# Patient Record
Sex: FEMALE | Race: BLACK OR AFRICAN AMERICAN | NOT HISPANIC OR LATINO | Employment: OTHER | URBAN - METROPOLITAN AREA
[De-identification: names, ages, dates, MRNs, and addresses within clinical notes are randomized per-mention and may not be internally consistent; named-entity substitution may affect disease eponyms.]

---

## 2018-02-05 ENCOUNTER — HOSPITAL ENCOUNTER (OUTPATIENT)
Facility: HOSPITAL | Age: 48
Discharge: HOME | End: 2018-02-06
Attending: EMERGENCY MEDICINE | Admitting: INTERNAL MEDICINE
Payer: MEDICAID

## 2018-02-05 DIAGNOSIS — J98.4 RESTRICTIVE LUNG DISEASE: Chronic | ICD-10-CM

## 2018-02-05 DIAGNOSIS — Z79.4 TYPE 2 DIABETES MELLITUS WITHOUT COMPLICATION, WITH LONG-TERM CURRENT USE OF INSULIN: Chronic | ICD-10-CM

## 2018-02-05 DIAGNOSIS — Z72.0 TOBACCO ABUSE: Chronic | ICD-10-CM

## 2018-02-05 DIAGNOSIS — I10 ESSENTIAL HYPERTENSION: Chronic | ICD-10-CM

## 2018-02-05 DIAGNOSIS — E11.9 TYPE 2 DIABETES MELLITUS WITHOUT COMPLICATION, WITH LONG-TERM CURRENT USE OF INSULIN: Chronic | ICD-10-CM

## 2018-02-05 DIAGNOSIS — R07.9 CHEST PAIN: ICD-10-CM

## 2018-02-05 DIAGNOSIS — F41.9 ANXIETY: Chronic | ICD-10-CM

## 2018-02-05 DIAGNOSIS — I50.9 CONGESTIVE HEART FAILURE, UNSPECIFIED CONGESTIVE HEART FAILURE CHRONICITY, UNSPECIFIED CONGESTIVE HEART FAILURE TYPE: Primary | Chronic | ICD-10-CM

## 2018-02-05 PROBLEM — J43.9 EMPHYSEMA LUNG: Chronic | Status: ACTIVE | Noted: 2018-02-05

## 2018-02-05 LAB
ALBUMIN SERPL BCP-MCNC: 3.6 G/DL
ALP SERPL-CCNC: 86 U/L
ALT SERPL W/O P-5'-P-CCNC: 20 U/L
ANION GAP SERPL CALC-SCNC: 9 MMOL/L
AST SERPL-CCNC: 19 U/L
B-HCG UR QL: NEGATIVE
BACTERIA #/AREA URNS HPF: NORMAL /HPF
BASOPHILS # BLD AUTO: 0.04 K/UL
BASOPHILS NFR BLD: 0.7 %
BILIRUB SERPL-MCNC: 0.6 MG/DL
BILIRUB UR QL STRIP: NEGATIVE
BNP SERPL-MCNC: 16 PG/ML
BUN SERPL-MCNC: 7 MG/DL
CALCIUM SERPL-MCNC: 8.8 MG/DL
CHLORIDE SERPL-SCNC: 103 MMOL/L
CLARITY UR: CLEAR
CO2 SERPL-SCNC: 26 MMOL/L
COLOR UR: YELLOW
CREAT SERPL-MCNC: 0.9 MG/DL
DIFFERENTIAL METHOD: ABNORMAL
EOSINOPHIL # BLD AUTO: 0.1 K/UL
EOSINOPHIL NFR BLD: 1.1 %
ERYTHROCYTE [DISTWIDTH] IN BLOOD BY AUTOMATED COUNT: 15.3 %
EST. GFR  (AFRICAN AMERICAN): >60 ML/MIN/1.73 M^2
EST. GFR  (NON AFRICAN AMERICAN): >60 ML/MIN/1.73 M^2
FLUAV AG SPEC QL IA: NEGATIVE
FLUBV AG SPEC QL IA: NEGATIVE
GLUCOSE SERPL-MCNC: 329 MG/DL
GLUCOSE UR QL STRIP: ABNORMAL
HCT VFR BLD AUTO: 40 %
HGB BLD-MCNC: 13.2 G/DL
HGB UR QL STRIP: NEGATIVE
HYALINE CASTS #/AREA URNS LPF: 0 /LPF
KETONES UR QL STRIP: NEGATIVE
LEUKOCYTE ESTERASE UR QL STRIP: NEGATIVE
LYMPHOCYTES # BLD AUTO: 1.8 K/UL
LYMPHOCYTES NFR BLD: 32.4 %
MCH RBC QN AUTO: 22.7 PG
MCHC RBC AUTO-ENTMCNC: 33 G/DL
MCV RBC AUTO: 69 FL
MICROSCOPIC COMMENT: NORMAL
MONOCYTES # BLD AUTO: 0.6 K/UL
MONOCYTES NFR BLD: 11 %
NEUTROPHILS # BLD AUTO: 3 K/UL
NEUTROPHILS NFR BLD: 54.8 %
NITRITE UR QL STRIP: NEGATIVE
PH UR STRIP: 6 [PH] (ref 5–8)
PLATELET # BLD AUTO: 219 K/UL
PMV BLD AUTO: 12.6 FL
POCT GLUCOSE: 277 MG/DL (ref 70–110)
POCT GLUCOSE: 280 MG/DL (ref 70–110)
POTASSIUM SERPL-SCNC: 3.8 MMOL/L
PROT SERPL-MCNC: 7.1 G/DL
PROT UR QL STRIP: ABNORMAL
RBC # BLD AUTO: 5.81 M/UL
RBC #/AREA URNS HPF: 1 /HPF (ref 0–4)
SODIUM SERPL-SCNC: 138 MMOL/L
SP GR UR STRIP: >1.03 (ref 1–1.03)
SPECIMEN SOURCE: NORMAL
SQUAMOUS #/AREA URNS HPF: 4 /HPF
TROPONIN I SERPL DL<=0.01 NG/ML-MCNC: <0.006 NG/ML
TROPONIN I SERPL DL<=0.01 NG/ML-MCNC: <0.006 NG/ML
URN SPEC COLLECT METH UR: ABNORMAL
UROBILINOGEN UR STRIP-ACNC: NEGATIVE EU/DL
WBC # BLD AUTO: 5.44 K/UL
WBC #/AREA URNS HPF: 1 /HPF (ref 0–5)
YEAST URNS QL MICRO: NORMAL

## 2018-02-05 PROCEDURE — 84484 ASSAY OF TROPONIN QUANT: CPT | Mod: 91

## 2018-02-05 PROCEDURE — 63600175 PHARM REV CODE 636 W HCPCS: Performed by: HOSPITALIST

## 2018-02-05 PROCEDURE — 63600175 PHARM REV CODE 636 W HCPCS: Performed by: EMERGENCY MEDICINE

## 2018-02-05 PROCEDURE — 84484 ASSAY OF TROPONIN QUANT: CPT

## 2018-02-05 PROCEDURE — 82962 GLUCOSE BLOOD TEST: CPT

## 2018-02-05 PROCEDURE — 94761 N-INVAS EAR/PLS OXIMETRY MLT: CPT

## 2018-02-05 PROCEDURE — 25000003 PHARM REV CODE 250: Performed by: EMERGENCY MEDICINE

## 2018-02-05 PROCEDURE — 81025 URINE PREGNANCY TEST: CPT

## 2018-02-05 PROCEDURE — 99285 EMERGENCY DEPT VISIT HI MDM: CPT | Mod: 25

## 2018-02-05 PROCEDURE — 81000 URINALYSIS NONAUTO W/SCOPE: CPT

## 2018-02-05 PROCEDURE — 25000242 PHARM REV CODE 250 ALT 637 W/ HCPCS: Performed by: HOSPITALIST

## 2018-02-05 PROCEDURE — 25000003 PHARM REV CODE 250: Performed by: HOSPITALIST

## 2018-02-05 PROCEDURE — 80053 COMPREHEN METABOLIC PANEL: CPT

## 2018-02-05 PROCEDURE — S4991 NICOTINE PATCH NONLEGEND: HCPCS | Performed by: HOSPITALIST

## 2018-02-05 PROCEDURE — 93005 ELECTROCARDIOGRAM TRACING: CPT

## 2018-02-05 PROCEDURE — 36415 COLL VENOUS BLD VENIPUNCTURE: CPT

## 2018-02-05 PROCEDURE — 83880 ASSAY OF NATRIURETIC PEPTIDE: CPT

## 2018-02-05 PROCEDURE — 94640 AIRWAY INHALATION TREATMENT: CPT

## 2018-02-05 PROCEDURE — 25500020 PHARM REV CODE 255: Performed by: EMERGENCY MEDICINE

## 2018-02-05 PROCEDURE — 96375 TX/PRO/DX INJ NEW DRUG ADDON: CPT | Mod: 59

## 2018-02-05 PROCEDURE — 96374 THER/PROPH/DIAG INJ IV PUSH: CPT | Mod: 59

## 2018-02-05 PROCEDURE — G0378 HOSPITAL OBSERVATION PER HR: HCPCS

## 2018-02-05 PROCEDURE — 87400 INFLUENZA A/B EACH AG IA: CPT

## 2018-02-05 PROCEDURE — 85025 COMPLETE CBC W/AUTO DIFF WBC: CPT

## 2018-02-05 PROCEDURE — 93010 ELECTROCARDIOGRAM REPORT: CPT | Mod: ,,, | Performed by: INTERNAL MEDICINE

## 2018-02-05 RX ORDER — ASPIRIN 325 MG
325 TABLET, DELAYED RELEASE (ENTERIC COATED) ORAL
Status: COMPLETED | OUTPATIENT
Start: 2018-02-05 | End: 2018-02-05

## 2018-02-05 RX ORDER — CLONIDINE HYDROCHLORIDE 0.1 MG/1
0.1 TABLET ORAL EVERY 6 HOURS PRN
Status: DISCONTINUED | OUTPATIENT
Start: 2018-02-05 | End: 2018-02-06 | Stop reason: HOSPADM

## 2018-02-05 RX ORDER — RISPERIDONE 1 MG/1
2 TABLET ORAL 2 TIMES DAILY
Status: DISCONTINUED | OUTPATIENT
Start: 2018-02-05 | End: 2018-02-06 | Stop reason: HOSPADM

## 2018-02-05 RX ORDER — POTASSIUM CHLORIDE 20 MEQ/1
20 TABLET, EXTENDED RELEASE ORAL DAILY
Status: DISCONTINUED | OUTPATIENT
Start: 2018-02-06 | End: 2018-02-06 | Stop reason: HOSPADM

## 2018-02-05 RX ORDER — VENLAFAXINE HYDROCHLORIDE 37.5 MG/1
75 CAPSULE, EXTENDED RELEASE ORAL DAILY
Status: DISCONTINUED | OUTPATIENT
Start: 2018-02-06 | End: 2018-02-06 | Stop reason: HOSPADM

## 2018-02-05 RX ORDER — PANTOPRAZOLE SODIUM 40 MG/1
40 TABLET, DELAYED RELEASE ORAL DAILY
Status: DISCONTINUED | OUTPATIENT
Start: 2018-02-06 | End: 2018-02-06 | Stop reason: HOSPADM

## 2018-02-05 RX ORDER — KETOROLAC TROMETHAMINE 30 MG/ML
15 INJECTION, SOLUTION INTRAMUSCULAR; INTRAVENOUS ONCE
Status: COMPLETED | OUTPATIENT
Start: 2018-02-05 | End: 2018-02-05

## 2018-02-05 RX ORDER — AMLODIPINE BESYLATE 5 MG/1
10 TABLET ORAL DAILY
Status: DISCONTINUED | OUTPATIENT
Start: 2018-02-06 | End: 2018-02-06 | Stop reason: HOSPADM

## 2018-02-05 RX ORDER — GLUCAGON 1 MG
1 KIT INJECTION
Status: DISCONTINUED | OUTPATIENT
Start: 2018-02-05 | End: 2018-02-06 | Stop reason: HOSPADM

## 2018-02-05 RX ORDER — KETOROLAC TROMETHAMINE 30 MG/ML
15 INJECTION, SOLUTION INTRAMUSCULAR; INTRAVENOUS EVERY 6 HOURS PRN
Status: DISCONTINUED | OUTPATIENT
Start: 2018-02-05 | End: 2018-02-06

## 2018-02-05 RX ORDER — IBUPROFEN 200 MG
1 TABLET ORAL DAILY
Status: DISCONTINUED | OUTPATIENT
Start: 2018-02-05 | End: 2018-02-06 | Stop reason: HOSPADM

## 2018-02-05 RX ORDER — GLIMEPIRIDE 4 MG/1
4 TABLET ORAL
Status: CANCELLED | OUTPATIENT
Start: 2018-02-06

## 2018-02-05 RX ORDER — NITROGLYCERIN 0.4 MG/1
0.4 TABLET SUBLINGUAL
Status: COMPLETED | OUTPATIENT
Start: 2018-02-05 | End: 2018-02-05

## 2018-02-05 RX ORDER — HYDROMORPHONE HYDROCHLORIDE 2 MG/ML
1 INJECTION, SOLUTION INTRAMUSCULAR; INTRAVENOUS; SUBCUTANEOUS ONCE
Status: COMPLETED | OUTPATIENT
Start: 2018-02-05 | End: 2018-02-05

## 2018-02-05 RX ORDER — ONDANSETRON 2 MG/ML
8 INJECTION INTRAMUSCULAR; INTRAVENOUS
Status: COMPLETED | OUTPATIENT
Start: 2018-02-05 | End: 2018-02-05

## 2018-02-05 RX ORDER — HYDROCODONE BITARTRATE AND ACETAMINOPHEN 5; 325 MG/1; MG/1
1 TABLET ORAL EVERY 4 HOURS PRN
Status: DISCONTINUED | OUTPATIENT
Start: 2018-02-05 | End: 2018-02-06 | Stop reason: HOSPADM

## 2018-02-05 RX ORDER — IBUPROFEN 200 MG
24 TABLET ORAL
Status: DISCONTINUED | OUTPATIENT
Start: 2018-02-05 | End: 2018-02-06 | Stop reason: HOSPADM

## 2018-02-05 RX ORDER — IPRATROPIUM BROMIDE AND ALBUTEROL SULFATE 2.5; .5 MG/3ML; MG/3ML
3 SOLUTION RESPIRATORY (INHALATION) EVERY 6 HOURS
Status: DISCONTINUED | OUTPATIENT
Start: 2018-02-05 | End: 2018-02-06 | Stop reason: HOSPADM

## 2018-02-05 RX ORDER — ENOXAPARIN SODIUM 100 MG/ML
40 INJECTION SUBCUTANEOUS DAILY
Status: DISCONTINUED | OUTPATIENT
Start: 2018-02-06 | End: 2018-02-06 | Stop reason: HOSPADM

## 2018-02-05 RX ORDER — FUROSEMIDE 40 MG/1
40 TABLET ORAL 2 TIMES DAILY
Status: DISCONTINUED | OUTPATIENT
Start: 2018-02-05 | End: 2018-02-06 | Stop reason: HOSPADM

## 2018-02-05 RX ORDER — ACETAMINOPHEN 325 MG/1
650 TABLET ORAL EVERY 8 HOURS PRN
Status: DISCONTINUED | OUTPATIENT
Start: 2018-02-05 | End: 2018-02-06 | Stop reason: HOSPADM

## 2018-02-05 RX ORDER — FLUTICASONE FUROATE AND VILANTEROL 200; 25 UG/1; UG/1
1 POWDER RESPIRATORY (INHALATION) DAILY
Status: CANCELLED | OUTPATIENT
Start: 2018-02-06

## 2018-02-05 RX ORDER — GABAPENTIN 300 MG/1
300 CAPSULE ORAL 2 TIMES DAILY
Status: DISCONTINUED | OUTPATIENT
Start: 2018-02-05 | End: 2018-02-06 | Stop reason: HOSPADM

## 2018-02-05 RX ORDER — BUPROPION HYDROCHLORIDE 150 MG/1
150 TABLET, EXTENDED RELEASE ORAL 2 TIMES DAILY
Status: DISCONTINUED | OUTPATIENT
Start: 2018-02-05 | End: 2018-02-06 | Stop reason: HOSPADM

## 2018-02-05 RX ORDER — IBUPROFEN 200 MG
16 TABLET ORAL
Status: DISCONTINUED | OUTPATIENT
Start: 2018-02-05 | End: 2018-02-06 | Stop reason: HOSPADM

## 2018-02-05 RX ORDER — ONDANSETRON 2 MG/ML
4 INJECTION INTRAMUSCULAR; INTRAVENOUS EVERY 8 HOURS PRN
Status: DISCONTINUED | OUTPATIENT
Start: 2018-02-05 | End: 2018-02-06 | Stop reason: HOSPADM

## 2018-02-05 RX ORDER — INSULIN ASPART 100 [IU]/ML
1-10 INJECTION, SOLUTION INTRAVENOUS; SUBCUTANEOUS
Status: DISCONTINUED | OUTPATIENT
Start: 2018-02-05 | End: 2018-02-06 | Stop reason: HOSPADM

## 2018-02-05 RX ORDER — TIOTROPIUM BROMIDE 18 UG/1
18 CAPSULE ORAL; RESPIRATORY (INHALATION) DAILY
Status: CANCELLED | OUTPATIENT
Start: 2018-02-06

## 2018-02-05 RX ORDER — MORPHINE SULFATE 10 MG/ML
4 INJECTION, SOLUTION INTRAMUSCULAR; INTRAVENOUS EVERY 4 HOURS PRN
Status: DISCONTINUED | OUTPATIENT
Start: 2018-02-05 | End: 2018-02-05

## 2018-02-05 RX ADMIN — NITROGLYCERIN 0.4 MG: 0.4 TABLET SUBLINGUAL at 04:02

## 2018-02-05 RX ADMIN — HYDROCODONE BITARTRATE AND ACETAMINOPHEN 1 TABLET: 5; 325 TABLET ORAL at 05:02

## 2018-02-05 RX ADMIN — NICOTINE 1 PATCH: 21 PATCH, EXTENDED RELEASE TRANSDERMAL at 09:02

## 2018-02-05 RX ADMIN — ONDANSETRON 8 MG: 2 INJECTION INTRAMUSCULAR; INTRAVENOUS at 12:02

## 2018-02-05 RX ADMIN — IOHEXOL 70 ML: 350 INJECTION, SOLUTION INTRAVENOUS at 11:02

## 2018-02-05 RX ADMIN — INSULIN ASPART 3 UNITS: 100 INJECTION, SOLUTION INTRAVENOUS; SUBCUTANEOUS at 09:02

## 2018-02-05 RX ADMIN — BUPROPION HYDROCHLORIDE 150 MG: 150 TABLET, FILM COATED, EXTENDED RELEASE ORAL at 09:02

## 2018-02-05 RX ADMIN — GUAIFENESIN AND DEXTROMETHORPHAN HYDROBROMIDE 1 TABLET: 600; 30 TABLET, EXTENDED RELEASE ORAL at 09:02

## 2018-02-05 RX ADMIN — HYDROMORPHONE HYDROCHLORIDE 1 MG: 2 INJECTION, SOLUTION INTRAMUSCULAR; INTRAVENOUS; SUBCUTANEOUS at 07:02

## 2018-02-05 RX ADMIN — CLONIDINE HYDROCHLORIDE 0.1 MG: 0.1 TABLET ORAL at 09:02

## 2018-02-05 RX ADMIN — ASPIRIN 325 MG: 325 TABLET, DELAYED RELEASE ORAL at 04:02

## 2018-02-05 RX ADMIN — IPRATROPIUM BROMIDE AND ALBUTEROL SULFATE 3 ML: .5; 3 SOLUTION RESPIRATORY (INHALATION) at 09:02

## 2018-02-05 RX ADMIN — RISPERIDONE 2 MG: 1 TABLET ORAL at 09:02

## 2018-02-05 RX ADMIN — GABAPENTIN 300 MG: 300 CAPSULE ORAL at 09:02

## 2018-02-05 RX ADMIN — KETOROLAC TROMETHAMINE 15 MG: 30 INJECTION, SOLUTION INTRAMUSCULAR at 12:02

## 2018-02-05 RX ADMIN — FUROSEMIDE 40 MG: 40 TABLET ORAL at 07:02

## 2018-02-05 NOTE — ED TRIAGE NOTES
"Pt to the ED with c/o sharp L sided cheat pain which began around 1000 yesterday morning. Pt reports cough and SOB x "couple of days". Pt states she has x of CHF and she stopped taking fluid pills. Pt denies fever, chill, diarrhea, nausea and vomiting. No acute distress noted. Pt placed on cardiac monitor. 12 lead obtained.   "

## 2018-02-05 NOTE — ED NOTES
POCT glucose checked after pt finished dinner tray. MD notified, per Dr. Carrasquillo, do not follow sliding scale due to late accuchek.

## 2018-02-05 NOTE — ED PROVIDER NOTES
"Encounter Date: 2018    SCRIBE #1 NOTE: I, Pina Nguyễn, am scribing for, and in the presence of,  Marielena Carrasquillo MD. I have scribed the following portions of the note - Other sections scribed: ROS, HPI and PE.       History     Chief Complaint   Patient presents with    Shortness of Breath     Pt reports increase shortness of breath x several days with onset of right sided chest and shoulder pain.      CC: Shortness of Breath; Chest Pain  HPI: This 47 y.o. female with a past medical history of Anxiety; Bronchitis; CHF; DM; Emphysema lung; HTN; Restrictive lung disease; and Sleep apnea,  presents to the ED complaining of progressively worsening SOB for last 2 days,  dry cough for last 2 days, acute onset of severe (10/10) constant non radiating L sided chest pain "rib pain" today.  Her chest pain is worse and severe with coughing. No alleviating factors. She self discontinued Lasix a month ago, stating "I did not have any fluid." She has some swelling to her legs. She reports atraumatic R leg pain. She reports hx of PE in .  She reports taking x2 800 MG ibuprofen 5 hrs ago PTA with minimal relief. Denies any recent sick exposure. UTD w/flu shot. No other associated sx.       The history is provided by the patient. No  was used.     Review of patient's allergies indicates:   Allergen Reactions    Hydrochlorothiazide plus      Past Medical History:   Diagnosis Date    Anxiety     Arthritis     Bronchitis     CHF (congestive heart failure)     DM (diabetes mellitus)     Emphysema lung     Encounter for blood transfusion     HTN (hypertension)     Restrictive lung disease     Sleep apnea      Past Surgical History:   Procedure Laterality Date     SECTION      PALATAL EXPANSION      WRIST SURGERY Right      Family History   Problem Relation Age of Onset    Diabetes Mother     Hypertension Mother     Hypertension Father     Diabetes Father     Diabetes Sister  "     Social History   Substance Use Topics    Smoking status: Current Some Day Smoker     Packs/day: 0.25     Types: Cigarettes    Smokeless tobacco: Never Used    Alcohol use Yes     Review of Systems   Constitutional: Negative for chills and fever.   HENT: Negative for rhinorrhea and sore throat.    Respiratory: Positive for cough (dry).    Cardiovascular: Positive for chest pain (left sided).   Gastrointestinal: Negative for abdominal pain, constipation, diarrhea, nausea and vomiting.   Genitourinary: Negative for dysuria.   Musculoskeletal:        (+) R leg pain   All other systems reviewed and are negative.      Physical Exam     Initial Vitals [02/05/18 1024]   BP Pulse Resp Temp SpO2   (!) 175/115 88 (!) 22 97.7 °F (36.5 °C) 95 %      MAP       135           Vitals:    02/05/18 2334 02/06/18 0027 02/06/18 0442 02/06/18 0727   BP: 130/76  (!) 148/94 138/85   BP Location: Left arm  Left arm Right arm   Patient Position: Sitting  Sitting Lying   Pulse: 65 (!) 56 70 66   Resp: 18 18 18 18   Temp: 98 °F (36.7 °C)  98.6 °F (37 °C) 98.9 °F (37.2 °C)   TempSrc: Oral  Oral Oral   SpO2: 95% 98% 95% (!) 94%   Weight:   110.3 kg (243 lb 2.7 oz)    Height:           Physical Exam    Nursing note and vitals reviewed.  Constitutional:   Conversational, interactive   HENT:   Mouth/Throat: Oropharynx is clear and moist.   Eyes: EOM are normal. Pupils are equal, round, and reactive to light.   Cardiovascular: Intact distal pulses.   see documented heart rate and blood pressure   Pulmonary/Chest: No respiratory distress. She has decreased breath sounds (bilateral lung bases). She has no wheezes. She has no rhonchi. She has no rales.   Abdominal: Soft. There is no tenderness. There is no rebound and no guarding.   Musculoskeletal: Normal range of motion.   The patient has trace edema to her BLE.   Neurological: She is alert and oriented to person, place, and time.   No obvious focal deficit   Skin: Skin is warm.   Psychiatric:  She has a normal mood and affect.         ED Course   Procedures  Labs Reviewed   CBC W/ AUTO DIFFERENTIAL - Abnormal; Notable for the following:        Result Value    RBC 5.81 (*)     MCV 69 (*)     MCH 22.7 (*)     RDW 15.3 (*)     All other components within normal limits   COMPREHENSIVE METABOLIC PANEL - Abnormal; Notable for the following:     Glucose 329 (*)     All other components within normal limits   URINALYSIS - Abnormal; Notable for the following:     Specific Gravity, UA >1.030 (*)     Protein, UA 2+ (*)     Glucose, UA 3+ (*)     All other components within normal limits   POCT GLUCOSE - Abnormal; Notable for the following:     POCT Glucose 277 (*)     All other components within normal limits   B-TYPE NATRIURETIC PEPTIDE   TROPONIN I   INFLUENZA A AND B ANTIGEN   PREGNANCY TEST, URINE RAPID   URINALYSIS MICROSCOPIC     X-Ray Chest PA And Lateral   Final Result      No acute cardiopulmonary process identified.         Electronically signed by: ALDO EUBANKS MD   Date:     02/05/18   Time:    20:57       CTA Chest Non-Coronary - PE Study   Final Result         1.  No pulmonary embolus or acute cardiopulmonary disease identified.         Electronically signed by: LIN DONATO MD   Date:     02/05/18   Time:    12:07       X-Ray Chest 1 View   Final Result    No acute cardiopulmonary disease         Electronically signed by: LIN DONATO MD   Date:     02/05/18   Time:    12:08         EKG Readings: (Independently Interpreted)   Rhythm: Normal Sinus Rhythm. Heart Rate: 74 BPM. Axis: Normal.   EKG at 10:39: NSR 74 BPM, nl axis, nl intervals, non specific T wave abnormality, non specific changes          Medical Decision Making:   ED Management:  At 1502, Patient states she is still having persistent chest pain.  1537, Discussed case with nurse practitionerQuinton for an admission to observation.             Scribe Attestation:   Scribe #1: I performed the above scribed service and the  documentation accurately describes the services I performed. I attest to the accuracy of the note.    Attending Attestation:           Physician Attestation for Scribe:  Physician Attestation Statement for Scribe #1: I, Marielena Carrasquillo MD, reviewed documentation, as scribed by Pina Nguyễn in my presence, and it is both accurate and complete.                 ED Course      Clinical Impression:   The primary encounter diagnosis was Congestive heart failure, unspecified congestive heart failure chronicity, unspecified congestive heart failure type. Diagnoses of Chest pain, Anxiety, Essential hypertension, Type 2 diabetes mellitus without complication, with long-term current use of insulin, Restrictive lung disease, and Tobacco abuse were also pertinent to this visit.                           Marielena Carrasquillo MD  02/06/18 5978

## 2018-02-06 VITALS
WEIGHT: 243.19 LBS | DIASTOLIC BLOOD PRESSURE: 85 MMHG | OXYGEN SATURATION: 94 % | BODY MASS INDEX: 41.52 KG/M2 | HEART RATE: 66 BPM | SYSTOLIC BLOOD PRESSURE: 138 MMHG | TEMPERATURE: 99 F | HEIGHT: 64 IN | RESPIRATION RATE: 18 BRPM

## 2018-02-06 LAB
ALBUMIN SERPL BCP-MCNC: 3.2 G/DL
ALP SERPL-CCNC: 83 U/L
ALT SERPL W/O P-5'-P-CCNC: 18 U/L
ANION GAP SERPL CALC-SCNC: 7 MMOL/L
AORTIC VALVE REGURGITATION: NORMAL
AORTIC VALVE STENOSIS: NORMAL
AST SERPL-CCNC: 15 U/L
BASOPHILS # BLD AUTO: 0.01 K/UL
BASOPHILS NFR BLD: 0.2 %
BILIRUB SERPL-MCNC: 0.4 MG/DL
BUN SERPL-MCNC: 7 MG/DL
CALCIUM SERPL-MCNC: 8.8 MG/DL
CHLORIDE SERPL-SCNC: 102 MMOL/L
CHOLEST SERPL-MCNC: 174 MG/DL
CHOLEST/HDLC SERPL: 2.9 {RATIO}
CO2 SERPL-SCNC: 28 MMOL/L
CREAT SERPL-MCNC: 0.8 MG/DL
DIFFERENTIAL METHOD: ABNORMAL
EOSINOPHIL # BLD AUTO: 0.1 K/UL
EOSINOPHIL NFR BLD: 1.4 %
ERYTHROCYTE [DISTWIDTH] IN BLOOD BY AUTOMATED COUNT: 15 %
EST. GFR  (AFRICAN AMERICAN): >60 ML/MIN/1.73 M^2
EST. GFR  (NON AFRICAN AMERICAN): >60 ML/MIN/1.73 M^2
ESTIMATED AVG GLUCOSE: 186 MG/DL
ESTIMATED PA SYSTOLIC PRESSURE: 32.81
GLOBAL PERICARDIAL EFFUSION: NORMAL
GLUCOSE SERPL-MCNC: 263 MG/DL
HBA1C MFR BLD HPLC: 8.1 %
HCT VFR BLD AUTO: 39 %
HDLC SERPL-MCNC: 61 MG/DL
HDLC SERPL: 35.1 %
HGB BLD-MCNC: 12.5 G/DL
HYPOCHROMIA BLD QL SMEAR: ABNORMAL
LDLC SERPL CALC-MCNC: 90 MG/DL
LYMPHOCYTES # BLD AUTO: 1.9 K/UL
LYMPHOCYTES NFR BLD: 43.9 %
MCH RBC QN AUTO: 22.8 PG
MCHC RBC AUTO-ENTMCNC: 32.1 G/DL
MCV RBC AUTO: 71 FL
MITRAL VALVE REGURGITATION: NORMAL
MONOCYTES # BLD AUTO: 0.5 K/UL
MONOCYTES NFR BLD: 11.4 %
NEUTROPHILS # BLD AUTO: 1.9 K/UL
NEUTROPHILS NFR BLD: 43.3 %
NONHDLC SERPL-MCNC: 113 MG/DL
PLATELET # BLD AUTO: 184 K/UL
PLATELET BLD QL SMEAR: ABNORMAL
PMV BLD AUTO: 10.7 FL
POCT GLUCOSE: 234 MG/DL (ref 70–110)
POTASSIUM SERPL-SCNC: 3.7 MMOL/L
PROT SERPL-MCNC: 6.6 G/DL
RBC # BLD AUTO: 5.49 M/UL
RETIRED EF AND QEF - SEE NOTES: 55 (ref 55–65)
SODIUM SERPL-SCNC: 137 MMOL/L
TRICUSPID VALVE REGURGITATION: NORMAL
TRIGL SERPL-MCNC: 115 MG/DL
TROPONIN I SERPL DL<=0.01 NG/ML-MCNC: <0.006 NG/ML
TSH SERPL DL<=0.005 MIU/L-ACNC: 2.87 UIU/ML
WBC # BLD AUTO: 4.4 K/UL

## 2018-02-06 PROCEDURE — 96374 THER/PROPH/DIAG INJ IV PUSH: CPT

## 2018-02-06 PROCEDURE — 85025 COMPLETE CBC W/AUTO DIFF WBC: CPT

## 2018-02-06 PROCEDURE — 94640 AIRWAY INHALATION TREATMENT: CPT

## 2018-02-06 PROCEDURE — 80061 LIPID PANEL: CPT

## 2018-02-06 PROCEDURE — 84443 ASSAY THYROID STIM HORMONE: CPT

## 2018-02-06 PROCEDURE — S4991 NICOTINE PATCH NONLEGEND: HCPCS | Performed by: HOSPITALIST

## 2018-02-06 PROCEDURE — 25000242 PHARM REV CODE 250 ALT 637 W/ HCPCS: Performed by: HOSPITALIST

## 2018-02-06 PROCEDURE — 25000003 PHARM REV CODE 250: Performed by: HOSPITALIST

## 2018-02-06 PROCEDURE — 80053 COMPREHEN METABOLIC PANEL: CPT

## 2018-02-06 PROCEDURE — 94761 N-INVAS EAR/PLS OXIMETRY MLT: CPT

## 2018-02-06 PROCEDURE — 93306 TTE W/DOPPLER COMPLETE: CPT

## 2018-02-06 PROCEDURE — 93306 TTE W/DOPPLER COMPLETE: CPT | Mod: 26,,, | Performed by: INTERNAL MEDICINE

## 2018-02-06 PROCEDURE — G0378 HOSPITAL OBSERVATION PER HR: HCPCS

## 2018-02-06 PROCEDURE — 25000003 PHARM REV CODE 250: Performed by: EMERGENCY MEDICINE

## 2018-02-06 PROCEDURE — 83036 HEMOGLOBIN GLYCOSYLATED A1C: CPT

## 2018-02-06 RX ORDER — IBUPROFEN 800 MG/1
800 TABLET ORAL 3 TIMES DAILY
Qty: 15 TABLET | Refills: 0 | OUTPATIENT
Start: 2018-02-06 | End: 2019-08-31

## 2018-02-06 RX ORDER — KETOROLAC TROMETHAMINE 30 MG/ML
15 INJECTION, SOLUTION INTRAMUSCULAR; INTRAVENOUS ONCE
Status: DISCONTINUED | OUTPATIENT
Start: 2018-02-06 | End: 2018-02-06 | Stop reason: HOSPADM

## 2018-02-06 RX ADMIN — PANTOPRAZOLE SODIUM 40 MG: 40 TABLET, DELAYED RELEASE ORAL at 08:02

## 2018-02-06 RX ADMIN — VENLAFAXINE HYDROCHLORIDE 75 MG: 37.5 CAPSULE, EXTENDED RELEASE ORAL at 08:02

## 2018-02-06 RX ADMIN — RISPERIDONE 2 MG: 1 TABLET ORAL at 08:02

## 2018-02-06 RX ADMIN — GABAPENTIN 300 MG: 300 CAPSULE ORAL at 08:02

## 2018-02-06 RX ADMIN — GUAIFENESIN AND DEXTROMETHORPHAN HYDROBROMIDE 1 TABLET: 600; 30 TABLET, EXTENDED RELEASE ORAL at 08:02

## 2018-02-06 RX ADMIN — POTASSIUM CHLORIDE 20 MEQ: 1500 TABLET, EXTENDED RELEASE ORAL at 08:02

## 2018-02-06 RX ADMIN — IPRATROPIUM BROMIDE AND ALBUTEROL SULFATE 3 ML: .5; 3 SOLUTION RESPIRATORY (INHALATION) at 12:02

## 2018-02-06 RX ADMIN — BUPROPION HYDROCHLORIDE 150 MG: 150 TABLET, FILM COATED, EXTENDED RELEASE ORAL at 08:02

## 2018-02-06 RX ADMIN — AMLODIPINE BESYLATE 10 MG: 5 TABLET ORAL at 08:02

## 2018-02-06 RX ADMIN — NICOTINE 1 PATCH: 21 PATCH, EXTENDED RELEASE TRANSDERMAL at 08:02

## 2018-02-06 RX ADMIN — FUROSEMIDE 40 MG: 40 TABLET ORAL at 08:02

## 2018-02-06 NOTE — ASSESSMENT & PLAN NOTE
Inpatient psychiatric hospitalization in the past, has been out of her medications for roughly 1 year.  Will resume venlafaxine, risperidone, and bupropion.  She appears stable with sound judgement and can safely care for herself.  No SI/HI.

## 2018-02-06 NOTE — ASSESSMENT & PLAN NOTE
Appears stable without sign of acute decompensation.  No echo on record.  Check echo and continue home medications.

## 2018-02-06 NOTE — PLAN OF CARE
02/06/18 1140   Final Note   Assessment Type Final Discharge Note   Discharge Disposition Home   What phone number can be called within the next 1-3 days to see how you are doing after discharge? ([phone])   Hospital Follow Up  Appt(s) scheduled? Yes   Discharge plans and expectations educations in teach back method with documentation complete? Yes   Right Care Referral Info   Post Acute Recommendation No Care   Reminded Ms Mosqueda things she will be responsible for to manage her healthcare at home: getting Rx filled, attending follow up appointments, and taking medication as prescribed were discussed.   Teach back method used.  All questions answered.  Patient verbalized understanding of all information.      Patient requested to schedule own appointments stating that she sees [facility] doctors.    NurseAnnie notified that all CM needs are met.

## 2018-02-06 NOTE — SUBJECTIVE & OBJECTIVE
Past Medical History:   Diagnosis Date    Anxiety     Arthritis     Bronchitis     CHF (congestive heart failure)     DM (diabetes mellitus)     Emphysema lung     Encounter for blood transfusion     HTN (hypertension)     Restrictive lung disease     Sleep apnea        Past Surgical History:   Procedure Laterality Date     SECTION      PALATAL EXPANSION      WRIST SURGERY Right        Review of patient's allergies indicates:   Allergen Reactions    Hydrochlorothiazide plus        No current facility-administered medications on file prior to encounter.      Current Outpatient Prescriptions on File Prior to Encounter   Medication Sig    amlodipine (NORVASC) 10 MG tablet Take 10 mg by mouth once daily.    INSULIN ASPART PROTAM & ASPART (NOVOLOG MIX 70-30 SUBQ) Inject into the skin.    omeprazole (PRILOSEC) 40 MG capsule Take 40 mg by mouth once daily.    albuterol (ACCUNEB) 0.63 mg/3 mL Nebu Take 0.63 mg by nebulization every 6 (six) hours as needed.    albuterol (ACCUNEB) 1.25 mg/3 mL Nebu Take 1.25 mg by nebulization every 6 (six) hours as needed.    alprazolam (XANAX) 0.25 MG tablet Take 0.25 mg by mouth 3 (three) times daily.    aspirin 81 MG Chew Take 81 mg by mouth once daily.    buPROPion (WELLBUTRIN SR) 100 MG TBSR 12 hr tablet Take 150 mg by mouth 2 (two) times daily.     fluticasone-salmeterol 500-50 mcg/dose (ADVAIR DISKUS) 500-50 mcg/dose DsDv diskus inhaler Inhale 1 puff into the lungs 2 (two) times daily.    furosemide (LASIX) 40 MG tablet Take 40 mg by mouth 2 (two) times daily.    gabapentin (NEURONTIN) 300 mg tablet Take 300 mg by mouth 2 (two) times daily.    glimepiride (AMARYL) 4 MG tablet Take 4 mg by mouth before breakfast.    hydrOXYzine (ATARAX) 50 MG tablet Take 50 mg by mouth 4 (four) times daily.    naproxen (NAPROSYN) 500 MG tablet Take 500 mg by mouth 2 (two) times daily.    potassium chloride SA (K-DUR,KLOR-CON) 20 MEQ tablet Take 20 mEq by mouth once  daily.    predniSONE (DELTASONE) 10 MG tablet Take 6 tabs x 3 days, then  Take 4 tabs x 3 days, then   Take 2 tab x 2 days, then  Take 1 tab x 2 days.    risperidone (RISPERDAL) 2 MG tablet Take 2 mg by mouth 2 (two) times daily.    tiotropium (SPIRIVA) 18 mcg inhalation capsule Inhale 18 mcg into the lungs once daily.    venlafaxine (EFFEXOR-XR) 150 MG Cp24 Take 75 mg by mouth once daily.     Family History     Problem Relation (Age of Onset)    Diabetes Mother, Father, Sister    Hypertension Mother, Father        Social History Main Topics    Smoking status: Current Some Day Smoker     Packs/day: 0.25     Types: Cigarettes    Smokeless tobacco: Never Used    Alcohol use Yes    Drug use: No    Sexual activity: Not on file     Review of Systems   Constitutional: Negative for chills, fatigue and fever.   Eyes: Negative for photophobia and visual disturbance.   Respiratory: Positive for cough (chronic). Negative for shortness of breath.    Cardiovascular: Positive for chest pain (left lateral and posterior rib pain). Negative for palpitations and leg swelling.   Gastrointestinal: Negative for abdominal pain, diarrhea, nausea and vomiting.   Genitourinary: Negative for dysuria, frequency and urgency.   Skin: Negative for pallor, rash and wound.   Neurological: Negative for dizziness, syncope, light-headedness and headaches.   Psychiatric/Behavioral: Negative for confusion and decreased concentration.     Objective:     Vital Signs (Most Recent):  Temp: 97.5 °F (36.4 °C) (02/05/18 1925)  Pulse: 65 (02/05/18 1925)  Resp: 18 (02/05/18 1925)  BP: (!) 188/88 (02/05/18 1925)  SpO2: 100 % (02/05/18 1925) Vital Signs (24h Range):  Temp:  [97.5 °F (36.4 °C)-98.1 °F (36.7 °C)] 97.5 °F (36.4 °C)  Pulse:  [64-88] 65  Resp:  [18-22] 18  SpO2:  [94 %-100 %] 100 %  BP: (140-188)/() 188/88     Weight: 110.5 kg (243 lb 9.7 oz)  Body mass index is 41.82 kg/m².    Physical Exam   Constitutional: She is oriented to person,  place, and time. She appears well-developed and well-nourished. No distress.   HENT:   Head: Normocephalic and atraumatic.   Right Ear: External ear normal.   Left Ear: External ear normal.   Nose: Nose normal.   Mouth/Throat: Oropharynx is clear and moist.   Eyes: Conjunctivae and EOM are normal. Pupils are equal, round, and reactive to light.   Neck: Normal range of motion. Neck supple. No thyromegaly present.   Cardiovascular: Normal rate, regular rhythm and intact distal pulses.    Pulmonary/Chest: Effort normal and breath sounds normal. No respiratory distress. She has no wheezes.   Abdominal: Soft. Bowel sounds are normal. She exhibits no distension. There is no tenderness.   No palpable hepatomegaly or splenomegaly    Musculoskeletal: Normal range of motion. She exhibits tenderness. She exhibits no edema.        Arms:  Lymphadenopathy:     She has no cervical adenopathy.   Neurological: She is alert and oriented to person, place, and time.   Skin: Skin is warm and dry. Capillary refill takes less than 2 seconds.   Psychiatric: She has a normal mood and affect. Thought content normal. Her speech is rapid and/or pressured. She expresses no homicidal and no suicidal ideation.   Nursing note and vitals reviewed.        CRANIAL NERVES     CN III, IV, VI   Pupils are equal, round, and reactive to light.  Extraocular motions are normal.        Significant Labs: All pertinent labs within the past 24 hours have been reviewed.    Significant Imaging: I have reviewed and interpreted all pertinent imaging results/findings within the past 24 hours.

## 2018-02-06 NOTE — DISCHARGE SUMMARY
[facility] Hospital Medicine  Discharge Summary      Patient Name: Jailene Mosqueda  MRN: [id number]  Admission Date: 2/5/2018  Hospital Length of Stay: 0 days  Discharge Date and Time:  02/06/2018 11:18 AM  Attending Physician: Rebecca Lockwood MD   Discharging Provider: Nohelia Helms PA-C  Primary Care Provider: Primary Doctor No      HPI:   47 y.o. female with CHF, COPD, HTN, DM type 2, and anxiety presents with a complaint of chest pain.  Pain is located left lateral and posterior chest wall, sharp, severe, and exacerbated by coughing, deep breathing and palpation.  Acute onset with coughing yesterday.  She has had a chronic cough for many years with COPD which is currently not worse than baseline.  Chronic SOB that is also at baseline.  She continues to smoke.  Denies fever, chills, palpitations, orthopnea, PND, edema, dizziness, syncope, nausea, vomiting, diarrhea, abdominal pain, bloody or dark stools, dysuria, frequency, or urgency.  ED evaluation unremarkable.  EKG without evidence of acute ischemia, initial troponin negative, chest xray without acute abnormality.  Placed in observation for ACS rule out.    * No surgery found *      Hospital Course:   Patient presents with CP. In the ED, patient was found to have EKG without evidence of acute ischemia, initial troponin negative, chest xray without acute abnormality. Troponin's  remained negative throughout course of the hospital admission. Pain more consistent with MSK strain likely secondary to excessive coughing. Echo with EF of 55-60% and trivial aortic stenosis. Patient's chest pain on discharge was reproducible with palpation and patient was discharged home with 800mg ibuprofen. Advised against heavy lifting. Recommended follow up with PCP if symptoms persist. Urged to quit smoking as symptoms likely exacerbated by underlying lung dysfunction.     Consults:     No new Assessment & Plan notes have been filed under this hospital  service since the last note was generated.  Service: Hospital Medicine    Final Active Diagnoses:    Diagnosis Date Noted POA    PRINCIPAL PROBLEM:  Chest pain [R07.9] 02/05/2018 Yes    CHF (congestive heart failure) [I50.9] 02/05/2018 Yes     Chronic    Anxiety [F41.9] 02/05/2018 Yes     Chronic    Essential hypertension [I10] 02/05/2018 Yes     Chronic    Type 2 diabetes mellitus without complication, with long-term current use of insulin [E11.9, Z79.4] 02/05/2018 Not Applicable     Chronic    Emphysema lung [J43.9] 02/05/2018 Yes     Chronic    Restrictive lung disease [J98.4] 02/05/2018 Yes     Chronic    Tobacco abuse [Z72.0] 02/05/2018 Yes     Chronic      Problems Resolved During this Admission:    Diagnosis Date Noted Date Resolved POA       Discharged Condition: good    Disposition: Home or Self Care    Follow Up:    Patient Instructions:     Diet Cardiac     Diet diabetic     Activity as tolerated         Significant Diagnostic Studies: Labs:   CMP   Recent Labs  Lab 02/05/18  1039 02/06/18  0505    137   K 3.8 3.7    102   CO2 26 28   * 263*   BUN 7 7   CREATININE 0.9 0.8   CALCIUM 8.8 8.8   PROT 7.1 6.6   ALBUMIN 3.6 3.2*   BILITOT 0.6 0.4   ALKPHOS 86 83   AST 19 15   ALT 20 18   ANIONGAP 9 7*   ESTGFRAFRICA >60 >60   EGFRNONAA >60 >60   , CBC   Recent Labs  Lab 02/05/18  1039 02/06/18  0505   WBC 5.44 4.40   HGB 13.2 12.5   HCT 40.0 39.0    184    and Troponin   Recent Labs  Lab 02/05/18  2320   TROPONINI <0.006     Cardiac Graphics: Echocardiogram:   2D echo with color flow doppler:   Results for orders placed or performed during the hospital encounter of 02/05/18   2D echo with color flow doppler   Result Value Ref Range    EF 55 55 - 65    Mitral Valve Regurgitation TRIVIAL     Aortic Valve Regurgitation TRIVIAL     Aortic Valve Stenosis TRIVIAL     Est. PA Systolic Pressure 32.81     Pericardial Effusion NONE     Tricuspid Valve Regurgitation TRIVIAL TO MILD         Pending Diagnostic Studies:     Procedure Component Value Units Date/Time    Hemoglobin A1c [[id number]] Collected:  02/06/18 0505    Order Status:  Sent Lab Status:  In process Updated:  02/06/18 0505    Specimen:  Blood from Blood          Medications:  Reconciled Home Medications:   Current Discharge Medication List      START taking these medications    Details   ibuprofen (ADVIL,MOTRIN) 800 MG tablet Take 1 tablet (800 mg total) by mouth 3 (three) times daily.  Qty: 15 tablet, Refills: 0         CONTINUE these medications which have NOT CHANGED    Details   amlodipine (NORVASC) 10 MG tablet Take 10 mg by mouth once daily.      INSULIN ASPART PROTAM & ASPART (NOVOLOG MIX 70-30 SUBQ) Inject into the skin.      omeprazole (PRILOSEC) 40 MG capsule Take 40 mg by mouth once daily.      albuterol (ACCUNEB) 0.63 mg/3 mL Nebu Take 0.63 mg by nebulization every 6 (six) hours as needed.      albuterol (ACCUNEB) 1.25 mg/3 mL Nebu Take 1.25 mg by nebulization every 6 (six) hours as needed.      alprazolam (XANAX) 0.25 MG tablet Take 0.25 mg by mouth 3 (three) times daily.      aspirin 81 MG Chew Take 81 mg by mouth once daily.      buPROPion (WELLBUTRIN SR) 100 MG TBSR 12 hr tablet Take 150 mg by mouth 2 (two) times daily.       fluticasone-salmeterol 500-50 mcg/dose (ADVAIR DISKUS) 500-50 mcg/dose DsDv diskus inhaler Inhale 1 puff into the lungs 2 (two) times daily.      furosemide (LASIX) 40 MG tablet Take 40 mg by mouth 2 (two) times daily.      gabapentin (NEURONTIN) 300 mg tablet Take 300 mg by mouth 2 (two) times daily.      glimepiride (AMARYL) 4 MG tablet Take 4 mg by mouth before breakfast.      hydrOXYzine (ATARAX) 50 MG tablet Take 50 mg by mouth 4 (four) times daily.      naproxen (NAPROSYN) 500 MG tablet Take 500 mg by mouth 2 (two) times daily.      potassium chloride SA (K-DUR,KLOR-CON) 20 MEQ tablet Take 20 mEq by mouth once daily.      predniSONE (DELTASONE) 10 MG tablet Take 6 tabs x 3 days,  then  Take 4 tabs x 3 days, then   Take 2 tab x 2 days, then  Take 1 tab x 2 days.  Qty: 36 tablet, Refills: 0      risperidone (RISPERDAL) 2 MG tablet Take 2 mg by mouth 2 (two) times daily.      tiotropium (SPIRIVA) 18 mcg inhalation capsule Inhale 18 mcg into the lungs once daily.      venlafaxine (EFFEXOR-XR) 150 MG Cp24 Take 75 mg by mouth once daily.             Indwelling Lines/Drains at time of discharge:   Lines/Drains/Airways          No matching active lines, drains, or airways          Time spent on the discharge of patient: less than thirty minutes  Patient was seen and examined on the date of discharge and determined to be suitable for discharge.         Nohelia Helms PA-C  Hospitalist-Department of Hospital Medicine  [address] [place] [address]  Office [phone] Pager [phone]

## 2018-02-06 NOTE — NURSING
Patient arrived on unit from the ER dept. via stretcher. Patient ambulated with stand-by assistance to bed. General evaluation of patient completed.  Admit assessment initiated.  NAD

## 2018-02-06 NOTE — H&P
[facility] Hospital Medicine  History & Physical    Patient Name: Jailene Mosqueda  MRN: [id number]  Admission Date: 2018  Attending Physician: Semaj Malone MD   Primary Care Provider: Primary Doctor No         Patient information was obtained from patient, past medical records and ER records.     Subjective:     Principal Problem:Chest pain    Chief Complaint:   Chief Complaint   Patient presents with    Shortness of Breath     Pt reports increase shortness of breath x several days with onset of right sided chest and shoulder pain.         HPI: 47 y.o. female with CHF, COPD, HTN, DM type 2, and anxiety presents with a complaint of chest pain.  Pain is located left lateral and posterior chest wall, sharp, severe, and exacerbated by coughing, deep breathing and palpation.  Acute onset with coughing yesterday.  She has had a chronic cough for many years with COPD which is currently not worse than baseline.  Chronic SOB that is also at baseline.  She continues to smoke.  Denies fever, chills, palpitations, orthopnea, PND, edema, dizziness, syncope, nausea, vomiting, diarrhea, abdominal pain, bloody or dark stools, dysuria, frequency, or urgency.  ED evaluation unremarkable.  EKG without evidence of acute ischemia, initial troponin negative, chest xray without acute abnormality.  Placed in observation for ACS rule out.    Past Medical History:   Diagnosis Date    Anxiety     Arthritis     Bronchitis     CHF (congestive heart failure)     DM (diabetes mellitus)     Emphysema lung     Encounter for blood transfusion     HTN (hypertension)     Restrictive lung disease     Sleep apnea        Past Surgical History:   Procedure Laterality Date     SECTION      PALATAL EXPANSION      WRIST SURGERY Right        Review of patient's allergies indicates:   Allergen Reactions    Hydrochlorothiazide plus        No current facility-administered medications on file prior to  encounter.      Current Outpatient Prescriptions on File Prior to Encounter   Medication Sig    amlodipine (NORVASC) 10 MG tablet Take 10 mg by mouth once daily.    INSULIN ASPART PROTAM & ASPART (NOVOLOG MIX 70-30 SUBQ) Inject into the skin.    omeprazole (PRILOSEC) 40 MG capsule Take 40 mg by mouth once daily.    albuterol (ACCUNEB) 0.63 mg/3 mL Nebu Take 0.63 mg by nebulization every 6 (six) hours as needed.    albuterol (ACCUNEB) 1.25 mg/3 mL Nebu Take 1.25 mg by nebulization every 6 (six) hours as needed.    alprazolam (XANAX) 0.25 MG tablet Take 0.25 mg by mouth 3 (three) times daily.    aspirin 81 MG Chew Take 81 mg by mouth once daily.    buPROPion (WELLBUTRIN SR) 100 MG TBSR 12 hr tablet Take 150 mg by mouth 2 (two) times daily.     fluticasone-salmeterol 500-50 mcg/dose (ADVAIR DISKUS) 500-50 mcg/dose DsDv diskus inhaler Inhale 1 puff into the lungs 2 (two) times daily.    furosemide (LASIX) 40 MG tablet Take 40 mg by mouth 2 (two) times daily.    gabapentin (NEURONTIN) 300 mg tablet Take 300 mg by mouth 2 (two) times daily.    glimepiride (AMARYL) 4 MG tablet Take 4 mg by mouth before breakfast.    hydrOXYzine (ATARAX) 50 MG tablet Take 50 mg by mouth 4 (four) times daily.    naproxen (NAPROSYN) 500 MG tablet Take 500 mg by mouth 2 (two) times daily.    potassium chloride SA (K-DUR,KLOR-CON) 20 MEQ tablet Take 20 mEq by mouth once daily.    predniSONE (DELTASONE) 10 MG tablet Take 6 tabs x 3 days, then  Take 4 tabs x 3 days, then   Take 2 tab x 2 days, then  Take 1 tab x 2 days.    risperidone (RISPERDAL) 2 MG tablet Take 2 mg by mouth 2 (two) times daily.    tiotropium (SPIRIVA) 18 mcg inhalation capsule Inhale 18 mcg into the lungs once daily.    venlafaxine (EFFEXOR-XR) 150 MG Cp24 Take 75 mg by mouth once daily.     Family History     Problem Relation (Age of Onset)    Diabetes Mother, Father, Sister    Hypertension Mother, Father        Social History Main Topics    Smoking  status: Current Some Day Smoker     Packs/day: 0.25     Types: Cigarettes    Smokeless tobacco: Never Used    Alcohol use Yes    Drug use: No    Sexual activity: Not on file     Review of Systems   Constitutional: Negative for chills, fatigue and fever.   Eyes: Negative for photophobia and visual disturbance.   Respiratory: Positive for cough (chronic). Negative for shortness of breath.    Cardiovascular: Positive for chest pain (left lateral and posterior rib pain). Negative for palpitations and leg swelling.   Gastrointestinal: Negative for abdominal pain, diarrhea, nausea and vomiting.   Genitourinary: Negative for dysuria, frequency and urgency.   Skin: Negative for pallor, rash and wound.   Neurological: Negative for dizziness, syncope, light-headedness and headaches.   Psychiatric/Behavioral: Negative for confusion and decreased concentration.     Objective:     Vital Signs (Most Recent):  Temp: 97.5 °F (36.4 °C) (02/05/18 1925)  Pulse: 65 (02/05/18 1925)  Resp: 18 (02/05/18 1925)  BP: (!) 188/88 (02/05/18 1925)  SpO2: 100 % (02/05/18 1925) Vital Signs (24h Range):  Temp:  [97.5 °F (36.4 °C)-98.1 °F (36.7 °C)] 97.5 °F (36.4 °C)  Pulse:  [64-88] 65  Resp:  [18-22] 18  SpO2:  [94 %-100 %] 100 %  BP: (140-188)/() 188/88     Weight: 110.5 kg (243 lb 9.7 oz)  Body mass index is 41.82 kg/m².    Physical Exam   Constitutional: She is oriented to person, place, and time. She appears well-developed and well-nourished. No distress.   HENT:   Head: Normocephalic and atraumatic.   Right Ear: External ear normal.   Left Ear: External ear normal.   Nose: Nose normal.   Mouth/Throat: Oropharynx is clear and moist.   Eyes: Conjunctivae and EOM are normal. Pupils are equal, round, and reactive to light.   Neck: Normal range of motion. Neck supple. No thyromegaly present.   Cardiovascular: Normal rate, regular rhythm and intact distal pulses.    Pulmonary/Chest: Effort normal and breath sounds normal. No respiratory  distress. She has no wheezes.   Abdominal: Soft. Bowel sounds are normal. She exhibits no distension. There is no tenderness.   No palpable hepatomegaly or splenomegaly    Musculoskeletal: Normal range of motion. She exhibits tenderness. She exhibits no edema.        Arms:  Lymphadenopathy:     She has no cervical adenopathy.   Neurological: She is alert and oriented to person, place, and time.   Skin: Skin is warm and dry. Capillary refill takes less than 2 seconds.   Psychiatric: She has a normal mood and affect. Thought content normal. Her speech is rapid and/or pressured. She expresses no homicidal and no suicidal ideation.   Nursing note and vitals reviewed.        CRANIAL NERVES     CN III, IV, VI   Pupils are equal, round, and reactive to light.  Extraocular motions are normal.        Significant Labs: All pertinent labs within the past 24 hours have been reviewed.    Significant Imaging: I have reviewed and interpreted all pertinent imaging results/findings within the past 24 hours.    Assessment/Plan:     * Chest pain    Not typical of ACS, suspect stress fracture or muscle strain from coughing, CXR in ED was a single AP view, will obtain PA and lateral.  If no improvement consider more advanced imaging in the AM.  HEART score 3.  -PRN analgesics, cough suppressant, and mucolytic  -cardiac monitoring  -serial troponins  -ASA and PRN NTG  -2D echo  -TSH and lipid panel        Restrictive lung disease    Hold home inhalers while in observation.  Duo nebs q 6 hr.  Encourage smoking cessation.         Emphysema lung    As above.        CHF (congestive heart failure)    Appears stable without sign of acute decompensation.  No echo on record.  Check echo and continue home medications.        Type 2 diabetes mellitus without complication, with long-term current use of insulin    Check A1c.  Hold oral antihyperglycemics while inpatient  PRN sliding scale insulin  ACHS glucose monitoring   ADA diet        Essential  hypertension    Poorly controlled, continue home medications and monitor blood pressures.  Adjust as needed. PRN clonidine available.        Anxiety    Inpatient psychiatric hospitalization in the past, has been out of her medications for roughly 1 year.  Will resume venlafaxine, risperidone, and bupropion.  She appears stable with sound judgement and can safely care for herself.  No SI/HI.        Tobacco abuse    Patient was counseled on smoking cessation and she is not currently ready to stop smoking. She will be provided a nicotine transdermal patch applied while inpatient.  Will provide additional smoking cessation counseling prior to discharge.          VTE Risk Mitigation         Ordered     enoxaparin injection 40 mg  Daily     Route:  Subcutaneous        02/05/18 2045     Medium Risk of VTE  Once      02/05/18 2045     Place sequential compression device  Until discontinued      02/05/18 2045        Quinton Tay Jr., APRN, AGACN-BC  Hospitalist - Department of Hospital Medicine  [facility] [address] [facility] [place] [address]  Office #: [phone]; Pager #: [phone]

## 2018-02-06 NOTE — PLAN OF CARE
02/06/18 1137   Discharge Assessment   Assessment Type Discharge Planning Assessment   Patient with active DC order on chart prior to completion of DC needs assessment.  Patient stated that she lives with her  who is able to help her to manage her care at home.  She is independent and does not use any equipment.

## 2018-02-06 NOTE — HPI
47 y.o. female with CHF, COPD, HTN, DM type 2, and anxiety presents with a complaint of chest pain.  Pain is located left lateral and posterior chest wall, sharp, severe, and exacerbated by coughing, deep breathing and palpation.  Acute onset with coughing yesterday.  She has had a chronic cough for many years with COPD which is currently not worse than baseline.  Chronic SOB that is also at baseline.  She continues to smoke.  Denies fever, chills, palpitations, orthopnea, PND, edema, dizziness, syncope, nausea, vomiting, diarrhea, abdominal pain, bloody or dark stools, dysuria, frequency, or urgency.  ED evaluation unremarkable.  EKG without evidence of acute ischemia, initial troponin negative, chest xray without acute abnormality.  Placed in observation for ACS rule out.

## 2018-02-06 NOTE — ASSESSMENT & PLAN NOTE
Check A1c.  Hold oral antihyperglycemics while inpatient  PRN sliding scale insulin  ACHS glucose monitoring   ADA diet

## 2018-02-06 NOTE — HOSPITAL COURSE
Patient presents with CP. In the ED, patient was found to have EKG without evidence of acute ischemia, initial troponin negative, chest xray without acute abnormality. Troponin's remained negative throughout course of the hospital admission. Pain more consistent with MSK strain likely secondary to excessive coughing. Echo with EF of 55-60% and trivial aortic stenosis. Patient's chest pain on discharge was reproducible with palpation and patient was discharged home with 800mg ibuprofen. Advised against heavy lifting. Recommended follow up with PCP if symptoms persist. Urged to quit smoking as symptoms likely exacerbated by underlying lung dysfunction.

## 2018-02-06 NOTE — MEDICAL/APP STUDENT
[facility] Hospital Medicine  Discharge Summary      Patient Name: Jailene Mosqueda  MRN: [id number]  Admission Date: 2/5/2018  Hospital Length of Stay: 0 days  Discharge Date and Time:  02/06/2018 11:00 AM  Attending Physician: Rebecca Lockwood MD   Discharging Provider: Eliel Franco  Primary Care Provider: Primary Doctor No        HPI: 47 y.o. female with CHF, COPD, HTN, DM type 2, and anxiety presents with a complaint of chest pain.  Pain is located left lateral and posterior chest wall, sharp, severe, and exacerbated by coughing, deep breathing and palpation.  Acute onset with coughing yesterday.  She has had a chronic cough for many years with COPD which is currently not worse than baseline.  Chronic SOB that is also at baseline.  She continues to smoke.  Denies fever, chills, palpitations, orthopnea, PND, edema, dizziness, syncope, nausea, vomiting, diarrhea, abdominal pain, bloody or dark stools, dysuria, frequency, or urgency.  ED evaluation unremarkable.  EKG without evidence of acute ischemia, initial troponin negative, chest xray without acute abnormality.  Placed in observation for ACS rule out.       * No surgery found *      Hospital Course: Jailene Mosqueda 47 y.o. female admitted to the hospital for evaluation and management of chest pain. In the Ed, patient was found to have EKG without evidence of acute ischemia, initial troponin negative, chest xray without acute abnormality. Troponin's stayed below the upper limit of normal over the course of the hospital admission. Echo with EF of 55-60% and trivial aortic stenosis. Patient's chest pain on discharge was reproducible with palpation and patient was discharged home with 800mg ibuprofen. Advised against heavy lifting. Recommended follow up with PCP if symptoms persist. Urged to quit smoking as symptoms can be due to underlying lung dysfunction.     Consults:     Final Active Diagnoses:    Diagnosis Date Noted POA    PRINCIPAL  PROBLEM:  Chest pain [R07.9] 02/05/2018 Yes    CHF (congestive heart failure) [I50.9] 02/05/2018 Yes     Chronic    Anxiety [F41.9] 02/05/2018 Yes     Chronic    Essential hypertension [I10] 02/05/2018 Yes     Chronic    Type 2 diabetes mellitus without complication, with long-term current use of insulin [E11.9, Z79.4] 02/05/2018 Not Applicable     Chronic    Emphysema lung [J43.9] 02/05/2018 Yes     Chronic    Restrictive lung disease [J98.4] 02/05/2018 Yes     Chronic    Tobacco abuse [Z72.0] 02/05/2018 Yes     Chronic      Problems Resolved During this Admission:    Diagnosis Date Noted Date Resolved POA      Discharged Condition: stable    Disposition: Home or Self Care    Follow Up:    Patient Instructions:     Diet Cardiac     Diet diabetic     Activity as tolerated       Medications:  Reconciled Home Medications:   Current Discharge Medication List      START taking these medications    Details   ibuprofen (ADVIL,MOTRIN) 800 MG tablet Take 1 tablet (800 mg total) by mouth 3 (three) times daily.  Qty: 15 tablet, Refills: 0         CONTINUE these medications which have NOT CHANGED    Details   amlodipine (NORVASC) 10 MG tablet Take 10 mg by mouth once daily.      INSULIN ASPART PROTAM & ASPART (NOVOLOG MIX 70-30 SUBQ) Inject into the skin.      omeprazole (PRILOSEC) 40 MG capsule Take 40 mg by mouth once daily.      albuterol (ACCUNEB) 0.63 mg/3 mL Nebu Take 0.63 mg by nebulization every 6 (six) hours as needed.      albuterol (ACCUNEB) 1.25 mg/3 mL Nebu Take 1.25 mg by nebulization every 6 (six) hours as needed.      alprazolam (XANAX) 0.25 MG tablet Take 0.25 mg by mouth 3 (three) times daily.      aspirin 81 MG Chew Take 81 mg by mouth once daily.      buPROPion (WELLBUTRIN SR) 100 MG TBSR 12 hr tablet Take 150 mg by mouth 2 (two) times daily.       fluticasone-salmeterol 500-50 mcg/dose (ADVAIR DISKUS) 500-50 mcg/dose DsDv diskus inhaler Inhale 1 puff into the lungs 2 (two) times daily.       furosemide (LASIX) 40 MG tablet Take 40 mg by mouth 2 (two) times daily.      gabapentin (NEURONTIN) 300 mg tablet Take 300 mg by mouth 2 (two) times daily.      glimepiride (AMARYL) 4 MG tablet Take 4 mg by mouth before breakfast.      hydrOXYzine (ATARAX) 50 MG tablet Take 50 mg by mouth 4 (four) times daily.      naproxen (NAPROSYN) 500 MG tablet Take 500 mg by mouth 2 (two) times daily.      potassium chloride SA (K-DUR,KLOR-CON) 20 MEQ tablet Take 20 mEq by mouth once daily.      predniSONE (DELTASONE) 10 MG tablet Take 6 tabs x 3 days, then  Take 4 tabs x 3 days, then   Take 2 tab x 2 days, then  Take 1 tab x 2 days.  Qty: 36 tablet, Refills: 0      risperidone (RISPERDAL) 2 MG tablet Take 2 mg by mouth 2 (two) times daily.      tiotropium (SPIRIVA) 18 mcg inhalation capsule Inhale 18 mcg into the lungs once daily.      venlafaxine (EFFEXOR-XR) 150 MG Cp24 Take 75 mg by mouth once daily.             Significant Diagnostic Studies: Labs:   CMP   Recent Labs  Lab 02/05/18  1039 02/06/18  0505    137   K 3.8 3.7    102   CO2 26 28   * 263*   BUN 7 7   CREATININE 0.9 0.8   CALCIUM 8.8 8.8   PROT 7.1 6.6   ALBUMIN 3.6 3.2*   BILITOT 0.6 0.4   ALKPHOS 86 83   AST 19 15   ALT 20 18   ANIONGAP 9 7*   ESTGFRAFRICA >60 >60   EGFRNONAA >60 >60   , CBC   Recent Labs  Lab 02/05/18  1039 02/06/18  0505   WBC 5.44 4.40   HGB 13.2 12.5   HCT 40.0 39.0    184   , Troponin   Recent Labs  Lab 02/05/18  2320   TROPONINI <0.006    and A1C: No results for input(s): HGBA1C in the last 4320 hours.    Pending Diagnostic Studies:     Procedure Component Value Units Date/Time    Hemoglobin A1c [[id number]] Collected:  02/06/18 0505    Order Status:  Sent Lab Status:  In process Updated:  02/06/18 0505    Specimen:  Blood from Blood         Indwelling Lines/Drains at time of discharge:   Lines/Drains/Airways          No matching active lines, drains, or airways          Time spent on the discharge of patient:  *30minutes  Patient was seen and examined on the date of discharge and determined to be suitable for discharge.         Eliel Franco  Department of Hospital Medicine  [facility]

## 2018-02-06 NOTE — NURSING
Discharge orders received. IV discontinued without complaint, catheter intact. Telemetry monitoring discontinued. Discharge instructions and educational handouts explained and given to patient. Questions and concerns addressed. Emotional support provided.     Patient waiting on daughter to pick her and bring her home.

## 2018-02-06 NOTE — ASSESSMENT & PLAN NOTE
Poorly controlled, continue home medications and monitor blood pressures.  Adjust as needed. PRN clonidine available.

## 2018-02-06 NOTE — ASSESSMENT & PLAN NOTE
Not typical of ACS, suspect stress fracture or muscle strain from coughing, CXR in ED was a single AP view, will obtain PA and lateral.  If no improvement consider more advanced imaging in the AM.  HEART score 3.  -PRN analgesics, cough suppressant, and mucolytic  -cardiac monitoring  -serial troponins  -ASA and PRN NTG  -2D echo  -TSH and lipid panel

## 2018-02-06 NOTE — ASSESSMENT & PLAN NOTE
Patient was counseled on smoking cessation and she is not currently ready to stop smoking. She will be provided a nicotine transdermal patch applied while inpatient.  Will provide additional smoking cessation counseling prior to discharge.

## 2018-08-14 ENCOUNTER — HOSPITAL ENCOUNTER (EMERGENCY)
Facility: HOSPITAL | Age: 48
Discharge: HOME | End: 2018-08-14
Attending: EMERGENCY MEDICINE
Payer: MEDICAID

## 2018-08-14 VITALS
RESPIRATION RATE: 18 BRPM | WEIGHT: 230 LBS | SYSTOLIC BLOOD PRESSURE: 176 MMHG | OXYGEN SATURATION: 97 % | DIASTOLIC BLOOD PRESSURE: 94 MMHG | HEIGHT: 64 IN | BODY MASS INDEX: 39.27 KG/M2 | HEART RATE: 69 BPM | TEMPERATURE: 98 F

## 2018-08-14 DIAGNOSIS — M25.561 RIGHT KNEE PAIN: ICD-10-CM

## 2018-08-14 LAB
B-HCG UR QL: NEGATIVE
CTP QC/QA: YES

## 2018-08-14 PROCEDURE — 99284 EMERGENCY DEPT VISIT MOD MDM: CPT | Mod: 25

## 2018-08-14 PROCEDURE — 96372 THER/PROPH/DIAG INJ SC/IM: CPT

## 2018-08-14 PROCEDURE — 81025 URINE PREGNANCY TEST: CPT | Performed by: NURSE PRACTITIONER

## 2018-08-14 PROCEDURE — 63600175 PHARM REV CODE 636 W HCPCS: Performed by: NURSE PRACTITIONER

## 2018-08-14 RX ORDER — ETODOLAC 400 MG/1
400 TABLET, FILM COATED ORAL EVERY 6 HOURS PRN
Qty: 20 TABLET | Refills: 0 | Status: SHIPPED | OUTPATIENT
Start: 2018-08-14 | End: 2019-08-31 | Stop reason: HOSPADM

## 2018-08-14 RX ORDER — KETOROLAC TROMETHAMINE 30 MG/ML
15 INJECTION, SOLUTION INTRAMUSCULAR; INTRAVENOUS
Status: COMPLETED | OUTPATIENT
Start: 2018-08-14 | End: 2018-08-14

## 2018-08-14 RX ADMIN — KETOROLAC TROMETHAMINE 15 MG: 30 INJECTION, SOLUTION INTRAMUSCULAR at 05:08

## 2018-08-14 NOTE — DISCHARGE INSTRUCTIONS
Follow up with Primary Care Physician    Return to the ED if symptoms worsen or any other concerns.
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
## 2018-08-14 NOTE — ED TRIAGE NOTES
Patient reports right knee pain and swelling x 2 weeks.  Patient unable to bear weight to right leg.  Denies trauma.

## 2019-08-31 ENCOUNTER — HOSPITAL ENCOUNTER (EMERGENCY)
Facility: HOSPITAL | Age: 49
Discharge: HOME | End: 2019-08-31
Attending: EMERGENCY MEDICINE
Payer: MEDICAID

## 2019-08-31 VITALS
HEART RATE: 77 BPM | RESPIRATION RATE: 18 BRPM | OXYGEN SATURATION: 97 % | SYSTOLIC BLOOD PRESSURE: 123 MMHG | HEIGHT: 64 IN | TEMPERATURE: 99 F | DIASTOLIC BLOOD PRESSURE: 62 MMHG | WEIGHT: 198 LBS | BODY MASS INDEX: 33.8 KG/M2

## 2019-08-31 DIAGNOSIS — M25.519 SHOULDER PAIN: ICD-10-CM

## 2019-08-31 DIAGNOSIS — M25.579 ANKLE PAIN: ICD-10-CM

## 2019-08-31 DIAGNOSIS — M79.673 FOOT PAIN: ICD-10-CM

## 2019-08-31 DIAGNOSIS — M25.569 KNEE PAIN: ICD-10-CM

## 2019-08-31 DIAGNOSIS — S82.831A OTHER CLOSED FRACTURE OF PROXIMAL END OF RIGHT FIBULA, INITIAL ENCOUNTER: Primary | ICD-10-CM

## 2019-08-31 PROCEDURE — 63600175 PHARM REV CODE 636 W HCPCS: Performed by: PHYSICIAN ASSISTANT

## 2019-08-31 PROCEDURE — 99284 EMERGENCY DEPT VISIT MOD MDM: CPT | Mod: 25

## 2019-08-31 PROCEDURE — 96372 THER/PROPH/DIAG INJ SC/IM: CPT

## 2019-08-31 PROCEDURE — 29505 APPLICATION LONG LEG SPLINT: CPT | Mod: RT

## 2019-08-31 RX ORDER — OXYCODONE AND ACETAMINOPHEN 5; 325 MG/1; MG/1
1 TABLET ORAL EVERY 4 HOURS PRN
Qty: 15 TABLET | Refills: 0 | Status: SHIPPED | OUTPATIENT
Start: 2019-08-31 | End: 2019-09-03

## 2019-08-31 RX ORDER — HYDROMORPHONE HYDROCHLORIDE 2 MG/ML
1 INJECTION, SOLUTION INTRAMUSCULAR; INTRAVENOUS; SUBCUTANEOUS
Status: COMPLETED | OUTPATIENT
Start: 2019-08-31 | End: 2019-08-31

## 2019-08-31 RX ORDER — LOSARTAN POTASSIUM 100 MG/1
100 TABLET ORAL DAILY
Status: ON HOLD | COMMUNITY
End: 2023-03-16

## 2019-08-31 RX ORDER — IBUPROFEN 600 MG/1
600 TABLET ORAL EVERY 6 HOURS PRN
Qty: 20 TABLET | Refills: 0 | Status: SHIPPED | OUTPATIENT
Start: 2019-08-31 | End: 2019-09-05

## 2019-08-31 RX ADMIN — HYDROMORPHONE HYDROCHLORIDE 1 MG: 2 INJECTION, SOLUTION INTRAMUSCULAR; INTRAVENOUS; SUBCUTANEOUS at 09:08

## 2019-08-31 NOTE — ED TRIAGE NOTES
Pt comes in after slip and fall in slippery mud. Pt denies dizziness prior to or after fall. Pt denies hitting head or LOC. Pt complains of right shoulder khai and right leg ankle pain.

## 2019-08-31 NOTE — ED PROVIDER NOTES
"Encounter Date: 2019    SCRIBE #1 NOTE: I, Cami Barrios, am scribing for, and in the presence of,  Janae Mcclelland PA-C. I have scribed the following portions of the note - Other sections scribed: HPI,ROS.       History     Chief Complaint   Patient presents with    Fall     slip and fall.  complaints of right leg pain.  and right shoulder pain     CC: Right shoulder/ Right lower extremity pain    HPI: This is a 48 y.o.female patient, with a PMHx of CHF, HTN, and DM, presenting to the ED with a complaint of right shoulder and right lower extremity pain, s/p a mechanical ground fall, that occurred x1 day ago. Patient states she slipped on a muddy sidewalk while walking. Patient states she is unable to bear weight to her right lower extremity and limps to ambulate. She describes the pain as "sharp" and "shooting". She reports attempting prior Tx with 325 mg Aspirin, with no relief. No head trauma or LOC. Patient denies any fever, chills, shortness of breath, chest pain, neck pain, back pain, abdominal pain, rash, headaches, congestion, rhinorrhea, cough, sore throat, ear pain, eye pain, blurred vision, nausea, vomiting, diarrhea, dysuria, numbness, tingling, or any other associated symptoms.      The history is provided by the patient.     Review of patient's allergies indicates:   Allergen Reactions    Hydrochlorothiazide plus      Past Medical History:   Diagnosis Date    Anxiety     Arthritis     Bronchitis     CHF (congestive heart failure)     DM (diabetes mellitus)     Emphysema lung     Encounter for blood transfusion     HTN (hypertension)     Restrictive lung disease     Sleep apnea      Past Surgical History:   Procedure Laterality Date     SECTION      PALATAL EXPANSION      WRIST SURGERY Right      Family History   Problem Relation Age of Onset    Diabetes Mother     Hypertension Mother     Hypertension Father     Diabetes Father     Diabetes Sister      Social " History     Tobacco Use    Smoking status: Current Some Day Smoker     Packs/day: 0.25     Types: Cigarettes    Smokeless tobacco: Never Used   Substance Use Topics    Alcohol use: Yes    Drug use: Yes     Types: Marijuana     Review of Systems   Constitutional: Negative for chills and fever.   HENT: Negative for congestion, ear pain, rhinorrhea and sore throat.    Eyes: Negative for pain and visual disturbance.   Respiratory: Negative for cough and shortness of breath.    Cardiovascular: Negative for chest pain.   Gastrointestinal: Negative for abdominal pain, diarrhea, nausea and vomiting.   Genitourinary: Negative for dysuria.   Musculoskeletal: Positive for arthralgias and gait problem. Negative for back pain and neck pain.   Skin: Negative for rash.   Neurological: Negative for syncope, numbness and headaches.        -tingling       Physical Exam     Initial Vitals   BP Pulse Resp Temp SpO2   08/31/19 0835 08/31/19 0835 08/31/19 0835 08/31/19 1154 08/31/19 0835   (!) 140/63 84 18 98.7 °F (37.1 °C) 96 %      MAP       --                Physical Exam    Nursing note and vitals reviewed.  Constitutional: She appears well-developed and well-nourished.   HENT:   Head: Normocephalic.   Right Ear: External ear normal.   Left Ear: External ear normal.   Nose: Nose normal.   Mouth/Throat: Oropharynx is clear and moist.   Eyes: Conjunctivae are normal.   Cardiovascular: Normal rate and regular rhythm. Exam reveals no gallop and no friction rub.    No murmur heard.  Pulses:       Dorsalis pedis pulses are 2+ on the right side, and 2+ on the left side.   Pulmonary/Chest: Breath sounds normal. She has no wheezes. She has no rhonchi. She has no rales.   Abdominal: Soft. Bowel sounds are normal. She exhibits no distension. There is no tenderness. There is no rebound, no guarding, no tenderness at McBurney's point and negative Bermeo's sign.   Musculoskeletal: Normal range of motion.   No midline ttp of spine. Patient  unable to bear weight. Swelling to R knee, R foot. Tenderness to palpation of the lateral aspect of the right knee with positive varus stress test.  Tenderness of patient of the proximal fibula and over lateral and medial malleolus. TTP over 1st metatarsal and MTP. Compartments soft      Neurological: She is alert. She has normal strength. A sensory deficit (mild tingling over 5th digit of R foot) is present. No cranial nerve deficit.   Skin: Skin is warm and dry.   Psychiatric: She has a normal mood and affect.         ED Course   Orthopedic Injury  Date/Time: 8/31/2019 2:29 PM  Performed by: Janae Mcclelland PA-C  Authorized by: Shahriar Cordova MD     Location procedure was performed:  [facility] EMERGENCY DEPARTMENT  Pre-operative diagnosis:  Proximal fibula fracture  Injury:     Injury location:  Lower leg    Location details:  Right lower leg    Injury type:  Fracture    Fracture type: proximal fibula        Pre-procedure assessment:     Neurovascular status: Neurovascularly intact      Distal perfusion: normal      Neurological function: normal      Range of motion: reduced        Selections made in this section will also lock the Injury type section above.:     Immobilization:  Brace    Splint type: knee immobilizer.    Complications: No      Specimens: No      Implants: No    Post-procedure assessment:     Neurovascular status: Neurovascularly intact      Distal perfusion: normal      Neurological function: normal      Range of motion: normal      Patient tolerance:  Patient tolerated the procedure well with no immediate complications      Labs Reviewed - No data to display       Imaging Results          X-Ray Knee 3 View Right (Final result)  Result time 08/31/19 10:23:37    Final result by Sasha Peralta MD (08/31/19 10:23:37)                 Impression:      Tricompartmental degenerative change.  Obliquely oriented fracture of the right proximal fibula.      Electronically signed by: Sasha Peralta  MD  Date:    08/31/2019  Time:    10:23             Narrative:    EXAMINATION:  XR KNEE 3 VIEW RIGHT    CLINICAL HISTORY:  Pain in unspecified knee    TECHNIQUE:  AP, lateral, and Merchant views of the right knee were performed.    COMPARISON:  08/14/2018    FINDINGS:  Tricompartmental degenerative changes consisting of joint space narrowing, subchondral sclerosis and marginal osteophyte formation.  Calcification along the course of the medial collateral ligament is unchanged.  Obliquely oriented fracture of the right proximal fibula..  Small joint effusion.                               X-Ray Ankle Complete Right (Final result)  Result time 08/31/19 10:24:42    Final result by Sasha Peralta MD (08/31/19 10:24:42)                 Impression:      As above.      Electronically signed by: Sasha Peralta MD  Date:    08/31/2019  Time:    10:24             Narrative:    EXAMINATION:  XR ANKLE COMPLETE 3 VIEW RIGHT    CLINICAL HISTORY:  Pain in unspecified ankle and joints of unspecified foot    TECHNIQUE:  AP, lateral, and oblique images of the right ankle were performed.    COMPARISON:  None    FINDINGS:  The ankle mortise is intact.  The talar dome is preserved.  No acute fracture or dislocation.  Small ankle joint effusion.                               X-Ray Foot Complete Right (Final result)  Result time 08/31/19 10:25:39    Final result by Sasha Peralta MD (08/31/19 10:25:39)                 Impression:      As above.      Electronically signed by: Sasha Peralta MD  Date:    08/31/2019  Time:    10:25             Narrative:    EXAMINATION:  XR FOOT COMPLETE 3 VIEW RIGHT    CLINICAL HISTORY:  . Pain in unspecified foot    TECHNIQUE:  AP, lateral, and oblique views of the right foot were performed.    COMPARISON:  None    FINDINGS:  Hallux valgus deformity.  Joint spaces are maintained.  No fracture or dislocation is identified.                               X-Ray Shoulder Trauma Right (Final result)   Result time 08/31/19 10:26:23    Final result by Sasha Peralta MD (08/31/19 10:26:23)                 Impression:      As above.      Electronically signed by: Sasha Peralta MD  Date:    08/31/2019  Time:    10:26             Narrative:    EXAMINATION:  XR SHOULDER TRAUMA 3 VIEW RIGHT    CLINICAL HISTORY:  Pain in unspecified shoulder    TECHNIQUE:  Three or four views of the right shoulder were performed.    COMPARISON:  None    FINDINGS:  Moderate degenerative changes of the acromioclavicular joint with mild degenerative changes of the glenohumeral joint.  No fracture or dislocation is seen.  The humeral head is slightly high-riding suggesting the possibility for underlying rotator cuff pathology.                                 Medical Decision Making:   Initial Assessment:   48-year-old female presenting for evaluation of right knee pain right ankle pain and right foot pain and right shoulder pain status post mechanical slip and fall that occurred yesterday.  Denies head injury, neck pain, back pain.  Exam above.  Mild tingling over the 5th digit of the right foot.  Distal pulses intact. X-ray right knee remarkable for obliquely oriented fracture of the right proximal fibula.  Knee immobilizer applied and crutches provided.  X-ray ankle, right foot and right shoulder are negative for fracture dislocation.  Doubt compartment syndrome at this time. Discussed findings with the patient and will have her follow up with Orthopedics for further evaluation management.  Referral placed to [facility].  Return emergency department for worsening symptoms or as needed.  Discussed patient with dr. Cordova who independently reviewed and interpreted x-rays with me and he agrees with assessment and plan.                       Clinical Impression:       ICD-10-CM ICD-9-CM   1. Other closed fracture of proximal end of right fibula, initial encounter S82.831A 823.01   2. Knee pain M25.569 719.46   3.  Ankle pain M25.579 719.47   4. Foot pain M79.673 729.5   5. Shoulder pain M25.519 719.41                        Scribe attestation: I Janae Mcclelland PA-C, personally performed the services described in this documentation. All medical record entries made by the scribe were at my direction and in my presence.  I have reviewed the chart and agree that the record reflects my personal performance and is accurate and complete.        Janae Mcclelland PA-C  08/31/19 [id number]

## 2019-08-31 NOTE — DISCHARGE INSTRUCTIONS
Take Ibuprofen and Percocet as prescribed for pain.     Do not bear weight on the right leg until cleared by Orthopedics.  A referral was placed to orthopedics at [facility] today.  You should contact them in 3 days if you do not hear from them.  Return to emergency department for worsening symptoms or as needed

## 2021-05-15 ENCOUNTER — HOSPITAL ENCOUNTER (EMERGENCY)
Facility: HOSPITAL | Age: 51
Discharge: HOME | End: 2021-05-15
Attending: EMERGENCY MEDICINE
Payer: MEDICAID

## 2021-05-15 VITALS
OXYGEN SATURATION: 100 % | HEART RATE: 71 BPM | HEIGHT: 64 IN | RESPIRATION RATE: 20 BRPM | BODY MASS INDEX: 32.44 KG/M2 | SYSTOLIC BLOOD PRESSURE: 160 MMHG | DIASTOLIC BLOOD PRESSURE: 87 MMHG | TEMPERATURE: 98 F | WEIGHT: 190 LBS

## 2021-05-15 DIAGNOSIS — R07.81 RIB PAIN ON LEFT SIDE: Primary | ICD-10-CM

## 2021-05-15 DIAGNOSIS — R94.31 PROLONGED Q-T INTERVAL ON ECG: ICD-10-CM

## 2021-05-15 LAB
ALBUMIN SERPL BCP-MCNC: 3 G/DL (ref 3.5–5.2)
ALP SERPL-CCNC: 107 U/L (ref 55–135)
ALT SERPL W/O P-5'-P-CCNC: 15 U/L (ref 10–44)
ANION GAP SERPL CALC-SCNC: 10 MMOL/L (ref 8–16)
AST SERPL-CCNC: 18 U/L (ref 10–40)
BACTERIA #/AREA URNS HPF: ABNORMAL /HPF
BASOPHILS # BLD AUTO: 0.03 K/UL (ref 0–0.2)
BASOPHILS NFR BLD: 0.6 % (ref 0–1.9)
BILIRUB SERPL-MCNC: 0.3 MG/DL (ref 0.1–1)
BILIRUB UR QL STRIP: NEGATIVE
BUN SERPL-MCNC: 9 MG/DL (ref 6–20)
CALCIUM SERPL-MCNC: 9.2 MG/DL (ref 8.7–10.5)
CHLORIDE SERPL-SCNC: 105 MMOL/L (ref 95–110)
CLARITY UR: ABNORMAL
CO2 SERPL-SCNC: 23 MMOL/L (ref 23–29)
COLOR UR: YELLOW
CREAT SERPL-MCNC: 0.8 MG/DL (ref 0.5–1.4)
CTP QC/QA: YES
DIFFERENTIAL METHOD: ABNORMAL
EOSINOPHIL # BLD AUTO: 0 K/UL (ref 0–0.5)
EOSINOPHIL NFR BLD: 0.6 % (ref 0–8)
ERYTHROCYTE [DISTWIDTH] IN BLOOD BY AUTOMATED COUNT: 14.7 % (ref 11.5–14.5)
EST. GFR  (AFRICAN AMERICAN): >60 ML/MIN/1.73 M^2
EST. GFR  (NON AFRICAN AMERICAN): >60 ML/MIN/1.73 M^2
GLUCOSE SERPL-MCNC: 282 MG/DL (ref 70–110)
GLUCOSE UR QL STRIP: NEGATIVE
HCT VFR BLD AUTO: 41.5 % (ref 37–48.5)
HGB BLD-MCNC: 13.4 G/DL (ref 12–16)
HGB UR QL STRIP: NEGATIVE
HYALINE CASTS #/AREA URNS LPF: 1 /LPF
IMM GRANULOCYTES # BLD AUTO: 0.01 K/UL (ref 0–0.04)
IMM GRANULOCYTES NFR BLD AUTO: 0.2 % (ref 0–0.5)
KETONES UR QL STRIP: NEGATIVE
LEUKOCYTE ESTERASE UR QL STRIP: ABNORMAL
LYMPHOCYTES # BLD AUTO: 1.7 K/UL (ref 1–4.8)
LYMPHOCYTES NFR BLD: 35.5 % (ref 18–48)
MCH RBC QN AUTO: 23.1 PG (ref 27–31)
MCHC RBC AUTO-ENTMCNC: 32.3 G/DL (ref 32–36)
MCV RBC AUTO: 72 FL (ref 82–98)
MICROSCOPIC COMMENT: ABNORMAL
MONOCYTES # BLD AUTO: 0.6 K/UL (ref 0.3–1)
MONOCYTES NFR BLD: 11.9 % (ref 4–15)
NEUTROPHILS # BLD AUTO: 2.5 K/UL (ref 1.8–7.7)
NEUTROPHILS NFR BLD: 51.2 % (ref 38–73)
NITRITE UR QL STRIP: NEGATIVE
NRBC BLD-RTO: 0 /100 WBC
PH UR STRIP: 6 [PH] (ref 5–8)
PLATELET # BLD AUTO: 234 K/UL (ref 150–450)
PMV BLD AUTO: 11.2 FL (ref 9.2–12.9)
POCT GLUCOSE: 290 MG/DL (ref 70–110)
POTASSIUM SERPL-SCNC: 3.7 MMOL/L (ref 3.5–5.1)
PROT SERPL-MCNC: 6.9 G/DL (ref 6–8.4)
PROT UR QL STRIP: NEGATIVE
RBC # BLD AUTO: 5.79 M/UL (ref 4–5.4)
RBC #/AREA URNS HPF: 5 /HPF (ref 0–4)
SARS-COV-2 RDRP RESP QL NAA+PROBE: NEGATIVE
SODIUM SERPL-SCNC: 138 MMOL/L (ref 136–145)
SP GR UR STRIP: 1.02 (ref 1–1.03)
TROPONIN I SERPL DL<=0.01 NG/ML-MCNC: <0.006 NG/ML (ref 0–0.03)
URN SPEC COLLECT METH UR: ABNORMAL
UROBILINOGEN UR STRIP-ACNC: NEGATIVE EU/DL
WBC # BLD AUTO: 4.88 K/UL (ref 3.9–12.7)
WBC #/AREA URNS HPF: 6 /HPF (ref 0–5)
WBC CLUMPS URNS QL MICRO: ABNORMAL

## 2021-05-15 PROCEDURE — 25000003 PHARM REV CODE 250: Performed by: PHYSICIAN ASSISTANT

## 2021-05-15 PROCEDURE — 84484 ASSAY OF TROPONIN QUANT: CPT | Performed by: PHYSICIAN ASSISTANT

## 2021-05-15 PROCEDURE — U0002 COVID-19 LAB TEST NON-CDC: HCPCS | Performed by: PHYSICIAN ASSISTANT

## 2021-05-15 PROCEDURE — 80053 COMPREHEN METABOLIC PANEL: CPT | Performed by: PHYSICIAN ASSISTANT

## 2021-05-15 PROCEDURE — 96365 THER/PROPH/DIAG IV INF INIT: CPT

## 2021-05-15 PROCEDURE — 81000 URINALYSIS NONAUTO W/SCOPE: CPT | Performed by: PHYSICIAN ASSISTANT

## 2021-05-15 PROCEDURE — 85025 COMPLETE CBC W/AUTO DIFF WBC: CPT | Performed by: PHYSICIAN ASSISTANT

## 2021-05-15 PROCEDURE — 82962 GLUCOSE BLOOD TEST: CPT

## 2021-05-15 PROCEDURE — 93005 ELECTROCARDIOGRAM TRACING: CPT

## 2021-05-15 PROCEDURE — 63600175 PHARM REV CODE 636 W HCPCS: Performed by: PHYSICIAN ASSISTANT

## 2021-05-15 PROCEDURE — 99285 EMERGENCY DEPT VISIT HI MDM: CPT | Mod: 25

## 2021-05-15 PROCEDURE — 93010 EKG 12-LEAD: ICD-10-PCS | Mod: ,,, | Performed by: INTERNAL MEDICINE

## 2021-05-15 PROCEDURE — 96372 THER/PROPH/DIAG INJ SC/IM: CPT | Mod: 59

## 2021-05-15 PROCEDURE — 93010 ELECTROCARDIOGRAM REPORT: CPT | Mod: ,,, | Performed by: INTERNAL MEDICINE

## 2021-05-15 RX ORDER — KETOROLAC TROMETHAMINE 30 MG/ML
15 INJECTION, SOLUTION INTRAMUSCULAR; INTRAVENOUS
Status: DISCONTINUED | OUTPATIENT
Start: 2021-05-15 | End: 2021-05-15

## 2021-05-15 RX ORDER — VALACYCLOVIR HYDROCHLORIDE 1 G/1
1000 TABLET, FILM COATED ORAL 3 TIMES DAILY
Qty: 21 TABLET | Refills: 0 | Status: ON HOLD | OUTPATIENT
Start: 2021-05-15 | End: 2023-03-16 | Stop reason: ALTCHOICE

## 2021-05-15 RX ORDER — HYDROCODONE BITARTRATE AND ACETAMINOPHEN 5; 325 MG/1; MG/1
1 TABLET ORAL EVERY 6 HOURS PRN
Qty: 8 TABLET | Refills: 0 | Status: SHIPPED | OUTPATIENT
Start: 2021-05-15 | End: 2023-01-09 | Stop reason: CLARIF

## 2021-05-15 RX ORDER — MORPHINE SULFATE 4 MG/ML
4 INJECTION, SOLUTION INTRAMUSCULAR; INTRAVENOUS
Status: COMPLETED | OUTPATIENT
Start: 2021-05-15 | End: 2021-05-15

## 2021-05-15 RX ORDER — TRAMADOL HYDROCHLORIDE 50 MG/1
50 TABLET ORAL
Status: COMPLETED | OUTPATIENT
Start: 2021-05-15 | End: 2021-05-15

## 2021-05-15 RX ORDER — ONDANSETRON 4 MG/1
4 TABLET, ORALLY DISINTEGRATING ORAL EVERY 6 HOURS PRN
Qty: 20 TABLET | Refills: 0 | Status: ON HOLD | OUTPATIENT
Start: 2021-05-15 | End: 2023-03-16

## 2021-05-15 RX ORDER — CIPROFLOXACIN 500 MG/1
500 TABLET ORAL 2 TIMES DAILY
Qty: 14 TABLET | Refills: 0 | Status: SHIPPED | OUTPATIENT
Start: 2021-05-15 | End: 2021-05-22

## 2021-05-15 RX ADMIN — TRAMADOL HYDROCHLORIDE 50 MG: 50 TABLET, FILM COATED ORAL at 05:05

## 2021-05-15 RX ADMIN — CEFTRIAXONE 1 G: 1 INJECTION, SOLUTION INTRAVENOUS at 07:05

## 2021-05-15 RX ADMIN — MORPHINE SULFATE 4 MG: 4 INJECTION, SOLUTION INTRAMUSCULAR; INTRAVENOUS at 06:05

## 2023-01-09 ENCOUNTER — HOSPITAL ENCOUNTER (EMERGENCY)
Facility: HOSPITAL | Age: 53
Discharge: LEFT BEFORE DISCHARGE | End: 2023-01-09
Attending: EMERGENCY MEDICINE
Payer: MEDICAID

## 2023-01-09 VITALS
HEART RATE: 81 BPM | DIASTOLIC BLOOD PRESSURE: 83 MMHG | HEIGHT: 64 IN | BODY MASS INDEX: 24.39 KG/M2 | RESPIRATION RATE: 18 BRPM | TEMPERATURE: 98 F | OXYGEN SATURATION: 99 % | WEIGHT: 142.88 LBS | SYSTOLIC BLOOD PRESSURE: 130 MMHG

## 2023-01-09 DIAGNOSIS — E87.8 ELECTROLYTE ABNORMALITY: ICD-10-CM

## 2023-01-09 DIAGNOSIS — D64.9 ANEMIA, UNSPECIFIED TYPE: ICD-10-CM

## 2023-01-09 DIAGNOSIS — L98.9 SKIN LESION: ICD-10-CM

## 2023-01-09 DIAGNOSIS — R42 DIZZINESS: ICD-10-CM

## 2023-01-09 DIAGNOSIS — R68.89 MULTIPLE COMPLAINTS: Primary | ICD-10-CM

## 2023-01-09 LAB
ALBUMIN SERPL BCP-MCNC: 2.5 G/DL (ref 3.5–5.2)
ALP SERPL-CCNC: 101 U/L (ref 55–135)
ALT SERPL W/O P-5'-P-CCNC: 15 U/L (ref 10–44)
ANION GAP SERPL CALC-SCNC: 8 MMOL/L (ref 8–16)
AST SERPL-CCNC: 12 U/L (ref 10–40)
BASOPHILS # BLD AUTO: 0.03 K/UL (ref 0–0.2)
BASOPHILS NFR BLD: 0.5 % (ref 0–1.9)
BILIRUB SERPL-MCNC: 0.2 MG/DL (ref 0.1–1)
BUN SERPL-MCNC: 9 MG/DL (ref 6–20)
CALCIUM SERPL-MCNC: 8.4 MG/DL (ref 8.7–10.5)
CHLORIDE SERPL-SCNC: 100 MMOL/L (ref 95–110)
CO2 SERPL-SCNC: 28 MMOL/L (ref 23–29)
CREAT SERPL-MCNC: 0.7 MG/DL (ref 0.5–1.4)
DIFFERENTIAL METHOD: ABNORMAL
EOSINOPHIL # BLD AUTO: 0.1 K/UL (ref 0–0.5)
EOSINOPHIL NFR BLD: 1.1 % (ref 0–8)
ERYTHROCYTE [DISTWIDTH] IN BLOOD BY AUTOMATED COUNT: 14.6 % (ref 11.5–14.5)
EST. GFR  (NO RACE VARIABLE): >60 ML/MIN/1.73 M^2
GLUCOSE SERPL-MCNC: 252 MG/DL (ref 70–110)
HCT VFR BLD AUTO: 39.5 % (ref 37–48.5)
HCV AB SERPL QL IA: NORMAL
HGB BLD-MCNC: 11.8 G/DL (ref 12–16)
HIV 1+2 AB+HIV1 P24 AG SERPL QL IA: NORMAL
IMM GRANULOCYTES # BLD AUTO: 0.01 K/UL (ref 0–0.04)
IMM GRANULOCYTES NFR BLD AUTO: 0.2 % (ref 0–0.5)
LYMPHOCYTES # BLD AUTO: 2 K/UL (ref 1–4.8)
LYMPHOCYTES NFR BLD: 35.4 % (ref 18–48)
MAGNESIUM SERPL-MCNC: 1.4 MG/DL (ref 1.6–2.6)
MCH RBC QN AUTO: 22.1 PG (ref 27–31)
MCHC RBC AUTO-ENTMCNC: 29.9 G/DL (ref 32–36)
MCV RBC AUTO: 74 FL (ref 82–98)
MONOCYTES # BLD AUTO: 0.5 K/UL (ref 0.3–1)
MONOCYTES NFR BLD: 8.3 % (ref 4–15)
NEUTROPHILS # BLD AUTO: 3 K/UL (ref 1.8–7.7)
NEUTROPHILS NFR BLD: 54.5 % (ref 38–73)
NRBC BLD-RTO: 0 /100 WBC
PLATELET # BLD AUTO: 286 K/UL (ref 150–450)
PMV BLD AUTO: 10.8 FL (ref 9.2–12.9)
POCT GLUCOSE: 147 MG/DL (ref 70–110)
POTASSIUM SERPL-SCNC: 3.6 MMOL/L (ref 3.5–5.1)
PROT SERPL-MCNC: 5.8 G/DL (ref 6–8.4)
RBC # BLD AUTO: 5.33 M/UL (ref 4–5.4)
SODIUM SERPL-SCNC: 136 MMOL/L (ref 136–145)
TSH SERPL DL<=0.005 MIU/L-ACNC: 1.22 UIU/ML (ref 0.4–4)
WBC # BLD AUTO: 5.54 K/UL (ref 3.9–12.7)

## 2023-01-09 PROCEDURE — 99284 EMERGENCY DEPT VISIT MOD MDM: CPT | Mod: ,,, | Performed by: PHYSICIAN ASSISTANT

## 2023-01-09 PROCEDURE — 84443 ASSAY THYROID STIM HORMONE: CPT | Performed by: PHYSICIAN ASSISTANT

## 2023-01-09 PROCEDURE — 83735 ASSAY OF MAGNESIUM: CPT | Performed by: PHYSICIAN ASSISTANT

## 2023-01-09 PROCEDURE — 99285 EMERGENCY DEPT VISIT HI MDM: CPT | Mod: 25

## 2023-01-09 PROCEDURE — 25000003 PHARM REV CODE 250: Performed by: PHYSICIAN ASSISTANT

## 2023-01-09 PROCEDURE — 93010 EKG 12-LEAD: ICD-10-PCS | Mod: ,,, | Performed by: INTERNAL MEDICINE

## 2023-01-09 PROCEDURE — 93005 ELECTROCARDIOGRAM TRACING: CPT

## 2023-01-09 PROCEDURE — 85025 COMPLETE CBC W/AUTO DIFF WBC: CPT | Performed by: PHYSICIAN ASSISTANT

## 2023-01-09 PROCEDURE — 93010 ELECTROCARDIOGRAM REPORT: CPT | Mod: ,,, | Performed by: INTERNAL MEDICINE

## 2023-01-09 PROCEDURE — 99284 PR EMERGENCY DEPT VISIT,LEVEL IV: ICD-10-PCS | Mod: ,,, | Performed by: PHYSICIAN ASSISTANT

## 2023-01-09 PROCEDURE — 80053 COMPREHEN METABOLIC PANEL: CPT | Performed by: PHYSICIAN ASSISTANT

## 2023-01-09 PROCEDURE — 86803 HEPATITIS C AB TEST: CPT | Performed by: PHYSICIAN ASSISTANT

## 2023-01-09 PROCEDURE — 87389 HIV-1 AG W/HIV-1&-2 AB AG IA: CPT | Performed by: PHYSICIAN ASSISTANT

## 2023-01-09 PROCEDURE — 96360 HYDRATION IV INFUSION INIT: CPT

## 2023-01-09 PROCEDURE — 63600175 PHARM REV CODE 636 W HCPCS: Performed by: PHYSICIAN ASSISTANT

## 2023-01-09 PROCEDURE — 82962 GLUCOSE BLOOD TEST: CPT

## 2023-01-09 RX ORDER — MUPIROCIN 20 MG/G
OINTMENT TOPICAL EVERY 8 HOURS
Qty: 22 G | Refills: 0 | Status: SHIPPED | OUTPATIENT
Start: 2023-01-09 | End: 2023-01-14

## 2023-01-09 RX ORDER — DOXYCYCLINE 100 MG/1
100 CAPSULE ORAL EVERY 12 HOURS
Qty: 20 CAPSULE | Refills: 0 | Status: SHIPPED | OUTPATIENT
Start: 2023-01-09 | End: 2023-01-19

## 2023-01-09 RX ORDER — BACITRACIN ZINC 500 [USP'U]/G
1 OINTMENT TOPICAL
Status: COMPLETED | OUTPATIENT
Start: 2023-01-09 | End: 2023-01-09

## 2023-01-09 RX ORDER — ACETAMINOPHEN 500 MG
1000 TABLET ORAL
Status: COMPLETED | OUTPATIENT
Start: 2023-01-09 | End: 2023-01-09

## 2023-01-09 RX ADMIN — SODIUM CHLORIDE, POTASSIUM CHLORIDE, SODIUM LACTATE AND CALCIUM CHLORIDE 1000 ML: 600; 310; 30; 20 INJECTION, SOLUTION INTRAVENOUS at 04:01

## 2023-01-09 RX ADMIN — ACETAMINOPHEN 1000 MG: 500 TABLET ORAL at 05:01

## 2023-01-09 RX ADMIN — BACITRACIN 1 EACH: 500 OINTMENT TOPICAL at 05:01
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
## 2023-01-09 NOTE — ED TRIAGE NOTES
Pt. Reports she has abscess on chin that has been draining, she feels dizzy, and she has lost around 50 lbs in a few months. No nausea, emesis, or diarrhea. No fevers. No cough.

## 2023-03-10 ENCOUNTER — HOSPITAL ENCOUNTER (INPATIENT)
Facility: HOSPITAL | Age: 53
LOS: 6 days | Discharge: LEFT BEFORE DISCHARGE | DRG: 637 | End: 2023-03-16
Attending: EMERGENCY MEDICINE | Admitting: INTERNAL MEDICINE
Payer: MEDICAID

## 2023-03-10 DIAGNOSIS — R31.9 URINARY TRACT INFECTION WITH HEMATURIA, SITE UNSPECIFIED: ICD-10-CM

## 2023-03-10 DIAGNOSIS — N39.0 URINARY TRACT INFECTION WITH HEMATURIA, SITE UNSPECIFIED: ICD-10-CM

## 2023-03-10 DIAGNOSIS — E11.10 DKA (DIABETIC KETOACIDOSIS): ICD-10-CM

## 2023-03-10 DIAGNOSIS — A41.9 SEPSIS: ICD-10-CM

## 2023-03-10 DIAGNOSIS — E11.10 DIABETIC KETOACIDOSIS WITHOUT COMA ASSOCIATED WITH TYPE 2 DIABETES MELLITUS: Primary | ICD-10-CM

## 2023-03-10 DIAGNOSIS — R41.82 ALTERED MENTAL STATUS: ICD-10-CM

## 2023-03-10 DIAGNOSIS — R41.0 DELIRIUM: ICD-10-CM

## 2023-03-10 PROBLEM — D72.829 LEUKOCYTOSIS: Status: ACTIVE | Noted: 2023-03-10

## 2023-03-10 PROBLEM — N17.9 AKI (ACUTE KIDNEY INJURY): Status: ACTIVE | Noted: 2023-03-10

## 2023-03-10 LAB
ALBUMIN SERPL BCP-MCNC: 1.9 G/DL (ref 3.5–5.2)
ALLENS TEST: ABNORMAL
ALP SERPL-CCNC: 219 U/L (ref 55–135)
ALT SERPL W/O P-5'-P-CCNC: 17 U/L (ref 10–44)
AMPHET+METHAMPHET UR QL: NEGATIVE
ANION GAP SERPL CALC-SCNC: 30 MMOL/L (ref 8–16)
ANION GAP SERPL CALC-SCNC: ABNORMAL MMOL/L (ref 8–16)
ANISOCYTOSIS BLD QL SMEAR: SLIGHT
ANISOCYTOSIS BLD QL SMEAR: SLIGHT
AST SERPL-CCNC: 11 U/L (ref 10–40)
B-OH-BUTYR BLD STRIP-SCNC: 5.2 MMOL/L (ref 0–0.5)
BACTERIA #/AREA URNS AUTO: ABNORMAL /HPF
BARBITURATES UR QL SCN>200 NG/ML: NEGATIVE
BASOPHILS # BLD AUTO: 0.14 K/UL (ref 0–0.2)
BASOPHILS NFR BLD: 0 % (ref 0–1.9)
BASOPHILS NFR BLD: 0.6 % (ref 0–1.9)
BENZODIAZ UR QL SCN>200 NG/ML: NEGATIVE
BILIRUB SERPL-MCNC: 0.2 MG/DL (ref 0.1–1)
BILIRUB UR QL STRIP: NEGATIVE
BUN SERPL-MCNC: 41 MG/DL (ref 6–30)
BUN SERPL-MCNC: 49 MG/DL (ref 6–30)
BUN SERPL-MCNC: 50 MG/DL (ref 6–20)
BUN SERPL-MCNC: 54 MG/DL (ref 6–20)
BURR CELLS BLD QL SMEAR: ABNORMAL
BZE UR QL SCN: ABNORMAL
CALCIUM SERPL-MCNC: 10.1 MG/DL (ref 8.7–10.5)
CALCIUM SERPL-MCNC: 9.8 MG/DL (ref 8.7–10.5)
CANNABINOIDS UR QL SCN: NEGATIVE
CHLORIDE SERPL-SCNC: 100 MMOL/L (ref 95–110)
CHLORIDE SERPL-SCNC: 110 MMOL/L (ref 95–110)
CHLORIDE SERPL-SCNC: 118 MMOL/L (ref 95–110)
CHLORIDE SERPL-SCNC: 90 MMOL/L (ref 95–110)
CLARITY UR REFRACT.AUTO: ABNORMAL
CO2 SERPL-SCNC: 6 MMOL/L (ref 23–29)
CO2 SERPL-SCNC: <5 MMOL/L (ref 23–29)
COLOR UR AUTO: ABNORMAL
CREAT SERPL-MCNC: 0.8 MG/DL (ref 0.5–1.4)
CREAT SERPL-MCNC: 1.4 MG/DL (ref 0.5–1.4)
CREAT SERPL-MCNC: 2.2 MG/DL (ref 0.5–1.4)
CREAT SERPL-MCNC: 2.8 MG/DL (ref 0.5–1.4)
CREAT UR-MCNC: 17 MG/DL (ref 15–325)
DELSYS: ABNORMAL
DIFFERENTIAL METHOD: ABNORMAL
DIFFERENTIAL METHOD: ABNORMAL
EOSINOPHIL # BLD AUTO: 0 K/UL (ref 0–0.5)
EOSINOPHIL NFR BLD: 0 % (ref 0–8)
EOSINOPHIL NFR BLD: 0 % (ref 0–8)
ERYTHROCYTE [DISTWIDTH] IN BLOOD BY AUTOMATED COUNT: 15.9 % (ref 11.5–14.5)
ERYTHROCYTE [DISTWIDTH] IN BLOOD BY AUTOMATED COUNT: 15.9 % (ref 11.5–14.5)
EST. GFR  (NO RACE VARIABLE): 19.5 ML/MIN/1.73 M^2
EST. GFR  (NO RACE VARIABLE): 26 ML/MIN/1.73 M^2
ETHANOL UR-MCNC: <10 MG/DL
FLOW: 2
GLUCOSE SERPL-MCNC: 440 MG/DL (ref 70–110)
GLUCOSE SERPL-MCNC: 610 MG/DL (ref 70–110)
GLUCOSE SERPL-MCNC: 783 MG/DL (ref 70–110)
GLUCOSE SERPL-MCNC: 977 MG/DL (ref 70–110)
GLUCOSE UR QL STRIP: ABNORMAL
HCO3 UR-SCNC: 4.9 MMOL/L (ref 24–28)
HCT VFR BLD AUTO: 36.8 % (ref 37–48.5)
HCT VFR BLD AUTO: 37.8 % (ref 37–48.5)
HCT VFR BLD CALC: 28 %PCV (ref 36–54)
HCT VFR BLD CALC: 37 %PCV (ref 36–54)
HGB BLD-MCNC: 11.2 G/DL (ref 12–16)
HGB BLD-MCNC: 11.3 G/DL (ref 12–16)
HGB UR QL STRIP: ABNORMAL
HYALINE CASTS UR QL AUTO: 0 /LPF
HYPOCHROMIA BLD QL SMEAR: ABNORMAL
IMM GRANULOCYTES # BLD AUTO: 0.6 K/UL (ref 0–0.04)
IMM GRANULOCYTES # BLD AUTO: ABNORMAL K/UL (ref 0–0.04)
IMM GRANULOCYTES NFR BLD AUTO: 2.7 % (ref 0–0.5)
IMM GRANULOCYTES NFR BLD AUTO: ABNORMAL % (ref 0–0.5)
INFLUENZA A, MOLECULAR: NOT DETECTED
INFLUENZA B, MOLECULAR: NOT DETECTED
KETONES UR QL STRIP: ABNORMAL
LACTATE SERPL-SCNC: 2.2 MMOL/L (ref 0.5–2.2)
LEUKOCYTE ESTERASE UR QL STRIP: ABNORMAL
LYMPHOCYTES # BLD AUTO: 0.8 K/UL (ref 1–4.8)
LYMPHOCYTES NFR BLD: 2 % (ref 18–48)
LYMPHOCYTES NFR BLD: 3.5 % (ref 18–48)
MCH RBC QN AUTO: 21.7 PG (ref 27–31)
MCH RBC QN AUTO: 22 PG (ref 27–31)
MCHC RBC AUTO-ENTMCNC: 29.9 G/DL (ref 32–36)
MCHC RBC AUTO-ENTMCNC: 30.4 G/DL (ref 32–36)
MCV RBC AUTO: 71 FL (ref 82–98)
MCV RBC AUTO: 74 FL (ref 82–98)
METHADONE UR QL SCN>300 NG/ML: NEGATIVE
MICROSCOPIC COMMENT: ABNORMAL
MODE: ABNORMAL
MONOCYTES # BLD AUTO: 0.8 K/UL (ref 0.3–1)
MONOCYTES NFR BLD: 3.4 % (ref 4–15)
MONOCYTES NFR BLD: 6 % (ref 4–15)
NEUTROPHILS # BLD AUTO: 19.7 K/UL (ref 1.8–7.7)
NEUTROPHILS NFR BLD: 89.8 % (ref 38–73)
NEUTROPHILS NFR BLD: 90 % (ref 38–73)
NEUTS BAND NFR BLD MANUAL: 2 %
NITRITE UR QL STRIP: NEGATIVE
NRBC BLD-RTO: 0 /100 WBC
NRBC BLD-RTO: 0 /100 WBC
OPIATES UR QL SCN: NEGATIVE
OSMOLALITY SERPL: 383 MOSM/KG (ref 275–295)
OVALOCYTES BLD QL SMEAR: ABNORMAL
OVALOCYTES BLD QL SMEAR: ABNORMAL
PCO2 BLDA: 17.7 MMHG (ref 35–45)
PCP UR QL SCN>25 NG/ML: NEGATIVE
PH SMN: 7.05 [PH] (ref 7.35–7.45)
PH UR STRIP: 5 [PH] (ref 5–8)
PLATELET # BLD AUTO: 234 K/UL (ref 150–450)
PLATELET # BLD AUTO: 257 K/UL (ref 150–450)
PLATELET BLD QL SMEAR: ABNORMAL
PLATELET BLD QL SMEAR: ABNORMAL
PMV BLD AUTO: 11.8 FL (ref 9.2–12.9)
PMV BLD AUTO: 12.3 FL (ref 9.2–12.9)
PO2 BLDA: 150 MMHG (ref 40–60)
POC BE: -26 MMOL/L
POC IONIZED CALCIUM: 0.99 MMOL/L (ref 1.06–1.42)
POC IONIZED CALCIUM: 1.28 MMOL/L (ref 1.06–1.42)
POC SATURATED O2: 98 % (ref 95–100)
POC TCO2 (MEASURED): 11 MMOL/L (ref 23–29)
POC TCO2 (MEASURED): 13 MMOL/L (ref 23–29)
POC TCO2: 5 MMOL/L (ref 24–29)
POCT GLUCOSE: >500 MG/DL (ref 70–110)
POIKILOCYTOSIS BLD QL SMEAR: SLIGHT
POIKILOCYTOSIS BLD QL SMEAR: SLIGHT
POTASSIUM BLD-SCNC: 4 MMOL/L (ref 3.5–5.1)
POTASSIUM BLD-SCNC: 4.6 MMOL/L (ref 3.5–5.1)
POTASSIUM SERPL-SCNC: 3 MMOL/L (ref 3.5–5.1)
POTASSIUM SERPL-SCNC: 4.3 MMOL/L (ref 3.5–5.1)
PROT SERPL-MCNC: 7.2 G/DL (ref 6–8.4)
PROT UR QL STRIP: ABNORMAL
RBC # BLD AUTO: 5.13 M/UL (ref 4–5.4)
RBC # BLD AUTO: 5.17 M/UL (ref 4–5.4)
RBC #/AREA URNS AUTO: >100 /HPF (ref 0–4)
RSV AG BY MOLECULAR METHOD: NOT DETECTED
SAMPLE: ABNORMAL
SARS-COV-2 RNA RESP QL NAA+PROBE: NOT DETECTED
SITE: ABNORMAL
SODIUM BLD-SCNC: 142 MMOL/L (ref 136–145)
SODIUM BLD-SCNC: 146 MMOL/L (ref 136–145)
SODIUM SERPL-SCNC: 133 MMOL/L (ref 136–145)
SODIUM SERPL-SCNC: 136 MMOL/L (ref 136–145)
SP GR UR STRIP: 1.01 (ref 1–1.03)
SP02: 100
SPHEROCYTES BLD QL SMEAR: ABNORMAL
SPHEROCYTES BLD QL SMEAR: ABNORMAL
SQUAMOUS #/AREA URNS AUTO: 20 /HPF
TOXIC GRANULES BLD QL SMEAR: PRESENT
TOXIC GRANULES BLD QL SMEAR: PRESENT
TOXICOLOGY INFORMATION: ABNORMAL
TROPONIN I SERPL DL<=0.01 NG/ML-MCNC: <0.006 NG/ML (ref 0–0.03)
URN SPEC COLLECT METH UR: ABNORMAL
WBC # BLD AUTO: 21.97 K/UL (ref 3.9–12.7)
WBC # BLD AUTO: 28.97 K/UL (ref 3.9–12.7)
WBC #/AREA URNS AUTO: >100 /HPF (ref 0–5)
WBC CLUMPS UR QL AUTO: ABNORMAL
YEAST UR QL AUTO: ABNORMAL

## 2023-03-10 PROCEDURE — 99292 CRITICAL CARE ADDL 30 MIN: CPT | Mod: 25

## 2023-03-10 PROCEDURE — 99900035 HC TECH TIME PER 15 MIN (STAT)

## 2023-03-10 PROCEDURE — 25000003 PHARM REV CODE 250: Performed by: STUDENT IN AN ORGANIZED HEALTH CARE EDUCATION/TRAINING PROGRAM

## 2023-03-10 PROCEDURE — 99223 PR INITIAL HOSPITAL CARE,LEVL III: ICD-10-PCS | Mod: ,,, | Performed by: INTERNAL MEDICINE

## 2023-03-10 PROCEDURE — 93005 ELECTROCARDIOGRAM TRACING: CPT

## 2023-03-10 PROCEDURE — 63600175 PHARM REV CODE 636 W HCPCS: Performed by: STUDENT IN AN ORGANIZED HEALTH CARE EDUCATION/TRAINING PROGRAM

## 2023-03-10 PROCEDURE — 80048 BASIC METABOLIC PNL TOTAL CA: CPT | Mod: XB | Performed by: STUDENT IN AN ORGANIZED HEALTH CARE EDUCATION/TRAINING PROGRAM

## 2023-03-10 PROCEDURE — 51798 US URINE CAPACITY MEASURE: CPT

## 2023-03-10 PROCEDURE — 81001 URINALYSIS AUTO W/SCOPE: CPT | Performed by: STUDENT IN AN ORGANIZED HEALTH CARE EDUCATION/TRAINING PROGRAM

## 2023-03-10 PROCEDURE — 82010 KETONE BODYS QUAN: CPT | Performed by: STUDENT IN AN ORGANIZED HEALTH CARE EDUCATION/TRAINING PROGRAM

## 2023-03-10 PROCEDURE — 80307 DRUG TEST PRSMV CHEM ANLYZR: CPT | Performed by: STUDENT IN AN ORGANIZED HEALTH CARE EDUCATION/TRAINING PROGRAM

## 2023-03-10 PROCEDURE — 80053 COMPREHEN METABOLIC PANEL: CPT | Performed by: STUDENT IN AN ORGANIZED HEALTH CARE EDUCATION/TRAINING PROGRAM

## 2023-03-10 PROCEDURE — 27000221 HC OXYGEN, UP TO 24 HOURS

## 2023-03-10 PROCEDURE — 83605 ASSAY OF LACTIC ACID: CPT | Performed by: STUDENT IN AN ORGANIZED HEALTH CARE EDUCATION/TRAINING PROGRAM

## 2023-03-10 PROCEDURE — 85027 COMPLETE CBC AUTOMATED: CPT | Performed by: STUDENT IN AN ORGANIZED HEALTH CARE EDUCATION/TRAINING PROGRAM

## 2023-03-10 PROCEDURE — 99291 PR CRITICAL CARE, E/M 30-74 MINUTES: ICD-10-PCS | Mod: CS,,, | Performed by: EMERGENCY MEDICINE

## 2023-03-10 PROCEDURE — 82803 BLOOD GASES ANY COMBINATION: CPT

## 2023-03-10 PROCEDURE — 82962 GLUCOSE BLOOD TEST: CPT | Mod: 91

## 2023-03-10 PROCEDURE — 0241U SARS-COV2 (COVID) WITH FLU/RSV BY PCR: CPT | Performed by: STUDENT IN AN ORGANIZED HEALTH CARE EDUCATION/TRAINING PROGRAM

## 2023-03-10 PROCEDURE — 84484 ASSAY OF TROPONIN QUANT: CPT | Performed by: STUDENT IN AN ORGANIZED HEALTH CARE EDUCATION/TRAINING PROGRAM

## 2023-03-10 PROCEDURE — 25000003 PHARM REV CODE 250: Performed by: EMERGENCY MEDICINE

## 2023-03-10 PROCEDURE — 87040 BLOOD CULTURE FOR BACTERIA: CPT | Mod: 59 | Performed by: STUDENT IN AN ORGANIZED HEALTH CARE EDUCATION/TRAINING PROGRAM

## 2023-03-10 PROCEDURE — 94761 N-INVAS EAR/PLS OXIMETRY MLT: CPT

## 2023-03-10 PROCEDURE — 85025 COMPLETE CBC W/AUTO DIFF WBC: CPT | Performed by: STUDENT IN AN ORGANIZED HEALTH CARE EDUCATION/TRAINING PROGRAM

## 2023-03-10 PROCEDURE — 87077 CULTURE AEROBIC IDENTIFY: CPT | Performed by: STUDENT IN AN ORGANIZED HEALTH CARE EDUCATION/TRAINING PROGRAM

## 2023-03-10 PROCEDURE — 12000002 HC ACUTE/MED SURGE SEMI-PRIVATE ROOM

## 2023-03-10 PROCEDURE — 99291 CRITICAL CARE FIRST HOUR: CPT | Mod: 25

## 2023-03-10 PROCEDURE — 85007 BL SMEAR W/DIFF WBC COUNT: CPT | Performed by: STUDENT IN AN ORGANIZED HEALTH CARE EDUCATION/TRAINING PROGRAM

## 2023-03-10 PROCEDURE — 93010 ELECTROCARDIOGRAM REPORT: CPT | Mod: ,,, | Performed by: INTERNAL MEDICINE

## 2023-03-10 PROCEDURE — 99291 CRITICAL CARE FIRST HOUR: CPT | Mod: CS,,, | Performed by: EMERGENCY MEDICINE

## 2023-03-10 PROCEDURE — 83930 ASSAY OF BLOOD OSMOLALITY: CPT | Performed by: STUDENT IN AN ORGANIZED HEALTH CARE EDUCATION/TRAINING PROGRAM

## 2023-03-10 PROCEDURE — 87186 SC STD MICRODIL/AGAR DIL: CPT | Mod: 59 | Performed by: STUDENT IN AN ORGANIZED HEALTH CARE EDUCATION/TRAINING PROGRAM

## 2023-03-10 PROCEDURE — 99223 1ST HOSP IP/OBS HIGH 75: CPT | Mod: ,,, | Performed by: INTERNAL MEDICINE

## 2023-03-10 PROCEDURE — 93010 EKG 12-LEAD: ICD-10-PCS | Mod: ,,, | Performed by: INTERNAL MEDICINE

## 2023-03-10 PROCEDURE — 87086 URINE CULTURE/COLONY COUNT: CPT | Performed by: STUDENT IN AN ORGANIZED HEALTH CARE EDUCATION/TRAINING PROGRAM

## 2023-03-10 PROCEDURE — 96374 THER/PROPH/DIAG INJ IV PUSH: CPT

## 2023-03-10 PROCEDURE — 51701 INSERT BLADDER CATHETER: CPT

## 2023-03-10 RX ORDER — SODIUM CHLORIDE 0.9 % (FLUSH) 0.9 %
10 SYRINGE (ML) INJECTION
Status: DISCONTINUED | OUTPATIENT
Start: 2023-03-10 | End: 2023-03-16 | Stop reason: HOSPADM

## 2023-03-10 RX ORDER — SODIUM CHLORIDE, SODIUM LACTATE, POTASSIUM CHLORIDE, CALCIUM CHLORIDE 600; 310; 30; 20 MG/100ML; MG/100ML; MG/100ML; MG/100ML
INJECTION, SOLUTION INTRAVENOUS CONTINUOUS
Status: DISCONTINUED | OUTPATIENT
Start: 2023-03-10 | End: 2023-03-10

## 2023-03-10 RX ORDER — HEPARIN SODIUM 5000 [USP'U]/ML
5000 INJECTION, SOLUTION INTRAVENOUS; SUBCUTANEOUS EVERY 8 HOURS
Status: DISCONTINUED | OUTPATIENT
Start: 2023-03-10 | End: 2023-03-16 | Stop reason: HOSPADM

## 2023-03-10 RX ORDER — POTASSIUM CHLORIDE 7.45 MG/ML
60 INJECTION INTRAVENOUS
Status: DISCONTINUED | OUTPATIENT
Start: 2023-03-10 | End: 2023-03-10

## 2023-03-10 RX ORDER — POTASSIUM CHLORIDE 7.45 MG/ML
10 INJECTION INTRAVENOUS
Status: DISCONTINUED | OUTPATIENT
Start: 2023-03-10 | End: 2023-03-10

## 2023-03-10 RX ORDER — CALCIUM GLUCONATE 20 MG/ML
1 INJECTION, SOLUTION INTRAVENOUS
Status: DISCONTINUED | OUTPATIENT
Start: 2023-03-10 | End: 2023-03-16 | Stop reason: HOSPADM

## 2023-03-10 RX ORDER — POTASSIUM CHLORIDE 7.45 MG/ML
80 INJECTION INTRAVENOUS
Status: DISCONTINUED | OUTPATIENT
Start: 2023-03-10 | End: 2023-03-10

## 2023-03-10 RX ORDER — CALCIUM GLUCONATE 20 MG/ML
3 INJECTION, SOLUTION INTRAVENOUS
Status: DISCONTINUED | OUTPATIENT
Start: 2023-03-10 | End: 2023-03-16 | Stop reason: HOSPADM

## 2023-03-10 RX ORDER — POTASSIUM CHLORIDE 7.45 MG/ML
10 INJECTION INTRAVENOUS
Status: COMPLETED | OUTPATIENT
Start: 2023-03-10 | End: 2023-03-10

## 2023-03-10 RX ORDER — DEXTROSE MONOHYDRATE 100 MG/ML
INJECTION, SOLUTION INTRAVENOUS
Status: DISCONTINUED | OUTPATIENT
Start: 2023-03-10 | End: 2023-03-16 | Stop reason: HOSPADM

## 2023-03-10 RX ORDER — MAGNESIUM SULFATE HEPTAHYDRATE 40 MG/ML
4 INJECTION, SOLUTION INTRAVENOUS
Status: DISCONTINUED | OUTPATIENT
Start: 2023-03-10 | End: 2023-03-10

## 2023-03-10 RX ORDER — POTASSIUM CHLORIDE 7.45 MG/ML
40 INJECTION INTRAVENOUS
Status: DISCONTINUED | OUTPATIENT
Start: 2023-03-10 | End: 2023-03-10

## 2023-03-10 RX ORDER — MAGNESIUM SULFATE HEPTAHYDRATE 40 MG/ML
2 INJECTION, SOLUTION INTRAVENOUS
Status: DISCONTINUED | OUTPATIENT
Start: 2023-03-10 | End: 2023-03-10

## 2023-03-10 RX ORDER — CALCIUM GLUCONATE 20 MG/ML
2 INJECTION, SOLUTION INTRAVENOUS
Status: DISCONTINUED | OUTPATIENT
Start: 2023-03-10 | End: 2023-03-16 | Stop reason: HOSPADM

## 2023-03-10 RX ADMIN — HEPARIN SODIUM 5000 UNITS: 5000 INJECTION INTRAVENOUS; SUBCUTANEOUS at 09:03

## 2023-03-10 RX ADMIN — POTASSIUM CHLORIDE 10 MEQ: 7.46 INJECTION, SOLUTION INTRAVENOUS at 11:03

## 2023-03-10 RX ADMIN — POTASSIUM CHLORIDE 10 MEQ: 7.46 INJECTION, SOLUTION INTRAVENOUS at 09:03

## 2023-03-10 RX ADMIN — CEFEPIME 1 G: 1 INJECTION, POWDER, FOR SOLUTION INTRAMUSCULAR; INTRAVENOUS at 01:03

## 2023-03-10 RX ADMIN — VANCOMYCIN HYDROCHLORIDE 1000 MG: 1 INJECTION, POWDER, LYOPHILIZED, FOR SOLUTION INTRAVENOUS at 02:03

## 2023-03-10 RX ADMIN — POTASSIUM CHLORIDE 10 MEQ: 7.46 INJECTION, SOLUTION INTRAVENOUS at 03:03

## 2023-03-10 RX ADMIN — SODIUM CHLORIDE, SODIUM LACTATE, POTASSIUM CHLORIDE, AND CALCIUM CHLORIDE 1000 ML: .6; .31; .03; .02 INJECTION, SOLUTION INTRAVENOUS at 12:03

## 2023-03-10 RX ADMIN — POTASSIUM CHLORIDE: 149 INJECTION, SOLUTION, CONCENTRATE INTRAVENOUS at 07:03

## 2023-03-10 RX ADMIN — POTASSIUM CHLORIDE 10 MEQ: 7.46 INJECTION, SOLUTION INTRAVENOUS at 06:03

## 2023-03-10 RX ADMIN — INSULIN HUMAN 4 UNITS/HR: 1 INJECTION, SOLUTION INTRAVENOUS at 03:03

## 2023-03-10 RX ADMIN — POTASSIUM CHLORIDE 10 MEQ: 7.46 INJECTION, SOLUTION INTRAVENOUS at 01:03

## 2023-03-10 RX ADMIN — POTASSIUM CHLORIDE 10 MEQ: 7.46 INJECTION, SOLUTION INTRAVENOUS at 05:03

## 2023-03-10 RX ADMIN — PIPERACILLIN SODIUM AND TAZOBACTAM SODIUM 4.5 G: 4; .5 INJECTION, POWDER, FOR SOLUTION INTRAVENOUS at 05:03

## 2023-03-10 NOTE — SUBJECTIVE & OBJECTIVE
Past Medical History:   Diagnosis Date    Anxiety     Arthritis     Bronchitis     CHF (congestive heart failure)     DM (diabetes mellitus)     Emphysema lung     Encounter for blood transfusion     HTN (hypertension)     Restrictive lung disease     Sleep apnea        Past Surgical History:   Procedure Laterality Date     SECTION      PALATAL EXPANSION      WRIST SURGERY Right        Review of patient's allergies indicates:   Allergen Reactions    Hydrochlorothiazide plus        Family History       Problem Relation (Age of Onset)    Diabetes Mother, Father, Sister    Hypertension Mother, Father          Tobacco Use    Smoking status: Some Days     Packs/day: 0.25     Types: Cigarettes    Smokeless tobacco: Never   Substance and Sexual Activity    Alcohol use: Yes    Drug use: Yes     Types: Marijuana    Sexual activity: Yes     Partners: Male      Review of Systems   Unable to perform ROS: Mental status change   Objective:     Vital Signs (Most Recent):  Temp: 99 °F (37.2 °C) (03/10/23 1149)  Pulse: 95 (03/10/23 1502)  Resp: (!) 28 (03/10/23 1332)  BP: (!) 118/58 (03/10/23 1502)  SpO2: 100 % (03/10/23 1502)   Vital Signs (24h Range):  Temp:  [99 °F (37.2 °C)] 99 °F (37.2 °C)  Pulse:  [93-98] 95  Resp:  [24-28] 28  SpO2:  [99 %-100 %] 100 %  BP: (108-119)/(58-64) 118/58   Weight: 63.5 kg (140 lb)  Body mass index is 24.03 kg/m².    No intake or output data in the 24 hours ending 03/10/23 1515    Physical Exam  Vitals and nursing note reviewed.   Constitutional:       General: She is not in acute distress.     Appearance: She is ill-appearing. She is not toxic-appearing or diaphoretic.   HENT:      Head: Normocephalic and atraumatic.      Mouth/Throat:      Mouth: Mucous membranes are dry.   Eyes:      General: No scleral icterus.  Cardiovascular:      Rate and Rhythm: Normal rate and regular rhythm.   Pulmonary:      Effort: Pulmonary effort is normal. No respiratory distress.      Breath sounds: No  wheezing or rales.   Abdominal:      General: Bowel sounds are normal.      Palpations: Abdomen is soft.      Tenderness: There is no abdominal tenderness (grimaces to palpation).   Musculoskeletal:         General: No swelling.      Right lower leg: No edema.      Left lower leg: No edema.   Skin:     General: Skin is warm and dry.      Coloration: Skin is not jaundiced.   Neurological:      Mental Status: She is easily aroused. She is lethargic, disoriented and confused.   Psychiatric:         Behavior: Behavior is uncooperative.       Vents:     Lines/Drains/Airways       Drain  Duration             Female External Urinary Catheter 03/10/23 1448 <1 day              Peripheral Intravenous Line  Duration                  Peripheral IV - Single Lumen 03/10/23 1218 18 G Right Antecubital <1 day         Peripheral IV - Single Lumen 03/10/23 18 G Left Antecubital <1 day                  Significant Labs:    CBC/Anemia Profile:  Recent Labs   Lab 03/10/23  1220   WBC 28.97*   HGB 11.2*   HCT 36.8*      MCV 71*   RDW 15.9*        Chemistries:  Recent Labs   Lab 03/10/23  1220   *   K 4.3   CL 90*   CO2 <5*   BUN 54*   CREATININE 2.8*   CALCIUM 10.1   ALBUMIN 1.9*   PROT 7.2   BILITOT 0.2   ALKPHOS 219*   ALT 17   AST 11       Lactic Acid:   Recent Labs   Lab 03/10/23  1304   LACTATE 2.2     POCT Glucose:   Recent Labs   Lab 03/10/23  1338 03/10/23  1523   POCTGLUCOSE >500* >500*     All pertinent labs within the past 24 hours have been reviewed.    Significant Imaging: I have reviewed all pertinent imaging results/findings within the past 24 hours.

## 2023-03-10 NOTE — ASSESSMENT & PLAN NOTE
UA concerning for UTI with altered mental status and abdominal pain on physical exam    Recent Labs   Lab 03/10/23  1443   COLORU Brown*   APPEARANCEUA Hazy*   PHUR 5.0   SPECGRAV 1.015   PROTEINUA 1+*   GLUCUA 4+*   KETONESU 3+*   BILIRUBINUA Negative   OCCULTUA 3+*   NITRITE Negative   LEUKOCYTESUR 3+*   RBCUA >100*   WBCUA >100*   BACTERIA Many*   SQUAMEPITHEL 20   HYALINECASTS 0     PLAN  Followup urine cultures and deescalate antibiotics as appropriate

## 2023-03-10 NOTE — ASSESSMENT & PLAN NOTE
Patient has acute metabolic encephalopathy that is likely secondary to DKA.  Treatment for underlying cause is underway.     Differential diagnoses include, but not limited to: DKA, electrolyte abnormalities, infection(UA concerning, abdominal pain on initial exam)    PLAN  - Followup CT Head  - Follow-up CT abdomen  - Followup UDS  - SLP consult  - Avoid sedating medications  - Followup Urine cultures, blood cultures

## 2023-03-10 NOTE — ASSESSMENT & PLAN NOTE
Recent Labs     03/10/23  1220   WBC 28.97*       Leukocytosis on admission likely secondary to dehydration from DKA    PLAN  - Follow-up CT abdomen  - Followup Urinalysis, blood cultures   - Started on broad-spectrum antibiotics with Vancomycin/Zosyn, deescalate as appropriate

## 2023-03-10 NOTE — PROGRESS NOTES
Pharmacokinetic Initial Assessment: IV Vancomycin    Assessment/Plan:    Initiate intravenous vancomycin with loading dose of 1000 mg once, done in ED.  In setting of CARLINE, check random vancomycin level with AM labs to determine if scheduling subsequent doses appropriate or if intermittent dosing indicated.   Desired empiric serum trough concentration is 15 to 20 mcg/mL.  Draw vancomycin random level with AM labs on 03/11/2023.  Pharmacy will continue to follow and monitor vancomycin.      Please contact pharmacy at extension [phone] with any questions regarding this assessment.     Thank you for the consult,   Radha Jiang       Patient brief summary:  Christine Mosqueda is a 52 y.o. female initiated on antimicrobial therapy with IV Vancomycin for treatment of suspected sepsis.    Drug Allergies:   Review of patient's allergies indicates:   Allergen Reactions    Hydrochlorothiazide plus        Actual Body Weight:   63.5 kg    Renal Function:   Estimated Creatinine Clearance: 19.8 mL/min (A) (based on SCr of 2.8 mg/dL (H)).    CBC (last 72 hours):  Recent Labs   Lab Result Units 03/10/23  1220   WBC K/uL 28.97*   Hemoglobin g/dL 11.2*   Hematocrit % 36.8*   Platelets K/uL 257   Gran % % 90.0*   Lymph % % 2.0*   Mono % % 6.0   Eosinophil % % 0.0   Basophil % % 0.0   Differential Method  Manual       Metabolic Panel (last 72 hours):  Recent Labs   Lab Result Units 03/10/23  1220 03/10/23  1443   Sodium mmol/L 133*  --    Potassium mmol/L 4.3  --    Chloride mmol/L 90*  --    CO2 mmol/L <5*  --    Glucose mg/dL 977*  --    Glucose, UA   --  4+*   BUN mg/dL 54*  --    Creatinine mg/dL 2.8*  --    Albumin g/dL 1.9*  --    Total Bilirubin mg/dL 0.2  --    Alkaline Phosphatase U/L 219*  --    AST U/L 11  --    ALT U/L 17  --        Drug levels (last 3 results):  No results for input(s): VANCOMYCINRA, VANCORANDOM, VANCOMYCINPE, VANCOPEAK, VANCOMYCINTR, VANCOTROUGH in the last 72 hours.    Microbiologic  Results:  Microbiology Results (last 7 days)       Procedure Component Value Units Date/Time    Urine culture [[id number]] Collected: 03/10/23 1443    Order Status: No result Specimen: Urine Updated: 03/10/23 1554    Blood culture #1 **CANNOT BE ORDERED STAT** [[id number]] Collected: 03/10/23 1335    Order Status: Sent Specimen: Blood from Peripheral, Forearm, Left Updated: 03/10/23 1359    Blood culture #2 **CANNOT BE ORDERED STAT** [[id number]] Collected: 03/10/23 1335    Order Status: Sent Specimen: Blood from Peripheral, Forearm, Left Updated: 03/10/23 135

## 2023-03-10 NOTE — ASSESSMENT & PLAN NOTE
Home Hypertension Medications             amlodipine (NORVASC) 10 MG tablet Take 10 mg by mouth once daily.    losartan (COZAAR) 100 MG tablet Take 100 mg by mouth once daily.          Plan:  Resume home medications as tolerated

## 2023-03-10 NOTE — ASSESSMENT & PLAN NOTE
Creatinine 2.8 on admit, baseline unclear(limited labs available)  Possible Etiologies:  - Likely pre-renal from dehydration and volume losses from DKA    Recent Labs     03/10/23  1220   CREATININE 2.8*   BUN 54*     Estimated Creatinine Clearance: 19.8 mL/min (A) (based on SCr of 2.8 mg/dL (H)).    Plan:    - Fluid resuscitation, if no improvement will consider urine lytes and renal/retroperitoneal ultrasound  - Avoid nephrotoxic agents such as NSAIDs, gadolinium and IV radiocontrast.  - Renally dose meds to current GFR.  - Maintain MAP > 65.

## 2023-03-10 NOTE — H&P
[facility] - Emergency Dept  Critical Care Medicine  History & Physical    Patient Name: Christine Mosqueda  MRN: [id number]  Admission Date: 3/10/2023  Hospital Length of Stay: 0 days  Code Status: Full Code  Attending Physician: Brittany De La Rosa MD   Primary Care Provider: Primary Doctor No   Principal Problem: Diabetic ketoacidosis associated with type 2 diabetes mellitus    Subjective:     HPI:  Patient information was obtained from relative(), past medical records, and ER records.    Ms. Mosqueda is a 52 y.o F w/ hx of CHF, COPD, HTN, DM type 2, and anxiety who presented to the  ED with altered mental status.   reports the altered mental status started around 3 days ago where the patient was increasingly agitated, aggressive, drinking lots of sodas and eating lots of ice cream.  He is unclear if she missed any insulin doses.  She appeared more lethargic this morning so she was brought to the ED via EMS.    ED course notable for WBC 28.97, hemoglobin 11.2, sodium 133, chloride 90, bicarb< 5, anion gap of 54, creatinine 2.8, glucose 977, alkaline phosphatase 219, serum osmolality 383, beta hydroxybutyrate 5.2, VBG notable for pH 7.051, CO2 17.7, bicarb 4.9.           Past Medical History:   Diagnosis Date    Anxiety     Arthritis     Bronchitis     CHF (congestive heart failure)     DM (diabetes mellitus)     Emphysema lung     Encounter for blood transfusion     HTN (hypertension)     Restrictive lung disease     Sleep apnea        Past Surgical History:   Procedure Laterality Date     SECTION      PALATAL EXPANSION      WRIST SURGERY Right        Review of patient's allergies indicates:   Allergen Reactions    Hydrochlorothiazide plus        Family History       Problem Relation (Age of Onset)    Diabetes Mother, Father, Sister    Hypertension Mother, Father          Tobacco Use    Smoking status: Some Days     Packs/day: 0.25     Types: Cigarettes    Smokeless tobacco: Never    Substance and Sexual Activity    Alcohol use: Yes    Drug use: Yes     Types: Marijuana    Sexual activity: Yes     Partners: Male      Review of Systems   Unable to perform ROS: Mental status change   Objective:     Vital Signs (Most Recent):  Temp: 99 °F (37.2 °C) (03/10/23 1149)  Pulse: 95 (03/10/23 1502)  Resp: (!) 28 (03/10/23 1332)  BP: (!) 118/58 (03/10/23 1502)  SpO2: 100 % (03/10/23 1502)   Vital Signs (24h Range):  Temp:  [99 °F (37.2 °C)] 99 °F (37.2 °C)  Pulse:  [93-98] 95  Resp:  [24-28] 28  SpO2:  [99 %-100 %] 100 %  BP: (108-119)/(58-64) 118/58   Weight: 63.5 kg (140 lb)  Body mass index is 24.03 kg/m².    No intake or output data in the 24 hours ending 03/10/23 1515    Physical Exam  Vitals and nursing note reviewed.   Constitutional:       General: She is not in acute distress.     Appearance: She is ill-appearing. She is not toxic-appearing or diaphoretic.   HENT:      Head: Normocephalic and atraumatic.      Mouth/Throat:      Mouth: Mucous membranes are dry.   Eyes:      General: No scleral icterus.  Cardiovascular:      Rate and Rhythm: Normal rate and regular rhythm.   Pulmonary:      Effort: Pulmonary effort is normal. No respiratory distress.      Breath sounds: No wheezing or rales.   Abdominal:      General: Bowel sounds are normal.      Palpations: Abdomen is soft.      Tenderness: There is no abdominal tenderness (grimaces to palpation).   Musculoskeletal:         General: No swelling.      Right lower leg: No edema.      Left lower leg: No edema.   Skin:     General: Skin is warm and dry.      Coloration: Skin is not jaundiced.   Neurological:      Mental Status: She is easily aroused. She is lethargic, disoriented and confused.   Psychiatric:         Behavior: Behavior is uncooperative.       Vents:     Lines/Drains/Airways       Drain  Duration             Female External Urinary Catheter 03/10/23 1448 <1 day              Peripheral Intravenous Line  Duration                   "Peripheral IV - Single Lumen 03/10/23 1218 18 G Right Antecubital <1 day         Peripheral IV - Single Lumen 03/10/23 18 G Left Antecubital <1 day                  Significant Labs:    CBC/Anemia Profile:  Recent Labs   Lab 03/10/23  1220   WBC 28.97*   HGB 11.2*   HCT 36.8*      MCV 71*   RDW 15.9*        Chemistries:  Recent Labs   Lab 03/10/23  1220   *   K 4.3   CL 90*   CO2 <5*   BUN 54*   CREATININE 2.8*   CALCIUM 10.1   ALBUMIN 1.9*   PROT 7.2   BILITOT 0.2   ALKPHOS 219*   ALT 17   AST 11       Lactic Acid:   Recent Labs   Lab 03/10/23  1304   LACTATE 2.2     POCT Glucose:   Recent Labs   Lab 03/10/23  1338 03/10/23  1523   POCTGLUCOSE >500* >500*     All pertinent labs within the past 24 hours have been reviewed.    Significant Imaging: I have reviewed all pertinent imaging results/findings within the past 24 hours.    Assessment/Plan:     Neuro  AMS (altered mental status)  Patient has acute metabolic encephalopathy that is likely secondary to DKA.  Treatment for underlying cause is underway.     Differential diagnoses include, but not limited to: DKA, electrolyte abnormalities, infection(UA concerning, abdominal pain on initial exam)    PLAN  - Followup CT Head  - Follow-up CT abdomen  - Followup UDS  - SLP consult  - Avoid sedating medications  - Followup Urine cultures, blood cultures       Pulmonary  Restrictive lung disease  See "Emphysema lung" A&P    Emphysema lung  Hx of tobacco abuse, pulmonary emphysema, pulmonary hypertension, restrictive lung disease listed per chart review of outside medical record    CXR 03/10/2023 non-acute    PLAN  Duo nebs PRN  Supplemental oxygen to target SaO2 >90%    Cardiac/Vascular  Essential hypertension    Home Hypertension Medications             amlodipine (NORVASC) 10 MG tablet Take 10 mg by mouth once daily.    losartan (COZAAR) 100 MG tablet Take 100 mg by mouth once daily.          Plan:  Resume home medications as tolerated    Renal/  CARLINE " (acute kidney injury)  Creatinine 2.8 on admit, baseline unclear(limited labs available)  Possible Etiologies:  - Likely pre-renal from dehydration and volume losses from DKA    Recent Labs     03/10/23  1220   CREATININE 2.8*   BUN 54*     Estimated Creatinine Clearance: 19.8 mL/min (A) (based on SCr of 2.8 mg/dL (H)).    Plan:    - Fluid resuscitation, if no improvement will consider urine lytes and renal/retroperitoneal ultrasound  - Avoid nephrotoxic agents such as NSAIDs, gadolinium and IV radiocontrast.  - Renally dose meds to current GFR.  - Maintain MAP > 65.    UTI (urinary tract infection)  UA concerning for UTI with altered mental status and abdominal pain on physical exam    Recent Labs   Lab 03/10/23  1443   COLORU Brown*   APPEARANCEUA Hazy*   PHUR 5.0   SPECGRAV 1.015   PROTEINUA 1+*   GLUCUA 4+*   KETONESU 3+*   BILIRUBINUA Negative   OCCULTUA 3+*   NITRITE Negative   LEUKOCYTESUR 3+*   RBCUA >100*   WBCUA >100*   BACTERIA Many*   SQUAMEPITHEL 20   HYALINECASTS 0     PLAN  Followup urine cultures and deescalate antibiotics as appropriate    Oncology  Leukocytosis  Recent Labs     03/10/23  1220   WBC 28.97*       Leukocytosis on admission likely secondary to dehydration from DKA    PLAN  - Follow-up CT abdomen  - Followup Urinalysis, blood cultures   - Started on broad-spectrum antibiotics with Vancomycin/Zosyn, deescalate as appropriate    Endocrine  * Diabetic ketoacidosis associated with type 2 diabetes mellitus  Last A1c:   Lab Results   Component Value Date    HGBA1C 11.4 (H) 03/13/2020      Home Diabetes Medications             INSULIN ASPART PROTAM & ASPART (NOVOLOG MIX 70-30 SUBQ) Inject into the skin.    insulin glargine,hum.rec.anlog (TOUJEO SOLOSTAR U-300 INSULIN SUBQ) Inject 85 Units into the skin 2 (two) times daily.            Recent Labs     03/10/23  1338 03/10/23  1523   POCTGLUCOSE >500* >500*         PLAN  -Started on insulin infusion per DKA protocol  - Will monitor glucose  "results and adjust insulin regimen accordingly  - Fluid resuscitation  - Diet: Bariatric clear (no sugar) when tolerated  - Glucose checks  - BMP q4hr  - POCT glucose q1hr    Type 2 diabetes mellitus without complication, with long-term current use of insulin  See "DKA" A&P          Critical Care Daily Checklist:    A: Awake: RASS Goal/Actual Goal:    Actual:     B: Spontaneous Breathing Trial Performed?  N/A   C: SAT & SBT Coordinated?  N/A                      D: Delirium: CAM-ICU     E: Early Mobility Performed? No   F: Feeding Goal:    Status:     Current Diet Order   Procedures    Diet NPO      AS: Analgesia/Sedation N/A   T: Thromboembolic Prophylaxis Heparin   H: HOB > 300 Yes   U: Stress Ulcer Prophylaxis (if needed) None   G: Glucose Control Insulin infusion   B: Bowel Function     I: Indwelling Catheter (Lines & Hernandez) Necessity PIV   D: De-escalation of Antimicrobials/Pharmacotherapies Not yet    Plan for the day/ETD Insulin infusion for DKA    Code Status:  Family/Goals of Care: Full Code         Critical secondary to Patient has a condition that poses threat to life and bodily function: DKA     Critical care was time spent personally by me on the following activities: development of treatment plan with patient or surrogate and bedside caregivers, discussions with consultants, evaluation of patient's response to treatment, examination of patient, ordering and performing treatments and interventions, ordering and review of laboratory studies, ordering and review of radiographic studies, pulse oximetry, re-evaluation of patient's condition. This critical care time did not overlap with that of any other provider or involve time for any procedures.     Jonathan Tatum, DO  Critical Care Medicine  [facility] - Emergency Dept  "

## 2023-03-10 NOTE — ASSESSMENT & PLAN NOTE
Last A1c:   Lab Results   Component Value Date    HGBA1C 11.4 (H) 03/13/2020      Home Diabetes Medications             INSULIN ASPART PROTAM & ASPART (NOVOLOG MIX 70-30 SUBQ) Inject into the skin.    insulin glargine,hum.rec.anlog (TOUJEO SOLOSTAR U-300 INSULIN SUBQ) Inject 85 Units into the skin 2 (two) times daily.            Recent Labs     03/10/23  1338 03/10/23  1523   POCTGLUCOSE >500* >500*         PLAN  -Started on insulin infusion per DKA protocol  - Will monitor glucose results and adjust insulin regimen accordingly  - Fluid resuscitation  - Diet: Bariatric clear (no sugar) when tolerated  - Glucose checks  - BMP q4hr  - POCT glucose q1hr

## 2023-03-10 NOTE — ASSESSMENT & PLAN NOTE
Hx of tobacco abuse, pulmonary emphysema, pulmonary hypertension, restrictive lung disease listed per chart review of outside medical record    CXR 03/10/2023 non-acute    PLAN  Duo nebs PRN  Supplemental oxygen to target SaO2 >90%

## 2023-03-10 NOTE — ED PROVIDER NOTES
Encounter Date: 3/10/2023       History     Chief Complaint   Patient presents with    Hyperglycemia     Coming from  home via EMS for AMS, pt has a CBG reading of HI. GCS-13.      Patient is a 52-year-old female with a past medical history of hypertension and insulin-dependent diabetes presenting to the ED via EMS obtunded.  EMS departed before they could give any information.  Patient appears obtunded and is unable to provide any history.    The history is provided by the patient, medical records and the EMS personnel. The history is limited by the condition of the patient.   Review of patient's allergies indicates:   Allergen Reactions    Hydrochlorothiazide plus      Past Medical History:   Diagnosis Date    Anxiety     Arthritis     Bronchitis     CHF (congestive heart failure)     DM (diabetes mellitus)     Emphysema lung     Encounter for blood transfusion     HTN (hypertension)     Restrictive lung disease     Sleep apnea      Past Surgical History:   Procedure Laterality Date     SECTION      PALATAL EXPANSION      WRIST SURGERY Right      Family History   Problem Relation Age of Onset    Diabetes Mother     Hypertension Mother     Hypertension Father     Diabetes Father     Diabetes Sister      Social History     Tobacco Use    Smoking status: Some Days     Packs/day: 0.25     Types: Cigarettes    Smokeless tobacco: Never   Substance Use Topics    Alcohol use: Yes    Drug use: Yes     Types: Marijuana     Review of Systems   Unable to perform ROS: Mental status change   All other systems reviewed and were negative; see HPI also for additional ROS.    Physical Exam     Initial Vitals [03/10/23 1149]   BP Pulse Resp Temp SpO2   108/64 98 (!) 24 99 °F (37.2 °C) 99 %      MAP       --         Physical Exam    Nursing note and vitals reviewed.  Constitutional: She appears well-developed and well-nourished. She appears listless and lethargic. She is not diaphoretic. She appears ill. No distress.   She is  ill-appearing.  Obtunded.  Appears thin.   HENT:   Head: Normocephalic and atraumatic.   Right Ear: External ear normal.   Left Ear: External ear normal.   Mouth/Throat: Mucous membranes are dry.   Neck: Neck supple.   Cardiovascular:  Normal rate, regular rhythm, normal heart sounds and intact distal pulses.           Pulmonary/Chest: Breath sounds normal. No respiratory distress. She has no wheezes. She has no rhonchi. She has no rales.   Abdominal: Abdomen is soft. She exhibits no distension. There is no abdominal tenderness. There is no rebound and no guarding.   Musculoskeletal:      Cervical back: Neck supple.     Neurological: She is oriented to person, place, and time. She appears lethargic and listless. GCS score is 15. GCS eye subscore is 4. GCS verbal subscore is 5. GCS motor subscore is 6.   Skin: Skin is warm. Capillary refill takes less than 2 seconds. No rash noted.   Psychiatric: She has a normal mood and affect.       ED Course   Critical Care    Date/Time: 3/10/2023 3:30 PM  Performed by: Wan Parikh DO  Authorized by: Wan Parikh DO   Direct patient critical care time: 15 minutes  Additional history critical care time: 15 minutes  Ordering / reviewing critical care time: 25 minutes  Documentation critical care time: 10 minutes  Consulting other physicians critical care time: 10 minutes  Total critical care time (exclusive of procedural time) : 75 minutes  Critical care was necessary to treat or prevent imminent or life-threatening deterioration of the following conditions: dehydration, endocrine crisis and metabolic crisis.  Critical care was time spent personally by me on the following activities: blood draw for specimens, development of treatment plan with patient or surrogate, discussions with consultants, evaluation of patient's response to treatment, examination of patient, obtaining history from patient or surrogate, ordering and performing treatments and interventions, ordering  and review of laboratory studies, ordering and review of radiographic studies, pulse oximetry, re-evaluation of patient's condition and review of old charts.      Labs Reviewed   CBC W/ AUTO DIFFERENTIAL - Abnormal; Notable for the following components:       Result Value    WBC 28.97 (*)     Hemoglobin 11.2 (*)     Hematocrit 36.8 (*)     MCV 71 (*)     MCH 21.7 (*)     MCHC 30.4 (*)     RDW 15.9 (*)     Gran % 90.0 (*)     Lymph % 2.0 (*)     All other components within normal limits   COMPREHENSIVE METABOLIC PANEL - Abnormal; Notable for the following components:    Sodium 133 (*)     Chloride 90 (*)     CO2 <5 (*)     Glucose 977 (*)     BUN 54 (*)     Creatinine 2.8 (*)     Albumin 1.9 (*)     Alkaline Phosphatase 219 (*)     eGFR 19.5 (*)     All other components within normal limits   OSMOLALITY, SERUM - Abnormal; Notable for the following components:    Osmolality 383 (*)     All other components within normal limits    Narrative:      osmo  critical result(s) called and verbal readback obtained from   janice francis rn by MAURISIO 03/10/2023 13:17   BETA - HYDROXYBUTYRATE, SERUM - Abnormal; Notable for the following components:    Beta-Hydroxybutyrate 5.2 (*)     All other components within normal limits   URINALYSIS, REFLEX TO URINE CULTURE - Abnormal; Notable for the following components:    Color, UA Brown (*)     Appearance, UA Hazy (*)     Protein, UA 1+ (*)     Glucose, UA 4+ (*)     Ketones, UA 3+ (*)     Occult Blood UA 3+ (*)     Leukocytes, UA 3+ (*)     All other components within normal limits    Narrative:     Specimen Source->Urine   URINALYSIS MICROSCOPIC - Abnormal; Notable for the following components:    RBC, UA >100 (*)     WBC, UA >100 (*)     WBC Clumps, UA Few (*)     Bacteria Many (*)     All other components within normal limits    Narrative:     Specimen Source->Urine   ISTAT PROCEDURE - Abnormal; Notable for the following components:    POC PH 7.051 (*)     POC PCO2 17.7 (*)     POC  PO2 150 (*)     POC HCO3 4.9 (*)     POC TCO2 5 (*)     All other components within normal limits   POCT GLUCOSE - Abnormal; Notable for the following components:    POCT Glucose >500 (*)     All other components within normal limits   POCT GLUCOSE - Abnormal; Notable for the following components:    POCT Glucose >500 (*)     All other components within normal limits   CULTURE, BLOOD   CULTURE, BLOOD   CULTURE, URINE   LACTIC ACID, PLASMA   SARS-COV2 (COVID) WITH FLU/RSV BY PCR   TOXICOLOGY SCREEN, URINE, RANDOM (COMPLIANCE)   TROPONIN I   CBC W/ AUTO DIFFERENTIAL   BASIC METABOLIC PANEL   TOXICOLOGY SCREEN, URINE, RANDOM (COMPLIANCE)   POCT GLUCOSE MONITORING CONTINUOUS   POCT GLUCOSE MONITORING CONTINUOUS     EKG Readings: (Independently Interpreted)   Initial Reading: No STEMI. Previous EKG: Compared with most recent EKG Previous EKG Date: January 2023. Rhythm: Normal Sinus Rhythm. Heart Rate: 95. Ectopy: No Ectopy. Conduction: Normal.   New lateral T-wave inversions not present on prior EKG.   ECG Results              EKG 12-lead (Final result)  Result time 03/10/23 15:31:17      Final result by Interface, Lab In [facility] (03/10/23 15:31:17)                   Narrative:    Test Reason : R41.82,    Vent. Rate : 095 BPM     Atrial Rate : 095 BPM     P-R Int : 128 ms          QRS Dur : 102 ms      QT Int : 376 ms       P-R-T Axes : 078 081 -15 degrees     QTc Int : 472 ms    Normal sinus rhythm  Possible Left atrial enlargement  LVH with repolarization abnormality  Abnormal ECG  When compared with ECG of 09-JAN-2023 16:26,  ST now depressed in Inferior leads  T wave inversion now evident in Inferior leads  Inverted T waves have replaced nonspecific T wave abnormality in Anterior  leads  Confirmed by Cole Mo MD ([id number]) on 3/10/2023 3:31:08 PM    Referred By: System System           Confirmed By:Cole Mo MD                                  Imaging Results              X-Ray Chest AP Portable (Final  result)  Result time 03/10/23 14:59:09      Final result by Shahriar Ramírez MD (03/10/23 14:59:09)                   Impression:      See above      Electronically signed by: Shahriar Ramírez MD  Date:    03/10/2023  Time:    14:59               Narrative:    EXAMINATION:  XR CHEST AP PORTABLE    CLINICAL HISTORY:  Sepsis, unspecified organism    TECHNIQUE:  Single frontal view of the chest was performed.    COMPARISON:  N 01/09/2023 one    FINDINGS:  Mild cardiomegaly.  The lungs are clear.  No pleural effusion                                    X-Rays:   Independently Interpreted Readings:   Chest X-Ray: No infiltrates.  No acute abnormalities. Cardiomegaly present.   Medications   potassium chloride 10 mEq in 100 mL IVPB (0 mEq Intravenous Stopped 3/10/23 1612)   sodium chloride 0.9% flush 10 mL (has no administration in time range)   dextrose 10% bolus 250 mL 250 mL (has no administration in time range)   dextrose 10 % infusion (has no administration in time range)   dextrose 10% bolus 125 mL 125 mL (has no administration in time range)   dextrose 10 % infusion (has no administration in time range)   dextrose 10% bolus 125 mL 125 mL (has no administration in time range)   dextrose 10% bolus 250 mL 250 mL (has no administration in time range)   insulin regular in 0.9 % NaCl 100 unit/100 mL (1 unit/mL) infusion (4 Units/hr Intravenous New Bag 3/10/23 1534)   sodium chloride 0.9% flush 10 mL (has no administration in time range)   heparin (porcine) injection 5,000 Units (has no administration in time range)   potassium chloride 10 mEq in 100 mL IVPB (has no administration in time range)     And   potassium chloride 10 mEq in 100 mL IVPB (has no administration in time range)     And   potassium chloride 10 mEq in 100 mL IVPB (has no administration in time range)   magnesium sulfate 2g in water 50mL IVPB (premix) (has no administration in time range)   magnesium sulfate 2g in water 50mL IVPB (premix) (has no  administration in time range)   sodium phosphate 15 mmol in dextrose 5 % (D5W) 250 mL IVPB (has no administration in time range)   sodium phosphate 20.01 mmol in dextrose 5 % (D5W) 250 mL IVPB (has no administration in time range)   sodium phosphate 30 mmol in dextrose 5 % (D5W) 250 mL IVPB (has no administration in time range)   calcium gluconate 1 g in NS IVPB (premixed) (has no administration in time range)   calcium gluconate 1 g in NS IVPB (premixed) (has no administration in time range)   calcium gluconate 1 g in NS IVPB (premixed) (has no administration in time range)   piperacillin-tazobactam (ZOSYN) 4.5 g in dextrose 5 % in water (D5W) 5 % 100 mL IVPB (MB+) (has no administration in time range)   potassium chloride 20 mEq in lactated ringers 1,010 mL infusion (has no administration in time range)   vancomycin - pharmacy to dose (has no administration in time range)   lactated ringers bolus 1,000 mL (0 mLs Intravenous Stopped 3/10/23 1330)   lactated ringers bolus 1,000 mL (0 mLs Intravenous Stopped 3/10/23 1330)   vancomycin (VANCOCIN) 1,000 mg in dextrose 5 % (D5W) 250 mL IVPB (Vial-Mate) (0 mg Intravenous Stopped 3/10/23 1530)   ceFEPIme (MAXIPIME) 1 g in dextrose 5 % in water (D5W) 5 % 50 mL IVPB (MB+) (0 g Intravenous Stopped 3/10/23 1330)   insulin regular bolus from bag/infusion 6.35 Units 6.35 mL (6.35 Units Intravenous Bolus from Bag 3/10/23 1535)     Medical Decision Making:   History:   I obtained history from: someone other than patient and EMS provider.       <> Summary of History: Patient brought via EMS for obtunded, lethargy and hyperglycemia.   Old Medical Records: I decided to obtain old medical records.  Old Records Summarized: records from previous admission(s).  Initial Assessment:   Emergent evaluation of altered mental status.  She is afebrile and hemodynamically stable.  Initial glucose was greater than 700  Differential Diagnosis:   DKA, HHS, sepsis, bacteremia, UTI, pneumonia,  "electrolyte abnormality, dehydration/orthostasis, CARLINE  Independently Interpreted Test(s):   I have ordered and independently interpreted X-rays - see prior notes.  I have ordered and independently interpreted EKG Reading(s) - see prior notes  Clinical Tests:   Lab Tests: Ordered and Reviewed  Radiological Study: Ordered and Reviewed  Medical Tests: Ordered and Reviewed  Sepsis Perfusion Assessment: "I attest a sepsis perfusion exam was performed within 6 hours of sepsis, severe sepsis, or septic shock presentation, following fluid resuscitation."  ED Management:  Labs are consistent with DKA; notable for glucose overnight 100, bicarb of 4.5, pH of 7.01 and an anion gap of 54.  She further has a leukocytosis of 28 with infected focus found on her UA.  She was covered with broad-spectrum antibiotics, given 2 liters of IVF and started on an insulin drip.  Discussed case with ICU, who will admit patient to their service for DKA and sepsis secondary to UTI. Troponin added on for new lateral T wave inversions on EKG and pending.  Other:   I have discussed this case with another health care provider.       <> Summary of the Discussion: Critical Care Medicine team          Attending Attestation:   Physician Attestation Statement for Resident:  As the supervising MD   Physician Attestation Statement: I have personally seen and examined this patient.   I agree with the above history.  -:   As the supervising MD I agree with the above PE.     As the supervising MD I agree with the above treatment, course, plan, and disposition.                        I have reviewed and concur with the resident's history, physical, assessment, and plan.  I have personally interviewed and examined the patient at bedside.   I did supervise any and all procedures and was present for any critical portion, and was always immediately available for help and as a resource.     The above history physical, review of symptoms, HPI and physical exam " reflect my independent interpretation and evaluation.    Emergent evaluation of patient presenting with hyperglycemia, lethargy and obtundation.  Exam consistent with no focal deficits, dry skin, lethargy and acetone breath.  Given hyperglycemia, DKA and HHS most likely diagnosis.  Blood work confirms DKA with acidosis, beta hydroxybutyrate, and significantly elevated serum osmolality.  Will initiate IV fluids for resuscitation, obtain infectious workup, treat with broad-spectrum antibiotics given significant leukocytosis of 28,000. Initiate insulin drip as a anion gap can not be calculated given severe acidosis and bicarb repletion.  Discussed case with ICU Medicine, patient admitted to ICU level of care.  Please see critical care note for critical care time.  Patient able to maintain airway status at this time, no need for emergent intubation at this time given severe acidosis.    Complexity:  Critical care    Final diagnoses:  [R41.82] Altered mental status  [A41.9] Sepsis  [E11.10] Diabetic ketoacidosis without coma associated with type 2 diabetes mellitus (Primary)  [N39.0, R31.9] Urinary tract infection with hematuria, site unspecified     Wan Parikh DO, FAAEM  Emergency Staff Physician   Dept of Emergency Medicine   [facility]  Spectralink: [id number]        Disclaimer: This note has been generated using voice-recognition software. There may be typographical errors that have been missed during proof-reading.                 Clinical Impression:   Final diagnoses:  [R41.82] Altered mental status  [A41.9] Sepsis  [E11.10] Diabetic ketoacidosis without coma associated with type 2 diabetes mellitus (Primary)  [N39.0, R31.9] Urinary tract infection with hematuria, site unspecified        ED Disposition Condition    Admit Stable                Loy Thomson MD  Resident  03/10/23 8009       Wan Parikh DO  03/11/23 4607

## 2023-03-10 NOTE — CONSULTS
Patient to be admitted to MICU. H&P to follow.     XAVIER El MD  [employer] Pulmonary & Critical Care Fellow

## 2023-03-10 NOTE — ED NOTES
I-STAT Chem-8+ Results:   Value Reference Range   Sodium 130 136-145 mmol/L   Potassium  4.2 3.5-5.1 mmol/L   Chloride 97  mmol/L   Ionized Calcium 1.18 1.06-1.42 mmol/L   CO2 (measured) 7 23-29 mmol/L   Glucose >700  mg/dL   BUN 48 6-30 mg/dL   Creatinine 1.7 0.5-1.4 mg/dL   Hematocrit 43 36-54%

## 2023-03-10 NOTE — HPI
Patient information was obtained from relative(), past medical records, and ER records.    Ms. Mosqueda is a 52 y.o F w/ hx of CHF, COPD, HTN, DM type 2, and anxiety who presented to the  ED with altered mental status.   reports the altered mental status started around 3 days ago where the patient was increasingly agitated, aggressive, drinking lots of sodas and eating lots of ice cream.  He is unclear if she missed any insulin doses.  She appeared more lethargic this morning so she was brought to the ED via EMS.    ED course notable for WBC 28.97, hemoglobin 11.2, sodium 133, chloride 90, bicarb< 5, anion gap of 54, creatinine 2.8, glucose 977, alkaline phosphatase 219, serum osmolality 383, beta hydroxybutyrate 5.2, VBG notable for pH 7.051, CO2 17.7, bicarb 4.9.

## 2023-03-11 LAB
ALLENS TEST: ABNORMAL
ALLENS TEST: ABNORMAL
ANION GAP SERPL CALC-SCNC: 12 MMOL/L (ref 8–16)
ANION GAP SERPL CALC-SCNC: 14 MMOL/L (ref 8–16)
ANION GAP SERPL CALC-SCNC: 15 MMOL/L (ref 8–16)
ANION GAP SERPL CALC-SCNC: 16 MMOL/L (ref 8–16)
ANION GAP SERPL CALC-SCNC: 25 MMOL/L (ref 8–16)
ANION GAP SERPL CALC-SCNC: 26 MMOL/L (ref 8–16)
ANION GAP SERPL CALC-SCNC: 27 MMOL/L (ref 8–16)
BACTERIA UR CULT: NORMAL
BACTERIA UR CULT: NORMAL
BUN SERPL-MCNC: 40 MG/DL (ref 6–20)
BUN SERPL-MCNC: 41 MG/DL (ref 6–20)
BUN SERPL-MCNC: 43 MG/DL (ref 6–30)
BUN SERPL-MCNC: 44 MG/DL (ref 6–20)
BUN SERPL-MCNC: 45 MG/DL (ref 6–20)
BUN SERPL-MCNC: 46 MG/DL (ref 6–20)
BUN SERPL-MCNC: 48 MG/DL (ref 6–20)
BUN SERPL-MCNC: 50 MG/DL (ref 6–20)
BUN SERPL-MCNC: 72 MG/DL (ref 6–30)
CALCIUM SERPL-MCNC: 10 MG/DL (ref 8.7–10.5)
CALCIUM SERPL-MCNC: 10.1 MG/DL (ref 8.7–10.5)
CALCIUM SERPL-MCNC: 10.2 MG/DL (ref 8.7–10.5)
CALCIUM SERPL-MCNC: 10.4 MG/DL (ref 8.7–10.5)
CALCIUM SERPL-MCNC: 9.3 MG/DL (ref 8.7–10.5)
CALCIUM SERPL-MCNC: 9.4 MG/DL (ref 8.7–10.5)
CALCIUM SERPL-MCNC: 9.9 MG/DL (ref 8.7–10.5)
CHLORIDE SERPL-SCNC: 105 MMOL/L (ref 95–110)
CHLORIDE SERPL-SCNC: 106 MMOL/L (ref 95–110)
CHLORIDE SERPL-SCNC: 109 MMOL/L (ref 95–110)
CHLORIDE SERPL-SCNC: 112 MMOL/L (ref 95–110)
CHLORIDE SERPL-SCNC: 113 MMOL/L (ref 95–110)
CHLORIDE SERPL-SCNC: 114 MMOL/L (ref 95–110)
CHLORIDE SERPL-SCNC: 115 MMOL/L (ref 95–110)
CHLORIDE SERPL-SCNC: 120 MMOL/L (ref 95–110)
CHLORIDE SERPL-SCNC: 122 MMOL/L (ref 95–110)
CO2 SERPL-SCNC: 11 MMOL/L (ref 23–29)
CO2 SERPL-SCNC: 21 MMOL/L (ref 23–29)
CO2 SERPL-SCNC: 22 MMOL/L (ref 23–29)
CO2 SERPL-SCNC: 23 MMOL/L (ref 23–29)
CO2 SERPL-SCNC: 24 MMOL/L (ref 23–29)
CO2 SERPL-SCNC: 9 MMOL/L (ref 23–29)
CO2 SERPL-SCNC: 9 MMOL/L (ref 23–29)
CREAT SERPL-MCNC: 1.3 MG/DL (ref 0.5–1.4)
CREAT SERPL-MCNC: 1.4 MG/DL (ref 0.5–1.4)
CREAT SERPL-MCNC: 1.5 MG/DL (ref 0.5–1.4)
CREAT SERPL-MCNC: 1.5 MG/DL (ref 0.5–1.4)
CREAT SERPL-MCNC: 2.1 MG/DL (ref 0.5–1.4)
CREAT SERPL-MCNC: 2.3 MG/DL (ref 0.5–1.4)
CREAT SERPL-MCNC: 2.5 MG/DL (ref 0.5–1.4)
DELSYS: ABNORMAL
ERYTHROCYTE [SEDIMENTATION RATE] IN BLOOD BY WESTERGREN METHOD: 36 MM/H
EST. GFR  (NO RACE VARIABLE): 22.3 ML/MIN/1.73 M^2
EST. GFR  (NO RACE VARIABLE): 24.6 ML/MIN/1.73 M^2
EST. GFR  (NO RACE VARIABLE): 27.5 ML/MIN/1.73 M^2
EST. GFR  (NO RACE VARIABLE): 41.2 ML/MIN/1.73 M^2
EST. GFR  (NO RACE VARIABLE): 48.9 ML/MIN/1.73 M^2
ESTIMATED AVG GLUCOSE: 344 MG/DL (ref 68–131)
FLOW: 2
GLUCOSE SERPL-MCNC: 126 MG/DL (ref 70–110)
GLUCOSE SERPL-MCNC: 202 MG/DL (ref 70–110)
GLUCOSE SERPL-MCNC: 239 MG/DL (ref 70–110)
GLUCOSE SERPL-MCNC: 443 MG/DL (ref 70–110)
GLUCOSE SERPL-MCNC: 588 MG/DL (ref 70–110)
GLUCOSE SERPL-MCNC: 592 MG/DL (ref 70–110)
GLUCOSE SERPL-MCNC: 609 MG/DL (ref 70–110)
GLUCOSE SERPL-MCNC: 637 MG/DL (ref 70–110)
GLUCOSE SERPL-MCNC: 777 MG/DL (ref 70–110)
HBA1C MFR BLD: 13.6 % (ref 4–5.6)
HCO3 UR-SCNC: 25.5 MMOL/L (ref 24–28)
HCO3 UR-SCNC: 27.1 MMOL/L (ref 24–28)
HCT VFR BLD CALC: 35 %PCV (ref 36–54)
HCT VFR BLD CALC: 36 %PCV (ref 36–54)
HCT VFR BLD CALC: 37 %PCV (ref 36–54)
MAGNESIUM SERPL-MCNC: 2.2 MG/DL (ref 1.6–2.6)
MODE: ABNORMAL
PCO2 BLDA: 37.3 MMHG (ref 35–45)
PCO2 BLDA: 44.3 MMHG (ref 35–45)
PH SMN: 7.37 [PH] (ref 7.35–7.45)
PH SMN: 7.47 [PH] (ref 7.35–7.45)
PHOSPHATE SERPL-MCNC: 1.3 MG/DL (ref 2.7–4.5)
PHOSPHATE SERPL-MCNC: <1 MG/DL (ref 2.7–4.5)
PO2 BLDA: 48 MMHG (ref 40–60)
PO2 BLDA: 60 MMHG (ref 40–60)
POC BE: 0 MMOL/L
POC BE: 3 MMOL/L
POC IONIZED CALCIUM: 1.24 MMOL/L (ref 1.06–1.42)
POC IONIZED CALCIUM: 1.32 MMOL/L (ref 1.06–1.42)
POC IONIZED CALCIUM: 1.37 MMOL/L (ref 1.06–1.42)
POC SATURATED O2: 86 % (ref 95–100)
POC SATURATED O2: 90 % (ref 95–100)
POC TCO2 (MEASURED): 12 MMOL/L (ref 23–29)
POC TCO2 (MEASURED): 14 MMOL/L (ref 23–29)
POC TCO2: 27 MMOL/L (ref 24–29)
POC TCO2: 28 MMOL/L (ref 24–29)
POCT GLUCOSE: 127 MG/DL (ref 70–110)
POCT GLUCOSE: 178 MG/DL (ref 70–110)
POCT GLUCOSE: 231 MG/DL (ref 70–110)
POCT GLUCOSE: 356 MG/DL (ref 70–110)
POCT GLUCOSE: 388 MG/DL (ref 70–110)
POCT GLUCOSE: 411 MG/DL (ref 70–110)
POCT GLUCOSE: 430 MG/DL (ref 70–110)
POCT GLUCOSE: 490 MG/DL (ref 70–110)
POCT GLUCOSE: 500 MG/DL (ref 70–110)
POCT GLUCOSE: 79 MG/DL (ref 70–110)
POCT GLUCOSE: 98 MG/DL (ref 70–110)
POCT GLUCOSE: >500 MG/DL (ref 70–110)
POTASSIUM BLD-SCNC: 3.5 MMOL/L (ref 3.5–5.1)
POTASSIUM BLD-SCNC: 3.8 MMOL/L (ref 3.5–5.1)
POTASSIUM BLD-SCNC: 6.9 MMOL/L (ref 3.5–5.1)
POTASSIUM SERPL-SCNC: 3.5 MMOL/L (ref 3.5–5.1)
POTASSIUM SERPL-SCNC: 3.7 MMOL/L (ref 3.5–5.1)
POTASSIUM SERPL-SCNC: 3.8 MMOL/L (ref 3.5–5.1)
POTASSIUM SERPL-SCNC: 3.8 MMOL/L (ref 3.5–5.1)
POTASSIUM SERPL-SCNC: 3.9 MMOL/L (ref 3.5–5.1)
POTASSIUM SERPL-SCNC: 4.4 MMOL/L (ref 3.5–5.1)
POTASSIUM SERPL-SCNC: 6.2 MMOL/L (ref 3.5–5.1)
SAMPLE: ABNORMAL
SITE: ABNORMAL
SITE: ABNORMAL
SODIUM BLD-SCNC: 141 MMOL/L (ref 136–145)
SODIUM BLD-SCNC: 147 MMOL/L (ref 136–145)
SODIUM BLD-SCNC: 150 MMOL/L (ref 136–145)
SODIUM SERPL-SCNC: 140 MMOL/L (ref 136–145)
SODIUM SERPL-SCNC: 142 MMOL/L (ref 136–145)
SODIUM SERPL-SCNC: 145 MMOL/L (ref 136–145)
SODIUM SERPL-SCNC: 149 MMOL/L (ref 136–145)
SODIUM SERPL-SCNC: 153 MMOL/L (ref 136–145)
SODIUM SERPL-SCNC: 157 MMOL/L (ref 136–145)
SODIUM SERPL-SCNC: 158 MMOL/L (ref 136–145)
SP02: 94
VANCOMYCIN SERPL-MCNC: 9.8 UG/ML

## 2023-03-11 PROCEDURE — 84295 ASSAY OF SERUM SODIUM: CPT

## 2023-03-11 PROCEDURE — 25000003 PHARM REV CODE 250: Performed by: STUDENT IN AN ORGANIZED HEALTH CARE EDUCATION/TRAINING PROGRAM

## 2023-03-11 PROCEDURE — 25000003 PHARM REV CODE 250: Performed by: EMERGENCY MEDICINE

## 2023-03-11 PROCEDURE — 99233 SBSQ HOSP IP/OBS HIGH 50: CPT | Mod: ,,, | Performed by: INTERNAL MEDICINE

## 2023-03-11 PROCEDURE — 80048 BASIC METABOLIC PNL TOTAL CA: CPT | Mod: 91 | Performed by: STUDENT IN AN ORGANIZED HEALTH CARE EDUCATION/TRAINING PROGRAM

## 2023-03-11 PROCEDURE — 80048 BASIC METABOLIC PNL TOTAL CA: CPT | Mod: 91 | Performed by: INTERNAL MEDICINE

## 2023-03-11 PROCEDURE — 99233 PR SUBSEQUENT HOSPITAL CARE,LEVL III: ICD-10-PCS | Mod: ,,, | Performed by: INTERNAL MEDICINE

## 2023-03-11 PROCEDURE — 83735 ASSAY OF MAGNESIUM: CPT | Performed by: STUDENT IN AN ORGANIZED HEALTH CARE EDUCATION/TRAINING PROGRAM

## 2023-03-11 PROCEDURE — 20000000 HC ICU ROOM

## 2023-03-11 PROCEDURE — 87040 BLOOD CULTURE FOR BACTERIA: CPT | Mod: 59 | Performed by: STUDENT IN AN ORGANIZED HEALTH CARE EDUCATION/TRAINING PROGRAM

## 2023-03-11 PROCEDURE — 94761 N-INVAS EAR/PLS OXIMETRY MLT: CPT

## 2023-03-11 PROCEDURE — 82803 BLOOD GASES ANY COMBINATION: CPT

## 2023-03-11 PROCEDURE — 84100 ASSAY OF PHOSPHORUS: CPT | Performed by: STUDENT IN AN ORGANIZED HEALTH CARE EDUCATION/TRAINING PROGRAM

## 2023-03-11 PROCEDURE — 25000003 PHARM REV CODE 250

## 2023-03-11 PROCEDURE — 63600175 PHARM REV CODE 636 W HCPCS: Performed by: STUDENT IN AN ORGANIZED HEALTH CARE EDUCATION/TRAINING PROGRAM

## 2023-03-11 PROCEDURE — 99900035 HC TECH TIME PER 15 MIN (STAT)

## 2023-03-11 PROCEDURE — 80202 ASSAY OF VANCOMYCIN: CPT | Performed by: STUDENT IN AN ORGANIZED HEALTH CARE EDUCATION/TRAINING PROGRAM

## 2023-03-11 PROCEDURE — 83036 HEMOGLOBIN GLYCOSYLATED A1C: CPT | Performed by: STUDENT IN AN ORGANIZED HEALTH CARE EDUCATION/TRAINING PROGRAM

## 2023-03-11 PROCEDURE — 87077 CULTURE AEROBIC IDENTIFY: CPT | Performed by: STUDENT IN AN ORGANIZED HEALTH CARE EDUCATION/TRAINING PROGRAM

## 2023-03-11 PROCEDURE — 84132 ASSAY OF SERUM POTASSIUM: CPT

## 2023-03-11 PROCEDURE — 63600175 PHARM REV CODE 636 W HCPCS: Mod: TB,JG | Performed by: INTERNAL MEDICINE

## 2023-03-11 PROCEDURE — 36415 COLL VENOUS BLD VENIPUNCTURE: CPT | Performed by: STUDENT IN AN ORGANIZED HEALTH CARE EDUCATION/TRAINING PROGRAM

## 2023-03-11 PROCEDURE — 25000003 PHARM REV CODE 250: Performed by: INTERNAL MEDICINE

## 2023-03-11 PROCEDURE — 82330 ASSAY OF CALCIUM: CPT

## 2023-03-11 PROCEDURE — 85014 HEMATOCRIT: CPT

## 2023-03-11 PROCEDURE — 84100 ASSAY OF PHOSPHORUS: CPT | Mod: 91 | Performed by: STUDENT IN AN ORGANIZED HEALTH CARE EDUCATION/TRAINING PROGRAM

## 2023-03-11 PROCEDURE — 27000221 HC OXYGEN, UP TO 24 HOURS

## 2023-03-11 RX ORDER — INSULIN ASPART 100 [IU]/ML
1-10 INJECTION, SOLUTION INTRAVENOUS; SUBCUTANEOUS EVERY 6 HOURS PRN
Status: DISCONTINUED | OUTPATIENT
Start: 2023-03-11 | End: 2023-03-11

## 2023-03-11 RX ORDER — FLUCONAZOLE 150 MG/1
150 TABLET ORAL ONCE
Status: DISCONTINUED | OUTPATIENT
Start: 2023-03-11 | End: 2023-03-12

## 2023-03-11 RX ORDER — GLUCAGON 1 MG
1 KIT INJECTION
Status: DISCONTINUED | OUTPATIENT
Start: 2023-03-11 | End: 2023-03-12

## 2023-03-11 RX ORDER — ACETAMINOPHEN 650 MG/1
650 SUPPOSITORY RECTAL ONCE
Status: COMPLETED | OUTPATIENT
Start: 2023-03-11 | End: 2023-03-11

## 2023-03-11 RX ORDER — INSULIN ASPART 100 [IU]/ML
1-10 INJECTION, SOLUTION INTRAVENOUS; SUBCUTANEOUS EVERY 4 HOURS PRN
Status: DISCONTINUED | OUTPATIENT
Start: 2023-03-11 | End: 2023-03-12

## 2023-03-11 RX ORDER — DEXTROSE MONOHYDRATE 50 MG/ML
INJECTION, SOLUTION INTRAVENOUS CONTINUOUS
Status: ACTIVE | OUTPATIENT
Start: 2023-03-11 | End: 2023-03-12

## 2023-03-11 RX ADMIN — PIPERACILLIN SODIUM AND TAZOBACTAM SODIUM 4.5 G: 4; .5 INJECTION, POWDER, FOR SOLUTION INTRAVENOUS at 06:03

## 2023-03-11 RX ADMIN — INSULIN HUMAN 10 UNITS/HR: 1 INJECTION, SOLUTION INTRAVENOUS at 07:03

## 2023-03-11 RX ADMIN — HEPARIN SODIUM 5000 UNITS: 5000 INJECTION INTRAVENOUS; SUBCUTANEOUS at 01:03

## 2023-03-11 RX ADMIN — HEPARIN SODIUM 5000 UNITS: 5000 INJECTION INTRAVENOUS; SUBCUTANEOUS at 10:03

## 2023-03-11 RX ADMIN — POTASSIUM PHOSPHATE, MONOBASIC AND POTASSIUM PHOSPHATE, DIBASIC 30 MMOL: 224; 236 INJECTION, SOLUTION, CONCENTRATE INTRAVENOUS at 05:03

## 2023-03-11 RX ADMIN — INSULIN DETEMIR 15 UNITS: 100 INJECTION, SOLUTION SUBCUTANEOUS at 12:03

## 2023-03-11 RX ADMIN — VANCOMYCIN HYDROCHLORIDE 1250 MG: 1.25 INJECTION, POWDER, LYOPHILIZED, FOR SOLUTION INTRAVENOUS at 12:03

## 2023-03-11 RX ADMIN — ACETAMINOPHEN 650 MG: 650 SUPPOSITORY RECTAL at 10:03

## 2023-03-11 RX ADMIN — PIPERACILLIN SODIUM AND TAZOBACTAM SODIUM 4.5 G: 4; .5 INJECTION, POWDER, FOR SOLUTION INTRAVENOUS at 01:03

## 2023-03-11 RX ADMIN — DEXTROSE MONOHYDRATE: 50 INJECTION, SOLUTION INTRAVENOUS at 06:03

## 2023-03-11 RX ADMIN — HEPARIN SODIUM 5000 UNITS: 5000 INJECTION INTRAVENOUS; SUBCUTANEOUS at 06:03

## 2023-03-11 RX ADMIN — INSULIN HUMAN 6 UNITS/HR: 1 INJECTION, SOLUTION INTRAVENOUS at 12:03

## 2023-03-11 NOTE — ASSESSMENT & PLAN NOTE
Patient has acute metabolic encephalopathy that is likely secondary to DKA.  Treatment for underlying cause is underway.    Differential diagnoses include, but not limited to: DKA, electrolyte abnormalities, infection(UA concerning, abdominal pain on initial exam)     CT head without acute abnormality. CT abdomen pelvis showed bibasilar consolidative opacities (L>R), cholelithiasis without evidence of cholecystitis and markedly distended bladder. UDS + for cocaine and UA concerning for possible UTI. Blood cultures + for GNR.     PLAN  - SLP consult  - Continue vanc/zosyn  - Avoid sedating medications  - Followup Urine cultures, blood cultures

## 2023-03-11 NOTE — ED NOTES
I-STAT Chem-8+ Results:   Value Reference Range   Sodium 147 136-145 mmol/L   Potassium  3.5 3.5-5.1 mmol/L   Chloride 112  mmol/L   Ionized Calcium 1.37 1.06-1.42 mmol/L   CO2 (measured) 14 23-29 mmol/L   Glucose 588  mg/dL   BUN 43 6-30 mg/dL   Creatinine 1.5 0.5-1.4 mg/dL   Hematocrit 37 36-54%

## 2023-03-11 NOTE — PROGRESS NOTES
Pharmacokinetic Follow-Up Assessment: IV Vancomycin    Assessment/Plan:    - Vanc random elvel drawn 13 hours post-loading dose is subtherapeutic at 9.8 mcg/mL  - Dosing by level in setting of CARLINE  - Goal trough level 15-20 mcg/mL  - Administer vanc 1250 mg x 1  - Draw AM random level tomorrow 3/12; will re-dose based on level and renal function    Pharmacy will continue to follow and monitor vancomycin.    Please contact pharmacy at extension [phone] with any questions regarding this assessment.     Thank you for the consult,   Aline Cosby, PharmD, [facility]  Neurocritical Care Pharmacist  [phone]         Patient brief summary:  Christine Mosqueda is a 52 y.o. female initiated on antimicrobial therapy with IV Vancomycin for treatment of suspected sepsis.    Drug Allergies:   Review of patient's allergies indicates:   Allergen Reactions    Hydrochlorothiazide plus        Actual Body Weight:   63.5 kg    Renal Function:   Estimated Creatinine Clearance: 24.1 mL/min (A) (based on SCr of 2.3 mg/dL (H)).    CBC (last 72 hours):  Recent Labs   Lab Result Units 03/10/23  1220 03/10/23  1718   WBC K/uL 28.97* 21.97*   Hemoglobin g/dL 11.2* 11.3*   Hematocrit % 36.8* 37.8   Platelets K/uL 257 234   Gran % % 90.0* 89.8*   Lymph % % 2.0* 3.5*   Mono % % 6.0 3.4*   Eosinophil % % 0.0 0.0   Basophil % % 0.0 0.6   Differential Method  Manual Automated       Metabolic Panel (last 72 hours):  Recent Labs   Lab Result Units 03/10/23  1220 03/10/23  1443 03/10/23  1718 03/11/23  0001 03/11/23  0216 03/11/23  0316 03/11/23  0749   Sodium mmol/L 133*  --  136 142 140 145 149*   Potassium mmol/L 4.3  --  3.0* 4.4 6.2* 3.5 3.8   Chloride mmol/L 90*  --  100 106 105 109 113*   CO2 mmol/L <5*  --  6* 9* 9* 11* 22*   Glucose mg/dL 977*  --  783* 637* 777* 609* 443*   Glucose, UA   --  4+*  --   --   --   --   --    BUN mg/dL 54*  --  50* 50* 48* 45* 46*   Creatinine mg/dL 2.8*  --  2.2* 2.3* 2.5* 2.3* 2.3*   Creatinine, Urine mg/dL  --  17.0  --    --   --   --   --    Albumin g/dL 1.9*  --   --   --   --   --   --    Total Bilirubin mg/dL 0.2  --   --   --   --   --   --    Alkaline Phosphatase U/L 219*  --   --   --   --   --   --    AST U/L 11  --   --   --   --   --   --    ALT U/L 17  --   --   --   --   --   --    Magnesium mg/dL  --   --   --   --   --  2.2  --    Phosphorus mg/dL  --   --   --   --   --  <1.0* 1.3*       Drug levels (last 3 results):  Recent Labs   Lab Result Units 03/11/23  0316   Vancomycin, Random ug/mL 9.8       Microbiologic Results:  Microbiology Results (last 7 days)       Procedure Component Value Units Date/Time    Blood culture [[id number]]     Order Status: Sent Specimen: Blood     Blood culture [[id number]]     Order Status: Sent Specimen: Blood     Blood culture #2 **CANNOT BE ORDERED STAT** [[id number]] Collected: 03/10/23 1335    Order Status: Completed Specimen: Blood from Peripheral, Forearm, Left Updated: 03/11/23 0238     Blood Culture, Routine Gram stain portia bottle: Gram negative rods      Results called to and read back by: Ritu Hope 03/11/2023  01:47      Gram stain aer bottle: Gram negative rods      Positive results previously called 03/11/2023    Blood culture #1 **CANNOT BE ORDERED STAT** [[id number]] Collected: 03/10/23 1335    Order Status: Completed Specimen: Blood from Peripheral, Forearm, Left Updated: 03/11/23 0236     Blood Culture, Routine Gram stain portia bottle: Gram negative rods      Results called to and read back by: Ritu Hope 03/11/2023  01:47      Gram stain aer bottle: Gram negative rods      Positive results previously called 03/11/2023    Urine culture [[id number]] Collected: 03/10/23 1443    Order Status: No result Specimen: Urine Updated: 03/10/23 9650

## 2023-03-11 NOTE — HOSPITAL COURSE
2/2 blood culture bottles have returned + for GNR. CT abdomen pelvis returned with consolidative opacities towards lungs bilaterally that may be concerning for potential airspace disease and markedly distended bladder. CT Head without acute abnormality. Patient with hypoglycemia overnight requiring maintenance IVF using LR with 20 mEq K+ at 150 mL/hr. Insulin gtt discontinued on 3/11. Patient hypernatremic requiring D5 gtt, mentation slowly improving as patient now able to open eyes and intermittently respond yes/no to questions as of 3/12.

## 2023-03-11 NOTE — SUBJECTIVE & OBJECTIVE
Interval History/Significant Events: Hypokalemic overnight, continues to be nonverbal    Review of Systems   Unable to perform ROS: Acuity of condition   Objective:     Vital Signs (Most Recent):  Temp: 99.5 °F (37.5 °C) (03/11/23 0934)  Pulse: (!) 116 (03/11/23 0934)  Resp: (!) 40 (03/11/23 0934)  BP: (!) 142/73 (03/11/23 0934)  SpO2: 97 % (03/11/23 0934)   Vital Signs (24h Range):  Temp:  [96.6 °F (35.9 °C)-99.5 °F (37.5 °C)] 99.5 °F (37.5 °C)  Pulse:  [] 116  Resp:  [20-40] 40  SpO2:  [96 %-100 %] 97 %  BP: (108-143)/(58-77) 142/73   Weight: 63.5 kg (140 lb)  Body mass index is 24.03 kg/m².      Intake/Output Summary (Last 24 hours) at 3/11/2023 1015  Last data filed at 3/11/2023 1001  Gross per 24 hour   Intake 0 ml   Output 1450 ml   Net -1450 ml       Physical Exam  Vitals and nursing note reviewed.   Constitutional:       Comments: Ill and uncomfortable appearing female sitting in exam room bed    HENT:      Head: Normocephalic and atraumatic.      Nose: Nose normal. No congestion.      Mouth/Throat:      Mouth: Mucous membranes are dry.   Eyes:      Extraocular Movements: Extraocular movements intact.      Pupils: Pupils are equal, round, and reactive to light.   Cardiovascular:      Rate and Rhythm: Regular rhythm. Tachycardia present.   Pulmonary:      Effort: Respiratory distress present.      Comments: Tachypneic, RR in the 30s-40s  Abdominal:      General: Abdomen is flat.      Palpations: Abdomen is soft.      Comments: Generalized abdominal tenderness to palpation, patient grimaces on palpation. Unable to assess guarding or rebound due to patients mental status    Musculoskeletal:      Cervical back: Normal range of motion and neck supple.   Skin:     General: Skin is warm and dry.       Vents:     Lines/Drains/Airways       Drain  Duration                  Urethral Catheter 03/11/23 0336 Temperature probe 16 Fr. <1 day              Peripheral Intravenous Line  Duration                   Peripheral IV - Single Lumen 03/10/23 18 G Left Antecubital 1 day         Peripheral IV - Single Lumen 03/11/23 0443 22 G Left;Posterior Hand <1 day         Peripheral IV - Single Lumen 03/11/23 0729 20 G Anterior;Right Upper Arm <1 day                  Significant Labs:    CBC/Anemia Profile:  Recent Labs   Lab 03/10/23  1220 03/10/23  1718 03/10/23  2202 03/11/23  0150 03/11/23  0314 03/11/23  0753   WBC 28.97* 21.97*  --   --   --   --    HGB 11.2* 11.3*  --   --   --   --    HCT 36.8* 37.8   < > 36 37 35*    234  --   --   --   --    MCV 71* 74*  --   --   --   --    RDW 15.9* 15.9*  --   --   --   --     < > = values in this interval not displayed.        Chemistries:  Recent Labs   Lab 03/10/23  1220 03/10/23  1718 03/11/23  0216 03/11/23 0316 03/11/23  0749   *   < > 140 145 149*   K 4.3   < > 6.2* 3.5 3.8   CL 90*   < > 105 109 113*   CO2 <5*   < > 9* 11* 22*   BUN 54*   < > 48* 45* 46*   CREATININE 2.8*   < > 2.5* 2.3* 2.3*   CALCIUM 10.1   < > 9.3 10.0 10.2   ALBUMIN 1.9*  --   --   --   --    PROT 7.2  --   --   --   --    BILITOT 0.2  --   --   --   --    ALKPHOS 219*  --   --   --   --    ALT 17  --   --   --   --    AST 11  --   --   --   --    MG  --   --   --  2.2  --    PHOS  --   --   --  <1.0* 1.3*    < > = values in this interval not displayed.       Blood Culture:   Recent Labs   Lab 03/10/23  1335   LABBLOO Gram stain portia bottle: Gram negative rods  Results called to and read back by: Ritu Hope 03/11/2023  01:47  Gram stain aer bottle: Gram negative rods  Positive results previously called 03/11/2023  Gram stain portia bottle: Gram negative rods  Results called to and read back by: Ritu Hope 03/11/2023  01:47  Gram stain aer bottle: Gram negative rods  Positive results previously called 03/11/2023     BMP:   Recent Labs   Lab 03/11/23  0316 03/11/23  0749   * 443*    149*   K 3.5 3.8    113*   CO2 11* 22*   BUN 45* 46*   CREATININE 2.3* 2.3*   CALCIUM 10.0  10.2   MG 2.2  --      Lactic Acid:   Recent Labs   Lab 03/10/23  1304   LACTATE 2.2     Troponin:   Recent Labs   Lab 03/10/23  1220   TROPONINI <0.006       Significant Imaging:  I have reviewed all pertinent imaging results/findings within the past 24 hours.

## 2023-03-11 NOTE — ED NOTES
I-STAT Chem-8+ Results:   Value Reference Range   Sodium 142 136-145 mmol/L   Potassium  4.6 3.5-5.1 mmol/L   Chloride 110  mmol/L   Ionized Calcium 1.28 1.06-1.42 mmol/L   CO2 (measured) 13 23-29 mmol/L   Glucose 610  mg/dL   BUN 41 6-30 mg/dL   Creatinine 1.4 0.5-1.4 mg/dL   Hematocrit 37 36-54%

## 2023-03-11 NOTE — ASSESSMENT & PLAN NOTE
Recent Labs     03/10/23  1220 03/10/23  1718   WBC 28.97* 21.97*       Leukocytosis likely secondary to sepsis + DKA as patient has grown GNR in her blood cultures, suspect source is urine as her urine is positive for WBC clumps, > 100 WBC and moderate bacteria. CTAP results with bilateral bibasilar consolidations and markedly distended bladder also rising concern for possible PNA.    PLAN  - Followup Urinalysis, blood cultures   - Continue broad-spectrum antibiotics with Vancomycin/Zosyn, deescalate as appropriate

## 2023-03-11 NOTE — PROGRESS NOTES
[facility] - Emergency Dept  Critical Care Medicine  Progress Note    Patient Name: Christine Mosqueda  MRN: [id number]  Admission Date: 3/10/2023  Hospital Length of Stay: 1 days  Code Status: Full Code  Attending Provider: Brittany De La Rosa MD  Primary Care Provider: Primary Doctor No   Principal Problem: Diabetic ketoacidosis associated with type 2 diabetes mellitus    Subjective:     HPI:  Patient information was obtained from relative(), past medical records, and ER records.    Ms. Mosqueda is a 52 y.o F w/ hx of CHF, COPD, HTN, DM type 2, and anxiety who presented to the  ED with altered mental status.   reports the altered mental status started around 3 days ago where the patient was increasingly agitated, aggressive, drinking lots of sodas and eating lots of ice cream.  He is unclear if she missed any insulin doses.  She appeared more lethargic this morning so she was brought to the ED via EMS.    ED course notable for WBC 28.97, hemoglobin 11.2, sodium 133, chloride 90, bicarb< 5, anion gap of 54, creatinine 2.8, glucose 977, alkaline phosphatase 219, serum osmolality 383, beta hydroxybutyrate 5.2, VBG notable for pH 7.051, CO2 17.7, bicarb 4.9.          Hospital/ICU Course:  2/2 blood culture bottles have returned + for GNR. CT abdomen pelvis returned with consolidative opacities towards lungs bilaterally that may be concerning for potential airspace disease and markedly distended bladder. CT Head without acute abnormality. Patient with hypoglycemia overnight requiring maintenance IVF using LR with 20 mEq K+ at 150 mL/hr. As of 3/11 patient continues to require insulin gtt.       Interval History/Significant Events: Hypokalemic overnight, continues to be nonverbal    Review of Systems   Unable to perform ROS: Acuity of condition   Objective:     Vital Signs (Most Recent):  Temp: 99.5 °F (37.5 °C) (03/11/23 0934)  Pulse: (!) 116 (03/11/23 0934)  Resp: (!) 40 (03/11/23 0934)  BP: (!) 142/73 (03/11/23  0934)  SpO2: 97 % (03/11/23 0934)   Vital Signs (24h Range):  Temp:  [96.6 °F (35.9 °C)-99.5 °F (37.5 °C)] 99.5 °F (37.5 °C)  Pulse:  [] 116  Resp:  [20-40] 40  SpO2:  [96 %-100 %] 97 %  BP: (108-143)/(58-77) 142/73   Weight: 63.5 kg (140 lb)  Body mass index is 24.03 kg/m².      Intake/Output Summary (Last 24 hours) at 3/11/2023 1015  Last data filed at 3/11/2023 1001  Gross per 24 hour   Intake 0 ml   Output 1450 ml   Net -1450 ml       Physical Exam  Vitals and nursing note reviewed.   Constitutional:       Comments: Ill and uncomfortable appearing female sitting in exam room bed    HENT:      Head: Normocephalic and atraumatic.      Nose: Nose normal. No congestion.      Mouth/Throat:      Mouth: Mucous membranes are dry.   Eyes:      Extraocular Movements: Extraocular movements intact.      Pupils: Pupils are equal, round, and reactive to light.   Cardiovascular:      Rate and Rhythm: Regular rhythm. Tachycardia present.   Pulmonary:      Effort: Respiratory distress present.      Comments: Tachypneic, RR in the 30s-40s  Abdominal:      General: Abdomen is flat.      Palpations: Abdomen is soft.      Comments: Generalized abdominal tenderness to palpation, patient grimaces on palpation. Unable to assess guarding or rebound due to patients mental status    Musculoskeletal:      Cervical back: Normal range of motion and neck supple.   Skin:     General: Skin is warm and dry.       Vents:     Lines/Drains/Airways       Drain  Duration                  Urethral Catheter 03/11/23 0336 Temperature probe 16 Fr. <1 day              Peripheral Intravenous Line  Duration                  Peripheral IV - Single Lumen 03/10/23 18 G Left Antecubital 1 day         Peripheral IV - Single Lumen 03/11/23 0443 22 G Left;Posterior Hand <1 day         Peripheral IV - Single Lumen 03/11/23 0729 20 G Anterior;Right Upper Arm <1 day                  Significant Labs:    CBC/Anemia Profile:  Recent Labs   Lab 03/10/23  1220  03/10/23  1718 03/10/23  2202 03/11/23  0150 03/11/23  0314 03/11/23  0753   WBC 28.97* 21.97*  --   --   --   --    HGB 11.2* 11.3*  --   --   --   --    HCT 36.8* 37.8   < > 36 37 35*    234  --   --   --   --    MCV 71* 74*  --   --   --   --    RDW 15.9* 15.9*  --   --   --   --     < > = values in this interval not displayed.        Chemistries:  Recent Labs   Lab 03/10/23  1220 03/10/23  1718 03/11/23  0216 03/11/23 0316 03/11/23  0749   *   < > 140 145 149*   K 4.3   < > 6.2* 3.5 3.8   CL 90*   < > 105 109 113*   CO2 <5*   < > 9* 11* 22*   BUN 54*   < > 48* 45* 46*   CREATININE 2.8*   < > 2.5* 2.3* 2.3*   CALCIUM 10.1   < > 9.3 10.0 10.2   ALBUMIN 1.9*  --   --   --   --    PROT 7.2  --   --   --   --    BILITOT 0.2  --   --   --   --    ALKPHOS 219*  --   --   --   --    ALT 17  --   --   --   --    AST 11  --   --   --   --    MG  --   --   --  2.2  --    PHOS  --   --   --  <1.0* 1.3*    < > = values in this interval not displayed.       Blood Culture:   Recent Labs   Lab 03/10/23  1335   LABBLOO Gram stain portia bottle: Gram negative rods  Results called to and read back by: Ritu Hope 03/11/2023  01:47  Gram stain aer bottle: Gram negative rods  Positive results previously called 03/11/2023  Gram stain portia bottle: Gram negative rods  Results called to and read back by: Ritu Hope 03/11/2023  01:47  Gram stain aer bottle: Gram negative rods  Positive results previously called 03/11/2023     BMP:   Recent Labs   Lab 03/11/23 0316 03/11/23  0749   * 443*    149*   K 3.5 3.8    113*   CO2 11* 22*   BUN 45* 46*   CREATININE 2.3* 2.3*   CALCIUM 10.0 10.2   MG 2.2  --      Lactic Acid:   Recent Labs   Lab 03/10/23  1304   LACTATE 2.2     Troponin:   Recent Labs   Lab 03/10/23  1220   TROPONINI <0.006       Significant Imaging:  I have reviewed all pertinent imaging results/findings within the past 24 hours.      ABG  Recent Labs   Lab 03/11/23  0753   PH 7.368   PO2 60  "  PCO2 44.3   HCO3 25.5   BE 0     Assessment/Plan:     Neuro  AMS (altered mental status)  Patient has acute metabolic encephalopathy that is likely secondary to DKA.  Treatment for underlying cause is underway.    Differential diagnoses include, but not limited to: DKA, electrolyte abnormalities, infection(UA concerning, abdominal pain on initial exam)     CT head without acute abnormality. CT abdomen pelvis showed bibasilar consolidative opacities (L>R), cholelithiasis without evidence of cholecystitis and markedly distended bladder. UDS + for cocaine and UA concerning for possible UTI. Blood cultures + for GNR.     PLAN  - SLP consult  - Continue vanc/zosyn  - Avoid sedating medications  - Followup Urine cultures, blood cultures       Pulmonary  Restrictive lung disease  See "Emphysema lung" A&P    Emphysema lung  Hx of tobacco abuse, pulmonary emphysema, pulmonary hypertension, restrictive lung disease listed per chart review of outside medical record    CXR 03/10/2023 non-acute    PLAN  Duo nebs PRN  Supplemental oxygen to target SaO2 >90%    Cardiac/Vascular  Essential hypertension    Home Hypertension Medications             amlodipine (NORVASC) 10 MG tablet Take 10 mg by mouth once daily.    losartan (COZAAR) 100 MG tablet Take 100 mg by mouth once daily.          Plan:  Resume home medications as tolerated    Renal/  CARLINE (acute kidney injury)  Creatinine 2.8 on admit, baseline unclear(limited labs available)  Possible Etiologies:  - Likely pre-renal from dehydration and volume losses from DKA    Recent Labs     03/11/23  0216 03/11/23  0316 03/11/23  0749   CREATININE 2.5* 2.3* 2.3*   BUN 48* 45* 46*     Estimated Creatinine Clearance: 24.1 mL/min (A) (based on SCr of 2.3 mg/dL (H)).    Plan:    - Fluid resuscitation, if no improvement will consider urine lytes and renal/retroperitoneal ultrasound  - Avoid nephrotoxic agents such as NSAIDs, gadolinium and IV radiocontrast.  - Renally dose meds to current " "GFR.  - Maintain MAP > 65.    UTI (urinary tract infection)  UA concerning for UTI with altered mental status and abdominal pain on physical exam    Recent Labs   Lab 03/10/23  1443   COLORU Brown*   APPEARANCEUA Hazy*   PHUR 5.0   SPECGRAV 1.015   PROTEINUA 1+*   GLUCUA 4+*   KETONESU 3+*   BILIRUBINUA Negative   OCCULTUA 3+*   NITRITE Negative   LEUKOCYTESUR 3+*   RBCUA >100*   WBCUA >100*   BACTERIA Many*   SQUAMEPITHEL 20   HYALINECASTS 0     PLAN  Followup urine cultures and deescalate antibiotics as appropriate    Oncology  Leukocytosis  Recent Labs     03/10/23  1220 03/10/23  1718   WBC 28.97* 21.97*       Leukocytosis likely secondary to sepsis + DKA as patient has grown GNR in her blood cultures, suspect source is urine as her urine is positive for WBC clumps, > 100 WBC and moderate bacteria. CTAP results with bilateral bibasilar consolidations and markedly distended bladder also rising concern for possible PNA.    PLAN  - Followup Urinalysis, blood cultures   - Continue broad-spectrum antibiotics with Vancomycin/Zosyn, deescalate as appropriate    Endocrine  * Diabetic ketoacidosis associated with type 2 diabetes mellitus  Last A1c:   Lab Results   Component Value Date    HGBA1C 11.4 (H) 03/13/2020      Home Diabetes Medications             INSULIN ASPART PROTAM & ASPART (NOVOLOG MIX 70-30 SUBQ) Inject into the skin.    insulin glargine,hum.rec.anlog (TOUJEO SOLOSTAR U-300 INSULIN SUBQ) Inject 85 Units into the skin 2 (two) times daily.            Recent Labs     03/10/23  1338 03/10/23  1523   POCTGLUCOSE >500* >500*         PLAN  -Started on insulin infusion per DKA protocol  - Will monitor glucose results and adjust insulin regimen accordingly  - Fluid resuscitation  - Diet: Bariatric clear (no sugar) when tolerated  - Glucose checks  - BMP q4hr  - POCT glucose q1hr    Type 2 diabetes mellitus without complication, with long-term current use of insulin  See "DKA" A&P             Critical Care Daily " Checklist:     A: Awake: RASS Goal/Actual Goal:    Actual:     B: Spontaneous Breathing Trial Performed?  N/A   C: SAT & SBT Coordinated?  N/A                      D: Delirium: CAM-ICU    E: Early Mobility Performed? No   F: Feeding Goal:    Status:         Current Diet Order   Procedures    Diet NPO       AS: Analgesia/Sedation N/A   T: Thromboembolic Prophylaxis Heparin   H: HOB > 300 Yes   U: Stress Ulcer Prophylaxis (if needed) None   G: Glucose Control Insulin infusion   B: Bowel Function    I: Indwelling Catheter (Lines & Hernandez) Necessity PIV   D: De-escalation of Antimicrobials/Pharmacotherapies Vanc/Zosyn     Plan for the day/ETD Insulin infusion for DKA     Code Status:  Family/Goals of Care: Full Code          Critical secondary to Patient has a condition that poses threat to life and bodily function: DKA      Critical care was time spent personally by me on the following activities: development of treatment plan with patient or surrogate and bedside caregivers, discussions with consultants, evaluation of patient's response to treatment, examination of patient, ordering and performing treatments and interventions, ordering and review of laboratory studies, ordering and review of radiographic studies, pulse oximetry, re-evaluation of patient's condition. This critical care time did not overlap with that of any other provider or involve time for any procedures.     Fabio Marin MD  Critical Care Medicine  [facility] - Emergency Dept

## 2023-03-11 NOTE — ASSESSMENT & PLAN NOTE
Creatinine 2.8 on admit, baseline unclear(limited labs available)  Possible Etiologies:  - Likely pre-renal from dehydration and volume losses from DKA    Recent Labs     03/11/23  0216 03/11/23  0316 03/11/23  0749   CREATININE 2.5* 2.3* 2.3*   BUN 48* 45* 46*     Estimated Creatinine Clearance: 24.1 mL/min (A) (based on SCr of 2.3 mg/dL (H)).    Plan:    - Fluid resuscitation, if no improvement will consider urine lytes and renal/retroperitoneal ultrasound  - Avoid nephrotoxic agents such as NSAIDs, gadolinium and IV radiocontrast.  - Renally dose meds to current GFR.  - Maintain MAP > 65.

## 2023-03-12 PROBLEM — E87.0 HYPERNATREMIA: Status: ACTIVE | Noted: 2023-03-12

## 2023-03-12 LAB
ALBUMIN SERPL BCP-MCNC: 1.6 G/DL (ref 3.5–5.2)
ALP SERPL-CCNC: 179 U/L (ref 55–135)
ALT SERPL W/O P-5'-P-CCNC: 18 U/L (ref 10–44)
ANION GAP SERPL CALC-SCNC: 10 MMOL/L (ref 8–16)
ANION GAP SERPL CALC-SCNC: 11 MMOL/L (ref 8–16)
ANION GAP SERPL CALC-SCNC: 12 MMOL/L (ref 8–16)
ANISOCYTOSIS BLD QL SMEAR: SLIGHT
AST SERPL-CCNC: 21 U/L (ref 10–40)
BASOPHILS # BLD AUTO: 0.15 K/UL (ref 0–0.2)
BASOPHILS NFR BLD: 0.8 % (ref 0–1.9)
BILIRUB SERPL-MCNC: 0.7 MG/DL (ref 0.1–1)
BUN SERPL-MCNC: 36 MG/DL (ref 6–20)
BUN SERPL-MCNC: 37 MG/DL (ref 6–20)
BUN SERPL-MCNC: 39 MG/DL (ref 6–20)
BUN SERPL-MCNC: 39 MG/DL (ref 6–20)
BUN SERPL-MCNC: 45 MG/DL (ref 6–20)
BURR CELLS BLD QL SMEAR: ABNORMAL
CALCIUM SERPL-MCNC: 10.1 MG/DL (ref 8.7–10.5)
CALCIUM SERPL-MCNC: 8.3 MG/DL (ref 8.7–10.5)
CALCIUM SERPL-MCNC: 9.4 MG/DL (ref 8.7–10.5)
CALCIUM SERPL-MCNC: 9.5 MG/DL (ref 8.7–10.5)
CALCIUM SERPL-MCNC: 9.9 MG/DL (ref 8.7–10.5)
CHLORIDE SERPL-SCNC: 120 MMOL/L (ref 95–110)
CHLORIDE SERPL-SCNC: 121 MMOL/L (ref 95–110)
CHLORIDE SERPL-SCNC: 124 MMOL/L (ref 95–110)
CHLORIDE SERPL-SCNC: 125 MMOL/L (ref 95–110)
CHLORIDE SERPL-SCNC: 126 MMOL/L (ref 95–110)
CO2 SERPL-SCNC: 21 MMOL/L (ref 23–29)
CO2 SERPL-SCNC: 23 MMOL/L (ref 23–29)
CO2 SERPL-SCNC: 24 MMOL/L (ref 23–29)
CO2 SERPL-SCNC: 26 MMOL/L (ref 23–29)
CO2 SERPL-SCNC: 26 MMOL/L (ref 23–29)
CREAT SERPL-MCNC: 1 MG/DL (ref 0.5–1.4)
CREAT SERPL-MCNC: 1 MG/DL (ref 0.5–1.4)
CREAT SERPL-MCNC: 1.2 MG/DL (ref 0.5–1.4)
CREAT SERPL-MCNC: 1.2 MG/DL (ref 0.5–1.4)
CREAT SERPL-MCNC: 1.3 MG/DL (ref 0.5–1.4)
DIFFERENTIAL METHOD: ABNORMAL
EOSINOPHIL # BLD AUTO: 0 K/UL (ref 0–0.5)
EOSINOPHIL NFR BLD: 0 % (ref 0–8)
ERYTHROCYTE [DISTWIDTH] IN BLOOD BY AUTOMATED COUNT: 14.3 % (ref 11.5–14.5)
EST. GFR  (NO RACE VARIABLE): 48.9 ML/MIN/1.73 M^2
EST. GFR  (NO RACE VARIABLE): 53.8 ML/MIN/1.73 M^2
EST. GFR  (NO RACE VARIABLE): 53.8 ML/MIN/1.73 M^2
EST. GFR  (NO RACE VARIABLE): >60 ML/MIN/1.73 M^2
EST. GFR  (NO RACE VARIABLE): >60 ML/MIN/1.73 M^2
GLUCOSE SERPL-MCNC: 198 MG/DL (ref 70–110)
GLUCOSE SERPL-MCNC: 296 MG/DL (ref 70–110)
GLUCOSE SERPL-MCNC: 332 MG/DL (ref 70–110)
GLUCOSE SERPL-MCNC: 355 MG/DL (ref 70–110)
GLUCOSE SERPL-MCNC: 358 MG/DL (ref 70–110)
HCT VFR BLD AUTO: 34.9 % (ref 37–48.5)
HGB BLD-MCNC: 12 G/DL (ref 12–16)
IMM GRANULOCYTES # BLD AUTO: 0.46 K/UL (ref 0–0.04)
IMM GRANULOCYTES NFR BLD AUTO: 2.4 % (ref 0–0.5)
LYMPHOCYTES # BLD AUTO: 1.2 K/UL (ref 1–4.8)
LYMPHOCYTES NFR BLD: 6 % (ref 18–48)
MAGNESIUM SERPL-MCNC: 2.2 MG/DL (ref 1.6–2.6)
MCH RBC QN AUTO: 22.1 PG (ref 27–31)
MCHC RBC AUTO-ENTMCNC: 34.4 G/DL (ref 32–36)
MCV RBC AUTO: 64 FL (ref 82–98)
MONOCYTES # BLD AUTO: 1.5 K/UL (ref 0.3–1)
MONOCYTES NFR BLD: 7.8 % (ref 4–15)
NEUTROPHILS # BLD AUTO: 16.1 K/UL (ref 1.8–7.7)
NEUTROPHILS NFR BLD: 83 % (ref 38–73)
NRBC BLD-RTO: 0 /100 WBC
PHOSPHATE SERPL-MCNC: 1.2 MG/DL (ref 2.7–4.5)
PLATELET # BLD AUTO: 126 K/UL (ref 150–450)
PLATELET BLD QL SMEAR: ABNORMAL
PMV BLD AUTO: ABNORMAL FL (ref 9.2–12.9)
POCT GLUCOSE: 127 MG/DL (ref 70–110)
POCT GLUCOSE: 195 MG/DL (ref 70–110)
POCT GLUCOSE: 241 MG/DL (ref 70–110)
POCT GLUCOSE: 248 MG/DL (ref 70–110)
POCT GLUCOSE: 267 MG/DL (ref 70–110)
POCT GLUCOSE: 294 MG/DL (ref 70–110)
POCT GLUCOSE: 297 MG/DL (ref 70–110)
POCT GLUCOSE: 301 MG/DL (ref 70–110)
POCT GLUCOSE: 346 MG/DL (ref 70–110)
POCT GLUCOSE: 372 MG/DL (ref 70–110)
POCT GLUCOSE: 431 MG/DL (ref 70–110)
POIKILOCYTOSIS BLD QL SMEAR: SLIGHT
POLYCHROMASIA BLD QL SMEAR: ABNORMAL
POTASSIUM SERPL-SCNC: 3.1 MMOL/L (ref 3.5–5.1)
POTASSIUM SERPL-SCNC: 3.6 MMOL/L (ref 3.5–5.1)
POTASSIUM SERPL-SCNC: 4.1 MMOL/L (ref 3.5–5.1)
POTASSIUM SERPL-SCNC: 4.5 MMOL/L (ref 3.5–5.1)
POTASSIUM SERPL-SCNC: 4.9 MMOL/L (ref 3.5–5.1)
PROT SERPL-MCNC: 5.9 G/DL (ref 6–8.4)
RBC # BLD AUTO: 5.43 M/UL (ref 4–5.4)
SODIUM SERPL-SCNC: 155 MMOL/L (ref 136–145)
SODIUM SERPL-SCNC: 156 MMOL/L (ref 136–145)
SODIUM SERPL-SCNC: 157 MMOL/L (ref 136–145)
SODIUM SERPL-SCNC: 160 MMOL/L (ref 136–145)
SODIUM SERPL-SCNC: 161 MMOL/L (ref 136–145)
TARGETS BLD QL SMEAR: ABNORMAL
VANCOMYCIN SERPL-MCNC: 13.4 UG/ML
WBC # BLD AUTO: 19.35 K/UL (ref 3.9–12.7)

## 2023-03-12 PROCEDURE — 80053 COMPREHEN METABOLIC PANEL: CPT | Performed by: INTERNAL MEDICINE

## 2023-03-12 PROCEDURE — 87086 URINE CULTURE/COLONY COUNT: CPT | Performed by: STUDENT IN AN ORGANIZED HEALTH CARE EDUCATION/TRAINING PROGRAM

## 2023-03-12 PROCEDURE — 99233 SBSQ HOSP IP/OBS HIGH 50: CPT | Mod: ,,, | Performed by: INTERNAL MEDICINE

## 2023-03-12 PROCEDURE — 27000221 HC OXYGEN, UP TO 24 HOURS

## 2023-03-12 PROCEDURE — 84100 ASSAY OF PHOSPHORUS: CPT | Performed by: INTERNAL MEDICINE

## 2023-03-12 PROCEDURE — 99233 PR SUBSEQUENT HOSPITAL CARE,LEVL III: ICD-10-PCS | Mod: ,,, | Performed by: INTERNAL MEDICINE

## 2023-03-12 PROCEDURE — 63600175 PHARM REV CODE 636 W HCPCS: Mod: TB,JG | Performed by: INTERNAL MEDICINE

## 2023-03-12 PROCEDURE — 25000003 PHARM REV CODE 250: Performed by: INTERNAL MEDICINE

## 2023-03-12 PROCEDURE — 99900035 HC TECH TIME PER 15 MIN (STAT)

## 2023-03-12 PROCEDURE — 85025 COMPLETE CBC W/AUTO DIFF WBC: CPT

## 2023-03-12 PROCEDURE — 94761 N-INVAS EAR/PLS OXIMETRY MLT: CPT

## 2023-03-12 PROCEDURE — 83735 ASSAY OF MAGNESIUM: CPT | Performed by: INTERNAL MEDICINE

## 2023-03-12 PROCEDURE — 25000003 PHARM REV CODE 250: Performed by: STUDENT IN AN ORGANIZED HEALTH CARE EDUCATION/TRAINING PROGRAM

## 2023-03-12 PROCEDURE — 80202 ASSAY OF VANCOMYCIN: CPT | Performed by: INTERNAL MEDICINE

## 2023-03-12 PROCEDURE — 20000000 HC ICU ROOM

## 2023-03-12 PROCEDURE — 25000003 PHARM REV CODE 250

## 2023-03-12 PROCEDURE — 63600175 PHARM REV CODE 636 W HCPCS: Performed by: STUDENT IN AN ORGANIZED HEALTH CARE EDUCATION/TRAINING PROGRAM

## 2023-03-12 PROCEDURE — 63600175 PHARM REV CODE 636 W HCPCS

## 2023-03-12 PROCEDURE — 80048 BASIC METABOLIC PNL TOTAL CA: CPT | Mod: 91,XB | Performed by: STUDENT IN AN ORGANIZED HEALTH CARE EDUCATION/TRAINING PROGRAM

## 2023-03-12 PROCEDURE — 80048 BASIC METABOLIC PNL TOTAL CA: CPT | Mod: XB | Performed by: STUDENT IN AN ORGANIZED HEALTH CARE EDUCATION/TRAINING PROGRAM

## 2023-03-12 RX ORDER — DEXMEDETOMIDINE HYDROCHLORIDE 4 UG/ML
0-1.4 INJECTION, SOLUTION INTRAVENOUS CONTINUOUS
Status: DISCONTINUED | OUTPATIENT
Start: 2023-03-12 | End: 2023-03-14

## 2023-03-12 RX ORDER — HYDRALAZINE HYDROCHLORIDE 20 MG/ML
10 INJECTION INTRAMUSCULAR; INTRAVENOUS EVERY 8 HOURS PRN
Status: DISCONTINUED | OUTPATIENT
Start: 2023-03-12 | End: 2023-03-16 | Stop reason: HOSPADM

## 2023-03-12 RX ORDER — LABETALOL HCL 20 MG/4 ML
10 SYRINGE (ML) INTRAVENOUS EVERY 6 HOURS PRN
Status: DISCONTINUED | OUTPATIENT
Start: 2023-03-12 | End: 2023-03-16 | Stop reason: HOSPADM

## 2023-03-12 RX ORDER — INSULIN ASPART 100 [IU]/ML
1-10 INJECTION, SOLUTION INTRAVENOUS; SUBCUTANEOUS EVERY 6 HOURS PRN
Status: DISCONTINUED | OUTPATIENT
Start: 2023-03-12 | End: 2023-03-13

## 2023-03-12 RX ORDER — GLUCAGON 1 MG
1 KIT INJECTION
Status: DISCONTINUED | OUTPATIENT
Start: 2023-03-12 | End: 2023-03-13

## 2023-03-12 RX ADMIN — PIPERACILLIN SODIUM AND TAZOBACTAM SODIUM 4.5 G: 4; .5 INJECTION, POWDER, FOR SOLUTION INTRAVENOUS at 06:03

## 2023-03-12 RX ADMIN — INSULIN ASPART 3 UNITS: 100 INJECTION, SOLUTION INTRAVENOUS; SUBCUTANEOUS at 12:03

## 2023-03-12 RX ADMIN — VANCOMYCIN HYDROCHLORIDE 750 MG: 750 INJECTION, POWDER, LYOPHILIZED, FOR SOLUTION INTRAVENOUS at 08:03

## 2023-03-12 RX ADMIN — DEXMEDETOMIDINE HYDROCHLORIDE 0.6 MCG/KG/HR: 4 INJECTION, SOLUTION INTRAVENOUS at 08:03

## 2023-03-12 RX ADMIN — HEPARIN SODIUM 5000 UNITS: 5000 INJECTION INTRAVENOUS; SUBCUTANEOUS at 10:03

## 2023-03-12 RX ADMIN — PIPERACILLIN SODIUM AND TAZOBACTAM SODIUM 4.5 G: 4; .5 INJECTION, POWDER, FOR SOLUTION INTRAVENOUS at 12:03

## 2023-03-12 RX ADMIN — HEPARIN SODIUM 5000 UNITS: 5000 INJECTION INTRAVENOUS; SUBCUTANEOUS at 06:03

## 2023-03-12 RX ADMIN — INSULIN ASPART 8 UNITS: 100 INJECTION, SOLUTION INTRAVENOUS; SUBCUTANEOUS at 06:03

## 2023-03-12 RX ADMIN — PIPERACILLIN SODIUM AND TAZOBACTAM SODIUM 4.5 G: 4; .5 INJECTION, POWDER, FOR SOLUTION INTRAVENOUS at 08:03

## 2023-03-12 RX ADMIN — INSULIN ASPART 10 UNITS: 100 INJECTION, SOLUTION INTRAVENOUS; SUBCUTANEOUS at 03:03

## 2023-03-12 RX ADMIN — INSULIN ASPART 6 UNITS: 100 INJECTION, SOLUTION INTRAVENOUS; SUBCUTANEOUS at 03:03

## 2023-03-12 RX ADMIN — HEPARIN SODIUM 5000 UNITS: 5000 INJECTION INTRAVENOUS; SUBCUTANEOUS at 01:03

## 2023-03-12 RX ADMIN — DEXMEDETOMIDINE HYDROCHLORIDE 0.1 MCG/KG/HR: 4 INJECTION, SOLUTION INTRAVENOUS at 08:03

## 2023-03-12 RX ADMIN — INSULIN DETEMIR 10 UNITS: 100 INJECTION, SOLUTION SUBCUTANEOUS at 04:03

## 2023-03-12 RX ADMIN — INSULIN DETEMIR 5 UNITS: 100 INJECTION, SOLUTION SUBCUTANEOUS at 08:03

## 2023-03-12 RX ADMIN — INSULIN ASPART 5 UNITS: 100 INJECTION, SOLUTION INTRAVENOUS; SUBCUTANEOUS at 12:03

## 2023-03-12 NOTE — CONSULTS
"Nutrition consult received stating "DKA".  Noted A1C of 13.6.    Per RN notes, pt disoriented x 4 & non-verbal. Pt currently not appropriate for diabetic diet education.     RD to follow-up if/when more appropriate.    Pt currently NPO; please re-consult if unable to advance diet x 72 hours & EN warranted.    Thanks!  MS Jenniffer, RD, LDN  "

## 2023-03-12 NOTE — PROGRESS NOTES
Pharmacokinetic Follow-Up Assessment: IV Vancomycin    Assessment/Plan:    - Vanc random level (~17 hours post-dose) is 13.4 mcg/mL  - Dosing by level in setting of CARLINE, SCr improving, excellent UOP  - Schedule vanc 750 mg Q12H  - Draw trough prior to fourth dose on 3/13 at 19:45     Pharmacy will continue to follow and monitor vancomycin.    Please contact pharmacy at extension [phone] with any questions regarding this assessment.     Thank you for the consult,   Aline Cosby, PharmD, [facility]  Neurocritical Care Pharmacist  [phone]         Patient brief summary:  Christine Mosqueda is a 52 y.o. female initiated on antimicrobial therapy with IV Vancomycin for treatment of suspected sepsis.    Drug Allergies:   Review of patient's allergies indicates:   Allergen Reactions    Hydrochlorothiazide plus        Actual Body Weight:   63.5 kg    Renal Function:   Estimated Creatinine Clearance: 46.3 mL/min (based on SCr of 1.2 mg/dL).    CBC (last 72 hours):  Recent Labs   Lab Result Units 03/10/23  1220 03/10/23  1718 03/11/23  1140 03/12/23  0523   WBC K/uL 28.97* 21.97*  --  19.35*   Hemoglobin g/dL 11.2* 11.3*  --  12.0   Hemoglobin A1C %  --   --  13.6*  --    Hematocrit % 36.8* 37.8  --  34.9*   Platelets K/uL 257 234  --  126*   Gran % % 90.0* 89.8*  --   --    Lymph % % 2.0* 3.5*  --   --    Mono % % 6.0 3.4*  --   --    Eosinophil % % 0.0 0.0  --   --    Basophil % % 0.0 0.6  --   --    Differential Method  Manual Automated  --   --        Metabolic Panel (last 72 hours):  Recent Labs   Lab Result Units 03/10/23  1220 03/10/23  1443 03/10/23  1718 03/11/23  0001 03/11/23  0216 03/11/23  0316 03/11/23  0749 03/11/23  1140 03/11/23  1645 03/11/23 2010 03/12/23  0017 03/12/23  0523   Sodium mmol/L 133*  --  136 142 140 145 149* 153* 157* 158* 155* 156*   Potassium mmol/L 4.3  --  3.0* 4.4 6.2* 3.5 3.8 3.8 3.9 3.7 4.1 3.6   Chloride mmol/L 90*  --  100 106 105 109 113* 115* 120* 122* 120* 121*   CO2 mmol/L <5*  --  6* 9* 9*  11* 22* 23 21* 24 23 24   Glucose mg/dL 977*  --  783* 637* 777* 609* 443* 239* 126* 202* 355* 358*   Glucose, UA   --  4+*  --   --   --   --   --   --   --   --   --   --    BUN mg/dL 54*  --  50* 50* 48* 45* 46* 44* 40* 41* 37* 39*   Creatinine mg/dL 2.8*  --  2.2* 2.3* 2.5* 2.3* 2.3* 2.1* 1.5* 1.3 1.3 1.2   Creatinine, Urine mg/dL  --  17.0  --   --   --   --   --   --   --   --   --   --    Albumin g/dL 1.9*  --   --   --   --   --   --   --   --   --   --  1.6*   Total Bilirubin mg/dL 0.2  --   --   --   --   --   --   --   --   --   --  0.7   Alkaline Phosphatase U/L 219*  --   --   --   --   --   --   --   --   --   --  179*   AST U/L 11  --   --   --   --   --   --   --   --   --   --  21   ALT U/L 17  --   --   --   --   --   --   --   --   --   --  18   Magnesium mg/dL  --   --   --   --   --  2.2  --   --   --   --   --  2.2   Phosphorus mg/dL  --   --   --   --   --  <1.0* 1.3*  --   --   --   --  1.2*       Drug levels (last 3 results):  Recent Labs   Lab Result Units 03/11/23  0316 03/12/23  0442   Vancomycin, Random ug/mL 9.8 13.4       Microbiologic Results:  Microbiology Results (last 7 days)       Procedure Component Value Units Date/Time    Urine culture [[id number]] Collected: 03/10/23 1443    Order Status: Completed Specimen: Urine Updated: 03/11/23 2322     Urine Culture, Routine Multiple organisms isolated. None in predominance.  Repeat if      clinically necessary.    Narrative:      add-on per Jonathan Tatum DO; [facility]; [phone]  03/10/2023    03/10/2023  16:35     Blood culture [[id number]] Collected: 03/11/23 1137    Order Status: Completed Specimen: Blood from Peripheral, Forearm, Left Updated: 03/11/23 1915     Blood Culture, Routine No Growth to date    Blood culture [[id number]] Collected: 03/11/23 1137    Order Status: Completed Specimen: Blood from Peripheral, Forearm, Left Updated: 03/11/23 1915     Blood Culture, Routine No Growth to date    Blood culture #2 **CANNOT BE ORDERED  STAT** [[id number]] Collected: 03/10/23 1335    Order Status: Completed Specimen: Blood from Peripheral, Forearm, Left Updated: 03/11/23 0238     Blood Culture, Routine Gram stain portia bottle: Gram negative rods      Results called to and read back by: Ritu Hope 03/11/2023  01:47      Gram stain aer bottle: Gram negative rods      Positive results previously called 03/11/2023    Blood culture #1 **CANNOT BE ORDERED STAT** [[id number]] Collected: 03/10/23 1335    Order Status: Completed Specimen: Blood from Peripheral, Forearm, Left Updated: 03/11/23 0236     Blood Culture, Routine Gram stain portia bottle: Gram negative rods      Results called to and read back by: Ritu Hope 03/11/2023  01:47      Gram stain aer bottle: Gram negative rods      Positive results previously called 03/11/2023

## 2023-03-12 NOTE — SUBJECTIVE & OBJECTIVE
Interval History/Significant Events: Overnight patient with improving mentation, stated her name to the nurse overnight and now able to state yes/no intermittently to questions. Patient was also febrile overnight requiring PRN tylenol    Review of Systems as stated in HPI  Objective:     Vital Signs (Most Recent):  Temp: 98.1 °F (36.7 °C) (03/12/23 0601)  Pulse: 101 (03/12/23 0601)  Resp: (!) 32 (03/12/23 0601)  BP: (!) 159/95 (03/12/23 0601)  SpO2: (!) 94 % (03/12/23 0601)   Vital Signs (24h Range):  Temp:  [97.7 °F (36.5 °C)-100 °F (37.8 °C)] 98.1 °F (36.7 °C)  Pulse:  [] 101  Resp:  [29-47] 32  SpO2:  [91 %-98 %] 94 %  BP: (137-196)/() 159/95   Weight: 63.5 kg (140 lb)  Body mass index is 24.03 kg/m².      Intake/Output Summary (Last 24 hours) at 3/12/2023 0842  Last data filed at 3/12/2023 0601  Gross per 24 hour   Intake 3511.84 ml   Output 6350 ml   Net -2838.16 ml       Physical Exam  Constitutional:       Comments: Ill appearing female   HENT:      Head: Normocephalic and atraumatic.      Mouth/Throat:      Mouth: Mucous membranes are dry.      Pharynx: Oropharynx is clear.   Eyes:      Extraocular Movements: Extraocular movements intact.      Pupils: Pupils are equal, round, and reactive to light.   Cardiovascular:      Rate and Rhythm: Regular rhythm. Tachycardia present.      Comments: HR in the low 100s  Pulmonary:      Comments: Mildly tachypneic with RR in the mid 20s  Abdominal:      General: Abdomen is flat.      Palpations: Abdomen is soft.      Comments: Generalized abdominal tenderness to palpation, unable to assess guarding/rebound   Musculoskeletal:      Cervical back: Normal range of motion and neck supple.   Skin:     General: Skin is warm and dry.   Neurological:      Mental Status: She is alert.      Comments: Now able to open eyes and intermittently responds yes/no to questions       Vents:     Lines/Drains/Airways       Drain  Duration                  Urethral Catheter  03/11/23 0336 Temperature probe 16 Fr. 1 day              Peripheral Intravenous Line  Duration                  Peripheral IV - Single Lumen 03/10/23 18 G Left Antecubital 2 days         Peripheral IV - Single Lumen 03/11/23 0729 20 G Anterior;Right Upper Arm 1 day         Peripheral IV - Single Lumen 03/11/23 1048 20 G Right Antecubital <1 day                  Significant Labs:    CBC/Anemia Profile:  Recent Labs   Lab 03/10/23  1220 03/10/23  1718 03/10/23  2202 03/11/23  0314 03/11/23  0753 03/12/23  0523   WBC 28.97* 21.97*  --   --   --  19.35*   HGB 11.2* 11.3*  --   --   --  12.0   HCT 36.8* 37.8   < > 37 35* 34.9*    234  --   --   --  126*   MCV 71* 74*  --   --   --  64*   RDW 15.9* 15.9*  --   --   --  14.3    < > = values in this interval not displayed.        Chemistries:  Recent Labs   Lab 03/10/23  1220 03/10/23  1718 03/11/23  0316 03/11/23  0749 03/11/23  1140 03/11/23  2010 03/12/23  0017 03/12/23  0523   *   < > 145 149*   < > 158* 155* 156*   K 4.3   < > 3.5 3.8   < > 3.7 4.1 3.6   CL 90*   < > 109 113*   < > 122* 120* 121*   CO2 <5*   < > 11* 22*   < > 24 23 24   BUN 54*   < > 45* 46*   < > 41* 37* 39*   CREATININE 2.8*   < > 2.3* 2.3*   < > 1.3 1.3 1.2   CALCIUM 10.1   < > 10.0 10.2   < > 10.1 10.1 9.4   ALBUMIN 1.9*  --   --   --   --   --   --  1.6*   PROT 7.2  --   --   --   --   --   --  5.9*   BILITOT 0.2  --   --   --   --   --   --  0.7   ALKPHOS 219*  --   --   --   --   --   --  179*   ALT 17  --   --   --   --   --   --  18   AST 11  --   --   --   --   --   --  21   MG  --   --  2.2  --   --   --   --  2.2   PHOS  --   --  <1.0* 1.3*  --   --   --  1.2*    < > = values in this interval not displayed.       A1C:   Recent Labs   Lab 03/11/23  1140   HGBA1C 13.6*     Blood Culture:   Recent Labs   Lab 03/10/23  1335 03/11/23  1137   LABPERLITA Gram stain portia bottle: Gram negative rods  Results called to and read back by: Ritu Hope 03/11/2023  01:47  Gram stain aer bottle:  Gram negative rods  Positive results previously called 03/11/2023  GRAM NEGATIVE YAYA  Identification pending  For susceptibility see order #[id number]  *  Gram stain portia bottle: Gram negative rods  Results called to and read back by: Ritu Hope 03/11/2023  01:47  Gram stain aer bottle: Gram negative rods  Positive results previously called 03/11/2023  GRAM NEGATIVE YAYA  Identification and susceptibility pending  * No Growth to date  No Growth to date     Lactic Acid:   Recent Labs   Lab 03/10/23  1304   LACTATE 2.2     POCT Glucose:   Recent Labs   Lab 03/12/23  0354 03/12/23  0612 03/12/23  0823   POCTGLUCOSE 431* 301* 195*     Troponin:   Recent Labs   Lab 03/10/23  1220   TROPONINI <0.006       Significant Imaging:  I have reviewed all pertinent imaging results/findings within the past 24 hours.

## 2023-03-12 NOTE — PROGRESS NOTES
[facility] - Cardiac Medical ICU  Critical Care Medicine  Progress Note    Patient Name: Christine Mosqueda  MRN: [id number]  Admission Date: 3/10/2023  Hospital Length of Stay: 2 days  Code Status: Full Code  Attending Provider: Brittany De La Rosa MD  Primary Care Provider: Primary Doctor No   Principal Problem: Diabetic ketoacidosis associated with type 2 diabetes mellitus    Subjective:     HPI:  Patient information was obtained from relative(), past medical records, and ER records.    Ms. Mosqueda is a 52 y.o F w/ hx of CHF, COPD, HTN, DM type 2, and anxiety who presented to the  ED with altered mental status.   reports the altered mental status started around 3 days ago where the patient was increasingly agitated, aggressive, drinking lots of sodas and eating lots of ice cream.  He is unclear if she missed any insulin doses.  She appeared more lethargic this morning so she was brought to the ED via EMS.    ED course notable for WBC 28.97, hemoglobin 11.2, sodium 133, chloride 90, bicarb< 5, anion gap of 54, creatinine 2.8, glucose 977, alkaline phosphatase 219, serum osmolality 383, beta hydroxybutyrate 5.2, VBG notable for pH 7.051, CO2 17.7, bicarb 4.9.          Hospital/ICU Course:  2/2 blood culture bottles have returned + for GNR. CT abdomen pelvis returned with consolidative opacities towards lungs bilaterally that may be concerning for potential airspace disease and markedly distended bladder. CT Head without acute abnormality. Patient with hypoglycemia overnight requiring maintenance IVF using LR with 20 mEq K+ at 150 mL/hr. Insulin gtt discontinued on 3/11. Patient hypernatremic requiring D5 gtt, mentation slowly improving as patient now able to open eyes and intermittently respond yes/no to questions as of 3/12.       Interval History/Significant Events: Overnight patient with improving mentation, stated her name to the nurse overnight and now able to state yes/no intermittently to questions.  Patient was also febrile overnight requiring PRN tylenol    Review of Systems as stated in HPI  Objective:     Vital Signs (Most Recent):  Temp: 98.1 °F (36.7 °C) (03/12/23 0601)  Pulse: 101 (03/12/23 0601)  Resp: (!) 32 (03/12/23 0601)  BP: (!) 159/95 (03/12/23 0601)  SpO2: (!) 94 % (03/12/23 0601)   Vital Signs (24h Range):  Temp:  [97.7 °F (36.5 °C)-100 °F (37.8 °C)] 98.1 °F (36.7 °C)  Pulse:  [] 101  Resp:  [29-47] 32  SpO2:  [91 %-98 %] 94 %  BP: (137-196)/() 159/95   Weight: 63.5 kg (140 lb)  Body mass index is 24.03 kg/m².      Intake/Output Summary (Last 24 hours) at 3/12/2023 0842  Last data filed at 3/12/2023 0601  Gross per 24 hour   Intake 3511.84 ml   Output 6350 ml   Net -2838.16 ml       Physical Exam  Constitutional:       Comments: Ill appearing female   HENT:      Head: Normocephalic and atraumatic.      Mouth/Throat:      Mouth: Mucous membranes are dry.      Pharynx: Oropharynx is clear.   Eyes:      Extraocular Movements: Extraocular movements intact.      Pupils: Pupils are equal, round, and reactive to light.   Cardiovascular:      Rate and Rhythm: Regular rhythm. Tachycardia present.      Comments: HR in the low 100s  Pulmonary:      Comments: Mildly tachypneic with RR in the mid 20s  Abdominal:      General: Abdomen is flat.      Palpations: Abdomen is soft.      Comments: Generalized abdominal tenderness to palpation, unable to assess guarding/rebound   Musculoskeletal:      Cervical back: Normal range of motion and neck supple.   Skin:     General: Skin is warm and dry.   Neurological:      Mental Status: She is alert.      Comments: Now able to open eyes and intermittently responds yes/no to questions       Vents:     Lines/Drains/Airways       Drain  Duration                  Urethral Catheter 03/11/23 0336 Temperature probe 16 Fr. 1 day              Peripheral Intravenous Line  Duration                  Peripheral IV - Single Lumen 03/10/23 18 G Left Antecubital 2 days          Peripheral IV - Single Lumen 03/11/23 0729 20 G Anterior;Right Upper Arm 1 day         Peripheral IV - Single Lumen 03/11/23 1048 20 G Right Antecubital <1 day                  Significant Labs:    CBC/Anemia Profile:  Recent Labs   Lab 03/10/23  1220 03/10/23  1718 03/10/23  2202 03/11/23  0314 03/11/23  0753 03/12/23  0523   WBC 28.97* 21.97*  --   --   --  19.35*   HGB 11.2* 11.3*  --   --   --  12.0   HCT 36.8* 37.8   < > 37 35* 34.9*    234  --   --   --  126*   MCV 71* 74*  --   --   --  64*   RDW 15.9* 15.9*  --   --   --  14.3    < > = values in this interval not displayed.        Chemistries:  Recent Labs   Lab 03/10/23  1220 03/10/23  1718 03/11/23  0316 03/11/23  0749 03/11/23  1140 03/11/23  2010 03/12/23  0017 03/12/23  0523   *   < > 145 149*   < > 158* 155* 156*   K 4.3   < > 3.5 3.8   < > 3.7 4.1 3.6   CL 90*   < > 109 113*   < > 122* 120* 121*   CO2 <5*   < > 11* 22*   < > 24 23 24   BUN 54*   < > 45* 46*   < > 41* 37* 39*   CREATININE 2.8*   < > 2.3* 2.3*   < > 1.3 1.3 1.2   CALCIUM 10.1   < > 10.0 10.2   < > 10.1 10.1 9.4   ALBUMIN 1.9*  --   --   --   --   --   --  1.6*   PROT 7.2  --   --   --   --   --   --  5.9*   BILITOT 0.2  --   --   --   --   --   --  0.7   ALKPHOS 219*  --   --   --   --   --   --  179*   ALT 17  --   --   --   --   --   --  18   AST 11  --   --   --   --   --   --  21   MG  --   --  2.2  --   --   --   --  2.2   PHOS  --   --  <1.0* 1.3*  --   --   --  1.2*    < > = values in this interval not displayed.       A1C:   Recent Labs   Lab 03/11/23  1140   HGBA1C 13.6*     Blood Culture:   Recent Labs   Lab 03/10/23  1335 03/11/23  1137   LABBLOO Gram stain portia bottle: Gram negative rods  Results called to and read back by: Ritu Hope 03/11/2023  01:47  Gram stain aer bottle: Gram negative rods  Positive results previously called 03/11/2023  GRAM NEGATIVE YAYA  Identification pending  For susceptibility see order #[id number]  *  Gram stain portia bottle: Gram  "negative rods  Results called to and read back by: Ritu Hope 03/11/2023  01:47  Gram stain aer bottle: Gram negative rods  Positive results previously called 03/11/2023  GRAM NEGATIVE YAYA  Identification and susceptibility pending  * No Growth to date  No Growth to date     Lactic Acid:   Recent Labs   Lab 03/10/23  1304   LACTATE 2.2     POCT Glucose:   Recent Labs   Lab 03/12/23  0354 03/12/23  0612 03/12/23  0823   POCTGLUCOSE 431* 301* 195*     Troponin:   Recent Labs   Lab 03/10/23  1220   TROPONINI <0.006       Significant Imaging:  I have reviewed all pertinent imaging results/findings within the past 24 hours.      ABG  Recent Labs   Lab 03/11/23 2023   PH 7.470*   PO2 48   PCO2 37.3   HCO3 27.1   BE 3     Assessment/Plan:     Neuro  AMS (altered mental status)  Patient has acute metabolic encephalopathy that is likely secondary to DKA v. Sepsis.  Treatment for underlying cause is underway.    Differential diagnoses include, but not limited to: DKA, electrolyte abnormalities, infection(UA concerning, abdominal pain on initial exam)     CT head without acute abnormality. CT abdomen pelvis showed bibasilar consolidative opacities (L>R), cholelithiasis without evidence of cholecystitis and markedly distended bladder. UDS + for cocaine and UA concerning for possible UTI. Blood cultures + for GNR. Encephalopathy has slowly been improving. Repeat blood cultures with NGTD.    PLAN  - SLP consult  - Follow up repeat urine culture  - Continue vanc/zosyn  - Avoid sedating medications  - Followup Urine cultures, blood cultures       Pulmonary  Restrictive lung disease  See "Emphysema lung" A&P    Emphysema lung  Hx of tobacco abuse, pulmonary emphysema, pulmonary hypertension, restrictive lung disease listed per chart review of outside medical record    CXR 03/10/2023 non-acute    PLAN  Duo nebs PRN  Supplemental oxygen to target SaO2 >90%    Cardiac/Vascular  Essential hypertension    Home Hypertension " Medications             amlodipine (NORVASC) 10 MG tablet Take 10 mg by mouth once daily.    losartan (COZAAR) 100 MG tablet Take 100 mg by mouth once daily.          Plan:  Resume home medications as tolerated    Renal/  CARLINE (acute kidney injury)  Creatinine 2.8 on admit, baseline unclear(limited labs available) now resolved with creatinine of 1.2  Possible Etiologies:  - Likely pre-renal from dehydration and volume losses from DKA    Recent Labs     03/11/23 2010 03/12/23  0017 03/12/23  0523   CREATININE 1.3 1.3 1.2   BUN 41* 37* 39*     Estimated Creatinine Clearance: 46.3 mL/min (based on SCr of 1.2 mg/dL).    Plan:    - Fluid resuscitation, if no improvement will consider urine lytes and renal/retroperitoneal ultrasound  - Avoid nephrotoxic agents such as NSAIDs, gadolinium and IV radiocontrast.  - Renally dose meds to current GFR.  - Maintain MAP > 65.    UTI (urinary tract infection)  UA concerning for UTI with altered mental status and abdominal pain on physical exam. Initial urine cultures with many organisms isolated though inconclusive     Recent Labs   Lab 03/10/23  1443   COLORU Brown*   APPEARANCEUA Hazy*   PHUR 5.0   SPECGRAV 1.015   PROTEINUA 1+*   GLUCUA 4+*   KETONESU 3+*   BILIRUBINUA Negative   OCCULTUA 3+*   NITRITE Negative   LEUKOCYTESUR 3+*   RBCUA >100*   WBCUA >100*   BACTERIA Many*   SQUAMEPITHEL 20   HYALINECASTS 0     PLAN  - Follow up repeat urine culture  - Continue vanc/zosyn      Oncology  Leukocytosis  Recent Labs     03/10/23  1220 03/10/23  1718 03/12/23  0523   WBC 28.97* 21.97* 19.35*       Leukocytosis likely secondary to sepsis + DKA as patient has grown GNR in her blood cultures, suspect source is urine as her urine is positive for WBC clumps, > 100 WBC and moderate bacteria. CTAP results with bilateral bibasilar consolidations and markedly distended bladder also rising concern for possible PNA. Blood cultures returned + for GNR    PLAN  - Followup repeat urine culture,  "blood cultures   - Continue broad-spectrum antibiotics with Vancomycin/Zosyn, deescalate as appropriate    Endocrine  * Diabetic ketoacidosis associated with type 2 diabetes mellitus  Last A1c:   Lab Results   Component Value Date    HGBA1C 13.6 (H) 03/11/2023      Home Diabetes Medications             INSULIN ASPART PROTAM & ASPART (NOVOLOG MIX 70-30 SUBQ) Inject into the skin.    insulin glargine,hum.rec.anlog (TOUJEO SOLOSTAR U-300 INSULIN SUBQ) Inject 85 Units into the skin 2 (two) times daily.            Recent Labs     03/11/23 2003 03/12/23  0017 03/12/23  0019 03/12/23  0354 03/12/23  0612 03/12/23  0823   POCTGLUCOSE 178* 346* 372* 431* 301* 195*         PLAN  -Started on insulin infusion per DKA protocol, insulin gtt discontinued 3/11  - Insulin regimen currently 15 units levemir QHS, 5 units QAM and moderate dose sliding scale  - Fluid resuscitation  - Diet: Bariatric clear (no sugar) when tolerated  - Glucose checks  - BMP q4hr  - POCT glucose q1hr    Type 2 diabetes mellitus without complication, with long-term current use of insulin  See "DKA" A&P           Critical Care Daily Checklist:     A: Awake: RASS Goal/Actual Goal:    Actual:     B: Spontaneous Breathing Trial Performed?  N/A   C: SAT & SBT Coordinated?  N/A                      D: Delirium: CAM-ICU     E: Early Mobility Performed? No   F: Feeding Goal:    Status:           Current Diet Order   Procedures    Diet NPO       AS: Analgesia/Sedation N/A   T: Thromboembolic Prophylaxis Heparin   H: HOB > 300 Yes   U: Stress Ulcer Prophylaxis (if needed) None   G: Glucose Control Insulin infusion   B: Bowel Function     I: Indwelling Catheter (Lines & Hernandez) Necessity PIV   D: De-escalation of Antimicrobials/Pharmacotherapies Vanc/Zosyn     Plan for the day/ETD Insulin infusion for DKA     Code Status:  Family/Goals of Care: Full Code             Critical secondary to Patient has a condition that poses threat to life and bodily function: DKA    "   Critical care was time spent personally by me on the following activities: development of treatment plan with patient or surrogate and bedside caregivers, discussions with consultants, evaluation of patient's response to treatment, examination of patient, ordering and performing treatments and interventions, ordering and review of laboratory studies, ordering and review of radiographic studies, pulse oximetry, re-evaluation of patient's condition. This critical care time did not overlap with that of any other provider or involve time for any procedures.     Fabio Marin MD  Critical Care Medicine  [facility] - Cardiac Medical [facility]

## 2023-03-12 NOTE — ASSESSMENT & PLAN NOTE
Last A1c:   Lab Results   Component Value Date    HGBA1C 13.6 (H) 03/11/2023      Home Diabetes Medications             INSULIN ASPART PROTAM & ASPART (NOVOLOG MIX 70-30 SUBQ) Inject into the skin.    insulin glargine,hum.rec.anlog (TOUJEO SOLOSTAR U-300 INSULIN SUBQ) Inject 85 Units into the skin 2 (two) times daily.            Recent Labs     03/11/23 2003 03/12/23  0017 03/12/23  0019 03/12/23  0354 03/12/23  0612 03/12/23  0823   POCTGLUCOSE 178* 346* 372* 431* 301* 195*         PLAN  -Started on insulin infusion per DKA protocol, insulin gtt discontinued 3/11  - Insulin regimen currently 15 units levemir QHS, 5 units QAM and moderate dose sliding scale  - Fluid resuscitation  - Diet: Bariatric clear (no sugar) when tolerated  - Glucose checks  - BMP q4hr  - POCT glucose q1hr

## 2023-03-12 NOTE — ASSESSMENT & PLAN NOTE
Creatinine 2.8 on admit, baseline unclear(limited labs available) now resolved with creatinine of 1.2  Possible Etiologies:  - Likely pre-renal from dehydration and volume losses from DKA    Recent Labs     03/11/23 2010 03/12/23  0017 03/12/23  0523   CREATININE 1.3 1.3 1.2   BUN 41* 37* 39*     Estimated Creatinine Clearance: 46.3 mL/min (based on SCr of 1.2 mg/dL).    Plan:    - Fluid resuscitation, if no improvement will consider urine lytes and renal/retroperitoneal ultrasound  - Avoid nephrotoxic agents such as NSAIDs, gadolinium and IV radiocontrast.  - Renally dose meds to current GFR.  - Maintain MAP > 65.

## 2023-03-12 NOTE — ASSESSMENT & PLAN NOTE
UA concerning for UTI with altered mental status and abdominal pain on physical exam. Initial urine cultures with many organisms isolated though inconclusive     Recent Labs   Lab 03/10/23  1443   COLORU Brown*   APPEARANCEUA Hazy*   PHUR 5.0   SPECGRAV 1.015   PROTEINUA 1+*   GLUCUA 4+*   KETONESU 3+*   BILIRUBINUA Negative   OCCULTUA 3+*   NITRITE Negative   LEUKOCYTESUR 3+*   RBCUA >100*   WBCUA >100*   BACTERIA Many*   SQUAMEPITHEL 20   HYALINECASTS 0     PLAN  - Follow up repeat urine culture  - Continue vanc/zosyn

## 2023-03-12 NOTE — ASSESSMENT & PLAN NOTE
Recent Labs     03/10/23  1220 03/10/23  1718 03/12/23  0523   WBC 28.97* 21.97* 19.35*       Leukocytosis likely secondary to sepsis + DKA as patient has grown GNR in her blood cultures, suspect source is urine as her urine is positive for WBC clumps, > 100 WBC and moderate bacteria. CTAP results with bilateral bibasilar consolidations and markedly distended bladder also rising concern for possible PNA. Blood cultures returned + for GNR    PLAN  - Followup repeat urine culture, blood cultures   - Continue broad-spectrum antibiotics with Vancomycin/Zosyn, deescalate as appropriate

## 2023-03-12 NOTE — ASSESSMENT & PLAN NOTE
Patient has acute metabolic encephalopathy that is likely secondary to DKA v. Sepsis.  Treatment for underlying cause is underway.    Differential diagnoses include, but not limited to: DKA, electrolyte abnormalities, infection(UA concerning, abdominal pain on initial exam)     CT head without acute abnormality. CT abdomen pelvis showed bibasilar consolidative opacities (L>R), cholelithiasis without evidence of cholecystitis and markedly distended bladder. UDS + for cocaine and UA concerning for possible UTI. Blood cultures + for GNR. Encephalopathy has slowly been improving. Repeat blood cultures with NGTD.    PLAN  - SLP consult  - Follow up repeat urine culture  - Continue vanc/zosyn  - Avoid sedating medications  - Followup Urine cultures, blood cultures

## 2023-03-13 PROBLEM — E86.1 HYPOVOLEMIA: Status: ACTIVE | Noted: 2023-03-13

## 2023-03-13 LAB
ALBUMIN SERPL BCP-MCNC: 1.4 G/DL (ref 3.5–5.2)
ALP SERPL-CCNC: 136 U/L (ref 55–135)
ALT SERPL W/O P-5'-P-CCNC: 14 U/L (ref 10–44)
ANION GAP SERPL CALC-SCNC: 11 MMOL/L (ref 8–16)
ANION GAP SERPL CALC-SCNC: 11 MMOL/L (ref 8–16)
ANION GAP SERPL CALC-SCNC: 16 MMOL/L (ref 8–16)
ANISOCYTOSIS BLD QL SMEAR: SLIGHT
ASCENDING AORTA: 2.61 CM
AST SERPL-CCNC: 13 U/L (ref 10–40)
AV INDEX (PROSTH): 0.9
AV MEAN GRADIENT: 3 MMHG
AV PEAK GRADIENT: 6 MMHG
AV VALVE AREA: 2.66 CM2
AV VELOCITY RATIO: 0.75
BACTERIA UR CULT: NORMAL
BASOPHILS # BLD AUTO: 0.07 K/UL (ref 0–0.2)
BASOPHILS NFR BLD: 0.5 % (ref 0–1.9)
BILIRUB SERPL-MCNC: 0.6 MG/DL (ref 0.1–1)
BSA FOR ECHO PROCEDURE: 1.69 M2
BUN SERPL-MCNC: 21 MG/DL (ref 6–20)
BUN SERPL-MCNC: 57 MG/DL (ref 6–20)
BUN SERPL-MCNC: 63 MG/DL (ref 6–20)
BURR CELLS BLD QL SMEAR: ABNORMAL
CALCIUM SERPL-MCNC: 6.2 MG/DL (ref 8.7–10.5)
CALCIUM SERPL-MCNC: 8.8 MG/DL (ref 8.7–10.5)
CALCIUM SERPL-MCNC: 9 MG/DL (ref 8.7–10.5)
CHLORIDE SERPL-SCNC: 114 MMOL/L (ref 95–110)
CHLORIDE SERPL-SCNC: 124 MMOL/L (ref 95–110)
CHLORIDE SERPL-SCNC: 125 MMOL/L (ref 95–110)
CO2 SERPL-SCNC: 24 MMOL/L (ref 23–29)
CO2 SERPL-SCNC: 24 MMOL/L (ref 23–29)
CO2 SERPL-SCNC: 9 MMOL/L (ref 23–29)
CREAT SERPL-MCNC: 0.4 MG/DL (ref 0.5–1.4)
CREAT SERPL-MCNC: 1.4 MG/DL (ref 0.5–1.4)
CREAT SERPL-MCNC: 1.4 MG/DL (ref 0.5–1.4)
CV ECHO LV RWT: 0.31 CM
DACRYOCYTES BLD QL SMEAR: ABNORMAL
DIFFERENTIAL METHOD: ABNORMAL
DOP CALC AO PEAK VEL: 1.26 M/S
DOP CALC AO VTI: 20.84 CM
DOP CALC LVOT AREA: 3 CM2
DOP CALC LVOT DIAMETER: 1.94 CM
DOP CALC LVOT PEAK VEL: 0.94 M/S
DOP CALC LVOT STROKE VOLUME: 55.37 CM3
DOP CALCLVOT PEAK VEL VTI: 18.74 CM
E WAVE DECELERATION TIME: 212.07 MSEC
E/A RATIO: 0.56
E/E' RATIO: 10 M/S
ECHO LV POSTERIOR WALL: 0.75 CM (ref 0.6–1.1)
EJECTION FRACTION: 53 %
EOSINOPHIL # BLD AUTO: 0 K/UL (ref 0–0.5)
EOSINOPHIL NFR BLD: 0 % (ref 0–8)
ERYTHROCYTE [DISTWIDTH] IN BLOOD BY AUTOMATED COUNT: 16.2 % (ref 11.5–14.5)
EST. GFR  (NO RACE VARIABLE): 44.7 ML/MIN/1.73 M^2
EST. GFR  (NO RACE VARIABLE): 44.7 ML/MIN/1.73 M^2
EST. GFR  (NO RACE VARIABLE): >60 ML/MIN/1.73 M^2
FRACTIONAL SHORTENING: 28 % (ref 28–44)
GLUCOSE SERPL-MCNC: 119 MG/DL (ref 70–110)
GLUCOSE SERPL-MCNC: 363 MG/DL (ref 70–110)
GLUCOSE SERPL-MCNC: 41 MG/DL (ref 70–110)
HCT VFR BLD AUTO: 33.2 % (ref 37–48.5)
HGB BLD-MCNC: 10.6 G/DL (ref 12–16)
HYPOCHROMIA BLD QL SMEAR: ABNORMAL
IMM GRANULOCYTES # BLD AUTO: 0.35 K/UL (ref 0–0.04)
IMM GRANULOCYTES NFR BLD AUTO: 2.4 % (ref 0–0.5)
INTERVENTRICULAR SEPTUM: 0.78 CM (ref 0.6–1.1)
LA MAJOR: 5.06 CM
LA MINOR: 4.7 CM
LA WIDTH: 4.11 CM
LEFT ATRIUM SIZE: 3.7 CM
LEFT ATRIUM VOLUME INDEX MOD: 35.8 ML/M2
LEFT ATRIUM VOLUME INDEX: 37.5 ML/M2
LEFT ATRIUM VOLUME MOD: 60.11 CM3
LEFT ATRIUM VOLUME: 62.99 CM3
LEFT INTERNAL DIMENSION IN SYSTOLE: 3.49 CM (ref 2.1–4)
LEFT VENTRICLE DIASTOLIC VOLUME INDEX: 66.54 ML/M2
LEFT VENTRICLE DIASTOLIC VOLUME: 111.78 ML
LEFT VENTRICLE MASS INDEX: 73 G/M2
LEFT VENTRICLE SYSTOLIC VOLUME INDEX: 30.1 ML/M2
LEFT VENTRICLE SYSTOLIC VOLUME: 50.62 ML
LEFT VENTRICULAR INTERNAL DIMENSION IN DIASTOLE: 4.88 CM (ref 3.5–6)
LEFT VENTRICULAR MASS: 123.04 G
LV LATERAL E/E' RATIO: 10 M/S
LV SEPTAL E/E' RATIO: 10 M/S
LYMPHOCYTES # BLD AUTO: 1.2 K/UL (ref 1–4.8)
LYMPHOCYTES NFR BLD: 8.3 % (ref 18–48)
MAGNESIUM SERPL-MCNC: 2.1 MG/DL (ref 1.6–2.6)
MCH RBC QN AUTO: 21.9 PG (ref 27–31)
MCHC RBC AUTO-ENTMCNC: 31.9 G/DL (ref 32–36)
MCV RBC AUTO: 69 FL (ref 82–98)
MONOCYTES # BLD AUTO: 0.7 K/UL (ref 0.3–1)
MONOCYTES NFR BLD: 5.1 % (ref 4–15)
MV PEAK A VEL: 0.89 M/S
MV PEAK E VEL: 0.5 M/S
MV STENOSIS PRESSURE HALF TIME: 61.5 MS
MV VALVE AREA P 1/2 METHOD: 3.58 CM2
NEUTROPHILS # BLD AUTO: 12.1 K/UL (ref 1.8–7.7)
NEUTROPHILS NFR BLD: 83.7 % (ref 38–73)
NRBC BLD-RTO: 0 /100 WBC
OVALOCYTES BLD QL SMEAR: ABNORMAL
PHOSPHATE SERPL-MCNC: 2.5 MG/DL (ref 2.7–4.5)
PISA TR MAX VEL: 2.23 M/S
PLATELET # BLD AUTO: 89 K/UL (ref 150–450)
PMV BLD AUTO: ABNORMAL FL (ref 9.2–12.9)
POCT GLUCOSE: 113 MG/DL (ref 70–110)
POCT GLUCOSE: 113 MG/DL (ref 70–110)
POCT GLUCOSE: 121 MG/DL (ref 70–110)
POCT GLUCOSE: 122 MG/DL (ref 70–110)
POCT GLUCOSE: 134 MG/DL (ref 70–110)
POCT GLUCOSE: 191 MG/DL (ref 70–110)
POCT GLUCOSE: 286 MG/DL (ref 70–110)
POCT GLUCOSE: 289 MG/DL (ref 70–110)
POCT GLUCOSE: 292 MG/DL (ref 70–110)
POCT GLUCOSE: 333 MG/DL (ref 70–110)
POCT GLUCOSE: 39 MG/DL (ref 70–110)
POIKILOCYTOSIS BLD QL SMEAR: SLIGHT
POLYCHROMASIA BLD QL SMEAR: ABNORMAL
POTASSIUM SERPL-SCNC: 3.3 MMOL/L (ref 3.5–5.1)
POTASSIUM SERPL-SCNC: 3.9 MMOL/L (ref 3.5–5.1)
POTASSIUM SERPL-SCNC: 4.2 MMOL/L (ref 3.5–5.1)
PROT SERPL-MCNC: 6.2 G/DL (ref 6–8.4)
RA MAJOR: 4.84 CM
RA PRESSURE: 3 MMHG
RA WIDTH: 4.84 CM
RBC # BLD AUTO: 4.85 M/UL (ref 4–5.4)
RIGHT VENTRICULAR END-DIASTOLIC DIMENSION: 4.23 CM
RV TISSUE DOPPLER FREE WALL SYSTOLIC VELOCITY 1 (APICAL 4 CHAMBER VIEW): 11.4 CM/S
SCHISTOCYTES BLD QL SMEAR: ABNORMAL
SINUS: 3.14 CM
SODIUM SERPL-SCNC: 139 MMOL/L (ref 136–145)
SODIUM SERPL-SCNC: 159 MMOL/L (ref 136–145)
SODIUM SERPL-SCNC: 160 MMOL/L (ref 136–145)
STJ: 2.45 CM
TDI LATERAL: 0.05 M/S
TDI SEPTAL: 0.05 M/S
TDI: 0.05 M/S
TR MAX PG: 20 MMHG
TRICUSPID ANNULAR PLANE SYSTOLIC EXCURSION: 1.53 CM
TV REST PULMONARY ARTERY PRESSURE: 23 MMHG
WBC # BLD AUTO: 14.48 K/UL (ref 3.9–12.7)

## 2023-03-13 PROCEDURE — 25000003 PHARM REV CODE 250: Performed by: STUDENT IN AN ORGANIZED HEALTH CARE EDUCATION/TRAINING PROGRAM

## 2023-03-13 PROCEDURE — 25000003 PHARM REV CODE 250: Performed by: NURSE PRACTITIONER

## 2023-03-13 PROCEDURE — 99900035 HC TECH TIME PER 15 MIN (STAT)

## 2023-03-13 PROCEDURE — 63600175 PHARM REV CODE 636 W HCPCS: Performed by: STUDENT IN AN ORGANIZED HEALTH CARE EDUCATION/TRAINING PROGRAM

## 2023-03-13 PROCEDURE — S5010 5% DEXTROSE AND 0.45% SALINE: HCPCS | Performed by: STUDENT IN AN ORGANIZED HEALTH CARE EDUCATION/TRAINING PROGRAM

## 2023-03-13 PROCEDURE — 94761 N-INVAS EAR/PLS OXIMETRY MLT: CPT

## 2023-03-13 PROCEDURE — 80048 BASIC METABOLIC PNL TOTAL CA: CPT | Mod: 91,XB | Performed by: INTERNAL MEDICINE

## 2023-03-13 PROCEDURE — 27000221 HC OXYGEN, UP TO 24 HOURS

## 2023-03-13 PROCEDURE — 36415 COLL VENOUS BLD VENIPUNCTURE: CPT | Performed by: INTERNAL MEDICINE

## 2023-03-13 PROCEDURE — 20000000 HC ICU ROOM

## 2023-03-13 PROCEDURE — 99291 PR CRITICAL CARE, E/M 30-74 MINUTES: ICD-10-PCS | Mod: ,,, | Performed by: INTERNAL MEDICINE

## 2023-03-13 PROCEDURE — 63600175 PHARM REV CODE 636 W HCPCS

## 2023-03-13 PROCEDURE — 25000003 PHARM REV CODE 250: Performed by: INTERNAL MEDICINE

## 2023-03-13 PROCEDURE — 84100 ASSAY OF PHOSPHORUS: CPT | Performed by: INTERNAL MEDICINE

## 2023-03-13 PROCEDURE — 80048 BASIC METABOLIC PNL TOTAL CA: CPT | Mod: XB | Performed by: STUDENT IN AN ORGANIZED HEALTH CARE EDUCATION/TRAINING PROGRAM

## 2023-03-13 PROCEDURE — 63600175 PHARM REV CODE 636 W HCPCS: Performed by: NURSE PRACTITIONER

## 2023-03-13 PROCEDURE — 83735 ASSAY OF MAGNESIUM: CPT | Performed by: INTERNAL MEDICINE

## 2023-03-13 PROCEDURE — 85025 COMPLETE CBC W/AUTO DIFF WBC: CPT | Performed by: INTERNAL MEDICINE

## 2023-03-13 PROCEDURE — 80053 COMPREHEN METABOLIC PANEL: CPT | Performed by: INTERNAL MEDICINE

## 2023-03-13 PROCEDURE — 99291 CRITICAL CARE FIRST HOUR: CPT | Mod: ,,, | Performed by: INTERNAL MEDICINE

## 2023-03-13 PROCEDURE — 63600175 PHARM REV CODE 636 W HCPCS: Performed by: INTERNAL MEDICINE

## 2023-03-13 RX ORDER — HALOPERIDOL 5 MG/ML
1 INJECTION INTRAMUSCULAR EVERY 6 HOURS PRN
Status: DISCONTINUED | OUTPATIENT
Start: 2023-03-13 | End: 2023-03-16 | Stop reason: HOSPADM

## 2023-03-13 RX ORDER — DEXTROSE MONOHYDRATE 50 MG/ML
INJECTION, SOLUTION INTRAVENOUS CONTINUOUS
Status: DISCONTINUED | OUTPATIENT
Start: 2023-03-13 | End: 2023-03-15

## 2023-03-13 RX ORDER — INSULIN ASPART 100 [IU]/ML
0-5 INJECTION, SOLUTION INTRAVENOUS; SUBCUTANEOUS EVERY 4 HOURS PRN
Status: DISCONTINUED | OUTPATIENT
Start: 2023-03-13 | End: 2023-03-14

## 2023-03-13 RX ORDER — DEXTROSE MONOHYDRATE 50 MG/ML
INJECTION, SOLUTION INTRAVENOUS CONTINUOUS
Status: DISCONTINUED | OUTPATIENT
Start: 2023-03-13 | End: 2023-03-13

## 2023-03-13 RX ORDER — DEXTROSE MONOHYDRATE, SODIUM CHLORIDE, AND POTASSIUM CHLORIDE 50; 1.49; 4.5 G/1000ML; G/1000ML; G/1000ML
INJECTION, SOLUTION INTRAVENOUS CONTINUOUS
Status: DISCONTINUED | OUTPATIENT
Start: 2023-03-13 | End: 2023-03-13

## 2023-03-13 RX ORDER — POTASSIUM CHLORIDE 7.45 MG/ML
10 INJECTION INTRAVENOUS
Status: COMPLETED | OUTPATIENT
Start: 2023-03-13 | End: 2023-03-14

## 2023-03-13 RX ORDER — GLUCAGON 1 MG
1 KIT INJECTION
Status: DISCONTINUED | OUTPATIENT
Start: 2023-03-13 | End: 2023-03-16 | Stop reason: HOSPADM

## 2023-03-13 RX ADMIN — HALOPERIDOL LACTATE 1 MG: 5 INJECTION, SOLUTION INTRAMUSCULAR at 09:03

## 2023-03-13 RX ADMIN — INSULIN HUMAN 0.2 UNITS/HR: 1 INJECTION, SOLUTION INTRAVENOUS at 02:03

## 2023-03-13 RX ADMIN — POTASSIUM CHLORIDE 10 MEQ: 7.46 INJECTION, SOLUTION INTRAVENOUS at 11:03

## 2023-03-13 RX ADMIN — HEPARIN SODIUM 5000 UNITS: 5000 INJECTION INTRAVENOUS; SUBCUTANEOUS at 01:03

## 2023-03-13 RX ADMIN — PIPERACILLIN SODIUM AND TAZOBACTAM SODIUM 4.5 G: 4; .5 INJECTION, POWDER, FOR SOLUTION INTRAVENOUS at 02:03

## 2023-03-13 RX ADMIN — INSULIN ASPART 1 UNITS: 100 INJECTION, SOLUTION INTRAVENOUS; SUBCUTANEOUS at 10:03

## 2023-03-13 RX ADMIN — HEPARIN SODIUM 5000 UNITS: 5000 INJECTION INTRAVENOUS; SUBCUTANEOUS at 10:03

## 2023-03-13 RX ADMIN — HEPARIN SODIUM 5000 UNITS: 5000 INJECTION INTRAVENOUS; SUBCUTANEOUS at 05:03

## 2023-03-13 RX ADMIN — PIPERACILLIN SODIUM AND TAZOBACTAM SODIUM 4.5 G: 4; .5 INJECTION, POWDER, FOR SOLUTION INTRAVENOUS at 09:03

## 2023-03-13 RX ADMIN — DEXTROSE MONOHYDRATE: 50 INJECTION, SOLUTION INTRAVENOUS at 11:03

## 2023-03-13 RX ADMIN — DEXTROSE MONOHYDRATE 250 ML: 100 INJECTION, SOLUTION INTRAVENOUS at 01:03

## 2023-03-13 RX ADMIN — INSULIN HUMAN 1.5 UNITS/HR: 1 INJECTION, SOLUTION INTRAVENOUS at 11:03

## 2023-03-13 RX ADMIN — SODIUM CHLORIDE, POTASSIUM CHLORIDE, SODIUM LACTATE AND CALCIUM CHLORIDE 500 ML: 600; 310; 30; 20 INJECTION, SOLUTION INTRAVENOUS at 11:03

## 2023-03-13 RX ADMIN — DEXTROSE AND SODIUM CHLORIDE: 5; 450 INJECTION, SOLUTION INTRAVENOUS at 03:03

## 2023-03-13 RX ADMIN — CEFTRIAXONE 2 G: 2 INJECTION, POWDER, FOR SOLUTION INTRAMUSCULAR; INTRAVENOUS at 05:03

## 2023-03-13 RX ADMIN — DEXTROSE MONOHYDRATE: 50 INJECTION, SOLUTION INTRAVENOUS at 08:03

## 2023-03-13 RX ADMIN — INSULIN ASPART 6 UNITS: 100 INJECTION, SOLUTION INTRAVENOUS; SUBCUTANEOUS at 08:03

## 2023-03-13 RX ADMIN — VANCOMYCIN HYDROCHLORIDE 750 MG: 750 INJECTION, POWDER, LYOPHILIZED, FOR SOLUTION INTRAVENOUS at 08:03

## 2023-03-13 RX ADMIN — POTASSIUM CHLORIDE 10 MEQ: 7.46 INJECTION, SOLUTION INTRAVENOUS at 10:03

## 2023-03-13 RX ADMIN — INSULIN ASPART 4 UNITS: 100 INJECTION, SOLUTION INTRAVENOUS; SUBCUTANEOUS at 05:03

## 2023-03-13 RX ADMIN — DEXMEDETOMIDINE HYDROCHLORIDE 1.2 MCG/KG/HR: 4 INJECTION, SOLUTION INTRAVENOUS at 08:03

## 2023-03-13 NOTE — PROGRESS NOTES
Therapy with vancomycin was discontinued by the provider.  Pharmacy will sign off, please re-consult as needed.    Thank you for the consult,   Ban Crow, PharmD, [facility]  [id number]

## 2023-03-13 NOTE — ASSESSMENT & PLAN NOTE
Patient has acute metabolic encephalopathy that is likely secondary to DKA v. Sepsis.  Treatment for underlying cause is underway.    Differential diagnoses include, but not limited to: DKA, electrolyte abnormalities, infection(UA concerning, abdominal pain on initial exam)     CT head without acute abnormality. CT abdomen pelvis showed bibasilar consolidative opacities (L>R), cholelithiasis without evidence of cholecystitis and markedly distended bladder. UDS + for cocaine and UA concerning for possible UTI. Blood cultures + for GNR. Encephalopathy has slowly been improving. Repeat blood cultures with NGTD.    PLAN  - SLP consult  - Follow up repeat urine culture  - Zosyn de-escalated to ceftriaxone   - Avoid sedating medications  - Followup Urine cultures, blood cultures

## 2023-03-13 NOTE — ASSESSMENT & PLAN NOTE
UA concerning for UTI with altered mental status and abdominal pain on physical exam. Initial urine cultures with many organisms isolated though inconclusive     No results for input(s): COLORU, APPEARANCEUA, PHUR, SPECGRAV, PROTEINUA, GLUCUA, KETONESU, BILIRUBINUA, OCCULTUA, NITRITE, UROBILINOGEN, LEUKOCYTESUR, RBCUA, WBCUA, BACTERIA, SQUAMEPITHEL, HYALINECASTS in the last 48 hours.    Invalid input(s): SARBJIT  PLAN  - Follow up repeat urine culture

## 2023-03-13 NOTE — PLAN OF CARE
[facility] - Cardiac Medical [facility]  Initial Discharge Assessment       Primary Care Provider: Primary Doctor No    Admission Diagnosis: Altered mental status [R41.82]  DKA (diabetic ketoacidosis) [E11.10]  Sepsis [A41.9]  Diabetic ketoacidosis without coma associated with type 2 diabetes mellitus [E11.10]  Urinary tract infection with hematuria, site unspecified [N39.0, R31.9]    Admission Date: 3/10/2023  Expected Discharge Date:          Payor: MEDICAID / Plan: [facility] COMMUNITY PLAN [facility] ([place] MEDICAID) / Product Type: Managed Medicaid /     Extended Emergency Contact Information  Primary Emergency Contact: Galen Shrestha   [facility]  Home Phone: [phone]  Mobile Phone: [phone]  Relation: Spouse    Discharge Plan A: Home with family  Discharge Plan B: Home      [id number] - [place] - [address] AT [facility] [address]  [address] [place] [address]  Phone: [phone] Fax: [phone]      Initial Assessment (most recent)       Adult Discharge Assessment - 03/13/23 1640          Discharge Assessment    Assessment Type Discharge Planning Assessment     Confirmed/corrected address, phone number and insurance Yes     Confirmed Demographics Correct on Facesheet     Source of Information patient;family   Galen Shrestha (spouse) [phone]    Communicated AVTAR with patient/caregiver Date not available/Unable to determine     Reason For Admission DKA associated with Type 2 Diabetes Mellitus     People in Home spouse     Facility Arrived From: home     Do you expect to return to your current living situation? Yes     Do you have help at home or someone to help you manage your care at home? Yes     Who are your caregiver(s) and their phone number(s)? Galen Shrestha (spouse) [phone]     Prior to hospitilization cognitive status: Unable to Assess     Current cognitive status: Not Oriented to Time   and situation    Home Accessibility  wheelchair accessible   has a ramp    Equipment Currently Used at Home other (see comments)   unable to get clear answer.  said has a motorized wheelchair but motor needs fixing.    Readmission within 30 days? No     Patient currently being followed by outpatient case management? No     Do you currently have service(s) that help you manage your care at home? No     Do you take prescription medications? Yes     Do you have prescription coverage? Yes     Coverage Medicaid U[employer] community Plan     Do you have any problems affording any of your prescribed medications? No     Is the patient taking medications as prescribed? yes   as stated per patient.    Who is going to help you get home at discharge? Galen Shrestha (spouse) [phone]     How do you get to doctors appointments? family or friend will provide     Are you on dialysis? No     Do you take coumadin? No     Discharge Plan A Home with family     Discharge Plan B Home     DME Needed Upon Discharge  other (see comments)   TBD    Discharge Plan discussed with: Spouse/sig other;Patient        Housing Stability    In the last 12 months, how many places have you lived? 1                      CM went to bedside and patient not answering all questions coherently. Call placed to spouse and he was up in room after and I returned to room. Patient was able to stated her name and birth date. Patient utilizes [facility] on [address] per Spouse for primary care. She lives in an apartment on first floor and a ramp to get inside her apartment.She has a motorized wheelchair at home but the motor needs repair. Patient stated her physicians first name is Yane . She and  reports patient uses [facility] [address] for her medications and wants her medications called there. Will continue to monitor for discharge needs.     Jonn Mcdonald RN    [phone]

## 2023-03-13 NOTE — PLAN OF CARE
Problem: Adult Inpatient Plan of Care  Goal: Plan of Care Review  Outcome: Ongoing, Progressing  Flowsheets (Taken 3/12/2023 1929)  Plan of Care Reviewed With:   patient   spouse     Problem: Fall Injury Risk  Goal: Absence of Fall and Fall-Related Injury  Outcome: Ongoing, Progressing  Intervention: Identify and Manage Contributors  Flowsheets (Taken 3/12/2023 1929)  Self-Care Promotion: independence encouraged  Medication Review/Management:   medications reviewed   provider consulted     Problem: Restraint, Nonbehavioral (Nonviolent)  Goal: Absence of Harm or Injury  Outcome: Ongoing, Progressing  Intervention: Implement Least Restrictive Safety Strategies  Flowsheets (Taken 3/12/2023 1929)  Medical Device Protection: IV pole/bag removed from visual field  Less Restrictive Alternative:   bed alarm in use   calming techniques promoted   emotional support provided   environment adjusted   medication offered  Diversional Activities: television  De-Escalation Techniques:   appropriate behavior reinforced   medication administered   medication offered   verbally redirected  Intervention: Protect Dignity, Rights, and Personal Wellbeing  Flowsheets (Taken 3/12/2023 1929)  Trust Relationship/Rapport:   care explained   choices provided   emotional support provided   empathic listening provided   questions answered   questions encouraged   reassurance provided   thoughts/feelings acknowledged  Intervention: Protect Skin and Joint Integrity  Flowsheets (Taken 3/12/2023 1929)  Body Position: position changed independently  Range of Motion:   active ROM (range of motion) encouraged   ROM (range of motion) performed  Intervention: Restraint Monitoring Q2 hours w/Assessment for Injury  Flowsheets (Taken 3/12/2023 1929)  Observed Emotional State:   uncooperative   combative   irritable  Visual Check: AG  Circulation: NS  Range of Motion: P  Fluids: IV  Food/Meal: N  Elimination: UC  Pain Rating (0-10): Rest: 0  Comfort  "Measures: Repositioned  Assessed readiness for DC: Yes  Privacy Maintained: Yes  Vital Signs per MD order: Yes  Intervention: Restraint Injuries Monitor Q2 hours  Flowsheets (Taken 3/12/2023 1929)  Injuries Noted: None  Treatment: Skin care     Problem: Infection  Goal: Absence of Infection Signs and Symptoms  Outcome: Ongoing, Progressing  Intervention: Prevent or Manage Infection  Flowsheets (Taken 3/12/2023 1929)  Fever Reduction/Comfort Measures:   lightweight clothing   lightweight bedding  Infection Management:   aseptic technique maintained   cultures obtained and sent to lab  Isolation Precautions:   precautions initiated   precautions maintained   precautions discontinued     Problem: Fluid and Electrolyte Imbalance (Acute Kidney Injury/Impairment)  Goal: Fluid and Electrolyte Balance  Outcome: Ongoing, Progressing  Intervention: Monitor and Manage Fluid and Electrolyte Balance  Flowsheets (Taken 3/12/2023 1929)  Fluid/Electrolyte Management:   electrolyte-binding therapy initiated   electrolyte supplement adjusted   fluids provided     Problem: Skin Injury Risk Increased  Goal: Skin Health and Integrity  Outcome: Ongoing, Progressing  Intervention: Optimize Skin Protection  Flowsheets (Taken 3/12/2023 1929)  Pressure Reduction Techniques: frequent weight shift encouraged  Pressure Reduction Devices:   pressure-redistributing mattress utilized   specialty bed utilized  Skin Protection:   tubing/devices free from skin contact   transparent dressing maintained  Head of Bed (HOB) Positioning: HOB at 30 degrees   Pt goal: control blood glucose level. During shift, unable to control blood glucose level. Sugars remained in the 200s. Pt extremely confused, disoriented, and combative. Pt stated multiple times throughout shift to multiple staff members verbally abusive statements... "If you touch me again, I am going to kick your ass." "Stop fucking touching me." "Yvette kick the piss out of you if you stick me " "again." Pt also pinched RN and spit on RN. Pt attempted to bite, knee, and kick RN multiple times throughout shift. Educated pt the sticks were for to check her blood sugar. Attempted to calm patient throughout the shift and teach pt about every step RN would make; unsuccessful. Precedex infusing. MD aware of pt status.   "

## 2023-03-13 NOTE — ASSESSMENT & PLAN NOTE
Last A1c:   Lab Results   Component Value Date    HGBA1C 13.6 (H) 03/11/2023      Home Diabetes Medications             INSULIN ASPART PROTAM & ASPART (NOVOLOG MIX 70-30 SUBQ) Inject into the skin.    insulin glargine,hum.rec.anlog (TOUJEO SOLOSTAR U-300 INSULIN SUBQ) Inject 85 Units into the skin 2 (two) times daily.            Recent Labs     03/12/23  1233 03/12/23  1236 03/12/23  1518 03/12/23  1812 03/12/23  1920 03/13/23  0546   POCTGLUCOSE 127* 297* 248* 294* 267* 333*         PLAN  -Given continued hyperglycemia insulin drip was started  - Diet: Bariatric clear (no sugar) when tolerated  - Glucose checks  - BMP q4hr  - POCT glucose q1hr

## 2023-03-13 NOTE — ASSESSMENT & PLAN NOTE
Hx of tobacco abuse, pulmonary emphysema, pulmonary hypertension, restrictive lung disease listed per chart review of outside medical record    CXR 03/13/2023 non-acute    PLAN  Duo nebs PRN  Supplemental oxygen to target SaO2 >90%

## 2023-03-13 NOTE — PROGRESS NOTES
[facility] - Card[facility] Medical [facility]  Critical Care Medicine  Progress Note    Patient Name: Christine Mosqueda  MRN: [id number]  Admission Date: 3/10/2023  Hospital Length of Stay: 3 days  Code Status: Full Code  Attending Provider: Wily Seay MD  Primary Care Provider: Primary Doctor No   Principal Problem: Diabetic ketoacidosis associated with type 2 diabetes mellitus    Subjective:     HPI:  Patient information was obtained from relative(), past medical records, and ER records.    Ms. Mosqueda is a 52 y.o F w/ hx of CHF, COPD, HTN, DM type 2, and anxiety who presented to the  ED with altered mental status.   reports the altered mental status started around 3 days ago where the patient was increasingly agitated, aggressive, drinking lots of sodas and eating lots of ice cream.  He is unclear if she missed any insulin doses.  She appeared more lethargic this morning so she was brought to the ED via EMS.    ED course notable for WBC 28.97, hemoglobin 11.2, sodium 133, chloride 90, bicarb< 5, anion gap of 54, creatinine 2.8, glucose 977, alkaline phosphatase 219, serum osmolality 383, beta hydroxybutyrate 5.2, VBG notable for pH 7.051, CO2 17.7, bicarb 4.9.          Hospital/ICU Course:  2/2 blood culture bottles have returned + for GNR. CT abdomen pelvis returned with consolidative opacities towards lungs bilaterally that may be concerning for potential airspace disease and markedly distended bladder. CT Head without acute abnormality. Patient with hypoglycemia overnight requiring maintenance IVF using LR with 20 mEq K+ at 150 mL/hr. Insulin gtt discontinued on 3/11. Patient hypernatremic requiring D5 gtt, mentation slowly improving as patient now able to open eyes and intermittently respond yes/no to questions as of 3/12.       Interval History/Significant Events: remains hyperglycemia and hypernatemic    Review of Systems   Unable to perform ROS: Mental status change   Objective:     Vital Signs (Most  Recent):  Temp: 97.2 °F (36.2 °C) (03/13/23 0724)  Pulse: 64 (03/13/23 0724)  Resp: (!) 25 (03/13/23 0724)  BP: 93/63 (03/13/23 0500)  SpO2: 100 % (03/13/23 0724)   Vital Signs (24h Range):  Temp:  [97 °F (36.1 °C)-98.8 °F (37.1 °C)] 97.2 °F (36.2 °C)  Pulse:  [59-99] 64  Resp:  [16-35] 25  SpO2:  [90 %-100 %] 100 %  BP: ()/(63-93) 93/63   Weight: 63.5 kg (140 lb)  Body mass index is 24.03 kg/m².      Intake/Output Summary (Last 24 hours) at 3/13/2023 0812  Last data filed at 3/13/2023 0800  Gross per 24 hour   Intake 958.25 ml   Output 1020 ml   Net -61.75 ml       Physical Exam  Vitals and nursing note reviewed.   Constitutional:       General: She is not in acute distress.     Appearance: She is ill-appearing. She is not toxic-appearing or diaphoretic.   HENT:      Head: Normocephalic and atraumatic.      Mouth/Throat:      Mouth: Mucous membranes are dry.   Eyes:      General: No scleral icterus.  Cardiovascular:      Rate and Rhythm: Normal rate and regular rhythm.   Pulmonary:      Effort: Pulmonary effort is normal. No respiratory distress.      Breath sounds: No wheezing or rales.   Abdominal:      General: Bowel sounds are normal.      Palpations: Abdomen is soft.      Tenderness: There is no abdominal tenderness (grimaces to palpation).   Musculoskeletal:         General: No swelling.      Right lower leg: No edema.      Left lower leg: No edema.   Skin:     General: Skin is warm and dry.      Coloration: Skin is not jaundiced.   Neurological:      Mental Status: She is easily aroused. She is lethargic, disoriented and confused.   Psychiatric:         Behavior: Behavior is uncooperative.       Vents:     Lines/Drains/Airways       Drain  Duration                  Urethral Catheter 03/11/23 0336 Temperature probe 16 Fr. 2 days              Peripheral Intravenous Line  Duration                  Peripheral IV - Single Lumen 03/10/23 18 G Left Antecubital 3 days         Peripheral IV - Single Lumen  03/11/23 0729 20 G Anterior;Right Upper Arm 1 day         Peripheral IV - Single Lumen 03/11/23 1048 20 G Right Antecubital 1 day                  Significant Labs:    CBC/Anemia Profile:  Recent Labs   Lab 03/11/23  0753 03/12/23  0523 03/13/23  0412   WBC  --  19.35* 14.48*   HGB  --  12.0 10.6*   HCT 35* 34.9* 33.2*   PLT  --  126* 89*   MCV  --  64* 69*   RDW  --  14.3 16.2*        Chemistries:  Recent Labs   Lab 03/11/23  0749 03/11/23  1140 03/12/23  0523 03/12/23  0842 03/12/23  1511 03/12/23  2303 03/13/23  0512   *   < > 156*   < > 160* 157* 160*   K 3.8   < > 3.6   < > 4.9 4.5 4.2   *   < > 121*   < > 124* 126* 125*   CO2 22*   < > 24   < > 26 21* 24   BUN 46*   < > 39*   < > 39* 45* 57*   CREATININE 2.3*   < > 1.2   < > 1.0 1.2 1.4   CALCIUM 10.2   < > 9.4   < > 9.5 8.3* 8.8   ALBUMIN  --   --  1.6*  --   --   --  1.4*   PROT  --   --  5.9*  --   --   --  6.2   BILITOT  --   --  0.7  --   --   --  0.6   ALKPHOS  --   --  179*  --   --   --  136*   ALT  --   --  18  --   --   --  14   AST  --   --  21  --   --   --  13   MG  --   --  2.2  --   --   --  2.1   PHOS 1.3*  --  1.2*  --   --   --  2.5*    < > = values in this interval not displayed.       All pertinent labs within the past 24 hours have been reviewed.    Significant Imaging:  I have reviewed all pertinent imaging results/findings within the past 24 hours.      ABG  Recent Labs   Lab 03/11/23 2023   PH 7.470*   PO2 48   PCO2 37.3   HCO3 27.1   BE 3     Assessment/Plan:     Neuro  AMS (altered mental status)  Patient has acute metabolic encephalopathy that is likely secondary to DKA v. Sepsis.  Treatment for underlying cause is underway.    Differential diagnoses include, but not limited to: DKA, electrolyte abnormalities, infection(UA concerning, abdominal pain on initial exam)     CT head without acute abnormality. CT abdomen pelvis showed bibasilar consolidative opacities (L>R), cholelithiasis without evidence of cholecystitis and  "markedly distended bladder. UDS + for cocaine and UA concerning for possible UTI. Blood cultures + for GNR. Encephalopathy has slowly been improving. Repeat blood cultures with NGTD.    PLAN  - SLP consult  - Follow up repeat urine culture  - Zosyn de-escalated to ceftriaxone   - Avoid sedating medications  - Followup Urine cultures, blood cultures       Pulmonary  Restrictive lung disease  See "Emphysema lung" A&P    Emphysema lung  Hx of tobacco abuse, pulmonary emphysema, pulmonary hypertension, restrictive lung disease listed per chart review of outside medical record    CXR 03/13/2023 non-acute    PLAN  Duo nebs PRN  Supplemental oxygen to target SaO2 >90%    Cardiac/Vascular  Essential hypertension    Home Hypertension Medications             amlodipine (NORVASC) 10 MG tablet Take 10 mg by mouth once daily.    losartan (COZAAR) 100 MG tablet Take 100 mg by mouth once daily.          Plan:  Resume home medications as tolerated    Renal/  Hypovolemia  Continue fluid resuscitation    Hypernatremia  Plan to give 2.1 L free water with goal of correction of 10 daily    CARLINE (acute kidney injury)  Creatinine 2.8 on admit, baseline unclear(limited labs available) now resolved with creatinine of 1.2  Possible Etiologies:  - Likely pre-renal from dehydration and volume losses from DKA    Recent Labs     03/12/23  1511 03/12/23  2303 03/13/23  0512   CREATININE 1.0 1.2 1.4   BUN 39* 45* 57*     Estimated Creatinine Clearance: 39.7 mL/min (based on SCr of 1.4 mg/dL).    Plan:    - Fluid resuscitation, if no improvement will consider urine lytes and renal/retroperitoneal ultrasound  - Avoid nephrotoxic agents such as NSAIDs, gadolinium and IV radiocontrast.  - Renally dose meds to current GFR.  - Maintain MAP > 65.    UTI (urinary tract infection)  UA concerning for UTI with altered mental status and abdominal pain on physical exam. Initial urine cultures with many organisms isolated though inconclusive     No results for " "input(s): COLORU, APPEARANCEUA, PHUR, SPECGRAV, PROTEINUA, GLUCUA, KETONESU, BILIRUBINUA, OCCULTUA, NITRITE, UROBILINOGEN, LEUKOCYTESUR, RBCUA, WBCUA, BACTERIA, SQUAMEPITHEL, HYALINECASTS in the last 48 hours.    Invalid input(s): WRIGHTSUR  PLAN  - Follow up repeat urine culture        Oncology  Leukocytosis  Recent Labs     03/10/23  1718 03/12/23  0523 03/13/23  0412   WBC 21.97* 19.35* 14.48*       Leukocytosis likely secondary to sepsis + DKA as patient has grown GNR in her blood cultures, suspect source is urine as her urine is positive for WBC clumps, > 100 WBC and moderate bacteria. CTAP results with bilateral bibasilar consolidations and markedly distended bladder also rising concern for possible PNA. Blood cultures returned + for GNR    PLAN  - Followup repeat urine culture, blood cultures       Endocrine  * Diabetic ketoacidosis associated with type 2 diabetes mellitus  Last A1c:   Lab Results   Component Value Date    HGBA1C 13.6 (H) 03/11/2023      Home Diabetes Medications             INSULIN ASPART PROTAM & ASPART (NOVOLOG MIX 70-30 SUBQ) Inject into the skin.    insulin glargine,hum.rec.anlog (TOUJEO SOLOSTAR U-300 INSULIN SUBQ) Inject 85 Units into the skin 2 (two) times daily.            Recent Labs     03/12/23  1233 03/12/23  1236 03/12/23  1518 03/12/23  1812 03/12/23  1920 03/13/23  0546   POCTGLUCOSE 127* 297* 248* 294* 267* 333*         PLAN  -Given continued hyperglycemia insulin drip was started  - Diet: Bariatric clear (no sugar) when tolerated  - Glucose checks  - BMP q4hr  - POCT glucose q1hr    Type 2 diabetes mellitus without complication, with long-term current use of insulin  See "DKA" A&P           Critical Care Daily Checklist:    A: Awake: RASS Goal/Actual Goal:    Actual: Durand Agitation Sedation Scale (RASS): Agitated   B: Spontaneous Breathing Trial Performed?     C: SAT & SBT Coordinated?  NA                      D: Delirium: CAM-ICU Overall CAM-ICU: Positive   E: Early " Mobility Performed? No   F: Feeding Goal:    Status:     Current Diet Order   Procedures    Diet NPO      AS: Analgesia/Sedation Precedex and Haldol    T: Thromboembolic Prophylaxis Sub q heparin   H: HOB > 300 Yes   U: Stress Ulcer Prophylaxis (if needed) NA   G: Glucose Control Insulin Drip with D5 1/2 at 200   B: Bowel Function     I: Indwelling Catheter (Lines & Hernandez) Necessity Hernandez PIV   D: De-escalation of Antimicrobials/Pharmacotherapies Zosyn to ceftriaxone    Plan for the day/ETD Continue to work on electrolyte disturbances and acidosis    Code Status:  Family/Goals of Care: Full Code         Critical secondary to Patient has a condition that poses threat to life and bodily function: Sever electrolyte derangement       Critical care was time spent personally by me on the following activities: development of treatment plan with patient or surrogate and bedside caregivers, discussions with consultants, evaluation of patient's response to treatment, examination of patient, ordering and performing treatments and interventions, ordering and review of laboratory studies, ordering and review of radiographic studies, pulse oximetry, re-evaluation of patient's condition. This critical care time did not overlap with that of any other provider or involve time for any procedures.     Saroj Tariq MD  Critical Care Medicine  [facility] - Cardiac Medical ICU

## 2023-03-13 NOTE — ASSESSMENT & PLAN NOTE
Creatinine 2.8 on admit, baseline unclear(limited labs available) now resolved with creatinine of 1.2  Possible Etiologies:  - Likely pre-renal from dehydration and volume losses from DKA    Recent Labs     03/12/23  1511 03/12/23  2303 03/13/23  0512   CREATININE 1.0 1.2 1.4   BUN 39* 45* 57*     Estimated Creatinine Clearance: 39.7 mL/min (based on SCr of 1.4 mg/dL).    Plan:    - Fluid resuscitation, if no improvement will consider urine lytes and renal/retroperitoneal ultrasound  - Avoid nephrotoxic agents such as NSAIDs, gadolinium and IV radiocontrast.  - Renally dose meds to current GFR.  - Maintain MAP > 65.

## 2023-03-13 NOTE — PLAN OF CARE
CMICU DAILY GOALS       A: Awake    RASS: Goal -    Actual - RASS (Durand Agitation-Sedation Scale): alert and calm   Restraint necessity: Clinical Justification: Removing medical devices  B: Breathe   SBT: Not intubated   C: Coordinate A & B, analgesics/sedatives   Pain: managed    SAT: Not intubated  D: Delirium   CAM-ICU: Overall CAM-ICU: Positive  E: Early(intubated/ Progressive (non-intubated) Mobility   MOVE Screen: Fail   Activity: Activity Management: Patient unable to perform activities  FAS: Feeding/Nutrition   Diet order: Diet/Nutrition Received: NPO,    T: Thrombus   DVT prophylaxis: VTE Required Core Measure: Pharmacological prophylaxis initiated/maintained  H: HOB Elevation   Head of Bed (HOB) Positioning: HOB at 20 degrees  U: Ulcer Prophylaxis   GI: yes  G: Glucose control   managed Glycemic Management: blood glucose monitored, supplemental insulin given  S: Skin   Bathing/Skin Care: linen changed, bath, complete  Device Skin Pressure Protection: absorbent pad utilized/changed, adhesive use limited, positioning supports utilized, pressure points protected, skin-to-device areas padded, skin-to-skin areas padded  Pressure Reduction Devices: specialty bed utilized  Pressure Reduction Techniques: frequent weight shift encouraged, weight shift assistance provided  Skin Protection: adhesive use limited  B: Bowel Function   no issues   I: Indwelling Catheters   Hernandez necessity:      Urethral Catheter 03/11/23 0336 Temperature probe 16 Fr.-Reason for Continuing Urinary Catheterization: Critically ill in ICU and requiring hourly monitoring of intake/output   CVC necessity: No  D: De-escalation Antibiotics   Yes    Family/Goals of care/Code Status   Code Status: Full Code    24H Vital Sign Range  Temp:  [97 °F (36.1 °C)-98.8 °F (37.1 °C)]   Pulse:  [51-82]   Resp:  [10-34]   BP: ()/(54-77)   SpO2:  [90 %-100 %]      Shift Events   Pt. had brief episode of hyperglycemia followed by hypoglycemia. Insulin  protocol followed.    VS and assessment per flow sheet, patient progressing towards goals as tolerated, plan of care reviewed with Christine Mosqueda and  (Galen), all concerns addressed.

## 2023-03-13 NOTE — SUBJECTIVE & OBJECTIVE
Interval History/Significant Events: remains hyperglycemia and hypernatemic    Review of Systems   Unable to perform ROS: Mental status change   Objective:     Vital Signs (Most Recent):  Temp: 97.2 °F (36.2 °C) (03/13/23 0724)  Pulse: 64 (03/13/23 0724)  Resp: (!) 25 (03/13/23 0724)  BP: 93/63 (03/13/23 0500)  SpO2: 100 % (03/13/23 0724)   Vital Signs (24h Range):  Temp:  [97 °F (36.1 °C)-98.8 °F (37.1 °C)] 97.2 °F (36.2 °C)  Pulse:  [59-99] 64  Resp:  [16-35] 25  SpO2:  [90 %-100 %] 100 %  BP: ()/(63-93) 93/63   Weight: 63.5 kg (140 lb)  Body mass index is 24.03 kg/m².      Intake/Output Summary (Last 24 hours) at 3/13/2023 0812  Last data filed at 3/13/2023 0800  Gross per 24 hour   Intake 958.25 ml   Output 1020 ml   Net -61.75 ml       Physical Exam  Vitals and nursing note reviewed.   Constitutional:       General: She is not in acute distress.     Appearance: She is ill-appearing. She is not toxic-appearing or diaphoretic.   HENT:      Head: Normocephalic and atraumatic.      Mouth/Throat:      Mouth: Mucous membranes are dry.   Eyes:      General: No scleral icterus.  Cardiovascular:      Rate and Rhythm: Normal rate and regular rhythm.   Pulmonary:      Effort: Pulmonary effort is normal. No respiratory distress.      Breath sounds: No wheezing or rales.   Abdominal:      General: Bowel sounds are normal.      Palpations: Abdomen is soft.      Tenderness: There is no abdominal tenderness (grimaces to palpation).   Musculoskeletal:         General: No swelling.      Right lower leg: No edema.      Left lower leg: No edema.   Skin:     General: Skin is warm and dry.      Coloration: Skin is not jaundiced.   Neurological:      Mental Status: She is easily aroused. She is lethargic, disoriented and confused.   Psychiatric:         Behavior: Behavior is uncooperative.       Vents:     Lines/Drains/Airways       Drain  Duration                  Urethral Catheter 03/11/23 0336 Temperature probe 16 Fr. 2 days               Peripheral Intravenous Line  Duration                  Peripheral IV - Single Lumen 03/10/23 18 G Left Antecubital 3 days         Peripheral IV - Single Lumen 03/11/23 0729 20 G Anterior;Right Upper Arm 1 day         Peripheral IV - Single Lumen 03/11/23 1048 20 G Right Antecubital 1 day                  Significant Labs:    CBC/Anemia Profile:  Recent Labs   Lab 03/11/23  0753 03/12/23  0523 03/13/23  0412   WBC  --  19.35* 14.48*   HGB  --  12.0 10.6*   HCT 35* 34.9* 33.2*   PLT  --  126* 89*   MCV  --  64* 69*   RDW  --  14.3 16.2*        Chemistries:  Recent Labs   Lab 03/11/23  0749 03/11/23  1140 03/12/23  0523 03/12/23  0842 03/12/23  1511 03/12/23  2303 03/13/23  0512   *   < > 156*   < > 160* 157* 160*   K 3.8   < > 3.6   < > 4.9 4.5 4.2   *   < > 121*   < > 124* 126* 125*   CO2 22*   < > 24   < > 26 21* 24   BUN 46*   < > 39*   < > 39* 45* 57*   CREATININE 2.3*   < > 1.2   < > 1.0 1.2 1.4   CALCIUM 10.2   < > 9.4   < > 9.5 8.3* 8.8   ALBUMIN  --   --  1.6*  --   --   --  1.4*   PROT  --   --  5.9*  --   --   --  6.2   BILITOT  --   --  0.7  --   --   --  0.6   ALKPHOS  --   --  179*  --   --   --  136*   ALT  --   --  18  --   --   --  14   AST  --   --  21  --   --   --  13   MG  --   --  2.2  --   --   --  2.1   PHOS 1.3*  --  1.2*  --   --   --  2.5*    < > = values in this interval not displayed.       All pertinent labs within the past 24 hours have been reviewed.    Significant Imaging:  I have reviewed all pertinent imaging results/findings within the past 24 hours.

## 2023-03-13 NOTE — PLAN OF CARE
[facility] DAILY GOALS       A: Awake    RASS: Goal -    Actual - RASS (Durand Agitation-Sedation Scale): restless   Restraint necessity: Clinical Justification: Removing medical devices  B: Breathe   SBT: Not intubated   C: Coordinate A & B, analgesics/sedatives   Pain: managed    SAT: Not intubated  D: Delirium   CAM-ICU: Overall CAM-ICU: Positive  E: Early(intubated/ Progressive (non-intubated) Mobility   MOVE Screen: Pass   Activity: Activity Management: Rolling - L1  FAS: Feeding/Nutrition   Diet order: Diet/Nutrition Received: NPO,    T: Thrombus   DVT prophylaxis: VTE Required Core Measure: Pharmacological prophylaxis initiated/maintained  H: HOB Elevation   Head of Bed (HOB) Positioning: HOB at 30-45 degrees  U: Ulcer Prophylaxis   GI: yes  G: Glucose control   managed Glycemic Management: blood glucose monitored  S: Skin   Bathing/Skin Care: bath, complete, incontinence care, dressed/undressed, linen changed  Device Skin Pressure Protection: adhesive use limited, absorbent pad utilized/changed  Pressure Reduction Devices: pressure-redistributing mattress utilized, specialty bed utilized  Pressure Reduction Techniques: frequent weight shift encouraged  Skin Protection: tubing/devices free from skin contact, transparent dressing maintained  B: Bowel Function   no issues   I: Indwelling Catheters   Hernandez necessity:      Urethral Catheter 03/11/23 0336 Temperature probe 16 Fr.-Reason for Continuing Urinary Catheterization: Critically ill in ICU and requiring hourly monitoring of intake/output   CVC necessity: No  D: De-escalation Antibiotics   Yes    Family/Goals of care/Code Status   Code Status: Full Code    24H Vital Sign Range  Temp:  [96.4 °F (35.8 °C)-98.8 °F (37.1 °C)]   Pulse:  []   Resp:  [16-35]   BP: ()/(63-93)   SpO2:  [90 %-100 %]      Shift Events   No acute events throughout shift    VS and assessment per flow sheet, patient progressing towards goals as tolerated, plan of care reviewed  with pt's  at bedside, all concerns addressed, will continue to monitor.

## 2023-03-13 NOTE — NURSING
"Patient in room screaming "I have to go home." Patient is in restraints, aggressively thrashing about in the bed and is maxed on Precedex. Patient is not consolable at this time.  MD notified, no other meds added bc of concerns for hypotension.   "

## 2023-03-13 NOTE — ASSESSMENT & PLAN NOTE
Recent Labs     03/10/23  1718 03/12/23  0523 03/13/23  0412   WBC 21.97* 19.35* 14.48*       Leukocytosis likely secondary to sepsis + DKA as patient has grown GNR in her blood cultures, suspect source is urine as her urine is positive for WBC clumps, > 100 WBC and moderate bacteria. CTAP results with bilateral bibasilar consolidations and markedly distended bladder also rising concern for possible PNA. Blood cultures returned + for GNR    PLAN  - Followup repeat urine culture, blood cultures

## 2023-03-14 LAB
ANION GAP SERPL CALC-SCNC: 11 MMOL/L (ref 8–16)
ANION GAP SERPL CALC-SCNC: 13 MMOL/L (ref 8–16)
ANION GAP SERPL CALC-SCNC: 8 MMOL/L (ref 8–16)
ANION GAP SERPL CALC-SCNC: 8 MMOL/L (ref 8–16)
B-OH-BUTYR BLD STRIP-SCNC: 0 MMOL/L (ref 0–0.5)
BACTERIA BLD CULT: ABNORMAL
BUN SERPL-MCNC: 39 MG/DL (ref 6–20)
BUN SERPL-MCNC: 39 MG/DL (ref 6–20)
BUN SERPL-MCNC: 50 MG/DL (ref 6–20)
BUN SERPL-MCNC: 55 MG/DL (ref 6–20)
CALCIUM SERPL-MCNC: 7.7 MG/DL (ref 8.7–10.5)
CALCIUM SERPL-MCNC: 7.7 MG/DL (ref 8.7–10.5)
CALCIUM SERPL-MCNC: 8.1 MG/DL (ref 8.7–10.5)
CALCIUM SERPL-MCNC: 8.4 MG/DL (ref 8.7–10.5)
CHLORIDE SERPL-SCNC: 112 MMOL/L (ref 95–110)
CHLORIDE SERPL-SCNC: 115 MMOL/L (ref 95–110)
CHLORIDE SERPL-SCNC: 115 MMOL/L (ref 95–110)
CHLORIDE SERPL-SCNC: 121 MMOL/L (ref 95–110)
CO2 SERPL-SCNC: 22 MMOL/L (ref 23–29)
CO2 SERPL-SCNC: 22 MMOL/L (ref 23–29)
CO2 SERPL-SCNC: 23 MMOL/L (ref 23–29)
CO2 SERPL-SCNC: 24 MMOL/L (ref 23–29)
CREAT SERPL-MCNC: 1 MG/DL (ref 0.5–1.4)
CREAT SERPL-MCNC: 1 MG/DL (ref 0.5–1.4)
CREAT SERPL-MCNC: 1.2 MG/DL (ref 0.5–1.4)
CREAT SERPL-MCNC: 1.4 MG/DL (ref 0.5–1.4)
EST. GFR  (NO RACE VARIABLE): 44.7 ML/MIN/1.73 M^2
EST. GFR  (NO RACE VARIABLE): 53.8 ML/MIN/1.73 M^2
EST. GFR  (NO RACE VARIABLE): >60 ML/MIN/1.73 M^2
EST. GFR  (NO RACE VARIABLE): >60 ML/MIN/1.73 M^2
GLUCOSE SERPL-MCNC: 244 MG/DL (ref 70–110)
GLUCOSE SERPL-MCNC: 247 MG/DL (ref 70–110)
GLUCOSE SERPL-MCNC: 248 MG/DL (ref 70–110)
GLUCOSE SERPL-MCNC: 414 MG/DL (ref 70–110)
LACTATE SERPL-SCNC: 2.2 MMOL/L (ref 0.5–2.2)
POCT GLUCOSE: 152 MG/DL (ref 70–110)
POCT GLUCOSE: 180 MG/DL (ref 70–110)
POCT GLUCOSE: 213 MG/DL (ref 70–110)
POCT GLUCOSE: 227 MG/DL (ref 70–110)
POCT GLUCOSE: 234 MG/DL (ref 70–110)
POCT GLUCOSE: 257 MG/DL (ref 70–110)
POCT GLUCOSE: 329 MG/DL (ref 70–110)
POCT GLUCOSE: 363 MG/DL (ref 70–110)
POCT GLUCOSE: 392 MG/DL (ref 70–110)
POCT GLUCOSE: 412 MG/DL (ref 70–110)
POTASSIUM SERPL-SCNC: 3.3 MMOL/L (ref 3.5–5.1)
POTASSIUM SERPL-SCNC: 3.6 MMOL/L (ref 3.5–5.1)
POTASSIUM SERPL-SCNC: 3.6 MMOL/L (ref 3.5–5.1)
POTASSIUM SERPL-SCNC: 4.1 MMOL/L (ref 3.5–5.1)
SODIUM SERPL-SCNC: 145 MMOL/L (ref 136–145)
SODIUM SERPL-SCNC: 146 MMOL/L (ref 136–145)
SODIUM SERPL-SCNC: 147 MMOL/L (ref 136–145)
SODIUM SERPL-SCNC: 156 MMOL/L (ref 136–145)

## 2023-03-14 PROCEDURE — 63600175 PHARM REV CODE 636 W HCPCS: Performed by: NURSE PRACTITIONER

## 2023-03-14 PROCEDURE — 82010 KETONE BODYS QUAN: CPT | Performed by: STUDENT IN AN ORGANIZED HEALTH CARE EDUCATION/TRAINING PROGRAM

## 2023-03-14 PROCEDURE — 25000003 PHARM REV CODE 250: Performed by: NURSE PRACTITIONER

## 2023-03-14 PROCEDURE — 25000003 PHARM REV CODE 250: Performed by: STUDENT IN AN ORGANIZED HEALTH CARE EDUCATION/TRAINING PROGRAM

## 2023-03-14 PROCEDURE — 63600175 PHARM REV CODE 636 W HCPCS: Performed by: STUDENT IN AN ORGANIZED HEALTH CARE EDUCATION/TRAINING PROGRAM

## 2023-03-14 PROCEDURE — 80048 BASIC METABOLIC PNL TOTAL CA: CPT | Performed by: STUDENT IN AN ORGANIZED HEALTH CARE EDUCATION/TRAINING PROGRAM

## 2023-03-14 PROCEDURE — 99233 SBSQ HOSP IP/OBS HIGH 50: CPT | Mod: ,,, | Performed by: INTERNAL MEDICINE

## 2023-03-14 PROCEDURE — 94761 N-INVAS EAR/PLS OXIMETRY MLT: CPT

## 2023-03-14 PROCEDURE — 83605 ASSAY OF LACTIC ACID: CPT | Performed by: STUDENT IN AN ORGANIZED HEALTH CARE EDUCATION/TRAINING PROGRAM

## 2023-03-14 PROCEDURE — 11000001 HC ACUTE MED/SURG PRIVATE ROOM

## 2023-03-14 PROCEDURE — 63600175 PHARM REV CODE 636 W HCPCS

## 2023-03-14 PROCEDURE — 36415 COLL VENOUS BLD VENIPUNCTURE: CPT | Performed by: STUDENT IN AN ORGANIZED HEALTH CARE EDUCATION/TRAINING PROGRAM

## 2023-03-14 PROCEDURE — 80048 BASIC METABOLIC PNL TOTAL CA: CPT | Mod: 91 | Performed by: STUDENT IN AN ORGANIZED HEALTH CARE EDUCATION/TRAINING PROGRAM

## 2023-03-14 PROCEDURE — 25000003 PHARM REV CODE 250

## 2023-03-14 PROCEDURE — 99233 PR SUBSEQUENT HOSPITAL CARE,LEVL III: ICD-10-PCS | Mod: ,,, | Performed by: INTERNAL MEDICINE

## 2023-03-14 RX ORDER — ACETAMINOPHEN 500 MG
1000 TABLET ORAL EVERY 6 HOURS PRN
Status: DISCONTINUED | OUTPATIENT
Start: 2023-03-14 | End: 2023-03-14

## 2023-03-14 RX ORDER — ACETAMINOPHEN 500 MG
1000 TABLET ORAL EVERY 8 HOURS PRN
Status: DISCONTINUED | OUTPATIENT
Start: 2023-03-14 | End: 2023-03-16 | Stop reason: HOSPADM

## 2023-03-14 RX ORDER — INSULIN ASPART 100 [IU]/ML
1-10 INJECTION, SOLUTION INTRAVENOUS; SUBCUTANEOUS EVERY 6 HOURS PRN
Status: DISCONTINUED | OUTPATIENT
Start: 2023-03-14 | End: 2023-03-16

## 2023-03-14 RX ADMIN — DEXTROSE MONOHYDRATE: 50 INJECTION, SOLUTION INTRAVENOUS at 11:03

## 2023-03-14 RX ADMIN — ACETAMINOPHEN 1000 MG: 500 TABLET ORAL at 06:03

## 2023-03-14 RX ADMIN — INSULIN DETEMIR 8 UNITS: 100 INJECTION, SOLUTION SUBCUTANEOUS at 09:03

## 2023-03-14 RX ADMIN — DEXTROSE MONOHYDRATE: 50 INJECTION, SOLUTION INTRAVENOUS at 01:03

## 2023-03-14 RX ADMIN — HEPARIN SODIUM 5000 UNITS: 5000 INJECTION INTRAVENOUS; SUBCUTANEOUS at 09:03

## 2023-03-14 RX ADMIN — INSULIN ASPART 10 UNITS: 100 INJECTION, SOLUTION INTRAVENOUS; SUBCUTANEOUS at 10:03

## 2023-03-14 RX ADMIN — INSULIN ASPART 1 UNITS: 100 INJECTION, SOLUTION INTRAVENOUS; SUBCUTANEOUS at 12:03

## 2023-03-14 RX ADMIN — INSULIN ASPART 10 UNITS: 100 INJECTION, SOLUTION INTRAVENOUS; SUBCUTANEOUS at 09:03

## 2023-03-14 RX ADMIN — INSULIN DETEMIR 16 UNITS: 100 INJECTION, SOLUTION SUBCUTANEOUS at 06:03

## 2023-03-14 RX ADMIN — DEXTROSE MONOHYDRATE: 50 INJECTION, SOLUTION INTRAVENOUS at 06:03

## 2023-03-14 RX ADMIN — CEFTRIAXONE 2 G: 2 INJECTION, POWDER, FOR SOLUTION INTRAMUSCULAR; INTRAVENOUS at 05:03

## 2023-03-14 RX ADMIN — INSULIN ASPART 3 UNITS: 100 INJECTION, SOLUTION INTRAVENOUS; SUBCUTANEOUS at 05:03

## 2023-03-14 RX ADMIN — POTASSIUM CHLORIDE 10 MEQ: 7.46 INJECTION, SOLUTION INTRAVENOUS at 12:03

## 2023-03-14 RX ADMIN — INSULIN ASPART 3 UNITS: 100 INJECTION, SOLUTION INTRAVENOUS; SUBCUTANEOUS at 09:03

## 2023-03-14 NOTE — SUBJECTIVE & OBJECTIVE
Interval History/Significant Events: Continuing to trend blood sugars    Review of Systems   Unable to perform ROS: Mental status change   Objective:     Vital Signs (Most Recent):  Temp: 98.8 °F (37.1 °C) (03/14/23 0600)  Pulse: 79 (03/14/23 0600)  Resp: (!) 21 (03/14/23 0600)  BP: 131/76 (03/14/23 0600)  SpO2: 99 % (03/14/23 0600)   Vital Signs (24h Range):  Temp:  [79.3 °F (26.3 °C)-99.9 °F (37.7 °C)] 98.8 °F (37.1 °C)  Pulse:  [51-88] 79  Resp:  [10-30] 21  SpO2:  [94 %-100 %] 99 %  BP: ()/(54-97) 131/76   Weight: 63.5 kg (140 lb)  Body mass index is 24.03 kg/m².      Intake/Output Summary (Last 24 hours) at 3/14/2023 0829  Last data filed at 3/14/2023 0600  Gross per 24 hour   Intake 4989.54 ml   Output 1150 ml   Net 3839.54 ml       Physical Exam  Vitals and nursing note reviewed.   Constitutional:       General: She is not in acute distress.     Appearance: She is ill-appearing. She is not toxic-appearing or diaphoretic.   HENT:      Head: Normocephalic and atraumatic.      Mouth/Throat:      Mouth: Mucous membranes are dry.   Eyes:      General: No scleral icterus.  Cardiovascular:      Rate and Rhythm: Normal rate and regular rhythm.   Pulmonary:      Effort: Pulmonary effort is normal. No respiratory distress.      Breath sounds: No wheezing or rales.   Abdominal:      General: Bowel sounds are normal.      Palpations: Abdomen is soft.      Tenderness: There is no abdominal tenderness (grimaces to palpation).   Musculoskeletal:         General: No swelling.      Right lower leg: No edema.      Left lower leg: No edema.   Skin:     General: Skin is warm and dry.      Coloration: Skin is not jaundiced.   Neurological:      Mental Status: She is easily aroused. She is lethargic, disoriented and confused.   Psychiatric:         Behavior: Behavior is uncooperative.       Vents:     Lines/Drains/Airways       Drain  Duration                  Urethral Catheter 03/11/23 0336 Temperature probe 16 Fr. 3 days               Peripheral Intravenous Line  Duration                  Peripheral IV - Single Lumen 03/10/23 18 G Left Antecubital 4 days         Peripheral IV - Single Lumen 03/11/23 0729 20 G Anterior;Right Upper Arm 3 days         Peripheral IV - Single Lumen 03/11/23 1048 20 G Right Antecubital 2 days                  Significant Labs:    CBC/Anemia Profile:  Recent Labs   Lab 03/13/23  0412   WBC 14.48*   HGB 10.6*   HCT 33.2*   PLT 89*   MCV 69*   RDW 16.2*        Chemistries:  Recent Labs   Lab 03/13/23  0512 03/13/23  1301 03/13/23  1610 03/14/23  0041   * 139 159* 156*   K 4.2 3.9 3.3* 4.1   * 114* 124* 121*   CO2 24 9* 24 22*   BUN 57* 21* 63* 55*   CREATININE 1.4 0.4* 1.4 1.4   CALCIUM 8.8 6.2* 9.0 8.4*   ALBUMIN 1.4*  --   --   --    PROT 6.2  --   --   --    BILITOT 0.6  --   --   --    ALKPHOS 136*  --   --   --    ALT 14  --   --   --    AST 13  --   --   --    MG 2.1  --   --   --    PHOS 2.5*  --   --   --        All pertinent labs within the past 24 hours have been reviewed.    Significant Imaging:  I have reviewed all pertinent imaging results/findings within the past 24 hours.

## 2023-03-14 NOTE — PLAN OF CARE
[facility] DAILY GOALS       A: Awake    RASS: Goal -    Actual - RASS (Durand Agitation-Sedation Scale): alert and calm   Restraint necessity: Clinical Justification: Removing medical devices, Climbing out of bed, Treatment Interference  B: Breathe   SBT: Not intubated   C: Coordinate A & B, analgesics/sedatives   Pain: managed    SAT: Not intubated  D: Delirium   CAM-ICU: Overall CAM-ICU: Positive  E: Early(intubated/ Progressive (non-intubated) Mobility   MOVE Screen: Pass   Activity: Activity Management: Patient unable to perform activities  FAS: Feeding/Nutrition   Diet order: Diet/Nutrition Received: NPO,    T: Thrombus   DVT prophylaxis: VTE Required Core Measure: Pharmacological prophylaxis initiated/maintained  H: HOB Elevation   Head of Bed (HOB) Positioning: HOB at 30-45 degrees  U: Ulcer Prophylaxis   GI: yes  G: Glucose control   managed Glycemic Management: blood glucose monitored  S: Skin   Bathing/Skin Care: patient refused  Device Skin Pressure Protection: absorbent pad utilized/changed, adhesive use limited, skin-to-device areas padded, skin-to-skin areas padded  Pressure Reduction Devices: pressure-redistributing mattress utilized  Pressure Reduction Techniques: weight shift assistance provided  Skin Protection: adhesive use limited, incontinence pads utilized, silicone foam dressing in place, skin-to-device areas padded, skin-to-skin areas padded, transparent dressing maintained, tubing/devices free from skin contact  B: Bowel Function   no issues   I: Indwelling Catheters   Hernandez necessity:      Urethral Catheter 03/11/23 0336 Temperature probe 16 Fr.-Reason for Continuing Urinary Catheterization: Critically ill in ICU and requiring hourly monitoring of intake/output   CVC necessity: No  D: De-escalation Antibiotics   No    Family/Goals of care/Code Status   Code Status: Full Code    24H Vital Sign Range  Temp:  [79.3 °F (26.3 °C)-99.9 °F (37.7 °C)]   Pulse:  [51-88]   Resp:  [10-30]   BP:  "()/(54-97)   SpO2:  [94 %-100 %]      Shift Events   Pt attempted to get out of bed multiple times throughout night; Stated "Im going home, call me cab!"; Soft wrist restrains remain; bed alarm on; Hyperglycemic this am; C/O headache; Refusing morning labs; Dr.L Reno made aware ; orders received/implemented.    VS and assessment per flow sheet, patient progressing towards goals as tolerated, plan of care reviewed with Christine Mosqueda, all concerns addressed, will continue to monitor.    Ofe Webb    "

## 2023-03-14 NOTE — ASSESSMENT & PLAN NOTE
Last A1c:   Lab Results   Component Value Date    HGBA1C 13.6 (H) 03/11/2023      Home Diabetes Medications             INSULIN ASPART PROTAM & ASPART (NOVOLOG MIX 70-30 SUBQ) Inject into the skin.    insulin glargine,hum.rec.anlog (TOUJEO SOLOSTAR U-300 INSULIN SUBQ) Inject 85 Units into the skin 2 (two) times daily.            Recent Labs     03/13/23  1508 03/13/23  1609 03/13/23  1738 03/13/23  2227 03/14/23  0050 03/14/23  0533   POCTGLUCOSE 121* 122* 134* 213* 234* 363*     3/14 the gap reopened    PLAN  -Insulin drip restarted  -D5 1/2 / hr to replete both volume and to maintain glucose   - Glucose checks  - BMP q4hr  - POCT glucose q1hr

## 2023-03-14 NOTE — ASSESSMENT & PLAN NOTE
Creatinine 2.8 on admit, baseline unclear(limited labs available) now resolved with creatinine of 1.2  Possible Etiologies:  - Likely pre-renal from dehydration and volume losses from DKA    Recent Labs     03/13/23  1301 03/13/23  1610 03/14/23  0041   CREATININE 0.4* 1.4 1.4   BUN 21* 63* 55*     Estimated Creatinine Clearance: 39.7 mL/min (based on SCr of 1.4 mg/dL).    Plan:    - Fluid resuscitation, if no improvement will consider urine lytes and renal/retroperitoneal ultrasound  - Avoid nephrotoxic agents such as NSAIDs, gadolinium and IV radiocontrast.  - Renally dose meds to current GFR.  - Maintain MAP > 65.

## 2023-03-14 NOTE — ASSESSMENT & PLAN NOTE
Recent Labs     03/12/23  0523 03/13/23  0412   WBC 19.35* 14.48*       Leukocytosis likely secondary to sepsis + DKA as patient has grown GNR in her blood cultures, suspect source is urine as her urine is positive for WBC clumps, > 100 WBC and moderate bacteria. CTAP results with bilateral bibasilar consolidations and markedly distended bladder also rising concern for possible PNA. Blood cultures returned + for GNR    PLAN  - CTX course for UTI

## 2023-03-14 NOTE — PROVIDER TRANSFER
Ms. Mosqueda is a 52 y.o F w/ hx of CHF, COPD, HTN, DM type 2, and anxiety who presented to the  ED with altered mental status.   reports the altered mental status started around 3 days ago where the patient was increasingly agitated, aggressive, drinking lots of sodas and eating lots of ice cream.  He is unclear if she missed any insulin doses.  She appeared more lethargic this morning so she was brought to the ED via EMS.     ED course notable for WBC 28.97, hemoglobin 11.2, sodium 133, chloride 90, bicarb< 5, anion gap of 54, creatinine 2.8, glucose 977, alkaline phosphatase 219, serum osmolality 383, beta hydroxybutyrate 5.2, VBG notable for pH 7.051, CO2 17.7, bicarb 4.9.              Hospital/ICU Course:  2/2 blood culture bottles have returned + for GNR. CT abdomen pelvis returned with consolidative opacities towards lungs bilaterally that may be concerning for potential airspace disease and markedly distended bladder. CT Head without acute abnormality. Patient with hypoglycemia overnight requiring maintenance IVF using LR with 20 mEq K+ at 150 mL/hr. Insulin gtt discontinued on 3/11. Patient hypernatremic requiring D5 gtt, mentation slowly improving as patient now able to open eyes and intermittently respond yes/no to questions as of 3/12.     After aberrant labs the patient was shown to have a corrected gap after the initiation of D5 1/2 NS at 200 per hour. The patient was placed on a High Dose sliding scale with long acting insulin. Rate of fluids cut to 100 per hour with appropriately correcting sodium    To dos  -Complete antibiotic course  -Continue to manage delerium  -Continue to correct sodium   Continue to correct glucose

## 2023-03-14 NOTE — PROGRESS NOTES
[facility] - Cardiac Medical ICU  Critical Care Medicine  Progress Note    Patient Name: Christine Mosqueda  MRN: [id number]  Admission Date: 3/10/2023  Hospital Length of Stay: 4 days  Code Status: Full Code  Attending Provider: Wily Seay MD  Primary Care Provider: Primary Doctor No   Principal Problem: Diabetic ketoacidosis associated with type 2 diabetes mellitus    Subjective:     HPI:  Patient information was obtained from relative(), past medical records, and ER records.    Ms. Mosqueda is a 52 y.o F w/ hx of CHF, COPD, HTN, DM type 2, and anxiety who presented to the  ED with altered mental status.   reports the altered mental status started around 3 days ago where the patient was increasingly agitated, aggressive, drinking lots of sodas and eating lots of ice cream.  He is unclear if she missed any insulin doses.  She appeared more lethargic this morning so she was brought to the ED via EMS.    ED course notable for WBC 28.97, hemoglobin 11.2, sodium 133, chloride 90, bicarb< 5, anion gap of 54, creatinine 2.8, glucose 977, alkaline phosphatase 219, serum osmolality 383, beta hydroxybutyrate 5.2, VBG notable for pH 7.051, CO2 17.7, bicarb 4.9.          Hospital/ICU Course:  2/2 blood culture bottles have returned + for GNR. CT abdomen pelvis returned with consolidative opacities towards lungs bilaterally that may be concerning for potential airspace disease and markedly distended bladder. CT Head without acute abnormality. Patient with hypoglycemia overnight requiring maintenance IVF using LR with 20 mEq K+ at 150 mL/hr. Insulin gtt discontinued on 3/11. Patient hypernatremic requiring D5 gtt, mentation slowly improving as patient now able to open eyes and intermittently respond yes/no to questions as of 3/12.       Interval History/Significant Events: Continuing to trend blood sugars    Review of Systems   Unable to perform ROS: Mental status change   Objective:     Vital Signs (Most  Recent):  Temp: 98.8 °F (37.1 °C) (03/14/23 0600)  Pulse: 79 (03/14/23 0600)  Resp: (!) 21 (03/14/23 0600)  BP: 131/76 (03/14/23 0600)  SpO2: 99 % (03/14/23 0600)   Vital Signs (24h Range):  Temp:  [79.3 °F (26.3 °C)-99.9 °F (37.7 °C)] 98.8 °F (37.1 °C)  Pulse:  [51-88] 79  Resp:  [10-30] 21  SpO2:  [94 %-100 %] 99 %  BP: ()/(54-97) 131/76   Weight: 63.5 kg (140 lb)  Body mass index is 24.03 kg/m².      Intake/Output Summary (Last 24 hours) at 3/14/2023 0829  Last data filed at 3/14/2023 0600  Gross per 24 hour   Intake 4989.54 ml   Output 1150 ml   Net 3839.54 ml       Physical Exam  Vitals and nursing note reviewed.   Constitutional:       General: She is not in acute distress.     Appearance: She is ill-appearing. She is not toxic-appearing or diaphoretic.   HENT:      Head: Normocephalic and atraumatic.      Mouth/Throat:      Mouth: Mucous membranes are dry.   Eyes:      General: No scleral icterus.  Cardiovascular:      Rate and Rhythm: Normal rate and regular rhythm.   Pulmonary:      Effort: Pulmonary effort is normal. No respiratory distress.      Breath sounds: No wheezing or rales.   Abdominal:      General: Bowel sounds are normal.      Palpations: Abdomen is soft.      Tenderness: There is no abdominal tenderness (grimaces to palpation).   Musculoskeletal:         General: No swelling.      Right lower leg: No edema.      Left lower leg: No edema.   Skin:     General: Skin is warm and dry.      Coloration: Skin is not jaundiced.   Neurological:      Mental Status: She is easily aroused. She is lethargic, disoriented and confused.   Psychiatric:         Behavior: Behavior is uncooperative.       Vents:     Lines/Drains/Airways       Drain  Duration                  Urethral Catheter 03/11/23 0336 Temperature probe 16 Fr. 3 days              Peripheral Intravenous Line  Duration                  Peripheral IV - Single Lumen 03/10/23 18 G Left Antecubital 4 days         Peripheral IV - Single Lumen  "03/11/23 0729 20 G Anterior;Right Upper Arm 3 days         Peripheral IV - Single Lumen 03/11/23 1048 20 G Right Antecubital 2 days                  Significant Labs:    CBC/Anemia Profile:  Recent Labs   Lab 03/13/23  0412   WBC 14.48*   HGB 10.6*   HCT 33.2*   PLT 89*   MCV 69*   RDW 16.2*        Chemistries:  Recent Labs   Lab 03/13/23  0512 03/13/23  1301 03/13/23  1610 03/14/23  0041   * 139 159* 156*   K 4.2 3.9 3.3* 4.1   * 114* 124* 121*   CO2 24 9* 24 22*   BUN 57* 21* 63* 55*   CREATININE 1.4 0.4* 1.4 1.4   CALCIUM 8.8 6.2* 9.0 8.4*   ALBUMIN 1.4*  --   --   --    PROT 6.2  --   --   --    BILITOT 0.6  --   --   --    ALKPHOS 136*  --   --   --    ALT 14  --   --   --    AST 13  --   --   --    MG 2.1  --   --   --    PHOS 2.5*  --   --   --        All pertinent labs within the past 24 hours have been reviewed.    Significant Imaging:  I have reviewed all pertinent imaging results/findings within the past 24 hours.      ABG  Recent Labs   Lab 03/11/23 2023   PH 7.470*   PO2 48   PCO2 37.3   HCO3 27.1   BE 3     Assessment/Plan:     Neuro  AMS (altered mental status)  Patient has acute metabolic encephalopathy that is likely secondary to DKA v. Sepsis.  Treatment for underlying cause is underway.    Differential diagnoses include, but not limited to: DKA, electrolyte abnormalities, infection(UA concerning, abdominal pain on initial exam)     CT head without acute abnormality. CT abdomen pelvis showed bibasilar consolidative opacities (L>R), cholelithiasis without evidence of cholecystitis and markedly distended bladder. UDS + for cocaine and UA concerning for possible UTI. Blood cultures + for GNR. Encephalopathy has slowly been improving. Repeat blood cultures with NGTD.    Urine culture with E coli    PLAN  - SLP consult  -Haldol prn with QT checks via ekg  -Precedex  - Zosyn de-escalated to ceftriaxone   - Avoid sedating medications      Pulmonary  Restrictive lung disease  See "Emphysema " "lung" A&P    Emphysema lung  Hx of tobacco abuse, pulmonary emphysema, pulmonary hypertension, restrictive lung disease listed per chart review of outside medical record    CXR 03/14/2023 non-acute    PLAN  Duo nebs PRN  Supplemental oxygen to target SaO2 >90%    Cardiac/Vascular  Essential hypertension    Home Hypertension Medications               amlodipine (NORVASC) 10 MG tablet Take 10 mg by mouth once daily.    losartan (COZAAR) 100 MG tablet Take 100 mg by mouth once daily.            Plan:  Resume home medications as tolerated    Renal/  Hypovolemia  Continue fluid resuscitation    Hypernatremia  See DKA    CARLINE (acute kidney injury)  Creatinine 2.8 on admit, baseline unclear(limited labs available) now resolved with creatinine of 1.2  Possible Etiologies:  - Likely pre-renal from dehydration and volume losses from DKA    Recent Labs     03/13/23  1301 03/13/23  1610 03/14/23  0041   CREATININE 0.4* 1.4 1.4   BUN 21* 63* 55*     Estimated Creatinine Clearance: 39.7 mL/min (based on SCr of 1.4 mg/dL).    Plan:    - Fluid resuscitation, if no improvement will consider urine lytes and renal/retroperitoneal ultrasound  - Avoid nephrotoxic agents such as NSAIDs, gadolinium and IV radiocontrast.  - Renally dose meds to current GFR.  - Maintain MAP > 65.    UTI (urinary tract infection)  UA concerning for UTI with altered mental status and abdominal pain on physical exam. Initial urine cultures with many organisms isolated though inconclusive     No results for input(s): COLORU, APPEARANCEUA, PHUR, SPECGRAV, PROTEINUA, GLUCUA, KETONESU, BILIRUBINUA, OCCULTUA, NITRITE, UROBILINOGEN, LEUKOCYTESUR, RBCUA, WBCUA, BACTERIA, SQUAMEPITHEL, HYALINECASTS in the last 48 hours.    Invalid input(s): WRIGHTSUR  PLAN  SEE AMS        Oncology  Leukocytosis  Recent Labs     03/12/23  0523 03/13/23  0412   WBC 19.35* 14.48*       Leukocytosis likely secondary to sepsis + DKA as patient has grown GNR in her blood cultures, suspect " "source is urine as her urine is positive for WBC clumps, > 100 WBC and moderate bacteria. CTAP results with bilateral bibasilar consolidations and markedly distended bladder also rising concern for possible PNA. Blood cultures returned + for GNR    PLAN  - CTX course for UTI      Endocrine  * Diabetic ketoacidosis associated with type 2 diabetes mellitus  Last A1c:   Lab Results   Component Value Date    HGBA1C 13.6 (H) 03/11/2023      Home Diabetes Medications               INSULIN ASPART PROTAM & ASPART (NOVOLOG MIX 70-30 SUBQ) Inject into the skin.    insulin glargine,hum.rec.anlog (TOUJEO SOLOSTAR U-300 INSULIN SUBQ) Inject 85 Units into the skin 2 (two) times daily.              Recent Labs     03/13/23  1508 03/13/23  1609 03/13/23  1738 03/13/23  2227 03/14/23  0050 03/14/23  0533   POCTGLUCOSE 121* 122* 134* 213* 234* 363*     3/14 the gap reopened    PLAN  -HDSS  Sodium correcting appropriately  -Long acting added  -D5 1/2 / hr to replete both volume and to maintain glucose   - Glucose checks  - BMP q4hr  - POCT glucose q1hr  Safe for transfer to floor    Type 2 diabetes mellitus without complication, with long-term current use of insulin  See "DKA" A&P           Critical Care Daily Checklist:    A: Awake: RASS Goal/Actual Goal:    Actual: Durand Agitation Sedation Scale (RASS): Agitated   B: Spontaneous Breathing Trial Performed?     C: SAT & SBT Coordinated?  NA                      D: Delirium: CAM-ICU Overall CAM-ICU: Positive   E: Early Mobility Performed? No   F: Feeding Goal:    Status:     Current Diet Order   Procedures    Diet NPO      AS: Analgesia/Sedation Precedex Haldol   T: Thromboembolic Prophylaxis heparin   H: HOB > 300 Yes   U: Stress Ulcer Prophylaxis (if needed) NA   G: Glucose Control High dose sliding scale   B: Bowel Function     I: Indwelling Catheter (Lines & Hernandez) Necessity PIV   D: De-escalation of Antimicrobials/Pharmacotherapies CTX    Plan for the day/ETD Control " blood sugar     Code Status:  Family/Goals of Care: Full Code         Critical secondary to Patient has a condition that poses threat to life and bodily function: Hyponatremia      Critical care was time spent personally by me on the following activities: development of treatment plan with patient or surrogate and bedside caregivers, discussions with consultants, evaluation of patient's response to treatment, examination of patient, ordering and performing treatments and interventions, ordering and review of laboratory studies, ordering and review of radiographic studies, pulse oximetry, re-evaluation of patient's condition. This critical care time did not overlap with that of any other provider or involve time for any procedures.     Saroj Tariq MD  Critical Care Medicine  [facility] - Cardiac Medical ICU

## 2023-03-14 NOTE — ASSESSMENT & PLAN NOTE
UA concerning for UTI with altered mental status and abdominal pain on physical exam. Initial urine cultures with many organisms isolated though inconclusive     No results for input(s): COLORU, APPEARANCEUA, PHUR, SPECGRAV, PROTEINUA, GLUCUA, KETONESU, BILIRUBINUA, OCCULTUA, NITRITE, UROBILINOGEN, LEUKOCYTESUR, RBCUA, WBCUA, BACTERIA, SQUAMEPITHEL, HYALINECASTS in the last 48 hours.    Invalid input(s): WRIGHTSUR  PLAN  SEE AMS

## 2023-03-15 PROBLEM — E11.10 DIABETIC KETOACIDOSIS ASSOCIATED WITH TYPE 2 DIABETES MELLITUS: Status: RESOLVED | Noted: 2023-03-10 | Resolved: 2023-03-15

## 2023-03-15 PROBLEM — M25.511 ACUTE PAIN OF RIGHT SHOULDER: Status: ACTIVE | Noted: 2023-03-15

## 2023-03-15 PROBLEM — R41.82 AMS (ALTERED MENTAL STATUS): Status: RESOLVED | Noted: 2023-03-10 | Resolved: 2023-03-15

## 2023-03-15 PROBLEM — R78.81 E COLI BACTEREMIA: Status: ACTIVE | Noted: 2023-03-10

## 2023-03-15 PROBLEM — B96.20 E COLI BACTEREMIA: Status: ACTIVE | Noted: 2023-03-10

## 2023-03-15 PROBLEM — N39.0 UTI (URINARY TRACT INFECTION): Status: RESOLVED | Noted: 2023-03-10 | Resolved: 2023-03-15

## 2023-03-15 LAB
ANION GAP SERPL CALC-SCNC: 11 MMOL/L (ref 8–16)
ANION GAP SERPL CALC-SCNC: 12 MMOL/L (ref 8–16)
ANION GAP SERPL CALC-SCNC: 9 MMOL/L (ref 8–16)
ANION GAP SERPL CALC-SCNC: 9 MMOL/L (ref 8–16)
ANISOCYTOSIS BLD QL SMEAR: SLIGHT
BACTERIA BLD CULT: ABNORMAL
BASOPHILS # BLD AUTO: 0.02 K/UL (ref 0–0.2)
BASOPHILS NFR BLD: 0.1 % (ref 0–1.9)
BUN SERPL-MCNC: 35 MG/DL (ref 6–20)
BUN SERPL-MCNC: 36 MG/DL (ref 6–20)
BUN SERPL-MCNC: 36 MG/DL (ref 6–20)
BUN SERPL-MCNC: 37 MG/DL (ref 6–20)
BUN SERPL-MCNC: 37 MG/DL (ref 6–20)
BUN SERPL-MCNC: 38 MG/DL (ref 6–20)
BURR CELLS BLD QL SMEAR: ABNORMAL
CALCIUM SERPL-MCNC: 7.4 MG/DL (ref 8.7–10.5)
CALCIUM SERPL-MCNC: 7.7 MG/DL (ref 8.7–10.5)
CALCIUM SERPL-MCNC: 7.8 MG/DL (ref 8.7–10.5)
CALCIUM SERPL-MCNC: 7.9 MG/DL (ref 8.7–10.5)
CALCIUM SERPL-MCNC: 7.9 MG/DL (ref 8.7–10.5)
CALCIUM SERPL-MCNC: 8 MG/DL (ref 8.7–10.5)
CHLORIDE SERPL-SCNC: 110 MMOL/L (ref 95–110)
CHLORIDE SERPL-SCNC: 111 MMOL/L (ref 95–110)
CHLORIDE SERPL-SCNC: 112 MMOL/L (ref 95–110)
CHLORIDE SERPL-SCNC: 113 MMOL/L (ref 95–110)
CO2 SERPL-SCNC: 20 MMOL/L (ref 23–29)
CO2 SERPL-SCNC: 20 MMOL/L (ref 23–29)
CO2 SERPL-SCNC: 21 MMOL/L (ref 23–29)
CO2 SERPL-SCNC: 21 MMOL/L (ref 23–29)
CO2 SERPL-SCNC: 22 MMOL/L (ref 23–29)
CO2 SERPL-SCNC: 23 MMOL/L (ref 23–29)
CREAT SERPL-MCNC: 1 MG/DL (ref 0.5–1.4)
CREAT SERPL-MCNC: 1 MG/DL (ref 0.5–1.4)
CREAT SERPL-MCNC: 1.1 MG/DL (ref 0.5–1.4)
DACRYOCYTES BLD QL SMEAR: ABNORMAL
DIFFERENTIAL METHOD: ABNORMAL
EOSINOPHIL # BLD AUTO: 0.1 K/UL (ref 0–0.5)
EOSINOPHIL NFR BLD: 0.3 % (ref 0–8)
ERYTHROCYTE [DISTWIDTH] IN BLOOD BY AUTOMATED COUNT: 15.4 % (ref 11.5–14.5)
EST. GFR  (NO RACE VARIABLE): 59.7 ML/MIN/1.73 M^2
EST. GFR  (NO RACE VARIABLE): >60 ML/MIN/1.73 M^2
EST. GFR  (NO RACE VARIABLE): >60 ML/MIN/1.73 M^2
GLUCOSE SERPL-MCNC: 182 MG/DL (ref 70–110)
GLUCOSE SERPL-MCNC: 263 MG/DL (ref 70–110)
GLUCOSE SERPL-MCNC: 287 MG/DL (ref 70–110)
GLUCOSE SERPL-MCNC: 288 MG/DL (ref 70–110)
GLUCOSE SERPL-MCNC: 410 MG/DL (ref 70–110)
GLUCOSE SERPL-MCNC: 440 MG/DL (ref 70–110)
HCT VFR BLD AUTO: 29 % (ref 37–48.5)
HGB BLD-MCNC: 9.1 G/DL (ref 12–16)
HYPOCHROMIA BLD QL SMEAR: ABNORMAL
IMM GRANULOCYTES # BLD AUTO: 0.19 K/UL (ref 0–0.04)
IMM GRANULOCYTES NFR BLD AUTO: 1.3 % (ref 0–0.5)
LACTATE SERPL-SCNC: 2.5 MMOL/L (ref 0.5–2.2)
LYMPHOCYTES # BLD AUTO: 1.8 K/UL (ref 1–4.8)
LYMPHOCYTES NFR BLD: 11.9 % (ref 18–48)
MAGNESIUM SERPL-MCNC: 1.5 MG/DL (ref 1.6–2.6)
MCH RBC QN AUTO: 21.2 PG (ref 27–31)
MCHC RBC AUTO-ENTMCNC: 31.4 G/DL (ref 32–36)
MCV RBC AUTO: 68 FL (ref 82–98)
MONOCYTES # BLD AUTO: 1.1 K/UL (ref 0.3–1)
MONOCYTES NFR BLD: 7.1 % (ref 4–15)
NEUTROPHILS # BLD AUTO: 11.8 K/UL (ref 1.8–7.7)
NEUTROPHILS NFR BLD: 79.3 % (ref 38–73)
NRBC BLD-RTO: 0 /100 WBC
OVALOCYTES BLD QL SMEAR: ABNORMAL
PHOSPHATE SERPL-MCNC: 2.2 MG/DL (ref 2.7–4.5)
PLATELET # BLD AUTO: 125 K/UL (ref 150–450)
PLATELET BLD QL SMEAR: ABNORMAL
PMV BLD AUTO: ABNORMAL FL (ref 9.2–12.9)
POCT GLUCOSE: 182 MG/DL (ref 70–110)
POCT GLUCOSE: 310 MG/DL (ref 70–110)
POCT GLUCOSE: 326 MG/DL (ref 70–110)
POCT GLUCOSE: 395 MG/DL (ref 70–110)
POCT GLUCOSE: 440 MG/DL (ref 70–110)
POIKILOCYTOSIS BLD QL SMEAR: SLIGHT
POLYCHROMASIA BLD QL SMEAR: ABNORMAL
POTASSIUM SERPL-SCNC: 3.2 MMOL/L (ref 3.5–5.1)
POTASSIUM SERPL-SCNC: 3.4 MMOL/L (ref 3.5–5.1)
POTASSIUM SERPL-SCNC: 3.4 MMOL/L (ref 3.5–5.1)
POTASSIUM SERPL-SCNC: 3.7 MMOL/L (ref 3.5–5.1)
POTASSIUM SERPL-SCNC: 3.8 MMOL/L (ref 3.5–5.1)
POTASSIUM SERPL-SCNC: 3.8 MMOL/L (ref 3.5–5.1)
RBC # BLD AUTO: 4.29 M/UL (ref 4–5.4)
SCHISTOCYTES BLD QL SMEAR: ABNORMAL
SODIUM SERPL-SCNC: 141 MMOL/L (ref 136–145)
SODIUM SERPL-SCNC: 144 MMOL/L (ref 136–145)
SODIUM SERPL-SCNC: 145 MMOL/L (ref 136–145)
SODIUM SERPL-SCNC: 146 MMOL/L (ref 136–145)
SPHEROCYTES BLD QL SMEAR: ABNORMAL
TARGETS BLD QL SMEAR: ABNORMAL
TOXIC GRANULES BLD QL SMEAR: PRESENT
WBC # BLD AUTO: 14.84 K/UL (ref 3.9–12.7)

## 2023-03-15 PROCEDURE — 11000001 HC ACUTE MED/SURG PRIVATE ROOM

## 2023-03-15 PROCEDURE — 36415 COLL VENOUS BLD VENIPUNCTURE: CPT | Performed by: STUDENT IN AN ORGANIZED HEALTH CARE EDUCATION/TRAINING PROGRAM

## 2023-03-15 PROCEDURE — 83735 ASSAY OF MAGNESIUM: CPT | Performed by: STUDENT IN AN ORGANIZED HEALTH CARE EDUCATION/TRAINING PROGRAM

## 2023-03-15 PROCEDURE — 99233 SBSQ HOSP IP/OBS HIGH 50: CPT | Mod: ,,, | Performed by: HOSPITALIST

## 2023-03-15 PROCEDURE — 99233 PR SUBSEQUENT HOSPITAL CARE,LEVL III: ICD-10-PCS | Mod: ,,, | Performed by: HOSPITALIST

## 2023-03-15 PROCEDURE — 36415 COLL VENOUS BLD VENIPUNCTURE: CPT

## 2023-03-15 PROCEDURE — 85025 COMPLETE CBC W/AUTO DIFF WBC: CPT

## 2023-03-15 PROCEDURE — 63600175 PHARM REV CODE 636 W HCPCS: Performed by: STUDENT IN AN ORGANIZED HEALTH CARE EDUCATION/TRAINING PROGRAM

## 2023-03-15 PROCEDURE — 36415 COLL VENOUS BLD VENIPUNCTURE: CPT | Performed by: HOSPITALIST

## 2023-03-15 PROCEDURE — 83605 ASSAY OF LACTIC ACID: CPT

## 2023-03-15 PROCEDURE — 87040 BLOOD CULTURE FOR BACTERIA: CPT | Performed by: HOSPITALIST

## 2023-03-15 PROCEDURE — 84100 ASSAY OF PHOSPHORUS: CPT | Performed by: STUDENT IN AN ORGANIZED HEALTH CARE EDUCATION/TRAINING PROGRAM

## 2023-03-15 PROCEDURE — 25000003 PHARM REV CODE 250: Performed by: HOSPITALIST

## 2023-03-15 PROCEDURE — 80048 BASIC METABOLIC PNL TOTAL CA: CPT | Mod: 91 | Performed by: STUDENT IN AN ORGANIZED HEALTH CARE EDUCATION/TRAINING PROGRAM

## 2023-03-15 PROCEDURE — 25000003 PHARM REV CODE 250: Performed by: SURGERY

## 2023-03-15 RX ORDER — LIDOCAINE 50 MG/G
1 PATCH TOPICAL
Status: DISCONTINUED | OUTPATIENT
Start: 2023-03-15 | End: 2023-03-16 | Stop reason: HOSPADM

## 2023-03-15 RX ORDER — CIPROFLOXACIN 500 MG/1
500 TABLET ORAL EVERY 12 HOURS
Status: DISCONTINUED | OUTPATIENT
Start: 2023-03-15 | End: 2023-03-16 | Stop reason: HOSPADM

## 2023-03-15 RX ORDER — PROMETHAZINE HYDROCHLORIDE 6.25 MG/5ML
12.5 SYRUP ORAL EVERY 6 HOURS PRN
Status: DISCONTINUED | OUTPATIENT
Start: 2023-03-15 | End: 2023-03-16 | Stop reason: HOSPADM

## 2023-03-15 RX ORDER — ALBUTEROL SULFATE 90 UG/1
1 AEROSOL, METERED RESPIRATORY (INHALATION) EVERY 4 HOURS PRN
Status: DISCONTINUED | OUTPATIENT
Start: 2023-03-15 | End: 2023-03-16 | Stop reason: HOSPADM

## 2023-03-15 RX ORDER — FLUTICASONE FUROATE AND VILANTEROL 200; 25 UG/1; UG/1
1 POWDER RESPIRATORY (INHALATION) DAILY
Status: DISCONTINUED | OUTPATIENT
Start: 2023-03-16 | End: 2023-03-16 | Stop reason: HOSPADM

## 2023-03-15 RX ORDER — ONDANSETRON 4 MG/1
4 TABLET, FILM COATED ORAL ONCE
Status: COMPLETED | OUTPATIENT
Start: 2023-03-15 | End: 2023-03-15

## 2023-03-15 RX ORDER — SODIUM CHLORIDE 450 MG/100ML
INJECTION, SOLUTION INTRAVENOUS CONTINUOUS
Status: DISCONTINUED | OUTPATIENT
Start: 2023-03-15 | End: 2023-03-16 | Stop reason: HOSPADM

## 2023-03-15 RX ORDER — ONDANSETRON 2 MG/ML
4 INJECTION INTRAMUSCULAR; INTRAVENOUS EVERY 6 HOURS PRN
Status: DISCONTINUED | OUTPATIENT
Start: 2023-03-15 | End: 2023-03-15

## 2023-03-15 RX ADMIN — INSULIN ASPART 2 UNITS: 100 INJECTION, SOLUTION INTRAVENOUS; SUBCUTANEOUS at 04:03

## 2023-03-15 RX ADMIN — LIDOCAINE 1 PATCH: 50 PATCH CUTANEOUS at 04:03

## 2023-03-15 RX ADMIN — INSULIN DETEMIR 8 UNITS: 100 INJECTION, SOLUTION SUBCUTANEOUS at 08:03

## 2023-03-15 RX ADMIN — SODIUM CHLORIDE: 4.5 INJECTION, SOLUTION INTRAVENOUS at 08:03

## 2023-03-15 RX ADMIN — INSULIN ASPART 10 UNITS: 100 INJECTION, SOLUTION INTRAVENOUS; SUBCUTANEOUS at 11:03

## 2023-03-15 RX ADMIN — PROMETHAZINE HYDROCHLORIDE 12.5 MG: 6.25 SOLUTION ORAL at 04:03

## 2023-03-15 RX ADMIN — CIPROFLOXACIN 500 MG: 500 TABLET, FILM COATED ORAL at 04:03

## 2023-03-15 RX ADMIN — HEPARIN SODIUM 5000 UNITS: 5000 INJECTION INTRAVENOUS; SUBCUTANEOUS at 02:03

## 2023-03-15 RX ADMIN — INSULIN ASPART 4 UNITS: 100 INJECTION, SOLUTION INTRAVENOUS; SUBCUTANEOUS at 09:03

## 2023-03-15 RX ADMIN — HEPARIN SODIUM 5000 UNITS: 5000 INJECTION INTRAVENOUS; SUBCUTANEOUS at 09:03

## 2023-03-15 RX ADMIN — INSULIN DETEMIR 8 UNITS: 100 INJECTION, SOLUTION SUBCUTANEOUS at 09:03

## 2023-03-15 RX ADMIN — INSULIN ASPART 10 UNITS: 100 INJECTION, SOLUTION INTRAVENOUS; SUBCUTANEOUS at 07:03

## 2023-03-15 RX ADMIN — ONDANSETRON 4 MG: 4 TABLET ORAL at 05:03

## 2023-03-15 RX ADMIN — HEPARIN SODIUM 5000 UNITS: 5000 INJECTION INTRAVENOUS; SUBCUTANEOUS at 05:03

## 2023-03-15 NOTE — HOSPITAL COURSE
Blood cultures grew Escherichia coli. CT of the abdomen and pelvis showed consolidative opacities in both lungs and bladder distension. Insulin drip was stopped on 3/11/2023. She had hypernatremia and was put on 5% dextrose drip. Mentation slowly improved. She was transferred to Hospital Medicine Team B on 3/15/2023.

## 2023-03-15 NOTE — PLAN OF CARE
Problem: Adult Inpatient Plan of Care  Goal: Plan of Care Review  Outcome: Ongoing, Progressing  Goal: Patient-Specific Goal (Individualized)  Outcome: Ongoing, Progressing  Goal: Absence of Hospital-Acquired Illness or Injury  Outcome: Ongoing, Progressing  Goal: Optimal Comfort and Wellbeing  Outcome: Ongoing, Progressing  Goal: Readiness for Transition of Care  Outcome: Ongoing, Progressing     Problem: Fall Injury Risk  Goal: Absence of Fall and Fall-Related Injury  Outcome: Ongoing, Progressing     Problem: Restraint, Nonbehavioral (Nonviolent)  Goal: Absence of Harm or Injury  Outcome: Ongoing, Progressing     Problem: Infection  Goal: Absence of Infection Signs and Symptoms  Outcome: Ongoing, Progressing     Problem: Fluid and Electrolyte Imbalance (Acute Kidney Injury/Impairment)  Goal: Fluid and Electrolyte Balance  Outcome: Ongoing, Progressing     Problem: Oral Intake Inadequate (Acute Kidney Injury/Impairment)  Goal: Optimal Nutrition Intake  Outcome: Ongoing, Progressing     Problem: Renal Function Impairment (Acute Kidney Injury/Impairment)  Goal: Effective Renal Function  Outcome: Ongoing, Progressing     Problem: Diabetes Comorbidity  Goal: Blood Glucose Level Within Targeted Range  Outcome: Ongoing, Progressing     Problem: Skin Injury Risk Increased  Goal: Skin Health and Integrity  Outcome: Ongoing, Progressing

## 2023-03-15 NOTE — ASSESSMENT & PLAN NOTE
"A medication reconciliation was done in our ED on 1/9/2023 and she reported "no longer taking" to several home medications. Consult Pharmacy to reconcile home medications now as she was confused for several days.   "

## 2023-03-15 NOTE — ASSESSMENT & PLAN NOTE
Hypovolemia  Hypernatremia  Was dehydrated with severe hyperglycemia prior to admission. Improving. Change 5% dextrose to 0.45% saline to avoid hyperglycemia while still treating hypernatremia.

## 2023-03-15 NOTE — CONSULTS
NIAS consulted for IV placement.  NIAS not needed at this time due to patient having adequate access.  Re consult NIAS if needed.
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
## 2023-03-15 NOTE — ASSESSMENT & PLAN NOTE
Had severe hyperglycemia on admission, now improved. She takes insulin glargine 85 units twice daily and insulin aspart. Giving insulin detemir 8 units twice daily and insulin aspart sliding scale.
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
## 2023-03-16 ENCOUNTER — NURSE TRIAGE (OUTPATIENT)
Dept: ADMINISTRATIVE | Facility: CLINIC | Age: 53
End: 2023-03-16
Payer: MEDICAID

## 2023-03-16 VITALS
DIASTOLIC BLOOD PRESSURE: 82 MMHG | RESPIRATION RATE: 18 BRPM | OXYGEN SATURATION: 100 % | SYSTOLIC BLOOD PRESSURE: 128 MMHG | HEIGHT: 64 IN | WEIGHT: 140 LBS | HEART RATE: 99 BPM | BODY MASS INDEX: 23.9 KG/M2 | TEMPERATURE: 97 F

## 2023-03-16 PROBLEM — M25.511 ACUTE PAIN OF RIGHT SHOULDER: Status: RESOLVED | Noted: 2023-03-15 | Resolved: 2023-03-16

## 2023-03-16 PROBLEM — N17.9 AKI (ACUTE KIDNEY INJURY): Status: RESOLVED | Noted: 2023-03-10 | Resolved: 2023-03-16

## 2023-03-16 PROBLEM — E86.1 HYPOVOLEMIA: Status: RESOLVED | Noted: 2023-03-13 | Resolved: 2023-03-16

## 2023-03-16 PROBLEM — E87.0 HYPERNATREMIA: Status: RESOLVED | Noted: 2023-03-12 | Resolved: 2023-03-16

## 2023-03-16 LAB
ANION GAP SERPL CALC-SCNC: 10 MMOL/L (ref 8–16)
ANION GAP SERPL CALC-SCNC: 10 MMOL/L (ref 8–16)
ANION GAP SERPL CALC-SCNC: 12 MMOL/L (ref 8–16)
ANION GAP SERPL CALC-SCNC: 13 MMOL/L (ref 8–16)
BACTERIA BLD CULT: ABNORMAL
BACTERIA BLD CULT: NORMAL
BUN SERPL-MCNC: 28 MG/DL (ref 6–20)
BUN SERPL-MCNC: 30 MG/DL (ref 6–20)
BUN SERPL-MCNC: 32 MG/DL (ref 6–20)
BUN SERPL-MCNC: 34 MG/DL (ref 6–20)
CALCIUM SERPL-MCNC: 7.5 MG/DL (ref 8.7–10.5)
CALCIUM SERPL-MCNC: 7.8 MG/DL (ref 8.7–10.5)
CALCIUM SERPL-MCNC: 7.9 MG/DL (ref 8.7–10.5)
CALCIUM SERPL-MCNC: 8 MG/DL (ref 8.7–10.5)
CHLORIDE SERPL-SCNC: 110 MMOL/L (ref 95–110)
CHLORIDE SERPL-SCNC: 112 MMOL/L (ref 95–110)
CHLORIDE SERPL-SCNC: 112 MMOL/L (ref 95–110)
CHLORIDE SERPL-SCNC: 114 MMOL/L (ref 95–110)
CO2 SERPL-SCNC: 19 MMOL/L (ref 23–29)
CO2 SERPL-SCNC: 21 MMOL/L (ref 23–29)
CO2 SERPL-SCNC: 21 MMOL/L (ref 23–29)
CO2 SERPL-SCNC: 22 MMOL/L (ref 23–29)
CREAT SERPL-MCNC: 1 MG/DL (ref 0.5–1.4)
CREAT SERPL-MCNC: 1.1 MG/DL (ref 0.5–1.4)
CREAT SERPL-MCNC: 1.1 MG/DL (ref 0.5–1.4)
CREAT SERPL-MCNC: 1.2 MG/DL (ref 0.5–1.4)
EST. GFR  (NO RACE VARIABLE): 53.8 ML/MIN/1.73 M^2
EST. GFR  (NO RACE VARIABLE): 59.7 ML/MIN/1.73 M^2
EST. GFR  (NO RACE VARIABLE): 59.7 ML/MIN/1.73 M^2
EST. GFR  (NO RACE VARIABLE): >60 ML/MIN/1.73 M^2
GLUCOSE SERPL-MCNC: 307 MG/DL (ref 70–110)
GLUCOSE SERPL-MCNC: 347 MG/DL (ref 70–110)
GLUCOSE SERPL-MCNC: 433 MG/DL (ref 70–110)
GLUCOSE SERPL-MCNC: 498 MG/DL (ref 70–110)
MAGNESIUM SERPL-MCNC: 1.5 MG/DL (ref 1.6–2.6)
PHOSPHATE SERPL-MCNC: 3.4 MG/DL (ref 2.7–4.5)
POCT GLUCOSE: 422 MG/DL (ref 70–110)
POCT GLUCOSE: 425 MG/DL (ref 70–110)
POTASSIUM SERPL-SCNC: 3.4 MMOL/L (ref 3.5–5.1)
POTASSIUM SERPL-SCNC: 3.4 MMOL/L (ref 3.5–5.1)
POTASSIUM SERPL-SCNC: 3.7 MMOL/L (ref 3.5–5.1)
POTASSIUM SERPL-SCNC: 3.8 MMOL/L (ref 3.5–5.1)
SODIUM SERPL-SCNC: 143 MMOL/L (ref 136–145)
SODIUM SERPL-SCNC: 144 MMOL/L (ref 136–145)
SODIUM SERPL-SCNC: 144 MMOL/L (ref 136–145)
SODIUM SERPL-SCNC: 145 MMOL/L (ref 136–145)

## 2023-03-16 PROCEDURE — 84100 ASSAY OF PHOSPHORUS: CPT | Performed by: STUDENT IN AN ORGANIZED HEALTH CARE EDUCATION/TRAINING PROGRAM

## 2023-03-16 PROCEDURE — 80048 BASIC METABOLIC PNL TOTAL CA: CPT | Performed by: STUDENT IN AN ORGANIZED HEALTH CARE EDUCATION/TRAINING PROGRAM

## 2023-03-16 PROCEDURE — 99222 PR INITIAL HOSPITAL CARE,LEVL II: ICD-10-PCS | Mod: ,,, | Performed by: INTERNAL MEDICINE

## 2023-03-16 PROCEDURE — 99900035 HC TECH TIME PER 15 MIN (STAT)

## 2023-03-16 PROCEDURE — 94640 AIRWAY INHALATION TREATMENT: CPT

## 2023-03-16 PROCEDURE — 36415 COLL VENOUS BLD VENIPUNCTURE: CPT | Performed by: STUDENT IN AN ORGANIZED HEALTH CARE EDUCATION/TRAINING PROGRAM

## 2023-03-16 PROCEDURE — 94761 N-INVAS EAR/PLS OXIMETRY MLT: CPT

## 2023-03-16 PROCEDURE — 25000242 PHARM REV CODE 250 ALT 637 W/ HCPCS: Performed by: HOSPITALIST

## 2023-03-16 PROCEDURE — 63600175 PHARM REV CODE 636 W HCPCS: Performed by: STUDENT IN AN ORGANIZED HEALTH CARE EDUCATION/TRAINING PROGRAM

## 2023-03-16 PROCEDURE — 99222 1ST HOSP IP/OBS MODERATE 55: CPT | Mod: ,,, | Performed by: INTERNAL MEDICINE

## 2023-03-16 PROCEDURE — 25000003 PHARM REV CODE 250: Performed by: HOSPITALIST

## 2023-03-16 PROCEDURE — 83735 ASSAY OF MAGNESIUM: CPT | Performed by: STUDENT IN AN ORGANIZED HEALTH CARE EDUCATION/TRAINING PROGRAM

## 2023-03-16 RX ORDER — INSULIN ASPART 100 [IU]/ML
10 INJECTION, SOLUTION INTRAVENOUS; SUBCUTANEOUS
COMMUNITY

## 2023-03-16 RX ORDER — PROMETHAZINE HYDROCHLORIDE 12.5 MG/1
25 TABLET ORAL EVERY 6 HOURS PRN
COMMUNITY

## 2023-03-16 RX ORDER — INSULIN ASPART 100 [IU]/ML
8 INJECTION, SOLUTION INTRAVENOUS; SUBCUTANEOUS
Status: DISCONTINUED | OUTPATIENT
Start: 2023-03-16 | End: 2023-03-16 | Stop reason: HOSPADM

## 2023-03-16 RX ORDER — ATORVASTATIN CALCIUM 20 MG/1
20 TABLET, FILM COATED ORAL DAILY
COMMUNITY

## 2023-03-16 RX ORDER — CIPROFLOXACIN 500 MG/1
500 TABLET ORAL EVERY 12 HOURS
Qty: 28 TABLET | Refills: 0 | Status: ON HOLD | OUTPATIENT
Start: 2023-03-16 | End: 2023-03-23 | Stop reason: SDUPTHER

## 2023-03-16 RX ORDER — GLUCAGON 1 MG
1 KIT INJECTION
Status: DISCONTINUED | OUTPATIENT
Start: 2023-03-16 | End: 2023-03-16 | Stop reason: HOSPADM

## 2023-03-16 RX ORDER — DEXTROSE 40 %
15 GEL (GRAM) ORAL
Status: DISCONTINUED | OUTPATIENT
Start: 2023-03-16 | End: 2023-03-16 | Stop reason: HOSPADM

## 2023-03-16 RX ORDER — DEXTROSE 40 %
30 GEL (GRAM) ORAL
Status: DISCONTINUED | OUTPATIENT
Start: 2023-03-16 | End: 2023-03-16 | Stop reason: HOSPADM

## 2023-03-16 RX ORDER — GABAPENTIN 400 MG/1
400 CAPSULE ORAL 2 TIMES DAILY
COMMUNITY

## 2023-03-16 RX ORDER — INSULIN ASPART 100 [IU]/ML
0-5 INJECTION, SOLUTION INTRAVENOUS; SUBCUTANEOUS
Status: DISCONTINUED | OUTPATIENT
Start: 2023-03-16 | End: 2023-03-16 | Stop reason: HOSPADM

## 2023-03-16 RX ORDER — PREGABALIN 75 MG/1
75 CAPSULE ORAL 2 TIMES DAILY
Status: ON HOLD | COMMUNITY
End: 2023-03-16 | Stop reason: HOSPADM

## 2023-03-16 RX ORDER — FLUTICASONE PROPIONATE AND SALMETEROL 500; 50 UG/1; UG/1
1 POWDER RESPIRATORY (INHALATION) 2 TIMES DAILY
Start: 2023-03-16

## 2023-03-16 RX ADMIN — PROMETHAZINE HYDROCHLORIDE 12.5 MG: 6.25 SOLUTION ORAL at 12:03

## 2023-03-16 RX ADMIN — CIPROFLOXACIN 500 MG: 500 TABLET, FILM COATED ORAL at 09:03

## 2023-03-16 RX ADMIN — INSULIN ASPART 10 UNITS: 100 INJECTION, SOLUTION INTRAVENOUS; SUBCUTANEOUS at 11:03

## 2023-03-16 RX ADMIN — INSULIN ASPART 10 UNITS: 100 INJECTION, SOLUTION INTRAVENOUS; SUBCUTANEOUS at 09:03

## 2023-03-16 RX ADMIN — FLUTICASONE FUROATE AND VILANTEROL TRIFENATATE 1 PUFF: 200; 25 POWDER RESPIRATORY (INHALATION) at 09:03

## 2023-03-16 RX ADMIN — PROMETHAZINE HYDROCHLORIDE 12.5 MG: 6.25 SOLUTION ORAL at 01:03

## 2023-03-16 RX ADMIN — HEPARIN SODIUM 5000 UNITS: 5000 INJECTION INTRAVENOUS; SUBCUTANEOUS at 05:03

## 2023-03-16 RX ADMIN — INSULIN DETEMIR 8 UNITS: 100 INJECTION, SOLUTION SUBCUTANEOUS at 09:03

## 2023-03-16 NOTE — CONSULTS
Food & Nutrition  Education    Diet Education: A1C >9  Time Spent: 10 minutes  Learners: Patient      Nutrition Education provided with handouts: Meal Planning Using the Plate Method, CHO Counting for People with Diabetes      Comments: RD spoke with patient regarding CHO meal planning for people with diabetes. RD explained carbohydrates are grains, starchy vegetables, milk, yogurt, fruit, and sweets.  Foods with carbohydrates cause your blood sugar to rise, it is important to choose healthy carbohydrates and to control the amount of carbohydrates eaten at each meal for blood sugar management. Don't skip meals. Choose fresh, unprocessed foods as often as possible. Drink water. Limit sugary beverages such as andrea, juice and punch. Fill half you plate with non-starchy vegetables. Choose lean proteins (seafood, beef/pork loin, chicken/turkey, eggs). Eat more whole grain breads and cereals. Choose 1-2 servings of carbohydrates per meal. Appropriate portion sizes. Patient is aware of A1C 13.6% and what that the lab value means. Patient agrees to make changes to comply with diabetic diet. All questions/concerns were addressed.     All questions and concerns answered. Dietitian's contact information provided.       Follow-Up: Yes    Please re-consult as needed.        Thanks!    Ky Negrete Registration Eligible, Provisional LDN

## 2023-03-16 NOTE — ASSESSMENT & PLAN NOTE
52 y.o. female with T2DM (A1C >13.6), HTN, CHF, COPD who is admitted to [facility] for DKA and bateremia. Patient brought in on 3/10 for altered mental status where she was found to be in DKA (bicarb <5, AG 54, Gluc 970, BHB 5.2). She was admitted to the ICU and started on DKA protcol. Insulin drip was off within one day and patient started on long acting insulin, however remained on D5W secondary to hypernatremia. Her BG since then has been labile with peaks at about 480 and one isolated reading at 39. At the time of consultation, she is only on scheduled detemir 8u BID.     -recommend increasing Detemir to 12u BID  -recommend initiation of prandial insulin, aspart 8u TIDWM  -LDISS  -further recommendations pending BG trend

## 2023-03-16 NOTE — PLAN OF CARE
[facility] - Med Surg  Discharge Final Note    Primary Care Provider: Primary Doctor No    Expected Discharge Date: 3/16/2023    Final Discharge Note (most recent)       Final Note - 03/13/23 1640          Final Note    Assessment Type Discharge Planning Assessment        Post-Acute Status    Coverage Medicaid [facility] community Plan                                Contact Info       No, Primary Doctor   Relationship: PCP - General        Next Steps: Follow up in 1 day(s)              Patient left AMA.     Shanice Bryant RN  Case Management  [facility]  [phone]

## 2023-03-16 NOTE — CONSULTS
Hospital Medicine Pharmacy Consult Note    PharmD Consult Received For:     Pharmacy to perform an admission medication history and reconciliation  Medication history completed by Zainab Mayfield PharmD. Please see the pharmacy med rec note documented on 3/16/23 for the updated medication list.      Pharmacy will sign-off, please reach out if you have any questions.    Thank you for the consult,  Zainab Mayfield PharmD  Extension [phone]    **Note: Consults are reviewed Monday-Friday 7:00am-3:30pm. The above recommendations are only suggested. The recommendations should be considered in conjunction with all patient factors.**

## 2023-03-16 NOTE — HPI
Reason for Consult: Management of T2DM, Hyperglycemia     Surgical Procedure and Date: n/a    Diabetes diagnosis year: >10yrs    Home Diabetes Medications:  Unclear. Per pharmacy med rec, detemir 40u daily, aspart 10uTIDWM. However, patient refers taking detemir 62u BID instead.    How often checking glucose at home?  Not reliably checking    BG readings on regimen: n/a  Hypoglycemia on the regimen?  No  Missed doses on regimen?  Yes    Diabetes Complications include:     Hyperglycemia    Complicating diabetes co morbidities:   CHF      HPI:   Patient is a 52 y.o. female with T2DM (A1C >13.6), HTN, CHF, COPD who is admitted to [facility] for [place]KA and bateremia. Patient brought in on 3/10 for altered mental status where she was found to be in DKA (bicarb <5, AG 54, Gluc 970, BHB 5.2). She was admitted to the ICU and started on DKA protcol and precedex for agitation. Insulin drip was off within one day and patient started on long acting insulin, however remained on D5W secondary to hypernatremia. Her BG since then has been labile with peaks at about 480 and one isolated reading at 39. At the time of consultation, she is only on scheduled detemir 8u BID. Endocrinology consulted for BG management.

## 2023-03-16 NOTE — PHARMACY MED REC
"Admission Medication History     The home medication history was taken by Zainab Mayfield PharmD.    You may go to "Admission" then "Reconcile Home Medications" tabs to review and/or act upon these items.     The home medication list has been updated by the Pharmacy department.   Please read ALL comments highlighted in yellow.   Please address this information as you see fit.    Feel free to contact us if you have any questions or require assistance.    Medications listed below were obtained from: Patient/family  PTA Medications   Medication Sig    albuterol (ACCUNEB) 1.25 mg/3 mL Nebu Take 1.25 mg by nebulization every 6 (six) hours as needed.    ALPRAZolam (XANAX) 2 MG Tab Take 2 mg by mouth daily as needed for Anxiety.    amlodipine (NORVASC) 10 MG tablet Take 10 mg by mouth once daily.    aspirin 81 MG Chew Take 81 mg by mouth once daily.    atorvastatin (LIPITOR) 20 MG tablet Take 20 mg by mouth once daily.    gabapentin (NEURONTIN) 400 MG capsule Take 400 mg by mouth 2 (two) times daily.    insulin aspart U-100 (NOVOLOG) 100 unit/mL injection Inject 10 Units into the skin 3 (three) times daily before meals.    insulin detemir U-100 (LEVEMIR) 100 unit/mL injection Inject 40 Units into the skin once daily.    omeprazole (PRILOSEC) 40 MG capsule Take 40 mg by mouth once daily.    pregabalin (LYRICA) 75 MG capsule Take 75 mg by mouth 2 (two) times daily.    promethazine (PHENERGAN) 12.5 MG Tab Take 25 mg by mouth every 6 (six) hours as needed.    tiotropium (SPIRIVA) 18 mcg inhalation capsule Inhale 18 mcg into the lungs once daily.       Potential issues to be addressed PRIOR TO DISCHARGE  Patient requested refill for the following medications:  Aprazolam (Xanax) 2 mg tablet daily prn anxiety  Aspirin 81 mg chew tablet daily  Omeprazole (Prilosec) 40 mg capsule daily  Promethazine 25 mg tablet every 6 hours prn nausea/vomiting    Thank you for your consult. Pharmacy will sign off. Please reach out if you have any " questions.    Zainab Mayfield, PharmD  EXT: [phone]                .

## 2023-03-16 NOTE — CONSULTS
[facility]  Endocrinology  Diabetes Consult Note    Consult Requested by: Qasim Cleveland MD   Reason for admit: Diabetic ketoacidosis associated with type 2 diabetes mellitus    HISTORY OF PRESENT ILLNESS:  Reason for Consult: Management of T2DM, Hyperglycemia     Surgical Procedure and Date: n/a    Diabetes diagnosis year: >10yrs    Home Diabetes Medications:  Unclear. Per pharmacy med rec, detemir 40u daily, aspart 10uTIDWM. However, patient refers taking detemir 62u BID instead.    How often checking glucose at home?  Not reliably checking    BG readings on regimen: n/a  Hypoglycemia on the regimen?  No  Missed doses on regimen?  Yes    Diabetes Complications include:     Hyperglycemia    Complicating diabetes co morbidities:   CHF      HPI:   Patient is a 52 y.o. female with T2DM (A1C >13.6), HTN, CHF, COPD who is admitted to [facility] for DKA and bateremia. Patient brought in on 3/10 for altered mental status where she was found to be in DKA (bicarb <5, AG 54, Gluc 970, BHB 5.2). She was admitted to the ICU and started on DKA protcol and precedex for agitation. Insulin drip was off within one day and patient started on long acting insulin, however remained on D5W secondary to hypernatremia. Her BG since then has been labile with peaks at about 480 and one isolated reading at 39. At the time of consultation, she is only on scheduled detemir 8u BID. Endocrinology consulted for BG management.         Interval HPI:   Overnight events:  Eatin%  Nausea: Yes  Hypoglycemia and intervention: Yes  Fever: No  TPN and/or TF: No  If yes, type of TF/TPN and rate:     PMH, PSH, FH, SH updated and reviewed         Review of Systems   Constitutional:  Negative for chills, fatigue and fever.   HENT:  Negative for congestion and sore throat.    Eyes:  Negative for visual disturbance.   Respiratory:  Negative for cough, shortness of breath and wheezing.    Cardiovascular:  Negative for chest pain, palpitations and leg  swelling.   Gastrointestinal:  Positive for nausea. Negative for abdominal pain, diarrhea and vomiting.   Endocrine: Negative for polyuria.   Genitourinary:  Negative for dysuria, flank pain and frequency.   Musculoskeletal:  Positive for arthralgias. Negative for back pain and myalgias.   Skin:  Negative for pallor and rash.   Neurological:  Negative for light-headedness and headaches.     Current Medications and/or Treatments Impacting Glycemic Control  Immunotherapy:    Immunosuppressants       None          Steroids:   Hormones (From admission, onward)      None          Pressors:    Autonomic Drugs (From admission, onward)      None          Hyperglycemia/Diabetes Medications:   Antihyperglycemics (From admission, onward)      Start     Stop Route Frequency Ordered    03/16/23 2100  insulin detemir U-100 pen 12 Units         -- SubQ 2 times daily 03/16/23 1306    03/16/23 1645  insulin aspart U-100 pen 8 Units         -- SubQ 3 times daily with meals 03/16/23 1306    03/16/23 1405  insulin aspart U-100 pen 0-5 Units         -- SubQ Before meals & nightly PRN 03/16/23 1306    03/10/23 1430  insulin regular in 0.9 % NaCl 100 unit/100 mL (1 unit/mL) infusion        Question Answer Comment   Insulin Rate Adjustment (DO NOT MODIFY ANSWER) \\[email or web address]    Enter initial dose from Infusion Protocol Chart (Units/hr): 4        03/11 1400 IV Continuous 03/10/23 1318             PHYSICAL EXAMINATION:  Vitals:    03/16/23 1123   BP: 128/82   Pulse: 99   Resp: 18   Temp: 97.2 °F (36.2 °C)     Body mass index is 24.03 kg/m².    Physical Exam  Vitals and nursing note reviewed.   Constitutional:       General: She is not in acute distress.     Appearance: She is not toxic-appearing.   HENT:      Head: Normocephalic and atraumatic.      Mouth/Throat:      Mouth: Mucous membranes are dry.      Pharynx: No oropharyngeal exudate.   Eyes:      Extraocular Movements:  Extraocular movements intact.      Pupils: Pupils are equal, round, and reactive to light.   Cardiovascular:      Rate and Rhythm: Normal rate and regular rhythm.      Heart sounds: No murmur heard.  Pulmonary:      Effort: Pulmonary effort is normal.      Breath sounds: No wheezing or rales.   Abdominal:      General: Abdomen is flat. Bowel sounds are normal. There is no distension.      Tenderness: There is no abdominal tenderness. There is no guarding.   Musculoskeletal:         General: No swelling or tenderness. Normal range of motion.      Cervical back: Normal range of motion.   Lymphadenopathy:      Cervical: No cervical adenopathy.   Skin:     General: Skin is warm.      Coloration: Skin is not jaundiced.      Findings: No bruising or rash.   Neurological:      General: No focal deficit present.      Mental Status: She is alert and oriented to person, place, and time.      Cranial Nerves: No cranial nerve deficit.         Labs Reviewed and Include   Recent Labs   Lab 03/16/23  0829   *   CALCIUM 7.9*      K 3.7   CO2 21*      BUN 30*   CREATININE 1.2     Lab Results   Component Value Date    WBC 14.84 (H) 03/15/2023    HGB 9.1 (L) 03/15/2023    HCT 29.0 (L) 03/15/2023    MCV 68 (L) 03/15/2023     (L) 03/15/2023     No results for input(s): TSH, FREET4 in the last 168 hours.  Lab Results   Component Value Date    HGBA1C 13.6 (H) 03/11/2023       Nutritional status:   Body mass index is 24.03 kg/m².  Lab Results   Component Value Date    ALBUMIN 1.4 (L) 03/13/2023    ALBUMIN 1.6 (L) 03/12/2023    ALBUMIN 1.9 (L) 03/10/2023     No results found for: PREALBUMIN    Estimated Creatinine Clearance: 46.3 mL/min (based on SCr of 1.2 mg/dL).    Accu-Checks  Recent Labs     03/14/23  1422 03/14/23  1658 03/14/23  2149 03/15/23  0727 03/15/23  1103 03/15/23  1610 03/15/23  2021 03/15/23  2051 03/16/23  0715 03/16/23  1122   POCTGLUCOSE 152* 227* 257* 440* 395* 182* 310* 326* 422* 425*         ASSESSMENT and PLAN    Renal/  Hypernatremia  -kept on D5W after resolution of DKA, complicating hyperglycemia  -now off and on 1/2 NS        ID  E coli bacteremia  -management per primary      Endocrine  Type 2 diabetes mellitus without complication, with long-term current use of insulin  52 y.o. female with T2DM (A1C >13.6), HTN, CHF, COPD who is admitted to [facility] for DKA and bateremia. Patient brought in on 3/10 for altered mental status where she was found to be in DKA (bicarb <5, AG 54, Gluc 970, BHB 5.2). She was admitted to the ICU and started on DKA protcol. Insulin drip was off within one day and patient started on long acting insulin, however remained on D5W secondary to hypernatremia. Her BG since then has been labile with peaks at about 480 and one isolated reading at 39. At the time of consultation, she is only on scheduled detemir 8u BID.     -recommend increasing Detemir to 12u BID  -recommend initiation of prandial insulin, aspart 8u TIDWM  -LDISS  -further recommendations pending BG trend        Plan discussed with patient, family, and RN at bedside.     Lang Reyes MD  Endocrinology  [facility] S[facility]

## 2023-03-16 NOTE — PLAN OF CARE
Patient leaving AMA. Doctor went over risk and benefits patient still want to leave  Problem: Adult Inpatient Plan of Care  Goal: Plan of Care Review  Outcome: Ongoing, Not Progressing  Goal: Patient-Specific Goal (Individualized)  Outcome: Ongoing, Not Progressing  Goal: Absence of Hospital-Acquired Illness or Injury  Outcome: Ongoing, Not Progressing  Goal: Optimal Comfort and Wellbeing  Outcome: Ongoing, Not Progressing  Goal: Readiness for Transition of Care  Outcome: Ongoing, Not Progressing     Problem: Fall Injury Risk  Goal: Absence of Fall and Fall-Related Injury  Outcome: Ongoing, Not Progressing     Problem: Restraint, Nonbehavioral (Nonviolent)  Goal: Absence of Harm or Injury  Outcome: Ongoing, Not Progressing     Problem: Infection  Goal: Absence of Infection Signs and Symptoms  Outcome: Ongoing, Not Progressing     Problem: Fluid and Electrolyte Imbalance (Acute Kidney Injury/Impairment)  Goal: Fluid and Electrolyte Balance  Outcome: Ongoing, Not Progressing     Problem: Oral Intake Inadequate (Acute Kidney Injury/Impairment)  Goal: Optimal Nutrition Intake  Outcome: Ongoing, Not Progressing     Problem: Renal Function Impairment (Acute Kidney Injury/Impairment)  Goal: Effective Renal Function  Outcome: Ongoing, Not Progressing     Problem: Diabetes Comorbidity  Goal: Blood Glucose Level Within Targeted Range  Outcome: Ongoing, Not Progressing     Problem: Skin Injury Risk Increased  Goal: Skin Health and Integrity  Outcome: Ongoing, Not Progressing

## 2023-03-16 NOTE — SUBJECTIVE & OBJECTIVE
Interval HPI:   Overnight events:  Eatin%  Nausea: Yes  Hypoglycemia and intervention: Yes  Fever: No  TPN and/or TF: No  If yes, type of TF/TPN and rate:     PMH, PSH, FH, SH updated and reviewed         Review of Systems   Constitutional:  Negative for chills, fatigue and fever.   HENT:  Negative for congestion and sore throat.    Eyes:  Negative for visual disturbance.   Respiratory:  Negative for cough, shortness of breath and wheezing.    Cardiovascular:  Negative for chest pain, palpitations and leg swelling.   Gastrointestinal:  Positive for nausea. Negative for abdominal pain, diarrhea and vomiting.   Endocrine: Negative for polyuria.   Genitourinary:  Negative for dysuria, flank pain and frequency.   Musculoskeletal:  Positive for arthralgias. Negative for back pain and myalgias.   Skin:  Negative for pallor and rash.   Neurological:  Negative for light-headedness and headaches.     Current Medications and/or Treatments Impacting Glycemic Control  Immunotherapy:    Immunosuppressants       None          Steroids:   Hormones (From admission, onward)      None          Pressors:    Autonomic Drugs (From admission, onward)      None          Hyperglycemia/Diabetes Medications:   Antihyperglycemics (From admission, onward)      Start     Stop Route Frequency Ordered    23 2100  insulin detemir U-100 pen 12 Units         -- SubQ 2 times daily 23 1306    23 1645  insulin aspart U-100 pen 8 Units         -- SubQ 3 times daily with meals 23 1306    23 1405  insulin aspart U-100 pen 0-5 Units         -- SubQ Before meals & nightly PRN 23 1306    03/10/23 1430  insulin regular in 0.9 % NaCl 100 unit/100 mL (1 unit/mL) infusion        Question Answer Comment   Insulin Rate Adjustment (DO NOT MODIFY ANSWER) [email or web address]    Enter initial dose from Infusion Protocol Chart (Units/hr): 4         1400 IV Continuous  03/10/23 1318             PHYSICAL EXAMINATION:  Vitals:    03/16/23 1123   BP: 128/82   Pulse: 99   Resp: 18   Temp: 97.2 °F (36.2 °C)     Body mass index is 24.03 kg/m².    Physical Exam  Vitals and nursing note reviewed.   Constitutional:       General: She is not in acute distress.     Appearance: She is not toxic-appearing.   HENT:      Head: Normocephalic and atraumatic.      Mouth/Throat:      Mouth: Mucous membranes are dry.      Pharynx: No oropharyngeal exudate.   Eyes:      Extraocular Movements: Extraocular movements intact.      Pupils: Pupils are equal, round, and reactive to light.   Cardiovascular:      Rate and Rhythm: Normal rate and regular rhythm.      Heart sounds: No murmur heard.  Pulmonary:      Effort: Pulmonary effort is normal.      Breath sounds: No wheezing or rales.   Abdominal:      General: Abdomen is flat. Bowel sounds are normal. There is no distension.      Tenderness: There is no abdominal tenderness. There is no guarding.   Musculoskeletal:         General: No swelling or tenderness. Normal range of motion.      Cervical back: Normal range of motion.   Lymphadenopathy:      Cervical: No cervical adenopathy.   Skin:     General: Skin is warm.      Coloration: Skin is not jaundiced.      Findings: No bruising or rash.   Neurological:      General: No focal deficit present.      Mental Status: She is alert and oriented to person, place, and time.      Cranial Nerves: No cranial nerve deficit.

## 2023-03-17 ENCOUNTER — HOSPITAL ENCOUNTER (INPATIENT)
Facility: HOSPITAL | Age: 53
LOS: 6 days | Discharge: HOME | DRG: 638 | End: 2023-03-23
Attending: EMERGENCY MEDICINE | Admitting: INTERNAL MEDICINE
Payer: MEDICAID

## 2023-03-17 DIAGNOSIS — E11.9 TYPE 2 DIABETES MELLITUS WITHOUT COMPLICATION, WITH LONG-TERM CURRENT USE OF INSULIN: ICD-10-CM

## 2023-03-17 DIAGNOSIS — I82.890 SUPERFICIAL VEIN THROMBOSIS: Primary | ICD-10-CM

## 2023-03-17 DIAGNOSIS — B96.20 E COLI BACTEREMIA: ICD-10-CM

## 2023-03-17 DIAGNOSIS — R06.00 DYSPNEA: ICD-10-CM

## 2023-03-17 DIAGNOSIS — M25.511 RIGHT SHOULDER PAIN: ICD-10-CM

## 2023-03-17 DIAGNOSIS — E16.2 HYPOGLYCEMIA: ICD-10-CM

## 2023-03-17 DIAGNOSIS — Z72.0 TOBACCO ABUSE: ICD-10-CM

## 2023-03-17 DIAGNOSIS — R78.81 E COLI BACTEREMIA: ICD-10-CM

## 2023-03-17 DIAGNOSIS — Z79.4 TYPE 2 DIABETES MELLITUS WITHOUT COMPLICATION, WITH LONG-TERM CURRENT USE OF INSULIN: ICD-10-CM

## 2023-03-17 PROBLEM — E87.8 ELECTROLYTE ABNORMALITY: Status: ACTIVE | Noted: 2023-03-17

## 2023-03-17 LAB
ALBUMIN SERPL BCP-MCNC: 1.7 G/DL (ref 3.5–5.2)
ALLENS TEST: ABNORMAL
ALP SERPL-CCNC: 115 U/L (ref 55–135)
ALT SERPL W/O P-5'-P-CCNC: 13 U/L (ref 10–44)
ANION GAP SERPL CALC-SCNC: 8 MMOL/L (ref 8–16)
ANISOCYTOSIS BLD QL SMEAR: SLIGHT
AST SERPL-CCNC: 14 U/L (ref 10–40)
B-OH-BUTYR BLD STRIP-SCNC: 0 MMOL/L (ref 0–0.5)
BASOPHILS # BLD AUTO: 0.03 K/UL (ref 0–0.2)
BASOPHILS NFR BLD: 0.2 % (ref 0–1.9)
BILIRUB SERPL-MCNC: 0.2 MG/DL (ref 0.1–1)
BUN SERPL-MCNC: 24 MG/DL (ref 6–20)
CALCIUM SERPL-MCNC: 7.2 MG/DL (ref 8.7–10.5)
CHLORIDE SERPL-SCNC: 112 MMOL/L (ref 95–110)
CO2 SERPL-SCNC: 24 MMOL/L (ref 23–29)
CREAT SERPL-MCNC: 0.8 MG/DL (ref 0.5–1.4)
DELSYS: ABNORMAL
DIFFERENTIAL METHOD: ABNORMAL
EOSINOPHIL # BLD AUTO: 0 K/UL (ref 0–0.5)
EOSINOPHIL NFR BLD: 0.1 % (ref 0–8)
ERYTHROCYTE [DISTWIDTH] IN BLOOD BY AUTOMATED COUNT: 15.7 % (ref 11.5–14.5)
EST. GFR  (NO RACE VARIABLE): >60 ML/MIN/1.73 M^2
GLUCOSE SERPL-MCNC: 77 MG/DL (ref 70–110)
HCO3 UR-SCNC: 19.4 MMOL/L (ref 24–28)
HCT VFR BLD AUTO: 26.8 % (ref 37–48.5)
HGB BLD-MCNC: 8.1 G/DL (ref 12–16)
HYPOCHROMIA BLD QL SMEAR: ABNORMAL
IMM GRANULOCYTES # BLD AUTO: 0.15 K/UL (ref 0–0.04)
IMM GRANULOCYTES NFR BLD AUTO: 0.8 % (ref 0–0.5)
LYMPHOCYTES # BLD AUTO: 1.3 K/UL (ref 1–4.8)
LYMPHOCYTES NFR BLD: 6.7 % (ref 18–48)
MAGNESIUM SERPL-MCNC: 1.4 MG/DL (ref 1.6–2.6)
MCH RBC QN AUTO: 21.5 PG (ref 27–31)
MCHC RBC AUTO-ENTMCNC: 30.2 G/DL (ref 32–36)
MCV RBC AUTO: 71 FL (ref 82–98)
MODE: ABNORMAL
MONOCYTES # BLD AUTO: 1.2 K/UL (ref 0.3–1)
MONOCYTES NFR BLD: 5.9 % (ref 4–15)
NEUTROPHILS # BLD AUTO: 16.8 K/UL (ref 1.8–7.7)
NEUTROPHILS NFR BLD: 86.3 % (ref 38–73)
NRBC BLD-RTO: 0 /100 WBC
OVALOCYTES BLD QL SMEAR: ABNORMAL
PCO2 BLDA: 44.8 MMHG (ref 35–45)
PH SMN: 7.25 [PH] (ref 7.35–7.45)
PLATELET # BLD AUTO: 301 K/UL (ref 150–450)
PLATELET BLD QL SMEAR: ABNORMAL
PMV BLD AUTO: 11.8 FL (ref 9.2–12.9)
PO2 BLDA: 17 MMHG (ref 40–60)
POC BE: -8 MMOL/L
POC SATURATED O2: 18 % (ref 95–100)
POC TCO2: 21 MMOL/L (ref 24–29)
POCT GLUCOSE: 145 MG/DL (ref 70–110)
POCT GLUCOSE: 151 MG/DL (ref 70–110)
POCT GLUCOSE: 154 MG/DL (ref 70–110)
POCT GLUCOSE: 44 MG/DL (ref 70–110)
POCT GLUCOSE: 57 MG/DL (ref 70–110)
POCT GLUCOSE: 90 MG/DL (ref 70–110)
POIKILOCYTOSIS BLD QL SMEAR: SLIGHT
POTASSIUM SERPL-SCNC: 2.9 MMOL/L (ref 3.5–5.1)
PROT SERPL-MCNC: 6 G/DL (ref 6–8.4)
RBC # BLD AUTO: 3.77 M/UL (ref 4–5.4)
SAMPLE: ABNORMAL
SITE: ABNORMAL
SODIUM SERPL-SCNC: 144 MMOL/L (ref 136–145)
TARGETS BLD QL SMEAR: ABNORMAL
WBC # BLD AUTO: 19.5 K/UL (ref 3.9–12.7)
WBC TOXIC VACUOLES BLD QL SMEAR: PRESENT

## 2023-03-17 PROCEDURE — 63600175 PHARM REV CODE 636 W HCPCS: Performed by: EMERGENCY MEDICINE

## 2023-03-17 PROCEDURE — 96367 TX/PROPH/DG ADDL SEQ IV INF: CPT

## 2023-03-17 PROCEDURE — 25000003 PHARM REV CODE 250: Performed by: EMERGENCY MEDICINE

## 2023-03-17 PROCEDURE — 80053 COMPREHEN METABOLIC PANEL: CPT | Performed by: EMERGENCY MEDICINE

## 2023-03-17 PROCEDURE — 85025 COMPLETE CBC W/AUTO DIFF WBC: CPT | Performed by: EMERGENCY MEDICINE

## 2023-03-17 PROCEDURE — 25000003 PHARM REV CODE 250: Performed by: INTERNAL MEDICINE

## 2023-03-17 PROCEDURE — 82010 KETONE BODYS QUAN: CPT | Performed by: EMERGENCY MEDICINE

## 2023-03-17 PROCEDURE — 99291 PR CRITICAL CARE, E/M 30-74 MINUTES: ICD-10-PCS | Mod: ,,, | Performed by: EMERGENCY MEDICINE

## 2023-03-17 PROCEDURE — 99900035 HC TECH TIME PER 15 MIN (STAT)

## 2023-03-17 PROCEDURE — 99284 EMERGENCY DEPT VISIT MOD MDM: CPT | Mod: 25

## 2023-03-17 PROCEDURE — 99222 PR INITIAL HOSPITAL CARE,LEVL II: ICD-10-PCS | Mod: ,,, | Performed by: INTERNAL MEDICINE

## 2023-03-17 PROCEDURE — 99291 CRITICAL CARE FIRST HOUR: CPT | Mod: ,,, | Performed by: EMERGENCY MEDICINE

## 2023-03-17 PROCEDURE — 82803 BLOOD GASES ANY COMBINATION: CPT

## 2023-03-17 PROCEDURE — 20600001 HC STEP DOWN PRIVATE ROOM

## 2023-03-17 PROCEDURE — 82962 GLUCOSE BLOOD TEST: CPT | Mod: 59

## 2023-03-17 PROCEDURE — 96375 TX/PRO/DX INJ NEW DRUG ADDON: CPT

## 2023-03-17 PROCEDURE — 99222 1ST HOSP IP/OBS MODERATE 55: CPT | Mod: ,,, | Performed by: INTERNAL MEDICINE

## 2023-03-17 PROCEDURE — 83735 ASSAY OF MAGNESIUM: CPT | Performed by: EMERGENCY MEDICINE

## 2023-03-17 PROCEDURE — 96365 THER/PROPH/DIAG IV INF INIT: CPT

## 2023-03-17 RX ORDER — ONDANSETRON 2 MG/ML
4 INJECTION INTRAMUSCULAR; INTRAVENOUS EVERY 8 HOURS PRN
Status: DISCONTINUED | OUTPATIENT
Start: 2023-03-17 | End: 2023-03-23 | Stop reason: HOSPADM

## 2023-03-17 RX ORDER — PANTOPRAZOLE SODIUM 40 MG/1
40 TABLET, DELAYED RELEASE ORAL DAILY
Status: DISCONTINUED | OUTPATIENT
Start: 2023-03-18 | End: 2023-03-23 | Stop reason: HOSPADM

## 2023-03-17 RX ORDER — DEXTROSE MONOHYDRATE 50 MG/ML
1000 INJECTION, SOLUTION INTRAVENOUS
Status: COMPLETED | OUTPATIENT
Start: 2023-03-17 | End: 2023-03-17

## 2023-03-17 RX ORDER — ONDANSETRON 2 MG/ML
4 INJECTION INTRAMUSCULAR; INTRAVENOUS
Status: COMPLETED | OUTPATIENT
Start: 2023-03-17 | End: 2023-03-17

## 2023-03-17 RX ORDER — ATORVASTATIN CALCIUM 20 MG/1
20 TABLET, FILM COATED ORAL DAILY
Status: DISCONTINUED | OUTPATIENT
Start: 2023-03-18 | End: 2023-03-23 | Stop reason: HOSPADM

## 2023-03-17 RX ORDER — DEXTROSE 40 %
15 GEL (GRAM) ORAL
Status: DISCONTINUED | OUTPATIENT
Start: 2023-03-17 | End: 2023-03-23 | Stop reason: HOSPADM

## 2023-03-17 RX ORDER — DEXTROSE 40 %
30 GEL (GRAM) ORAL
Status: DISCONTINUED | OUTPATIENT
Start: 2023-03-17 | End: 2023-03-23 | Stop reason: HOSPADM

## 2023-03-17 RX ORDER — DEXTROSE 40 %
15 GEL (GRAM) ORAL
Status: DISCONTINUED | OUTPATIENT
Start: 2023-03-17 | End: 2023-03-17

## 2023-03-17 RX ORDER — PROMETHAZINE HYDROCHLORIDE 12.5 MG/1
25 TABLET ORAL EVERY 6 HOURS PRN
Status: DISCONTINUED | OUTPATIENT
Start: 2023-03-17 | End: 2023-03-23 | Stop reason: HOSPADM

## 2023-03-17 RX ORDER — MAGNESIUM SULFATE HEPTAHYDRATE 40 MG/ML
2 INJECTION, SOLUTION INTRAVENOUS ONCE
Status: COMPLETED | OUTPATIENT
Start: 2023-03-17 | End: 2023-03-17

## 2023-03-17 RX ORDER — POTASSIUM CHLORIDE 20 MEQ/1
40 TABLET, EXTENDED RELEASE ORAL ONCE
Status: COMPLETED | OUTPATIENT
Start: 2023-03-17 | End: 2023-03-17

## 2023-03-17 RX ORDER — NAPROXEN SODIUM 220 MG/1
81 TABLET, FILM COATED ORAL DAILY
Status: DISCONTINUED | OUTPATIENT
Start: 2023-03-18 | End: 2023-03-22

## 2023-03-17 RX ORDER — ALBUTEROL SULFATE 90 UG/1
2 AEROSOL, METERED RESPIRATORY (INHALATION) EVERY 6 HOURS PRN
Status: DISCONTINUED | OUTPATIENT
Start: 2023-03-17 | End: 2023-03-23 | Stop reason: HOSPADM

## 2023-03-17 RX ORDER — DEXTROSE 40 %
30 GEL (GRAM) ORAL
Status: DISCONTINUED | OUTPATIENT
Start: 2023-03-17 | End: 2023-03-17

## 2023-03-17 RX ORDER — INSULIN ASPART 100 [IU]/ML
8 INJECTION, SOLUTION INTRAVENOUS; SUBCUTANEOUS
Status: DISCONTINUED | OUTPATIENT
Start: 2023-03-18 | End: 2023-03-18

## 2023-03-17 RX ORDER — AMLODIPINE BESYLATE 10 MG/1
10 TABLET ORAL DAILY
Status: DISCONTINUED | OUTPATIENT
Start: 2023-03-18 | End: 2023-03-23 | Stop reason: HOSPADM

## 2023-03-17 RX ORDER — INSULIN ASPART 100 [IU]/ML
1-10 INJECTION, SOLUTION INTRAVENOUS; SUBCUTANEOUS
Status: DISCONTINUED | OUTPATIENT
Start: 2023-03-17 | End: 2023-03-18

## 2023-03-17 RX ORDER — SODIUM CHLORIDE 0.9 % (FLUSH) 0.9 %
10 SYRINGE (ML) INJECTION EVERY 12 HOURS PRN
Status: DISCONTINUED | OUTPATIENT
Start: 2023-03-17 | End: 2023-03-23 | Stop reason: HOSPADM

## 2023-03-17 RX ORDER — IBUPROFEN 200 MG
400 TABLET ORAL EVERY 6 HOURS PRN
Status: DISCONTINUED | OUTPATIENT
Start: 2023-03-17 | End: 2023-03-23 | Stop reason: HOSPADM

## 2023-03-17 RX ORDER — GABAPENTIN 400 MG/1
400 CAPSULE ORAL 2 TIMES DAILY
Status: DISCONTINUED | OUTPATIENT
Start: 2023-03-17 | End: 2023-03-23 | Stop reason: HOSPADM

## 2023-03-17 RX ORDER — IBUPROFEN 200 MG
1 TABLET ORAL DAILY
Status: DISCONTINUED | OUTPATIENT
Start: 2023-03-18 | End: 2023-03-23 | Stop reason: HOSPADM

## 2023-03-17 RX ORDER — GLUCAGON 1 MG
1 KIT INJECTION
Status: DISCONTINUED | OUTPATIENT
Start: 2023-03-17 | End: 2023-03-23 | Stop reason: HOSPADM

## 2023-03-17 RX ORDER — NALOXONE HCL 0.4 MG/ML
0.02 VIAL (ML) INJECTION
Status: DISCONTINUED | OUTPATIENT
Start: 2023-03-17 | End: 2023-03-23 | Stop reason: HOSPADM

## 2023-03-17 RX ORDER — POTASSIUM CHLORIDE 7.45 MG/ML
10 INJECTION INTRAVENOUS
Status: COMPLETED | OUTPATIENT
Start: 2023-03-17 | End: 2023-03-17

## 2023-03-17 RX ORDER — FLUTICASONE FUROATE AND VILANTEROL 200; 25 UG/1; UG/1
1 POWDER RESPIRATORY (INHALATION) DAILY
Status: DISCONTINUED | OUTPATIENT
Start: 2023-03-18 | End: 2023-03-23 | Stop reason: HOSPADM

## 2023-03-17 RX ORDER — ACETAMINOPHEN 325 MG/1
650 TABLET ORAL EVERY 4 HOURS PRN
Status: DISCONTINUED | OUTPATIENT
Start: 2023-03-17 | End: 2023-03-23 | Stop reason: HOSPADM

## 2023-03-17 RX ADMIN — ONDANSETRON 4 MG: 2 INJECTION INTRAMUSCULAR; INTRAVENOUS at 02:03

## 2023-03-17 RX ADMIN — MAGNESIUM SULFATE 2 G: 2 INJECTION INTRAVENOUS at 06:03

## 2023-03-17 RX ADMIN — ACETAMINOPHEN 650 MG: 325 TABLET ORAL at 11:03

## 2023-03-17 RX ADMIN — POTASSIUM CHLORIDE 10 MEQ: 7.46 INJECTION, SOLUTION INTRAVENOUS at 03:03

## 2023-03-17 RX ADMIN — CEFTRIAXONE 1 G: 1 INJECTION, POWDER, FOR SOLUTION INTRAMUSCULAR; INTRAVENOUS at 02:03

## 2023-03-17 RX ADMIN — DEXTROSE 1000 ML: 5 SOLUTION INTRAVENOUS at 02:03

## 2023-03-17 RX ADMIN — GABAPENTIN 400 MG: 400 CAPSULE ORAL at 09:03

## 2023-03-17 RX ADMIN — DEXTROSE MONOHYDRATE 250 ML: 100 INJECTION, SOLUTION INTRAVENOUS at 03:03

## 2023-03-17 RX ADMIN — POTASSIUM CHLORIDE 40 MEQ: 1500 TABLET, EXTENDED RELEASE ORAL at 09:03
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
## 2023-03-18 LAB
AMORPH CRY UR QL COMP ASSIST: ABNORMAL
ANION GAP SERPL CALC-SCNC: 8 MMOL/L (ref 8–16)
ANISOCYTOSIS BLD QL SMEAR: SLIGHT
BACTERIA #/AREA URNS AUTO: ABNORMAL /HPF
BASOPHILS # BLD AUTO: 0.01 K/UL (ref 0–0.2)
BASOPHILS NFR BLD: 0.1 % (ref 0–1.9)
BILIRUB UR QL STRIP: NEGATIVE
BUN SERPL-MCNC: 24 MG/DL (ref 6–20)
CALCIUM SERPL-MCNC: 7 MG/DL (ref 8.7–10.5)
CHLORIDE SERPL-SCNC: 111 MMOL/L (ref 95–110)
CLARITY UR REFRACT.AUTO: CLEAR
CO2 SERPL-SCNC: 24 MMOL/L (ref 23–29)
COLOR UR AUTO: COLORLESS
CREAT SERPL-MCNC: 0.9 MG/DL (ref 0.5–1.4)
DIFFERENTIAL METHOD: ABNORMAL
EOSINOPHIL # BLD AUTO: 0.1 K/UL (ref 0–0.5)
EOSINOPHIL NFR BLD: 0.6 % (ref 0–8)
ERYTHROCYTE [DISTWIDTH] IN BLOOD BY AUTOMATED COUNT: 15.9 % (ref 11.5–14.5)
EST. GFR  (NO RACE VARIABLE): >60 ML/MIN/1.73 M^2
GLUCOSE SERPL-MCNC: 42 MG/DL (ref 70–110)
GLUCOSE UR QL STRIP: ABNORMAL
HCT VFR BLD AUTO: 23.4 % (ref 37–48.5)
HGB BLD-MCNC: 7.2 G/DL (ref 12–16)
HGB UR QL STRIP: ABNORMAL
HYPOCHROMIA BLD QL SMEAR: ABNORMAL
IMM GRANULOCYTES # BLD AUTO: 0.07 K/UL (ref 0–0.04)
IMM GRANULOCYTES NFR BLD AUTO: 0.6 % (ref 0–0.5)
KETONES UR QL STRIP: NEGATIVE
LEUKOCYTE ESTERASE UR QL STRIP: ABNORMAL
LYMPHOCYTES # BLD AUTO: 2 K/UL (ref 1–4.8)
LYMPHOCYTES NFR BLD: 16.2 % (ref 18–48)
MAGNESIUM SERPL-MCNC: 1.3 MG/DL (ref 1.6–2.6)
MCH RBC QN AUTO: 21.2 PG (ref 27–31)
MCHC RBC AUTO-ENTMCNC: 30.8 G/DL (ref 32–36)
MCV RBC AUTO: 69 FL (ref 82–98)
MICROSCOPIC COMMENT: ABNORMAL
MONOCYTES # BLD AUTO: 1.1 K/UL (ref 0.3–1)
MONOCYTES NFR BLD: 9.3 % (ref 4–15)
NEUTROPHILS # BLD AUTO: 8.9 K/UL (ref 1.8–7.7)
NEUTROPHILS NFR BLD: 73.2 % (ref 38–73)
NITRITE UR QL STRIP: NEGATIVE
NRBC BLD-RTO: 0 /100 WBC
OVALOCYTES BLD QL SMEAR: ABNORMAL
PH UR STRIP: 6 [PH] (ref 5–8)
PLATELET # BLD AUTO: 312 K/UL (ref 150–450)
PLATELET BLD QL SMEAR: ABNORMAL
PMV BLD AUTO: 12.3 FL (ref 9.2–12.9)
POCT GLUCOSE: 137 MG/DL (ref 70–110)
POCT GLUCOSE: 156 MG/DL (ref 70–110)
POCT GLUCOSE: 180 MG/DL (ref 70–110)
POCT GLUCOSE: 188 MG/DL (ref 70–110)
POCT GLUCOSE: 210 MG/DL (ref 70–110)
POCT GLUCOSE: 283 MG/DL (ref 70–110)
POCT GLUCOSE: 290 MG/DL (ref 70–110)
POCT GLUCOSE: 44 MG/DL (ref 70–110)
POIKILOCYTOSIS BLD QL SMEAR: SLIGHT
POTASSIUM SERPL-SCNC: 3.4 MMOL/L (ref 3.5–5.1)
PROT UR QL STRIP: ABNORMAL
RBC # BLD AUTO: 3.39 M/UL (ref 4–5.4)
RBC #/AREA URNS AUTO: 3 /HPF (ref 0–4)
SODIUM SERPL-SCNC: 143 MMOL/L (ref 136–145)
SP GR UR STRIP: 1 (ref 1–1.03)
SPHEROCYTES BLD QL SMEAR: ABNORMAL
SQUAMOUS #/AREA URNS AUTO: 1 /HPF
TARGETS BLD QL SMEAR: ABNORMAL
URN SPEC COLLECT METH UR: ABNORMAL
WBC # BLD AUTO: 12.11 K/UL (ref 3.9–12.7)
WBC #/AREA URNS AUTO: 18 /HPF (ref 0–5)

## 2023-03-18 PROCEDURE — 87086 URINE CULTURE/COLONY COUNT: CPT | Performed by: EMERGENCY MEDICINE

## 2023-03-18 PROCEDURE — 25000003 PHARM REV CODE 250: Performed by: INTERNAL MEDICINE

## 2023-03-18 PROCEDURE — 63600175 PHARM REV CODE 636 W HCPCS: Performed by: INTERNAL MEDICINE

## 2023-03-18 PROCEDURE — 36415 COLL VENOUS BLD VENIPUNCTURE: CPT | Performed by: INTERNAL MEDICINE

## 2023-03-18 PROCEDURE — 99232 SBSQ HOSP IP/OBS MODERATE 35: CPT | Mod: ,,, | Performed by: INTERNAL MEDICINE

## 2023-03-18 PROCEDURE — 94761 N-INVAS EAR/PLS OXIMETRY MLT: CPT

## 2023-03-18 PROCEDURE — 81001 URINALYSIS AUTO W/SCOPE: CPT | Performed by: EMERGENCY MEDICINE

## 2023-03-18 PROCEDURE — 83735 ASSAY OF MAGNESIUM: CPT | Performed by: INTERNAL MEDICINE

## 2023-03-18 PROCEDURE — 80048 BASIC METABOLIC PNL TOTAL CA: CPT | Performed by: INTERNAL MEDICINE

## 2023-03-18 PROCEDURE — 99222 1ST HOSP IP/OBS MODERATE 55: CPT | Mod: ,,, | Performed by: INTERNAL MEDICINE

## 2023-03-18 PROCEDURE — 99900035 HC TECH TIME PER 15 MIN (STAT)

## 2023-03-18 PROCEDURE — 99222 PR INITIAL HOSPITAL CARE,LEVL II: ICD-10-PCS | Mod: ,,, | Performed by: INTERNAL MEDICINE

## 2023-03-18 PROCEDURE — 94640 AIRWAY INHALATION TREATMENT: CPT

## 2023-03-18 PROCEDURE — 85025 COMPLETE CBC W/AUTO DIFF WBC: CPT | Performed by: INTERNAL MEDICINE

## 2023-03-18 PROCEDURE — 25000242 PHARM REV CODE 250 ALT 637 W/ HCPCS: Performed by: INTERNAL MEDICINE

## 2023-03-18 PROCEDURE — 99232 PR SUBSEQUENT HOSPITAL CARE,LEVL II: ICD-10-PCS | Mod: ,,, | Performed by: INTERNAL MEDICINE

## 2023-03-18 PROCEDURE — 63600175 PHARM REV CODE 636 W HCPCS: Performed by: STUDENT IN AN ORGANIZED HEALTH CARE EDUCATION/TRAINING PROGRAM

## 2023-03-18 PROCEDURE — 20600001 HC STEP DOWN PRIVATE ROOM

## 2023-03-18 RX ORDER — POTASSIUM CHLORIDE 20 MEQ/1
40 TABLET, EXTENDED RELEASE ORAL ONCE
Status: COMPLETED | OUTPATIENT
Start: 2023-03-18 | End: 2023-03-18

## 2023-03-18 RX ORDER — INSULIN ASPART 100 [IU]/ML
0-5 INJECTION, SOLUTION INTRAVENOUS; SUBCUTANEOUS
Status: DISCONTINUED | OUTPATIENT
Start: 2023-03-18 | End: 2023-03-23 | Stop reason: HOSPADM

## 2023-03-18 RX ORDER — INSULIN ASPART 100 [IU]/ML
4 INJECTION, SOLUTION INTRAVENOUS; SUBCUTANEOUS
Status: DISCONTINUED | OUTPATIENT
Start: 2023-03-18 | End: 2023-03-18

## 2023-03-18 RX ORDER — MAGNESIUM SULFATE HEPTAHYDRATE 40 MG/ML
4 INJECTION, SOLUTION INTRAVENOUS ONCE
Status: COMPLETED | OUTPATIENT
Start: 2023-03-18 | End: 2023-03-18

## 2023-03-18 RX ORDER — INSULIN ASPART 100 [IU]/ML
6 INJECTION, SOLUTION INTRAVENOUS; SUBCUTANEOUS
Status: DISCONTINUED | OUTPATIENT
Start: 2023-03-19 | End: 2023-03-19

## 2023-03-18 RX ORDER — MUPIROCIN 20 MG/G
OINTMENT TOPICAL 2 TIMES DAILY
Status: DISCONTINUED | OUTPATIENT
Start: 2023-03-18 | End: 2023-03-23 | Stop reason: HOSPADM

## 2023-03-18 RX ORDER — INSULIN ASPART 100 [IU]/ML
8 INJECTION, SOLUTION INTRAVENOUS; SUBCUTANEOUS
Status: DISCONTINUED | OUTPATIENT
Start: 2023-03-18 | End: 2023-03-18

## 2023-03-18 RX ADMIN — ATORVASTATIN CALCIUM 20 MG: 20 TABLET, FILM COATED ORAL at 09:03

## 2023-03-18 RX ADMIN — INSULIN ASPART 4 UNITS: 100 INJECTION, SOLUTION INTRAVENOUS; SUBCUTANEOUS at 12:03

## 2023-03-18 RX ADMIN — ACETAMINOPHEN 650 MG: 325 TABLET ORAL at 09:03

## 2023-03-18 RX ADMIN — POTASSIUM CHLORIDE 40 MEQ: 1500 TABLET, EXTENDED RELEASE ORAL at 11:03

## 2023-03-18 RX ADMIN — MAGNESIUM SULFATE 4 G: 2 INJECTION INTRAVENOUS at 12:03

## 2023-03-18 RX ADMIN — INSULIN ASPART 4 UNITS: 100 INJECTION, SOLUTION INTRAVENOUS; SUBCUTANEOUS at 05:03

## 2023-03-18 RX ADMIN — PROMETHAZINE HYDROCHLORIDE 25 MG: 12.5 TABLET ORAL at 09:03

## 2023-03-18 RX ADMIN — GABAPENTIN 400 MG: 400 CAPSULE ORAL at 08:03

## 2023-03-18 RX ADMIN — FLUTICASONE FUROATE AND VILANTEROL TRIFENATATE 1 PUFF: 200; 25 POWDER RESPIRATORY (INHALATION) at 09:03

## 2023-03-18 RX ADMIN — MUPIROCIN: 20 OINTMENT TOPICAL at 10:03

## 2023-03-18 RX ADMIN — PANTOPRAZOLE SODIUM 40 MG: 40 TABLET, DELAYED RELEASE ORAL at 09:03

## 2023-03-18 RX ADMIN — INSULIN ASPART 3 UNITS: 100 INJECTION, SOLUTION INTRAVENOUS; SUBCUTANEOUS at 12:03

## 2023-03-18 RX ADMIN — GABAPENTIN 400 MG: 400 CAPSULE ORAL at 09:03

## 2023-03-18 RX ADMIN — CEFTRIAXONE 2 G: 2 INJECTION, POWDER, FOR SOLUTION INTRAMUSCULAR; INTRAVENOUS at 04:03

## 2023-03-18 RX ADMIN — AMLODIPINE BESYLATE 10 MG: 10 TABLET ORAL at 09:03

## 2023-03-18 RX ADMIN — ONDANSETRON 4 MG: 2 INJECTION INTRAMUSCULAR; INTRAVENOUS at 09:03

## 2023-03-18 RX ADMIN — ASPIRIN 81 MG: 81 TABLET, CHEWABLE ORAL at 09:03

## 2023-03-18 RX ADMIN — DEXTROSE MONOHYDRATE 250 ML: 100 INJECTION, SOLUTION INTRAVENOUS at 03:03

## 2023-03-18 RX ADMIN — INSULIN DETEMIR 12 UNITS: 100 INJECTION, SOLUTION SUBCUTANEOUS at 09:03

## 2023-03-18 RX ADMIN — ACETAMINOPHEN 650 MG: 325 TABLET ORAL at 08:03

## 2023-03-18 RX ADMIN — INSULIN ASPART 3 UNITS: 100 INJECTION, SOLUTION INTRAVENOUS; SUBCUTANEOUS at 05:03

## 2023-03-18 NOTE — NURSING
Upon arrival to the floor, pt BG=90. Pt consumed a sandwich and peanut butter with crackers. Rechecked BG at 0345. BG=44. Per order, administered D10 250ml bolus. Pt also consumed a sandwich and 2 apple juices. Rechecked BG. WY=928

## 2023-03-18 NOTE — ED NOTES
Pt helped to bathroom at this time - pt provided new gown and had diarrhea on previous gown. Pt had bowel movement on bathroom floor. Helped to clean up at this time. Will continue to monitor.
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
## 2023-03-18 NOTE — ASSESSMENT & PLAN NOTE
- sugars improving now, currently above 100 mg/dl  - will be cautious with insulin regimen and titrate them slowly to prevent episodes of hypoglycemia

## 2023-03-18 NOTE — ASSESSMENT & PLAN NOTE
Patient appears to have difficulty with glucose control.  Hypoglycemia resolved and patient is doing well, eating fine.  We will resume insulin regime per previous Endocrinology recommendations starting tomorrow.  Sliding scale insulin.  Endocrine consult

## 2023-03-18 NOTE — ED NOTES
Pt helped to restroom at this time - pt had bowel movement on the floor and in gown, bathroom cleaned up, pt helped clean up at this time and provided new gown.

## 2023-03-18 NOTE — SUBJECTIVE & OBJECTIVE
Past Medical History:   Diagnosis Date    Anxiety     Arthritis     Bronchitis     CHF (congestive heart failure)     Diabetic ketoacidosis associated with type 2 diabetes mellitus 3/10/2023    DM (diabetes mellitus)     Emphysema lung     Encounter for blood transfusion     HTN (hypertension)     Restrictive lung disease     Sleep apnea        Past Surgical History:   Procedure Laterality Date     SECTION      PALATAL EXPANSION      WRIST SURGERY Right        Review of patient's allergies indicates:   Allergen Reactions    Hydrochlorothiazide plus        No current facility-administered medications on file prior to encounter.     Current Outpatient Medications on File Prior to Encounter   Medication Sig    albuterol (ACCUNEB) 1.25 mg/3 mL Nebu Take 1.25 mg by nebulization every 6 (six) hours as needed.    ALPRAZolam (XANAX) 2 MG Tab Take 2 mg by mouth daily as needed for Anxiety.    amlodipine (NORVASC) 10 MG tablet Take 10 mg by mouth once daily.    aspirin 81 MG Chew Take 81 mg by mouth once daily.    atorvastatin (LIPITOR) 20 MG tablet Take 20 mg by mouth once daily.    ciprofloxacin HCl (CIPRO) 500 MG tablet Take 1 tablet (500 mg total) by mouth every 12 (twelve) hours. for 14 days    fluticasone-salmeterol diskus inhaler 500-50 mcg Inhale 1 puff into the lungs 2 (two) times daily.    gabapentin (NEURONTIN) 400 MG capsule Take 400 mg by mouth 2 (two) times daily.    insulin aspart U-100 (NOVOLOG) 100 unit/mL injection Inject 10 Units into the skin 3 (three) times daily before meals.    insulin detemir U-100 (LEVEMIR) 100 unit/mL injection Inject 40 Units into the skin once daily.    omeprazole (PRILOSEC) 40 MG capsule Take 40 mg by mouth once daily.    promethazine (PHENERGAN) 12.5 MG Tab Take 25 mg by mouth every 6 (six) hours as needed.    tiotropium (SPIRIVA) 18 mcg inhalation capsule Inhale 18 mcg into the lungs once daily.     Family History       Problem Relation (Age of Onset)    Diabetes Mother,  Father, Sister    Hypertension Mother, Father          Tobacco Use    Smoking status: Some Days     Packs/day: 0.25     Types: Cigarettes    Smokeless tobacco: Never   Substance and Sexual Activity    Alcohol use: Yes    Drug use: Yes     Types: Marijuana    Sexual activity: Yes     Partners: Male     Review of Systems   Neurological:  Positive for dizziness, weakness and light-headedness.   Objective:     Vital Signs (Most Recent):  Temp: 98.5 °F (36.9 °C) (03/17/23 1630)  Pulse: 70 (03/17/23 1630)  Resp: (!) 22 (03/17/23 1630)  BP: 112/74 (03/17/23 1630)  SpO2: 99 % (03/17/23 1630)   Vital Signs (24h Range):  Temp:  [97.6 °F (36.4 °C)-98.5 °F (36.9 °C)] 98.5 °F (36.9 °C)  Pulse:  [58-70] 70  Resp:  [16-22] 22  SpO2:  [98 %-100 %] 99 %  BP: ()/(59-86) 112/74        There is no height or weight on file to calculate BMI.    Physical Exam  Constitutional:       General: She is not in acute distress.     Comments: Unkempt female   HENT:      Head: Normocephalic.      Right Ear: External ear normal.      Left Ear: External ear normal.      Nose: Nose normal.      Mouth/Throat:      Mouth: Mucous membranes are moist.   Cardiovascular:      Rate and Rhythm: Normal rate.   Pulmonary:      Breath sounds: Normal breath sounds.   Abdominal:      Palpations: Abdomen is soft.      Tenderness: There is no abdominal tenderness.   Musculoskeletal:      Comments: Full range of right shoulder joint limited secondary to pain   Skin:     General: Skin is warm.   Neurological:      General: No focal deficit present.      Mental Status: She is alert and oriented to person, place, and time.   Psychiatric:         Mood and Affect: Mood normal.           Significant Labs: All pertinent labs within the past 24 hours have been reviewed.    Significant Imaging: I have reviewed all pertinent imaging results/findings within the past 24 hours.

## 2023-03-18 NOTE — NURSING
At 0610 notified by lab of a critical glucose of 42 that was drawn at 0311. BG has already been corrected. See previous note. BG is now 156.

## 2023-03-18 NOTE — CONSULTS
RD consulted for diabetic education. Per previous RD's note, pt was educated on diabetic diet on 3/16/2023. No need for reeducation at this time. Please re-consult if needed.
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
## 2023-03-18 NOTE — ASSESSMENT & PLAN NOTE
- poorly controlled outpatient with HbA1c of 13.6   - 24 hour glucose trend: sugars improving after blood sugar found to be low on admission  - Will be cautious with insulin regimen considering presentation with hypoglycemia  - Levemir 12 units daily  - Aspart 4 units before meals with low dose correction scale   - Hypoglycemia protocol in place  - If blood glucose greater than 300 mg/dl, please ask patient not to eat food or drink anything other than water until correctional insulin has brought it back below 250 mg/dl
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
## 2023-03-18 NOTE — ED NOTES
Pt taken upstairs at this time via stretcher w all belongings and trasnport and  present. Pt stable - denies further needs prior to being taken up.

## 2023-03-18 NOTE — ASSESSMENT & PLAN NOTE
We will continue with IV ceftriaxone.  Repeat blood and urine cultures remain no growth, leukocytosis resolved.  We will continue to monitor repeat urine and blood cx and labs

## 2023-03-18 NOTE — ASSESSMENT & PLAN NOTE
We will continue with IV ceftriaxone.  Repeat blood cultures remain no growth, however leukocytosis noted.  We will continue to monitor labs
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
## 2023-03-18 NOTE — ASSESSMENT & PLAN NOTE
Patient appears to have difficulty with glucose control.  Continue diabetic diet.  Continue insulin regime per Endocrinology recommendations

## 2023-03-18 NOTE — PLAN OF CARE
Problem: Adult Inpatient Plan of Care  Goal: Optimal Comfort and Wellbeing  Outcome: Ongoing, Progressing     Problem: Adult Inpatient Plan of Care  Goal: Absence of Hospital-Acquired Illness or Injury  Outcome: Ongoing, Progressing     Problem: Diabetes Comorbidity  Goal: Blood Glucose Level Within Targeted Range  3/18/2023 1705 by Yaa Elizalde RN  Outcome: Ongoing, Not Progressing

## 2023-03-18 NOTE — SUBJECTIVE & OBJECTIVE
Interval HPI:   Blood sugars improving after presenting with low blood glucose on admission    Review of Systems as a daniela    Current Medications and/or Treatments Impacting Glycemic Control  Immunotherapy:    Immunosuppressants       None          Steroids:   Hormones (From admission, onward)      None          Pressors:    Autonomic Drugs (From admission, onward)      None          Hyperglycemia/Diabetes Medications:   Antihyperglycemics (From admission, onward)      Start     Stop Route Frequency Ordered    03/18/23 0900  insulin detemir U-100 pen 12 Units         -- SubQ 2 times daily 03/17/23 1856    03/18/23 0645  insulin aspart U-100 pen 8 Units         -- SubQ 3 times daily before meals 03/18/23 0429    03/17/23 1956  insulin aspart U-100 pen 1-10 Units         -- SubQ Before meals & nightly PRN 03/17/23 1857             PHYSICAL EXAMINATION:  Vitals:    03/18/23 0930   BP:    Pulse: 99   Resp: 16   Temp:      There is no height or weight on file to calculate BMI.    Physical Exam  HENT:      Head: Normocephalic and atraumatic.      Right Ear: External ear normal.      Left Ear: External ear normal.      Nose: Nose normal.   Eyes:      Extraocular Movements: Extraocular movements intact.   Cardiovascular:      Rate and Rhythm: Tachycardia present.   Pulmonary:      Effort: Pulmonary effort is normal.   Musculoskeletal:         General: Normal range of motion.      Cervical back: Normal range of motion.   Skin:     Comments: Dry skin   Neurological:      General: No focal deficit present.      Mental Status: She is alert.   Psychiatric:         Mood and Affect: Mood normal.

## 2023-03-18 NOTE — SUBJECTIVE & OBJECTIVE
Interval History:  Patient is doing well and is in good spirits.  Leukocytosis resolved.  Hypokalemia improved, hypomagnesemia present.  Repeat urinalysis showed improvement.  Repeat urine and blood cultures no growth thus, transient hypoglycemia early this morning of 44 which was corrected.  Seen by endocrinology    Review of Systems   All other systems reviewed and are negative.  Objective:     Vital Signs (Most Recent):  Temp: 98.8 °F (37.1 °C) (03/18/23 0750)  Pulse: 99 (03/18/23 0930)  Resp: 16 (03/18/23 0930)  BP: 128/73 (03/18/23 0750)  SpO2: 100 % (03/18/23 0930) Vital Signs (24h Range):  Temp:  [97.5 °F (36.4 °C)-98.8 °F (37.1 °C)] 98.8 °F (37.1 °C)  Pulse:  [58-99] 99  Resp:  [16-22] 16  SpO2:  [96 %-100 %] 100 %  BP: ()/(57-86) 128/73        There is no height or weight on file to calculate BMI.    Intake/Output Summary (Last 24 hours) at 3/18/2023 1206  Last data filed at 3/17/2023 1622  Gross per 24 hour   Intake 400 ml   Output --   Net 400 ml      Physical Exam  Constitutional:       General: She is not in acute distress.     Comments: Unkempt female   HENT:      Head: Normocephalic.      Right Ear: External ear normal.      Left Ear: External ear normal.      Nose: Nose normal.   Cardiovascular:      Rate and Rhythm: Normal rate.   Pulmonary:      Effort: Pulmonary effort is normal.   Abdominal:      Palpations: Abdomen is soft.      Tenderness: There is no abdominal tenderness.   Musculoskeletal:      Comments: No change in findings   Skin:     General: Skin is warm.   Neurological:      General: No focal deficit present.      Mental Status: She is alert and oriented to person, place, and time.   Psychiatric:         Mood and Affect: Mood normal.       Significant Labs: All pertinent labs within the past 24 hours have been reviewed.

## 2023-03-19 LAB
ANION GAP SERPL CALC-SCNC: 13 MMOL/L (ref 8–16)
BACTERIA UR CULT: NO GROWTH
BASOPHILS # BLD AUTO: 0.02 K/UL (ref 0–0.2)
BASOPHILS NFR BLD: 0.1 % (ref 0–1.9)
BUN SERPL-MCNC: 22 MG/DL (ref 6–20)
CALCIUM SERPL-MCNC: 7.6 MG/DL (ref 8.7–10.5)
CHLORIDE SERPL-SCNC: 110 MMOL/L (ref 95–110)
CO2 SERPL-SCNC: 15 MMOL/L (ref 23–29)
CREAT SERPL-MCNC: 1.1 MG/DL (ref 0.5–1.4)
DIFFERENTIAL METHOD: ABNORMAL
EOSINOPHIL # BLD AUTO: 0 K/UL (ref 0–0.5)
EOSINOPHIL NFR BLD: 0.1 % (ref 0–8)
ERYTHROCYTE [DISTWIDTH] IN BLOOD BY AUTOMATED COUNT: 16.6 % (ref 11.5–14.5)
EST. GFR  (NO RACE VARIABLE): 59.7 ML/MIN/1.73 M^2
GLUCOSE SERPL-MCNC: 401 MG/DL (ref 70–110)
HCT VFR BLD AUTO: 25.9 % (ref 37–48.5)
HGB BLD-MCNC: 7.9 G/DL (ref 12–16)
IMM GRANULOCYTES # BLD AUTO: 0.13 K/UL (ref 0–0.04)
IMM GRANULOCYTES NFR BLD AUTO: 0.9 % (ref 0–0.5)
LYMPHOCYTES # BLD AUTO: 1.6 K/UL (ref 1–4.8)
LYMPHOCYTES NFR BLD: 10.6 % (ref 18–48)
MAGNESIUM SERPL-MCNC: 1.9 MG/DL (ref 1.6–2.6)
MCH RBC QN AUTO: 21.9 PG (ref 27–31)
MCHC RBC AUTO-ENTMCNC: 30.5 G/DL (ref 32–36)
MCV RBC AUTO: 72 FL (ref 82–98)
MONOCYTES # BLD AUTO: 1.1 K/UL (ref 0.3–1)
MONOCYTES NFR BLD: 7.3 % (ref 4–15)
NEUTROPHILS # BLD AUTO: 12.3 K/UL (ref 1.8–7.7)
NEUTROPHILS NFR BLD: 81 % (ref 38–73)
NRBC BLD-RTO: 0 /100 WBC
PLATELET # BLD AUTO: 513 K/UL (ref 150–450)
PMV BLD AUTO: 11.9 FL (ref 9.2–12.9)
POCT GLUCOSE: 146 MG/DL (ref 70–110)
POCT GLUCOSE: 235 MG/DL (ref 70–110)
POCT GLUCOSE: 358 MG/DL (ref 70–110)
POCT GLUCOSE: 411 MG/DL (ref 70–110)
POCT GLUCOSE: 428 MG/DL (ref 70–110)
POCT GLUCOSE: 445 MG/DL (ref 70–110)
POCT GLUCOSE: 96 MG/DL (ref 70–110)
POTASSIUM SERPL-SCNC: 5.5 MMOL/L (ref 3.5–5.1)
RBC # BLD AUTO: 3.61 M/UL (ref 4–5.4)
SODIUM SERPL-SCNC: 138 MMOL/L (ref 136–145)
WBC # BLD AUTO: 15.16 K/UL (ref 3.9–12.7)

## 2023-03-19 PROCEDURE — 20600001 HC STEP DOWN PRIVATE ROOM

## 2023-03-19 PROCEDURE — 99232 PR SUBSEQUENT HOSPITAL CARE,LEVL II: ICD-10-PCS | Mod: ,,, | Performed by: INTERNAL MEDICINE

## 2023-03-19 PROCEDURE — 83735 ASSAY OF MAGNESIUM: CPT | Performed by: INTERNAL MEDICINE

## 2023-03-19 PROCEDURE — 99232 SBSQ HOSP IP/OBS MODERATE 35: CPT | Mod: ,,, | Performed by: INTERNAL MEDICINE

## 2023-03-19 PROCEDURE — 63600175 PHARM REV CODE 636 W HCPCS: Performed by: INTERNAL MEDICINE

## 2023-03-19 PROCEDURE — 80048 BASIC METABOLIC PNL TOTAL CA: CPT | Performed by: INTERNAL MEDICINE

## 2023-03-19 PROCEDURE — 85025 COMPLETE CBC W/AUTO DIFF WBC: CPT | Performed by: INTERNAL MEDICINE

## 2023-03-19 PROCEDURE — 25000003 PHARM REV CODE 250: Performed by: INTERNAL MEDICINE

## 2023-03-19 PROCEDURE — 36415 COLL VENOUS BLD VENIPUNCTURE: CPT | Performed by: INTERNAL MEDICINE

## 2023-03-19 RX ORDER — INSULIN ASPART 100 [IU]/ML
7 INJECTION, SOLUTION INTRAVENOUS; SUBCUTANEOUS
Status: DISCONTINUED | OUTPATIENT
Start: 2023-03-19 | End: 2023-03-20

## 2023-03-19 RX ORDER — INSULIN ASPART 100 [IU]/ML
4 INJECTION, SOLUTION INTRAVENOUS; SUBCUTANEOUS
Status: DISCONTINUED | OUTPATIENT
Start: 2023-03-19 | End: 2023-03-23 | Stop reason: HOSPADM

## 2023-03-19 RX ADMIN — GABAPENTIN 400 MG: 400 CAPSULE ORAL at 08:03

## 2023-03-19 RX ADMIN — GABAPENTIN 400 MG: 400 CAPSULE ORAL at 11:03

## 2023-03-19 RX ADMIN — PROMETHAZINE HYDROCHLORIDE 25 MG: 12.5 TABLET ORAL at 11:03

## 2023-03-19 RX ADMIN — INSULIN ASPART 5 UNITS: 100 INJECTION, SOLUTION INTRAVENOUS; SUBCUTANEOUS at 07:03

## 2023-03-19 RX ADMIN — PANTOPRAZOLE SODIUM 40 MG: 40 TABLET, DELAYED RELEASE ORAL at 11:03

## 2023-03-19 RX ADMIN — INSULIN ASPART 2 UNITS: 100 INJECTION, SOLUTION INTRAVENOUS; SUBCUTANEOUS at 11:03

## 2023-03-19 RX ADMIN — INSULIN ASPART 7 UNITS: 100 INJECTION, SOLUTION INTRAVENOUS; SUBCUTANEOUS at 11:03

## 2023-03-19 RX ADMIN — INSULIN ASPART 7 UNITS: 100 INJECTION, SOLUTION INTRAVENOUS; SUBCUTANEOUS at 05:03

## 2023-03-19 RX ADMIN — ACETAMINOPHEN 650 MG: 325 TABLET ORAL at 08:03

## 2023-03-19 RX ADMIN — CEFTRIAXONE 2 G: 2 INJECTION, POWDER, FOR SOLUTION INTRAMUSCULAR; INTRAVENOUS at 01:03

## 2023-03-19 RX ADMIN — INSULIN ASPART 3 UNITS: 100 INJECTION, SOLUTION INTRAVENOUS; SUBCUTANEOUS at 02:03

## 2023-03-19 RX ADMIN — AMLODIPINE BESYLATE 10 MG: 10 TABLET ORAL at 11:03

## 2023-03-19 RX ADMIN — MUPIROCIN: 20 OINTMENT TOPICAL at 11:03

## 2023-03-19 RX ADMIN — ASPIRIN 81 MG: 81 TABLET, CHEWABLE ORAL at 11:03

## 2023-03-19 RX ADMIN — ATORVASTATIN CALCIUM 20 MG: 20 TABLET, FILM COATED ORAL at 11:03
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
## 2023-03-19 NOTE — ASSESSMENT & PLAN NOTE
We will continue with IV ceftriaxone.  Repeat blood and urine cultures remain no growth, transient leukocytosis is possibly secondary to hemoconcentration.  We will continue to monitor repeat urine and blood cx and labs

## 2023-03-19 NOTE — ASSESSMENT & PLAN NOTE
- no episodes in the last 24 hours  - will be cautious with insulin regimen and titrate them slowly to prevent episodes of hypoglycemia
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
## 2023-03-19 NOTE — SUBJECTIVE & OBJECTIVE
Interval HPI:   Blood sugars above 400 mg/dl this morning  Patient requested for a snack overnight but as per RN did not snack  No episodes of hypoglycemia      /71   Pulse 89   Temp 98.4 °F (36.9 °C) (Axillary)   Resp 18   LMP 01/29/2018 (Approximate)   SpO2 98%     Labs Reviewed and Include    Recent Labs   Lab 03/19/23  0409   *   CALCIUM 7.6*      K 5.5*   CO2 15*      BUN 22*   CREATININE 1.1     Lab Results   Component Value Date    WBC 15.16 (H) 03/19/2023    HGB 7.9 (L) 03/19/2023    HCT 25.9 (L) 03/19/2023    MCV 72 (L) 03/19/2023     (H) 03/19/2023     No results for input(s): TSH, FREET4 in the last 168 hours.  Lab Results   Component Value Date    HGBA1C 13.6 (H) 03/11/2023       Nutritional status:   There is no height or weight on file to calculate BMI.  Lab Results   Component Value Date    ALBUMIN 1.7 (L) 03/17/2023    ALBUMIN 1.4 (L) 03/13/2023    ALBUMIN 1.6 (L) 03/12/2023     No results found for: PREALBUMIN    CrCl cannot be calculated (Unknown ideal weight.).    Accu-Checks  Recent Labs     03/18/23  0331 03/18/23  0403 03/18/23  0607 03/18/23  0755 03/18/23  0949 03/18/23  1244 03/18/23  1731 03/18/23  1937 03/19/23  0244 03/19/23  0416   POCTGLUCOSE 44* 137* 156* 180* 210* 290* 283* 188* 445* 428*       Current Medications and/or Treatments Impacting Glycemic Control  Immunotherapy:    Immunosuppressants       None          Steroids:   Hormones (From admission, onward)      None          Pressors:    Autonomic Drugs (From admission, onward)      None          Hyperglycemia/Diabetes Medications:   Antihyperglycemics (From admission, onward)      Start     Stop Route Frequency Ordered    03/19/23 1100  insulin aspart U-100 pen 7 Units         -- SubQ 3 times daily before meals 03/19/23 0727    03/19/23 0900  insulin detemir U-100 pen 18 Units         -- SubQ Daily 03/19/23 0726    03/19/23 0826  insulin aspart U-100 pen 4 Units         -- SubQ As needed (PRN)  03/19/23 0727    03/18/23 1247  insulin aspart U-100 pen 0-5 Units         -- SubQ Before meals & nightly PRN 03/18/23 1146

## 2023-03-19 NOTE — ASSESSMENT & PLAN NOTE
Magnesium now within normal limits, mild hyperkalemia noted at 5.5, patient already receiving significant insulin.  Continue to  monitor

## 2023-03-19 NOTE — SUBJECTIVE & OBJECTIVE
Interval History:  Patient had elevated glucose of greater than 400 this morning.  Magnesium now within normal limits, mild hyperkalemia noted.  Mild leukocytosis noted, hemoglobin stable.  Cultures remain no growth.  Patient has no complaints at this time    Review of Systems   All other systems reviewed and are negative.  Objective:     Vital Signs (Most Recent):  Temp: 98.2 °F (36.8 °C) (03/19/23 0800)  Pulse: 90 (03/19/23 0800)  Resp: 18 (03/19/23 0800)  BP: 107/83 (03/19/23 0800)  SpO2: 99 % (03/19/23 0800) Vital Signs (24h Range):  Temp:  [98.2 °F (36.8 °C)-99.7 °F (37.6 °C)] 98.2 °F (36.8 °C)  Pulse:  [89-99] 90  Resp:  [16-20] 18  SpO2:  [95 %-100 %] 99 %  BP: (107-125)/(64-83) 107/83        There is no height or weight on file to calculate BMI.  No intake or output data in the 24 hours ending 03/19/23 0852     Physical Exam  Constitutional:       General: She is not in acute distress.     Comments: Unkempt female   HENT:      Head: Normocephalic.      Right Ear: External ear normal.      Left Ear: External ear normal.      Nose: Nose normal.   Cardiovascular:      Rate and Rhythm: Normal rate.   Pulmonary:      Effort: Pulmonary effort is normal.   Abdominal:      Palpations: Abdomen is soft.      Tenderness: There is no abdominal tenderness.   Musculoskeletal:      Comments: No change in findings   Skin:     General: Skin is warm.   Neurological:      General: No focal deficit present.      Mental Status: She is alert and oriented to person, place, and time.   Psychiatric:         Mood and Affect: Mood normal.     Significant Labs: All pertinent labs within the past 24 hours have been reviewed.
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
## 2023-03-19 NOTE — ASSESSMENT & PLAN NOTE
- poorly controlled outpatient with HbA1c of 13.6   - 24 hour glucose trend: sugars above goal and in 400's this morning  - Will be cautious with insulin regimen considering presentation with hypoglycemia  - Levemir 18 units daily  - Aspart 7 units before meals with low dose correction scale   - Snack time insulin 4 units as needed is available to use for snacks  - Hypoglycemia protocol in place  - If blood glucose greater than 300 mg/dl, please ask patient not to eat food or drink anything other than water until correctional insulin has brought it back below 250 mg/dl

## 2023-03-20 LAB
ANION GAP SERPL CALC-SCNC: 7 MMOL/L (ref 8–16)
BACTERIA BLD CULT: NORMAL
BASOPHILS # BLD AUTO: 0.03 K/UL (ref 0–0.2)
BASOPHILS NFR BLD: 0.2 % (ref 0–1.9)
BUN SERPL-MCNC: 18 MG/DL (ref 6–20)
CALCIUM SERPL-MCNC: 8 MG/DL (ref 8.7–10.5)
CHLORIDE SERPL-SCNC: 112 MMOL/L (ref 95–110)
CO2 SERPL-SCNC: 23 MMOL/L (ref 23–29)
CREAT SERPL-MCNC: 1.2 MG/DL (ref 0.5–1.4)
DIFFERENTIAL METHOD: ABNORMAL
EOSINOPHIL # BLD AUTO: 0 K/UL (ref 0–0.5)
EOSINOPHIL NFR BLD: 0.1 % (ref 0–8)
ERYTHROCYTE [DISTWIDTH] IN BLOOD BY AUTOMATED COUNT: 17.1 % (ref 11.5–14.5)
EST. GFR  (NO RACE VARIABLE): 53.8 ML/MIN/1.73 M^2
GLUCOSE SERPL-MCNC: 336 MG/DL (ref 70–110)
HCT VFR BLD AUTO: 24 % (ref 37–48.5)
HGB BLD-MCNC: 7.2 G/DL (ref 12–16)
IMM GRANULOCYTES # BLD AUTO: 0.12 K/UL (ref 0–0.04)
IMM GRANULOCYTES NFR BLD AUTO: 0.7 % (ref 0–0.5)
LYMPHOCYTES # BLD AUTO: 2.6 K/UL (ref 1–4.8)
LYMPHOCYTES NFR BLD: 14.6 % (ref 18–48)
MCH RBC QN AUTO: 21.4 PG (ref 27–31)
MCHC RBC AUTO-ENTMCNC: 30 G/DL (ref 32–36)
MCV RBC AUTO: 71 FL (ref 82–98)
MONOCYTES # BLD AUTO: 1 K/UL (ref 0.3–1)
MONOCYTES NFR BLD: 5.7 % (ref 4–15)
NEUTROPHILS # BLD AUTO: 13.9 K/UL (ref 1.8–7.7)
NEUTROPHILS NFR BLD: 78.7 % (ref 38–73)
NRBC BLD-RTO: 0 /100 WBC
PLATELET # BLD AUTO: 515 K/UL (ref 150–450)
PMV BLD AUTO: 12 FL (ref 9.2–12.9)
POCT GLUCOSE: 114 MG/DL (ref 70–110)
POCT GLUCOSE: 156 MG/DL (ref 70–110)
POCT GLUCOSE: 294 MG/DL (ref 70–110)
POCT GLUCOSE: 449 MG/DL (ref 70–110)
POCT GLUCOSE: 450 MG/DL (ref 70–110)
POTASSIUM SERPL-SCNC: 4.8 MMOL/L (ref 3.5–5.1)
RBC # BLD AUTO: 3.37 M/UL (ref 4–5.4)
SODIUM SERPL-SCNC: 142 MMOL/L (ref 136–145)
WBC # BLD AUTO: 17.61 K/UL (ref 3.9–12.7)

## 2023-03-20 PROCEDURE — 25000242 PHARM REV CODE 250 ALT 637 W/ HCPCS: Performed by: INTERNAL MEDICINE

## 2023-03-20 PROCEDURE — 63600175 PHARM REV CODE 636 W HCPCS: Performed by: STUDENT IN AN ORGANIZED HEALTH CARE EDUCATION/TRAINING PROGRAM

## 2023-03-20 PROCEDURE — 99232 PR SUBSEQUENT HOSPITAL CARE,LEVL II: ICD-10-PCS | Mod: ,,, | Performed by: INTERNAL MEDICINE

## 2023-03-20 PROCEDURE — 99232 SBSQ HOSP IP/OBS MODERATE 35: CPT | Mod: ,,, | Performed by: INTERNAL MEDICINE

## 2023-03-20 PROCEDURE — 87040 BLOOD CULTURE FOR BACTERIA: CPT | Performed by: INTERNAL MEDICINE

## 2023-03-20 PROCEDURE — 36415 COLL VENOUS BLD VENIPUNCTURE: CPT | Performed by: INTERNAL MEDICINE

## 2023-03-20 PROCEDURE — 80048 BASIC METABOLIC PNL TOTAL CA: CPT | Performed by: INTERNAL MEDICINE

## 2023-03-20 PROCEDURE — 87086 URINE CULTURE/COLONY COUNT: CPT | Performed by: INTERNAL MEDICINE

## 2023-03-20 PROCEDURE — 85025 COMPLETE CBC W/AUTO DIFF WBC: CPT | Performed by: INTERNAL MEDICINE

## 2023-03-20 PROCEDURE — 20600001 HC STEP DOWN PRIVATE ROOM

## 2023-03-20 PROCEDURE — 25000003 PHARM REV CODE 250: Performed by: INTERNAL MEDICINE

## 2023-03-20 PROCEDURE — 94761 N-INVAS EAR/PLS OXIMETRY MLT: CPT

## 2023-03-20 PROCEDURE — 63600175 PHARM REV CODE 636 W HCPCS: Performed by: INTERNAL MEDICINE

## 2023-03-20 PROCEDURE — S4991 NICOTINE PATCH NONLEGEND: HCPCS | Performed by: INTERNAL MEDICINE

## 2023-03-20 PROCEDURE — 94640 AIRWAY INHALATION TREATMENT: CPT

## 2023-03-20 PROCEDURE — 25000003 PHARM REV CODE 250: Performed by: STUDENT IN AN ORGANIZED HEALTH CARE EDUCATION/TRAINING PROGRAM

## 2023-03-20 RX ORDER — INSULIN ASPART 100 [IU]/ML
8 INJECTION, SOLUTION INTRAVENOUS; SUBCUTANEOUS ONCE
Status: COMPLETED | OUTPATIENT
Start: 2023-03-20 | End: 2023-03-20

## 2023-03-20 RX ORDER — INSULIN ASPART 100 [IU]/ML
5 INJECTION, SOLUTION INTRAVENOUS; SUBCUTANEOUS ONCE
Status: DISCONTINUED | OUTPATIENT
Start: 2023-03-20 | End: 2023-03-20

## 2023-03-20 RX ORDER — INSULIN ASPART 100 [IU]/ML
10 INJECTION, SOLUTION INTRAVENOUS; SUBCUTANEOUS
Status: DISCONTINUED | OUTPATIENT
Start: 2023-03-20 | End: 2023-03-21

## 2023-03-20 RX ADMIN — INSULIN ASPART 5 UNITS: 100 INJECTION, SOLUTION INTRAVENOUS; SUBCUTANEOUS at 08:03

## 2023-03-20 RX ADMIN — INSULIN ASPART 4 UNITS: 100 INJECTION, SOLUTION INTRAVENOUS; SUBCUTANEOUS at 08:03

## 2023-03-20 RX ADMIN — INSULIN ASPART 8 UNITS: 100 INJECTION, SOLUTION INTRAVENOUS; SUBCUTANEOUS at 12:03

## 2023-03-20 RX ADMIN — INSULIN DETEMIR 4 UNITS: 100 INJECTION, SOLUTION SUBCUTANEOUS at 12:03

## 2023-03-20 RX ADMIN — GABAPENTIN 400 MG: 400 CAPSULE ORAL at 08:03

## 2023-03-20 RX ADMIN — INSULIN ASPART 7 UNITS: 100 INJECTION, SOLUTION INTRAVENOUS; SUBCUTANEOUS at 08:03

## 2023-03-20 RX ADMIN — ATORVASTATIN CALCIUM 20 MG: 20 TABLET, FILM COATED ORAL at 08:03

## 2023-03-20 RX ADMIN — CEFTRIAXONE 2 G: 2 INJECTION, POWDER, FOR SOLUTION INTRAMUSCULAR; INTRAVENOUS at 02:03

## 2023-03-20 RX ADMIN — MUPIROCIN: 20 OINTMENT TOPICAL at 08:03

## 2023-03-20 RX ADMIN — ASPIRIN 81 MG: 81 TABLET, CHEWABLE ORAL at 08:03

## 2023-03-20 RX ADMIN — FLUTICASONE FUROATE AND VILANTEROL TRIFENATATE 1 PUFF: 200; 25 POWDER RESPIRATORY (INHALATION) at 09:03

## 2023-03-20 RX ADMIN — INSULIN ASPART 10 UNITS: 100 INJECTION, SOLUTION INTRAVENOUS; SUBCUTANEOUS at 05:03

## 2023-03-20 RX ADMIN — AMLODIPINE BESYLATE 10 MG: 10 TABLET ORAL at 08:03

## 2023-03-20 RX ADMIN — TIOTROPIUM BROMIDE INHALATION SPRAY 2 PUFF: 3.12 SPRAY, METERED RESPIRATORY (INHALATION) at 09:03

## 2023-03-20 RX ADMIN — PANTOPRAZOLE SODIUM 40 MG: 40 TABLET, DELAYED RELEASE ORAL at 08:03

## 2023-03-20 NOTE — SUBJECTIVE & OBJECTIVE
Interval HPI:   Overnight events: Had snack o/n - turkey sandwich and jean marie crackers with peanut butter - but did not receive prn snack aspart which is ordered. BG>400 again this AM.  Eatin%  Nausea: No  Hypoglycemia and intervention: No  Fever: No  TPN and/or TF: No  If yes, type of TF/TPN and rate: n/a    BP (!) 140/66 (BP Location: Right arm, Patient Position: Lying)   Pulse 92   Temp 98.6 °F (37 °C) (Oral)   Resp 18   LMP 2018 (Approximate)   SpO2 100%     Labs Reviewed and Include    Recent Labs   Lab 23  0341   *   CALCIUM 8.0*      K 4.8   CO2 23   *   BUN 18   CREATININE 1.2     Lab Results   Component Value Date    WBC 17.61 (H) 2023    HGB 7.2 (L) 2023    HCT 24.0 (L) 2023    MCV 71 (L) 2023     (H) 2023     No results for input(s): TSH, FREET4 in the last 168 hours.  Lab Results   Component Value Date    HGBA1C 13.6 (H) 2023       Nutritional status:   There is no height or weight on file to calculate BMI.  Lab Results   Component Value Date    ALBUMIN 1.7 (L) 2023    ALBUMIN 1.4 (L) 2023    ALBUMIN 1.6 (L) 2023     No results found for: PREALBUMIN    CrCl cannot be calculated (Unknown ideal weight.).    Accu-Checks  Recent Labs     23  0244 23  0416 23  0734 23  0915 23  1135 23  1749 23  1932 23  0844 23  1002 23  1159   POCTGLUCOSE 445* 428* 411* 358* 235* 96 146* 450* 449* 294*       Current Medications and/or Treatments Impacting Glycemic Control  Immunotherapy:    Immunosuppressants       None          Steroids:   Hormones (From admission, onward)      None          Pressors:    Autonomic Drugs (From admission, onward)      None          Hyperglycemia/Diabetes Medications:   Antihyperglycemics (From admission, onward)      Start     Stop Route Frequency Ordered    23 0900  insulin detemir U-100 pen 22 Units         -- SubQ Daily  03/20/23 1101    03/20/23 1115  insulin aspart U-100 pen 10 Units         -- SubQ 3 times daily before meals 03/20/23 1101    03/19/23 0826  insulin aspart U-100 pen 4 Units         -- SubQ As needed (PRN) 03/19/23 0727    03/18/23 1247  insulin aspart U-100 pen 0-5 Units         -- SubQ Before meals & nightly PRN 03/18/23 1148
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
## 2023-03-20 NOTE — ASSESSMENT & PLAN NOTE
- poorly controlled outpatient with HbA1c of 13.6   - 24 hour glucose trend: sugars above goal and in 400's this morning due to snack o/n without being given prn snack Aspart   - Will be cautious with insulin regimen considering presentation with hypoglycemia  - Increase Levemir to 22 units daily  - Continue Aspart 7 units before meals with low dose correction scale   - Snack time insulin 4 units as needed is available to use for snacks -- please ensure pt receives snack insulin if she has a snack o/n   - Hypoglycemia protocol in place  - If blood glucose greater than 300 mg/dl, please ask patient not to eat food or drink anything other than water until correctional insulin has brought it back below 250 mg/dl

## 2023-03-20 NOTE — NURSING
Patient AOX4, VSS on RA, patient independently ambulates in room. IV Abx given per order.  During shift patient had . MD notified and continuous communication between provider and this author done until BG reached 294. Last BG before dinner 156, schedule aspart insulin given per order.

## 2023-03-20 NOTE — SUBJECTIVE & OBJECTIVE
Interval History:  Elevated glucose noted this morning.  Hyperkalemia resolved.  Leukocytosis present.  Patient herself has no complaints at this time.  Denies any urinary symptoms    Review of Systems   Unable to perform ROS: Other   Objective:     Vital Signs (Most Recent):  Temp: 98.2 °F (36.8 °C) (03/20/23 0845)  Pulse: 84 (03/20/23 0845)  Resp: 18 (03/20/23 0845)  BP: 129/84 (03/20/23 0845)  SpO2: 100 % (03/20/23 0845) Vital Signs (24h Range):  Temp:  [97.1 °F (36.2 °C)-99.2 °F (37.3 °C)] 98.2 °F (36.8 °C)  Pulse:  [] 84  Resp:  [16-22] 18  SpO2:  [95 %-100 %] 100 %  BP: (122-142)/(65-85) 129/84        There is no height or weight on file to calculate BMI.  No intake or output data in the 24 hours ending 03/20/23 0925   Physical Exam  Constitutional:       General: She is not in acute distress.     Comments: Unkempt female   HENT:      Head: Normocephalic.      Right Ear: External ear normal.      Left Ear: External ear normal.      Nose: Nose normal.   Cardiovascular:      Rate and Rhythm: Normal rate.   Pulmonary:      Effort: Pulmonary effort is normal.   Abdominal:      Palpations: Abdomen is soft.      Tenderness: There is no abdominal tenderness. There is no right CVA tenderness or left CVA tenderness.   Skin:     General: Skin is warm.   Neurological:      General: No focal deficit present.      Mental Status: She is alert and oriented to person, place, and time.   Psychiatric:         Mood and Affect: Mood normal.       Significant Labs: All pertinent labs within the past 24 hours have been reviewed.

## 2023-03-20 NOTE — ASSESSMENT & PLAN NOTE
We will continue with IV ceftriaxone.  Repeat blood and urine cultures remain no growth.  Leukocytosis noted.  We will repeat urine and blood cultures.  Continue to follow up on initial repeat cultures which remain no growth.
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
## 2023-03-21 PROBLEM — D72.829 LEUKOCYTOSIS: Status: ACTIVE | Noted: 2023-03-21

## 2023-03-21 PROBLEM — D64.9 ANEMIA: Status: ACTIVE | Noted: 2023-03-21

## 2023-03-21 LAB
ANION GAP SERPL CALC-SCNC: 12 MMOL/L (ref 8–16)
BACTERIA UR CULT: NO GROWTH
BASOPHILS # BLD AUTO: 0.05 K/UL (ref 0–0.2)
BASOPHILS NFR BLD: 0.3 % (ref 0–1.9)
BUN SERPL-MCNC: 20 MG/DL (ref 6–20)
CALCIUM SERPL-MCNC: 8.4 MG/DL (ref 8.7–10.5)
CHLORIDE SERPL-SCNC: 109 MMOL/L (ref 95–110)
CO2 SERPL-SCNC: 21 MMOL/L (ref 23–29)
CREAT SERPL-MCNC: 1.2 MG/DL (ref 0.5–1.4)
DIFFERENTIAL METHOD: ABNORMAL
EOSINOPHIL # BLD AUTO: 0 K/UL (ref 0–0.5)
EOSINOPHIL NFR BLD: 0.1 % (ref 0–8)
ERYTHROCYTE [DISTWIDTH] IN BLOOD BY AUTOMATED COUNT: 17.2 % (ref 11.5–14.5)
ERYTHROCYTE [SEDIMENTATION RATE] IN BLOOD BY PHOTOMETRIC METHOD: >120 MM/HR (ref 0–36)
EST. GFR  (NO RACE VARIABLE): 53.8 ML/MIN/1.73 M^2
FERRITIN SERPL-MCNC: 506 NG/ML (ref 20–300)
GLUCOSE SERPL-MCNC: 171 MG/DL (ref 70–110)
HCT VFR BLD AUTO: 25.5 % (ref 37–48.5)
HGB BLD-MCNC: 7.5 G/DL (ref 12–16)
IMM GRANULOCYTES # BLD AUTO: 0.09 K/UL (ref 0–0.04)
IMM GRANULOCYTES NFR BLD AUTO: 0.5 % (ref 0–0.5)
IRON SERPL-MCNC: 40 UG/DL (ref 30–160)
LACTATE SERPL-SCNC: 0.9 MMOL/L (ref 0.5–2.2)
LYMPHOCYTES # BLD AUTO: 2.6 K/UL (ref 1–4.8)
LYMPHOCYTES NFR BLD: 14.5 % (ref 18–48)
MCH RBC QN AUTO: 21.2 PG (ref 27–31)
MCHC RBC AUTO-ENTMCNC: 29.4 G/DL (ref 32–36)
MCV RBC AUTO: 72 FL (ref 82–98)
MONOCYTES # BLD AUTO: 0.9 K/UL (ref 0.3–1)
MONOCYTES NFR BLD: 4.7 % (ref 4–15)
NEUTROPHILS # BLD AUTO: 14.5 K/UL (ref 1.8–7.7)
NEUTROPHILS NFR BLD: 79.9 % (ref 38–73)
NRBC BLD-RTO: 0 /100 WBC
PLATELET # BLD AUTO: 558 K/UL (ref 150–450)
PMV BLD AUTO: 12.8 FL (ref 9.2–12.9)
POCT GLUCOSE: 108 MG/DL (ref 70–110)
POCT GLUCOSE: 216 MG/DL (ref 70–110)
POCT GLUCOSE: 247 MG/DL (ref 70–110)
POCT GLUCOSE: 462 MG/DL (ref 70–110)
POTASSIUM SERPL-SCNC: 4.8 MMOL/L (ref 3.5–5.1)
PROCALCITONIN SERPL IA-MCNC: 1.32 NG/ML
RBC # BLD AUTO: 3.53 M/UL (ref 4–5.4)
SATURATED IRON: 17 % (ref 20–50)
SODIUM SERPL-SCNC: 142 MMOL/L (ref 136–145)
TOTAL IRON BINDING CAPACITY: 240 UG/DL (ref 250–450)
TRANSFERRIN SERPL-MCNC: 162 MG/DL (ref 200–375)
WBC # BLD AUTO: 18.16 K/UL (ref 3.9–12.7)

## 2023-03-21 PROCEDURE — 80048 BASIC METABOLIC PNL TOTAL CA: CPT | Performed by: INTERNAL MEDICINE

## 2023-03-21 PROCEDURE — 63600175 PHARM REV CODE 636 W HCPCS: Performed by: INTERNAL MEDICINE

## 2023-03-21 PROCEDURE — 99232 PR SUBSEQUENT HOSPITAL CARE,LEVL II: ICD-10-PCS | Mod: ,,, | Performed by: INTERNAL MEDICINE

## 2023-03-21 PROCEDURE — 84145 PROCALCITONIN (PCT): CPT | Performed by: INTERNAL MEDICINE

## 2023-03-21 PROCEDURE — 99232 SBSQ HOSP IP/OBS MODERATE 35: CPT | Mod: ,,, | Performed by: INTERNAL MEDICINE

## 2023-03-21 PROCEDURE — 99223 PR INITIAL HOSPITAL CARE,LEVL III: ICD-10-PCS | Mod: ,,, | Performed by: STUDENT IN AN ORGANIZED HEALTH CARE EDUCATION/TRAINING PROGRAM

## 2023-03-21 PROCEDURE — 85025 COMPLETE CBC W/AUTO DIFF WBC: CPT | Performed by: INTERNAL MEDICINE

## 2023-03-21 PROCEDURE — 25000003 PHARM REV CODE 250: Performed by: INTERNAL MEDICINE

## 2023-03-21 PROCEDURE — 99223 1ST HOSP IP/OBS HIGH 75: CPT | Mod: ,,, | Performed by: STUDENT IN AN ORGANIZED HEALTH CARE EDUCATION/TRAINING PROGRAM

## 2023-03-21 PROCEDURE — 36415 COLL VENOUS BLD VENIPUNCTURE: CPT | Performed by: INTERNAL MEDICINE

## 2023-03-21 PROCEDURE — 82728 ASSAY OF FERRITIN: CPT | Performed by: INTERNAL MEDICINE

## 2023-03-21 PROCEDURE — 84466 ASSAY OF TRANSFERRIN: CPT | Performed by: INTERNAL MEDICINE

## 2023-03-21 PROCEDURE — 85652 RBC SED RATE AUTOMATED: CPT | Performed by: INTERNAL MEDICINE

## 2023-03-21 PROCEDURE — 25000003 PHARM REV CODE 250: Performed by: HOSPITALIST

## 2023-03-21 PROCEDURE — 20600001 HC STEP DOWN PRIVATE ROOM

## 2023-03-21 PROCEDURE — 83605 ASSAY OF LACTIC ACID: CPT | Performed by: INTERNAL MEDICINE

## 2023-03-21 RX ORDER — TALC
6 POWDER (GRAM) TOPICAL NIGHTLY PRN
Status: DISCONTINUED | OUTPATIENT
Start: 2023-03-21 | End: 2023-03-23 | Stop reason: HOSPADM

## 2023-03-21 RX ORDER — INSULIN ASPART 100 [IU]/ML
14 INJECTION, SOLUTION INTRAVENOUS; SUBCUTANEOUS
Status: DISCONTINUED | OUTPATIENT
Start: 2023-03-21 | End: 2023-03-21

## 2023-03-21 RX ORDER — INSULIN ASPART 100 [IU]/ML
10 INJECTION, SOLUTION INTRAVENOUS; SUBCUTANEOUS
Status: DISCONTINUED | OUTPATIENT
Start: 2023-03-21 | End: 2023-03-23 | Stop reason: HOSPADM

## 2023-03-21 RX ADMIN — INSULIN ASPART 2 UNITS: 100 INJECTION, SOLUTION INTRAVENOUS; SUBCUTANEOUS at 05:03

## 2023-03-21 RX ADMIN — Medication 6 MG: at 01:03

## 2023-03-21 RX ADMIN — CEFTRIAXONE 2 G: 2 INJECTION, POWDER, FOR SOLUTION INTRAMUSCULAR; INTRAVENOUS at 02:03

## 2023-03-21 RX ADMIN — INSULIN ASPART 2 UNITS: 100 INJECTION, SOLUTION INTRAVENOUS; SUBCUTANEOUS at 09:03

## 2023-03-21 RX ADMIN — ASPIRIN 81 MG: 81 TABLET, CHEWABLE ORAL at 09:03

## 2023-03-21 RX ADMIN — Medication 6 MG: at 08:03

## 2023-03-21 RX ADMIN — MUPIROCIN: 20 OINTMENT TOPICAL at 09:03

## 2023-03-21 RX ADMIN — PANTOPRAZOLE SODIUM 40 MG: 40 TABLET, DELAYED RELEASE ORAL at 09:03

## 2023-03-21 RX ADMIN — INSULIN ASPART 4 UNITS: 100 INJECTION, SOLUTION INTRAVENOUS; SUBCUTANEOUS at 08:03

## 2023-03-21 RX ADMIN — GABAPENTIN 400 MG: 400 CAPSULE ORAL at 08:03

## 2023-03-21 RX ADMIN — ONDANSETRON 4 MG: 2 INJECTION INTRAMUSCULAR; INTRAVENOUS at 08:03

## 2023-03-21 RX ADMIN — MUPIROCIN: 20 OINTMENT TOPICAL at 08:03

## 2023-03-21 RX ADMIN — GABAPENTIN 400 MG: 400 CAPSULE ORAL at 09:03

## 2023-03-21 RX ADMIN — AMLODIPINE BESYLATE 10 MG: 10 TABLET ORAL at 09:03

## 2023-03-21 RX ADMIN — INSULIN ASPART 10 UNITS: 100 INJECTION, SOLUTION INTRAVENOUS; SUBCUTANEOUS at 01:03

## 2023-03-21 RX ADMIN — ATORVASTATIN CALCIUM 20 MG: 20 TABLET, FILM COATED ORAL at 09:03

## 2023-03-21 RX ADMIN — INSULIN ASPART 10 UNITS: 100 INJECTION, SOLUTION INTRAVENOUS; SUBCUTANEOUS at 05:03

## 2023-03-21 RX ADMIN — INSULIN ASPART 10 UNITS: 100 INJECTION, SOLUTION INTRAVENOUS; SUBCUTANEOUS at 09:03

## 2023-03-21 RX ADMIN — INSULIN ASPART 5 UNITS: 100 INJECTION, SOLUTION INTRAVENOUS; SUBCUTANEOUS at 01:03

## 2023-03-21 NOTE — SUBJECTIVE & OBJECTIVE
Interval HPI:   Overnight events: Continues to have very labile BG due to dietary noncompliance. Early this morning pt drank a regular soda without notifying nurse or receiving insulin.  Eatin%  Nausea: No  Hypoglycemia and intervention: No  Fever: No  TPN and/or TF: No  If yes, type of TF/TPN and rate: n/a    BP (!) 167/82   Pulse 86   Temp 97.1 °F (36.2 °C) (Oral)   Resp 18   LMP 2018 (Approximate)   SpO2 98%     Labs Reviewed and Include    Recent Labs   Lab 23  0336   *   CALCIUM 8.4*      K 4.8   CO2 21*      BUN 20   CREATININE 1.2     Lab Results   Component Value Date    WBC 18.16 (H) 2023    HGB 7.5 (L) 2023    HCT 25.5 (L) 2023    MCV 72 (L) 2023     (H) 2023     No results for input(s): TSH, FREET4 in the last 168 hours.  Lab Results   Component Value Date    HGBA1C 13.6 (H) 2023       Nutritional status:   There is no height or weight on file to calculate BMI.  Lab Results   Component Value Date    ALBUMIN 1.7 (L) 2023    ALBUMIN 1.4 (L) 2023    ALBUMIN 1.6 (L) 2023     No results found for: PREALBUMIN    CrCl cannot be calculated (Unknown ideal weight.).    Accu-Checks  Recent Labs     23  1135 23  1749 23  1932 23  0844 23  1002 23  1159 23  1639 23  2035 23  0800 23  1129   POCTGLUCOSE 235* 96 146* 450* 449* 294* 156* 114* 247* 462*       Current Medications and/or Treatments Impacting Glycemic Control  Immunotherapy:    Immunosuppressants       None          Steroids:   Hormones (From admission, onward)      Start     Stop Route Frequency Ordered    23 0143  melatonin tablet 6 mg         -- Oral Nightly PRN 23 0043          Pressors:    Autonomic Drugs (From admission, onward)      None          Hyperglycemia/Diabetes Medications:   Antihyperglycemics (From admission, onward)      Start     Stop Route Frequency Ordered     03/21/23 1615  insulin aspart U-100 pen 10 Units         -- SubQ 3 times daily before meals 03/21/23 1424    03/21/23 0900  insulin detemir U-100 pen 22 Units         -- SubQ Daily 03/20/23 1101    03/19/23 0826  insulin aspart U-100 pen 4 Units         -- SubQ As needed (PRN) 03/19/23 0727    03/18/23 1247  insulin aspart U-100 pen 0-5 Units         -- SubQ Before meals & nightly PRN 03/18/23 1147

## 2023-03-21 NOTE — ASSESSMENT & PLAN NOTE
Microcytic anemia, likely secondary to chronic disease.  Hemoglobin stable.  We will obtain iron studies

## 2023-03-21 NOTE — ASSESSMENT & PLAN NOTE
We will continue with IV ceftriaxone.  Repeat blood and urine cultures are no growth.  Leukocytosis still persistent, no bands present.  blood and urine culture repeated yesterday which are no growth thus far.  We will obtain procalcitonin, ESR, lactic acid and ID consult.
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
## 2023-03-21 NOTE — NURSING
Pt requesting snack. . PRN insulin given. Diabetes education given on appropriate snacks to eat.    0039 Pt requesting something to help her sleep. MD notified. PRN melatonin ordered. Administered.
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
## 2023-03-21 NOTE — SUBJECTIVE & OBJECTIVE
Past Medical History:   Diagnosis Date    Anxiety     Arthritis     Bronchitis     CHF (congestive heart failure)     Diabetic ketoacidosis associated with type 2 diabetes mellitus 3/10/2023    DM (diabetes mellitus)     Emphysema lung     Encounter for blood transfusion     HTN (hypertension)     Restrictive lung disease     Sleep apnea        Past Surgical History:   Procedure Laterality Date     SECTION      PALATAL EXPANSION      WRIST SURGERY Right        Review of patient's allergies indicates:   Allergen Reactions    Hydrochlorothiazide plus        Medications:  Medications Prior to Admission   Medication Sig    albuterol (ACCUNEB) 1.25 mg/3 mL Nebu Take 1.25 mg by nebulization every 6 (six) hours as needed.    ALPRAZolam (XANAX) 2 MG Tab Take 2 mg by mouth daily as needed for Anxiety.    amlodipine (NORVASC) 10 MG tablet Take 10 mg by mouth once daily.    aspirin 81 MG Chew Take 81 mg by mouth once daily.    atorvastatin (LIPITOR) 20 MG tablet Take 20 mg by mouth once daily.    ciprofloxacin HCl (CIPRO) 500 MG tablet Take 1 tablet (500 mg total) by mouth every 12 (twelve) hours. for 14 days    fluticasone-salmeterol diskus inhaler 500-50 mcg Inhale 1 puff into the lungs 2 (two) times daily.    gabapentin (NEURONTIN) 400 MG capsule Take 400 mg by mouth 2 (two) times daily.    insulin aspart U-100 (NOVOLOG) 100 unit/mL injection Inject 10 Units into the skin 3 (three) times daily before meals.    insulin detemir U-100 (LEVEMIR) 100 unit/mL injection Inject 40 Units into the skin once daily.    omeprazole (PRILOSEC) 40 MG capsule Take 40 mg by mouth once daily.    promethazine (PHENERGAN) 12.5 MG Tab Take 25 mg by mouth every 6 (six) hours as needed.    tiotropium (SPIRIVA) 18 mcg inhalation capsule Inhale 18 mcg into the lungs once daily.     Antibiotics (From admission, onward)      Start     Stop Route Frequency Ordered    23  mupirocin 2 % ointment         2059 Nasl 2 times daily  03/18/23 2118 03/18/23 1400  cefTRIAXone (ROCEPHIN) 2 g in dextrose 5 % in water (D5W) 5 % 50 mL IVPB (MB+)         -- IV Every 24 hours (non-standard times) 03/17/23 1859          Antifungals (From admission, onward)      None          Antivirals (From admission, onward)      None               There is no immunization history on file for this patient.    Family History       Problem Relation (Age of Onset)    Diabetes Mother, Father, Sister    Hypertension Mother, Father          Social History     Socioeconomic History    Marital status:    Tobacco Use    Smoking status: Some Days     Packs/day: 0.25     Types: Cigarettes    Smokeless tobacco: Never   Substance and Sexual Activity    Alcohol use: Yes    Drug use: Yes     Types: Marijuana    Sexual activity: Yes     Partners: Male     Social Determinants of Health     Financial Resource Strain: Unknown    Difficulty of Paying Living Expenses: Patient refused   Food Insecurity: Unknown    Worried About Running Out of Food in the Last Year: Patient refused    Ran Out of Food in the Last Year: Patient refused   Transportation Needs: Unknown    Lack of Transportation (Medical): Patient refused    Lack of Transportation (Non-Medical): Patient refused   Physical Activity: Unknown    Days of Exercise per Week: Patient refused    Minutes of Exercise per Session: Patient refused   Stress: Unknown    Feeling of Stress : Patient refused   Social Connections: Unknown    Frequency of Communication with Friends and Family: Patient refused    Frequency of Social Gatherings with Friends and Family: Patient refused    Attends Shinto Services: Patient refused    Active Member of Clubs or Organizations: Patient refused    Attends Club or Organization Meetings: Patient refused    Marital Status: Patient refused   Housing Stability: Unknown    Unable to Pay for Housing in the Last Year: Patient refused    Number of Places Lived in the Last Year: 1    Unstable Housing in  the Last Year: Patient refused     Review of Systems   Constitutional:  Negative for chills and fever.   HENT:  Positive for dental problem. Negative for sore throat.    Respiratory:  Negative for cough and shortness of breath.    Cardiovascular:  Negative for leg swelling.   Gastrointestinal:  Positive for abdominal pain. Negative for diarrhea, nausea and vomiting.   Genitourinary:  Negative for dysuria.   Musculoskeletal:  Positive for arthralgias.   Skin:  Negative for rash.   Neurological:  Negative for headaches.   Hematological:  Negative for adenopathy.   Psychiatric/Behavioral:  Negative for confusion.    Objective:     Vital Signs (Most Recent):  Temp: 97.6 °F (36.4 °C) (03/21/23 1642)  Pulse: 92 (03/21/23 1642)  Resp: 18 (03/21/23 1642)  BP: 133/87 (03/21/23 1642)  SpO2: 98 % (03/21/23 1642) Vital Signs (24h Range):  Temp:  [97.1 °F (36.2 °C)-98.9 °F (37.2 °C)] 97.6 °F (36.4 °C)  Pulse:  [81-92] 92  Resp:  [17-18] 18  SpO2:  [98 %-100 %] 98 %  BP: (113-167)/(62-87) 133/87        There is no height or weight on file to calculate BMI.    CrCl cannot be calculated (Unknown ideal weight.).    Physical Exam  Vitals reviewed.   Constitutional:       General: She is not in acute distress.     Appearance: Normal appearance. She is not ill-appearing, toxic-appearing or diaphoretic.   HENT:      Head: Normocephalic and atraumatic.      Right Ear: External ear normal.      Left Ear: External ear normal.      Nose: Nose normal.      Mouth/Throat:      Mouth: Mucous membranes are moist.      Pharynx: Oropharynx is clear. No oropharyngeal exudate or posterior oropharyngeal erythema.      Comments: Poor dentition  Eyes:      General: No scleral icterus.        Right eye: No discharge.         Left eye: No discharge.      Extraocular Movements: Extraocular movements intact.      Conjunctiva/sclera: Conjunctivae normal.   Cardiovascular:      Rate and Rhythm: Normal rate and regular rhythm.      Heart sounds: No murmur  heard.  Pulmonary:      Effort: Pulmonary effort is normal. No respiratory distress.   Abdominal:      General: Abdomen is flat. There is no distension.      Palpations: Abdomen is soft.   Musculoskeletal:         General: No deformity. Normal range of motion.      Cervical back: Normal range of motion. No rigidity.      Right lower leg: No edema.      Left lower leg: No edema.   Lymphadenopathy:      Cervical: No cervical adenopathy.   Skin:     General: Skin is warm and dry.      Coloration: Skin is not jaundiced.      Findings: No rash.      Comments: Right AC with cord-like firmness; no erythema, discharge   Neurological:      Mental Status: She is alert and oriented to person, place, and time. Mental status is at baseline.   Psychiatric:         Mood and Affect: Mood normal.         Behavior: Behavior normal.         Thought Content: Thought content normal.         Judgment: Judgment normal.       Significant Labs: All pertinent labs within the past 24 hours have been reviewed.    Significant Imaging: I have reviewed all pertinent imaging results/findings within the past 24 hours.

## 2023-03-21 NOTE — ASSESSMENT & PLAN NOTE
- no episodes in the last few days  - will be cautious with insulin regimen and titrate them slowly to prevent episodes of hypoglycemia

## 2023-03-21 NOTE — NURSING
Patient AOX4, VSS on RA. Patient ambulated around room and unit independently. Family at bedside.   No acute events during shift. IV Abx given per order.

## 2023-03-21 NOTE — SUBJECTIVE & OBJECTIVE
Interval HPI:   Overnight events: Early AM BG much better today after receiving prn insulin with snack o/n. However, AC lunch BG back up in 400s.  Eatin%  Nausea: No  Hypoglycemia and intervention: No  Fever: No  TPN and/or TF: No  If yes, type of TF/TPN and rate: n/a    BP (!) 167/82   Pulse 86   Temp 97.1 °F (36.2 °C) (Oral)   Resp 18   LMP 2018 (Approximate)   SpO2 98%     Labs Reviewed and Include    Recent Labs   Lab 23  0336   *   CALCIUM 8.4*      K 4.8   CO2 21*      BUN 20   CREATININE 1.2     Lab Results   Component Value Date    WBC 18.16 (H) 2023    HGB 7.5 (L) 2023    HCT 25.5 (L) 2023    MCV 72 (L) 2023     (H) 2023     No results for input(s): TSH, FREET4 in the last 168 hours.  Lab Results   Component Value Date    HGBA1C 13.6 (H) 2023       Nutritional status:   There is no height or weight on file to calculate BMI.  Lab Results   Component Value Date    ALBUMIN 1.7 (L) 2023    ALBUMIN 1.4 (L) 2023    ALBUMIN 1.6 (L) 2023     No results found for: PREALBUMIN    CrCl cannot be calculated (Unknown ideal weight.).    Accu-Checks  Recent Labs     23  1135 23  1749 23  1932 23  0844 23  1002 23  1159 23  1639 23  2035 23  0800 23  1129   POCTGLUCOSE 235* 96 146* 450* 449* 294* 156* 114* 247* 462*       Current Medications and/or Treatments Impacting Glycemic Control  Immunotherapy:    Immunosuppressants       None          Steroids:   Hormones (From admission, onward)      Start     Stop Route Frequency Ordered    23 0143  melatonin tablet 6 mg         -- Oral Nightly PRN 23 0043          Pressors:    Autonomic Drugs (From admission, onward)      None          Hyperglycemia/Diabetes Medications:   Antihyperglycemics (From admission, onward)      Start     Stop Route Frequency Ordered    23 1615  insulin aspart U-100 pen 14  Units         -- SubQ 3 times daily before meals 03/21/23 1355    03/21/23 0900  insulin detemir U-100 pen 22 Units         -- SubQ Daily 03/20/23 1101    03/19/23 0826  insulin aspart U-100 pen 4 Units         -- SubQ As needed (PRN) 03/19/23 0727    03/18/23 1247  insulin aspart U-100 pen 0-5 Units         -- SubQ Before meals & nightly PRN 03/18/23 1147

## 2023-03-21 NOTE — ASSESSMENT & PLAN NOTE
- poorly controlled outpatient with HbA1c of 13.6   - 24 hour glucose trend: Highly variable based on dietary excursions and missing insulin doses with snacks.     She continues to have significant hyperglycemia due to dietary noncompliance. We will not make adjustments in insulin for this, as glycemic control adequate other times and do not want to provoke hypoglycemia but increasing in this circumstance.    - Will be cautious with insulin regimen considering presentation with hypoglycemia  - Continue Levemir 22 units daily  - Continue Aspart 10 units before meals with low dose correction scale   - Snack time insulin 4 units as needed is available to use for snacks -- please ensure pt receives snack insulin if she has a snack o/n   - Hypoglycemia protocol in place  - If blood glucose greater than 300 mg/dl, please ask patient not to eat food or drink anything other than water until correctional insulin has brought it back below 250 mg/dl

## 2023-03-21 NOTE — ASSESSMENT & PLAN NOTE
- poorly controlled outpatient with HbA1c of 13.6   - 24 hour glucose trend: sugars above goal and in 400's this morning due to snack o/n without being given prn snack Aspart   - Will be cautious with insulin regimen considering presentation with hypoglycemia  - Continue Levemir 22 units daily  - Increase Aspart to 14 units before meals with low dose correction scale   - Snack time insulin 4 units as needed is available to use for snacks -- please ensure pt receives snack insulin if she has a snack o/n   - Hypoglycemia protocol in place  - If blood glucose greater than 300 mg/dl, please ask patient not to eat food or drink anything other than water until correctional insulin has brought it back below 250 mg/dl

## 2023-03-22 PROBLEM — I99.9 VASCULOPATHY: Status: ACTIVE | Noted: 2023-03-22

## 2023-03-22 PROBLEM — I82.890 SUPERFICIAL VEIN THROMBOSIS: Status: ACTIVE | Noted: 2023-03-22

## 2023-03-22 LAB
ABO + RH BLD: NORMAL
ANION GAP SERPL CALC-SCNC: 11 MMOL/L (ref 8–16)
BASOPHILS # BLD AUTO: 0.02 K/UL (ref 0–0.2)
BASOPHILS NFR BLD: 0.1 % (ref 0–1.9)
BLD GP AB SCN CELLS X3 SERPL QL: NORMAL
BLD PROD TYP BPU: NORMAL
BLOOD UNIT EXPIRATION DATE: NORMAL
BLOOD UNIT TYPE CODE: 5100
BLOOD UNIT TYPE: NORMAL
BUN SERPL-MCNC: 25 MG/DL (ref 6–20)
CALCIUM SERPL-MCNC: 8.3 MG/DL (ref 8.7–10.5)
CHLORIDE SERPL-SCNC: 110 MMOL/L (ref 95–110)
CO2 SERPL-SCNC: 18 MMOL/L (ref 23–29)
CODING SYSTEM: NORMAL
CREAT SERPL-MCNC: 1.2 MG/DL (ref 0.5–1.4)
CROSSMATCH INTERPRETATION: NORMAL
DIFFERENTIAL METHOD: ABNORMAL
DISPENSE STATUS: NORMAL
EOSINOPHIL # BLD AUTO: 0.1 K/UL (ref 0–0.5)
EOSINOPHIL NFR BLD: 0.3 % (ref 0–8)
ERYTHROCYTE [DISTWIDTH] IN BLOOD BY AUTOMATED COUNT: 16.9 % (ref 11.5–14.5)
EST. GFR  (NO RACE VARIABLE): 53.8 ML/MIN/1.73 M^2
GLUCOSE SERPL-MCNC: 152 MG/DL (ref 70–110)
HCT VFR BLD AUTO: 22.8 % (ref 37–48.5)
HGB BLD-MCNC: 6.7 G/DL (ref 12–16)
IMM GRANULOCYTES # BLD AUTO: 0.08 K/UL (ref 0–0.04)
IMM GRANULOCYTES NFR BLD AUTO: 0.5 % (ref 0–0.5)
LYMPHOCYTES # BLD AUTO: 2 K/UL (ref 1–4.8)
LYMPHOCYTES NFR BLD: 12.2 % (ref 18–48)
MCH RBC QN AUTO: 21.3 PG (ref 27–31)
MCHC RBC AUTO-ENTMCNC: 29.4 G/DL (ref 32–36)
MCV RBC AUTO: 73 FL (ref 82–98)
MONOCYTES # BLD AUTO: 0.8 K/UL (ref 0.3–1)
MONOCYTES NFR BLD: 5.1 % (ref 4–15)
NEUTROPHILS # BLD AUTO: 13.2 K/UL (ref 1.8–7.7)
NEUTROPHILS NFR BLD: 81.8 % (ref 38–73)
NRBC BLD-RTO: 0 /100 WBC
NUM UNITS TRANS PACKED RBC: NORMAL
PLATELET # BLD AUTO: 631 K/UL (ref 150–450)
PMV BLD AUTO: 11.6 FL (ref 9.2–12.9)
POCT GLUCOSE: 105 MG/DL (ref 70–110)
POCT GLUCOSE: 201 MG/DL (ref 70–110)
POCT GLUCOSE: 284 MG/DL (ref 70–110)
POCT GLUCOSE: 318 MG/DL (ref 70–110)
POCT GLUCOSE: 328 MG/DL (ref 70–110)
POCT GLUCOSE: 389 MG/DL (ref 70–110)
POCT GLUCOSE: 447 MG/DL (ref 70–110)
POTASSIUM SERPL-SCNC: 4.5 MMOL/L (ref 3.5–5.1)
RBC # BLD AUTO: 3.14 M/UL (ref 4–5.4)
SODIUM SERPL-SCNC: 139 MMOL/L (ref 136–145)
SPECIMEN OUTDATE: NORMAL
WBC # BLD AUTO: 16.18 K/UL (ref 3.9–12.7)

## 2023-03-22 PROCEDURE — 86920 COMPATIBILITY TEST SPIN: CPT | Performed by: INTERNAL MEDICINE

## 2023-03-22 PROCEDURE — 20600001 HC STEP DOWN PRIVATE ROOM

## 2023-03-22 PROCEDURE — P9016 RBC LEUKOCYTES REDUCED: HCPCS | Performed by: INTERNAL MEDICINE

## 2023-03-22 PROCEDURE — 99233 PR SUBSEQUENT HOSPITAL CARE,LEVL III: ICD-10-PCS | Mod: ,,, | Performed by: STUDENT IN AN ORGANIZED HEALTH CARE EDUCATION/TRAINING PROGRAM

## 2023-03-22 PROCEDURE — 36430 TRANSFUSION BLD/BLD COMPNT: CPT

## 2023-03-22 PROCEDURE — 25000003 PHARM REV CODE 250: Performed by: INTERNAL MEDICINE

## 2023-03-22 PROCEDURE — 99232 PR SUBSEQUENT HOSPITAL CARE,LEVL II: ICD-10-PCS | Mod: ,,, | Performed by: INTERNAL MEDICINE

## 2023-03-22 PROCEDURE — 94761 N-INVAS EAR/PLS OXIMETRY MLT: CPT

## 2023-03-22 PROCEDURE — 99232 SBSQ HOSP IP/OBS MODERATE 35: CPT | Mod: ,,, | Performed by: INTERNAL MEDICINE

## 2023-03-22 PROCEDURE — 86900 BLOOD TYPING SEROLOGIC ABO: CPT | Performed by: INTERNAL MEDICINE

## 2023-03-22 PROCEDURE — 99233 SBSQ HOSP IP/OBS HIGH 50: CPT | Mod: ,,, | Performed by: INTERNAL MEDICINE

## 2023-03-22 PROCEDURE — 99900035 HC TECH TIME PER 15 MIN (STAT)

## 2023-03-22 PROCEDURE — 94640 AIRWAY INHALATION TREATMENT: CPT

## 2023-03-22 PROCEDURE — 25000242 PHARM REV CODE 250 ALT 637 W/ HCPCS: Performed by: INTERNAL MEDICINE

## 2023-03-22 PROCEDURE — 25000003 PHARM REV CODE 250: Performed by: HOSPITALIST

## 2023-03-22 PROCEDURE — 99233 SBSQ HOSP IP/OBS HIGH 50: CPT | Mod: ,,, | Performed by: STUDENT IN AN ORGANIZED HEALTH CARE EDUCATION/TRAINING PROGRAM

## 2023-03-22 PROCEDURE — 85025 COMPLETE CBC W/AUTO DIFF WBC: CPT | Performed by: INTERNAL MEDICINE

## 2023-03-22 PROCEDURE — 36415 COLL VENOUS BLD VENIPUNCTURE: CPT | Performed by: INTERNAL MEDICINE

## 2023-03-22 PROCEDURE — 99233 PR SUBSEQUENT HOSPITAL CARE,LEVL III: ICD-10-PCS | Mod: ,,, | Performed by: INTERNAL MEDICINE

## 2023-03-22 PROCEDURE — 80048 BASIC METABOLIC PNL TOTAL CA: CPT | Performed by: INTERNAL MEDICINE

## 2023-03-22 RX ORDER — HYDROCODONE BITARTRATE AND ACETAMINOPHEN 500; 5 MG/1; MG/1
TABLET ORAL
Status: DISCONTINUED | OUTPATIENT
Start: 2023-03-22 | End: 2023-03-23 | Stop reason: HOSPADM

## 2023-03-22 RX ADMIN — INSULIN ASPART 5 UNITS: 100 INJECTION, SOLUTION INTRAVENOUS; SUBCUTANEOUS at 01:03

## 2023-03-22 RX ADMIN — GABAPENTIN 400 MG: 400 CAPSULE ORAL at 09:03

## 2023-03-22 RX ADMIN — ASPIRIN 81 MG: 81 TABLET, CHEWABLE ORAL at 08:03

## 2023-03-22 RX ADMIN — MUPIROCIN: 20 OINTMENT TOPICAL at 09:03

## 2023-03-22 RX ADMIN — INSULIN ASPART 10 UNITS: 100 INJECTION, SOLUTION INTRAVENOUS; SUBCUTANEOUS at 07:03

## 2023-03-22 RX ADMIN — INSULIN ASPART 10 UNITS: 100 INJECTION, SOLUTION INTRAVENOUS; SUBCUTANEOUS at 01:03

## 2023-03-22 RX ADMIN — INSULIN ASPART 10 UNITS: 100 INJECTION, SOLUTION INTRAVENOUS; SUBCUTANEOUS at 06:03

## 2023-03-22 RX ADMIN — AMLODIPINE BESYLATE 10 MG: 10 TABLET ORAL at 08:03

## 2023-03-22 RX ADMIN — TIOTROPIUM BROMIDE INHALATION SPRAY 2 PUFF: 3.12 SPRAY, METERED RESPIRATORY (INHALATION) at 08:03

## 2023-03-22 RX ADMIN — GABAPENTIN 400 MG: 400 CAPSULE ORAL at 08:03

## 2023-03-22 RX ADMIN — INSULIN ASPART 4 UNITS: 100 INJECTION, SOLUTION INTRAVENOUS; SUBCUTANEOUS at 07:03

## 2023-03-22 RX ADMIN — Medication 6 MG: at 09:03

## 2023-03-22 RX ADMIN — INSULIN ASPART 3 UNITS: 100 INJECTION, SOLUTION INTRAVENOUS; SUBCUTANEOUS at 06:03

## 2023-03-22 RX ADMIN — FLUTICASONE FUROATE AND VILANTEROL TRIFENATATE 1 PUFF: 200; 25 POWDER RESPIRATORY (INHALATION) at 08:03

## 2023-03-22 RX ADMIN — INSULIN ASPART 1 UNITS: 100 INJECTION, SOLUTION INTRAVENOUS; SUBCUTANEOUS at 09:03

## 2023-03-22 RX ADMIN — ATORVASTATIN CALCIUM 20 MG: 20 TABLET, FILM COATED ORAL at 08:03

## 2023-03-22 RX ADMIN — PANTOPRAZOLE SODIUM 40 MG: 40 TABLET, DELAYED RELEASE ORAL at 08:03

## 2023-03-22 RX ADMIN — MUPIROCIN: 20 OINTMENT TOPICAL at 08:03
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
## 2023-03-22 NOTE — HOSPITAL COURSE
Endocrinology was consulted. She was put back on ceftriaxone. Leukocytosis persisted. Infectious Disease was consulted. Procalcitonin was elevated. Sed rate was elevated. Ultrasound of the right upper extremity veins showed thrombosis of the superficial right basilic and cephalic veins. Hemoglobin and hematocrit decreased until they were 6.7 and 22.8 on 3/22/2023. She has a chronically low MCV. Iron studies were consistent with anemia of chronic disease rather than iron deficiency. 1 unit of packed red blood cells was transfused. Infectious Disease recommended transitioning to oral ciprofloxacin 500 mg twice daily at discharge to complete on 3/24/2023.. Endocrinology did not decrease her insulin doses because she only had hyperglycemia while in the hospital.

## 2023-03-22 NOTE — ASSESSMENT & PLAN NOTE
Significant history of smoking, reduce radial volume pulse to right upper extremity when compared to left.  Will obtain a right upper extremity arterial ultrasound.  Continue statin

## 2023-03-22 NOTE — ASSESSMENT & PLAN NOTE
Significant history of smoking, reduce radial volume pulse to right upper extremity when compared to left.  Will obtain a right upper extremity arterial ultrasound

## 2023-03-22 NOTE — CARE UPDATE
Arterial ultrasound of right upper extremity unremarkable for stenosis or occlusion.  ID believes superficial thrombosis is also  accounting for leukocytosis.  Continue care

## 2023-03-22 NOTE — ASSESSMENT & PLAN NOTE
We will continue with IV ceftriaxone.  Repeat blood and urine cultures are no growth.  Leukocytosis still persistent, no bands present, elevated procalcitonin ESR, normal lactic acid.  ID consulted.  Most recent(3/20) repeat blood and urine culture remains no growth, continue to follow.

## 2023-03-22 NOTE — ASSESSMENT & PLAN NOTE
Patient is now symptomatic of anemia and hemoglobin of 6.7, will transfuse 1 unit of packed red cells.  Will avoid antiplatelets and anticoagulation at this time.  Obtain fecal occult blood.  Chronic Microcytic anemia, likely secondary to chronic disease. Iron studies not suggestive of iron deficiency anemia.

## 2023-03-22 NOTE — SUBJECTIVE & OBJECTIVE
Interval History: No acute events overnight. Upper extremity ultrasound with evidence of thrombus in right basilic and cephalic veins. WBC remains elevated.    Review of Systems   Constitutional:  Positive for chills. Negative for fever.   HENT:  Negative for congestion and sore throat.    Eyes:  Negative for photophobia and pain.   Respiratory:  Negative for cough and shortness of breath.    Cardiovascular:  Positive for chest pain (intermittent chest wall). Negative for palpitations.   Gastrointestinal:  Negative for blood in stool and constipation.   Genitourinary:  Negative for dysuria and hematuria.   Musculoskeletal:  Positive for arthralgias. Negative for myalgias.   Skin:  Negative for rash and wound.   Neurological:  Negative for light-headedness and headaches.   Psychiatric/Behavioral:  Negative for agitation. The patient is nervous/anxious.    Objective:     Vital Signs (Most Recent):  Temp: 98.3 °F (36.8 °C) (03/22/23 1545)  Pulse: 89 (03/22/23 1545)  Resp: 20 (03/22/23 1545)  BP: 135/68 (03/22/23 1545)  SpO2: 97 % (03/22/23 1545) Vital Signs (24h Range):  Temp:  [97.6 °F (36.4 °C)-99.1 °F (37.3 °C)] 98.3 °F (36.8 °C)  Pulse:  [88-97] 89  Resp:  [17-20] 20  SpO2:  [96 %-98 %] 97 %  BP: (112-136)/(59-96) 135/68     Weight: 63.5 kg (140 lb)  Body mass index is 24.03 kg/m².    Estimated Creatinine Clearance: 46.3 mL/min (based on SCr of 1.2 mg/dL).    Physical Exam  Vitals reviewed.   Constitutional:       General: She is not in acute distress.  HENT:      Head: Normocephalic and atraumatic.      Mouth/Throat:      Mouth: Mucous membranes are moist.      Pharynx: Oropharynx is clear.      Comments: Poor dentition  Eyes:      Extraocular Movements: Extraocular movements intact.      Conjunctiva/sclera: Conjunctivae normal.   Cardiovascular:      Rate and Rhythm: Normal rate and regular rhythm.   Pulmonary:      Effort: Pulmonary effort is normal.      Breath sounds: Normal breath sounds. No rales.    Abdominal:      General: Bowel sounds are normal.      Palpations: Abdomen is soft.      Tenderness: There is no abdominal tenderness.   Musculoskeletal:         General: Normal range of motion.      Right lower leg: No edema.      Left lower leg: No edema.   Skin:     General: Skin is warm and dry.      Findings: No erythema.   Neurological:      General: No focal deficit present.      Mental Status: She is alert. Mental status is at baseline.   Psychiatric:         Mood and Affect: Mood normal.         Behavior: Behavior normal.       Significant Labs: All pertinent labs within the past 24 hours have been reviewed.    Significant Imaging: I have reviewed all pertinent imaging results/findings within the past 24 hours.
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
## 2023-03-22 NOTE — ASSESSMENT & PLAN NOTE
Per literature review, No anticoagulation at this time especially given ongoing bleed.  Supportive care

## 2023-03-22 NOTE — SUBJECTIVE & OBJECTIVE
Interval History:  Patient has no complaints at this time ,yesterday glucose is better controlled, this morning slightly  elevated.  She denies any per rectal or vaginal bleeding , hemoglobin of 6.7 this morning, she admits to having some lightheadedness since late yesterday.  Ultrasound of right upper extremity reports Thrombosis of the superficial right basilic and cephalic veins.  ESR procalcitonin elevated.  Lactic acid normal.  Repeat urine and blood cultures remain no growth.  Patient denies any pain or complaints to her upper extremities    Review of Systems   Gastrointestinal:  Negative for blood in stool.   Genitourinary:  Negative for vaginal pain.   Neurological:  Positive for light-headedness.   Objective:     Vital Signs (Most Recent):  Temp: 98.3 °F (36.8 °C) (03/22/23 0830)  Pulse: 92 (03/22/23 0830)  Resp: 18 (03/22/23 0830)  BP: (!) 127/96 (03/22/23 0830)  SpO2: 97 % (03/22/23 0830)   Vital Signs (24h Range):  Temp:  [97.1 °F (36.2 °C)-99.1 °F (37.3 °C)] 98.3 °F (36.8 °C)  Pulse:  [86-97] 92  Resp:  [17-18] 18  SpO2:  [96 %-98 %] 97 %  BP: (112-167)/(59-96) 127/96        There is no height or weight on file to calculate BMI.  No intake or output data in the 24 hours ending 03/22/23 0916   Physical Exam  Constitutional:       General: She is not in acute distress.     Comments: Unkempt female   HENT:      Head: Normocephalic.      Right Ear: External ear normal.      Left Ear: External ear normal.      Nose: Nose normal.   Cardiovascular:      Rate and Rhythm: Normal rate.   Pulmonary:      Effort: Pulmonary effort is normal.   Abdominal:      Palpations: Abdomen is soft.      Tenderness: There is no abdominal tenderness. There is no right CVA tenderness or left CVA tenderness.   Musculoskeletal:      Comments: Decrease in right radial pulse when compared to the left.  No focal or sensory deficit, no tenderness to touch.  Temperature is the same in both hands.  Difficulties with assessing capillary  refill given chronic changes and dirty fingers   Skin:     Comments: cord-like induration/firmness as the right AC, prior site of IV   Neurological:      General: No focal deficit present.      Mental Status: She is alert and oriented to person, place, and time.       Significant Labs: All pertinent labs within the past 24 hours have been reviewed.    Significant Imaging: I have reviewed all pertinent imaging results/findings within the past 24 hours.

## 2023-03-23 ENCOUNTER — TELEPHONE (OUTPATIENT)
Dept: ENDOCRINOLOGY | Facility: HOSPITAL | Age: 53
End: 2023-03-23
Payer: MEDICAID

## 2023-03-23 VITALS
TEMPERATURE: 98 F | DIASTOLIC BLOOD PRESSURE: 72 MMHG | HEIGHT: 64 IN | OXYGEN SATURATION: 93 % | SYSTOLIC BLOOD PRESSURE: 150 MMHG | HEART RATE: 90 BPM | WEIGHT: 140 LBS | BODY MASS INDEX: 23.9 KG/M2 | RESPIRATION RATE: 18 BRPM

## 2023-03-23 PROBLEM — E16.2 HYPOGLYCEMIA: Status: RESOLVED | Noted: 2023-03-17 | Resolved: 2023-03-23

## 2023-03-23 PROBLEM — Z91.199 NONADHERENCE TO MEDICAL TREATMENT: Status: ACTIVE | Noted: 2023-03-23

## 2023-03-23 PROBLEM — D64.9 ANEMIA: Chronic | Status: ACTIVE | Noted: 2023-03-21

## 2023-03-23 PROBLEM — E87.8 ELECTROLYTE ABNORMALITY: Status: RESOLVED | Noted: 2023-03-17 | Resolved: 2023-03-23

## 2023-03-23 PROBLEM — D72.829 LEUKOCYTOSIS: Status: RESOLVED | Noted: 2023-03-21 | Resolved: 2023-03-23

## 2023-03-23 PROBLEM — D50.9 MICROCYTIC ANEMIA: Status: ACTIVE | Noted: 2023-03-13

## 2023-03-23 LAB
ANION GAP SERPL CALC-SCNC: 12 MMOL/L (ref 8–16)
BASOPHILS # BLD AUTO: 0.01 K/UL (ref 0–0.2)
BASOPHILS NFR BLD: 0.1 % (ref 0–1.9)
BUN SERPL-MCNC: 29 MG/DL (ref 6–20)
CALCIUM SERPL-MCNC: 8.4 MG/DL (ref 8.7–10.5)
CHLORIDE SERPL-SCNC: 109 MMOL/L (ref 95–110)
CO2 SERPL-SCNC: 18 MMOL/L (ref 23–29)
CREAT SERPL-MCNC: 1.1 MG/DL (ref 0.5–1.4)
DIFFERENTIAL METHOD: ABNORMAL
EOSINOPHIL # BLD AUTO: 0 K/UL (ref 0–0.5)
EOSINOPHIL NFR BLD: 0.1 % (ref 0–8)
ERYTHROCYTE [DISTWIDTH] IN BLOOD BY AUTOMATED COUNT: 18.9 % (ref 11.5–14.5)
EST. GFR  (NO RACE VARIABLE): 59.7 ML/MIN/1.73 M^2
GLUCOSE SERPL-MCNC: 263 MG/DL (ref 70–110)
HCT VFR BLD AUTO: 26.7 % (ref 37–48.5)
HGB BLD-MCNC: 8 G/DL (ref 12–16)
IMM GRANULOCYTES # BLD AUTO: 0.05 K/UL (ref 0–0.04)
IMM GRANULOCYTES NFR BLD AUTO: 0.4 % (ref 0–0.5)
LYMPHOCYTES # BLD AUTO: 0.8 K/UL (ref 1–4.8)
LYMPHOCYTES NFR BLD: 6.3 % (ref 18–48)
MCH RBC QN AUTO: 22.2 PG (ref 27–31)
MCHC RBC AUTO-ENTMCNC: 30 G/DL (ref 32–36)
MCV RBC AUTO: 74 FL (ref 82–98)
MONOCYTES # BLD AUTO: 0.3 K/UL (ref 0.3–1)
MONOCYTES NFR BLD: 2.8 % (ref 4–15)
NEUTROPHILS # BLD AUTO: 10.9 K/UL (ref 1.8–7.7)
NEUTROPHILS NFR BLD: 90.3 % (ref 38–73)
NRBC BLD-RTO: 0 /100 WBC
PLATELET # BLD AUTO: 603 K/UL (ref 150–450)
PMV BLD AUTO: 12 FL (ref 9.2–12.9)
POCT GLUCOSE: 344 MG/DL (ref 70–110)
POTASSIUM SERPL-SCNC: 4.6 MMOL/L (ref 3.5–5.1)
RBC # BLD AUTO: 3.6 M/UL (ref 4–5.4)
SODIUM SERPL-SCNC: 139 MMOL/L (ref 136–145)
WBC # BLD AUTO: 12.09 K/UL (ref 3.9–12.7)

## 2023-03-23 PROCEDURE — 94761 N-INVAS EAR/PLS OXIMETRY MLT: CPT

## 2023-03-23 PROCEDURE — 80048 BASIC METABOLIC PNL TOTAL CA: CPT | Performed by: INTERNAL MEDICINE

## 2023-03-23 PROCEDURE — 85025 COMPLETE CBC W/AUTO DIFF WBC: CPT | Performed by: INTERNAL MEDICINE

## 2023-03-23 PROCEDURE — 99232 PR SUBSEQUENT HOSPITAL CARE,LEVL II: ICD-10-PCS | Mod: ,,, | Performed by: INTERNAL MEDICINE

## 2023-03-23 PROCEDURE — 36415 COLL VENOUS BLD VENIPUNCTURE: CPT | Performed by: INTERNAL MEDICINE

## 2023-03-23 PROCEDURE — 25000003 PHARM REV CODE 250: Performed by: HOSPITALIST

## 2023-03-23 PROCEDURE — 25000003 PHARM REV CODE 250: Performed by: INTERNAL MEDICINE

## 2023-03-23 PROCEDURE — 99232 SBSQ HOSP IP/OBS MODERATE 35: CPT | Mod: ,,, | Performed by: INTERNAL MEDICINE

## 2023-03-23 PROCEDURE — 99239 HOSP IP/OBS DSCHRG MGMT >30: CPT | Mod: ,,, | Performed by: HOSPITALIST

## 2023-03-23 PROCEDURE — 94640 AIRWAY INHALATION TREATMENT: CPT

## 2023-03-23 PROCEDURE — 99239 PR HOSPITAL DISCHARGE DAY,>30 MIN: ICD-10-PCS | Mod: ,,, | Performed by: HOSPITALIST

## 2023-03-23 RX ORDER — CIPROFLOXACIN 500 MG/1
500 TABLET ORAL EVERY 12 HOURS
Status: DISCONTINUED | OUTPATIENT
Start: 2023-03-23 | End: 2023-03-23 | Stop reason: HOSPADM

## 2023-03-23 RX ORDER — IBUPROFEN 200 MG
1 TABLET ORAL DAILY
Qty: 28 PATCH | Refills: 1 | Status: SHIPPED | OUTPATIENT
Start: 2023-03-23

## 2023-03-23 RX ORDER — CIPROFLOXACIN 500 MG/1
500 TABLET ORAL EVERY 12 HOURS
Qty: 3 TABLET | Refills: 0 | Status: SHIPPED | OUTPATIENT
Start: 2023-03-23 | End: 2023-03-24

## 2023-03-23 RX ADMIN — CIPROFLOXACIN 500 MG: 500 TABLET, FILM COATED ORAL at 09:03

## 2023-03-23 RX ADMIN — TIOTROPIUM BROMIDE INHALATION SPRAY 2 PUFF: 3.12 SPRAY, METERED RESPIRATORY (INHALATION) at 08:03

## 2023-03-23 RX ADMIN — GABAPENTIN 400 MG: 400 CAPSULE ORAL at 09:03

## 2023-03-23 RX ADMIN — FLUTICASONE FUROATE AND VILANTEROL TRIFENATATE 1 PUFF: 200; 25 POWDER RESPIRATORY (INHALATION) at 08:03

## 2023-03-23 RX ADMIN — PANTOPRAZOLE SODIUM 40 MG: 40 TABLET, DELAYED RELEASE ORAL at 09:03

## 2023-03-23 RX ADMIN — ATORVASTATIN CALCIUM 20 MG: 20 TABLET, FILM COATED ORAL at 09:03

## 2023-03-23 RX ADMIN — AMLODIPINE BESYLATE 10 MG: 10 TABLET ORAL at 09:03

## 2023-03-23 RX ADMIN — INSULIN ASPART 4 UNITS: 100 INJECTION, SOLUTION INTRAVENOUS; SUBCUTANEOUS at 09:03

## 2023-03-23 NOTE — ASSESSMENT & PLAN NOTE
- poorly controlled outpatient with HbA1c of 13.6   - 24 hour glucose trend: Highly variable based on dietary excursions and missing insulin doses with snacks.     She continues to have significant hyperglycemia due to dietary noncompliance. We will not make adjustments in insulin for this, as glycemic control adequate other times and do not want to provoke hypoglycemia but increasing in this circumstance.    - Will be cautious with insulin regimen considering presentation with hypoglycemia  - Continue Levemir 22 units daily  - Continue Aspart 10 units before meals with low dose correction scale   - Snack time insulin 4 units as needed is available to use for snacks -- please ensure pt receives snack insulin if she has a snack o/n   - Hypoglycemia protocol in place  - If blood glucose greater than 300 mg/dl, please ask patient not to eat food or drink anything other than water until correctional insulin has brought it back below 250 mg/dl    Discharging today per primary - would send her on the same regimen listed above (including the prn snack aspart) and encourage compliance.  I will request an endocrine fellow d/c clinic appt for this pt on 4/19/2023 at 8am.

## 2023-03-23 NOTE — PLAN OF CARE
Patient discharged to home.  Spouse at bedside with wheelchair.  Bedside delivery completed.  PIV removed.  No telemetry.  All personal belongings secured to include cell phone and .

## 2023-03-23 NOTE — SUBJECTIVE & OBJECTIVE
"Interval HPI:   Overnight events: Continues to have intermittent hyperglycemia due to dietary non-adherence and snacking between meals without insulin. Frequently off unit purchasing vending machine snacks and drinks.  Eatin%  Nausea: No  Hypoglycemia and intervention: No  Fever: No  TPN and/or TF: No  If yes, type of TF/TPN and rate: n/a    BP (!) 150/72 (BP Location: Left arm, Patient Position: Sitting)   Pulse 90   Temp 98.2 °F (36.8 °C) (Oral)   Resp 18   Ht 5' 4" (1.626 m)   Wt 63.5 kg (140 lb)   LMP 2018 (Approximate)   SpO2 (!) 93%   BMI 24.03 kg/m²     Labs Reviewed and Include    Recent Labs   Lab 23  0543   *   CALCIUM 8.4*      K 4.6   CO2 18*      BUN 29*   CREATININE 1.1     Lab Results   Component Value Date    WBC 12.09 2023    HGB 8.0 (L) 2023    HCT 26.7 (L) 2023    MCV 74 (L) 2023     (H) 2023     No results for input(s): TSH, FREET4 in the last 168 hours.  Lab Results   Component Value Date    HGBA1C 13.6 (H) 2023       Nutritional status:   Body mass index is 24.03 kg/m².  Lab Results   Component Value Date    ALBUMIN 1.7 (L) 2023    ALBUMIN 1.4 (L) 2023    ALBUMIN 1.6 (L) 2023     No results found for: PREALBUMIN    Estimated Creatinine Clearance: 50.5 mL/min (based on SCr of 1.1 mg/dL).    Accu-Checks  Recent Labs     23  1129 23  1609 23  2004 23  0755 23  1138 23  1203 23  1535 23  2105 23  2342 23  0957   POCTGLUCOSE 462* 216* 108 318* 389* 328* 284* 201* 105 344*       Current Medications and/or Treatments Impacting Glycemic Control  Immunotherapy:    Immunosuppressants       None          Steroids:   Hormones (From admission, onward)      Start     Stop Route Frequency Ordered    23 0143  melatonin tablet 6 mg         -- Oral Nightly PRN 23 0043          Pressors:    Autonomic Drugs (From admission, onward)      " None          Hyperglycemia/Diabetes Medications:   Antihyperglycemics (From admission, onward)      Start     Stop Route Frequency Ordered    03/21/23 1615  insulin aspart U-100 pen 10 Units         -- SubQ 3 times daily before meals 03/21/23 1424    03/21/23 0900  insulin detemir U-100 pen 22 Units         -- SubQ Daily 03/20/23 1101    03/19/23 0826  insulin aspart U-100 pen 4 Units         -- SubQ As needed (PRN) 03/19/23 0727    03/18/23 1247  insulin aspart U-100 pen 0-5 Units         -- SubQ Before meals & nightly PRN 03/18/23 1147

## 2023-03-23 NOTE — ASSESSMENT & PLAN NOTE
Throughout her admission pt frequently left her room/the unit and purchased processed/high carbohydrate snacks/drinks without getting insulin  This made it very difficult to manage BG and is an ongoing concern we have for this pt at d/c
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
## 2023-03-23 NOTE — TELEPHONE ENCOUNTER
Hi,  This pt is being d/c today. Can she please be scheduled in fellow d/c clinic on 4/19 at 8am?  Thank you!  Donna

## 2023-03-23 NOTE — ASSESSMENT & PLAN NOTE
- no episodes in the last few days  - will be cautious with insulin regimen to prevent episodes of hypoglycemia

## 2023-03-23 NOTE — NURSING
Patient is not in room for morning assessment.  Confirmed with team that Patient has permission to leave unit.

## 2023-03-24 ENCOUNTER — PATIENT OUTREACH (OUTPATIENT)
Dept: ADMINISTRATIVE | Facility: CLINIC | Age: 53
End: 2023-03-24
Payer: MEDICAID

## 2023-03-24 NOTE — PROGRESS NOTES
C3 nurse attempted to contact Christine Mosqueda  for a TCC post hospital discharge follow up call. No answer. No voicemail available.The patient does not have a scheduled HOSFU appointment. Message sent to PCP staff for assistance with scheduling visit with patient.

## 2023-03-25 LAB — BACTERIA BLD CULT: NORMAL

## 2023-03-27 NOTE — PROGRESS NOTES
2nd Attempt made to reach patient for TCC call. Left voicemail please call [phone] leave first name, last name, and  [facility] will return your call.

## 2023-03-28 NOTE — PROGRESS NOTES
3rd Attempt made to reach patient for TCC call. Left voicemail please call [phone] leave first name, last name, and  [facility] will return your call.

## 2023-04-06 ENCOUNTER — HOSPITAL ENCOUNTER (INPATIENT)
Facility: HOSPITAL | Age: 53
LOS: 2 days | Discharge: LEFT BEFORE DISCHARGE | DRG: 291 | End: 2023-04-08
Attending: EMERGENCY MEDICINE | Admitting: INTERNAL MEDICINE
Payer: MEDICAID

## 2023-04-06 DIAGNOSIS — R53.81 MALAISE: ICD-10-CM

## 2023-04-06 DIAGNOSIS — E83.42 HYPOMAGNESEMIA: ICD-10-CM

## 2023-04-06 DIAGNOSIS — I50.33 ACUTE ON CHRONIC DIASTOLIC HEART FAILURE: Primary | ICD-10-CM

## 2023-04-06 DIAGNOSIS — R73.9 HYPERGLYCEMIA: ICD-10-CM

## 2023-04-06 DIAGNOSIS — R41.82 ALTERED MENTAL STATUS, UNSPECIFIED ALTERED MENTAL STATUS TYPE: ICD-10-CM

## 2023-04-06 DIAGNOSIS — R09.02 HYPOXIA: ICD-10-CM

## 2023-04-06 LAB
ALLENS TEST: ABNORMAL
B-OH-BUTYR BLD STRIP-SCNC: 0 MMOL/L (ref 0–0.5)
BASOPHILS # BLD AUTO: 0.05 K/UL (ref 0–0.2)
BASOPHILS NFR BLD: 0.5 % (ref 0–1.9)
BNP SERPL-MCNC: 250 PG/ML (ref 0–99)
DIFFERENTIAL METHOD: ABNORMAL
EOSINOPHIL # BLD AUTO: 0 K/UL (ref 0–0.5)
EOSINOPHIL NFR BLD: 0.2 % (ref 0–8)
ERYTHROCYTE [DISTWIDTH] IN BLOOD BY AUTOMATED COUNT: 18.7 % (ref 11.5–14.5)
HCO3 UR-SCNC: 26.6 MMOL/L (ref 24–28)
HCT VFR BLD AUTO: 23.7 % (ref 37–48.5)
HGB BLD-MCNC: 7.1 G/DL (ref 12–16)
IMM GRANULOCYTES # BLD AUTO: 0.06 K/UL (ref 0–0.04)
IMM GRANULOCYTES NFR BLD AUTO: 0.6 % (ref 0–0.5)
LACTATE SERPL-SCNC: 0.9 MMOL/L (ref 0.5–2.2)
LYMPHOCYTES # BLD AUTO: 1.4 K/UL (ref 1–4.8)
LYMPHOCYTES NFR BLD: 13 % (ref 18–48)
MCH RBC QN AUTO: 22.7 PG (ref 27–31)
MCHC RBC AUTO-ENTMCNC: 30 G/DL (ref 32–36)
MCV RBC AUTO: 76 FL (ref 82–98)
MONOCYTES # BLD AUTO: 0.9 K/UL (ref 0.3–1)
MONOCYTES NFR BLD: 8.2 % (ref 4–15)
NEUTROPHILS # BLD AUTO: 8.4 K/UL (ref 1.8–7.7)
NEUTROPHILS NFR BLD: 77.5 % (ref 38–73)
NRBC BLD-RTO: 0 /100 WBC
PCO2 BLDA: 46.2 MMHG (ref 35–45)
PH SMN: 7.37 [PH] (ref 7.35–7.45)
PLATELET # BLD AUTO: 316 K/UL (ref 150–450)
PMV BLD AUTO: 10.4 FL (ref 9.2–12.9)
PO2 BLDA: 29 MMHG (ref 40–60)
POC BE: 1 MMOL/L
POC SATURATED O2: 52 % (ref 95–100)
POC TCO2: 28 MMOL/L (ref 24–29)
POCT GLUCOSE: 459 MG/DL (ref 70–110)
RBC # BLD AUTO: 3.13 M/UL (ref 4–5.4)
SAMPLE: ABNORMAL
SITE: ABNORMAL
TROPONIN I SERPL DL<=0.01 NG/ML-MCNC: 0.01 NG/ML (ref 0–0.03)
WBC # BLD AUTO: 10.77 K/UL (ref 3.9–12.7)

## 2023-04-06 PROCEDURE — 85025 COMPLETE CBC W/AUTO DIFF WBC: CPT | Performed by: EMERGENCY MEDICINE

## 2023-04-06 PROCEDURE — 82803 BLOOD GASES ANY COMBINATION: CPT

## 2023-04-06 PROCEDURE — 99285 PR EMERGENCY DEPT VISIT,LEVEL V: ICD-10-PCS | Mod: ,,, | Performed by: EMERGENCY MEDICINE

## 2023-04-06 PROCEDURE — 93005 ELECTROCARDIOGRAM TRACING: CPT

## 2023-04-06 PROCEDURE — 93010 EKG 12-LEAD: ICD-10-PCS | Mod: ,,, | Performed by: INTERNAL MEDICINE

## 2023-04-06 PROCEDURE — 82010 KETONE BODYS QUAN: CPT | Performed by: EMERGENCY MEDICINE

## 2023-04-06 PROCEDURE — 83036 HEMOGLOBIN GLYCOSYLATED A1C: CPT | Performed by: EMERGENCY MEDICINE

## 2023-04-06 PROCEDURE — 83735 ASSAY OF MAGNESIUM: CPT | Performed by: EMERGENCY MEDICINE

## 2023-04-06 PROCEDURE — 83880 ASSAY OF NATRIURETIC PEPTIDE: CPT | Performed by: EMERGENCY MEDICINE

## 2023-04-06 PROCEDURE — 84484 ASSAY OF TROPONIN QUANT: CPT | Performed by: EMERGENCY MEDICINE

## 2023-04-06 PROCEDURE — 12000002 HC ACUTE/MED SURGE SEMI-PRIVATE ROOM

## 2023-04-06 PROCEDURE — 80053 COMPREHEN METABOLIC PANEL: CPT | Performed by: EMERGENCY MEDICINE

## 2023-04-06 PROCEDURE — 93010 ELECTROCARDIOGRAM REPORT: CPT | Mod: ,,, | Performed by: INTERNAL MEDICINE

## 2023-04-06 PROCEDURE — 99285 EMERGENCY DEPT VISIT HI MDM: CPT | Mod: ,,, | Performed by: EMERGENCY MEDICINE

## 2023-04-06 PROCEDURE — 99900035 HC TECH TIME PER 15 MIN (STAT)

## 2023-04-06 PROCEDURE — 87040 BLOOD CULTURE FOR BACTERIA: CPT | Performed by: EMERGENCY MEDICINE

## 2023-04-06 PROCEDURE — 83605 ASSAY OF LACTIC ACID: CPT | Performed by: EMERGENCY MEDICINE

## 2023-04-06 PROCEDURE — 82962 GLUCOSE BLOOD TEST: CPT

## 2023-04-06 PROCEDURE — 84443 ASSAY THYROID STIM HORMONE: CPT | Performed by: EMERGENCY MEDICINE

## 2023-04-06 RX ORDER — FUROSEMIDE 10 MG/ML
40 INJECTION INTRAMUSCULAR; INTRAVENOUS
Status: COMPLETED | OUTPATIENT
Start: 2023-04-07 | End: 2023-04-07

## 2023-04-07 PROBLEM — E11.649 TYPE 2 DIABETES MELLITUS WITH HYPOGLYCEMIA WITHOUT COMA, WITH LONG-TERM CURRENT USE OF INSULIN: Status: ACTIVE | Noted: 2018-02-05

## 2023-04-07 PROBLEM — Z79.4 TYPE 2 DIABETES MELLITUS WITH HYPOGLYCEMIA WITHOUT COMA, WITH LONG-TERM CURRENT USE OF INSULIN: Status: ACTIVE | Noted: 2018-02-05

## 2023-04-07 PROBLEM — I50.23 ACUTE ON CHRONIC SYSTOLIC HEART FAILURE: Status: ACTIVE | Noted: 2018-02-05

## 2023-04-07 PROBLEM — E83.42 HYPOMAGNESEMIA: Status: ACTIVE | Noted: 2023-04-07

## 2023-04-07 LAB
ALBUMIN SERPL BCP-MCNC: 2.5 G/DL (ref 3.5–5.2)
ALP SERPL-CCNC: 155 U/L (ref 55–135)
ALT SERPL W/O P-5'-P-CCNC: 30 U/L (ref 10–44)
ANION GAP SERPL CALC-SCNC: 13 MMOL/L (ref 8–16)
ANION GAP SERPL CALC-SCNC: 15 MMOL/L (ref 8–16)
AST SERPL-CCNC: 29 U/L (ref 10–40)
BACTERIA #/AREA URNS AUTO: ABNORMAL /HPF
BASOPHILS # BLD AUTO: 0.04 K/UL (ref 0–0.2)
BASOPHILS NFR BLD: 0.3 % (ref 0–1.9)
BILIRUB SERPL-MCNC: 0.2 MG/DL (ref 0.1–1)
BILIRUB UR QL STRIP: NEGATIVE
BUN SERPL-MCNC: 11 MG/DL (ref 6–20)
BUN SERPL-MCNC: 12 MG/DL (ref 6–20)
CALCIUM SERPL-MCNC: 8.1 MG/DL (ref 8.7–10.5)
CALCIUM SERPL-MCNC: 9.1 MG/DL (ref 8.7–10.5)
CHLORIDE SERPL-SCNC: 106 MMOL/L (ref 95–110)
CHLORIDE SERPL-SCNC: 107 MMOL/L (ref 95–110)
CLARITY UR REFRACT.AUTO: ABNORMAL
CO2 SERPL-SCNC: 22 MMOL/L (ref 23–29)
CO2 SERPL-SCNC: 24 MMOL/L (ref 23–29)
COLOR UR AUTO: COLORLESS
CREAT SERPL-MCNC: 0.8 MG/DL (ref 0.5–1.4)
CREAT SERPL-MCNC: 0.9 MG/DL (ref 0.5–1.4)
DIFFERENTIAL METHOD: ABNORMAL
EOSINOPHIL # BLD AUTO: 0.1 K/UL (ref 0–0.5)
EOSINOPHIL NFR BLD: 0.4 % (ref 0–8)
ERYTHROCYTE [DISTWIDTH] IN BLOOD BY AUTOMATED COUNT: 19.2 % (ref 11.5–14.5)
EST. GFR  (NO RACE VARIABLE): >60 ML/MIN/1.73 M^2
EST. GFR  (NO RACE VARIABLE): >60 ML/MIN/1.73 M^2
ESTIMATED AVG GLUCOSE: 189 MG/DL (ref 68–131)
GLUCOSE SERPL-MCNC: 223 MG/DL (ref 70–110)
GLUCOSE SERPL-MCNC: 34 MG/DL (ref 70–110)
GLUCOSE UR QL STRIP: NEGATIVE
HBA1C MFR BLD: 8.2 % (ref 4–5.6)
HCT VFR BLD AUTO: 31 % (ref 37–48.5)
HGB BLD-MCNC: 9 G/DL (ref 12–16)
HGB UR QL STRIP: NEGATIVE
HYALINE CASTS UR QL AUTO: 0 /LPF
IMM GRANULOCYTES # BLD AUTO: 0.04 K/UL (ref 0–0.04)
IMM GRANULOCYTES NFR BLD AUTO: 0.3 % (ref 0–0.5)
KETONES UR QL STRIP: NEGATIVE
LEUKOCYTE ESTERASE UR QL STRIP: ABNORMAL
LYMPHOCYTES # BLD AUTO: 1.7 K/UL (ref 1–4.8)
LYMPHOCYTES NFR BLD: 13.2 % (ref 18–48)
MAGNESIUM SERPL-MCNC: 1.3 MG/DL (ref 1.6–2.6)
MCH RBC QN AUTO: 21.9 PG (ref 27–31)
MCHC RBC AUTO-ENTMCNC: 29 G/DL (ref 32–36)
MCV RBC AUTO: 75 FL (ref 82–98)
MICROSCOPIC COMMENT: ABNORMAL
MONOCYTES # BLD AUTO: 0.7 K/UL (ref 0.3–1)
MONOCYTES NFR BLD: 5.2 % (ref 4–15)
NEUTROPHILS # BLD AUTO: 10.3 K/UL (ref 1.8–7.7)
NEUTROPHILS NFR BLD: 80.6 % (ref 38–73)
NITRITE UR QL STRIP: NEGATIVE
NRBC BLD-RTO: 0 /100 WBC
OSMOLALITY SERPL: 287 MOSM/KG (ref 275–295)
PH UR STRIP: 7 [PH] (ref 5–8)
PLATELET # BLD AUTO: 365 K/UL (ref 150–450)
PMV BLD AUTO: 11 FL (ref 9.2–12.9)
POCT GLUCOSE: 111 MG/DL (ref 70–110)
POCT GLUCOSE: 143 MG/DL (ref 70–110)
POCT GLUCOSE: 160 MG/DL (ref 70–110)
POCT GLUCOSE: 227 MG/DL (ref 70–110)
POCT GLUCOSE: 383 MG/DL (ref 70–110)
POCT GLUCOSE: 429 MG/DL (ref 70–110)
POTASSIUM SERPL-SCNC: 3.2 MMOL/L (ref 3.5–5.1)
POTASSIUM SERPL-SCNC: 3.8 MMOL/L (ref 3.5–5.1)
PROT SERPL-MCNC: 7.4 G/DL (ref 6–8.4)
PROT UR QL STRIP: ABNORMAL
RBC # BLD AUTO: 4.11 M/UL (ref 4–5.4)
RBC #/AREA URNS AUTO: 8 /HPF (ref 0–4)
SODIUM SERPL-SCNC: 142 MMOL/L (ref 136–145)
SODIUM SERPL-SCNC: 145 MMOL/L (ref 136–145)
SP GR UR STRIP: 1 (ref 1–1.03)
SQUAMOUS #/AREA URNS AUTO: 5 /HPF
TSH SERPL DL<=0.005 MIU/L-ACNC: 1.03 UIU/ML (ref 0.4–4)
URN SPEC COLLECT METH UR: ABNORMAL
WBC # BLD AUTO: 12.78 K/UL (ref 3.9–12.7)
WBC #/AREA URNS AUTO: 27 /HPF (ref 0–5)

## 2023-04-07 PROCEDURE — 21400001 HC TELEMETRY ROOM

## 2023-04-07 PROCEDURE — 81001 URINALYSIS AUTO W/SCOPE: CPT | Performed by: EMERGENCY MEDICINE

## 2023-04-07 PROCEDURE — 25000242 PHARM REV CODE 250 ALT 637 W/ HCPCS: Performed by: INTERNAL MEDICINE

## 2023-04-07 PROCEDURE — 99285 EMERGENCY DEPT VISIT HI MDM: CPT | Mod: 25

## 2023-04-07 PROCEDURE — 63600175 PHARM REV CODE 636 W HCPCS: Performed by: EMERGENCY MEDICINE

## 2023-04-07 PROCEDURE — 80048 BASIC METABOLIC PNL TOTAL CA: CPT | Performed by: HOSPITALIST

## 2023-04-07 PROCEDURE — 85025 COMPLETE CBC W/AUTO DIFF WBC: CPT | Performed by: HOSPITALIST

## 2023-04-07 PROCEDURE — 63600175 PHARM REV CODE 636 W HCPCS: Performed by: HOSPITALIST

## 2023-04-07 PROCEDURE — 63600175 PHARM REV CODE 636 W HCPCS: Performed by: INTERNAL MEDICINE

## 2023-04-07 PROCEDURE — 82962 GLUCOSE BLOOD TEST: CPT

## 2023-04-07 PROCEDURE — 25000003 PHARM REV CODE 250: Performed by: INTERNAL MEDICINE

## 2023-04-07 PROCEDURE — 83930 ASSAY OF BLOOD OSMOLALITY: CPT | Performed by: EMERGENCY MEDICINE

## 2023-04-07 PROCEDURE — 96374 THER/PROPH/DIAG INJ IV PUSH: CPT

## 2023-04-07 PROCEDURE — 99223 PR INITIAL HOSPITAL CARE,LEVL III: ICD-10-PCS | Mod: ,,, | Performed by: INTERNAL MEDICINE

## 2023-04-07 PROCEDURE — 87086 URINE CULTURE/COLONY COUNT: CPT | Performed by: EMERGENCY MEDICINE

## 2023-04-07 PROCEDURE — 87088 URINE BACTERIA CULTURE: CPT | Performed by: EMERGENCY MEDICINE

## 2023-04-07 PROCEDURE — 99223 1ST HOSP IP/OBS HIGH 75: CPT | Mod: ,,, | Performed by: INTERNAL MEDICINE

## 2023-04-07 RX ORDER — FLUTICASONE FUROATE AND VILANTEROL 200; 25 UG/1; UG/1
1 POWDER RESPIRATORY (INHALATION) DAILY
Status: DISCONTINUED | OUTPATIENT
Start: 2023-04-07 | End: 2023-04-08 | Stop reason: HOSPADM

## 2023-04-07 RX ORDER — MAGNESIUM SULFATE HEPTAHYDRATE 40 MG/ML
2 INJECTION, SOLUTION INTRAVENOUS
Status: COMPLETED | OUTPATIENT
Start: 2023-04-07 | End: 2023-04-07

## 2023-04-07 RX ORDER — INSULIN ASPART 100 [IU]/ML
0-5 INJECTION, SOLUTION INTRAVENOUS; SUBCUTANEOUS
Status: DISCONTINUED | OUTPATIENT
Start: 2023-04-07 | End: 2023-04-08 | Stop reason: HOSPADM

## 2023-04-07 RX ORDER — DEXTROSE 40 %
30 GEL (GRAM) ORAL
Status: DISCONTINUED | OUTPATIENT
Start: 2023-04-07 | End: 2023-04-08 | Stop reason: HOSPADM

## 2023-04-07 RX ORDER — INSULIN ASPART 100 [IU]/ML
4 INJECTION, SOLUTION INTRAVENOUS; SUBCUTANEOUS
Status: DISCONTINUED | OUTPATIENT
Start: 2023-04-07 | End: 2023-04-07

## 2023-04-07 RX ORDER — PANTOPRAZOLE SODIUM 40 MG/1
40 TABLET, DELAYED RELEASE ORAL DAILY
Status: DISCONTINUED | OUTPATIENT
Start: 2023-04-07 | End: 2023-04-08 | Stop reason: HOSPADM

## 2023-04-07 RX ORDER — INSULIN ASPART 100 [IU]/ML
4 INJECTION, SOLUTION INTRAVENOUS; SUBCUTANEOUS
Status: DISCONTINUED | OUTPATIENT
Start: 2023-04-07 | End: 2023-04-08 | Stop reason: HOSPADM

## 2023-04-07 RX ORDER — SODIUM CHLORIDE 0.9 % (FLUSH) 0.9 %
10 SYRINGE (ML) INJECTION
Status: DISCONTINUED | OUTPATIENT
Start: 2023-04-07 | End: 2023-04-08 | Stop reason: HOSPADM

## 2023-04-07 RX ORDER — GABAPENTIN 400 MG/1
400 CAPSULE ORAL 2 TIMES DAILY
Status: DISCONTINUED | OUTPATIENT
Start: 2023-04-07 | End: 2023-04-08 | Stop reason: HOSPADM

## 2023-04-07 RX ORDER — MAG HYDROX/ALUMINUM HYD/SIMETH 200-200-20
30 SUSPENSION, ORAL (FINAL DOSE FORM) ORAL
Status: DISCONTINUED | OUTPATIENT
Start: 2023-04-07 | End: 2023-04-08 | Stop reason: HOSPADM

## 2023-04-07 RX ORDER — SUCRALFATE 1 G/10ML
1 SUSPENSION ORAL EVERY 6 HOURS
Status: DISCONTINUED | OUTPATIENT
Start: 2023-04-07 | End: 2023-04-08 | Stop reason: HOSPADM

## 2023-04-07 RX ORDER — FUROSEMIDE 10 MG/ML
40 INJECTION INTRAMUSCULAR; INTRAVENOUS
Status: DISCONTINUED | OUTPATIENT
Start: 2023-04-07 | End: 2023-04-08 | Stop reason: HOSPADM

## 2023-04-07 RX ORDER — ATORVASTATIN CALCIUM 20 MG/1
20 TABLET, FILM COATED ORAL DAILY
Status: DISCONTINUED | OUTPATIENT
Start: 2023-04-07 | End: 2023-04-08 | Stop reason: HOSPADM

## 2023-04-07 RX ORDER — GLUCAGON 1 MG
1 KIT INJECTION
Status: DISCONTINUED | OUTPATIENT
Start: 2023-04-07 | End: 2023-04-08 | Stop reason: HOSPADM

## 2023-04-07 RX ORDER — POLYETHYLENE GLYCOL 3350 17 G/17G
17 POWDER, FOR SOLUTION ORAL 3 TIMES DAILY PRN
Status: DISCONTINUED | OUTPATIENT
Start: 2023-04-07 | End: 2023-04-08 | Stop reason: HOSPADM

## 2023-04-07 RX ORDER — ONDANSETRON 4 MG/1
4 TABLET, ORALLY DISINTEGRATING ORAL EVERY 8 HOURS PRN
Status: DISCONTINUED | OUTPATIENT
Start: 2023-04-07 | End: 2023-04-08 | Stop reason: HOSPADM

## 2023-04-07 RX ORDER — DEXTROSE 40 %
15 GEL (GRAM) ORAL
Status: DISCONTINUED | OUTPATIENT
Start: 2023-04-07 | End: 2023-04-08 | Stop reason: HOSPADM

## 2023-04-07 RX ORDER — AMLODIPINE BESYLATE 10 MG/1
10 TABLET ORAL DAILY
Status: DISCONTINUED | OUTPATIENT
Start: 2023-04-07 | End: 2023-04-08 | Stop reason: HOSPADM

## 2023-04-07 RX ORDER — NAPROXEN SODIUM 220 MG/1
81 TABLET, FILM COATED ORAL DAILY
Status: DISCONTINUED | OUTPATIENT
Start: 2023-04-07 | End: 2023-04-08 | Stop reason: HOSPADM

## 2023-04-07 RX ADMIN — AMLODIPINE BESYLATE 10 MG: 10 TABLET ORAL at 10:04

## 2023-04-07 RX ADMIN — FLUTICASONE FUROATE AND VILANTEROL TRIFENATATE 1 PUFF: 200; 25 POWDER RESPIRATORY (INHALATION) at 09:04

## 2023-04-07 RX ADMIN — SUCRALFATE 1 G: 1 SUSPENSION ORAL at 05:04

## 2023-04-07 RX ADMIN — ALUMINUM HYDROXIDE, MAGNESIUM HYDROXIDE, AND SIMETHICONE 30 ML: 200; 200; 20 SUSPENSION ORAL at 09:04

## 2023-04-07 RX ADMIN — MAGNESIUM SULFATE 2 G: 2 INJECTION INTRAVENOUS at 01:04

## 2023-04-07 RX ADMIN — INSULIN ASPART 4 UNITS: 100 INJECTION, SOLUTION INTRAVENOUS; SUBCUTANEOUS at 10:04

## 2023-04-07 RX ADMIN — FUROSEMIDE 40 MG: 10 INJECTION, SOLUTION INTRAMUSCULAR; INTRAVENOUS at 12:04

## 2023-04-07 RX ADMIN — ASPIRIN 81 MG 81 MG: 81 TABLET ORAL at 10:04

## 2023-04-07 RX ADMIN — INSULIN DETEMIR 10 UNITS: 100 INJECTION, SOLUTION SUBCUTANEOUS at 10:04

## 2023-04-07 RX ADMIN — ALUMINUM HYDROXIDE, MAGNESIUM HYDROXIDE, AND SIMETHICONE 30 ML: 200; 200; 20 SUSPENSION ORAL at 11:04

## 2023-04-07 RX ADMIN — GABAPENTIN 400 MG: 400 CAPSULE ORAL at 09:04

## 2023-04-07 RX ADMIN — ALUMINUM HYDROXIDE, MAGNESIUM HYDROXIDE, AND SIMETHICONE 30 ML: 200; 200; 20 SUSPENSION ORAL at 06:04

## 2023-04-07 RX ADMIN — FUROSEMIDE 40 MG: 10 INJECTION, SOLUTION INTRAMUSCULAR; INTRAVENOUS at 09:04

## 2023-04-07 RX ADMIN — INSULIN ASPART 4 UNITS: 100 INJECTION, SOLUTION INTRAVENOUS; SUBCUTANEOUS at 05:04

## 2023-04-07 RX ADMIN — INSULIN ASPART 3 UNITS: 100 INJECTION, SOLUTION INTRAVENOUS; SUBCUTANEOUS at 09:04

## 2023-04-07 RX ADMIN — FUROSEMIDE 40 MG: 10 INJECTION, SOLUTION INTRAMUSCULAR; INTRAVENOUS at 10:04

## 2023-04-07 RX ADMIN — TIOTROPIUM BROMIDE INHALATION SPRAY 2 PUFF: 3.12 SPRAY, METERED RESPIRATORY (INHALATION) at 10:04

## 2023-04-07 RX ADMIN — ALUMINUM HYDROXIDE, MAGNESIUM HYDROXIDE, AND SIMETHICONE 30 ML: 200; 200; 20 SUSPENSION ORAL at 05:04

## 2023-04-07 RX ADMIN — SUCRALFATE 1 G: 1 SUSPENSION ORAL at 06:04

## 2023-04-07 RX ADMIN — GABAPENTIN 400 MG: 400 CAPSULE ORAL at 10:04

## 2023-04-07 RX ADMIN — ATORVASTATIN CALCIUM 20 MG: 20 TABLET, FILM COATED ORAL at 10:04

## 2023-04-07 RX ADMIN — PANTOPRAZOLE SODIUM 40 MG: 40 TABLET, DELAYED RELEASE ORAL at 10:04

## 2023-04-07 NOTE — NURSING
Care of patient assumed. Patient in bed resting comfortably. Patient AAOX4 in NAD. VSS on 3 L per nasal cannula. Plan of care reviewed with patient, patient verbalized understanding. Belongings and call bell within reach. Will continue to monitor.

## 2023-04-07 NOTE — SUBJECTIVE & OBJECTIVE
Past Medical History:   Diagnosis Date    Anxiety     Arthritis     Bronchitis     CHF (congestive heart failure)     Diabetic ketoacidosis associated with type 2 diabetes mellitus 3/10/2023    DM (diabetes mellitus)     Emphysema lung     Encounter for blood transfusion     HTN (hypertension)     Restrictive lung disease     Sleep apnea        Past Surgical History:   Procedure Laterality Date     SECTION      PALATAL EXPANSION      WRIST SURGERY Right        Review of patient's allergies indicates:   Allergen Reactions    Hydrochlorothiazide plus        No current facility-administered medications on file prior to encounter.     Current Outpatient Medications on File Prior to Encounter   Medication Sig    albuterol (ACCUNEB) 1.25 mg/3 mL Nebu Take 1.25 mg by nebulization every 6 (six) hours as needed.    ALPRAZolam (XANAX) 2 MG Tab Take 2 mg by mouth daily as needed for Anxiety.    amlodipine (NORVASC) 10 MG tablet Take 10 mg by mouth once daily.    aspirin 81 MG Chew Take 81 mg by mouth once daily.    atorvastatin (LIPITOR) 20 MG tablet Take 20 mg by mouth once daily.    fluticasone-salmeterol diskus inhaler 500-50 mcg Inhale 1 puff into the lungs 2 (two) times daily.    gabapentin (NEURONTIN) 400 MG capsule Take 400 mg by mouth 2 (two) times daily.    insulin aspart U-100 (NOVOLOG) 100 unit/mL injection Inject 10 Units into the skin 3 (three) times daily before meals.    insulin detemir U-100 (LEVEMIR) 100 unit/mL injection Inject 40 Units into the skin once daily.    nicotine (NICODERM CQ) 14 mg/24 hr Place 1 patch onto the skin once daily.    omeprazole (PRILOSEC) 40 MG capsule Take 40 mg by mouth once daily.    promethazine (PHENERGAN) 12.5 MG Tab Take 25 mg by mouth every 6 (six) hours as needed.    tiotropium (SPIRIVA) 18 mcg inhalation capsule Inhale 18 mcg into the lungs once daily.     Family History       Problem Relation (Age of Onset)    Diabetes Mother, Father, Sister    Hypertension Mother,  Father          Tobacco Use    Smoking status: Some Days     Packs/day: 0.25     Types: Cigarettes    Smokeless tobacco: Never   Substance and Sexual Activity    Alcohol use: Yes    Drug use: Yes     Types: Marijuana    Sexual activity: Yes     Partners: Male     Review of Systems   Unable to perform ROS: Mental status change   Objective:     Vital Signs (Most Recent):  Temp: 97.6 °F (36.4 °C) (04/06/23 2115)  Pulse: 72 (04/07/23 0042)  Resp: 16 (04/07/23 0042)  BP: 116/65 (04/07/23 0042)  SpO2: 99 % (04/07/23 0042) Vital Signs (24h Range):  Temp:  [97.6 °F (36.4 °C)] 97.6 °F (36.4 °C)  Pulse:  [72-80] 72  Resp:  [15-16] 16  SpO2:  [97 %-99 %] 99 %  BP: (116-144)/(65-80) 116/65        There is no height or weight on file to calculate BMI.    Physical Exam  Vitals reviewed.   Constitutional:       General: She is not in acute distress.     Appearance: She is well-developed.   HENT:      Head: Normocephalic and atraumatic.      Nose: Nose normal. No rhinorrhea.      Mouth/Throat:      Mouth: Mucous membranes are moist.   Eyes:      General: No scleral icterus.        Right eye: No discharge.         Left eye: No discharge.      Pupils: Pupils are equal, round, and reactive to light.   Neck:      Vascular: No JVD.   Cardiovascular:      Rate and Rhythm: Normal rate and regular rhythm.      Heart sounds: Normal heart sounds. No murmur heard.    No friction rub.   Pulmonary:      Effort: Pulmonary effort is normal. No respiratory distress.      Breath sounds: Rhonchi present. No wheezing.   Abdominal:      General: Bowel sounds are normal. There is no distension.      Palpations: Abdomen is soft.      Tenderness: There is no abdominal tenderness.   Musculoskeletal:         General: No deformity. Normal range of motion.      Cervical back: Normal range of motion and neck supple.      Right lower leg: Edema present.      Left lower leg: Edema present.   Skin:     General: Skin is warm and dry.   Neurological:      General:  No focal deficit present.      Mental Status: She is alert. She is disoriented.      Motor: Weakness present.   Psychiatric:         Mood and Affect: Mood normal.         Behavior: Behavior normal.         CRANIAL NERVES     CN III, IV, VI   Pupils are equal, round, and reactive to light.     Significant Labs: All pertinent labs within the past 24 hours have been reviewed.  CBC:   Recent Labs   Lab 04/06/23 2248   WBC 10.77   HGB 7.1*   HCT 23.7*        CMP:   Recent Labs   Lab 04/06/23 2248      K 3.2*      CO2 22*   GLU 34*   BUN 12   CREATININE 0.8   CALCIUM 8.1*   PROT 7.4   ALBUMIN 2.5*   BILITOT 0.2   ALKPHOS 155*   AST 29   ALT 30   ANIONGAP 13     Cardiac Markers:   Recent Labs   Lab 04/06/23 2248   *     Troponin:   Recent Labs   Lab 04/06/23 2248   TROPONINI 0.012       Significant Imaging: I have reviewed all pertinent imaging results/findings within the past 24 hours.

## 2023-04-07 NOTE — ED NOTES
Nurse at bedside. Pt found to have soiled linen and on bedpan. Perineal care performed at this time. Changed into hospital gown, diaper applied, and new bed linens applied. Pt transported to [facility] by PCT at this time.

## 2023-04-07 NOTE — ED TRIAGE NOTES
Christine Mosqueda, a 52 y.o. female presents to the ED w/ complaint of hyperglycemia. Pt ervin states that she has been noncompliant with insulin at home. Pt is lethargic and somnolent    Triage note:  Chief Complaint   Patient presents with    Blood Sugar Problem     Pt brought in by [facility] w/ CBG of 317. Per EMS pt is normally hypoglycemic and pt was found by family approximately 30 min ago altered. Per EMS pt is AAO to person, place only.     Altered Mental Status     Review of patient's allergies indicates:   Allergen Reactions    Hydrochlorothiazide plus      Past Medical History:   Diagnosis Date    Anxiety     Arthritis     Bronchitis     CHF (congestive heart failure)     Diabetic ketoacidosis associated with type 2 diabetes mellitus 3/10/2023    DM (diabetes mellitus)     Emphysema lung     Encounter for blood transfusion     HTN (hypertension)     Restrictive lung disease     Sleep apnea

## 2023-04-07 NOTE — HPI
53 yo female with diastolic and mild systolic heart failure, HTN, DM2 on insulin presenting for AMS. History provided by ER staff and reports from . Patients family called EMS for her being altered. She is known to usually be hypoglycemic however patient's glucose was elevated on EMS and ER check. She was noted to be hypoxic on room air and was placed on oxygen with mild improvement in altered mentation but still not fully awake.     Glucose was check several times in the ER. There was one hypoglycemic check in the ER to 34 on her CMP taken around 30 minutes within the 459 on POCT testing. She is now at 111 on POCT testing. With the hypoxia, she was noted to also have bilateral lower ext edema, edema on CXR, and an elevated BNP. She was also noted to have a low magnesium. Medicine called for admit for concern of hypoglycemia, CHF, and hypomagnesium.

## 2023-04-07 NOTE — ED PROVIDER NOTES
Source of History:    Patient  Chart    Chief complaint:  Blood Sugar Problem (Pt brought in by [facility] w/ CBG of 317. Per EMS pt is normally hypoglycemic and pt was found by family approximately 30 min ago altered. Per EMS pt is AAO to person, place only. ) and Altered Mental Status      HPI:  Christine Mosqueda is a 52 y.o. female with history of diastolic HF, hypertension, type 2 diabetes (treated with insulin), emphysema, anxiety, presenting to emergency department with complaint of altered mental status.    Per chart review, patient with recent 6 day hospital stay, discharged on 03/23/2023.  She would previously been hospitalized for 6 days with encephalopathy due to E coli bacteremia, DKA, and dehydration.  She left against medical advice on 03/16, despite uncontrolled hyperglycemia and was prescribed ciprofloxacin.  She bounced back on the 17th.  At that time, she was hypoglycemic.  Her labs showed leukocytosis, hypokalemia, and hypomagnesemia.  Her hemoglobin was 8.1 from 11.2.  A blood glucose was 44.  Her workup also showed thrombosis of a superficial right basilic and cephalic veins.  H&H decreased, hemoglobin 6.7.  Iron studies were consistent with anemia of chronic disease rather than iron-deficiency.  She was transfused 1 unit of PRBCs.  She was transitioned from ceftriaxone to oral ciprofloxacin twice daily at discharge, date of completion was 3/24.  Endocrinology did not decrease her insulin doses because she only had hyperglycemia while in the hospital.    Patient's  provides the majority of the history this evening.  He states that she seemed to be in her normal state of health until several hours ago, when he went into her room to check on her, and noted that she was naked, confused, and seeming to light half in and out of the bed.  States that she was talking in a way that did not make sense.  He states that she has not been taking all her medications as directed but does not know which  ones she has been missing.  He has not noted any vomiting.  He had not noted any falls.      Here, patient is somnolent but arousable, states that her legs are swollen, but she does not have abdominal pain.  She is not short of breath and not having chest pain.  She denies dysuria or hematuria.  No bleeding symptoms.    Review of patient's allergies indicates:   Allergen Reactions    Hydrochlorothiazide plus        No current facility-administered medications on file prior to encounter.     Current Outpatient Medications on File Prior to Encounter   Medication Sig Dispense Refill    albuterol (ACCUNEB) 1.25 mg/3 mL Nebu Take 1.25 mg by nebulization every 6 (six) hours as needed.      ALPRAZolam (XANAX) 2 MG Tab Take 2 mg by mouth daily as needed for Anxiety.      amlodipine (NORVASC) 10 MG tablet Take 10 mg by mouth once daily.      aspirin 81 MG Chew Take 81 mg by mouth once daily.      atorvastatin (LIPITOR) 20 MG tablet Take 20 mg by mouth once daily.      fluticasone-salmeterol diskus inhaler 500-50 mcg Inhale 1 puff into the lungs 2 (two) times daily.      gabapentin (NEURONTIN) 400 MG capsule Take 400 mg by mouth 2 (two) times daily.      insulin aspart U-100 (NOVOLOG) 100 unit/mL injection Inject 10 Units into the skin 3 (three) times daily before meals.      insulin detemir U-100 (LEVEMIR) 100 unit/mL injection Inject 40 Units into the skin once daily.      nicotine (NICODERM CQ) 14 mg/24 hr Place 1 patch onto the skin once daily. 28 patch 1    omeprazole (PRILOSEC) 40 MG capsule Take 40 mg by mouth once daily.      promethazine (PHENERGAN) 12.5 MG Tab Take 25 mg by mouth every 6 (six) hours as needed.      tiotropium (SPIRIVA) 18 mcg inhalation capsule Inhale 18 mcg into the lungs once daily.         PMH:  As per HPI and below:  Past Medical History:   Diagnosis Date    Anxiety     Arthritis     Bronchitis     CHF (congestive heart failure)     Diabetic ketoacidosis associated with type 2 diabetes mellitus  3/10/2023    DM (diabetes mellitus)     Emphysema lung     Encounter for blood transfusion     HTN (hypertension)     Restrictive lung disease     Sleep apnea      Past Surgical History:   Procedure Laterality Date     SECTION      PALATAL EXPANSION      WRIST SURGERY Right        Social History     Socioeconomic History    Marital status:    Tobacco Use    Smoking status: Some Days     Packs/day: 0.25     Types: Cigarettes    Smokeless tobacco: Never   Substance and Sexual Activity    Alcohol use: Yes    Drug use: Yes     Types: Marijuana    Sexual activity: Yes     Partners: Male     Social Determinants of Health     Financial Resource Strain: Unknown    Difficulty of Paying Living Expenses: Patient refused   Food Insecurity: Unknown    Worried About Running Out of Food in the Last Year: Patient refused    Ran Out of Food in the Last Year: Patient refused   Transportation Needs: Unknown    Lack of Transportation (Medical): Patient refused    Lack of Transportation (Non-Medical): Patient refused   Physical Activity: Unknown    Days of Exercise per Week: Patient refused    Minutes of Exercise per Session: Patient refused   Stress: Unknown    Feeling of Stress : Patient refused   Social Connections: Unknown    Frequency of Communication with Friends and Family: Patient refused    Frequency of Social Gatherings with Friends and Family: Patient refused    Attends Yarsani Services: Patient refused    Active Member of Clubs or Organizations: Patient refused    Attends Club or Organization Meetings: Patient refused    Marital Status: Patient refused   Housing Stability: Unknown    Unable to Pay for Housing in the Last Year: Patient refused    Number of Places Lived in the Last Year: 1    Unstable Housing in the Last Year: Patient refused       Family History   Problem Relation Age of Onset    Diabetes Mother     Hypertension Mother     Hypertension Father     Diabetes Father     Diabetes Sister         Physical Exam:      Vitals:    04/07/23 0118   BP:    Pulse:    Resp:    Temp: 97.7 °F (36.5 °C)     Gen: No acute distress.  Nontoxic.  Chronically ill-appearing.  Mental Status:  Somnolent but arousable.  Answers questions appropriately.  Skin: Warm, dry. No rashes seen.  Eyes: No conjunctival injection.  Pulm:  Bibasilar crackles.  No audible stridor or wheezing.  Hypoxic to 90% on room air.    CV: Regular rate. Regular rhythm.  Bilateral lower extremity edema present.  Abd: Soft.  Distended.  Nontender.  MSK: Good range of motion all joints.  No deformities.    Neuro: Awake. Speech normal. No focal neuro deficit observed.      Laboratory Studies:  Labs Reviewed   CBC W/ AUTO DIFFERENTIAL - Abnormal; Notable for the following components:       Result Value    RBC 3.13 (*)     Hemoglobin 7.1 (*)     Hematocrit 23.7 (*)     MCV 76 (*)     MCH 22.7 (*)     MCHC 30.0 (*)     RDW 18.7 (*)     Immature Granulocytes 0.6 (*)     Gran # (ANC) 8.4 (*)     Immature Grans (Abs) 0.06 (*)     Gran % 77.5 (*)     Lymph % 13.0 (*)     All other components within normal limits   COMPREHENSIVE METABOLIC PANEL - Abnormal; Notable for the following components:    Potassium 3.2 (*)     CO2 22 (*)     Glucose 34 (*)     Calcium 8.1 (*)     Albumin 2.5 (*)     Alkaline Phosphatase 155 (*)     All other components within normal limits   URINALYSIS, REFLEX TO URINE CULTURE - Abnormal; Notable for the following components:    Color, UA Colorless (*)     Appearance, UA Hazy (*)     Protein, UA 1+ (*)     Leukocytes, UA 3+ (*)     All other components within normal limits    Narrative:     Specimen Source->Urine   MAGNESIUM - Abnormal; Notable for the following components:    Magnesium 1.3 (*)     All other components within normal limits   B-TYPE NATRIURETIC PEPTIDE - Abnormal; Notable for the following components:     (*)     All other components within normal limits   URINALYSIS MICROSCOPIC - Abnormal; Notable for the  following components:    RBC, UA 8 (*)     WBC, UA 27 (*)     All other components within normal limits    Narrative:     Specimen Source->Urine   HEMOGLOBIN A1C - Abnormal; Notable for the following components:    Hemoglobin A1C 8.2 (*)     Estimated Avg Glucose 189 (*)     All other components within normal limits    Narrative:     add on [facility] #[id number] per Braulio Purdy MD @ 02:04  04/07/2023    POCT GLUCOSE - Abnormal; Notable for the following components:    POCT Glucose 459 (*)     All other components within normal limits   ISTAT PROCEDURE - Abnormal; Notable for the following components:    POC PCO2 46.2 (*)     POC PO2 29 (*)     POC SATURATED O2 52 (*)     All other components within normal limits   POCT GLUCOSE - Abnormal; Notable for the following components:    POCT Glucose 111 (*)     All other components within normal limits   POCT GLUCOSE - Abnormal; Notable for the following components:    POCT Glucose 143 (*)     All other components within normal limits   CULTURE, BLOOD   CULTURE, BLOOD   CULTURE, URINE   BETA - HYDROXYBUTYRATE, SERUM   TSH   LACTIC ACID, PLASMA   TROPONIN I   OSMOLALITY, SERUM   HEMOGLOBIN A1C   POCT GLUCOSE MONITORING CONTINUOUS   POCT GLUCOSE MONITORING CONTINUOUS       EKG (independently interpreted by me):  Normal sinus rhythm, rate 74.  No STEMI.  QRS 74 milliseconds.   milliseconds.    X-rays (independently interpreted by me):  Pulmonary edema    Chart reviewed.  See summary in HPI    Imaging Results              CT Head Without Contrast (Final result)  Result time 04/07/23 00:07:58      Final result by Cali Lowery MD (04/07/23 00:07:58)                   Impression:      No acute intracranial pathology.    Electronically signed by resident: Dimas Wong  Date:    04/06/2023  Time:    23:58    Electronically signed by: Cali Lowery MD  Date:    04/07/2023  Time:    00:07               Narrative:    EXAMINATION:  CT HEAD WITHOUT CONTRAST    CLINICAL  HISTORY:  Mental status change, unknown cause;    TECHNIQUE:  Low dose axial CT images obtained throughout the head without the use of intravenous contrast.  Axial, sagittal and coronal reconstructions were performed.    COMPARISON:  CT head 03/10/2023    FINDINGS:  The brain parenchyma appears unchanged.  No new hemorrhage, edema, mass, or acute major vascular distribution infarct.  No mass effect or midline shift.    Ventricles are unchanged in size and configuration.  No overt hydrocephalus.    No new extra-axial blood or fluid collections are identified.    No displaced calvarial fracture.    Mastoid air cells and paranasal sinuses are essentially clear.                                       X-Ray Chest 1 View (Final result)  Result time 04/06/23 23:31:26      Final result by Cali Lowery MD (04/06/23 23:31:26)                   Impression:      Cardiomegaly with bilateral edema.      Electronically signed by: Cali Lowery MD  Date:    04/06/2023  Time:    23:31               Narrative:    EXAMINATION:  XR CHEST 1 VIEW    CLINICAL HISTORY:  Hypoxemia    TECHNIQUE:  Single frontal view of the chest was performed.    COMPARISON:  03/17/2023.    FINDINGS:  No consolidation, pleural effusion or pneumothorax.    Cardiomegaly with bilateral edema.                                      Medications Given:  Medications   amLODIPine tablet 10 mg (has no administration in time range)   aspirin chewable tablet 81 mg (has no administration in time range)   atorvastatin tablet 20 mg (has no administration in time range)   fluticasone furoate-vilanteroL 200-25 mcg/dose diskus inhaler 1 puff (has no administration in time range)   gabapentin capsule 400 mg (has no administration in time range)   insulin aspart U-100 injection 4 Units (has no administration in time range)   insulin detemir U-100 (Levemir) pen 10 Units (has no administration in time range)   pantoprazole EC tablet 40 mg (has no administration in time  range)   sucralfate 100 mg/mL suspension 1 g (has no administration in time range)   aluminum-magnesium hydroxide-simethicone 200-200-20 mg/5 mL suspension 30 mL (has no administration in time range)   tiotropium bromide 2.5 mcg/actuation inhaler 2 puff (has no administration in time range)   sodium chloride 0.9% flush 10 mL (has no administration in time range)   furosemide injection 40 mg (has no administration in time range)   ondansetron disintegrating tablet 4 mg (has no administration in time range)   polyethylene glycol packet 17 g (has no administration in time range)   furosemide injection 40 mg (40 mg Intravenous Given 4/7/23 0032)   magnesium sulfate 2g in water 50mL IVPB (premix) (0 g Intravenous Stopped 4/7/23 0317)       Discussed with:  Internal medicine    MDM:    52 y.o. female with altered mental status, hypoxia, elevated blood glucose.  Initial blood glucose here greater than 400.      A short time later, with no intervention, her CMP resulted with a blood glucose that was critically low.  We immediately repeated a fingerstick blood glucose and it was 111.  I have no obvious explanation for these extremely labile blood glucose measurements.  We will continue to monitor hourly.      Her labs were reviewed.  There is no evidence of DKA.  No leukocytosis.  Hemoglobin is around her baseline.    .  No elevation of troponin.  Chest x-ray with pulmonary edema.      Will give Lasix.    Low magnesium.  She is symptomatic, will replete.  Has malaise.      Admitted to Internal Medicine for hypoxemic respiratory failure secondary to CHF exacerbation, and symptomatic hypomagnesemia.  Pending UA at time of admission.    Diagnostic Impression:    1. Altered mental status, unspecified altered mental status type    2. Hyperglycemia    3. Hypoxia    4. Malaise    5. Hypomagnesemia         ED Disposition Condition    Admit Stable                 Arlen Roe MD  Emergency Medicine         Arlen PRATHER  MD Bhupinder  04/07/23 0359

## 2023-04-07 NOTE — H&P
[facility]  [facility] Medicine  History & Physical    Patient Name: Christine Mosqueda  MRN: [id number]  Patient Class: IP- Inpatient  Admission Date: 4/6/2023  Attending Physician: Braulio Purdy MD   Primary Care Provider: [facility]      Patient information was obtained from patient, past medical records and ER records.     Subjective:     Principal Problem:Acute on chronic systolic heart failure    Chief Complaint:   Chief Complaint   Patient presents with    Blood Sugar Problem     Pt brought in by [facility] w/ CBG of 317. Per EMS pt is normally hypoglycemic and pt was found by family approximately 30 min ago altered. Per EMS pt is AAO to person, place only.     Altered Mental Status        HPI: 51 yo female with diastolic and mild systolic heart failure, HTN, DM2 on insulin presenting for AMS. History provided by ER staff and reports from . Patients family called EMS for her being altered. She is known to usually be hypoglycemic however patient's glucose was elevated on EMS and ER check. She was noted to be hypoxic on room air and was placed on oxygen with mild improvement in altered mentation but still not fully awake.     Glucose was check several times in the ER. There was one hypoglycemic check in the ER to 34 on her CMP taken around 30 minutes within the 459 on POCT testing. She is now at 111 on POCT testing. With the hypoxia, she was noted to also have bilateral lower ext edema, edema on CXR, and an elevated BNP. She was also noted to have a low magnesium. Medicine called for admit for concern of hypoglycemia, CHF, and hypomagnesium.       Past Medical History:   Diagnosis Date    Anxiety     Arthritis     Bronchitis     CHF (congestive heart failure)     Diabetic ketoacidosis associated with type 2 diabetes mellitus 3/10/2023    DM (diabetes mellitus)     Emphysema lung     Encounter for blood transfusion     HTN (hypertension)     Restrictive lung disease      Sleep apnea        Past Surgical History:   Procedure Laterality Date     SECTION      PALATAL EXPANSION      WRIST SURGERY Right        Review of patient's allergies indicates:   Allergen Reactions    Hydrochlorothiazide plus        No current facility-administered medications on file prior to encounter.     Current Outpatient Medications on File Prior to Encounter   Medication Sig    albuterol (ACCUNEB) 1.25 mg/3 mL Nebu Take 1.25 mg by nebulization every 6 (six) hours as needed.    ALPRAZolam (XANAX) 2 MG Tab Take 2 mg by mouth daily as needed for Anxiety.    amlodipine (NORVASC) 10 MG tablet Take 10 mg by mouth once daily.    aspirin 81 MG Chew Take 81 mg by mouth once daily.    atorvastatin (LIPITOR) 20 MG tablet Take 20 mg by mouth once daily.    fluticasone-salmeterol diskus inhaler 500-50 mcg Inhale 1 puff into the lungs 2 (two) times daily.    gabapentin (NEURONTIN) 400 MG capsule Take 400 mg by mouth 2 (two) times daily.    insulin aspart U-100 (NOVOLOG) 100 unit/mL injection Inject 10 Units into the skin 3 (three) times daily before meals.    insulin detemir U-100 (LEVEMIR) 100 unit/mL injection Inject 40 Units into the skin once daily.    nicotine (NICODERM CQ) 14 mg/24 hr Place 1 patch onto the skin once daily.    omeprazole (PRILOSEC) 40 MG capsule Take 40 mg by mouth once daily.    promethazine (PHENERGAN) 12.5 MG Tab Take 25 mg by mouth every 6 (six) hours as needed.    tiotropium (SPIRIVA) 18 mcg inhalation capsule Inhale 18 mcg into the lungs once daily.     Family History       Problem Relation (Age of Onset)    Diabetes Mother, Father, Sister    Hypertension Mother, Father          Tobacco Use    Smoking status: Some Days     Packs/day: 0.25     Types: Cigarettes    Smokeless tobacco: Never   Substance and Sexual Activity    Alcohol use: Yes    Drug use: Yes     Types: Marijuana    Sexual activity: Yes     Partners: Male     Review of Systems   Unable to perform ROS:  Mental status change   Objective:     Vital Signs (Most Recent):  Temp: 97.6 °F (36.4 °C) (04/06/23 2115)  Pulse: 72 (04/07/23 0042)  Resp: 16 (04/07/23 0042)  BP: 116/65 (04/07/23 0042)  SpO2: 99 % (04/07/23 0042) Vital Signs (24h Range):  Temp:  [97.6 °F (36.4 °C)] 97.6 °F (36.4 °C)  Pulse:  [72-80] 72  Resp:  [15-16] 16  SpO2:  [97 %-99 %] 99 %  BP: (116-144)/(65-80) 116/65        There is no height or weight on file to calculate BMI.    Physical Exam  Vitals reviewed.   Constitutional:       General: She is not in acute distress.     Appearance: She is well-developed.   HENT:      Head: Normocephalic and atraumatic.      Nose: Nose normal. No rhinorrhea.      Mouth/Throat:      Mouth: Mucous membranes are moist.   Eyes:      General: No scleral icterus.        Right eye: No discharge.         Left eye: No discharge.      Pupils: Pupils are equal, round, and reactive to light.   Neck:      Vascular: No JVD.   Cardiovascular:      Rate and Rhythm: Normal rate and regular rhythm.      Heart sounds: Normal heart sounds. No murmur heard.    No friction rub.   Pulmonary:      Effort: Pulmonary effort is normal. No respiratory distress.      Breath sounds: Rhonchi present. No wheezing.   Abdominal:      General: Bowel sounds are normal. There is no distension.      Palpations: Abdomen is soft.      Tenderness: There is no abdominal tenderness.   Musculoskeletal:         General: No deformity. Normal range of motion.      Cervical back: Normal range of motion and neck supple.      Right lower leg: Edema present.      Left lower leg: Edema present.   Skin:     General: Skin is warm and dry.   Neurological:      General: No focal deficit present.      Mental Status: She is alert. She is disoriented.      Motor: Weakness present.   Psychiatric:         Mood and Affect: Mood normal.         Behavior: Behavior normal.         CRANIAL NERVES     CN III, IV, VI   Pupils are equal, round, and reactive to light.     Significant  Labs: All pertinent labs within the past 24 hours have been reviewed.  CBC:   Recent Labs   Lab 04/06/23 2248   WBC 10.77   HGB 7.1*   HCT 23.7*        CMP:   Recent Labs   Lab 04/06/23 2248      K 3.2*      CO2 22*   GLU 34*   BUN 12   CREATININE 0.8   CALCIUM 8.1*   PROT 7.4   ALBUMIN 2.5*   BILITOT 0.2   ALKPHOS 155*   AST 29   ALT 30   ANIONGAP 13     Cardiac Markers:   Recent Labs   Lab 04/06/23 2248   *     Troponin:   Recent Labs   Lab 04/06/23 2248   TROPONINI 0.012       Significant Imaging: I have reviewed all pertinent imaging results/findings within the past 24 hours.    Assessment/Plan:     * Acute on chronic systolic heart failure  - CHF pathway started  - uncontrolled  - s/s corresponding to heart failure exacerbation  - last ECHO with Results for orders placed during the hospital encounter of 03/10/23    Echo    Interpretation Summary  · The left ventricle is normal in size with low normal systolic function.  · The estimated ejection fraction is 53%.  · There is abnormal septal wall motion.  · Indeterminate left ventricular diastolic function.  · Mild left atrial enlargement.  · Normal right ventricular size with low normal right ventricular systolic function.  · Mild right atrial enlargement.  · Mild mitral regurgitation.  · Mild tricuspid regurgitation.  · Normal central venous pressure (3 mmHg).  · The estimated PA systolic pressure is 23 mmHg.    - BNP elevated to 250, troponin wnl  - will continue diuresis with lasix 40 IV BID  - strict I/Os, fluid restricted diet  - monitor daily weights, telemetry  - replace K and Mg above 4 and 2 resp  - monitor oxygenation  - Continue Furosemide               Hypomagnesemia  Low, patient with weakness  Will replace      Nonadherence to medical treatment        Microcytic anemia  Chronic, controlled, will monitor      Type 2 diabetes mellitus with hypoglycemia without coma, with long-term current use of insulin  Patient's FSGs  are uncontrolled due to hyperglycemia and hypoglycemia. Will continue accuchecks q2 hrs until stable. Will restart on very reduced insulin doses and monitor closely.      Last A1c reviewed-   Lab Results   Component Value Date    HGBA1C 13.6 (H) 03/11/2023     Most recent fingerstick glucose reviewed-   Recent Labs   Lab 04/06/23 2227 04/07/23  0018   POCTGLUCOSE 459* 111*     Current correctional scale  Low  Maintain anti-hyperglycemic dose as follows-   Antihyperglycemics (From admission, onward)    Start     Stop Route Frequency Ordered    04/07/23 0900  insulin detemir U-100 (Levemir) pen 10 Units         -- SubQ Daily 04/07/23 0052 04/07/23 0645  insulin aspart U-100 injection 4 Units         -- SubQ 3 times daily before meals 04/07/23 0052        Hold Oral hypoglycemics while patient is in the hospital.    Essential hypertension  Chronic, controlled, continue home meds        VTE Risk Mitigation (From admission, onward)         Ordered     Place MARGIE hose  Until discontinued         04/07/23 0052     Place sequential compression device  Until discontinued         04/07/23 0052     IP VTE LOW RISK PATIENT  Once         04/07/23 0052                   Braulio Purdy MD  Department of Hospital Medicine  [facility] - Emergency Dept

## 2023-04-07 NOTE — ASSESSMENT & PLAN NOTE
Patient's FSGs are uncontrolled due to hyperglycemia and hypoglycemia. Will continue accuchecks q2 hrs until stable. Will restart on very reduced insulin doses and monitor closely.      Last A1c reviewed-   Lab Results   Component Value Date    HGBA1C 13.6 (H) 03/11/2023     Most recent fingerstick glucose reviewed-   Recent Labs   Lab 04/06/23  2227 04/07/23  0018   POCTGLUCOSE 459* 111*     Current correctional scale  Low  Maintain anti-hyperglycemic dose as follows-   Antihyperglycemics (From admission, onward)    Start     Stop Route Frequency Ordered    04/07/23 0900  insulin detemir U-100 (Levemir) pen 10 Units         -- SubQ Daily 04/07/23 0052    04/07/23 0645  insulin aspart U-100 injection 4 Units         -- SubQ 3 times daily before meals 04/07/23 0052        Hold Oral hypoglycemics while patient is in the hospital.

## 2023-04-07 NOTE — NURSING
ARRIVED TO UNIT VIA WC WITH TRANSPORT, VSS, NAD, DENIES PAIN, REQUESTING FOOD, DIETARY MADE AWARE, SKIN INTACT, FULL ASSESSMENT COMPLETED, SAFETY MEASURES INTACT, WILL MONITOR.

## 2023-04-07 NOTE — ASSESSMENT & PLAN NOTE
- CHF pathway started  - uncontrolled  - s/s corresponding to heart failure exacerbation  - last ECHO with Results for orders placed during the hospital encounter of 03/10/23    Echo    Interpretation Summary  · The left ventricle is normal in size with low normal systolic function.  · The estimated ejection fraction is 53%.  · There is abnormal septal wall motion.  · Indeterminate left ventricular diastolic function.  · Mild left atrial enlargement.  · Normal right ventricular size with low normal right ventricular systolic function.  · Mild right atrial enlargement.  · Mild mitral regurgitation.  · Mild tricuspid regurgitation.  · Normal central venous pressure (3 mmHg).  · The estimated PA systolic pressure is 23 mmHg.    - BNP elevated to 250, troponin wnl  - will continue diuresis with lasix 40 IV BID  - strict I/Os, fluid restricted diet  - monitor daily weights, telemetry  - replace K and Mg above 4 and 2 resp  - monitor oxygenation  - Continue Furosemide

## 2023-04-08 VITALS
HEART RATE: 78 BPM | DIASTOLIC BLOOD PRESSURE: 88 MMHG | WEIGHT: 140 LBS | BODY MASS INDEX: 24.03 KG/M2 | SYSTOLIC BLOOD PRESSURE: 145 MMHG | TEMPERATURE: 98 F | OXYGEN SATURATION: 95 % | RESPIRATION RATE: 18 BRPM

## 2023-04-08 LAB
ANION GAP SERPL CALC-SCNC: 10 MMOL/L (ref 8–16)
BACTERIA UR CULT: ABNORMAL
BUN SERPL-MCNC: 15 MG/DL (ref 6–20)
CALCIUM SERPL-MCNC: 8.6 MG/DL (ref 8.7–10.5)
CHLORIDE SERPL-SCNC: 102 MMOL/L (ref 95–110)
CO2 SERPL-SCNC: 28 MMOL/L (ref 23–29)
CREAT SERPL-MCNC: 0.9 MG/DL (ref 0.5–1.4)
EST. GFR  (NO RACE VARIABLE): >60 ML/MIN/1.73 M^2
GLUCOSE SERPL-MCNC: 311 MG/DL (ref 70–110)
POCT GLUCOSE: 180 MG/DL (ref 70–110)
POCT GLUCOSE: 271 MG/DL (ref 70–110)
POCT GLUCOSE: 272 MG/DL (ref 70–110)
POCT GLUCOSE: >500 MG/DL (ref 70–110)
POTASSIUM SERPL-SCNC: 3.8 MMOL/L (ref 3.5–5.1)
SODIUM SERPL-SCNC: 140 MMOL/L (ref 136–145)

## 2023-04-08 PROCEDURE — 25000242 PHARM REV CODE 250 ALT 637 W/ HCPCS: Performed by: INTERNAL MEDICINE

## 2023-04-08 PROCEDURE — 25000003 PHARM REV CODE 250: Performed by: INTERNAL MEDICINE

## 2023-04-08 PROCEDURE — 80048 BASIC METABOLIC PNL TOTAL CA: CPT | Performed by: HOSPITALIST

## 2023-04-08 PROCEDURE — 99233 SBSQ HOSP IP/OBS HIGH 50: CPT | Mod: ,,, | Performed by: HOSPITALIST

## 2023-04-08 PROCEDURE — 63600175 PHARM REV CODE 636 W HCPCS: Performed by: INTERNAL MEDICINE

## 2023-04-08 PROCEDURE — 36415 COLL VENOUS BLD VENIPUNCTURE: CPT | Performed by: HOSPITALIST

## 2023-04-08 PROCEDURE — 99233 PR SUBSEQUENT HOSPITAL CARE,LEVL III: ICD-10-PCS | Mod: ,,, | Performed by: HOSPITALIST

## 2023-04-08 RX ORDER — FUROSEMIDE 40 MG/1
40 TABLET ORAL DAILY
Qty: 30 TABLET | Refills: 0 | Status: ON HOLD | OUTPATIENT
Start: 2023-04-08 | End: 2024-03-06

## 2023-04-08 RX ORDER — POTASSIUM CHLORIDE 600 MG/1
8 TABLET, FILM COATED, EXTENDED RELEASE ORAL DAILY
Qty: 30 TABLET | Refills: 0 | Status: SHIPPED | OUTPATIENT
Start: 2023-04-08

## 2023-04-08 RX ADMIN — ASPIRIN 81 MG 81 MG: 81 TABLET ORAL at 08:04

## 2023-04-08 RX ADMIN — INSULIN ASPART 5 UNITS: 100 INJECTION, SOLUTION INTRAVENOUS; SUBCUTANEOUS at 12:04

## 2023-04-08 RX ADMIN — INSULIN ASPART 4 UNITS: 100 INJECTION, SOLUTION INTRAVENOUS; SUBCUTANEOUS at 12:04

## 2023-04-08 RX ADMIN — PANTOPRAZOLE SODIUM 40 MG: 40 TABLET, DELAYED RELEASE ORAL at 08:04

## 2023-04-08 RX ADMIN — SUCRALFATE 1 G: 1 SUSPENSION ORAL at 06:04

## 2023-04-08 RX ADMIN — SUCRALFATE 1 G: 1 SUSPENSION ORAL at 12:04

## 2023-04-08 RX ADMIN — TIOTROPIUM BROMIDE INHALATION SPRAY 2 PUFF: 3.12 SPRAY, METERED RESPIRATORY (INHALATION) at 12:04

## 2023-04-08 RX ADMIN — FUROSEMIDE 40 MG: 10 INJECTION, SOLUTION INTRAMUSCULAR; INTRAVENOUS at 08:04

## 2023-04-08 RX ADMIN — AMLODIPINE BESYLATE 10 MG: 10 TABLET ORAL at 08:04

## 2023-04-08 RX ADMIN — INSULIN ASPART 3 UNITS: 100 INJECTION, SOLUTION INTRAVENOUS; SUBCUTANEOUS at 08:04

## 2023-04-08 RX ADMIN — ALUMINUM HYDROXIDE, MAGNESIUM HYDROXIDE, AND SIMETHICONE 30 ML: 200; 200; 20 SUSPENSION ORAL at 12:04

## 2023-04-08 RX ADMIN — ALUMINUM HYDROXIDE, MAGNESIUM HYDROXIDE, AND SIMETHICONE 30 ML: 200; 200; 20 SUSPENSION ORAL at 06:04

## 2023-04-08 RX ADMIN — INSULIN ASPART 4 UNITS: 100 INJECTION, SOLUTION INTRAVENOUS; SUBCUTANEOUS at 06:04

## 2023-04-08 RX ADMIN — FLUTICASONE FUROATE AND VILANTEROL TRIFENATATE 1 PUFF: 200; 25 POWDER RESPIRATORY (INHALATION) at 08:04

## 2023-04-08 RX ADMIN — ATORVASTATIN CALCIUM 20 MG: 20 TABLET, FILM COATED ORAL at 08:04

## 2023-04-08 RX ADMIN — GABAPENTIN 400 MG: 400 CAPSULE ORAL at 08:04

## 2023-04-08 NOTE — DISCHARGE SUMMARY
[facility] - [facility] ([address] Medicine  Discharge Summary      Patient Name: Christine Mosqueda  MRN: [id number]  ALYSA: [id number]  Patient Class: IP- Inpatient  Admission Date: 4/6/2023  Hospital Length of Stay: 1 days  Discharge Date and Time: 4/8/2023  4:14 PM  Attending Physician: No att. providers found   Discharging Provider: Karl Rodriguez MD  Primary Care Provider: [facility] Medicine Team: [facility] Karl Rodriguez MD  Primary Care Team: [facility]    HPI:   53 yo female with diastolic and mild systolic heart failure, HTN, DM2 on insulin presenting for AMS. History provided by ER staff and reports from . Patients family called EMS for her being altered. She is known to usually be hypoglycemic however patient's glucose was elevated on EMS and ER check. She was noted to be hypoxic on room air and was placed on oxygen with mild improvement in altered mentation but still not fully awake.     Glucose was check several times in the ER. There was one hypoglycemic check in the ER to 34 on her CMP taken around 30 minutes within the 459 on POCT testing. She is now at 111 on POCT testing. With the hypoxia, she was noted to also have bilateral lower ext edema, edema on CXR, and an elevated BNP. She was also noted to have a low magnesium. Medicine called for admit for concern of hypoglycemia, CHF, and hypomagnesium.       * No surgery found *      Hospital Course:   53 yo female with diastolic and mild systolic heart failure, HTN, DM2 on insulin was admitted for metabolic encephalopathy secondary to hypoglycemia as well as acute hypoxic respiratory failure secondary to acute on chronic diastolic CHF.    Glucose was check several times in the ER. There was one hypoglycemic check in the ER to 34 on her CMP taken around 30 minutes within the 459 on POCT testing. She is now at 111 on POCT testing as well as elevated BNP, new onset bilateral lower ext edema,  edema on CXR.  Patient was started on IV Lasix and stabilized on her sugars with the appropriate insulin regimen.  She was ambulating without difficulty with restoration of her mental status back to baseline and resolution of her dyspnea.  However she did qualify for home oxygen on a walk test and refused to stay for oxygen tank to arrive.  Patient left AMA..      Exam much earlier in the morning prior to the patient leaving AMA was as follows:  On room air, no cyanosis   Heart sounds indicate a regular rate and rhythm   No obvious lower extremity edema  Awake alert, no acute distress   No facial droop and no slurred speech       Goals of Care Treatment Preferences:  Code Status: Full Code      Consults:   Consults (From admission, onward)        Status Ordering Provider     Inpatient consult to Social Work  Once        Provider:  (Not yet assigned)    Acknowledged ENEIDA VIVAS     Inpatient consult to Social Work/Case Management  Once        Provider:  (Not yet assigned)    Acknowledged BERLIN BENDER     Inpatient consult to Registered Dietitian/Nutritionist  Once        Provider:  (Not yet assigned)    Completed BERLIN BENDER          No new Assessment & Plan notes have been filed under this hospital service since the last note was generated.  Service: Hospital Medicine    Final Active Diagnoses:    Diagnosis Date Noted POA    PRINCIPAL PROBLEM:  Acute on chronic systolic heart failure [I50.23] 02/05/2018 Yes    Hypomagnesemia [E83.42] 04/07/2023 Yes    Nonadherence to medical treatment [Z91.199] 03/23/2023 Not Applicable    Microcytic anemia [D50.9] 03/13/2023 Yes    Type 2 diabetes mellitus with hypoglycemia without coma, with long-term current use of insulin [E11.649, Z79.4] 02/05/2018 Not Applicable    Essential hypertension [I10] 02/05/2018 Yes     Chronic      Problems Resolved During this Admission:       Discharged Condition: against medical advice    Disposition: Left Against Medical Adv*        "  OXYGEN FOR HOME USE     Order Specific Question Answer Comments   Liter Flow 2    Duration Continuous    Qualifying Test Performed at: Activity    Oxygen saturation at rest 90    Oxygen saturation with activity 86    Oxygen saturation with activity on oxygen 94    Portable mode: pulse dose acceptable    Mode: Portable concentrator    Route nasal cannula    Device: home concentrator with portable concentrator    Length of need (in months): 99 mos    Patient condition with qualifying saturation CHF    Height: 5'4"    Weight: 63.5 kg (139 lb 15.9 oz)    Alternative treatment measures have been tried or considered and deemed clinically ineffective. Yes        [place]    Pending Diagnostic Studies:     None         Medications:  None    Indwelling Lines/Drains at time of discharge:   Lines/Drains/Airways     None                     Karl Rodriguez MD  Department of Hospital Medicine  [facility] - Intensive Care ([place] [address])  "

## 2023-04-08 NOTE — CONSULTS
Food & Nutrition  Education    Diet Education: Low Na   Time Spent: 10 minutes   Learners: Pt, 1 family member     Nutrition Education provided with handouts. All questions and concerns answered. Dietitian's contact information provided.     Follow-Up: Yes    Pt scheduled to discharge today.    Please re-consult as needed.    Thanks!  MS Jenniffer, RD, LDN

## 2023-04-08 NOTE — HOSPITAL COURSE
53 yo female with diastolic and mild systolic heart failure, HTN, DM2 on insulin was admitted for metabolic encephalopathy secondary to hypoglycemia as well as acute hypoxic respiratory failure secondary to acute on chronic diastolic CHF.    Glucose was check several times in the ER. There was one hypoglycemic check in the ER to 34 on her CMP taken around 30 minutes within the 459 on POCT testing. She is now at 111 on POCT testing as well as elevated BNP, new onset bilateral lower ext edema, edema on CXR.  Patient was started on IV Lasix and stabilized on her sugars with the appropriate insulin regimen.  She was ambulating without difficulty with restoration of her mental status back to baseline and resolution of her dyspnea.  However she did qualify for home oxygen on a walk test and refused to stay for oxygen tank to arrive at bedside.  Patient left AMA..      Exam much earlier in the morning prior to the patient leaving AMA was as follows:  Blood pressure  On room air, no cyanosis   Heart sounds indicate a regular rate and rhythm   No obvious lower extremity edema  Awake alert, no acute distress   No facial droop and no slurred speech

## 2023-04-08 NOTE — PLAN OF CARE
Problem: Adult Inpatient Plan of Care  Goal: Optimal Comfort and Wellbeing  Outcome: Ongoing, Progressing   Patient is stable. BG have been elevated during evening shift. Patient is non-compliant. Appetite is increased at this time. Nurse met all needs and request regarding food. Patient was also educated regarding elevated BG. Education needs reinforcement. VSS, AAOX4, no distress noted at this time. Safety measures in place, personal items within reach. POC continued.

## 2023-04-08 NOTE — PROGRESS NOTES
Home Oxygen Evaluation    Date Performed: 2023    1) Patient's Home O2 Sat on room air, while at rest: 90%        If O2 sats on room air at rest are 88% or below, patient qualifies. No additional testing needed. Document N/A in steps 2 and 3. If 89% or above, complete steps 2.      2) Patient's O2 Sat on room air while exercisin%        If O2 sats on room air while exercising remain 89% or above patient does not qualify, no further testing needed Document N/A in step 3. If O2 sats on room air while exercising are 88% or below, continue to step 3.      3) Patient's O2 Sat while exercising on O2: 94% at 2 LPM         (Must show improvement from #2 for patients to qualify)    If O2 sats improve on oxygen, patient qualifies for portable oxygen. If not, the patient does not qualify.

## 2023-04-11 ENCOUNTER — TELEPHONE (OUTPATIENT)
Dept: CARDIOLOGY | Facility: CLINIC | Age: 53
End: 2023-04-11
Payer: MEDICAID

## 2023-04-12 ENCOUNTER — DOCUMENTATION ONLY (OUTPATIENT)
Dept: CARDIOLOGY | Facility: CLINIC | Age: 53
End: 2023-04-12
Payer: MEDICAID

## 2023-04-12 DIAGNOSIS — R06.02 SOB (SHORTNESS OF BREATH): Primary | ICD-10-CM

## 2023-04-12 LAB
BACTERIA BLD CULT: NORMAL
BACTERIA BLD CULT: NORMAL

## 2023-04-12 NOTE — PROGRESS NOTES
"[facility]) nurse navigator notified of [facility] candidate in need of education and introduction to 4-6 week program.        PT returns phone call from message to phone enroll. Introduced self to pt as [facility] nurse navigator.     Patient will be given "[facility] Home Care Guide" which includes "Daily weight and symptom tracker" at first appointment.  Encouraged pt  to review information once she has the Guide.      Reviewed the following key points of [facility] program with pt and family:   1.) Take your medications as directed.    2.) Weight yourself daily   3.) Follow low salt and limited fluid diet.    4.) Stop smoking and start exercising   5.) Go to your appointments and call your team.      Pt reminded to follow Symptom tracker and to call at the onset of symptoms according to tracker.     Reviewed plan for follow up  to include phone calls, in person and virtual visits to assist pt optimizing their heart failure medication regimen and encouraging healthy lifestyle modifications.  Reminded pt that program will assist them over the next 4-6 weeks and then patient will be transferred to long term care provider .  Reminded pt how to contact [facility] navigator via phone and or via [employer].     Pt instructed appointment will be given by Magui Landaverde after I finish the explanation.     .     PT can  verbalize read back of information given.  Encouraged pt  to read over information often and contact team with any questions or concerns.      All teaching has been done per telephone with reinforcement to be done at first visit.    "

## 2023-04-18 NOTE — PROGRESS NOTES
[facility] Provider Note (Initial Clinic) Consult Note    Date of original referral: 4/12/23  Age: 52 y.o.  Gender: female  Ethnicity: Black or   Number of admissions for CHF within the preceding year: 1   Duration of CHF: unsure, years  Type of Congestive Heart Failure: Diastolic   Etiology: unspecified  Social history: current smoker <3 cigarettes/day (previously smoked 1/2 ppd x 20 years), denies alcohol or illict drug use   Enrolled in Infusion suite: no    Diagnostic Labs:   EKG - 04/07/2023  CXR - 04/06/2023  ECHO - 03/13/2023  Stress test -   Stress echo -   Pharmacologic stress -   Cardiac catheterization -    Cardiac MRI -     Lab Results   Component Value Date     04/08/2023     04/07/2023    K 3.8 04/08/2023    K 3.8 04/07/2023     04/08/2023     04/07/2023    CO2 28 04/08/2023    CO2 24 04/07/2023     (H) 04/08/2023     (H) 04/07/2023    BUN 15 04/08/2023    BUN 11 04/07/2023    CREATININE 0.9 04/08/2023    CREATININE 0.9 04/07/2023    CALCIUM 8.6 (L) 04/08/2023    CALCIUM 9.1 04/07/2023    PROT 7.4 04/06/2023    PROT 6.0 03/17/2023    ALBUMIN 2.5 (L) 04/06/2023    ALBUMIN 1.7 (L) 03/17/2023    BILITOT 0.2 04/06/2023    BILITOT 0.2 03/17/2023    ALKPHOS 155 (H) 04/06/2023    ALKPHOS 115 03/17/2023    AST 29 04/06/2023    AST 14 03/17/2023    ALT 30 04/06/2023    ALT 13 03/17/2023    ANIONGAP 10 04/08/2023    ANIONGAP 15 04/07/2023    ESTGFRAFRICA >60 05/15/2021    ESTGFRAFRICA >60 02/06/2018    EGFRNONAA >60 05/15/2021    EGFRNONAA >60 02/06/2018       Lab Results   Component Value Date    WBC 12.78 (H) 04/07/2023    WBC 10.77 04/06/2023    RBC 4.11 04/07/2023    RBC 3.13 (L) 04/06/2023    HGB 9.0 (L) 04/07/2023    HGB 7.1 (L) 04/06/2023    HCT 31.0 (L) 04/07/2023    HCT 23.7 (L) 04/06/2023    MCV 75 (L) 04/07/2023    MCV 76 (L) 04/06/2023    MCH 21.9 (L) 04/07/2023    MCH 22.7 (L) 04/06/2023    MCHC 29.0 (L) 04/07/2023    MCHC 30.0 (L) 04/06/2023    RDW  19.2 (H) 04/07/2023    RDW 18.7 (H) 04/06/2023     04/07/2023     04/06/2023    MPV 11.0 04/07/2023    MPV 10.4 04/06/2023    IMMGR 0.3 04/07/2023    IMMGR 0.6 (H) 04/06/2023    IGABS 0.04 04/07/2023    IGABS 0.06 (H) 04/06/2023    LYMPH 1.7 04/07/2023    LYMPH 13.2 (L) 04/07/2023    MONO 0.7 04/07/2023    MONO 5.2 04/07/2023    EOS 0.1 04/07/2023    EOS 0.0 04/06/2023    BASO 0.04 04/07/2023    BASO 0.05 04/06/2023    NRBC 0 04/07/2023    NRBC 0 04/06/2023    GRAN 10.3 (H) 04/07/2023    GRAN 80.6 (H) 04/07/2023    EOSINOPHIL 0.4 04/07/2023    EOSINOPHIL 0.2 04/06/2023    BASOPHIL 0.3 04/07/2023    BASOPHIL 0.5 04/06/2023    PLTEST Appears normal 03/18/2023    PLTEST Appears normal 03/17/2023    ANISO Slight 03/18/2023    ANISO Slight 03/17/2023    HYPO Occasional 03/18/2023    HYPO Occasional 03/17/2023       Lab Results   Component Value Date     (H) 04/06/2023    BNP 16 02/05/2018    MG 1.3 (L) 04/06/2023    MG 1.9 03/19/2023    PHOS 3.4 03/16/2023    PHOS 2.2 (L) 03/15/2023    TROPONINI 0.012 04/06/2023    TROPONINI <0.006 03/10/2023    HGBA1C 8.2 (H) 04/06/2023    HGBA1C 13.6 (H) 03/11/2023    TSH 1.030 04/06/2023    TSH 1.217 01/09/2023       Lab Results   Component Value Date    IRON 40 03/21/2023    TIBC 240 (L) 03/21/2023    FERRITIN 506 (H) 03/21/2023    CHOL 194 03/13/2020    TRIG 139 03/13/2020    HDL 65 (H) 03/13/2020    LDLCALC 101 03/13/2020    CHOLHDL 2.98 03/13/2020    TOTALCHOLEST 2.9 02/06/2018    NONHDLCHOL 129 03/13/2020    COLORU Colorless (A) 04/07/2023    APPEARANCEUA Hazy (A) 04/07/2023    PHUR 7.0 04/07/2023    SPECGRAV 1.005 04/07/2023    PROTEINUA 1+ (A) 04/07/2023    GLUCUA Negative 04/07/2023    KETONESU Negative 04/07/2023    BILIRUBINUA Negative 04/07/2023    OCCULTUA Negative 04/07/2023    NITRITE Negative 04/07/2023    LEUKOCYTESUR 3+ (A) 04/07/2023       No implanted cardiac devices    Current Outpatient Medications on File Prior to Visit   Medication Sig  Dispense Refill    albuterol (ACCUNEB) 1.25 mg/3 mL Nebu Take 1.25 mg by nebulization every 6 (six) hours as needed.      ALPRAZolam (XANAX) 2 MG Tab Take 2 mg by mouth daily as needed for Anxiety.      amlodipine (NORVASC) 10 MG tablet Take 10 mg by mouth once daily.      aspirin 81 MG Chew Take 81 mg by mouth once daily.      atorvastatin (LIPITOR) 20 MG tablet Take 20 mg by mouth once daily.      fluticasone-salmeterol diskus inhaler 500-50 mcg Inhale 1 puff into the lungs 2 (two) times daily.      furosemide (LASIX) 40 MG tablet Take 1 tablet (40 mg total) by mouth once daily. 30 tablet 0    gabapentin (NEURONTIN) 400 MG capsule Take 400 mg by mouth 2 (two) times daily.      insulin aspart U-100 (NOVOLOG) 100 unit/mL injection Inject 10 Units into the skin 3 (three) times daily before meals.      insulin detemir U-100 (LEVEMIR) 100 unit/mL injection Inject 22 Units into the skin once daily.  12    nicotine (NICODERM CQ) 14 mg/24 hr Place 1 patch onto the skin once daily. 28 patch 1    omeprazole (PRILOSEC) 40 MG capsule Take 40 mg by mouth once daily.      potassium chloride (KLOR-CON) 8 MEQ TbSR Take 1 tablet (8 mEq total) by mouth once daily. 30 tablet 0    promethazine (PHENERGAN) 12.5 MG Tab Take 25 mg by mouth every 6 (six) hours as needed.      tiotropium (SPIRIVA) 18 mcg inhalation capsule Inhale 18 mcg into the lungs once daily.       No current facility-administered medications on file prior to visit.         HPI:  Patient endorses SOB on minimal exertion, estimates can walk 10 feet and pace normal before SOB. Ambulating to clinic today requiring breaks due to SOB/unsteadiness. SpO2 99% on RA.   Patient sleeps on 3 number of pillows   Patient wakes up SOB, has to get out of bed, associated cough, sputum- endorses intermittent PND symptoms with chronic cough for the past couple months sometimes productive for clear/cream colored sputum   Palpitations - occasional palpitations on exertion lasting couple  minutes in duration (h/o uncontrolled DM with frequent hypoglycemic episodes)   Dizzy, light-headed, pre-syncope or syncope- endorses frequent episodes of dizziness/lightheadedness. Denies syncope   Since discharge frequency of performing weights, home weight and weight change- performing home weights, but unsure values. ~140s lbs   Other information felt pertinent to HPI: Ms. Christine Mosqueda is a 51 yo female with a PMHx of diastolic heart failure, HTN, T2DM on insulin who presents to first [facility] visit following recent admission for AMS 2/2 ADHF and hypoglycemia. History obtained by ER staff and reports from . Patients family called EMS for her being altered. She is known to have hypoglycemic episodes. There was one hypoglycemic (BG 34) reading in the ER on her CMP, but repeat  on POCT testing. Initially hypoxic requiring supplemental O2. She was noted to also have BLE edema, edema on CXR, and elevated BNP. She was admitted for metabolic encephalopathy 2/2 hypoglycemia and acute hypoxic respiratory failure. Patient was started on IVP Lasix and BG stabilized with mental status returning to baseline. 6MWT qualifying for home O2, however left AMA prior to tank arrival.    4/20/23: Pt ambulating to clinic today unsteady and requiring multiple breaks due to SOB. She endorses h/o anxiety, previously on xanax, however no longer on anxiolytics. Dizzy/lightheaded during visit. POCT glucose 138. Endo appointment yesterday, but no showed due to transportation issues.      PHYSICAL:   Vitals:    04/20/23 1048   BP: 123/75   Pulse: 89      Wt Readings from Last 3 Encounters:   04/20/23 64.8 kg (142 lb 14.4 oz)   04/08/23 63.5 kg (139 lb 15.9 oz)   03/22/23 63.5 kg (140 lb)       JVD: no   Heart rhythm: regular  Cardiac murmur: No    S3: no  S4: no  Lungs: rhonchi  Hepatojugular reflux: no  Edema: no      Echo 3/13/23:  The left ventricle is normal in size with low normal systolic function.  The estimated ejection  fraction is 53%.  There is abnormal septal wall motion.  Indeterminate left ventricular diastolic function.  Mild left atrial enlargement.  Normal right ventricular size with low normal right ventricular systolic function.  Mild right atrial enlargement.  Mild mitral regurgitation.  Mild tricuspid regurgitation.  Normal central venous pressure (3 mmHg).  The estimated PA systolic pressure is 23 mmHg.    ASSESSMENT: HFpEF    PLAN:      Patient Instructions:   Instruct the patient to notify this clinic if HH, a physician or an advanced care provider wants to change medication one of their HF medications   Activity and Diet restrictions:   Recommend 2-3 gram sodium restriction and 1500cc- 2000cc fluid restriction.  Encourage physical activity with graded exercise program.  Requested patient to weigh themselves daily, and to notify us if their weight increases by more than 3 lbs in 1 day or 5 lbs in 3 days.    Assigned dry weight on home scale: unsure  Medication changes (include current dose and changed dose): NYHA Class II symptoms. Well compensated on exam. Negative orthostatics in office. BNP downtrending and wnl. Renal function stable. Continue lasix 40mg once daily.     Upcoming labs and date anticipated: RTC in 1 week for repeat labs and close follow up    Other diagnostic tests ordered: Heme/Onc referral placed for abnormal CBC.    No showed Endocrine appointment yesterday due to transportation issues. Will message schedulers to reschedule.    Current smoker, in process of quitting. Plan for smoking cessation referral in future as amenable.    Fidelina Simpson PA-C

## 2023-04-20 ENCOUNTER — OFFICE VISIT (OUTPATIENT)
Dept: CARDIOLOGY | Facility: CLINIC | Age: 53
End: 2023-04-20
Payer: MEDICAID

## 2023-04-20 ENCOUNTER — LAB VISIT (OUTPATIENT)
Dept: LAB | Facility: HOSPITAL | Age: 53
End: 2023-04-20
Payer: MEDICAID

## 2023-04-20 ENCOUNTER — SOCIAL WORK (OUTPATIENT)
Dept: CARDIOLOGY | Facility: CLINIC | Age: 53
End: 2023-04-20
Payer: MEDICAID

## 2023-04-20 VITALS
DIASTOLIC BLOOD PRESSURE: 75 MMHG | HEART RATE: 89 BPM | WEIGHT: 142.88 LBS | HEIGHT: 64 IN | SYSTOLIC BLOOD PRESSURE: 123 MMHG | OXYGEN SATURATION: 98 % | BODY MASS INDEX: 24.39 KG/M2

## 2023-04-20 DIAGNOSIS — Z79.4 TYPE 2 DIABETES MELLITUS WITH HYPOGLYCEMIA WITHOUT COMA, WITH LONG-TERM CURRENT USE OF INSULIN: ICD-10-CM

## 2023-04-20 DIAGNOSIS — Z72.0 TOBACCO ABUSE: Chronic | ICD-10-CM

## 2023-04-20 DIAGNOSIS — E11.649 TYPE 2 DIABETES MELLITUS WITH HYPOGLYCEMIA WITHOUT COMA, WITH LONG-TERM CURRENT USE OF INSULIN: ICD-10-CM

## 2023-04-20 DIAGNOSIS — I10 ESSENTIAL HYPERTENSION: Chronic | ICD-10-CM

## 2023-04-20 DIAGNOSIS — D64.9 ANEMIA, UNSPECIFIED TYPE: ICD-10-CM

## 2023-04-20 DIAGNOSIS — R06.02 SOB (SHORTNESS OF BREATH): ICD-10-CM

## 2023-04-20 DIAGNOSIS — I50.32 CHRONIC DIASTOLIC HEART FAILURE: Primary | ICD-10-CM

## 2023-04-20 LAB
ALBUMIN SERPL BCP-MCNC: 3.4 G/DL (ref 3.5–5.2)
ALP SERPL-CCNC: 108 U/L (ref 55–135)
ALT SERPL W/O P-5'-P-CCNC: 14 U/L (ref 10–44)
ANION GAP SERPL CALC-SCNC: 11 MMOL/L (ref 8–16)
AST SERPL-CCNC: 16 U/L (ref 10–40)
BASOPHILS # BLD AUTO: 0.05 K/UL (ref 0–0.2)
BASOPHILS NFR BLD: 0.8 % (ref 0–1.9)
BILIRUB SERPL-MCNC: 0.2 MG/DL (ref 0.1–1)
BNP SERPL-MCNC: 45 PG/ML (ref 0–99)
BUN SERPL-MCNC: 33 MG/DL (ref 6–20)
CALCIUM SERPL-MCNC: 10 MG/DL (ref 8.7–10.5)
CHLORIDE SERPL-SCNC: 104 MMOL/L (ref 95–110)
CO2 SERPL-SCNC: 25 MMOL/L (ref 23–29)
CREAT SERPL-MCNC: 0.9 MG/DL (ref 0.5–1.4)
DIFFERENTIAL METHOD: ABNORMAL
EOSINOPHIL # BLD AUTO: 0.2 K/UL (ref 0–0.5)
EOSINOPHIL NFR BLD: 2.4 % (ref 0–8)
ERYTHROCYTE [DISTWIDTH] IN BLOOD BY AUTOMATED COUNT: 17 % (ref 11.5–14.5)
EST. GFR  (NO RACE VARIABLE): >60 ML/MIN/1.73 M^2
GLUCOSE SERPL-MCNC: 54 MG/DL (ref 70–110)
HCT VFR BLD AUTO: 33.2 % (ref 37–48.5)
HGB BLD-MCNC: 9.7 G/DL (ref 12–16)
IMM GRANULOCYTES # BLD AUTO: 0.02 K/UL (ref 0–0.04)
IMM GRANULOCYTES NFR BLD AUTO: 0.3 % (ref 0–0.5)
LYMPHOCYTES # BLD AUTO: 2.4 K/UL (ref 1–4.8)
LYMPHOCYTES NFR BLD: 36.9 % (ref 18–48)
MAGNESIUM SERPL-MCNC: 1.9 MG/DL (ref 1.6–2.6)
MCH RBC QN AUTO: 21.8 PG (ref 27–31)
MCHC RBC AUTO-ENTMCNC: 29.2 G/DL (ref 32–36)
MCV RBC AUTO: 75 FL (ref 82–98)
MONOCYTES # BLD AUTO: 0.6 K/UL (ref 0.3–1)
MONOCYTES NFR BLD: 8.8 % (ref 4–15)
NEUTROPHILS # BLD AUTO: 3.4 K/UL (ref 1.8–7.7)
NEUTROPHILS NFR BLD: 50.8 % (ref 38–73)
NRBC BLD-RTO: 0 /100 WBC
PHOSPHATE SERPL-MCNC: 4.3 MG/DL (ref 2.7–4.5)
PLATELET # BLD AUTO: 466 K/UL (ref 150–450)
PMV BLD AUTO: 10.4 FL (ref 9.2–12.9)
POTASSIUM SERPL-SCNC: 5 MMOL/L (ref 3.5–5.1)
PROT SERPL-MCNC: 8.6 G/DL (ref 6–8.4)
RBC # BLD AUTO: 4.45 M/UL (ref 4–5.4)
SODIUM SERPL-SCNC: 140 MMOL/L (ref 136–145)
WBC # BLD AUTO: 6.61 K/UL (ref 3.9–12.7)

## 2023-04-20 PROCEDURE — 84100 ASSAY OF PHOSPHORUS: CPT

## 2023-04-20 PROCEDURE — 83735 ASSAY OF MAGNESIUM: CPT

## 2023-04-20 PROCEDURE — 83880 ASSAY OF NATRIURETIC PEPTIDE: CPT

## 2023-04-20 PROCEDURE — 3052F HG A1C>EQUAL 8.0%<EQUAL 9.0%: CPT | Mod: CPTII,,,

## 2023-04-20 PROCEDURE — 3008F PR BODY MASS INDEX (BMI) DOCUMENTED: ICD-10-PCS | Mod: CPTII,,,

## 2023-04-20 PROCEDURE — 1159F PR MEDICATION LIST DOCUMENTED IN MEDICAL RECORD: ICD-10-PCS | Mod: CPTII,,,

## 2023-04-20 PROCEDURE — 99999 PR PBB SHADOW E&M-EST. PATIENT-LVL V: CPT | Mod: PBBFAC,,,

## 2023-04-20 PROCEDURE — 99204 OFFICE O/P NEW MOD 45 MIN: CPT | Mod: S$PBB,,,

## 2023-04-20 PROCEDURE — 99215 OFFICE O/P EST HI 40 MIN: CPT | Mod: PBBFAC

## 2023-04-20 PROCEDURE — 3078F PR MOST RECENT DIASTOLIC BLOOD PRESSURE < 80 MM HG: ICD-10-PCS | Mod: CPTII,,,

## 2023-04-20 PROCEDURE — 3074F SYST BP LT 130 MM HG: CPT | Mod: CPTII,,,

## 2023-04-20 PROCEDURE — 3078F DIAST BP <80 MM HG: CPT | Mod: CPTII,,,

## 2023-04-20 PROCEDURE — 1111F DSCHRG MED/CURRENT MED MERGE: CPT | Mod: CPTII,,,

## 2023-04-20 PROCEDURE — 1111F PR DISCHARGE MEDS RECONCILED W/ CURRENT OUTPATIENT MED LIST: ICD-10-PCS | Mod: CPTII,,,

## 2023-04-20 PROCEDURE — 80053 COMPREHEN METABOLIC PANEL: CPT

## 2023-04-20 PROCEDURE — 3074F PR MOST RECENT SYSTOLIC BLOOD PRESSURE < 130 MM HG: ICD-10-PCS | Mod: CPTII,,,

## 2023-04-20 PROCEDURE — 99204 PR OFFICE/OUTPT VISIT, NEW, LEVL IV, 45-59 MIN: ICD-10-PCS | Mod: S$PBB,,,

## 2023-04-20 PROCEDURE — 3008F BODY MASS INDEX DOCD: CPT | Mod: CPTII,,,

## 2023-04-20 PROCEDURE — 99999 PR PBB SHADOW E&M-EST. PATIENT-LVL V: ICD-10-PCS | Mod: PBBFAC,,,

## 2023-04-20 PROCEDURE — 1160F RVW MEDS BY RX/DR IN RCRD: CPT | Mod: CPTII,,,

## 2023-04-20 PROCEDURE — 1159F MED LIST DOCD IN RCRD: CPT | Mod: CPTII,,,

## 2023-04-20 PROCEDURE — 3052F PR MOST RECENT HEMOGLOBIN A1C LEVEL 8.0 - < 9.0%: ICD-10-PCS | Mod: CPTII,,,

## 2023-04-20 PROCEDURE — 1160F PR REVIEW ALL MEDS BY PRESCRIBER/CLIN PHARMACIST DOCUMENTED: ICD-10-PCS | Mod: CPTII,,,

## 2023-04-20 PROCEDURE — 36415 COLL VENOUS BLD VENIPUNCTURE: CPT

## 2023-04-20 PROCEDURE — 85025 COMPLETE CBC W/AUTO DIFF WBC: CPT

## 2023-04-20 NOTE — PROGRESS NOTES
[facility]   Heart Transplant Clinic  [address] [place] [address]   [phone] after hours        HOME  HEALTH ORDERS      Admit to Home Health    Diagnosis:   Patient Active Problem List   Diagnosis    Chest pain    Acute on chronic systolic heart failure    Anxiety    Essential hypertension    Type 2 diabetes mellitus with hypoglycemia without coma, with long-term current use of insulin    Emphysema lung    Restrictive lung disease    Tobacco abuse    E coli bacteremia    Right shoulder pain    Microcytic anemia    Superficial vein thrombosis    Vasculopathy    Nonadherence to medical treatment    Hypomagnesemia       Patient is homebound due to: Debility  Diet: Low Sodium  Acitivities: As Tolerated    Nursing:   SN to complete comprehensive assessment including routine vital signs. Instruct on disease process and s/s of complications to report to MD. Review/verify medication list sent home with the patient at time of discharge  and instruct patient/caregiver as needed. Frequency may be adjusted depending on start of care date.    Notify MD if SBP > 160 or < 90; DBP > 90 or < 50; HR > 120 or < 50; Temp > 101; Weight gain >3lbs in 1 day or 5lbs in 1 week.    CONSULTS:     Physical Therapy to evaluate and treat. Evaluate for home safety and equipment needs; Establish/upgrade home exercise program. Perform / instruct on therapeutic exercises, gait training, transfer training, and Range of Motion.    Occupational Therapy to evaluate and treat. Evaluate home environment for safety and equipment needs. Perform/Instruct on transfers, ADL training, ROM, and therapeutic exercises.    Send follow up questions to [facility] [phone] or fax: [phone]    Fidelina Simpson PA-C

## 2023-04-20 NOTE — PATIENT INSTRUCTIONS
Will call with lab results today and possible lasix adjustments.    Monitor weight and blood pressure daily and keep log.    Notify us if you have worsening shortness of breath, swelling or 2-3lb weight gain on home scale.

## 2023-04-20 NOTE — PROGRESS NOTES
LMSW met with pt in [facility] to discuss barriers towards treatment. Pt has access to transportation, medication, insurance benefits, food, and housing. Pt has co-occurring barriers that prevent her from taking care of mental and physical help. Pt reports difficulty walking around and has anxiety around falling and incontinence. LMSW will find resources to help get HME to her home. Pt reports having a difficult time managing her anxiety and is not currently taking any of her past psych medications. Pt reports her previous medications were helpful for her anxiety and anxiety. Pt reports no visual or auditory hallucinations, no HI/SI. Pt is wanting to return to [facility] health support to best help her manage her symptoms. Pt is currently connected with [facility] for physical help and would like to be connected there for mental health as well. Pt gave verbal consent to LMSW to reach out and ask for mental health appointment on behalf of pt.     LMSW called [facility] on behalf of pt. Earliest appt available in May 15th at 9am for behavioral health.     HH orders faxed to [facility]. [facility] is unable to staff at this time. Referral sent to [facility]. Out of Network. Referral Sent to [facility]. Agency is at quota. At this time, no agencies in the area that take pt's insurance have the ability to staff pt. Will try and reach out again in the future to try and staff.

## 2023-04-25 ENCOUNTER — TELEPHONE (OUTPATIENT)
Dept: CARDIOLOGY | Facility: CLINIC | Age: 53
End: 2023-04-25
Payer: MEDICAID

## 2023-04-25 NOTE — PROGRESS NOTES
HF TCC Provider Note (Follow-up) Consult Note      HPI:     Patient endorses SOB on minimal exertion, estimates can walk 10 feet and pace normal before SOB. Presents to clinic in wheelchair              Patient sleeps on 3 number of pillows. Difficulty sleeping 2/2 R shoulder pain              Patient wakes up SOB, has to get out of bed, associated cough, sputum- endorses intermittent PND symptoms with chronic cough for the past couple months sometimes productive for clear/cream colored sputum              Palpitations - occasional palpitations on exertion lasting couple minutes in duration (h/o uncontrolled DM with frequent hypoglycemic episodes)              Dizzy, light-headed, pre-syncope or syncope- endorses frequent episodes of dizziness/lightheadedness. Denies syncope              Since discharge frequency of performing weights, home weight and weight change- performing home weights, but unsure values. ~140s lbs, reports weight stable               Other information felt pertinent to HPI: Ms. Christine Mosqueda is a 51 yo female with a PMHx of diastolic heart failure, HTN, T2DM on insulin who presents to second HFTCC visit following recent admission for AMS 2/2 ADHF and hypoglycemia. History obtained by ER staff and reports from . Patients family called EMS for her being altered. She is known to have hypoglycemic episodes. There was one hypoglycemic (BG 34) reading in the ER on her CMP, but repeat  on POCT testing. Initially hypoxic requiring supplemental O2. She was noted to also have BLE edema, edema on CXR, and elevated BNP. She was admitted for metabolic encephalopathy 2/2 hypoglycemia and acute hypoxic respiratory failure. Patient was started on IVP Lasix and BG stabilized with mental status returning to baseline. 6MWT qualifying for home O2, however left AMA prior to tank arrival.     4/20/23: Pt ambulating to clinic today unsteady and requiring multiple breaks due to SOB. She endorses h/o  anxiety, previously on xanax, however no longer on anxiolytics. Dizzy/lightheaded during visit. POCT glucose 138. Endo appointment yesterday, but no showed due to transportation issues.     4/27/23: Today her primary complaint is R shoulder pain for the past couple months. Describes the pain as an aching pain. Reports pain is exacerbated by lifting R arm, limiting quality of sleep at night due to positioning. Rates the pain 10/10 at night. Denies current CP. Reports episodes of chest pressure on exertion with associated SOB. Denies N/V/diaphoresis. Reports chest pressure relieved with rest.      PHYSICAL:   Vitals:    04/27/23 1005   BP: 123/74   Pulse: 80      Wt Readings from Last 3 Encounters:   04/27/23 66.1 kg (145 lb 11.2 oz)   04/20/23 64.8 kg (142 lb 14.4 oz)   04/08/23 63.5 kg (139 lb 15.9 oz)       JVD: no   Heart rhythm: regular  Cardiac murmur: No    S3: no  S4: no  Lungs: clear  Hepatojugular reflux: no  Edema: no      Lab Results   Component Value Date     04/27/2023    K 4.7 04/27/2023    MG 1.8 04/27/2023     04/27/2023    CO2 30 (H) 04/27/2023    BUN 25 (H) 04/27/2023    CREATININE 0.9 04/27/2023     (H) 04/27/2023    CALCIUM 9.8 04/27/2023    AST 18 04/27/2023    ALT 19 04/27/2023    ALBUMIN 3.3 (L) 04/27/2023    PROT 8.0 04/27/2023    BILITOT 0.3 04/27/2023     Lab Results   Component Value Date    BNP 22 04/27/2023    BNP 45 04/20/2023     (H) 04/06/2023       ASSESSMENT: HFpEF    PLAN:      Patient Instructions:   Instruct the patient to notify this clinic if HH, a physician or an advanced care provider wants to change medication one of their HF medications   Activity and Diet restrictions:   Recommend 2-3 gram sodium restriction and 1500cc- 2000cc fluid restriction.  Encourage physical activity with graded exercise program.  Requested patient to weigh themselves daily, and to notify us if their weight increases by more than 3 lbs in 1 day or 5 lbs in 3  days.    Assigned dry weight on home scale: unsure  Medication changes (include current dose and changed dose): NYHA Class II symptoms. Well compensated on exam. BNP downtrending and wnl. Renal function stable. Continue lasix 40mg once daily. Recommend OTC analgesics and following with PCP for further evaluation of R shoulder pain.    RD eduction provided during visit.     Upcoming labs and date anticipated: RTC in 2 weeks for repeat labs and close follow up     Other diagnostic tests ordered: Nuclear Stress ordered for ischemic eval. ED precautions for CP not relieved with rest.     Heme/Onc referral placed for abnormal CBC.     No showed Endocrine appointment due to transportation issues. Will message schedulers to reschedule.     Current smoker, in process of quitting. Plan for smoking cessation referral in future as amenable.     Fidelina Simpson PA-C

## 2023-04-25 NOTE — TELEPHONE ENCOUNTER
Pt was calling in regards to HME. LMSW let pt know that pt could call and order HME herself or wait until her next PCP appt. LMSW explained that they helped make pt an appt with their PCP at [facility] so they could help with wrap around services long-term. Pt is fine with waiting till appt. LMSW let pt know that pt will receive information about appts made at [facility] when she comes to clinic on 4/27.

## 2023-04-27 ENCOUNTER — EDUCATION (OUTPATIENT)
Dept: TRANSPLANT | Facility: CLINIC | Age: 53
End: 2023-04-27
Payer: MEDICAID

## 2023-04-27 ENCOUNTER — OFFICE VISIT (OUTPATIENT)
Dept: CARDIOLOGY | Facility: CLINIC | Age: 53
End: 2023-04-27
Payer: MEDICAID

## 2023-04-27 ENCOUNTER — LAB VISIT (OUTPATIENT)
Dept: LAB | Facility: HOSPITAL | Age: 53
End: 2023-04-27
Payer: MEDICAID

## 2023-04-27 ENCOUNTER — SOCIAL WORK (OUTPATIENT)
Dept: CARDIOLOGY | Facility: CLINIC | Age: 53
End: 2023-04-27
Payer: MEDICAID

## 2023-04-27 VITALS
BODY MASS INDEX: 24.87 KG/M2 | DIASTOLIC BLOOD PRESSURE: 74 MMHG | HEIGHT: 64 IN | HEART RATE: 80 BPM | SYSTOLIC BLOOD PRESSURE: 123 MMHG | WEIGHT: 145.69 LBS

## 2023-04-27 DIAGNOSIS — I10 ESSENTIAL HYPERTENSION: ICD-10-CM

## 2023-04-27 DIAGNOSIS — E11.649 TYPE 2 DIABETES MELLITUS WITH HYPOGLYCEMIA WITHOUT COMA, WITH LONG-TERM CURRENT USE OF INSULIN: ICD-10-CM

## 2023-04-27 DIAGNOSIS — I50.32 CHRONIC DIASTOLIC HEART FAILURE: Primary | ICD-10-CM

## 2023-04-27 DIAGNOSIS — R06.02 SOB (SHORTNESS OF BREATH): ICD-10-CM

## 2023-04-27 DIAGNOSIS — Z79.4 TYPE 2 DIABETES MELLITUS WITH HYPOGLYCEMIA WITHOUT COMA, WITH LONG-TERM CURRENT USE OF INSULIN: ICD-10-CM

## 2023-04-27 DIAGNOSIS — D64.9 ANEMIA, UNSPECIFIED TYPE: ICD-10-CM

## 2023-04-27 DIAGNOSIS — Z72.0 TOBACCO ABUSE: ICD-10-CM

## 2023-04-27 LAB
ALBUMIN SERPL BCP-MCNC: 3.3 G/DL (ref 3.5–5.2)
ALP SERPL-CCNC: 99 U/L (ref 55–135)
ALT SERPL W/O P-5'-P-CCNC: 19 U/L (ref 10–44)
ANION GAP SERPL CALC-SCNC: 7 MMOL/L (ref 8–16)
AST SERPL-CCNC: 18 U/L (ref 10–40)
BILIRUB SERPL-MCNC: 0.3 MG/DL (ref 0.1–1)
BNP SERPL-MCNC: 22 PG/ML (ref 0–99)
BUN SERPL-MCNC: 25 MG/DL (ref 6–20)
CALCIUM SERPL-MCNC: 9.8 MG/DL (ref 8.7–10.5)
CHLORIDE SERPL-SCNC: 102 MMOL/L (ref 95–110)
CO2 SERPL-SCNC: 30 MMOL/L (ref 23–29)
CREAT SERPL-MCNC: 0.9 MG/DL (ref 0.5–1.4)
EST. GFR  (NO RACE VARIABLE): >60 ML/MIN/1.73 M^2
GLUCOSE SERPL-MCNC: 239 MG/DL (ref 70–110)
MAGNESIUM SERPL-MCNC: 1.8 MG/DL (ref 1.6–2.6)
PHOSPHATE SERPL-MCNC: 4.7 MG/DL (ref 2.7–4.5)
POTASSIUM SERPL-SCNC: 4.7 MMOL/L (ref 3.5–5.1)
PROT SERPL-MCNC: 8 G/DL (ref 6–8.4)
SODIUM SERPL-SCNC: 139 MMOL/L (ref 136–145)

## 2023-04-27 PROCEDURE — 1160F PR REVIEW ALL MEDS BY PRESCRIBER/CLIN PHARMACIST DOCUMENTED: ICD-10-PCS | Mod: CPTII,,,

## 2023-04-27 PROCEDURE — 1111F PR DISCHARGE MEDS RECONCILED W/ CURRENT OUTPATIENT MED LIST: ICD-10-PCS | Mod: CPTII,,,

## 2023-04-27 PROCEDURE — 3052F HG A1C>EQUAL 8.0%<EQUAL 9.0%: CPT | Mod: CPTII,,,

## 2023-04-27 PROCEDURE — 99999 PR PBB SHADOW E&M-EST. PATIENT-LVL IV: CPT | Mod: PBBFAC,,,

## 2023-04-27 PROCEDURE — 99214 OFFICE O/P EST MOD 30 MIN: CPT | Mod: PBBFAC

## 2023-04-27 PROCEDURE — 3008F PR BODY MASS INDEX (BMI) DOCUMENTED: ICD-10-PCS | Mod: CPTII,,,

## 2023-04-27 PROCEDURE — 99214 PR OFFICE/OUTPT VISIT, EST, LEVL IV, 30-39 MIN: ICD-10-PCS | Mod: S$PBB,,,

## 2023-04-27 PROCEDURE — 83735 ASSAY OF MAGNESIUM: CPT

## 2023-04-27 PROCEDURE — 3078F DIAST BP <80 MM HG: CPT | Mod: CPTII,,,

## 2023-04-27 PROCEDURE — 3078F PR MOST RECENT DIASTOLIC BLOOD PRESSURE < 80 MM HG: ICD-10-PCS | Mod: CPTII,,,

## 2023-04-27 PROCEDURE — 1159F MED LIST DOCD IN RCRD: CPT | Mod: CPTII,,,

## 2023-04-27 PROCEDURE — 99999 PR PBB SHADOW E&M-EST. PATIENT-LVL IV: ICD-10-PCS | Mod: PBBFAC,,,

## 2023-04-27 PROCEDURE — 99214 OFFICE O/P EST MOD 30 MIN: CPT | Mod: S$PBB,,,

## 2023-04-27 PROCEDURE — 3074F PR MOST RECENT SYSTOLIC BLOOD PRESSURE < 130 MM HG: ICD-10-PCS | Mod: CPTII,,,

## 2023-04-27 PROCEDURE — 3052F PR MOST RECENT HEMOGLOBIN A1C LEVEL 8.0 - < 9.0%: ICD-10-PCS | Mod: CPTII,,,

## 2023-04-27 PROCEDURE — 1160F RVW MEDS BY RX/DR IN RCRD: CPT | Mod: CPTII,,,

## 2023-04-27 PROCEDURE — 1111F DSCHRG MED/CURRENT MED MERGE: CPT | Mod: CPTII,,,

## 2023-04-27 PROCEDURE — 36415 COLL VENOUS BLD VENIPUNCTURE: CPT

## 2023-04-27 PROCEDURE — 3074F SYST BP LT 130 MM HG: CPT | Mod: CPTII,,,

## 2023-04-27 PROCEDURE — 3008F BODY MASS INDEX DOCD: CPT | Mod: CPTII,,,

## 2023-04-27 PROCEDURE — 84100 ASSAY OF PHOSPHORUS: CPT

## 2023-04-27 PROCEDURE — 1159F PR MEDICATION LIST DOCUMENTED IN MEDICAL RECORD: ICD-10-PCS | Mod: CPTII,,,

## 2023-04-27 PROCEDURE — 83880 ASSAY OF NATRIURETIC PEPTIDE: CPT

## 2023-04-27 PROCEDURE — 80053 COMPREHEN METABOLIC PANEL: CPT

## 2023-04-27 NOTE — PROGRESS NOTES
Met with pt to discuss 2g Na diet and 1500 ml fluid restriction      Pt states good christiane, no n/v/d/c, no prob chew/swallow      Pt stated diet is oatmeal, eggs, sausage links, and toast and jelly. Lunch is fruit or ham sandwich. Dinner is [employer] burger and fries or smothered pork chops with rice and gravy. Pt drinks 2-3L per day of fluid         Pt was educated on how to read nutrition labels to understand food has natural sodium present. Recc'd of 600 - 700mg Na per meal. Pt recc to drink no more than 2000ml / 60oz. Pt given nutrition handout and contact info if needs arise. Will follow up in HFTCC as needed

## 2023-04-27 NOTE — PROGRESS NOTES
ANDER provided pt with the two appointments that pt sent up for pt with pt's consent. Pt has a PCP appt on May 5th at 10:45 and a social work appt on May 15th at 9:00. Both of these appts are at [facility] to help pt with wraparound services. No other needs at this time.

## 2023-05-02 ENCOUNTER — TELEPHONE (OUTPATIENT)
Dept: CARDIOLOGY | Facility: HOSPITAL | Age: 53
End: 2023-05-02
Payer: MEDICAID

## 2023-05-03 ENCOUNTER — TELEPHONE (OUTPATIENT)
Dept: CARDIOLOGY | Facility: CLINIC | Age: 53
End: 2023-05-03
Payer: MEDICAID

## 2023-05-03 NOTE — TELEPHONE ENCOUNTER
"[facility]    Attempted to call pt to complete 1 week "check in" call. Unable to reach pt at listed phone numbers.  Was able to leave message on voicemail encouraging pt to return call with [facility] phone number..     Will continue to try to reach patient.    LM with [facility] ph # and request to please call us.  "

## 2023-05-03 NOTE — TELEPHONE ENCOUNTER
"[facility] Clinic(HFT[facility]) weekly phone follow up / triage call completed.     TCC RN Navigator spoke with Pt    Current Patient reported weight: 149 lbs Pt states that she has gained 3-4 lbs.   Patient Goal Weight: (Unsure)  Recent Patient reported blood pressure and heart rate: Pt states that she doesn't have time for this.    Pt reports the following:  []  Shortness of Breath with Activity  [x]  Shortness of Breath at rest   []  Fatigue  []  Edema   [] Chest pain or tightness  [] Weight Increase since discharge  [] None of the above    Pt reports using "Daily weight and symptom tracker".    Pt reports being in the (color) Zone. If in yellow/red, reminded that they should be calling HFTCC today or now.     Medications:   Medication compliance reviewed with pt.  Pt reports having medication list available and has all medications at home for use per list.     Education:   Confirmed pt still has "[facility] Home Care Guide"  .     Reminded of key points as listed below.     Recommend 2 -3 gram sodium restriction and 1500 cc-2000 cc fluid restriction.  Encourage physical activity with graded exercise program.  Requested patient to weigh themselves daily, and to notify us if their weight increases by more than 3 lbs in 1 day or 5 lbs in 3 days.   Reminded to use "Daily weight and symptom tracker".  Even if pt does not have a scale, to use symptom tracker.       Watch for these Signs and Symptoms: If any of these occur, contact HFTCC immediately:   Increase in shortness of breath with movement   Increase in swelling in your legs and ankles   Weight gain of more than 3 pounds in a day or 5 pounds in 3 days.   Difficulty breathing when you are lying down   Worsening fatigue or tiredness   Stomach bloating, a full feeling or a loss of appetite   Increased coughing--especially when you are lying down      Pt was able to verbalize back to RN in their own words correct " "diet/fluid restrictions, necessity for exercise, warning signs and symptoms, when and how to contact their [facility] team.      Pt reminded of upcoming appointment.  PT reports they will attend. Nuclear Stress Test tomorrow and sees Ms. Simpson in [facility] 5-11-23      Pt reminded of how and when to contact [facility]:  [phone] (Mon-Fri, 8a-5p) & for urgent issues on the weekend to page the Heart Transplant MD on call.  Pt also encouraged utilize [facility] messaging as well.       Pt can't give me her BP/P and states when I ask about her SOB that she can't hardly do anything without sitting down for a while. Pt states that "I don't mean no harm but I am too busy for this, I am cooking for a Birthday Party and I have things in the oven, I will talk to you tomorrow." Will confer with Ms. Simpson.        "

## 2023-05-04 ENCOUNTER — HOSPITAL ENCOUNTER (OUTPATIENT)
Dept: CARDIOLOGY | Facility: HOSPITAL | Age: 53
Discharge: HOME | End: 2023-05-04
Payer: MEDICAID

## 2023-05-04 DIAGNOSIS — I50.32 CHRONIC DIASTOLIC HEART FAILURE: ICD-10-CM

## 2023-05-08 ENCOUNTER — HOSPITAL ENCOUNTER (OUTPATIENT)
Dept: CARDIOLOGY | Facility: HOSPITAL | Age: 53
Discharge: HOME | End: 2023-05-08
Payer: MEDICAID

## 2023-05-08 VITALS
BODY MASS INDEX: 24.75 KG/M2 | HEIGHT: 64 IN | HEART RATE: 69 BPM | SYSTOLIC BLOOD PRESSURE: 144 MMHG | DIASTOLIC BLOOD PRESSURE: 91 MMHG | WEIGHT: 145 LBS

## 2023-05-08 LAB
CV PHARM DOSE: 0.4 MG
CV STRESS BASE HR: 69 BPM
DIASTOLIC BLOOD PRESSURE: 91 MMHG
EJECTION FRACTION- HIGH: 59 %
END DIASTOLIC INDEX-HIGH: 155 ML/M2
END DIASTOLIC INDEX-LOW: 91 ML/M2
END SYSTOLIC INDEX-HIGH: 78 ML/M2
END SYSTOLIC INDEX-LOW: 40 ML/M2
NUC REST DIASTOLIC VOLUME INDEX: 113
NUC REST EJECTION FRACTION: 47
NUC REST SYSTOLIC VOLUME INDEX: 60
NUC STRESS DIASTOLIC VOLUME INDEX: 126
NUC STRESS EJECTION FRACTION: 54 %
NUC STRESS SYSTOLIC VOLUME INDEX: 58
OHS CV CPX 85 PERCENT MAX PREDICTED HEART RATE MALE: 135
OHS CV CPX MAX PREDICTED HEART RATE: 158
OHS CV CPX PATIENT IS FEMALE: 1
OHS CV CPX PATIENT IS MALE: 0
OHS CV CPX PEAK DIASTOLIC BLOOD PRESSURE: 91 MMHG
OHS CV CPX PEAK HEAR RATE: 74 BPM
OHS CV CPX PEAK RATE PRESSURE PRODUCT: NORMAL
OHS CV CPX PEAK SYSTOLIC BLOOD PRESSURE: 144 MMHG
OHS CV CPX PERCENT MAX PREDICTED HEART RATE ACHIEVED: 47
OHS CV CPX RATE PRESSURE PRODUCT PRESENTING: 9936
RETIRED EF AND QEF - SEE NOTES: 47 %
SYSTOLIC BLOOD PRESSURE: 144 MMHG

## 2023-05-08 PROCEDURE — 93016 CV STRESS TEST SUPVJ ONLY: CPT | Mod: ,,, | Performed by: INTERNAL MEDICINE

## 2023-05-08 PROCEDURE — A9502 TC99M TETROFOSMIN: HCPCS

## 2023-05-08 PROCEDURE — 63600175 PHARM REV CODE 636 W HCPCS

## 2023-05-08 PROCEDURE — 93018 NUCLEAR STRESS - CARDIOLOGY INTERPRETED (CUPID ONLY): ICD-10-PCS | Mod: ,,, | Performed by: INTERNAL MEDICINE

## 2023-05-08 PROCEDURE — 78452 HT MUSCLE IMAGE SPECT MULT: CPT | Mod: 26,,, | Performed by: INTERNAL MEDICINE

## 2023-05-08 PROCEDURE — 93016 NUCLEAR STRESS - CARDIOLOGY INTERPRETED (CUPID ONLY): ICD-10-PCS | Mod: ,,, | Performed by: INTERNAL MEDICINE

## 2023-05-08 PROCEDURE — 93017 CV STRESS TEST TRACING ONLY: CPT

## 2023-05-08 PROCEDURE — 93018 CV STRESS TEST I&R ONLY: CPT | Mod: ,,, | Performed by: INTERNAL MEDICINE

## 2023-05-08 PROCEDURE — 78452 NUCLEAR STRESS - CARDIOLOGY INTERPRETED (CUPID ONLY): ICD-10-PCS | Mod: 26,,, | Performed by: INTERNAL MEDICINE

## 2023-05-08 RX ORDER — REGADENOSON 0.08 MG/ML
0.4 INJECTION, SOLUTION INTRAVENOUS
Status: COMPLETED | OUTPATIENT
Start: 2023-05-08 | End: 2023-05-08

## 2023-05-08 RX ADMIN — REGADENOSON 0.4 MG: 0.08 INJECTION, SOLUTION INTRAVENOUS at 09:05

## 2023-05-15 ENCOUNTER — TELEPHONE (OUTPATIENT)
Dept: CARDIOLOGY | Facility: CLINIC | Age: 53
End: 2023-05-15
Payer: MEDICAID

## 2023-05-15 NOTE — TELEPHONE ENCOUNTER
"[facility]    Attempted to call pt to complete 1 week "check in"  and discharge phone call. Unable to reach pt at listed phone numbers.  Was unable to leave message with family nor on voicemail. Voicemail not set up or full. . Mailbox was full    Will continue to try to reach patient.    "

## 2023-05-15 NOTE — TELEPHONE ENCOUNTER
Spoke with Pt's spouse and he says Miss King is off somewhere with her children. I explain that she did not come to her TC appointment on 5-11-23 and that we were calling to check on her and make sure she is breathing well and her weight and BP/P are good. He states that he can't answer for her in that matter but he will tell her we are calling to do that. Told Mr. Aaron that I left a message on her VM.

## 2023-05-19 ENCOUNTER — TELEPHONE (OUTPATIENT)
Dept: CARDIOLOGY | Facility: CLINIC | Age: 53
End: 2023-05-19
Payer: MEDICAID

## 2023-05-19 NOTE — TELEPHONE ENCOUNTER
"[facility] Failure [facility] Clinic(HFTCC) DISCHARGE VISIT - PHONE     Called and spoke to Galen Shrestha Pt's     Most Recent Hospital Discharge Date: 4-8-2023  Last admission Diagnosis/chief complaint:SOB    Pt discharge completed by phone related to Pt Convenience      Pt reports the following:  []  Shortness of Breath with activity  []  Shortness of Breath at rest   []  Fatigue  []  Edema   [] Chest pain or tightness  [] Weight Increase since discharge  [] None of the above  Unable to assess  Medications:   Medication reconciliation completed today per RN.  Pt reports having all medications available and understands how to take them appropriately. Reminded pt to call prior to making any changes to medications.     Education:   [x] Confirmed pt still has  "[facility] Failure [facility] Clinic Home Care Guide" .   Reviewed key points as listed below.     Recommend 2 gram sodium restriction and 1500cc fluid restriction.  Encourage physical activity with graded exercise program.  Requested patient to weigh themselves daily, and to notify us if their weight increases by more than 3 lbs in 1 day or 5 lbs in 3 days.     [] Reviewed completed "Daily Weight and Symptom Tracker".  Reviewed with patient when and how to call HFTCC according to "Yellow Zone" and "Red Zone". Unable to review, pt unavailable      Watch for these Signs and Symptoms: If any of these occur, contact HFTCC immediately:   Increase in shortness of breath with movement   Increase in swelling in your legs and ankles   Weight gain of more than 3 pounds in a night or 5 pounds in 3 days.   Difficulty breathing when you are lying down   Worsening fatigue or tiredness   Stomach bloating, a full feeling or a loss of appetite   Increased coughing--especially when you are lying down    MyChart and Care Companion:   Patient active on myChart? Yes, patient uses regularly.    HF TCC Program Plan:  Pt has successfully completed HFTCC program.  Pt " care to be transferred to Cardiology for long term care.     Pt educated on how to call their offices and how to call [facility] On call in the event of an after hour issue.    PT reminded to continue to follow recommendations made during the HFTCC program to include monitoring daily weights, taking medications according to list, following up to appointments per provider recommendations, stop smoking/ start exercising and following a heart friendly low salt, low fluid diet.      Pt was able to verbalize back to RN in their own words correct diet/fluid restrictions, necessity for exercise, warning signs and symptoms, when and how to contact their  Long term care team .      Plan:           [x]  Discussed upcoming appointments and/or plan for follow-up care with his/her PCP/Cardiology Will refer to Cardiology    Electronic hand off completed : To Cards by routing epic note per Fidelina Simpson PA-C.      Please refer to provider note for additional details and assessment.    Unable to speak with this Patient to evaluate her fluid status at discharge . Left messages X2 with her , this last message explaining that she has reached the end of her 6 weeks and we would like to speak with her if she will return our call please. Certificate of Completion to be sent to home.

## 2023-06-19 ENCOUNTER — HOSPITAL ENCOUNTER (EMERGENCY)
Facility: HOSPITAL | Age: 53
Discharge: PSYCHIATRIC OR SUBSTANCE USE FACILITY | End: 2023-06-20
Attending: EMERGENCY MEDICINE | Admitting: STUDENT IN AN ORGANIZED HEALTH CARE EDUCATION/TRAINING PROGRAM
Payer: MEDICAID

## 2023-06-19 DIAGNOSIS — R73.9 HYPERGLYCEMIA: Primary | ICD-10-CM

## 2023-06-19 DIAGNOSIS — F99 PSYCHIATRIC COMPLAINT: ICD-10-CM

## 2023-06-19 DIAGNOSIS — N30.00 ACUTE CYSTITIS WITHOUT HEMATURIA: ICD-10-CM

## 2023-06-19 LAB
ALBUMIN SERPL BCP-MCNC: 2.7 G/DL (ref 3.5–5.2)
ALP SERPL-CCNC: 100 U/L (ref 55–135)
ALT SERPL W/O P-5'-P-CCNC: 17 U/L (ref 10–44)
AMPHET+METHAMPHET UR QL: NEGATIVE
ANION GAP SERPL CALC-SCNC: 11 MMOL/L (ref 8–16)
APAP SERPL-MCNC: <3 UG/ML (ref 10–20)
AST SERPL-CCNC: 27 U/L (ref 10–40)
BACTERIA #/AREA URNS AUTO: ABNORMAL /HPF
BARBITURATES UR QL SCN>200 NG/ML: NEGATIVE
BASOPHILS # BLD AUTO: 0.04 K/UL (ref 0–0.2)
BASOPHILS NFR BLD: 0.7 % (ref 0–1.9)
BENZODIAZ UR QL SCN>200 NG/ML: NEGATIVE
BILIRUB SERPL-MCNC: 0.3 MG/DL (ref 0.1–1)
BILIRUB UR QL STRIP: NEGATIVE
BNP SERPL-MCNC: 49 PG/ML (ref 0–99)
BUN SERPL-MCNC: 18 MG/DL (ref 6–20)
BZE UR QL SCN: ABNORMAL
CALCIUM SERPL-MCNC: 8.7 MG/DL (ref 8.7–10.5)
CANNABINOIDS UR QL SCN: NEGATIVE
CHLORIDE SERPL-SCNC: 105 MMOL/L (ref 95–110)
CLARITY UR REFRACT.AUTO: ABNORMAL
CO2 SERPL-SCNC: 23 MMOL/L (ref 23–29)
COLOR UR AUTO: YELLOW
CREAT SERPL-MCNC: 1.2 MG/DL (ref 0.5–1.4)
CREAT UR-MCNC: 75 MG/DL (ref 15–325)
DIFFERENTIAL METHOD: ABNORMAL
EOSINOPHIL # BLD AUTO: 0.1 K/UL (ref 0–0.5)
EOSINOPHIL NFR BLD: 1.4 % (ref 0–8)
ERYTHROCYTE [DISTWIDTH] IN BLOOD BY AUTOMATED COUNT: 16 % (ref 11.5–14.5)
EST. GFR  (NO RACE VARIABLE): 53.8 ML/MIN/1.73 M^2
ETHANOL SERPL-MCNC: <10 MG/DL
GLUCOSE SERPL-MCNC: 345 MG/DL (ref 70–110)
GLUCOSE UR QL STRIP: ABNORMAL
HCT VFR BLD AUTO: 34.8 % (ref 37–48.5)
HGB BLD-MCNC: 10.8 G/DL (ref 12–16)
HGB UR QL STRIP: ABNORMAL
HYALINE CASTS UR QL AUTO: 0 /LPF
IMM GRANULOCYTES # BLD AUTO: 0 K/UL (ref 0–0.04)
IMM GRANULOCYTES NFR BLD AUTO: 0 % (ref 0–0.5)
KETONES UR QL STRIP: NEGATIVE
LEUKOCYTE ESTERASE UR QL STRIP: NEGATIVE
LYMPHOCYTES # BLD AUTO: 2.3 K/UL (ref 1–4.8)
LYMPHOCYTES NFR BLD: 40.5 % (ref 18–48)
MCH RBC QN AUTO: 22.2 PG (ref 27–31)
MCHC RBC AUTO-ENTMCNC: 31 G/DL (ref 32–36)
MCV RBC AUTO: 72 FL (ref 82–98)
METHADONE UR QL SCN>300 NG/ML: NEGATIVE
MICROSCOPIC COMMENT: ABNORMAL
MONOCYTES # BLD AUTO: 0.6 K/UL (ref 0.3–1)
MONOCYTES NFR BLD: 9.6 % (ref 4–15)
NEUTROPHILS # BLD AUTO: 2.7 K/UL (ref 1.8–7.7)
NEUTROPHILS NFR BLD: 47.8 % (ref 38–73)
NITRITE UR QL STRIP: NEGATIVE
NRBC BLD-RTO: 0 /100 WBC
OPIATES UR QL SCN: NEGATIVE
PCP UR QL SCN>25 NG/ML: NEGATIVE
PH UR STRIP: 6 [PH] (ref 5–8)
PLATELET # BLD AUTO: 325 K/UL (ref 150–450)
PMV BLD AUTO: 9.9 FL (ref 9.2–12.9)
POCT GLUCOSE: 149 MG/DL (ref 70–110)
POTASSIUM SERPL-SCNC: 4 MMOL/L (ref 3.5–5.1)
PROT SERPL-MCNC: 6.6 G/DL (ref 6–8.4)
PROT UR QL STRIP: ABNORMAL
RBC # BLD AUTO: 4.86 M/UL (ref 4–5.4)
RBC #/AREA URNS AUTO: 4 /HPF (ref 0–4)
SODIUM SERPL-SCNC: 139 MMOL/L (ref 136–145)
SP GR UR STRIP: 1.01 (ref 1–1.03)
SQUAMOUS #/AREA URNS AUTO: 52 /HPF
TOXICOLOGY INFORMATION: ABNORMAL
TROPONIN I SERPL DL<=0.01 NG/ML-MCNC: 0.01 NG/ML (ref 0–0.03)
TSH SERPL DL<=0.005 MIU/L-ACNC: 1.7 UIU/ML (ref 0.4–4)
URN SPEC COLLECT METH UR: ABNORMAL
WBC # BLD AUTO: 5.71 K/UL (ref 3.9–12.7)
WBC #/AREA URNS AUTO: 9 /HPF (ref 0–5)
YEAST UR QL AUTO: ABNORMAL

## 2023-06-19 PROCEDURE — 93005 ELECTROCARDIOGRAM TRACING: CPT

## 2023-06-19 PROCEDURE — 96374 THER/PROPH/DIAG INJ IV PUSH: CPT

## 2023-06-19 PROCEDURE — 25000003 PHARM REV CODE 250: Performed by: EMERGENCY MEDICINE

## 2023-06-19 PROCEDURE — 93010 ELECTROCARDIOGRAM REPORT: CPT | Mod: ,,, | Performed by: INTERNAL MEDICINE

## 2023-06-19 PROCEDURE — 82077 ASSAY SPEC XCP UR&BREATH IA: CPT | Performed by: EMERGENCY MEDICINE

## 2023-06-19 PROCEDURE — 96375 TX/PRO/DX INJ NEW DRUG ADDON: CPT

## 2023-06-19 PROCEDURE — 84484 ASSAY OF TROPONIN QUANT: CPT | Performed by: EMERGENCY MEDICINE

## 2023-06-19 PROCEDURE — 96361 HYDRATE IV INFUSION ADD-ON: CPT

## 2023-06-19 PROCEDURE — 80143 DRUG ASSAY ACETAMINOPHEN: CPT | Performed by: EMERGENCY MEDICINE

## 2023-06-19 PROCEDURE — 80307 DRUG TEST PRSMV CHEM ANLYZR: CPT | Performed by: EMERGENCY MEDICINE

## 2023-06-19 PROCEDURE — 82962 GLUCOSE BLOOD TEST: CPT

## 2023-06-19 PROCEDURE — 25000242 PHARM REV CODE 250 ALT 637 W/ HCPCS: Performed by: EMERGENCY MEDICINE

## 2023-06-19 PROCEDURE — 99285 EMERGENCY DEPT VISIT HI MDM: CPT | Mod: ,,, | Performed by: EMERGENCY MEDICINE

## 2023-06-19 PROCEDURE — 93010 EKG 12-LEAD: ICD-10-PCS | Mod: ,,, | Performed by: INTERNAL MEDICINE

## 2023-06-19 PROCEDURE — 63600175 PHARM REV CODE 636 W HCPCS: Performed by: EMERGENCY MEDICINE

## 2023-06-19 PROCEDURE — 99285 EMERGENCY DEPT VISIT HI MDM: CPT | Mod: 25

## 2023-06-19 PROCEDURE — 80053 COMPREHEN METABOLIC PANEL: CPT | Performed by: EMERGENCY MEDICINE

## 2023-06-19 PROCEDURE — 84443 ASSAY THYROID STIM HORMONE: CPT | Performed by: EMERGENCY MEDICINE

## 2023-06-19 PROCEDURE — 83880 ASSAY OF NATRIURETIC PEPTIDE: CPT | Performed by: EMERGENCY MEDICINE

## 2023-06-19 PROCEDURE — 99285 PR EMERGENCY DEPT VISIT,LEVEL V: ICD-10-PCS | Mod: ,,, | Performed by: EMERGENCY MEDICINE

## 2023-06-19 PROCEDURE — 85025 COMPLETE CBC W/AUTO DIFF WBC: CPT | Performed by: EMERGENCY MEDICINE

## 2023-06-19 PROCEDURE — 96372 THER/PROPH/DIAG INJ SC/IM: CPT | Mod: 59 | Performed by: EMERGENCY MEDICINE

## 2023-06-19 PROCEDURE — 81001 URINALYSIS AUTO W/SCOPE: CPT | Performed by: EMERGENCY MEDICINE

## 2023-06-19 RX ORDER — ATORVASTATIN CALCIUM 10 MG/1
20 TABLET, FILM COATED ORAL DAILY
Status: DISCONTINUED | OUTPATIENT
Start: 2023-06-19 | End: 2023-06-20 | Stop reason: HOSPADM

## 2023-06-19 RX ORDER — NAPROXEN SODIUM 220 MG/1
81 TABLET, FILM COATED ORAL DAILY
Status: DISCONTINUED | OUTPATIENT
Start: 2023-06-19 | End: 2023-06-20 | Stop reason: HOSPADM

## 2023-06-19 RX ORDER — HALOPERIDOL 5 MG/ML
5 INJECTION INTRAMUSCULAR EVERY 6 HOURS PRN
Status: DISCONTINUED | OUTPATIENT
Start: 2023-06-19 | End: 2023-06-20 | Stop reason: HOSPADM

## 2023-06-19 RX ORDER — CEPHALEXIN 500 MG/1
500 CAPSULE ORAL
Status: COMPLETED | OUTPATIENT
Start: 2023-06-19 | End: 2023-06-20

## 2023-06-19 RX ORDER — FLUTICASONE FUROATE AND VILANTEROL 100; 25 UG/1; UG/1
1 POWDER RESPIRATORY (INHALATION) DAILY
Status: DISCONTINUED | OUTPATIENT
Start: 2023-06-19 | End: 2023-06-20 | Stop reason: HOSPADM

## 2023-06-19 RX ORDER — LORAZEPAM 2 MG/ML
1 INJECTION INTRAMUSCULAR
Status: DISCONTINUED | OUTPATIENT
Start: 2023-06-19 | End: 2023-06-20 | Stop reason: HOSPADM

## 2023-06-19 RX ORDER — AMLODIPINE BESYLATE 10 MG/1
10 TABLET ORAL DAILY
Status: DISCONTINUED | OUTPATIENT
Start: 2023-06-19 | End: 2023-06-20 | Stop reason: HOSPADM

## 2023-06-19 RX ADMIN — AMLODIPINE BESYLATE 10 MG: 10 TABLET ORAL at 09:06

## 2023-06-19 RX ADMIN — ASPIRIN 81 MG: 81 TABLET, CHEWABLE ORAL at 09:06

## 2023-06-19 RX ADMIN — HALOPERIDOL LACTATE 5 MG: 5 INJECTION, SOLUTION INTRAMUSCULAR at 09:06

## 2023-06-19 RX ADMIN — INSULIN HUMAN 5 UNITS: 100 INJECTION, SOLUTION PARENTERAL at 09:06

## 2023-06-19 RX ADMIN — ATORVASTATIN CALCIUM 20 MG: 10 TABLET, FILM COATED ORAL at 09:06

## 2023-06-19 RX ADMIN — SODIUM CHLORIDE 1000 ML: 9 INJECTION, SOLUTION INTRAVENOUS at 09:06

## 2023-06-19 RX ADMIN — LORAZEPAM 1 MG: 2 INJECTION INTRAMUSCULAR; INTRAVENOUS at 06:06

## 2023-06-19 NOTE — ED NOTES
Pt changed into paper scrubs per hospital policy. All items placed behind garage door per policy.    Belongings in closet:  1x pair of slippers  1x hat  1x bra  1x stripped shirt  1x tweezers  1x pair of black pants
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
## 2023-06-19 NOTE — Clinical Note
Diagnosis: Hyperglycemia [060143]   Future Attending Provider: CORONA VILLEGAS [[id number]]   Admitting Provider:: CORONA VILLEGAS [[id number]]
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
## 2023-06-20 VITALS
BODY MASS INDEX: 24.75 KG/M2 | RESPIRATION RATE: 19 BRPM | OXYGEN SATURATION: 97 % | SYSTOLIC BLOOD PRESSURE: 164 MMHG | WEIGHT: 145 LBS | DIASTOLIC BLOOD PRESSURE: 86 MMHG | HEIGHT: 64 IN | TEMPERATURE: 99 F | HEART RATE: 71 BPM

## 2023-06-20 PROCEDURE — 25000003 PHARM REV CODE 250: Performed by: EMERGENCY MEDICINE

## 2023-06-20 RX ADMIN — CEPHALEXIN 500 MG: 500 CAPSULE ORAL at 12:06

## 2023-06-20 NOTE — ED NOTES
Resting w/ eyes closed,rise/fall of chest noted. The bed is in the lowest locked position. DVC is maintained for safety.

## 2023-06-20 NOTE — ED NOTES
"Escorted back to bed from the restroom. Patient's gait is unsteady. She states, " I'm a fall risk." She states that she uses a rolator @ home. DVC is maintained for safety  "

## 2023-06-20 NOTE — PROVIDER PROGRESS NOTES - EMERGENCY DEPT.
Encounter Date: 6/19/2023    ED Physician Progress Notes        Physician Note:   Received patient at sign-out    53 yo W with pmhx CHF, recently admitted for hypervolemia, COPD, diabetes, ?  Homelessness presents with delusional parasitosis as well as suicide ideation that she voice to a family member.  Patient denies that to previous provider.  Psych screening labs revealed a U tox was positive for cocaine as well as hyperglycemia.  No evidence of DKA.  Repeat glucose was within normal limits.  Previous physician cleared patient to be transferred to [facility] psych facility.

## 2023-06-20 NOTE — ED NOTES
"The patient appears agitated while engaging w/ her sister. Sister is asked to step out of the room so that patient can calm down. Patient denies S/HI, A/VH. While speaking w/ sister she states that patient has been hallucinating stating that she has "parasites." Sister states that patient has been homeless for a couple of weeks. She states that patient has been using drugs. The patient refuses to give any information & states that her sister doesn't know what's going on with her. She is maintained on DVC for safety.  "

## 2023-06-20 NOTE — ED NOTES
The patient is escorted via w/c by the EDT & hospital security to [facility] vehicle. Two belongings bags were given to [facility] staff. She did not wish for anyone to be informed of her departure but patient's sister while in patient's presence desires to be notified of her transfer.

## 2023-06-20 NOTE — ED NOTES
The patient is calm & is pleasantly engaging w/ her sister. She has consumed a turkey sandwich & 2 servings of Cranberry juice (diet). She is maintained on DVC for safety.

## 2023-06-20 NOTE — ED NOTES
Bed linen changed, pillow provided. Patient assumed a resting position in bed w/ eyes closed. DVC is maintained fro safety. Patient's sister left bedside to go home.

## 2024-02-15 ENCOUNTER — HOSPITAL ENCOUNTER (INPATIENT)
Facility: HOSPITAL | Age: 54
LOS: 20 days | Discharge: HOME HEALTH | DRG: 853 | End: 2024-03-06
Attending: STUDENT IN AN ORGANIZED HEALTH CARE EDUCATION/TRAINING PROGRAM | Admitting: SURGERY
Payer: MEDICAID

## 2024-02-15 DIAGNOSIS — E87.1 HYPONATREMIA: ICD-10-CM

## 2024-02-15 DIAGNOSIS — R78.81 BACTEREMIA: ICD-10-CM

## 2024-02-15 DIAGNOSIS — A41.9 SEPSIS: ICD-10-CM

## 2024-02-15 DIAGNOSIS — N49.3 FOURNIER GANGRENE: ICD-10-CM

## 2024-02-15 DIAGNOSIS — N17.9 AKI (ACUTE KIDNEY INJURY): ICD-10-CM

## 2024-02-15 DIAGNOSIS — D63.8 ANEMIA OF CHRONIC DISEASE: ICD-10-CM

## 2024-02-15 DIAGNOSIS — N49.3 FOURNIER'S GANGRENE: ICD-10-CM

## 2024-02-15 DIAGNOSIS — M72.6 NECROTIZING FASCIITIS: Primary | ICD-10-CM

## 2024-02-15 LAB
ALLENS TEST: ABNORMAL
ALLENS TEST: ABNORMAL
ANION GAP SERPL CALC-SCNC: 16 MMOL/L (ref 8–16)
BUN SERPL-MCNC: 17 MG/DL (ref 6–30)
CHLORIDE SERPL-SCNC: 90 MMOL/L (ref 95–110)
CREAT SERPL-MCNC: 1.7 MG/DL (ref 0.5–1.4)
DELSYS: ABNORMAL
DELSYS: ABNORMAL
GLUCOSE SERPL-MCNC: 356 MG/DL (ref 70–110)
HCT VFR BLD CALC: 34 %PCV (ref 36–54)
LDH SERPL L TO P-CCNC: 2.89 MMOL/L (ref 0.5–2.2)
MODE: ABNORMAL
MODE: ABNORMAL
POC IONIZED CALCIUM: 1.13 MMOL/L (ref 1.06–1.42)
POC TCO2 (MEASURED): 26 MMOL/L (ref 23–29)
POTASSIUM BLD-SCNC: 3.6 MMOL/L (ref 3.5–5.1)
SAMPLE: ABNORMAL
SAMPLE: ABNORMAL
SITE: ABNORMAL
SITE: ABNORMAL
SODIUM BLD-SCNC: 128 MMOL/L (ref 136–145)
SP02: 95
SP02: 95

## 2024-02-15 PROCEDURE — 82550 ASSAY OF CK (CPK): CPT | Performed by: STUDENT IN AN ORGANIZED HEALTH CARE EDUCATION/TRAINING PROGRAM

## 2024-02-15 PROCEDURE — 99285 EMERGENCY DEPT VISIT HI MDM: CPT | Mod: 25

## 2024-02-15 PROCEDURE — 85014 HEMATOCRIT: CPT

## 2024-02-15 PROCEDURE — 87040 BLOOD CULTURE FOR BACTERIA: CPT | Mod: 59 | Performed by: STUDENT IN AN ORGANIZED HEALTH CARE EDUCATION/TRAINING PROGRAM

## 2024-02-15 PROCEDURE — 85025 COMPLETE CBC W/AUTO DIFF WBC: CPT | Performed by: STUDENT IN AN ORGANIZED HEALTH CARE EDUCATION/TRAINING PROGRAM

## 2024-02-15 PROCEDURE — 63600175 PHARM REV CODE 636 W HCPCS: Performed by: STUDENT IN AN ORGANIZED HEALTH CARE EDUCATION/TRAINING PROGRAM

## 2024-02-15 PROCEDURE — 96366 THER/PROPH/DIAG IV INF ADDON: CPT

## 2024-02-15 PROCEDURE — 99900035 HC TECH TIME PER 15 MIN (STAT)

## 2024-02-15 PROCEDURE — 86140 C-REACTIVE PROTEIN: CPT | Performed by: STUDENT IN AN ORGANIZED HEALTH CARE EDUCATION/TRAINING PROGRAM

## 2024-02-15 PROCEDURE — 12000002 HC ACUTE/MED SURGE SEMI-PRIVATE ROOM

## 2024-02-15 PROCEDURE — 25000003 PHARM REV CODE 250: Performed by: STUDENT IN AN ORGANIZED HEALTH CARE EDUCATION/TRAINING PROGRAM

## 2024-02-15 PROCEDURE — 87154 CUL TYP ID BLD PTHGN 6+ TRGT: CPT | Performed by: STUDENT IN AN ORGANIZED HEALTH CARE EDUCATION/TRAINING PROGRAM

## 2024-02-15 PROCEDURE — 80053 COMPREHEN METABOLIC PANEL: CPT | Performed by: STUDENT IN AN ORGANIZED HEALTH CARE EDUCATION/TRAINING PROGRAM

## 2024-02-15 PROCEDURE — 96365 THER/PROPH/DIAG IV INF INIT: CPT | Mod: 59

## 2024-02-15 PROCEDURE — 84132 ASSAY OF SERUM POTASSIUM: CPT

## 2024-02-15 PROCEDURE — 93005 ELECTROCARDIOGRAM TRACING: CPT

## 2024-02-15 PROCEDURE — 93010 ELECTROCARDIOGRAM REPORT: CPT | Mod: ,,, | Performed by: INTERNAL MEDICINE

## 2024-02-15 PROCEDURE — 83605 ASSAY OF LACTIC ACID: CPT

## 2024-02-15 PROCEDURE — 82330 ASSAY OF CALCIUM: CPT

## 2024-02-15 PROCEDURE — 84295 ASSAY OF SERUM SODIUM: CPT

## 2024-02-15 PROCEDURE — 82565 ASSAY OF CREATININE: CPT

## 2024-02-15 PROCEDURE — 87077 CULTURE AEROBIC IDENTIFY: CPT | Performed by: STUDENT IN AN ORGANIZED HEALTH CARE EDUCATION/TRAINING PROGRAM

## 2024-02-15 RX ORDER — MORPHINE SULFATE 4 MG/ML
4 INJECTION, SOLUTION INTRAMUSCULAR; INTRAVENOUS
Status: COMPLETED | OUTPATIENT
Start: 2024-02-15 | End: 2024-02-15

## 2024-02-15 RX ORDER — CLINDAMYCIN PHOSPHATE 900 MG/50ML
900 INJECTION, SOLUTION INTRAVENOUS
Status: COMPLETED | OUTPATIENT
Start: 2024-02-15 | End: 2024-02-16

## 2024-02-15 RX ADMIN — MORPHINE SULFATE 4 MG: 4 INJECTION, SOLUTION INTRAMUSCULAR; INTRAVENOUS at 11:02

## 2024-02-15 RX ADMIN — VANCOMYCIN HYDROCHLORIDE 1750 MG: 500 INJECTION, POWDER, LYOPHILIZED, FOR SOLUTION INTRAVENOUS at 11:02

## 2024-02-15 RX ADMIN — SODIUM CHLORIDE, POTASSIUM CHLORIDE, SODIUM LACTATE AND CALCIUM CHLORIDE 1000 ML: 600; 310; 30; 20 INJECTION, SOLUTION INTRAVENOUS at 11:02

## 2024-02-15 RX ADMIN — CLINDAMYCIN PHOSPHATE 900 MG: 900 INJECTION, SOLUTION INTRAVENOUS at 11:02

## 2024-02-16 ENCOUNTER — ANESTHESIA EVENT (OUTPATIENT)
Dept: SURGERY | Facility: HOSPITAL | Age: 54
DRG: 853 | End: 2024-02-16
Payer: MEDICAID

## 2024-02-16 ENCOUNTER — ANESTHESIA (OUTPATIENT)
Dept: SURGERY | Facility: HOSPITAL | Age: 54
DRG: 853 | End: 2024-02-16
Payer: MEDICAID

## 2024-02-16 PROBLEM — F14.10 COCAINE ABUSE: Status: ACTIVE | Noted: 2024-02-16

## 2024-02-16 PROBLEM — N49.3 FOURNIER'S GANGRENE: Status: ACTIVE | Noted: 2024-02-16

## 2024-02-16 PROBLEM — I10 ESSENTIAL HYPERTENSION: Status: ACTIVE | Noted: 2024-02-16

## 2024-02-16 PROBLEM — D63.8 ANEMIA OF CHRONIC DISEASE: Status: ACTIVE | Noted: 2024-02-16

## 2024-02-16 PROBLEM — J84.9 ILD (INTERSTITIAL LUNG DISEASE): Status: ACTIVE | Noted: 2024-02-16

## 2024-02-16 PROBLEM — E11.65 TYPE 2 DIABETES MELLITUS WITH HYPERGLYCEMIA, WITHOUT LONG-TERM CURRENT USE OF INSULIN: Status: ACTIVE | Noted: 2018-02-05

## 2024-02-16 PROBLEM — I50.32 CHRONIC DIASTOLIC CONGESTIVE HEART FAILURE: Status: ACTIVE | Noted: 2024-02-16

## 2024-02-16 PROBLEM — A41.9 SEPSIS WITHOUT ACUTE ORGAN DYSFUNCTION: Status: ACTIVE | Noted: 2024-02-16

## 2024-02-16 PROBLEM — F10.10 ETOH ABUSE: Status: ACTIVE | Noted: 2024-02-16

## 2024-02-16 LAB
ABO + RH BLD: NORMAL
ALBUMIN SERPL BCP-MCNC: 1.2 G/DL (ref 3.5–5.2)
ALBUMIN SERPL BCP-MCNC: 1.5 G/DL (ref 3.5–5.2)
ALP SERPL-CCNC: 117 U/L (ref 55–135)
ALP SERPL-CCNC: 153 U/L (ref 55–135)
ALT SERPL W/O P-5'-P-CCNC: 5 U/L (ref 10–44)
ALT SERPL W/O P-5'-P-CCNC: 7 U/L (ref 10–44)
ANION GAP SERPL CALC-SCNC: 11 MMOL/L (ref 8–16)
ANION GAP SERPL CALC-SCNC: 11 MMOL/L (ref 8–16)
ANION GAP SERPL CALC-SCNC: 9 MMOL/L (ref 8–16)
AST SERPL-CCNC: 11 U/L (ref 10–40)
AST SERPL-CCNC: 6 U/L (ref 10–40)
BASOPHILS # BLD AUTO: 0.14 K/UL (ref 0–0.2)
BASOPHILS # BLD AUTO: 0.16 K/UL (ref 0–0.2)
BASOPHILS NFR BLD: 0.5 % (ref 0–1.9)
BASOPHILS NFR BLD: 0.6 % (ref 0–1.9)
BILIRUB SERPL-MCNC: 0.3 MG/DL (ref 0.1–1)
BILIRUB SERPL-MCNC: 0.4 MG/DL (ref 0.1–1)
BLD GP AB SCN CELLS X3 SERPL QL: NORMAL
BUN SERPL-MCNC: 16 MG/DL (ref 6–20)
BUN SERPL-MCNC: 16 MG/DL (ref 6–20)
BUN SERPL-MCNC: 21 MG/DL (ref 6–20)
CALCIUM SERPL-MCNC: 8.1 MG/DL (ref 8.7–10.5)
CALCIUM SERPL-MCNC: 8.1 MG/DL (ref 8.7–10.5)
CALCIUM SERPL-MCNC: 8.7 MG/DL (ref 8.7–10.5)
CHLORIDE SERPL-SCNC: 93 MMOL/L (ref 95–110)
CHLORIDE SERPL-SCNC: 94 MMOL/L (ref 95–110)
CHLORIDE SERPL-SCNC: 95 MMOL/L (ref 95–110)
CK SERPL-CCNC: 42 U/L (ref 20–180)
CO2 SERPL-SCNC: 24 MMOL/L (ref 23–29)
CO2 SERPL-SCNC: 24 MMOL/L (ref 23–29)
CO2 SERPL-SCNC: 26 MMOL/L (ref 23–29)
CREAT SERPL-MCNC: 1.5 MG/DL (ref 0.5–1.4)
CREAT SERPL-MCNC: 1.8 MG/DL (ref 0.5–1.4)
CREAT SERPL-MCNC: 2.1 MG/DL (ref 0.5–1.4)
CRP SERPL-MCNC: 357 MG/L (ref 0–8.2)
DIFFERENTIAL METHOD BLD: ABNORMAL
DIFFERENTIAL METHOD BLD: ABNORMAL
EOSINOPHIL # BLD AUTO: 0 K/UL (ref 0–0.5)
EOSINOPHIL # BLD AUTO: 0.1 K/UL (ref 0–0.5)
EOSINOPHIL NFR BLD: 0.1 % (ref 0–8)
EOSINOPHIL NFR BLD: 0.3 % (ref 0–8)
ERYTHROCYTE [DISTWIDTH] IN BLOOD BY AUTOMATED COUNT: 14.9 % (ref 11.5–14.5)
ERYTHROCYTE [DISTWIDTH] IN BLOOD BY AUTOMATED COUNT: 15.3 % (ref 11.5–14.5)
EST. GFR  (NO RACE VARIABLE): 27 ML/MIN/1.73 M^2
EST. GFR  (NO RACE VARIABLE): 33 ML/MIN/1.73 M^2
EST. GFR  (NO RACE VARIABLE): 41 ML/MIN/1.73 M^2
GLUCOSE SERPL-MCNC: 252 MG/DL (ref 70–110)
GLUCOSE SERPL-MCNC: 371 MG/DL (ref 70–110)
GLUCOSE SERPL-MCNC: 384 MG/DL (ref 70–110)
HCT VFR BLD AUTO: 30 % (ref 37–48.5)
HCT VFR BLD AUTO: 30.6 % (ref 37–48.5)
HGB BLD-MCNC: 9.3 G/DL (ref 12–16)
HGB BLD-MCNC: 9.8 G/DL (ref 12–16)
IMM GRANULOCYTES # BLD AUTO: 0.28 K/UL (ref 0–0.04)
IMM GRANULOCYTES # BLD AUTO: 0.68 K/UL (ref 0–0.04)
IMM GRANULOCYTES NFR BLD AUTO: 1 % (ref 0–0.5)
IMM GRANULOCYTES NFR BLD AUTO: 2.6 % (ref 0–0.5)
LACTATE SERPL-SCNC: 1.9 MMOL/L (ref 0.5–2.2)
LYMPHOCYTES # BLD AUTO: 1.8 K/UL (ref 1–4.8)
LYMPHOCYTES # BLD AUTO: 2.2 K/UL (ref 1–4.8)
LYMPHOCYTES NFR BLD: 6.7 % (ref 18–48)
LYMPHOCYTES NFR BLD: 7.9 % (ref 18–48)
MAGNESIUM SERPL-MCNC: 1.5 MG/DL (ref 1.6–2.6)
MAGNESIUM SERPL-MCNC: 1.6 MG/DL (ref 1.6–2.6)
MCH RBC QN AUTO: 21.2 PG (ref 27–31)
MCH RBC QN AUTO: 21.7 PG (ref 27–31)
MCHC RBC AUTO-ENTMCNC: 31 G/DL (ref 32–36)
MCHC RBC AUTO-ENTMCNC: 32 G/DL (ref 32–36)
MCV RBC AUTO: 68 FL (ref 82–98)
MCV RBC AUTO: 69 FL (ref 82–98)
MONOCYTES # BLD AUTO: 1.6 K/UL (ref 0.3–1)
MONOCYTES # BLD AUTO: 1.9 K/UL (ref 0.3–1)
MONOCYTES NFR BLD: 5.9 % (ref 4–15)
MONOCYTES NFR BLD: 7 % (ref 4–15)
NEUTROPHILS # BLD AUTO: 21.9 K/UL (ref 1.8–7.7)
NEUTROPHILS # BLD AUTO: 23.2 K/UL (ref 1.8–7.7)
NEUTROPHILS NFR BLD: 83.1 % (ref 38–73)
NEUTROPHILS NFR BLD: 84.3 % (ref 38–73)
NRBC BLD-RTO: 0 /100 WBC
NRBC BLD-RTO: 0 /100 WBC
PHOSPHATE SERPL-MCNC: 2.6 MG/DL (ref 2.7–4.5)
PLATELET # BLD AUTO: 139 K/UL (ref 150–450)
PLATELET # BLD AUTO: 400 K/UL (ref 150–450)
PLATELET BLD QL SMEAR: ABNORMAL
PMV BLD AUTO: ABNORMAL FL (ref 9.2–12.9)
PMV BLD AUTO: ABNORMAL FL (ref 9.2–12.9)
POCT GLUCOSE: 158 MG/DL (ref 70–110)
POCT GLUCOSE: 250 MG/DL (ref 70–110)
POCT GLUCOSE: 270 MG/DL (ref 70–110)
POCT GLUCOSE: 396 MG/DL (ref 70–110)
POCT GLUCOSE: 449 MG/DL (ref 70–110)
POCT GLUCOSE: >500 MG/DL (ref 70–110)
POTASSIUM SERPL-SCNC: 3.7 MMOL/L (ref 3.5–5.1)
POTASSIUM SERPL-SCNC: 3.8 MMOL/L (ref 3.5–5.1)
POTASSIUM SERPL-SCNC: 4.1 MMOL/L (ref 3.5–5.1)
PROT SERPL-MCNC: 5.8 G/DL (ref 6–8.4)
PROT SERPL-MCNC: 7 G/DL (ref 6–8.4)
RBC # BLD AUTO: 4.38 M/UL (ref 4–5.4)
RBC # BLD AUTO: 4.52 M/UL (ref 4–5.4)
SODIUM SERPL-SCNC: 128 MMOL/L (ref 136–145)
SODIUM SERPL-SCNC: 129 MMOL/L (ref 136–145)
SODIUM SERPL-SCNC: 130 MMOL/L (ref 136–145)
SPECIMEN OUTDATE: NORMAL
WBC # BLD AUTO: 26.34 K/UL (ref 3.9–12.7)
WBC # BLD AUTO: 27.47 K/UL (ref 3.9–12.7)

## 2024-02-16 PROCEDURE — 0KBQ0ZZ EXCISION OF RIGHT UPPER LEG MUSCLE, OPEN APPROACH: ICD-10-PCS | Performed by: SURGERY

## 2024-02-16 PROCEDURE — 63600175 PHARM REV CODE 636 W HCPCS: Performed by: NURSE ANESTHETIST, CERTIFIED REGISTERED

## 2024-02-16 PROCEDURE — 21400001 HC TELEMETRY ROOM

## 2024-02-16 PROCEDURE — 63600175 PHARM REV CODE 636 W HCPCS: Performed by: STUDENT IN AN ORGANIZED HEALTH CARE EDUCATION/TRAINING PROGRAM

## 2024-02-16 PROCEDURE — 25000003 PHARM REV CODE 250: Performed by: STUDENT IN AN ORGANIZED HEALTH CARE EDUCATION/TRAINING PROGRAM

## 2024-02-16 PROCEDURE — 11004 DBRDMT SKIN XTRNL GENT&PER: CPT | Mod: ,,, | Performed by: SURGERY

## 2024-02-16 PROCEDURE — 83605 ASSAY OF LACTIC ACID: CPT | Performed by: STUDENT IN AN ORGANIZED HEALTH CARE EDUCATION/TRAINING PROGRAM

## 2024-02-16 PROCEDURE — 96376 TX/PRO/DX INJ SAME DRUG ADON: CPT

## 2024-02-16 PROCEDURE — 25500020 PHARM REV CODE 255: Performed by: STUDENT IN AN ORGANIZED HEALTH CARE EDUCATION/TRAINING PROGRAM

## 2024-02-16 PROCEDURE — 80048 BASIC METABOLIC PNL TOTAL CA: CPT | Mod: XB | Performed by: SURGERY

## 2024-02-16 PROCEDURE — 84100 ASSAY OF PHOSPHORUS: CPT

## 2024-02-16 PROCEDURE — 36000707: Performed by: SURGERY

## 2024-02-16 PROCEDURE — 99223 1ST HOSP IP/OBS HIGH 75: CPT | Mod: 57,,, | Performed by: SURGERY

## 2024-02-16 PROCEDURE — 36415 COLL VENOUS BLD VENIPUNCTURE: CPT | Mod: XB | Performed by: SURGERY

## 2024-02-16 PROCEDURE — 63600175 PHARM REV CODE 636 W HCPCS: Performed by: SURGERY

## 2024-02-16 PROCEDURE — 36415 COLL VENOUS BLD VENIPUNCTURE: CPT

## 2024-02-16 PROCEDURE — 63600175 PHARM REV CODE 636 W HCPCS: Mod: JZ,JG

## 2024-02-16 PROCEDURE — 71000033 HC RECOVERY, INTIAL HOUR: Performed by: SURGERY

## 2024-02-16 PROCEDURE — 83735 ASSAY OF MAGNESIUM: CPT | Performed by: SURGERY

## 2024-02-16 PROCEDURE — 25000003 PHARM REV CODE 250: Performed by: FAMILY MEDICINE

## 2024-02-16 PROCEDURE — 25000003 PHARM REV CODE 250: Performed by: SURGERY

## 2024-02-16 PROCEDURE — 85025 COMPLETE CBC W/AUTO DIFF WBC: CPT

## 2024-02-16 PROCEDURE — 94640 AIRWAY INHALATION TREATMENT: CPT

## 2024-02-16 PROCEDURE — 25000242 PHARM REV CODE 250 ALT 637 W/ HCPCS: Performed by: SURGERY

## 2024-02-16 PROCEDURE — 86850 RBC ANTIBODY SCREEN: CPT | Performed by: ANESTHESIOLOGY

## 2024-02-16 PROCEDURE — 96368 THER/DIAG CONCURRENT INF: CPT

## 2024-02-16 PROCEDURE — 25000003 PHARM REV CODE 250: Performed by: NURSE ANESTHETIST, CERTIFIED REGISTERED

## 2024-02-16 PROCEDURE — 37000009 HC ANESTHESIA EA ADD 15 MINS: Performed by: SURGERY

## 2024-02-16 PROCEDURE — 25000003 PHARM REV CODE 250

## 2024-02-16 PROCEDURE — 96375 TX/PRO/DX INJ NEW DRUG ADDON: CPT

## 2024-02-16 PROCEDURE — 36000706: Performed by: SURGERY

## 2024-02-16 PROCEDURE — 94761 N-INVAS EAR/PLS OXIMETRY MLT: CPT

## 2024-02-16 PROCEDURE — 37000008 HC ANESTHESIA 1ST 15 MINUTES: Performed by: SURGERY

## 2024-02-16 PROCEDURE — 51702 INSERT TEMP BLADDER CATH: CPT

## 2024-02-16 PROCEDURE — 63600175 PHARM REV CODE 636 W HCPCS: Performed by: HOSPITALIST

## 2024-02-16 PROCEDURE — 25000242 PHARM REV CODE 250 ALT 637 W/ HCPCS: Performed by: NURSE ANESTHETIST, CERTIFIED REGISTERED

## 2024-02-16 PROCEDURE — 83735 ASSAY OF MAGNESIUM: CPT | Mod: 91

## 2024-02-16 PROCEDURE — D9220A PRA ANESTHESIA: Mod: ANES,,, | Performed by: ANESTHESIOLOGY

## 2024-02-16 PROCEDURE — 27000221 HC OXYGEN, UP TO 24 HOURS

## 2024-02-16 PROCEDURE — D9220A PRA ANESTHESIA: Mod: CRNA,,, | Performed by: NURSE ANESTHETIST, CERTIFIED REGISTERED

## 2024-02-16 PROCEDURE — 80053 COMPREHEN METABOLIC PANEL: CPT

## 2024-02-16 RX ORDER — LANOLIN ALCOHOL/MO/W.PET/CERES
100 CREAM (GRAM) TOPICAL DAILY
Status: DISCONTINUED | OUTPATIENT
Start: 2024-02-17 | End: 2024-02-16

## 2024-02-16 RX ORDER — HYDROMORPHONE HYDROCHLORIDE 2 MG/ML
0.5 INJECTION, SOLUTION INTRAMUSCULAR; INTRAVENOUS; SUBCUTANEOUS EVERY 4 HOURS PRN
Status: DISCONTINUED | OUTPATIENT
Start: 2024-02-16 | End: 2024-02-17 | Stop reason: SDUPTHER

## 2024-02-16 RX ORDER — ALBUTEROL SULFATE 90 UG/1
2 AEROSOL, METERED RESPIRATORY (INHALATION) EVERY 4 HOURS PRN
Status: DISCONTINUED | OUTPATIENT
Start: 2024-02-16 | End: 2024-02-16 | Stop reason: CLARIF

## 2024-02-16 RX ORDER — ONDANSETRON HYDROCHLORIDE 2 MG/ML
4 INJECTION, SOLUTION INTRAVENOUS DAILY PRN
Status: DISCONTINUED | OUTPATIENT
Start: 2024-02-16 | End: 2024-02-16 | Stop reason: HOSPADM

## 2024-02-16 RX ORDER — SODIUM CHLORIDE, SODIUM LACTATE, POTASSIUM CHLORIDE, CALCIUM CHLORIDE 600; 310; 30; 20 MG/100ML; MG/100ML; MG/100ML; MG/100ML
INJECTION, SOLUTION INTRAVENOUS CONTINUOUS
Status: DISCONTINUED | OUTPATIENT
Start: 2024-02-16 | End: 2024-02-16

## 2024-02-16 RX ORDER — GABAPENTIN 300 MG/1
300 CAPSULE ORAL 3 TIMES DAILY
Status: DISCONTINUED | OUTPATIENT
Start: 2024-02-16 | End: 2024-02-17

## 2024-02-16 RX ORDER — IBUPROFEN 200 MG
16 TABLET ORAL
Status: DISCONTINUED | OUTPATIENT
Start: 2024-02-16 | End: 2024-03-06 | Stop reason: HOSPADM

## 2024-02-16 RX ORDER — SODIUM CHLORIDE 0.9 % (FLUSH) 0.9 %
10 SYRINGE (ML) INJECTION
Status: DISCONTINUED | OUTPATIENT
Start: 2024-02-16 | End: 2024-03-06 | Stop reason: HOSPADM

## 2024-02-16 RX ORDER — ONDANSETRON 8 MG/1
8 TABLET, ORALLY DISINTEGRATING ORAL EVERY 8 HOURS PRN
Status: DISCONTINUED | OUTPATIENT
Start: 2024-02-16 | End: 2024-03-06 | Stop reason: HOSPADM

## 2024-02-16 RX ORDER — FOLIC ACID 1 MG/1
1 TABLET ORAL DAILY
Status: DISCONTINUED | OUTPATIENT
Start: 2024-02-16 | End: 2024-03-06 | Stop reason: HOSPADM

## 2024-02-16 RX ORDER — ATORVASTATIN CALCIUM 10 MG/1
20 TABLET, FILM COATED ORAL DAILY
Status: DISCONTINUED | OUTPATIENT
Start: 2024-02-16 | End: 2024-03-06 | Stop reason: HOSPADM

## 2024-02-16 RX ORDER — LIDOCAINE HYDROCHLORIDE 20 MG/ML
INJECTION INTRAVENOUS
Status: DISCONTINUED | OUTPATIENT
Start: 2024-02-16 | End: 2024-02-16

## 2024-02-16 RX ORDER — HYDROMORPHONE HYDROCHLORIDE 2 MG/ML
0.2 INJECTION, SOLUTION INTRAMUSCULAR; INTRAVENOUS; SUBCUTANEOUS EVERY 5 MIN PRN
Status: DISCONTINUED | OUTPATIENT
Start: 2024-02-16 | End: 2024-02-16 | Stop reason: HOSPADM

## 2024-02-16 RX ORDER — PROCHLORPERAZINE EDISYLATE 5 MG/ML
5 INJECTION INTRAMUSCULAR; INTRAVENOUS EVERY 30 MIN PRN
Status: DISCONTINUED | OUTPATIENT
Start: 2024-02-16 | End: 2024-02-16 | Stop reason: HOSPADM

## 2024-02-16 RX ORDER — TALC
500 POWDER (GRAM) TOPICAL 2 TIMES DAILY
Status: DISCONTINUED | OUTPATIENT
Start: 2024-02-16 | End: 2024-02-20

## 2024-02-16 RX ORDER — PROCHLORPERAZINE EDISYLATE 5 MG/ML
5 INJECTION INTRAMUSCULAR; INTRAVENOUS EVERY 6 HOURS PRN
Status: DISCONTINUED | OUTPATIENT
Start: 2024-02-16 | End: 2024-03-06 | Stop reason: HOSPADM

## 2024-02-16 RX ORDER — PROPOFOL 10 MG/ML
VIAL (ML) INTRAVENOUS
Status: DISCONTINUED | OUTPATIENT
Start: 2024-02-16 | End: 2024-02-16

## 2024-02-16 RX ORDER — OXYCODONE HYDROCHLORIDE 5 MG/1
5 TABLET ORAL EVERY 4 HOURS PRN
Status: DISCONTINUED | OUTPATIENT
Start: 2024-02-16 | End: 2024-03-06 | Stop reason: HOSPADM

## 2024-02-16 RX ORDER — OXYCODONE HYDROCHLORIDE 5 MG/1
10 TABLET ORAL EVERY 4 HOURS PRN
Status: DISCONTINUED | OUTPATIENT
Start: 2024-02-16 | End: 2024-03-06 | Stop reason: HOSPADM

## 2024-02-16 RX ORDER — CHLORDIAZEPOXIDE HYDROCHLORIDE 5 MG/1
5 CAPSULE, GELATIN COATED ORAL 4 TIMES DAILY
Status: DISCONTINUED | OUTPATIENT
Start: 2024-02-16 | End: 2024-02-20

## 2024-02-16 RX ORDER — INSULIN ASPART 100 [IU]/ML
0-10 INJECTION, SOLUTION INTRAVENOUS; SUBCUTANEOUS
Status: DISCONTINUED | OUTPATIENT
Start: 2024-02-16 | End: 2024-02-16

## 2024-02-16 RX ORDER — MIDAZOLAM HYDROCHLORIDE 1 MG/ML
INJECTION, SOLUTION INTRAMUSCULAR; INTRAVENOUS
Status: DISCONTINUED | OUTPATIENT
Start: 2024-02-16 | End: 2024-02-16

## 2024-02-16 RX ORDER — FOLIC ACID 1 MG/1
1 TABLET ORAL DAILY
Status: DISCONTINUED | OUTPATIENT
Start: 2024-02-17 | End: 2024-02-16

## 2024-02-16 RX ORDER — IBUPROFEN 200 MG
24 TABLET ORAL
Status: DISCONTINUED | OUTPATIENT
Start: 2024-02-16 | End: 2024-03-06 | Stop reason: HOSPADM

## 2024-02-16 RX ORDER — TALC
6 POWDER (GRAM) TOPICAL NIGHTLY PRN
Status: DISCONTINUED | OUTPATIENT
Start: 2024-02-16 | End: 2024-03-06 | Stop reason: HOSPADM

## 2024-02-16 RX ORDER — INSULIN ASPART 100 [IU]/ML
0-15 INJECTION, SOLUTION INTRAVENOUS; SUBCUTANEOUS
Status: DISCONTINUED | OUTPATIENT
Start: 2024-02-16 | End: 2024-03-06 | Stop reason: HOSPADM

## 2024-02-16 RX ORDER — POTASSIUM CHLORIDE 20 MEQ/1
40 TABLET, EXTENDED RELEASE ORAL DAILY
Status: DISCONTINUED | OUTPATIENT
Start: 2024-02-17 | End: 2024-02-17

## 2024-02-16 RX ORDER — CLINDAMYCIN PHOSPHATE 600 MG/50ML
600 INJECTION, SOLUTION INTRAVENOUS
Status: DISCONTINUED | OUTPATIENT
Start: 2024-02-16 | End: 2024-02-18

## 2024-02-16 RX ORDER — PANTOPRAZOLE SODIUM 40 MG/1
40 TABLET, DELAYED RELEASE ORAL DAILY
Status: DISCONTINUED | OUTPATIENT
Start: 2024-02-16 | End: 2024-03-06 | Stop reason: HOSPADM

## 2024-02-16 RX ORDER — ACETAMINOPHEN 500 MG
1000 TABLET ORAL EVERY 8 HOURS
Status: DISCONTINUED | OUTPATIENT
Start: 2024-02-16 | End: 2024-03-06 | Stop reason: HOSPADM

## 2024-02-16 RX ORDER — ALBUTEROL SULFATE 90 UG/1
AEROSOL, METERED RESPIRATORY (INHALATION)
Status: DISCONTINUED | OUTPATIENT
Start: 2024-02-16 | End: 2024-02-16

## 2024-02-16 RX ORDER — GLUCAGON 1 MG
1 KIT INJECTION
Status: DISCONTINUED | OUTPATIENT
Start: 2024-02-16 | End: 2024-03-06 | Stop reason: HOSPADM

## 2024-02-16 RX ORDER — SODIUM,POTASSIUM PHOSPHATES 280-250MG
1 POWDER IN PACKET (EA) ORAL 2 TIMES DAILY
Status: COMPLETED | OUTPATIENT
Start: 2024-02-16 | End: 2024-02-16

## 2024-02-16 RX ORDER — LIDOCAINE HYDROCHLORIDE 10 MG/ML
1 INJECTION, SOLUTION EPIDURAL; INFILTRATION; INTRACAUDAL; PERINEURAL ONCE AS NEEDED
Status: DISCONTINUED | OUTPATIENT
Start: 2024-02-16 | End: 2024-02-20

## 2024-02-16 RX ORDER — SODIUM CHLORIDE 9 MG/ML
INJECTION, SOLUTION INTRAVENOUS CONTINUOUS
Status: DISCONTINUED | OUTPATIENT
Start: 2024-02-16 | End: 2024-02-18

## 2024-02-16 RX ORDER — FENTANYL CITRATE 50 UG/ML
50 INJECTION, SOLUTION INTRAMUSCULAR; INTRAVENOUS
Status: COMPLETED | OUTPATIENT
Start: 2024-02-16 | End: 2024-02-16

## 2024-02-16 RX ORDER — FLUTICASONE FUROATE AND VILANTEROL 200; 25 UG/1; UG/1
1 POWDER RESPIRATORY (INHALATION) DAILY
Status: DISCONTINUED | OUTPATIENT
Start: 2024-02-16 | End: 2024-02-16 | Stop reason: CLARIF

## 2024-02-16 RX ORDER — KETOROLAC TROMETHAMINE 30 MG/ML
15 INJECTION, SOLUTION INTRAMUSCULAR; INTRAVENOUS
Status: COMPLETED | OUTPATIENT
Start: 2024-02-16 | End: 2024-02-16

## 2024-02-16 RX ORDER — TALC
500 POWDER (GRAM) TOPICAL ONCE
Status: COMPLETED | OUTPATIENT
Start: 2024-02-16 | End: 2024-02-16

## 2024-02-16 RX ORDER — BUDESONIDE 0.5 MG/2ML
1 INHALANT ORAL EVERY 12 HOURS
Status: DISCONTINUED | OUTPATIENT
Start: 2024-02-16 | End: 2024-03-06 | Stop reason: HOSPADM

## 2024-02-16 RX ORDER — ROCURONIUM BROMIDE 10 MG/ML
INJECTION, SOLUTION INTRAVENOUS
Status: DISCONTINUED | OUTPATIENT
Start: 2024-02-16 | End: 2024-02-16

## 2024-02-16 RX ORDER — ALBUTEROL SULFATE 2.5 MG/.5ML
2.5 SOLUTION RESPIRATORY (INHALATION) EVERY 4 HOURS PRN
Status: DISCONTINUED | OUTPATIENT
Start: 2024-02-16 | End: 2024-03-06 | Stop reason: HOSPADM

## 2024-02-16 RX ORDER — SODIUM CHLORIDE 0.9 % (FLUSH) 0.9 %
10 SYRINGE (ML) INJECTION
Status: DISCONTINUED | OUTPATIENT
Start: 2024-02-16 | End: 2024-02-16 | Stop reason: HOSPADM

## 2024-02-16 RX ORDER — SUCCINYLCHOLINE CHLORIDE 20 MG/ML
INJECTION INTRAMUSCULAR; INTRAVENOUS
Status: DISCONTINUED | OUTPATIENT
Start: 2024-02-16 | End: 2024-02-16

## 2024-02-16 RX ORDER — PHENYLEPHRINE HYDROCHLORIDE 10 MG/ML
INJECTION INTRAVENOUS
Status: DISCONTINUED | OUTPATIENT
Start: 2024-02-16 | End: 2024-02-16

## 2024-02-16 RX ORDER — ENOXAPARIN SODIUM 100 MG/ML
40 INJECTION SUBCUTANEOUS EVERY 24 HOURS
Status: DISCONTINUED | OUTPATIENT
Start: 2024-02-16 | End: 2024-02-19

## 2024-02-16 RX ORDER — ARFORMOTEROL TARTRATE 15 UG/2ML
15 SOLUTION RESPIRATORY (INHALATION) 2 TIMES DAILY
Status: DISCONTINUED | OUTPATIENT
Start: 2024-02-16 | End: 2024-03-06 | Stop reason: HOSPADM

## 2024-02-16 RX ORDER — ONDANSETRON HYDROCHLORIDE 2 MG/ML
INJECTION, SOLUTION INTRAVENOUS
Status: DISCONTINUED | OUTPATIENT
Start: 2024-02-16 | End: 2024-02-16

## 2024-02-16 RX ORDER — LANOLIN ALCOHOL/MO/W.PET/CERES
100 CREAM (GRAM) TOPICAL DAILY
Status: DISCONTINUED | OUTPATIENT
Start: 2024-02-16 | End: 2024-03-06 | Stop reason: HOSPADM

## 2024-02-16 RX ORDER — NAPROXEN SODIUM 220 MG/1
81 TABLET, FILM COATED ORAL DAILY
Status: DISCONTINUED | OUTPATIENT
Start: 2024-02-16 | End: 2024-03-06 | Stop reason: HOSPADM

## 2024-02-16 RX ORDER — FENTANYL CITRATE 50 UG/ML
INJECTION, SOLUTION INTRAMUSCULAR; INTRAVENOUS
Status: DISCONTINUED | OUTPATIENT
Start: 2024-02-16 | End: 2024-02-16

## 2024-02-16 RX ORDER — AMLODIPINE BESYLATE 5 MG/1
10 TABLET ORAL DAILY
Status: DISCONTINUED | OUTPATIENT
Start: 2024-02-16 | End: 2024-02-18

## 2024-02-16 RX ORDER — FENTANYL CITRATE 50 UG/ML
25 INJECTION, SOLUTION INTRAMUSCULAR; INTRAVENOUS
Status: COMPLETED | OUTPATIENT
Start: 2024-02-16 | End: 2024-02-16

## 2024-02-16 RX ORDER — PROCHLORPERAZINE EDISYLATE 5 MG/ML
INJECTION INTRAMUSCULAR; INTRAVENOUS
Status: DISCONTINUED | OUTPATIENT
Start: 2024-02-16 | End: 2024-02-16

## 2024-02-16 RX ADMIN — CHLORDIAZEPOXIDE HYDROCHLORIDE 5 MG: 5 CAPSULE ORAL at 04:02

## 2024-02-16 RX ADMIN — SODIUM CHLORIDE, SODIUM LACTATE, POTASSIUM CHLORIDE, AND CALCIUM CHLORIDE: .6; .31; .03; .02 INJECTION, SOLUTION INTRAVENOUS at 03:02

## 2024-02-16 RX ADMIN — ALBUTEROL SULFATE 2 PUFF: 90 AEROSOL, METERED RESPIRATORY (INHALATION) at 04:02

## 2024-02-16 RX ADMIN — PROCHLORPERAZINE EDISYLATE 5 MG: 5 INJECTION INTRAMUSCULAR; INTRAVENOUS at 04:02

## 2024-02-16 RX ADMIN — INSULIN ASPART 3 UNITS: 100 INJECTION, SOLUTION INTRAVENOUS; SUBCUTANEOUS at 10:02

## 2024-02-16 RX ADMIN — PHENYLEPHRINE HYDROCHLORIDE 100 MCG: 10 INJECTION INTRAVENOUS at 04:02

## 2024-02-16 RX ADMIN — Medication 1 PACKET: at 10:02

## 2024-02-16 RX ADMIN — INSULIN DETEMIR 50 UNITS: 100 INJECTION, SOLUTION SUBCUTANEOUS at 09:02

## 2024-02-16 RX ADMIN — PHENYLEPHRINE HYDROCHLORIDE 200 MCG: 10 INJECTION INTRAVENOUS at 04:02

## 2024-02-16 RX ADMIN — INSULIN DETEMIR 15 UNITS: 100 INJECTION, SOLUTION SUBCUTANEOUS at 10:02

## 2024-02-16 RX ADMIN — PIPERACILLIN AND TAZOBACTAM 4.5 G: 4; .5 INJECTION, POWDER, LYOPHILIZED, FOR SOLUTION INTRAVENOUS; PARENTERAL at 01:02

## 2024-02-16 RX ADMIN — BUDESONIDE 1 MG: 0.5 INHALANT RESPIRATORY (INHALATION) at 07:02

## 2024-02-16 RX ADMIN — INSULIN ASPART 10 UNITS: 100 INJECTION, SOLUTION INTRAVENOUS; SUBCUTANEOUS at 12:02

## 2024-02-16 RX ADMIN — GABAPENTIN 300 MG: 300 CAPSULE ORAL at 08:02

## 2024-02-16 RX ADMIN — GABAPENTIN 300 MG: 300 CAPSULE ORAL at 10:02

## 2024-02-16 RX ADMIN — KETOROLAC TROMETHAMINE 15 MG: 30 INJECTION, SOLUTION INTRAMUSCULAR; INTRAVENOUS at 01:02

## 2024-02-16 RX ADMIN — FOLIC ACID 1 MG: 1 TABLET ORAL at 04:02

## 2024-02-16 RX ADMIN — Medication 500 MG: at 04:02

## 2024-02-16 RX ADMIN — IOHEXOL 70 ML: 350 INJECTION, SOLUTION INTRAVENOUS at 12:02

## 2024-02-16 RX ADMIN — INSULIN ASPART 6 UNITS: 100 INJECTION, SOLUTION INTRAVENOUS; SUBCUTANEOUS at 08:02

## 2024-02-16 RX ADMIN — INSULIN ASPART 15 UNITS: 100 INJECTION, SOLUTION INTRAVENOUS; SUBCUTANEOUS at 05:02

## 2024-02-16 RX ADMIN — PIPERACILLIN AND TAZOBACTAM 4.5 G: 4; .5 INJECTION, POWDER, LYOPHILIZED, FOR SOLUTION INTRAVENOUS; PARENTERAL at 04:02

## 2024-02-16 RX ADMIN — SODIUM CHLORIDE, POTASSIUM CHLORIDE, SODIUM LACTATE AND CALCIUM CHLORIDE 1000 ML: 600; 310; 30; 20 INJECTION, SOLUTION INTRAVENOUS at 01:02

## 2024-02-16 RX ADMIN — SUCCINYLCHOLINE CHLORIDE 100 MG: 20 INJECTION, SOLUTION INTRAMUSCULAR; INTRAVENOUS at 04:02

## 2024-02-16 RX ADMIN — CHLORDIAZEPOXIDE HYDROCHLORIDE 5 MG: 5 CAPSULE ORAL at 10:02

## 2024-02-16 RX ADMIN — SODIUM CHLORIDE: 9 INJECTION, SOLUTION INTRAVENOUS at 04:02

## 2024-02-16 RX ADMIN — ATORVASTATIN CALCIUM 20 MG: 10 TABLET, FILM COATED ORAL at 08:02

## 2024-02-16 RX ADMIN — PIPERACILLIN AND TAZOBACTAM 4.5 G: 4; .5 INJECTION, POWDER, LYOPHILIZED, FOR SOLUTION INTRAVENOUS; PARENTERAL at 09:02

## 2024-02-16 RX ADMIN — ARFORMOTEROL TARTRATE 15 MCG: 15 SOLUTION RESPIRATORY (INHALATION) at 07:02

## 2024-02-16 RX ADMIN — ACETAMINOPHEN 1000 MG: 500 TABLET ORAL at 10:02

## 2024-02-16 RX ADMIN — PROPOFOL 140 MG: 10 INJECTION, EMULSION INTRAVENOUS at 04:02

## 2024-02-16 RX ADMIN — VANCOMYCIN HYDROCHLORIDE 750 MG: 750 INJECTION, POWDER, LYOPHILIZED, FOR SOLUTION INTRAVENOUS at 11:02

## 2024-02-16 RX ADMIN — ROCURONIUM BROMIDE 20 MG: 10 INJECTION, SOLUTION INTRAVENOUS at 04:02

## 2024-02-16 RX ADMIN — ACETAMINOPHEN 1000 MG: 500 TABLET ORAL at 06:02

## 2024-02-16 RX ADMIN — FENTANYL CITRATE 100 MCG: 50 INJECTION, SOLUTION INTRAMUSCULAR; INTRAVENOUS at 03:02

## 2024-02-16 RX ADMIN — SUGAMMADEX 200 MG: 100 INJECTION, SOLUTION INTRAVENOUS at 04:02

## 2024-02-16 RX ADMIN — THIAMINE HCL TAB 100 MG 100 MG: 100 TAB at 04:02

## 2024-02-16 RX ADMIN — Medication 500 MG: at 08:02

## 2024-02-16 RX ADMIN — CLINDAMYCIN IN 5 PERCENT DEXTROSE 600 MG: 12 INJECTION, SOLUTION INTRAVENOUS at 06:02

## 2024-02-16 RX ADMIN — Medication 1 PACKET: at 08:02

## 2024-02-16 RX ADMIN — FENTANYL CITRATE 25 MCG: 50 INJECTION, SOLUTION INTRAMUSCULAR; INTRAVENOUS at 01:02

## 2024-02-16 RX ADMIN — ASPIRIN 81 MG CHEWABLE TABLET 81 MG: 81 TABLET CHEWABLE at 08:02

## 2024-02-16 RX ADMIN — OXYCODONE 10 MG: 5 TABLET ORAL at 11:02

## 2024-02-16 RX ADMIN — MIDAZOLAM HYDROCHLORIDE 2 MG: 1 INJECTION, SOLUTION INTRAMUSCULAR; INTRAVENOUS at 03:02

## 2024-02-16 RX ADMIN — CLINDAMYCIN IN 5 PERCENT DEXTROSE 600 MG: 12 INJECTION, SOLUTION INTRAVENOUS at 12:02

## 2024-02-16 RX ADMIN — LIDOCAINE HYDROCHLORIDE 100 MG: 20 INJECTION, SOLUTION INTRAVENOUS at 04:02

## 2024-02-16 RX ADMIN — ONDANSETRON 4 MG: 2 INJECTION, SOLUTION INTRAMUSCULAR; INTRAVENOUS at 04:02

## 2024-02-16 RX ADMIN — GABAPENTIN 300 MG: 300 CAPSULE ORAL at 03:02

## 2024-02-16 RX ADMIN — PANTOPRAZOLE SODIUM 40 MG: 40 TABLET, DELAYED RELEASE ORAL at 08:02

## 2024-02-16 RX ADMIN — FENTANYL CITRATE 50 MCG: 50 INJECTION INTRAMUSCULAR; INTRAVENOUS at 12:02

## 2024-02-16 RX ADMIN — ENOXAPARIN SODIUM 40 MG: 40 INJECTION SUBCUTANEOUS at 04:02

## 2024-02-16 RX ADMIN — INSULIN HUMAN 5 UNITS: 100 INJECTION, SOLUTION PARENTERAL at 06:02

## 2024-02-16 RX ADMIN — ACETAMINOPHEN 1000 MG: 500 TABLET ORAL at 01:02

## 2024-02-16 RX ADMIN — Medication 6 MG: at 09:02

## 2024-02-16 RX ADMIN — OXYCODONE 5 MG: 5 TABLET ORAL at 04:02

## 2024-02-16 NOTE — ASSESSMENT & PLAN NOTE
Defined by leukocytosis, tachycardia and groin abscess  Continue vanc/ zosyn  Fu blood/ wound cx's  Maintenance IVFs; gentle

## 2024-02-16 NOTE — ASSESSMENT & PLAN NOTE
S/p debridement.   Op note reviewed:  Necrotizing soft tissue infection involving the lateral perineum and proximal medial right thigh. Wound packed with betadine-soaked kerlix.

## 2024-02-16 NOTE — OP NOTE
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 Wound packed with betadine-soaked kerlix.    INDICATION: Ms. Mosqueda is a 53 y.o. 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    PROCEDURE IN DETAIL: Patient was brought to the operating room where she was placed in supine position on the operating room table and underwent general anesthesia with endotracheal intubation without complication. She was then placed in lithotomy. The field was sterilely prepped out and draped in standard fashion, and a timeout identifying the correct patient, placement, procedure, and preoperative antibiotics was called with everyone in agreement.    First we incised the skin between her prior I+D sites. Deep to this incision was significant purulence. After probing of the wound revealed tracking posteriorly towards the right gluteal region, the incision was extended posteriorly and necrotic subcutaneous tissue and superficial muscle was encountered. This tissue was excised back to healthy bleeding tissue.     Next, given the extensive inflammatory changes of the right thigh, the anterior portion of the incision was carried down the medial thigh 3-4 cm, however no evidence of extension of the NSTI was present. Following debridement of all of the necrotic tissue hemostasis was achieved with electrocautery and the wound was packed with 1 betadine-soaked kerlix. This was covered with a dry kerlix, ABD, and tape.    This completed the  proposed operation. All instruments, needles, and sponge counts were reported as correct x2 by the nursing staff. Patient was extubated and awakened from general anesthesia without complication. She was sent to the recovery unit in stable condition.     EBL: 30 mL.    COMPLICATIONS: none apparent.    SPECIMEN: none    DRAINS: none    DISPOSITION: PACU.      Andrew Paula MD  General Surgery Resident, PGY-3

## 2024-02-16 NOTE — ASSESSMENT & PLAN NOTE
Chronic, controlled. Latest blood pressure and vitals reviewed-     Temp:  [97.6 °F (36.4 °C)-99.5 °F (37.5 °C)]   Pulse:  [78-98]   Resp:  [16-24]   BP: ()/(55-80)   SpO2:  [88 %-100 %] .   Home meds for hypertension were reviewed and noted below.   Hypertension Medications               amlodipine (NORVASC) 10 MG tablet Take 10 mg by mouth once daily.    furosemide (LASIX) 40 MG tablet Take 1 tablet (40 mg total) by mouth once daily.            While in the hospital, will manage blood pressure as follows; Adjust home antihypertensive regimen as follows- hold for sepsis/ pain control. Resume as warranted    Will utilize p.r.n. blood pressure medication only if patient's blood pressure greater than 180/110 and she develops symptoms such as worsening chest pain or shortness of breath.

## 2024-02-16 NOTE — ED TRIAGE NOTES
Patient presents to the ED via EMS from home c/o abscess to right inner thigh and right posterior labia. Pt was seen and tx at [facility] on 2/10 and d/c w/ Doxycycline but reports severe pain since yesterday at the site of the I&D. Endorses compliance w/ Rx. Denies abd pain, chills, fevers, or n/v/d.

## 2024-02-16 NOTE — ASSESSMENT & PLAN NOTE
Patient's FSGs are uncontrolled due to hyperglycemia on current medication regimen.  Last A1c reviewed-   Lab Results   Component Value Date    HGBA1C 8.2 (H) 04/06/2023     Most recent fingerstick glucose reviewed-   Recent Labs   Lab 02/16/24  0500 02/16/24  0821 02/16/24  1137   POCTGLUCOSE 250* 270* 449*     Current correctional scale  High  Maintain anti-hyperglycemic dose as follows-   Antihyperglycemics (From admission, onward)      Start     Stop Route Frequency Ordered    02/17/24 0900  insulin detemir U-100 (Levemir) pen 30 Units         -- SubQ Daily 02/16/24 0931    02/16/24 2100  insulin detemir U-100 (Levemir) pen 15 Units         -- SubQ Nightly 02/16/24 0931    02/16/24 1514  insulin aspart U-100 pen 0-15 Units         -- SubQ Before meals & nightly PRN 02/16/24 1515          Hold Oral hypoglycemics while patient is in the hospital.

## 2024-02-16 NOTE — ASSESSMENT & PLAN NOTE
Patient is identified as having Diastolic (HFpEF) heart failure that is Chronic. CHF is currently controlled. Latest ECHO performed and demonstrates- Results for orders placed during the hospital encounter of 03/10/23    Echo    Interpretation Summary  · The left ventricle is normal in size with low normal systolic function.  · The estimated ejection fraction is 53%.  · There is abnormal septal wall motion.  · Indeterminate left ventricular diastolic function.  · Mild left atrial enlargement.  · Normal right ventricular size with low normal right ventricular systolic function.  · Mild right atrial enlargement.  · Mild mitral regurgitation.  · Mild tricuspid regurgitation.  · Normal central venous pressure (3 mmHg).  · The estimated PA systolic pressure is 23 mmHg.    No acute issues  Monitor fluid status

## 2024-02-16 NOTE — TRANSFER OF CARE
"Anesthesia Transfer of Care Note    Patient: Christine Mosqueda    Procedure(s) Performed: Procedure(s) (LRB):  DEBRIDEMENT; Librado's gangrene (Right)    Patient location: PACU    Anesthesia Type: general    Transport from OR: Transported from OR on 100% O2 by closed face mask with adequate spontaneous ventilation    Post pain: adequate analgesia    Post assessment: no apparent anesthetic complications and tolerated procedure well    Post vital signs: stable    Level of consciousness: sedated and responds to stimulation    Nausea/Vomiting: no nausea/vomiting    Complications: none    Transfer of care protocol was followed      Last vitals: Visit Vitals  /63 (BP Location: Left arm)   Pulse 78   Temp 36.7 °C (98.1 °F) (Oral)   Resp 18   Ht 5' 4" (1.626 m)   Wt 65.8 kg (145 lb)   LMP 01/29/2018 (Approximate)   SpO2 100%   BMI 24.89 kg/m²     "

## 2024-02-16 NOTE — ANESTHESIA PREPROCEDURE EVALUATION
2024  Christine Mosqueda is a 53 y.o., female.  To undergo Procedure(s) (LRB):  DEBRIDEMENT; Librado's gangrene (Right)     Denies CP/MI/CVA/URI symptoms.  Endorses baseline SOB.  Endorses GERD that is poorly controlled.  METS > 4  NPO < 8    Past Medical History:  Past Medical History:   Diagnosis Date    Anxiety     Arthritis     Bronchitis     CHF (congestive heart failure)     Diabetic ketoacidosis associated with type 2 diabetes mellitus 3/10/2023    DM (diabetes mellitus)     Emphysema lung     Encounter for blood transfusion     HTN (hypertension)     Restrictive lung disease     Sleep apnea        Past Surgical History:  Past Surgical History:   Procedure Laterality Date     SECTION      PALATAL EXPANSION      WRIST SURGERY Right        Social History:  Social History     Socioeconomic History    Marital status:    Tobacco Use    Smoking status: Some Days     Current packs/day: 0.25     Types: Cigarettes    Smokeless tobacco: Never   Substance and Sexual Activity    Alcohol use: Yes    Drug use: Yes     Types: Marijuana    Sexual activity: Yes     Partners: Male     Social Determinants of Health     Financial Resource Strain: Patient Declined (3/19/2023)    Overall Financial Resource Strain (CARDIA)     Difficulty of Paying Living Expenses: Patient declined   Food Insecurity: Patient Declined (3/19/2023)    Hunger Vital Sign     Worried About Running Out of Food in the Last Year: Patient declined     Ran Out of Food in the Last Year: Patient declined   Transportation Needs: Patient Declined (3/19/2023)    PRAPARE - Transportation     Lack of Transportation (Medical): Patient declined     Lack of Transportation (Non-Medical): Patient declined   Physical Activity: Patient Declined (3/19/2023)    Exercise Vital Sign     Days of Exercise per Week: Patient declined     Minutes of Exercise per Session: Patient declined   Stress: Patient Declined (3/19/2023)    [employer] [place] [employer] - Occupational Stress Questionnaire     Feeling of Stress : Patient declined   Social Connections: Patient Declined (3/19/2023)    Social Connection and Isolation Panel [NHANES]     Frequency of Communication with Friends and Family: Patient declined     Frequency of Social Gatherings with Friends and Family: Patient declined     Attends Yazdanism Services: Patient declined     Active Member of Clubs or Organizations: Patient declined     Attends Club or Organization Meetings: Patient declined     Marital Status: Patient declined   Housing Stability: Unknown (3/19/2023)    Housing Stability Vital Sign     Unable to Pay for Housing in the Last Year: Patient refused     Number of Places Lived in the Last Year: 1     Unstable Housing in the Last Year: Patient refused       Medications:  No current facility-administered medications on file prior to encounter.     Current Outpatient Medications on File Prior to Encounter   Medication Sig Dispense Refill    albuterol (ACCUNEB) 1.25 mg/3 mL Nebu Take 1.25 mg by nebulization every 6 (six) hours as needed.      ALPRAZolam (XANAX) 2 MG Tab Take 2 mg by mouth daily as needed for Anxiety.      amlodipine (NORVASC) 10 MG tablet Take 10 mg by mouth once daily.      aspirin 81 MG Chew Take 81 mg by mouth once daily.      atorvastatin (LIPITOR) 20 MG tablet Take 20 mg by mouth once daily.      fluticasone-salmeterol diskus inhaler 500-50 mcg Inhale 1 puff into the lungs 2 (two) times daily.      furosemide (LASIX) 40 MG tablet Take 1 tablet (40 mg total) by mouth once daily. 30 tablet 0    gabapentin (NEURONTIN) 400 MG capsule Take 400 mg by mouth 2 (two) times daily.      insulin aspart U-100 (NOVOLOG) 100 unit/mL injection Inject 10 Units into the skin 3 (three) times daily before meals.      insulin detemir U-100 (LEVEMIR) 100 unit/mL injection Inject 22 Units into the skin once daily.  12    nicotine (NICODERM CQ) 14 mg/24 hr Place 1 patch onto the skin once daily. 28  patch 1    omeprazole (PRILOSEC) 40 MG capsule Take 40 mg by mouth once daily.      potassium chloride (KLOR-CON) 8 MEQ TbSR Take 1 tablet (8 mEq total) by mouth once daily. 30 tablet 0    promethazine (PHENERGAN) 12.5 MG Tab Take 25 mg by mouth every 6 (six) hours as needed.      tiotropium (SPIRIVA) 18 mcg inhalation capsule Inhale 18 mcg into the lungs once daily.         Allergies:  Review of patient's allergies indicates:   Allergen Reactions    Hydrochlorothiazide plus        Active Problems:  Patient Active Problem List   Diagnosis    Chest pain    Acute on chronic systolic heart failure    Anxiety    Essential hypertension    Type 2 diabetes mellitus with hypoglycemia without coma, with long-term current use of insulin    Emphysema lung    Restrictive lung disease    Tobacco abuse    E coli bacteremia    Right shoulder pain    Microcytic anemia    Superficial vein thrombosis    Vasculopathy    Nonadherence to medical treatment    Hypomagnesemia    Librado's gangrene       Diagnostic Studies:   Latest Reference Range & Units 02/15/24 23:20   WBC 3.90 - 12.70 K/uL 26.34 (H)   RBC 4.00 - 5.40 M/uL 4.52   Hemoglobin 12.0 - 16.0 g/dL 9.8 (L)   Hematocrit 37.0 - 48.5 % 30.6 (L)   MCV 82 - 98 fL 68 (L)   MCH 27.0 - 31.0 pg 21.7 (L)   MCHC 32.0 - 36.0 g/dL 32.0   RDW 11.5 - 14.5 % 14.9 (H)   Platelet Count 150 - 450 K/uL 400 (C)   MPV 9.2 - 12.9 fL SEE COMMENT   Gran % 38.0 - 73.0 % 83.1 (H)   Lymph % 18.0 - 48.0 % 6.7 (L)   Mono % 4.0 - 15.0 % 7.0   Eos % 0.0 - 8.0 % 0.1   Basophil % 0.0 - 1.9 % 0.5   Immature Granulocytes 0.0 - 0.5 % 2.6 (H)   Gran # (ANC) 1.8 - 7.7 K/uL 21.9 (H)   Lymph # 1.0 - 4.8 K/uL 1.8   Mono # 0.3 - 1.0 K/uL 1.9 (H)   Eos # 0.0 - 0.5 K/uL 0.0   Baso # 0.00 - 0.20 K/uL 0.14   Immature Grans (Abs) 0.00 - 0.04 K/uL 0.68 (H)   nRBC 0 /100 WBC 0   Differential Method  Automated      Latest Reference Range & Units 02/15/24 23:20   Sodium 136 - 145 mmol/L 128 (L)   Potassium 3.5 - 5.1 mmol/L 3.8    Chloride 95 - 110 mmol/L 93 (L)   CO2 23 - 29 mmol/L 26   Anion Gap 8 - 16 mmol/L 9   BUN 6 - 20 mg/dL 16   Creatinine 0.5 - 1.4 mg/dL 1.8 (H)   eGFR >60 mL/min/1.73 m^2 33 !   Glucose 70 - 110 mg/dL 384 (H)   Calcium 8.7 - 10.5 mg/dL 8.7   ALP 55 - 135 U/L 153 (H)   PROTEIN TOTAL 6.0 - 8.4 g/dL 7.0   Albumin 3.5 - 5.2 g/dL 1.5 (L)   BILIRUBIN TOTAL 0.1 - 1.0 mg/dL 0.4   AST 10 - 40 U/L 11   ALT 10 - 44 U/L 7 (L)   CRP 0.0 - 8.2 mg/L 357.0 (H)     EKG (2/15/24):  NSR    Nuclear Stress (5/8/23):    Normal myocardial perfusion scan. There is no evidence of myocardial ischemia or infarction.    The gated perfusion images showed an ejection fraction of 47% at rest. The gated perfusion images showed an ejection fraction of 54% post stress. Normal ejection fraction is greater than 47%.    There is normal wall motion at rest and post stress.    LV cavity size is normal at rest and normal at stress.    The ECG portion of the study is negative for ischemia.    The patient reported no chest pain during the stress test.    There were no arrhythmias during stress.    There are no prior studies for comparison.    TTE (3/13/23):  The left ventricle is normal in size with low normal systolic function.  The estimated ejection fraction is 53%.  There is abnormal septal wall motion.  Indeterminate left ventricular diastolic function.  Mild left atrial enlargement.  Normal right ventricular size with low normal right ventricular systolic function.  Mild right atrial enlargement.  Mild mitral regurgitation.  Mild tricuspid regurgitation.  Normal central venous pressure (3 mmHg).  The estimated PA systolic pressure is 23 mmHg.    24 Hour Vitals:  Temp:  [37.1 °C (98.8 °F)-37.5 °C (99.5 °F)] 37.5 °C (99.5 °F)  Pulse:  [90-98] 97  Resp:  [18-24] 20  SpO2:  [93 %-99 %] 97 %  BP: (130-140)/(67-80) 130/70   See Nursing Charting For Additional Vitals      Pre-op Assessment    I have reviewed the Patient Summary Reports.     I have reviewed  the Nursing Notes.       Review of Systems  Anesthesia Hx:  No problems with previous Anesthesia               Denies Personal Hx of Anesthesia complications.                    Social:  Smoker, Alcohol Use, Recreational Drugs MJ use      Cardiovascular:  Exercise tolerance: good   Hypertension       CHF       ECG has been reviewed.                          Pulmonary:   COPD     Sleep Apnea                Hepatic/GI:  Hepatic/GI Normal                 Musculoskeletal:  Arthritis   Librado's gangrene            Neurological:  Neurology Normal                                      Endocrine:  Diabetes           Psych:   anxiety                 Physical Exam  General: Well nourished and Cooperative    Airway:  Mallampati: III   Mouth Opening: Normal  TM Distance: Normal    Dental:    Chest/Lungs:  Clear to auscultation, Normal Respiratory Rate    Heart:  Rate: Normal  Rhythm: Regular Rhythm        Anesthesia Plan  Type of Anesthesia, risks & benefits discussed:    Anesthesia Type: Gen ETT  Intra-op Monitoring Plan: Standard ASA Monitors  Post Op Pain Control Plan: multimodal analgesia and IV/PO Opioids PRN  Induction:  IV and rapid sequence  Airway Plan: Direct and Video, Post-Induction  Informed Consent: Informed consent signed with the Patient and all parties understand the risks and agree with anesthesia plan.  All questions answered. Patient consented to blood products? Yes  ASA Score: 4 Emergent  Anesthesia Plan Notes:   GA with OETT, RSI  Standard ASA monitors  Recovery in ICU  PONV: 2    Ready For Surgery From Anesthesia Perspective.     .

## 2024-02-16 NOTE — ASSESSMENT & PLAN NOTE
Chronic, controlled. Latest blood pressure and vitals reviewed-     Temp:  [97.6 °F (36.4 °C)-99.5 °F (37.5 °C)]   Pulse:  [78-98]   Resp:  [16-24]   BP: ()/(55-80)   SpO2:  [88 %-100 %] .   Home meds for hypertension were reviewed and noted below.   Hypertension Medications               amlodipine (NORVASC) 10 MG tablet Take 10 mg by mouth once daily.    furosemide (LASIX) 40 MG tablet Take 1 tablet (40 mg total) by mouth once daily.            While in the hospital, will manage blood pressure as follows; Adjust home antihypertensive regimen as follows- hold meds for now given infection/ narcotic use. Resume as warranted . May develop rebound HTN from drug/ alcohol withdrawal.    Will utilize p.r.n. blood pressure medication only if patient's blood pressure greater than 180/110 and she develops symptoms such as worsening chest pain or shortness of breath.

## 2024-02-16 NOTE — ED NOTES
Dr. Segundo, anesthesiologist at bedside obtaining informed consent for sx and blood. Risk and benefits explained, all questions answered.

## 2024-02-16 NOTE — ASSESSMENT & PLAN NOTE
Patient with acute kidney injury/acute renal failure likely due to pre-renal azotemia due to IVVD and acute tubular necrosis caused by sepsis  CARLINE is currently stable. Baseline creatinine  0.9  - Labs reviewed- Renal function/electrolytes with Estimated Creatinine Clearance: 45.2 mL/min (A) (based on SCr of 1.5 mg/dL (H)). according to latest data. Monitor urine output and serial BMP and adjust therapy as needed. Avoid nephrotoxins and renally dose meds for GFR listed above.    Gentle IVFs/ treat infection. Trend.

## 2024-02-16 NOTE — ASSESSMENT & PLAN NOTE
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 We discussed the indication for emergent surgery, as well as the risks and benefits, and she would like to proceed.    - OR for debridement of right medial thigh, Class A  - NPO  - Consent signed  - Continue scheduled broad spectrum antibiotics: vanc/zosyn/clinda  - Continue IVF resuscitation  - Insulin gtt  - ICU postop  - Trend labs, including lactate

## 2024-02-16 NOTE — HPI
Ms. Mosqueda is a 53 yoF with a PMHx of diastolic heart failure, HTN, T2DM on insulin. She presented to the hospital with worsening pain and swelling of the right medial thigh. She developed an abscess in the area that underwent I+D at [facility] a few days ago. She was sent home on PO abx. Since that time the pain and swelling have worsened.  In ED, CT scan demonstrated extensive inflammation extending from the right perineum to the right medial thigh with multiple foci of air. She was admitted to general surgery for estephanie's gangrene. She underwent extensive debridement under general surgery today in OR. She is currently doing well with good pain control. HM consulted for medical management.

## 2024-02-16 NOTE — CONSULTS
[facility] Emergency Dept  General Surgery  Consult Note    Patient Name: Christine Mosqueda  MRN: [id number]  Code Status: Prior  Admission Date: 2/15/2024  Hospital Length of Stay: 0 days  Attending Physician: Jose M Tony MD  Primary Care Provider: [facility]    Patient information was obtained from patient and ER records.     Inpatient consult to General surgery  Consult performed by: Steven Paula MD  Consult ordered by: Maria D Gomez MD        Subjective:     Principal Problem: Librado's gangrene    History of Present Illness: Ms. Mosqueda is a 53 yoF with a PMH significant for insulin-dependent T2DM and CHF (last echo EF 53%, PA systolic 23) who presents with worsening pain and swelling of the right medial thigh. She developed an abscess in the area that underwent I+D at [facility] a few days ago, after which she was sent home on PO abx. Since that time the pain and swelling have worsened leading to her presentation to our ED. Workup here revealed normal vital signs, but with significantly elevated WBC and CRP, with other laboratory derangements leading to a LRINIC score of 11. CT scan demonstrates extensive inflammation extending from the right perineum to the right medial thigh with multiple foci of air. She is not on any blood thinners. No issues with anesthesia in the past.    No current facility-administered medications on file prior to encounter.     Current Outpatient Medications on File Prior to Encounter   Medication Sig    albuterol (ACCUNEB) 1.25 mg/3 mL Nebu Take 1.25 mg by nebulization every 6 (six) hours as needed.    ALPRAZolam (XANAX) 2 MG Tab Take 2 mg by mouth daily as needed for Anxiety.    amlodipine (NORVASC) 10 MG tablet Take 10 mg by mouth once daily.    aspirin 81 MG Chew Take 81 mg by mouth once daily.    atorvastatin (LIPITOR) 20 MG tablet Take 20 mg by mouth once daily.    fluticasone-salmeterol diskus inhaler 500-50 mcg Inhale 1 puff into the lungs 2 (two)  times daily.    furosemide (LASIX) 40 MG tablet Take 1 tablet (40 mg total) by mouth once daily.    gabapentin (NEURONTIN) 400 MG capsule Take 400 mg by mouth 2 (two) times daily.    insulin aspart U-100 (NOVOLOG) 100 unit/mL injection Inject 10 Units into the skin 3 (three) times daily before meals.    insulin detemir U-100 (LEVEMIR) 100 unit/mL injection Inject 22 Units into the skin once daily.    nicotine (NICODERM CQ) 14 mg/24 hr Place 1 patch onto the skin once daily.    omeprazole (PRILOSEC) 40 MG capsule Take 40 mg by mouth once daily.    potassium chloride (KLOR-CON) 8 MEQ TbSR Take 1 tablet (8 mEq total) by mouth once daily.    promethazine (PHENERGAN) 12.5 MG Tab Take 25 mg by mouth every 6 (six) hours as needed.    tiotropium (SPIRIVA) 18 mcg inhalation capsule Inhale 18 mcg into the lungs once daily.       Review of patient's allergies indicates:   Allergen Reactions    Hydrochlorothiazide plus        Past Medical History:   Diagnosis Date    Anxiety     Arthritis     Bronchitis     CHF (congestive heart failure)     Diabetic ketoacidosis associated with type 2 diabetes mellitus 3/10/2023    DM (diabetes mellitus)     Emphysema lung     Encounter for blood transfusion     HTN (hypertension)     Restrictive lung disease     Sleep apnea      Past Surgical History:   Procedure Laterality Date     SECTION      PALATAL EXPANSION      WRIST SURGERY Right      Family History       Problem Relation (Age of Onset)    Diabetes Mother, Father, Sister    Hypertension Mother, Father          Tobacco Use    Smoking status: Some Days     Current packs/day: 0.25     Types: Cigarettes    Smokeless tobacco: Never   Substance and Sexual Activity    Alcohol use: Yes    Drug use: Yes     Types: Marijuana    Sexual activity: Yes     Partners: Male     Review of Systems   Constitutional:  Negative for chills and fever.   Respiratory:  Negative for cough and shortness of breath.    Cardiovascular:  Negative for chest  pain and palpitations.   Gastrointestinal:  Negative for abdominal pain, nausea and vomiting.   Genitourinary:  Negative for dysuria.   Neurological:  Negative for dizziness and light-headedness.     Objective:     Vital Signs (Most Recent):  Temp: 99.5 °F (37.5 °C) (02/16/24 0120)  Pulse: 97 (02/16/24 0120)  Resp: 20 (02/16/24 0135)  BP: 130/70 (02/16/24 0102)  SpO2: 97 % (02/16/24 0120) Vital Signs (24h Range):  Temp:  [98.8 °F (37.1 °C)-99.5 °F (37.5 °C)] 99.5 °F (37.5 °C)  Pulse:  [90-98] 97  Resp:  [18-24] 20  SpO2:  [93 %-99 %] 97 %  BP: (130-140)/(67-80) 130/70     Weight: 65.8 kg (145 lb)  Body mass index is 24.89 kg/m².     Physical Exam  Vitals reviewed.   Constitutional:       Appearance: Normal appearance.   Cardiovascular:      Rate and Rhythm: Normal rate.      Pulses: Normal pulses.   Pulmonary:      Effort: Pulmonary effort is normal.   Abdominal:      General: There is no distension.      Palpations: Abdomen is soft.   Musculoskeletal:      Comments: Significant swelling, tenderness, and warmth of the right medial thigh. Small area of purulent drainage on the most medial aspect at prior I+D site   Skin:     General: Skin is warm and dry.   Neurological:      General: No focal deficit present.      Mental Status: She is alert and oriented to person, place, and time.            I have reviewed all pertinent lab results within the past 24 hours.  CBC:   Recent Labs   Lab 02/15/24  2320 02/15/24  2328   WBC 26.34*  --    RBC 4.52  --    HGB 9.8*  --    HCT 30.6* 34*     --    MCV 68*  --    MCH 21.7*  --    MCHC 32.0  --      CMP:   Recent Labs   Lab 02/15/24  2320   *   CALCIUM 8.7   ALBUMIN 1.5*   PROT 7.0   *   K 3.8   CO2 26   CL 93*   BUN 16   CREATININE 1.8*   ALKPHOS 153*   ALT 7*   AST 11   BILITOT 0.4       Significant Diagnostics:  I have reviewed all pertinent imaging results/findings within the past 24 hours.    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 We discussed the indication for emergent surgery, as well as the risks and benefits, and she would like to proceed.    - OR for debridement of right medial thigh, Class A  - NPO  - Consent signed  - Continue scheduled broad spectrum antibiotics: vanc/zosyn/clinda  - Continue IVF resuscitation  - Insulin gtt  - Trend labs, including lactate      VTE Risk Mitigation (From admission, onward)      None            Thank you for your consult. I will follow-up with patient. Please contact us if you have any additional questions.    Steven Paula MD  General Surgery  [facility] - Emergency Dept

## 2024-02-16 NOTE — NURSING
Note that BGL@396. Given 15 units of Novolog, and notified Hal Brewer and Shelli per order.     Will continue to f/u.    Patient notified order has been placed.

## 2024-02-16 NOTE — ANESTHESIA POSTPROCEDURE EVALUATION
Anesthesia Post Evaluation    Patient: Christine Mosqueda    Procedure(s) Performed: Procedure(s) (LRB):  DEBRIDEMENT; Librado's gangrene (Right)    Final Anesthesia Type: general      Patient location during evaluation: PACU  Patient participation: Yes- Able to Participate  Level of consciousness: awake and alert and oriented  Post-procedure vital signs: reviewed and stable  Pain management: adequate  Airway patency: patent    PONV status at discharge: No PONV  Anesthetic complications: no      Cardiovascular status: hemodynamically stable and blood pressure returned to baseline  Respiratory status: spontaneous ventilation, room air and unassisted  Hydration status: euvolemic  Follow-up not needed.              Vitals Value Taken Time   /60 02/16/24 0558   Temp 36.4 °C (97.6 °F) 02/16/24 0558   Pulse 83 02/16/24 0747   Resp 16 02/16/24 0747   SpO2 96 % 02/16/24 0558         Event Time   Out of Recovery 02/16/2024 05:40:00         Pain/Yoel Score: Pain Rating Prior to Med Admin: 0 (2/16/2024  6:30 AM)  Pain Rating Post Med Admin: 0 (2/16/2024  4:55 AM)  Yoel Score: 8 (2/16/2024  6:15 AM)

## 2024-02-16 NOTE — PROGRESS NOTES
"Pharmacokinetic Initial Assessment: IV Vancomycin    Assessment/Plan:    Initiate intravenous vancomycin with loading dose of 1750 mg once followed by a maintenance dose of vancomycin 750 mg IV every 24 hours  Desired empiric serum trough concentration is 10 to 20 mcg/mL  Draw vancomycin trough level 60 min prior to third dose on 2/17/24 at approximately 2230  Pharmacy will continue to follow and monitor vancomycin.      Please contact pharmacy at extension [phone] with any questions regarding this assessment.     Thank you for the consult,   Terence Unger       Patient brief summary:  Christine Mosqueda is a 53 y.o. female initiated on antimicrobial therapy with IV Vancomycin for treatment of suspected skin & soft tissue infection    Drug Allergies:   Review of patient's allergies indicates:   Allergen Reactions    Hydrochlorothiazide plus        Actual Body Weight:   65.8 kg    Renal Function:   Estimated Creatinine Clearance: 32.9 mL/min (A) (based on SCr of 1.8 mg/dL (H)).,     Dialysis Method (if applicable):  N/A    CBC (last 72 hours):  Recent Labs   Lab Result Units 02/15/24  2320   WBC K/uL 26.34*   Hemoglobin g/dL 9.8*   Hematocrit % 30.6*   Platelets K/uL 400   Gran % % 83.1*   Lymph % % 6.7*   Mono % % 7.0   Eosinophil % % 0.1   Basophil % % 0.5   Differential Method  Automated       Metabolic Panel (last 72 hours):  Recent Labs   Lab Result Units 02/15/24  2320   Sodium mmol/L 128*   Potassium mmol/L 3.8   Chloride mmol/L 93*   CO2 mmol/L 26   Glucose mg/dL 384*   BUN mg/dL 16   Creatinine mg/dL 1.8*   Albumin g/dL 1.5*   Total Bilirubin mg/dL 0.4   Alkaline Phosphatase U/L 153*   AST U/L 11   ALT U/L 7*       Drug levels (last 3 results):  No results for input(s): "VANCOMYCINRA", "VANCORANDOM", "VANCOMYCINPE", "VANCOPEAK", "VANCOMYCINTR", "VANCOTROUGH" in the last 72 hours.    Microbiologic Results:  Microbiology Results (last 7 days)       Procedure Component Value Units Date/Time    Blood culture x " two cultures. Draw prior to antibiotics. [[id number]] Collected: 02/15/24 2321    Order Status: Sent Specimen: Blood from Peripheral, Antecubital, Right Updated: 02/15/24 2336    Blood culture x two cultures. Draw prior to antibiotics. [[id number]] Collected: 02/15/24 2320    Order Status: Sent Specimen: Blood from Peripheral, Antecubital, Left Updated: 02/15/24 2336

## 2024-02-16 NOTE — CONSULTS
[facility] Medicine  Consult Note    Patient Name: Christine Mosqueda  MRN: [id number]  Admission Date: 2/15/2024  Hospital Length of Stay: 0 days  Attending Physician: Doris Brewer MD  Primary Care Provider: [facility]         Patient information was obtained from patient, past medical records, and ER records.     Inpatient consult to Hospital Medicine-General  Consult performed by: Doris Brewer MD  Consult ordered by: Gabi Marques MD  Reason for consult: medical management  Assessment/Recommendations:   Very complicated patient secondary to poor compliance, alcohol/ drug abuse.           Subjective:     Principal Problem: Estephanie's gangrene    Chief Complaint:   Chief Complaint   Patient presents with    Wound Check     Was seen at  for wound to RIGHT thigh/labia and given doxy on 2/10. States not getting any better. Hx DM2        HPI: Ms. Mosqueda is a 53 yoF with a PMHx of diastolic heart failure, HTN, T2DM on insulin. She presented to the hospital with worsening pain and swelling of the right medial thigh. She developed an abscess in the area that underwent I+D at [facility] a few days ago. She was sent home on PO abx. Since that time the pain and swelling have worsened.  In ED, CT scan demonstrated extensive inflammation extending from the right perineum to the right medial thigh with multiple foci of air. She was admitted to general surgery for estephanie's gangrene. She underwent extensive debridement under general surgery today in OR. She is currently doing well with good pain control.  consulted for medical management.     Past Medical History:   Diagnosis Date    Anxiety     Arthritis     Bronchitis     CHF (congestive heart failure)     Diabetic ketoacidosis associated with type 2 diabetes mellitus 3/10/2023    DM (diabetes mellitus)     Emphysema lung     Encounter for blood transfusion     HTN (hypertension)     Restrictive lung disease     Sleep apnea         Past Surgical History:   Procedure Laterality Date     SECTION      PALATAL EXPANSION      WRIST SURGERY Right        Review of patient's allergies indicates:   Allergen Reactions    Hydrochlorothiazide plus        No current facility-administered medications on file prior to encounter.     Current Outpatient Medications on File Prior to Encounter   Medication Sig    albuterol (ACCUNEB) 1.25 mg/3 mL Nebu Take 1.25 mg by nebulization every 6 (six) hours as needed.    ALPRAZolam (XANAX) 2 MG Tab Take 2 mg by mouth daily as needed for Anxiety.    amlodipine (NORVASC) 10 MG tablet Take 10 mg by mouth once daily.    aspirin 81 MG Chew Take 81 mg by mouth once daily.    atorvastatin (LIPITOR) 20 MG tablet Take 20 mg by mouth once daily.    fluticasone-salmeterol diskus inhaler 500-50 mcg Inhale 1 puff into the lungs 2 (two) times daily.    furosemide (LASIX) 40 MG tablet Take 1 tablet (40 mg total) by mouth once daily.    gabapentin (NEURONTIN) 400 MG capsule Take 400 mg by mouth 2 (two) times daily.    insulin aspart U-100 (NOVOLOG) 100 unit/mL injection Inject 10 Units into the skin 3 (three) times daily before meals.    insulin detemir U-100 (LEVEMIR) 100 unit/mL injection Inject 22 Units into the skin once daily.    nicotine (NICODERM CQ) 14 mg/24 hr Place 1 patch onto the skin once daily.    omeprazole (PRILOSEC) 40 MG capsule Take 40 mg by mouth once daily.    potassium chloride (KLOR-CON) 8 MEQ TbSR Take 1 tablet (8 mEq total) by mouth once daily.    promethazine (PHENERGAN) 12.5 MG Tab Take 25 mg by mouth every 6 (six) hours as needed.    tiotropium (SPIRIVA) 18 mcg inhalation capsule Inhale 18 mcg into the lungs once daily.     Family History       Problem Relation (Age of Onset)    Diabetes Mother, Father, Sister    Hypertension Mother, Father          Tobacco Use    Smoking status: Some Days     Current packs/day: 0.25     Types: Cigarettes    Smokeless tobacco: Never   Substance and Sexual  Activity    Alcohol use: Yes    Drug use: Yes     Types: Marijuana    Sexual activity: Yes     Partners: Male     Review of Systems   Constitutional:  Negative for activity change, appetite change, chills, diaphoresis, fatigue and fever.   HENT:  Negative for congestion, sinus pressure, sore throat and tinnitus.    Eyes:  Negative for visual disturbance.   Respiratory:  Negative for cough, chest tightness, shortness of breath and wheezing.    Cardiovascular:  Negative for chest pain, palpitations and leg swelling.   Gastrointestinal:  Negative for abdominal distention, abdominal pain, nausea and vomiting.   Endocrine: Negative for polydipsia, polyphagia and polyuria.   Genitourinary:  Negative for dysuria.   Musculoskeletal:  Negative for arthralgias and back pain.   Skin:  Positive for wound. Negative for rash.   Neurological:  Negative for dizziness, syncope, light-headedness and headaches.   Psychiatric/Behavioral:  Negative for confusion.      Objective:     Vital Signs (Most Recent):  Temp: 98.6 °F (37 °C) (02/16/24 1141)  Pulse: 85 (02/16/24 1141)  Resp: 18 (02/16/24 1141)  BP: (!) 101/57 (02/16/24 1141)  SpO2: (!) 88 % (02/16/24 1141) Vital Signs (24h Range):  Temp:  [97.6 °F (36.4 °C)-99.5 °F (37.5 °C)] 98.6 °F (37 °C)  Pulse:  [78-98] 85  Resp:  [16-24] 18  SpO2:  [88 %-100 %] 88 %  BP: ()/(55-80) 101/57     Weight: 86.6 kg (190 lb 14.7 oz)  Body mass index is 32.77 kg/m².     Physical Exam  Vitals and nursing note reviewed.   Constitutional:       General: She is not in acute distress.     Appearance: She is well-developed. She is obese. She is ill-appearing (chronically ill appearing). She is not toxic-appearing.   HENT:      Head: Normocephalic and atraumatic.      Comments: +hirsutism     Right Ear: External ear normal.      Left Ear: External ear normal.      Nose: Nose normal.      Mouth/Throat:      Dentition: Dental caries present.      Pharynx: Uvula midline.      Comments: Hyperactive  "movements of mouth  Eyes:      General: Lids are normal.      Conjunctiva/sclera: Conjunctivae normal.      Pupils: Pupils are equal, round, and reactive to light.   Neck:      Thyroid: No thyroid mass.      Vascular: No JVD.      Trachea: Trachea normal.   Cardiovascular:      Rate and Rhythm: Normal rate and regular rhythm.      Heart sounds: Normal heart sounds, S1 normal and S2 normal.   Pulmonary:      Effort: Pulmonary effort is normal.      Breath sounds: Normal breath sounds.   Abdominal:      General: Bowel sounds are normal. There is no distension.      Palpations: Abdomen is soft.      Tenderness: There is no abdominal tenderness.   Genitourinary:     Comments: R inner upper thigh/ lateral R ext labia with gauze/ dressing. No oozing/ saturation. Surrounding thigh with mild swelling. No appreciable erythema.  Musculoskeletal:      Cervical back: Full passive range of motion without pain, normal range of motion and neck supple.      Right lower leg: No edema.      Left lower leg: No edema.   Neurological:      Mental Status: She is alert.      Cranial Nerves: No cranial nerve deficit.      Sensory: No sensory deficit.   Psychiatric:         Speech: Speech normal.         Behavior: Behavior normal. Behavior is cooperative.         Thought Content: Thought content normal.          Significant Labs: All pertinent labs within the past 24 hours have been reviewed.  Blood Culture:   Recent Labs   Lab 02/15/24  2320 02/15/24  2321   LABBLOO No Growth to date No Growth to date     BMP:   Recent Labs   Lab 02/16/24  0703   *   *   K 3.7   CL 95   CO2 24   BUN 16   CREATININE 1.5*   CALCIUM 8.1*   MG 1.5*     CBC:   Recent Labs   Lab 02/15/24  2320 02/15/24  2328 02/16/24  0703   WBC 26.34*  --  27.47*   HGB 9.8*  --  9.3*   HCT 30.6* 34* 30.0*     --  139*     Urine Culture: No results for input(s): "LABURIN" in the last 48 hours.    Significant Imaging: I have reviewed all pertinent imaging " results/findings within the past 24 hours.  Assessment/Plan:     * Librado's gangrene  S/p debridement.   Op note reviewed:  Necrotizing soft tissue infection involving the lateral perineum and proximal medial right thigh. Wound packed with betadine-soaked kerlix.      Sepsis without acute organ dysfunction  Defined by leukocytosis, tachycardia and groin abscess  Continue vanc/ zosyn  Fu blood/ wound cx's  Maintenance IVFs; gentle      CARLINE (acute kidney injury)  Patient with acute kidney injury/acute renal failure likely due to pre-renal azotemia due to IVVD and acute tubular necrosis caused by sepsis  CARLINE is currently stable. Baseline creatinine  0.9  - Labs reviewed- Renal function/electrolytes with Estimated Creatinine Clearance: 45.2 mL/min (A) (based on SCr of 1.5 mg/dL (H)). according to latest data. Monitor urine output and serial BMP and adjust therapy as needed. Avoid nephrotoxins and renally dose meds for GFR listed above.    Gentle IVFs/ treat infection. Trend.     Type 2 diabetes mellitus with hyperglycemia, without long-term current use of insulin  Patient's FSGs are uncontrolled due to hyperglycemia on current medication regimen.  Last A1c reviewed-   Lab Results   Component Value Date    HGBA1C 8.2 (H) 04/06/2023     Most recent fingerstick glucose reviewed-   Recent Labs   Lab 02/16/24  0500 02/16/24  0821 02/16/24  1137   POCTGLUCOSE 250* 270* 449*     Current correctional scale  High  Maintain anti-hyperglycemic dose as follows-   Antihyperglycemics (From admission, onward)      Start     Stop Route Frequency Ordered    02/17/24 0900  insulin detemir U-100 (Levemir) pen 30 Units         -- SubQ Daily 02/16/24 0931    02/16/24 2100  insulin detemir U-100 (Levemir) pen 15 Units         -- SubQ Nightly 02/16/24 0931    02/16/24 1514  insulin aspart U-100 pen 0-15 Units         -- SubQ Before meals & nightly PRN 02/16/24 1515          Hold Oral hypoglycemics while patient is in the  hospital.        ETOH abuse  Patient drinks at least a 6 pack beer daily  Start scheduled librium. Taper.   Thiamine, folate, potassium, magnesium      Cocaine abuse  Patient sprinkles crack crystals in her marijuana  Wants help. Tearful. Emotional support provided.   CM consulted.       Anemia of chronic disease  Patient's anemia is currently controlled. Has not received any PRBCs to date. Etiology likely d/t chronic disease.   Current CBC reviewed-   Lab Results   Component Value Date    HGB 9.3 (L) 02/16/2024    HCT 30.0 (L) 02/16/2024     Monitor serial CBC and transfuse if patient becomes hemodynamically unstable, symptomatic or H/H drops below 7/21.    Essential hypertension  Chronic, controlled. Latest blood pressure and vitals reviewed-     Temp:  [97.6 °F (36.4 °C)-99.5 °F (37.5 °C)]   Pulse:  [78-98]   Resp:  [16-24]   BP: ()/(55-80)   SpO2:  [88 %-100 %] .   Home meds for hypertension were reviewed and noted below.   Hypertension Medications               amlodipine (NORVASC) 10 MG tablet Take 10 mg by mouth once daily.    furosemide (LASIX) 40 MG tablet Take 1 tablet (40 mg total) by mouth once daily.            While in the hospital, will manage blood pressure as follows; Adjust home antihypertensive regimen as follows- hold meds for now given infection/ narcotic use. Resume as warranted . May develop rebound HTN from drug/ alcohol withdrawal.    Will utilize p.r.n. blood pressure medication only if patient's blood pressure greater than 180/110 and she develops symptoms such as worsening chest pain or shortness of breath.    Chronic diastolic congestive heart failure  Patient is identified as having Diastolic (HFpEF) heart failure that is Chronic. CHF is currently controlled. Latest ECHO performed and demonstrates- Results for orders placed during the hospital encounter of 03/10/23    Echo    Interpretation Summary  · The left ventricle is normal in size with low normal systolic function.  · The  estimated ejection fraction is 53%.  · There is abnormal septal wall motion.  · Indeterminate left ventricular diastolic function.  · Mild left atrial enlargement.  · Normal right ventricular size with low normal right ventricular systolic function.  · Mild right atrial enlargement.  · Mild mitral regurgitation.  · Mild tricuspid regurgitation.  · Normal central venous pressure (3 mmHg).  · The estimated PA systolic pressure is 23 mmHg.    No acute issues  Monitor fluid status    ILD (interstitial lung disease)  Noted on imaging; CT chest 2022  Stable, no acute issues. Not on home O2.        VTE Risk Mitigation (From admission, onward)           Ordered     enoxaparin injection 40 mg  Daily         02/16/24 0506     IP VTE HIGH RISK PATIENT  Once         02/16/24 0506     Place sequential compression device  Until discontinued         02/16/24 0506                    Thank you for your consult. I will follow-up with patient. Please contact us if you have any additional questions.      Doris Brewer MD  Department of Hospital Medicine   [facility] [place] [facility]

## 2024-02-16 NOTE — ASSESSMENT & PLAN NOTE
Patient sprinkles crack crystals in her marijuana  Wants help. Tearful. Emotional support provided.   CM consulted.

## 2024-02-16 NOTE — ASSESSMENT & PLAN NOTE
Patient drinks at least a 6 pack beer daily  Start scheduled librium. Taper.   Thiamine, folate, potassium, magnesium

## 2024-02-16 NOTE — SUBJECTIVE & OBJECTIVE
Past Medical History:   Diagnosis Date    Anxiety     Arthritis     Bronchitis     CHF (congestive heart failure)     Diabetic ketoacidosis associated with type 2 diabetes mellitus 3/10/2023    DM (diabetes mellitus)     Emphysema lung     Encounter for blood transfusion     HTN (hypertension)     Restrictive lung disease     Sleep apnea        Past Surgical History:   Procedure Laterality Date     SECTION      PALATAL EXPANSION      WRIST SURGERY Right        Review of patient's allergies indicates:   Allergen Reactions    Hydrochlorothiazide plus        No current facility-administered medications on file prior to encounter.     Current Outpatient Medications on File Prior to Encounter   Medication Sig    albuterol (ACCUNEB) 1.25 mg/3 mL Nebu Take 1.25 mg by nebulization every 6 (six) hours as needed.    ALPRAZolam (XANAX) 2 MG Tab Take 2 mg by mouth daily as needed for Anxiety.    amlodipine (NORVASC) 10 MG tablet Take 10 mg by mouth once daily.    aspirin 81 MG Chew Take 81 mg by mouth once daily.    atorvastatin (LIPITOR) 20 MG tablet Take 20 mg by mouth once daily.    fluticasone-salmeterol diskus inhaler 500-50 mcg Inhale 1 puff into the lungs 2 (two) times daily.    furosemide (LASIX) 40 MG tablet Take 1 tablet (40 mg total) by mouth once daily.    gabapentin (NEURONTIN) 400 MG capsule Take 400 mg by mouth 2 (two) times daily.    insulin aspart U-100 (NOVOLOG) 100 unit/mL injection Inject 10 Units into the skin 3 (three) times daily before meals.    insulin detemir U-100 (LEVEMIR) 100 unit/mL injection Inject 22 Units into the skin once daily.    nicotine (NICODERM CQ) 14 mg/24 hr Place 1 patch onto the skin once daily.    omeprazole (PRILOSEC) 40 MG capsule Take 40 mg by mouth once daily.    potassium chloride (KLOR-CON) 8 MEQ TbSR Take 1 tablet (8 mEq total) by mouth once daily.    promethazine (PHENERGAN) 12.5 MG Tab Take 25 mg by mouth every 6 (six) hours as needed.    tiotropium (SPIRIVA) 18 mcg  inhalation capsule Inhale 18 mcg into the lungs once daily.     Family History       Problem Relation (Age of Onset)    Diabetes Mother, Father, Sister    Hypertension Mother, Father          Tobacco Use    Smoking status: Some Days     Current packs/day: 0.25     Types: Cigarettes    Smokeless tobacco: Never   Substance and Sexual Activity    Alcohol use: Yes    Drug use: Yes     Types: Marijuana    Sexual activity: Yes     Partners: Male     Review of Systems   Constitutional:  Negative for activity change, appetite change, chills, diaphoresis, fatigue and fever.   HENT:  Negative for congestion, sinus pressure, sore throat and tinnitus.    Eyes:  Negative for visual disturbance.   Respiratory:  Negative for cough, chest tightness, shortness of breath and wheezing.    Cardiovascular:  Negative for chest pain, palpitations and leg swelling.   Gastrointestinal:  Negative for abdominal distention, abdominal pain, nausea and vomiting.   Endocrine: Negative for polydipsia, polyphagia and polyuria.   Genitourinary:  Negative for dysuria.   Musculoskeletal:  Negative for arthralgias and back pain.   Skin:  Positive for wound. Negative for rash.   Neurological:  Negative for dizziness, syncope, light-headedness and headaches.   Psychiatric/Behavioral:  Negative for confusion.      Objective:     Vital Signs (Most Recent):  Temp: 98.6 °F (37 °C) (02/16/24 1141)  Pulse: 85 (02/16/24 1141)  Resp: 18 (02/16/24 1141)  BP: (!) 101/57 (02/16/24 1141)  SpO2: (!) 88 % (02/16/24 1141) Vital Signs (24h Range):  Temp:  [97.6 °F (36.4 °C)-99.5 °F (37.5 °C)] 98.6 °F (37 °C)  Pulse:  [78-98] 85  Resp:  [16-24] 18  SpO2:  [88 %-100 %] 88 %  BP: ()/(55-80) 101/57     Weight: 86.6 kg (190 lb 14.7 oz)  Body mass index is 32.77 kg/m².     Physical Exam  Vitals and nursing note reviewed.   Constitutional:       General: She is not in acute distress.     Appearance: She is well-developed. She is obese. She is ill-appearing (chronically  ill appearing). She is not toxic-appearing.   HENT:      Head: Normocephalic and atraumatic.      Comments: +hirsutism     Right Ear: External ear normal.      Left Ear: External ear normal.      Nose: Nose normal.      Mouth/Throat:      Dentition: Dental caries present.      Pharynx: Uvula midline.      Comments: Hyperactive movements of mouth  Eyes:      General: Lids are normal.      Conjunctiva/sclera: Conjunctivae normal.      Pupils: Pupils are equal, round, and reactive to light.   Neck:      Thyroid: No thyroid mass.      Vascular: No JVD.      Trachea: Trachea normal.   Cardiovascular:      Rate and Rhythm: Normal rate and regular rhythm.      Heart sounds: Normal heart sounds, S1 normal and S2 normal.   Pulmonary:      Effort: Pulmonary effort is normal.      Breath sounds: Normal breath sounds.   Abdominal:      General: Bowel sounds are normal. There is no distension.      Palpations: Abdomen is soft.      Tenderness: There is no abdominal tenderness.   Genitourinary:     Comments: R inner upper thigh/ lateral R ext labia with gauze/ dressing. No oozing/ saturation. Surrounding thigh with mild swelling. No appreciable erythema.  Musculoskeletal:      Cervical back: Full passive range of motion without pain, normal range of motion and neck supple.      Right lower leg: No edema.      Left lower leg: No edema.   Neurological:      Mental Status: She is alert.      Cranial Nerves: No cranial nerve deficit.      Sensory: No sensory deficit.   Psychiatric:         Speech: Speech normal.         Behavior: Behavior normal. Behavior is cooperative.         Thought Content: Thought content normal.          Significant Labs: All pertinent labs within the past 24 hours have been reviewed.  Blood Culture:   Recent Labs   Lab 02/15/24  2320 02/15/24  2321   LABBLOO No Growth to date No Growth to date     BMP:   Recent Labs   Lab 02/16/24  0703   *   *   K 3.7   CL 95   CO2 24   BUN 16   CREATININE 1.5*  "  CALCIUM 8.1*   MG 1.5*     CBC:   Recent Labs   Lab 02/15/24  2320 02/15/24  2328 02/16/24  0703   WBC 26.34*  --  27.47*   HGB 9.8*  --  9.3*   HCT 30.6* 34* 30.0*     --  139*     Urine Culture: No results for input(s): "LABURIN" in the last 48 hours.    Significant Imaging: I have reviewed all pertinent imaging results/findings within the past 24 hours.  "

## 2024-02-16 NOTE — ED NOTES
Pt appears extremely uncomfortable and crying in pain, MD made aware. Orders to be placed for additional pain medication

## 2024-02-16 NOTE — ASSESSMENT & PLAN NOTE
Patient's anemia is currently controlled. Has not received any PRBCs to date. Etiology likely d/t chronic disease.   Current CBC reviewed-   Lab Results   Component Value Date    HGB 9.3 (L) 02/16/2024    HCT 30.0 (L) 02/16/2024     Monitor serial CBC and transfuse if patient becomes hemodynamically unstable, symptomatic or H/H drops below 7/21.

## 2024-02-16 NOTE — PLAN OF CARE
Case Management Assessment     PCP: [facility]  Pharmacy: [facility]    Patient Arrived From: Home  Existing Help at Home: Galen- spouse    Barriers to Discharge: None    Discharge Plan:    A. Home with family   B. Other- tbd      CM discussed discharge planning with pt and pt's spouse, Galen. Pt is independent and doesn't use DME. Pt's spouse will provide transportation home when discharged.      02/16/24 1412   Discharge Assessment   Assessment Type Discharge Planning Assessment   Confirmed/corrected address, phone number and insurance Yes   Confirmed Demographics Correct on Facesheet   Source of Information patient;family   When was your last doctors appointment? 02/15/24   Reason For Admission Librado's gangrene   People in Home spouse   Facility Arrived From: Home   Do you expect to return to your current living situation? Yes   Do you have help at home or someone to help you manage your care at home? Yes   Who are your caregiver(s) and their phone number(s)? Galen- spouse   Prior to hospitilization cognitive status: Alert/Oriented   Current cognitive status: Alert/Oriented   Walking or Climbing Stairs Difficulty no   Dressing/Bathing Difficulty no   Equipment Currently Used at Home none   Readmission within 30 days? No   Patient currently being followed by outpatient case management? No   Do you currently have service(s) that help you manage your care at home? No   Do you take prescription medications? Yes   Do you have prescription coverage? Yes   Coverage MEDICAID- [facility] [employer] PLAN   Do you have any problems affording any of your prescribed medications? No   Is the patient taking medications as prescribed? yes   Who is going to help you get home at discharge? Family   How do you get to doctors appointments? family or friend will provide;car, drives self   Are you on dialysis? No   Do you take coumadin? No   Discharge Plan A Home with family   Discharge Plan B Other  (tbd)    DME Needed Upon Discharge  other (see comments)  (tbd)   Discharge Plan discussed with: Patient;Spouse/sig other   Name(s) and Number(s) Galen- spouse [phone]   Transition of Care Barriers None   SDOH   (tbd)   Physical Activity   On average, how many days per week do you engage in moderate to strenuous exercise (like a brisk walk)? 0 days   On average, how many minutes do you engage in exercise at this level? 0 min   Financial Resource Strain   How hard is it for you to pay for the very basics like food, housing, medical care, and heating? Not very   Housing Stability   In the last 12 months, was there a time when you were not able to pay the mortgage or rent on time? N   In the last 12 months, how many places have you lived? 1   In the last 12 months, was there a time when you did not have a steady place to sleep or slept in a shelter (including now)? N   Transportation Needs   In the past 12 months, has lack of transportation kept you from medical appointments or from getting medications? no   In the past 12 months, has lack of transportation kept you from meetings, work, or from getting things needed for daily living? No   Food Insecurity   Within the past 12 months, you worried that your food would run out before you got the money to buy more. Never true   Within the past 12 months, the food you bought just didn't last and you didn't have money to get more. Never true   Stress   Do you feel stress - tense, restless, nervous, or anxious, or unable to sleep at night because your mind is troubled all the time - these days? Only a littl   Social Connections   In a typical week, how many times do you talk on the phone with family, friends, or neighbors? Three   How often do you get together with friends or relatives? Three times   How often do you attend Orthodoxy or Confucianist services? Never   Do you belong to any clubs or organizations such as Orthodoxy groups, unions, fraternal or athletic groups, or school  groups? No   How often do you attend meetings of the clubs or organizations you belong to? Never   Are you , , , , never , or living with a partner?    Alcohol Use   Q1: How often do you have a drink containing alcohol? Pt Declined   Q2: How many drinks containing alcohol do you have on a typical day when you are drinking? Pt Declined   Q3: How often do you have six or more drinks on one occasion? Pt Declined   OTHER   Name(s) of People in Home Galen- spouse

## 2024-02-16 NOTE — SUBJECTIVE & OBJECTIVE
No current facility-administered medications on file prior to encounter.     Current Outpatient Medications on File Prior to Encounter   Medication Sig    albuterol (ACCUNEB) 1.25 mg/3 mL Nebu Take 1.25 mg by nebulization every 6 (six) hours as needed.    ALPRAZolam (XANAX) 2 MG Tab Take 2 mg by mouth daily as needed for Anxiety.    amlodipine (NORVASC) 10 MG tablet Take 10 mg by mouth once daily.    aspirin 81 MG Chew Take 81 mg by mouth once daily.    atorvastatin (LIPITOR) 20 MG tablet Take 20 mg by mouth once daily.    fluticasone-salmeterol diskus inhaler 500-50 mcg Inhale 1 puff into the lungs 2 (two) times daily.    furosemide (LASIX) 40 MG tablet Take 1 tablet (40 mg total) by mouth once daily.    gabapentin (NEURONTIN) 400 MG capsule Take 400 mg by mouth 2 (two) times daily.    insulin aspart U-100 (NOVOLOG) 100 unit/mL injection Inject 10 Units into the skin 3 (three) times daily before meals.    insulin detemir U-100 (LEVEMIR) 100 unit/mL injection Inject 22 Units into the skin once daily.    nicotine (NICODERM CQ) 14 mg/24 hr Place 1 patch onto the skin once daily.    omeprazole (PRILOSEC) 40 MG capsule Take 40 mg by mouth once daily.    potassium chloride (KLOR-CON) 8 MEQ TbSR Take 1 tablet (8 mEq total) by mouth once daily.    promethazine (PHENERGAN) 12.5 MG Tab Take 25 mg by mouth every 6 (six) hours as needed.    tiotropium (SPIRIVA) 18 mcg inhalation capsule Inhale 18 mcg into the lungs once daily.       Review of patient's allergies indicates:   Allergen Reactions    Hydrochlorothiazide plus        Past Medical History:   Diagnosis Date    Anxiety     Arthritis     Bronchitis     CHF (congestive heart failure)     Diabetic ketoacidosis associated with type 2 diabetes mellitus 3/10/2023    DM (diabetes mellitus)     Emphysema lung     Encounter for blood transfusion     HTN (hypertension)     Restrictive lung disease     Sleep apnea      Past Surgical History:   Procedure Laterality Date      SECTION      PALATAL EXPANSION      WRIST SURGERY Right      Family History       Problem Relation (Age of Onset)    Diabetes Mother, Father, Sister    Hypertension Mother, Father          Tobacco Use    Smoking status: Some Days     Current packs/day: 0.25     Types: Cigarettes    Smokeless tobacco: Never   Substance and Sexual Activity    Alcohol use: Yes    Drug use: Yes     Types: Marijuana    Sexual activity: Yes     Partners: Male     Review of Systems   Constitutional:  Negative for chills and fever.   Respiratory:  Negative for cough and shortness of breath.    Cardiovascular:  Negative for chest pain and palpitations.   Gastrointestinal:  Negative for abdominal pain, nausea and vomiting.   Genitourinary:  Negative for dysuria.   Neurological:  Negative for dizziness and light-headedness.     Objective:     Vital Signs (Most Recent):  Temp: 99.5 °F (37.5 °C) (24 0120)  Pulse: 97 (24 0120)  Resp: 20 (24 0135)  BP: 130/70 (24 0102)  SpO2: 97 % (24 0120) Vital Signs (24h Range):  Temp:  [98.8 °F (37.1 °C)-99.5 °F (37.5 °C)] 99.5 °F (37.5 °C)  Pulse:  [90-98] 97  Resp:  [18-24] 20  SpO2:  [93 %-99 %] 97 %  BP: (130-140)/(67-80) 130/70     Weight: 65.8 kg (145 lb)  Body mass index is 24.89 kg/m².     Physical Exam  Vitals reviewed.   Constitutional:       Appearance: Normal appearance.   Cardiovascular:      Rate and Rhythm: Normal rate.      Pulses: Normal pulses.   Pulmonary:      Effort: Pulmonary effort is normal.   Abdominal:      General: There is no distension.      Palpations: Abdomen is soft.   Musculoskeletal:      Comments: Significant swelling, tenderness, and warmth of the right medial thigh. Small area of purulent drainage on the most medial aspect at prior I+D site   Skin:     General: Skin is warm and dry.   Neurological:      General: No focal deficit present.      Mental Status: She is alert and oriented to person, place, and time.            I have  reviewed all pertinent lab results within the past 24 hours.  CBC:   Recent Labs   Lab 02/15/24  2320 02/15/24  2328   WBC 26.34*  --    RBC 4.52  --    HGB 9.8*  --    HCT 30.6* 34*     --    MCV 68*  --    MCH 21.7*  --    MCHC 32.0  --      CMP:   Recent Labs   Lab 02/15/24  2320   *   CALCIUM 8.7   ALBUMIN 1.5*   PROT 7.0   *   K 3.8   CO2 26   CL 93*   BUN 16   CREATININE 1.8*   ALKPHOS 153*   ALT 7*   AST 11   BILITOT 0.4       Significant Diagnostics:  I have reviewed all pertinent imaging results/findings within the past 24 hours.

## 2024-02-16 NOTE — HPI
Ms. Mosqueda is a 53 yoF with a PMH significant for insulin-dependent T2DM and CHF (last echo EF 53%, PA systolic 23) who presents with worsening pain and swelling of the right medial thigh. She developed an abscess in the area that underwent I+D at [facility] a few days ago, after which she was sent home on PO abx. Since that time the pain and swelling have worsened leading to her presentation to our ED. Workup here revealed normal vital signs, but with significantly elevated WBC and CRP, with other laboratory derangements leading to a LRINIC score of 11. CT scan demonstrates extensive inflammation extending from the right perineum to the right medial thigh with multiple foci of air. She is not on any blood thinners. No issues with anesthesia in the past.

## 2024-02-16 NOTE — ED NOTES
Patient has two bags of belongings labeled with patient information. In one bag patient's purse is in it with a pain of earrings and one ring. Offered to have valuables locked up with security, patient declined and stated daughter is on way and will take her property.

## 2024-02-17 ENCOUNTER — ANESTHESIA (OUTPATIENT)
Dept: SURGERY | Facility: HOSPITAL | Age: 54
DRG: 853 | End: 2024-02-17
Payer: MEDICAID

## 2024-02-17 LAB
ALBUMIN SERPL BCP-MCNC: 1 G/DL (ref 3.5–5.2)
ALP SERPL-CCNC: 128 U/L (ref 55–135)
ALT SERPL W/O P-5'-P-CCNC: 5 U/L (ref 10–44)
ANION GAP SERPL CALC-SCNC: 13 MMOL/L (ref 8–16)
ANION GAP SERPL CALC-SCNC: 8 MMOL/L (ref 8–16)
ANION GAP SERPL CALC-SCNC: 8 MMOL/L (ref 8–16)
AST SERPL-CCNC: 11 U/L (ref 10–40)
BASOPHILS # BLD AUTO: 0.09 K/UL (ref 0–0.2)
BASOPHILS NFR BLD: 0.3 % (ref 0–1.9)
BILIRUB SERPL-MCNC: 0.3 MG/DL (ref 0.1–1)
BUN SERPL-MCNC: 26 MG/DL (ref 6–20)
BUN SERPL-MCNC: 28 MG/DL (ref 6–20)
BUN SERPL-MCNC: 30 MG/DL (ref 6–20)
CALCIUM SERPL-MCNC: 7.4 MG/DL (ref 8.7–10.5)
CALCIUM SERPL-MCNC: 7.7 MG/DL (ref 8.7–10.5)
CALCIUM SERPL-MCNC: 8.3 MG/DL (ref 8.7–10.5)
CHLORIDE SERPL-SCNC: 96 MMOL/L (ref 95–110)
CHLORIDE SERPL-SCNC: 96 MMOL/L (ref 95–110)
CHLORIDE SERPL-SCNC: 97 MMOL/L (ref 95–110)
CO2 SERPL-SCNC: 22 MMOL/L (ref 23–29)
CO2 SERPL-SCNC: 24 MMOL/L (ref 23–29)
CO2 SERPL-SCNC: 25 MMOL/L (ref 23–29)
CREAT SERPL-MCNC: 2.3 MG/DL (ref 0.5–1.4)
CREAT SERPL-MCNC: 2.4 MG/DL (ref 0.5–1.4)
CREAT SERPL-MCNC: 2.6 MG/DL (ref 0.5–1.4)
CREAT UR-MCNC: 131.7 MG/DL (ref 15–325)
DIFFERENTIAL METHOD BLD: ABNORMAL
EOSINOPHIL # BLD AUTO: 0.2 K/UL (ref 0–0.5)
EOSINOPHIL NFR BLD: 0.5 % (ref 0–8)
EOSINOPHIL URNS QL WRIGHT STN: NORMAL
ERYTHROCYTE [DISTWIDTH] IN BLOOD BY AUTOMATED COUNT: 15.8 % (ref 11.5–14.5)
EST. GFR  (NO RACE VARIABLE): 21 ML/MIN/1.73 M^2
EST. GFR  (NO RACE VARIABLE): 23 ML/MIN/1.73 M^2
EST. GFR  (NO RACE VARIABLE): 24 ML/MIN/1.73 M^2
GLUCOSE SERPL-MCNC: 101 MG/DL (ref 70–110)
GLUCOSE SERPL-MCNC: 356 MG/DL (ref 70–110)
GLUCOSE SERPL-MCNC: 68 MG/DL (ref 70–110)
GRAM STN SPEC: NORMAL
HCT VFR BLD AUTO: 25.2 % (ref 37–48.5)
HGB BLD-MCNC: 8 G/DL (ref 12–16)
IMM GRANULOCYTES # BLD AUTO: 0.42 K/UL (ref 0–0.04)
IMM GRANULOCYTES NFR BLD AUTO: 1.4 % (ref 0–0.5)
LYMPHOCYTES # BLD AUTO: 2.2 K/UL (ref 1–4.8)
LYMPHOCYTES NFR BLD: 7.6 % (ref 18–48)
MAGNESIUM SERPL-MCNC: 1.5 MG/DL (ref 1.6–2.6)
MCH RBC QN AUTO: 21.7 PG (ref 27–31)
MCHC RBC AUTO-ENTMCNC: 31.7 G/DL (ref 32–36)
MCV RBC AUTO: 69 FL (ref 82–98)
MONOCYTES # BLD AUTO: 1.7 K/UL (ref 0.3–1)
MONOCYTES NFR BLD: 5.8 % (ref 4–15)
NEUTROPHILS # BLD AUTO: 24.9 K/UL (ref 1.8–7.7)
NEUTROPHILS NFR BLD: 84.4 % (ref 38–73)
NRBC BLD-RTO: 0 /100 WBC
PHOSPHATE SERPL-MCNC: 3 MG/DL (ref 2.7–4.5)
PLATELET # BLD AUTO: 423 K/UL (ref 150–450)
PLATELET BLD QL SMEAR: ABNORMAL
PMV BLD AUTO: ABNORMAL FL (ref 9.2–12.9)
POCT GLUCOSE: 135 MG/DL (ref 70–110)
POCT GLUCOSE: 154 MG/DL (ref 70–110)
POCT GLUCOSE: 386 MG/DL (ref 70–110)
POCT GLUCOSE: 83 MG/DL (ref 70–110)
POCT GLUCOSE: 98 MG/DL (ref 70–110)
POTASSIUM SERPL-SCNC: 3.8 MMOL/L (ref 3.5–5.1)
POTASSIUM SERPL-SCNC: 4.1 MMOL/L (ref 3.5–5.1)
POTASSIUM SERPL-SCNC: 4.3 MMOL/L (ref 3.5–5.1)
PROT SERPL-MCNC: 5.4 G/DL (ref 6–8.4)
RBC # BLD AUTO: 3.68 M/UL (ref 4–5.4)
SODIUM SERPL-SCNC: 128 MMOL/L (ref 136–145)
SODIUM SERPL-SCNC: 130 MMOL/L (ref 136–145)
SODIUM SERPL-SCNC: 131 MMOL/L (ref 136–145)
SODIUM UR-SCNC: <20 MMOL/L (ref 20–250)
VANCOMYCIN TROUGH SERPL-MCNC: 18.3 UG/ML (ref 10–22)
WBC # BLD AUTO: 29.46 K/UL (ref 3.9–12.7)

## 2024-02-17 PROCEDURE — 87040 BLOOD CULTURE FOR BACTERIA: CPT | Performed by: INTERNAL MEDICINE

## 2024-02-17 PROCEDURE — 83735 ASSAY OF MAGNESIUM: CPT

## 2024-02-17 PROCEDURE — 25000003 PHARM REV CODE 250: Performed by: NURSE ANESTHETIST, CERTIFIED REGISTERED

## 2024-02-17 PROCEDURE — 11046 DBRDMT MUSC&/FSCA EA ADDL: CPT | Mod: ,,, | Performed by: SURGERY

## 2024-02-17 PROCEDURE — 84300 ASSAY OF URINE SODIUM: CPT

## 2024-02-17 PROCEDURE — 63600175 PHARM REV CODE 636 W HCPCS: Performed by: SURGERY

## 2024-02-17 PROCEDURE — 82570 ASSAY OF URINE CREATININE: CPT

## 2024-02-17 PROCEDURE — 99223 1ST HOSP IP/OBS HIGH 75: CPT | Mod: ,,, | Performed by: INTERNAL MEDICINE

## 2024-02-17 PROCEDURE — 94640 AIRWAY INHALATION TREATMENT: CPT

## 2024-02-17 PROCEDURE — 36415 COLL VENOUS BLD VENIPUNCTURE: CPT | Mod: XB | Performed by: SURGERY

## 2024-02-17 PROCEDURE — 25000003 PHARM REV CODE 250: Performed by: FAMILY MEDICINE

## 2024-02-17 PROCEDURE — 87206 SMEAR FLUORESCENT/ACID STAI: CPT | Performed by: SURGERY

## 2024-02-17 PROCEDURE — 36000706: Performed by: SURGERY

## 2024-02-17 PROCEDURE — 87070 CULTURE OTHR SPECIMN AEROBIC: CPT | Performed by: SURGERY

## 2024-02-17 PROCEDURE — 63600175 PHARM REV CODE 636 W HCPCS

## 2024-02-17 PROCEDURE — 83935 ASSAY OF URINE OSMOLALITY: CPT

## 2024-02-17 PROCEDURE — 71000033 HC RECOVERY, INTIAL HOUR: Performed by: SURGERY

## 2024-02-17 PROCEDURE — 25000003 PHARM REV CODE 250: Performed by: SURGERY

## 2024-02-17 PROCEDURE — 27000221 HC OXYGEN, UP TO 24 HOURS

## 2024-02-17 PROCEDURE — 80202 ASSAY OF VANCOMYCIN: CPT | Performed by: SURGERY

## 2024-02-17 PROCEDURE — 27201423 OPTIME MED/SURG SUP & DEVICES STERILE SUPPLY: Performed by: SURGERY

## 2024-02-17 PROCEDURE — 84540 ASSAY OF URINE/UREA-N: CPT | Performed by: INTERNAL MEDICINE

## 2024-02-17 PROCEDURE — 36415 COLL VENOUS BLD VENIPUNCTURE: CPT | Mod: XB

## 2024-02-17 PROCEDURE — 87205 SMEAR GRAM STAIN: CPT | Mod: 91

## 2024-02-17 PROCEDURE — 80048 BASIC METABOLIC PNL TOTAL CA: CPT | Mod: 91,XB | Performed by: INTERNAL MEDICINE

## 2024-02-17 PROCEDURE — D9220A PRA ANESTHESIA: Mod: CRNA,,, | Performed by: NURSE ANESTHETIST, CERTIFIED REGISTERED

## 2024-02-17 PROCEDURE — 0KBQ0ZZ EXCISION OF RIGHT UPPER LEG MUSCLE, OPEN APPROACH: ICD-10-PCS | Performed by: SURGERY

## 2024-02-17 PROCEDURE — 25000003 PHARM REV CODE 250: Performed by: INTERNAL MEDICINE

## 2024-02-17 PROCEDURE — 25000003 PHARM REV CODE 250

## 2024-02-17 PROCEDURE — 11043 DBRDMT MUSC&/FSCA 1ST 20/<: CPT | Mod: ,,, | Performed by: SURGERY

## 2024-02-17 PROCEDURE — 87205 SMEAR GRAM STAIN: CPT | Performed by: SURGERY

## 2024-02-17 PROCEDURE — 85025 COMPLETE CBC W/AUTO DIFF WBC: CPT

## 2024-02-17 PROCEDURE — 37000009 HC ANESTHESIA EA ADD 15 MINS: Performed by: SURGERY

## 2024-02-17 PROCEDURE — 71000039 HC RECOVERY, EACH ADD'L HOUR: Performed by: SURGERY

## 2024-02-17 PROCEDURE — 80053 COMPREHEN METABOLIC PANEL: CPT

## 2024-02-17 PROCEDURE — 94761 N-INVAS EAR/PLS OXIMETRY MLT: CPT

## 2024-02-17 PROCEDURE — 84100 ASSAY OF PHOSPHORUS: CPT

## 2024-02-17 PROCEDURE — 83930 ASSAY OF BLOOD OSMOLALITY: CPT

## 2024-02-17 PROCEDURE — D9220A PRA ANESTHESIA: Mod: ANES,,, | Performed by: ANESTHESIOLOGY

## 2024-02-17 PROCEDURE — 20000000 HC ICU ROOM

## 2024-02-17 PROCEDURE — 63600175 PHARM REV CODE 636 W HCPCS: Performed by: FAMILY MEDICINE

## 2024-02-17 PROCEDURE — 36000707: Performed by: SURGERY

## 2024-02-17 PROCEDURE — 63600175 PHARM REV CODE 636 W HCPCS: Performed by: NURSE ANESTHETIST, CERTIFIED REGISTERED

## 2024-02-17 PROCEDURE — 63600175 PHARM REV CODE 636 W HCPCS: Performed by: ANESTHESIOLOGY

## 2024-02-17 PROCEDURE — 25000242 PHARM REV CODE 250 ALT 637 W/ HCPCS: Performed by: SURGERY

## 2024-02-17 PROCEDURE — 94760 N-INVAS EAR/PLS OXIMETRY 1: CPT

## 2024-02-17 PROCEDURE — 80048 BASIC METABOLIC PNL TOTAL CA: CPT | Mod: XB | Performed by: SURGERY

## 2024-02-17 PROCEDURE — 37000008 HC ANESTHESIA 1ST 15 MINUTES: Performed by: SURGERY

## 2024-02-17 PROCEDURE — 36415 COLL VENOUS BLD VENIPUNCTURE: CPT | Performed by: SURGERY

## 2024-02-17 PROCEDURE — 87116 MYCOBACTERIA CULTURE: CPT | Performed by: SURGERY

## 2024-02-17 PROCEDURE — 87102 FUNGUS ISOLATION CULTURE: CPT | Performed by: SURGERY

## 2024-02-17 PROCEDURE — 36415 COLL VENOUS BLD VENIPUNCTURE: CPT | Mod: XB | Performed by: INTERNAL MEDICINE

## 2024-02-17 PROCEDURE — 99233 SBSQ HOSP IP/OBS HIGH 50: CPT | Mod: 57,,, | Performed by: SURGERY

## 2024-02-17 PROCEDURE — 87075 CULTR BACTERIA EXCEPT BLOOD: CPT | Performed by: SURGERY

## 2024-02-17 RX ORDER — MUPIROCIN 20 MG/G
OINTMENT TOPICAL 2 TIMES DAILY
Status: COMPLETED | OUTPATIENT
Start: 2024-02-17 | End: 2024-02-22

## 2024-02-17 RX ORDER — MAGNESIUM SULFATE HEPTAHYDRATE 40 MG/ML
4 INJECTION, SOLUTION INTRAVENOUS ONCE
Status: DISCONTINUED | OUTPATIENT
Start: 2024-02-17 | End: 2024-02-18

## 2024-02-17 RX ORDER — HYDROMORPHONE HYDROCHLORIDE 2 MG/ML
0.2 INJECTION, SOLUTION INTRAMUSCULAR; INTRAVENOUS; SUBCUTANEOUS EVERY 5 MIN PRN
Status: DISCONTINUED | OUTPATIENT
Start: 2024-02-17 | End: 2024-02-17 | Stop reason: HOSPADM

## 2024-02-17 RX ORDER — ROCURONIUM BROMIDE 10 MG/ML
INJECTION, SOLUTION INTRAVENOUS
Status: DISCONTINUED | OUTPATIENT
Start: 2024-02-17 | End: 2024-02-17

## 2024-02-17 RX ORDER — SODIUM BICARBONATE 325 MG/1
1300 TABLET ORAL 2 TIMES DAILY
Status: DISCONTINUED | OUTPATIENT
Start: 2024-02-17 | End: 2024-02-20

## 2024-02-17 RX ORDER — CALCIUM GLUCONATE 20 MG/ML
1 INJECTION, SOLUTION INTRAVENOUS
Status: DISPENSED | OUTPATIENT
Start: 2024-02-17 | End: 2024-02-17

## 2024-02-17 RX ORDER — PROCHLORPERAZINE EDISYLATE 5 MG/ML
INJECTION INTRAMUSCULAR; INTRAVENOUS
Status: DISCONTINUED | OUTPATIENT
Start: 2024-02-17 | End: 2024-02-17

## 2024-02-17 RX ORDER — LIDOCAINE HYDROCHLORIDE 20 MG/ML
INJECTION INTRAVENOUS
Status: DISCONTINUED | OUTPATIENT
Start: 2024-02-17 | End: 2024-02-17

## 2024-02-17 RX ORDER — PROPOFOL 10 MG/ML
VIAL (ML) INTRAVENOUS
Status: DISCONTINUED | OUTPATIENT
Start: 2024-02-17 | End: 2024-02-17

## 2024-02-17 RX ORDER — ONDANSETRON HYDROCHLORIDE 2 MG/ML
INJECTION, SOLUTION INTRAVENOUS
Status: DISCONTINUED | OUTPATIENT
Start: 2024-02-17 | End: 2024-02-17

## 2024-02-17 RX ORDER — HYDROMORPHONE HYDROCHLORIDE 1 MG/ML
0.5 INJECTION, SOLUTION INTRAMUSCULAR; INTRAVENOUS; SUBCUTANEOUS
Status: DISCONTINUED | OUTPATIENT
Start: 2024-02-17 | End: 2024-02-20

## 2024-02-17 RX ORDER — PHENYLEPHRINE HYDROCHLORIDE 10 MG/ML
INJECTION INTRAVENOUS
Status: DISCONTINUED | OUTPATIENT
Start: 2024-02-17 | End: 2024-02-17

## 2024-02-17 RX ORDER — MIDAZOLAM HYDROCHLORIDE 1 MG/ML
INJECTION, SOLUTION INTRAMUSCULAR; INTRAVENOUS
Status: DISCONTINUED | OUTPATIENT
Start: 2024-02-17 | End: 2024-02-17

## 2024-02-17 RX ORDER — FENTANYL CITRATE 50 UG/ML
INJECTION, SOLUTION INTRAMUSCULAR; INTRAVENOUS
Status: DISCONTINUED | OUTPATIENT
Start: 2024-02-17 | End: 2024-02-17

## 2024-02-17 RX ORDER — GABAPENTIN 100 MG/1
100 CAPSULE ORAL 2 TIMES DAILY
Status: DISCONTINUED | OUTPATIENT
Start: 2024-02-17 | End: 2024-02-20

## 2024-02-17 RX ORDER — SODIUM CHLORIDE 0.9 % (FLUSH) 0.9 %
10 SYRINGE (ML) INJECTION
Status: DISCONTINUED | OUTPATIENT
Start: 2024-02-17 | End: 2024-02-17 | Stop reason: HOSPADM

## 2024-02-17 RX ORDER — SUCCINYLCHOLINE CHLORIDE 20 MG/ML
INJECTION INTRAMUSCULAR; INTRAVENOUS
Status: DISCONTINUED | OUTPATIENT
Start: 2024-02-17 | End: 2024-02-17

## 2024-02-17 RX ORDER — LORAZEPAM 2 MG/ML
0.25 INJECTION INTRAMUSCULAR EVERY 10 MIN PRN
Status: DISCONTINUED | OUTPATIENT
Start: 2024-02-17 | End: 2024-02-17 | Stop reason: HOSPADM

## 2024-02-17 RX ADMIN — CHLORDIAZEPOXIDE HYDROCHLORIDE 5 MG: 5 CAPSULE ORAL at 01:02

## 2024-02-17 RX ADMIN — INSULIN DETEMIR 15 UNITS: 100 INJECTION, SOLUTION SUBCUTANEOUS at 08:02

## 2024-02-17 RX ADMIN — SODIUM BICARBONATE 1300 MG: 325 TABLET ORAL at 03:02

## 2024-02-17 RX ADMIN — Medication 500 MG: at 08:02

## 2024-02-17 RX ADMIN — PIPERACILLIN AND TAZOBACTAM 4.5 G: 4; .5 INJECTION, POWDER, LYOPHILIZED, FOR SOLUTION INTRAVENOUS; PARENTERAL at 02:02

## 2024-02-17 RX ADMIN — SODIUM CHLORIDE, SODIUM LACTATE, POTASSIUM CHLORIDE, AND CALCIUM CHLORIDE: .6; .31; .03; .02 INJECTION, SOLUTION INTRAVENOUS at 09:02

## 2024-02-17 RX ADMIN — ONDANSETRON 4 MG: 2 INJECTION, SOLUTION INTRAMUSCULAR; INTRAVENOUS at 08:02

## 2024-02-17 RX ADMIN — LIDOCAINE HYDROCHLORIDE 100 MG: 20 INJECTION, SOLUTION INTRAVENOUS at 08:02

## 2024-02-17 RX ADMIN — FENTANYL CITRATE 100 MCG: 0.05 INJECTION, SOLUTION INTRAMUSCULAR; INTRAVENOUS at 08:02

## 2024-02-17 RX ADMIN — ACETAMINOPHEN 1000 MG: 500 TABLET ORAL at 01:02

## 2024-02-17 RX ADMIN — SUGAMMADEX 200 MG: 100 INJECTION, SOLUTION INTRAVENOUS at 09:02

## 2024-02-17 RX ADMIN — HYDROMORPHONE HYDROCHLORIDE 0.2 MG: 2 INJECTION INTRAMUSCULAR; INTRAVENOUS; SUBCUTANEOUS at 09:02

## 2024-02-17 RX ADMIN — CLINDAMYCIN IN 5 PERCENT DEXTROSE 600 MG: 12 INJECTION, SOLUTION INTRAVENOUS at 07:02

## 2024-02-17 RX ADMIN — CHLORDIAZEPOXIDE HYDROCHLORIDE 5 MG: 5 CAPSULE ORAL at 08:02

## 2024-02-17 RX ADMIN — MEROPENEM 1 G: 1 INJECTION INTRAVENOUS at 08:02

## 2024-02-17 RX ADMIN — CLINDAMYCIN IN 5 PERCENT DEXTROSE 600 MG: 12 INJECTION, SOLUTION INTRAVENOUS at 02:02

## 2024-02-17 RX ADMIN — BUDESONIDE 1 MG: 0.5 INHALANT RESPIRATORY (INHALATION) at 08:02

## 2024-02-17 RX ADMIN — ARFORMOTEROL TARTRATE 15 MCG: 15 SOLUTION RESPIRATORY (INHALATION) at 08:02

## 2024-02-17 RX ADMIN — ARFORMOTEROL TARTRATE 15 MCG: 15 SOLUTION RESPIRATORY (INHALATION) at 07:02

## 2024-02-17 RX ADMIN — ACETAMINOPHEN 1000 MG: 500 TABLET ORAL at 10:02

## 2024-02-17 RX ADMIN — PHENYLEPHRINE HYDROCHLORIDE 100 MCG: 10 INJECTION INTRAVENOUS at 08:02

## 2024-02-17 RX ADMIN — GABAPENTIN 100 MG: 100 CAPSULE ORAL at 08:02

## 2024-02-17 RX ADMIN — PROCHLORPERAZINE EDISYLATE 5 MG: 5 INJECTION INTRAMUSCULAR; INTRAVENOUS at 08:02

## 2024-02-17 RX ADMIN — CHLORDIAZEPOXIDE HYDROCHLORIDE 5 MG: 5 CAPSULE ORAL at 04:02

## 2024-02-17 RX ADMIN — BUDESONIDE 1 MG: 0.5 INHALANT RESPIRATORY (INHALATION) at 07:02

## 2024-02-17 RX ADMIN — ENOXAPARIN SODIUM 40 MG: 40 INJECTION SUBCUTANEOUS at 04:02

## 2024-02-17 RX ADMIN — MUPIROCIN: 20 OINTMENT TOPICAL at 08:02

## 2024-02-17 RX ADMIN — ROCURONIUM BROMIDE 30 MG: 10 INJECTION, SOLUTION INTRAVENOUS at 08:02

## 2024-02-17 RX ADMIN — SUCCINYLCHOLINE CHLORIDE 120 MG: 20 INJECTION, SOLUTION INTRAMUSCULAR; INTRAVENOUS at 08:02

## 2024-02-17 RX ADMIN — INSULIN ASPART 15 UNITS: 100 INJECTION, SOLUTION INTRAVENOUS; SUBCUTANEOUS at 09:02

## 2024-02-17 RX ADMIN — SODIUM CHLORIDE, SODIUM LACTATE, POTASSIUM CHLORIDE, AND CALCIUM CHLORIDE: .6; .31; .03; .02 INJECTION, SOLUTION INTRAVENOUS at 07:02

## 2024-02-17 RX ADMIN — MEROPENEM 1 G: 1 INJECTION INTRAVENOUS at 12:02

## 2024-02-17 RX ADMIN — MIDAZOLAM HYDROCHLORIDE 2 MG: 1 INJECTION, SOLUTION INTRAMUSCULAR; INTRAVENOUS at 08:02

## 2024-02-17 RX ADMIN — CLINDAMYCIN IN 5 PERCENT DEXTROSE 600 MG: 12 INJECTION, SOLUTION INTRAVENOUS at 05:02

## 2024-02-17 RX ADMIN — INSULIN ASPART 3 UNITS: 100 INJECTION, SOLUTION INTRAVENOUS; SUBCUTANEOUS at 04:02

## 2024-02-17 RX ADMIN — ROCURONIUM BROMIDE 10 MG: 10 INJECTION, SOLUTION INTRAVENOUS at 08:02

## 2024-02-17 RX ADMIN — SODIUM CHLORIDE: 9 INJECTION, SOLUTION INTRAVENOUS at 01:02

## 2024-02-17 RX ADMIN — PROPOFOL 120 MG: 10 INJECTION, EMULSION INTRAVENOUS at 08:02

## 2024-02-17 RX ADMIN — HYDROMORPHONE HYDROCHLORIDE 0.2 MG: 2 INJECTION INTRAMUSCULAR; INTRAVENOUS; SUBCUTANEOUS at 10:02

## 2024-02-17 RX ADMIN — OXYCODONE 5 MG: 5 TABLET ORAL at 08:02

## 2024-02-17 RX ADMIN — OXYCODONE 5 MG: 5 TABLET ORAL at 01:02

## 2024-02-17 NOTE — HOSPITAL COURSE
Ms Christine Mosqueda is a 53 y.o.  woman with poorly controlled type II DM who was admitted for sepsis secondary to bacteremia and Librado's gangrene. Pt presented w/ worsening pain and swelling of the Rt medial thigh. 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 General surgery team consulted. S/p surgical debridement 02/16, 2/17, 2/19, and on 02/21 (wound vac placed.) S/p OR 2/23 for first wound vac change. Plan for OR again on 02/26. Had acute on chronic anemia, likely exacerbated with multiple debridements. Required blood transfusions on 02/19 and again on 02/21.  Blood and wound cultures with lactobacillus. ID consulted- on meropenem. Repeat blood cx with no growth thus far. Also found to have CARLINE on admission, nephrology following. CARLINE resolved. PT/OT recommends SNF on discharge.

## 2024-02-17 NOTE — NURSING
Note that patient w/ POCT BGL@83, asymptomatic.   Denies pain, except with activity.   On 2L NC with SPO2@92%.   Hernandez drainage bag emptied 600 cc concentrated urine.   No meds given prior to patient leaving floor.    Patient to surgery.    Will continue to f/u.

## 2024-02-17 NOTE — HPI
"53F with h/o DM, admitted 2/15 with abscess in R medial thigh. Reports going to [facility] on 2/10 for the same thing. Given doxycycline, which she says she was taking. Wound on R leg became more painful, so came to hospital. Reports fever. Denies indwelling hardware. Reports dental caries, but no recent extractions. Reports aches all over, but no other areas of localized pain other than R thigh. Denies abx allergies.     CT 2/15-  Extensive inflammatory changes extending from the right perineum to the right medial thigh. Linear area of fluid attenuation in the right labial region, which may represent a small abscess. Multiple foci of air in the right medial thigh, which is difficult to measure. Considerations may include developing abscess, phlegmonous tissue, and or instrumentation resulting in subcutaneous air.       Bcx on admit positive for GPR in one of two bottles      Component 2 d ago   Blood Culture, Routine Gram stain portia bottle: Gram positive rods P   Blood Culture, Routine Results called to and read back by:Sandra HARVEY 02/17/2024  11:01 P          S/p I&D with general surgery on 2/16 and 2/17 with plans for another debridment Monday. Muscle necrosis noted. Cultures sent. Gram stain positive:       Component 09:11   Gram Stain Result Rare WBC's   Gram Stain Result Many Gram positive cocci in pairs and chains   Gram Stain Result Rare Gram negative rods     Hiv and hep c testing negative    On vanc/meropenem    ID consulted for "fourniers gangrene, diabetic, CARLINE; no cultures taken at initial debridement "  "

## 2024-02-17 NOTE — ASSESSMENT & PLAN NOTE
Patient's FSGs are uncontrolled due to hyperglycemia on current medication regimen.  Last A1c reviewed-   Lab Results   Component Value Date    HGBA1C 8.2 (H) 04/06/2023     Most recent fingerstick glucose reviewed-   Recent Labs   Lab 02/16/24  1807 02/16/24 2026 02/17/24  0734 02/17/24  0948   POCTGLUCOSE >500* 158* 83 98       Current correctional scale  High  Maintain anti-hyperglycemic dose as follows-   Antihyperglycemics (From admission, onward)      Start     Stop Route Frequency Ordered    02/17/24 0900  insulin detemir U-100 (Levemir) pen 30 Units         -- SubQ Daily 02/16/24 0931    02/16/24 2100  insulin detemir U-100 (Levemir) pen 15 Units         -- SubQ Nightly 02/16/24 0931    02/16/24 1514  insulin aspart U-100 pen 0-15 Units         -- SubQ Before meals & nightly PRN 02/16/24 1515          Hold Oral hypoglycemics while patient is in the hospital.  Will give only the night dose of Levemir this pm, hold am dose

## 2024-02-17 NOTE — ANESTHESIA PROCEDURE NOTES
Intubation    Date/Time: 2/17/2024 8:10 AM    Performed by: Qasim Ochoa CRNA  Authorized by: Wilmer Calixto MD    Intubation:     Induction:  Rapid sequence induction    Intubated:  Postinduction    Mask Ventilation:  Not attempted    Attempts:  1    Attempted By:  CRNA    Method of Intubation:  Video laryngoscopy    Blade:  Jama 3    Laryngeal View Grade: Grade I - full view of cords      Difficult Airway Encountered?: No      Complications:  None    Airway Device:  Oral endotracheal tube    Airway Device Size:  7.0    Style/Cuff Inflation:  Cuffed (inflated to minimal occlusive pressure)    Inflation Amount (mL):  5    Tube secured:  21    Secured at:  The lips    Placement Verified By:  Capnometry    Complicating Factors:  None    Findings Post-Intubation:  BS equal bilateral and atraumatic/condition of teeth unchanged

## 2024-02-17 NOTE — PROGRESS NOTES
[facility] Medicine  Progress Note    Patient Name: Christine Mosqueda  MRN: [id number]  Patient Class: IP- Inpatient   Admission Date: 2/15/2024  Length of Stay: 1 days  Attending Physician: Radha Snider MD  Primary Care Provider: [facility]        Subjective:     Principal Problem:Estephanie's gangrene      HPI:  Ms. Mosqueda is a 53 yoF with a PMHx of diastolic heart failure, HTN, T2DM on insulin. She presented to the hospital with worsening pain and swelling of the right medial thigh. She developed an abscess in the area that underwent I+D at [facility] a few days ago. She was sent home on PO abx. Since that time the pain and swelling have worsened.  In ED, CT scan demonstrated extensive inflammation extending from the right perineum to the right medial thigh with multiple foci of air. She was admitted to general surgery for estephanie's gangrene. She underwent extensive debridement under general surgery today in OR. She is currently doing well with good pain control.  consulted for medical management.     Overview/Hospital Course:  A 53y.o F w/ PMHx of poorly controlled type II DM, who was admitted w/ worsening pain and swelling of the Rt medial thigh. CT scan findings were consistent w/ a possible Estephanie's gangrene, pt had surgical debridement done on 02/16 and again she had wound washout and necrotic tissue removal on 02/17, currently she is being covered w/ IV Vanc + Meropenem + Clindamycin, admitted to the MICU postoperatively for close monitoring. Medicine service will be primary on this pt.    Subjective:   Interval History:  Pt had a second surgical debridement + washout done by general surgery this am, she tolerated surgery well w/o acute complications. Pt will be admitted to the MICU post-operatively for close monitoring.      Review of Systems   Constitutional:  Negative for chills and fever.   HENT:  Negative for congestion, sinus pressure, sore throat and  tinnitus.    Respiratory:  Negative for cough, chest tightness, shortness of breath and wheezing.    Cardiovascular:  Negative for chest pain, palpitations and leg swelling.   Gastrointestinal:  Negative for abdominal distention, abdominal pain, nausea and vomiting.   Musculoskeletal:  Negative for arthralgias and back pain.   Skin:  Positive for wound (severe pain at the surgical site). Negative for rash.   Neurological:  Negative for syncope and light-headedness.   Psychiatric/Behavioral:  Negative for confusion.      Objective:     Vital Signs (Most Recent):  Temp: 99 °F (37.2 °C) (02/17/24 1230)  Pulse: 97 (02/17/24 1303)  Resp: (!) 26 (02/17/24 1350)  BP: (!) 136/58 (02/17/24 1303)  SpO2: 98 % (02/17/24 1303) Vital Signs (24h Range):  Temp:  [97.8 °F (36.6 °C)-101.3 °F (38.5 °C)] 99 °F (37.2 °C)  Pulse:  [] 97  Resp:  [15-33] 26  SpO2:  [79 %-100 %] 98 %  BP: (108-167)/(56-86) 136/58     Weight: 86.6 kg (190 lb 14.7 oz)  Body mass index is 32.77 kg/m².     Physical Exam  Vitals and nursing note reviewed.   Constitutional:       General: She is not in acute distress.     Appearance: She is well-developed. She is obese. She is ill-appearing (chronically ill appearing). She is not toxic-appearing.   HENT:      Head: Normocephalic and atraumatic.      Comments: +hirsutism     Right Ear: External ear normal.      Left Ear: External ear normal.      Nose: Nose normal.      Mouth/Throat:      Dentition: Dental caries present.      Pharynx: Uvula midline.      Comments: Hyperactive movements of mouth  Eyes:      General: Lids are normal.      Conjunctiva/sclera: Conjunctivae normal.      Pupils: Pupils are equal, round, and reactive to light.   Neck:      Thyroid: No thyroid mass.      Vascular: No JVD.      Trachea: Trachea normal.   Cardiovascular:      Rate and Rhythm: Normal rate and regular rhythm.      Heart sounds: Normal heart sounds, S1 normal and S2 normal.   Pulmonary:      Effort: Pulmonary effort is  "normal.      Breath sounds: Normal breath sounds.   Abdominal:      General: Bowel sounds are normal. There is no distension.      Palpations: Abdomen is soft.      Tenderness: There is no abdominal tenderness.   Genitourinary:     Comments: R inner upper thigh/ lateral R ext labia with gauze/ dressing. No oozing/ saturation. Surrounding thigh with mild swelling.  Musculoskeletal:      Cervical back: Full passive range of motion without pain, normal range of motion and neck supple.      Right lower leg: No edema.      Left lower leg: No edema.   Neurological:      Mental Status: She is alert.      Cranial Nerves: No cranial nerve deficit.      Sensory: No sensory deficit.   Psychiatric:         Speech: Speech normal.         Behavior: Behavior normal. Behavior is cooperative.         Thought Content: Thought content normal.          Significant Labs: All pertinent labs within the past 24 hours have been reviewed.  Blood Culture:   Recent Labs   Lab 02/15/24  2320 02/15/24  2321   LABBLOO Gram stain portia bottle: Gram positive rods  Results called to and read back by:Sandra HARVEY 02/17/2024  11:01 No Growth to date  No Growth to date       BMP:   Recent Labs   Lab 02/17/24  0407 02/17/24  1212   GLU 68* 101   * 131*   K 3.8 4.1   CL 97 96   CO2 25 22*   BUN 26* 28*   CREATININE 2.4* 2.3*   CALCIUM 7.7* 8.3*   MG 1.5*  --        CBC:   Recent Labs   Lab 02/15/24  2320 02/15/24  2328 02/16/24  0703 02/17/24  0407   WBC 26.34*  --  27.47* 29.46*   HGB 9.8*  --  9.3* 8.0*   HCT 30.6* 34* 30.0* 25.2*     --  139* 423       Urine Culture: No results for input(s): "LABURIN" in the last 48 hours.    Significant Imaging: I have reviewed all pertinent imaging results/findings within the past 24 hours.    Assessment/Plan:      * Librado's gangrene  S/p initial debridement on 02/16 and again on 02/17/2024  Op note reviewed:  Cont Abx coverage w/ IV Vanc + Meropenem + Clindamycin  Blood culture w/ NGTD, surgical wound " cultures pending   ID consult requested, appreciate recs  Plan to return to OR for an additional wound exploration/debridement/washout on Monday 2/19/24.       ETOH abuse  Patient drinks at least a 6 pack beer daily  Start scheduled librium. Taper.   Thiamine, folate, potassium, magnesium      Cocaine abuse  Patient sprinkles crack crystals in her marijuana  Wants help. Tearful. Emotional support provided.   CM consulted.       Anemia of chronic disease  Patient's anemia is currently controlled. Has not received any PRBCs to date. Etiology likely d/t chronic disease.   Current CBC reviewed-   Lab Results   Component Value Date    HGB 9.3 (L) 02/16/2024    HCT 30.0 (L) 02/16/2024     Monitor serial CBC and transfuse if patient becomes hemodynamically unstable, symptomatic or H/H drops below 7/21.    Essential hypertension  Chronic, controlled. Latest blood pressure and vitals reviewed-     Temp:  [97.6 °F (36.4 °C)-99.5 °F (37.5 °C)]   Pulse:  [78-98]   Resp:  [16-24]   BP: ()/(55-80)   SpO2:  [88 %-100 %] .   Home meds for hypertension were reviewed and noted below.   Hypertension Medications               amlodipine (NORVASC) 10 MG tablet Take 10 mg by mouth once daily.    furosemide (LASIX) 40 MG tablet Take 1 tablet (40 mg total) by mouth once daily.            While in the hospital, will manage blood pressure as follows; Adjust home antihypertensive regimen as follows- hold meds for now given infection/ narcotic use. Resume as warranted . May develop rebound HTN from drug/ alcohol withdrawal.    Will utilize p.r.n. blood pressure medication only if patient's blood pressure greater than 180/110 and she develops symptoms such as worsening chest pain or shortness of breath.    Sepsis without acute organ dysfunction  Defined by leukocytosis, tachycardia and groin abscess  Continue vanc/ meropenem/clindamycin  Fu blood/ wound cx's  Maintenance IVFs; gentle  Appreciate ID recs      Chronic diastolic congestive  heart failure  Patient is identified as having Diastolic (HFpEF) heart failure that is Chronic. CHF is currently controlled. Latest ECHO performed and demonstrates- Results for orders placed during the hospital encounter of 03/10/23    Echo    Interpretation Summary  · The left ventricle is normal in size with low normal systolic function.  · The estimated ejection fraction is 53%.  · There is abnormal septal wall motion.  · Indeterminate left ventricular diastolic function.  · Mild left atrial enlargement.  · Normal right ventricular size with low normal right ventricular systolic function.  · Mild right atrial enlargement.  · Mild mitral regurgitation.  · Mild tricuspid regurgitation.  · Normal central venous pressure (3 mmHg).  · The estimated PA systolic pressure is 23 mmHg.    No acute issues  Monitor fluid status    ILD (interstitial lung disease)  Noted on imaging; CT chest 2022  Stable, no acute issues. Not on home O2.      CARLINE (acute kidney injury)  Patient with acute kidney injury/acute renal failure likely due to pre-renal azotemia due to IVVD and acute tubular necrosis caused by sepsis  CARLINE is currently worsening. Baseline creatinine  0.9  - Labs reviewed- Renal function/electrolytes with Estimated Creatinine Clearance: 29.4 mL/min (A) (based on SCr of 2.3 mg/dL (H)). according to latest data. Monitor urine output and serial BMP and adjust therapy as needed. Avoid nephrotoxins and renally dose meds for GFR listed above.    - Highly suspect ATN as an etiology at this time  - Nephrology consult requested, appreciate recs  - Cont gentle IVFs/ treat infection.    Tobacco abuse  - Pt was counseled about cessation      Type 2 diabetes mellitus with hyperglycemia, without long-term current use of insulin  Patient's FSGs are uncontrolled due to hyperglycemia on current medication regimen.  Last A1c reviewed-   Lab Results   Component Value Date    HGBA1C 8.2 (H) 04/06/2023     Most recent fingerstick glucose  reviewed-   Recent Labs   Lab 02/16/24  1807 02/16/24 2026 02/17/24  0734 02/17/24  0948   POCTGLUCOSE >500* 158* 83 98       Current correctional scale  High  Maintain anti-hyperglycemic dose as follows-   Antihyperglycemics (From admission, onward)      Start     Stop Route Frequency Ordered    02/17/24 0900  insulin detemir U-100 (Levemir) pen 30 Units         -- SubQ Daily 02/16/24 0931    02/16/24 2100  insulin detemir U-100 (Levemir) pen 15 Units         -- SubQ Nightly 02/16/24 0931    02/16/24 1514  insulin aspart U-100 pen 0-15 Units         -- SubQ Before meals & nightly PRN 02/16/24 1515          Hold Oral hypoglycemics while patient is in the hospital.  Will give only the night dose of Levemir this pm, hold am dose          VTE Risk Mitigation (From admission, onward)           Ordered     enoxaparin injection 40 mg  Daily         02/16/24 0506     IP VTE HIGH RISK PATIENT  Once         02/16/24 0506     Place sequential compression device  Until discontinued         02/16/24 0506                    Discharge Planning   AVTAR: 2/17/2024     Code Status: Full Code   Is the patient medically ready for discharge?:     Reason for patient still in hospital (select all that apply): Patient trending condition  Discharge Plan A: Home with family            Critical care time spent on the evaluation and treatment of severe organ dysfunction, review of pertinent labs and imaging studies, discussions with consulting providers and discussions with patient/family: 45 minutes.      Radha Snider MD  Department of Hospital Medicine   [facility] - [place] - Intensive Care

## 2024-02-17 NOTE — NURSING
Note that patient personal belongings, (two bags) and jewelry brought to ICU. Spouse has cell phone.

## 2024-02-17 NOTE — ASSESSMENT & PLAN NOTE
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     - Will need take back to OR for repeat exploration and debridement, possible wound vac application no later than Monday 2/19  - Post operatively we feel that she should transfer to the ICU for closer hemodynamic monitoring, serial labs, and close nursing care  - Worsening leukocytosis and CARLINE in the setting of inadequate source control; continue serial monitoring, resuscitation, nephro consult   - ID consult; Vanc/zosyn/clinda started 2/16, switched to meropenam, vanc, clinda 2/17   - Blood cultures 2/16, NGTD    - Operative cultures 2/17, pending  - Poorly controlled DMII - insulin per HM  - Polysubstance abuse to alcohol dependency - librium taper on, CIWA protocol  - Multiple modal pain control limited by CARLINE - continue scheduled tylenol, combination of oral and IV narcotics  - Okay for DVT ppx   - Will have long term wound care needs; pending final disposition may need placement for this   -  taking over as primary for this medically complex patient, appreciate their assistance greatly    Surgery will follow closely

## 2024-02-17 NOTE — ASSESSMENT & PLAN NOTE
S/p initial debridement on 02/16 and again on 02/17/2024  Op note reviewed:  Cont Abx coverage w/ IV Vanc + Meropenem + Clindamycin  Blood culture w/ NGTD, surgical wound cultures pending   ID consult requested, appreciate recs  Plan to return to OR for an additional wound exploration/debridement/washout on Monday 2/19/24.

## 2024-02-17 NOTE — NURSING
Pre-surgery orders for medication:  Note that I contacted surgery this am to determine if scheduled meds should be given .  The anesthesiologist ordered to only give amlodipine. Hold all other scheduled meds including insulin.    Otherwise, patient has been maintained NPO and chlorhexidine  given.     Will review v/s to determine if amlodipine may be given.    Awaiting contact for surgery.

## 2024-02-17 NOTE — ASSESSMENT & PLAN NOTE
Patient with acute kidney injury/acute renal failure likely due to pre-renal azotemia due to IVVD and acute tubular necrosis caused by sepsis  CARLINE is currently worsening. Baseline creatinine  0.9  - Labs reviewed- Renal function/electrolytes with Estimated Creatinine Clearance: 29.4 mL/min (A) (based on SCr of 2.3 mg/dL (H)). according to latest data. Monitor urine output and serial BMP and adjust therapy as needed. Avoid nephrotoxins and renally dose meds for GFR listed above.    - Highly suspect ATN as an etiology at this time  - Nephrology consult requested, appreciate recs  - Cont gentle IVFs/ treat infection.

## 2024-02-17 NOTE — OP NOTE
[facility] [place]  Operative Note    SUMMARY     Surgery Date: 2/17/2024     Surgeon(s) and Role:     * Scott Koroma MD - Primary    Assisting Surgeon: Gabi Marques MD    Pre-op Diagnosis:  Librado's gangrene [N49.3]    Post-op Diagnosis:  Post-Op Diagnosis Codes:     * Librado's gangrene [N49.3]  Necrotizing Soft Tissue Infection/Fasciitis    Procedure(s) (LRB):  INCISION AND DRAINAGE; WOUND DEBRIDEMENT, (N/A)    Anesthesia: General    Indication for procedure: Christine Mosqueda is 53 y.o. female with multiple medical comorbidities including uncontrolled diabetes who had a debridement yesterday early morning of the right thigh and groin. After discussion of disease process, we discussed options and have elected for operation to perform repeat operation debridement washout.    Description of Procedure: After consent was obtained, patient was taken to the OR. The right groin and thigh was prepped and draped in a standard sterile fashion after general anesthesia was started. Time out was performed. Antibiotics were started with vanc and Zosyn. We began the procedure by having the patient in lithotomy position we explored the previously opened wound.  It did not track it seemed to be very superficial debridement however we found a lot of edema on the skin and the thigh and on repeat evaluation of the CT scan it did appear that there should be some necrotic muscle deeper to this.  We opened the wound further and found a large amount of necrotic muscle which proximally tract medially tract and distally tract.  There was large amount of pus this was thoroughly irrigated and washed out and evacuated from all the wound spaces.  It was cultured.  The necrotic muscle was traced distally appeared to be possibly sartorius possibly some adductor muscles and these were completely removed proximally segment of 14 x 6 x 4 cm chunk of muscle was frankly necrotic and excised and removed from the field.  The remaining tissue  had scattered areas of necrosis which were debrided sharply and bluntly and with electrocautery dissection.  It did track medially towards the labial tissues this was also debrided similarly.  Towards the perirectal area there was a small amount of purulent fluid this was irrigated and washed out.  We used a Pulsavac with a L of normal saline and thoroughly used the Pulsavac on all the exposed tissues.  There was 1 area of bleeding we used a 3-0 Vicryl to over-sew this bleeder but most of the hemostasis was achieved with Bovie electrocautery.  By the end of the case all the tissue appeared to have well-vascularized muscle which had good color contractility and bleeding surfaces.  We did have to extend the skin distally approximately 10 cm distal to the thigh to expose molar the infected tissue for washout.  We then packed the open wound with multiple Betadine-soaked Kerlix is and then put ABD pads over this.  All counts were correct x2. Patient was awakened from anesthesia and taken to the recovery room in a stable condition having suffered no issues at this time.    Description of the findings of the procedure:   Further abscess pockets were washed out and explored and cleared.  Significant amount of musculature of the proximal thigh was sharply excised due to necrosis.  Wound washout with Pulsavac and packed with Betadine-soaked Kerlix  Wound dimensions after debridement: 22 cm x 19 cm x 6 cm    Estimated Blood Loss: 100 mL         Specimens:   Wound cultures    PLAN OF CARE:  Will transfer to ICU, patient could become septic. Has multiple medical comorbidities.  Medicine is on board, will discuss possible transfer of care to be primary team  Plan to return to OR for an additional wound exploration/debridement/washout on Monday 2/19/24.    Scott Koroma  General Surgery - [facility]  2/17/2024  9:26 AM

## 2024-02-17 NOTE — SUBJECTIVE & OBJECTIVE
Interval History: NAEON. Taken to OR yesterday morning as class A for NSTI of right thigh. Admitted post oepratively to floor, seen by hospital medicine  Rising leukocytosis this am, febrile overnight, blood glucose labile. NPO since midnight, on mIVF.     Taken to OR for repeat wound exploration. Extensive remaining necrotic tissue and muscle with loculated subfascial purulence in multiple planes, fully released and explored.     Medications:  Continuous Infusions:   sodium chloride 0.9% 75 mL/hr at 02/16/24 1605     Scheduled Meds:   acetaminophen  1,000 mg Oral Q8H    amLODIPine  10 mg Oral Daily    budesonide  1 mg Nebulization Q12H    And    arformoteroL  15 mcg Nebulization BID    aspirin  81 mg Oral Daily    atorvastatin  20 mg Oral Daily    calcium gluconate IVPB  1 g Intravenous Q1H    chlordiazepoxide  5 mg Oral QID    clindamycin IV (PEDS and ADULTS)  600 mg Intravenous Q6H    enoxparin  40 mg Subcutaneous Daily    folic acid  1 mg Oral Daily    gabapentin  300 mg Oral TID    insulin detemir U-100  15 Units Subcutaneous QHS    insulin detemir U-100 (Levemir)  30 Units Subcutaneous Daily    magnesium oxide  500 mg Oral BID    magnesium sulfate IVPB  4 g Intravenous Once    meropenem (MERREM) IVPB  1 g Intravenous Q8H    pantoprazole  40 mg Oral Daily    potassium chloride  40 mEq Oral Daily    sodium chloride 0.9%  500 mL Intravenous Once    thiamine  100 mg Oral Daily    vancomycin (VANCOCIN) IV (PEDS and ADULTS)  750 mg Intravenous Q24H     PRN Meds:albuterol sulfate, dextrose 10%, dextrose 10%, glucagon (human recombinant), glucose, glucose, HYDROmorphone, HYDROmorphone, HYDROmorphone, insulin aspart U-100, LIDOcaine (PF) 10 mg/ml (1%), melatonin, ondansetron, oxyCODONE, oxyCODONE, prochlorperazine, sodium chloride 0.9%, sodium chloride 0.9%, Pharmacy to dose Vancomycin consult **AND** vancomycin - pharmacy to dose     Review of patient's allergies indicates:   Allergen Reactions    Hydrochlorothiazide  plus      Objective:     Vital Signs (Most Recent):  Temp: 100.3 °F (37.9 °C) (02/17/24 0444)  Pulse: 102 (02/17/24 0715)  Resp: 18 (02/17/24 0715)  BP: 125/82 (02/17/24 0444)  SpO2: (!) 92 % (02/17/24 0715) Vital Signs (24h Range):  Temp:  [97.9 °F (36.6 °C)-101.3 °F (38.5 °C)] 100.3 °F (37.9 °C)  Pulse:  [] 102  Resp:  [16-20] 18  SpO2:  [88 %-95 %] 92 %  BP: (101-125)/(56-82) 125/82     Weight: 86.6 kg (190 lb 14.7 oz)  Body mass index is 32.77 kg/m².    Intake/Output - Last 3 Shifts         02/15 0700 02/16 0659 02/16 0700 02/17 0659 02/17 0700 02/18 0659    P.O.  360     IV Piggyback 3400  1000    Total Intake(mL/kg) 3400 (39.3) 360 (4.2) 1000 (11.5)    Urine (mL/kg/hr) 500 450 (0.2) 695 (2.9)    Blood   100    Total Output 500 450 795    Net +2900 -90 +205                    Physical Exam  Vitals and nursing note reviewed.   Constitutional:       Appearance: Normal appearance.   HENT:      Head: Normocephalic and atraumatic.   Cardiovascular:      Rate and Rhythm: Normal rate and regular rhythm.   Pulmonary:      Effort: Pulmonary effort is normal. No respiratory distress.   Abdominal:      General: Abdomen is flat. There is no distension.      Palpations: Abdomen is soft. There is no mass.      Tenderness: There is no abdominal tenderness.      Hernia: No hernia is present.   Musculoskeletal:      Right lower leg: No edema.      Left lower leg: No edema.   Skin:     General: Skin is warm and dry.      Comments: Wound to right thigh dressed   Neurological:      General: No focal deficit present.      Mental Status: She is alert and oriented to person, place, and time.   Psychiatric:         Mood and Affect: Mood normal.         Behavior: Behavior normal.          Significant Labs:  I have reviewed all pertinent lab results within the past 24 hours.  CBC:   Recent Labs   Lab 02/17/24  0407   WBC 29.46*   RBC 3.68*   HGB 8.0*   HCT 25.2*      MCV 69*   MCH 21.7*   MCHC 31.7*     CMP:   Recent  Labs   Lab 02/17/24  0407   GLU 68*   CALCIUM 7.7*   ALBUMIN 1.0*   PROT 5.4*   *   K 3.8   CO2 25   CL 97   BUN 26*   CREATININE 2.4*   ALKPHOS 128   ALT 5*   AST 11   BILITOT 0.3     LFTs:   Recent Labs   Lab 02/17/24  0407   ALT 5*   AST 11   ALKPHOS 128   BILITOT 0.3   PROT 5.4*   ALBUMIN 1.0*       Significant Diagnostics:  I have reviewed all pertinent imaging results/findings within the past 24 hours.

## 2024-02-17 NOTE — CONSULTS
[facility] [place] - Intensive Care  Nephrology  Consult Note    Patient Name: Christine Mosqueda  MRN: [id number]  Admission Date: 2/15/2024  Hospital Length of Stay: 1 days  Attending Provider: Radha Snider MD   Primary Care Physician: [facility]  Principal Problem:Estephanie's gangrene    Inpatient consult to Nephrology  Consult performed by: Brigitte Deleon, FÉLIX-C  Consult ordered by: Gabi Marques MD  Reason for consult: Worsening CARLINE 2/2 sepsis, ATN        Subjective:     HPI: Ms. Mosqueda is a 53 yoF with a PMHx of diastolic heart failure, EtOH abuse and cocaine abuse, HTN, uncontrolled T2DM on insulin who presented to the hospital with worsening pain and swelling of the right medial thigh. She developed an abscess in the right medial thigh that underwent I+D at [place] a few days ago. She was sent home on PO abx. Since that time the pain and swelling have worsened. In ED, CT scan demonstrated extensive inflammation extending from the right perineum to the right medial thigh with multiple foci of air. She was admitted to general surgery for estephanie's gangrene. She underwent extensive debridement under general surgery yesterday in OR, brought back this morning for I&D and wound debridement.     Nephrology consulted for CARLINE due to sepsis/ATN. Baseline Cr 0.8-1.2 in . Cr on admission 1.8, Peak Cr 2.4 today. BP labile, on 2L NC. Hernandez catheter in place. NS running at 75 ml/hr. She denies any complaints just hungry and wants ice chips.     Past Medical History:   Diagnosis Date    Anxiety     Arthritis     Bronchitis     CHF (congestive heart failure)     Diabetic ketoacidosis associated with type 2 diabetes mellitus 3/10/2023    DM (diabetes mellitus)     Emphysema lung     Encounter for blood transfusion     HTN (hypertension)     Restrictive lung disease     Sleep apnea        Past Surgical History:   Procedure Laterality Date     SECTION      PALATAL EXPANSION      WRIST SURGERY  Right        Review of patient's allergies indicates:   Allergen Reactions    Hydrochlorothiazide plus      Current Facility-Administered Medications   Medication Frequency    0.9%  NaCl infusion Continuous    acetaminophen tablet 1,000 mg Q8H    albuterol sulfate nebulizer solution 2.5 mg Q4H PRN    amLODIPine tablet 10 mg Daily    budesonide nebulizer solution 1 mg Q12H    And    arformoteroL nebulizer solution 15 mcg BID    aspirin chewable tablet 81 mg Daily    atorvastatin tablet 20 mg Daily    chlordiazepoxide capsule 5 mg QID    clindamycin in D5W 600 mg/50 mL IVPB 600 mg Q6H    dextrose 10% bolus 125 mL 125 mL PRN    dextrose 10% bolus 250 mL 250 mL PRN    enoxaparin injection 40 mg Daily    folic acid tablet 1 mg Daily    gabapentin capsule 100 mg BID    glucagon (human recombinant) injection 1 mg PRN    glucose chewable tablet 16 g PRN    glucose chewable tablet 24 g PRN    HYDROmorphone injection 0.5 mg Q2H PRN    insulin aspart U-100 pen 0-15 Units QID (AC + HS) PRN    insulin detemir U-100 (Levemir) pen 15 Units QHS    insulin detemir U-100 (Levemir) pen 30 Units Daily    LIDOcaine (PF) 10 mg/ml (1%) injection 10 mg Once PRN    magnesium oxide tablet 500 mg BID    magnesium sulfate 2g in water 50mL IVPB (premix) Once    melatonin tablet 6 mg Nightly PRN    meropenem (MERREM) 1 g in sodium chloride 0.9 % 100 mL IVPB (MB+) Q8H    mupirocin 2 % ointment BID    ondansetron disintegrating tablet 8 mg Q8H PRN    oxyCODONE immediate release tablet 10 mg Q4H PRN    oxyCODONE immediate release tablet 5 mg Q4H PRN    pantoprazole EC tablet 40 mg Daily    prochlorperazine injection Soln 5 mg Q6H PRN    sodium bicarbonate tablet 1,300 mg BID    sodium chloride 0.9% bolus 500 mL 500 mL Once    sodium chloride 0.9% flush 10 mL PRN    thiamine tablet 100 mg Daily    vancomycin - pharmacy to dose pharmacy to manage frequency    vancomycin 750 mg in dextrose 5 % (D5W) 250 mL IVPB (Vial-Mate) Q24H     Family History        Problem Relation (Age of Onset)    Diabetes Mother, Father, Sister    Hypertension Mother, Father          Tobacco Use    Smoking status: Some Days     Current packs/day: 0.25     Types: Cigarettes    Smokeless tobacco: Never   Substance and Sexual Activity    Alcohol use: Yes    Drug use: Yes     Types: Marijuana    Sexual activity: Yes     Partners: Male     Review of Systems   Constitutional:  Negative for activity change, appetite change and fatigue.   HENT: Negative.     Eyes: Negative.    Respiratory:  Negative for cough, chest tightness, shortness of breath and wheezing.    Cardiovascular:  Negative for chest pain, palpitations and leg swelling.   Gastrointestinal:  Negative for abdominal pain, blood in stool, diarrhea, nausea and vomiting.   Genitourinary:  Positive for decreased urine volume. Negative for difficulty urinating, dysuria, flank pain and hematuria.   Musculoskeletal: Negative.    Skin:  Positive for wound.   Neurological: Negative.    Psychiatric/Behavioral: Negative.     All other systems reviewed and are negative.    Objective:     Vital Signs (Most Recent):  Temp: 98 °F (36.7 °C) (02/17/24 1330)  Pulse: 98 (02/17/24 1500)  Resp: 12 (02/17/24 1500)  BP: (!) 90/54 (02/17/24 1500)  SpO2: 96 % (02/17/24 1500) Vital Signs (24h Range):  Temp:  [97.8 °F (36.6 °C)-101.3 °F (38.5 °C)] 98 °F (36.7 °C)  Pulse:  [] 98  Resp:  [12-33] 12  SpO2:  [79 %-100 %] 96 %  BP: ()/(54-86) 90/54     Weight: 86.6 kg (190 lb 14.7 oz) (02/16/24 0558)  Body mass index is 32.77 kg/m².  Body surface area is 1.98 meters squared.    I/O last 3 completed shifts:  In: 3760 [P.O.:360; IV Piggyback:3400]  Out: 950 [Urine:950]    Physical Exam  Vitals and nursing note reviewed.   Constitutional:       General: She is not in acute distress.     Appearance: She is well-developed. She is obese. She is ill-appearing (chronically ill appearing). She is not toxic-appearing.   HENT:      Head: Normocephalic and  atraumatic.      Comments: Teeth decayed and loose     Right Ear: External ear normal.      Left Ear: External ear normal.      Nose: Nose normal.      Mouth/Throat:      Dentition: Dental caries present.      Pharynx: Uvula midline.   Eyes:      General: Lids are normal.      Conjunctiva/sclera: Conjunctivae normal.      Pupils: Pupils are equal, round, and reactive to light.   Neck:      Thyroid: No thyroid mass.      Vascular: No JVD.      Trachea: Trachea normal.   Cardiovascular:      Rate and Rhythm: Normal rate and regular rhythm.      Heart sounds: Normal heart sounds, S1 normal and S2 normal.   Pulmonary:      Effort: Pulmonary effort is normal.      Breath sounds: Normal breath sounds.   Abdominal:      General: Bowel sounds are normal. There is no distension.      Palpations: Abdomen is soft.      Tenderness: There is no abdominal tenderness.   Genitourinary:     Comments: jansen  Musculoskeletal:      Cervical back: Full passive range of motion without pain, normal range of motion and neck supple.      Right lower leg: No edema.      Left lower leg: No edema.   Skin:     Comments: Fresh Surgical dressing on R medial thigh. No drains   Neurological:      General: No focal deficit present.      Mental Status: She is alert and oriented to person, place, and time.      Cranial Nerves: No cranial nerve deficit.      Sensory: No sensory deficit.   Psychiatric:         Speech: Speech normal.         Behavior: Inattentive. Fidgeting with phone and blanket.        Thought Content: Thought content normal.     Significant Labs:  BMP:   Recent Labs   Lab 02/17/24  0407 02/17/24  1212   GLU 68* 101   * 131*   K 3.8 4.1   CL 97 96   CO2 25 22*   BUN 26* 28*   CREATININE 2.4* 2.3*   CALCIUM 7.7* 8.3*   MG 1.5*  --      CBC:   Recent Labs   Lab 02/17/24 0407   WBC 29.46*   RBC 3.68*   HGB 8.0*   HCT 25.2*      MCV 69*   MCH 21.7*   MCHC 31.7*     CMP:   Recent Labs   Lab 02/17/24  0407 02/17/24  1212   GLU  68* 101   CALCIUM 7.7* 8.3*   ALBUMIN 1.0*  --    PROT 5.4*  --    * 131*   K 3.8 4.1   CO2 25 22*   CL 97 96   BUN 26* 28*   CREATININE 2.4* 2.3*   ALKPHOS 128  --    ALT 5*  --    AST 11  --    BILITOT 0.3  --      LFTs:   Recent Labs   Lab 02/17/24  0407   ALT 5*   AST 11   ALKPHOS 128   BILITOT 0.3   PROT 5.4*   ALBUMIN 1.0*     All labs within the past 24 hours have been reviewed.    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] 02/16/2024 Yes    ILD (interstitial lung disease) [J84.9] 02/16/2024 Yes    Chronic diastolic congestive heart failure [I50.32] 02/16/2024 Yes    Sepsis without acute organ dysfunction [A41.9] 02/16/2024 Yes    Essential hypertension [I10] 02/16/2024 Yes    Anemia of chronic disease [D63.8] 02/16/2024 Yes    Cocaine abuse [F14.10] 02/16/2024 Yes    ETOH abuse [F10.10] 02/16/2024 Yes    CARLINE (acute kidney injury) [N17.9] 03/10/2023 Yes    Tobacco abuse [Z72.0] 02/05/2018 Yes     Chronic    Type 2 diabetes mellitus with hyperglycemia, without long-term current use of insulin [E11.65] 02/05/2018 Yes      Problems Resolved During this Admission:       CARLINE  - baseline Cr 0.8-1.2 in 2023  - suspect septic and ischemic ATN in the setting of Librado's gangrene  - Cr appears to be reaching a plateau, UOP is adequate  - can continue IVF for now w/ low threshold to hold if volume overload develops, ok to give lasix PRN hypervolemia.  Maintain euvolemia  - check UA, Ulytes, renal U/S  - no urgent indication for dialysis   - check BMP this PM  - monitor daily weights, strict I&Os  - avoid nephrotoxic agents including NSAIDs, renally dose medications     Hypomagnesemia/Hypocalcemia  - replace IV and PO  - repeat level in AM     Hyponatremia  - Na trending up  - goal toward glycemic control  - recommend converting IVPB to NS when possible  - monitor serial levels    Acidosis  - start  PO bicarb  - monitor level  - continue IVF    Hypoalbuminemia  - diabetic renal diet  - Boost supplement drinks TID    Thank you for your consult. I will follow-up with patient. Please contact us if you have any additional questions.    VONDA TREADWELLP-C  Nephrology  [facility] - [place] - Intensive Care

## 2024-02-17 NOTE — PLAN OF CARE
Problem: Diabetes Comorbidity  Goal: Blood Glucose Level Within Targeted Range  Outcome: Met  Intervention: Monitor and Manage Glycemia  Flowsheets (Taken 2/16/2024 3043)  Glycemic Management:   blood glucose monitored   supplemental insulin given   oral hydration promoted

## 2024-02-17 NOTE — CLINICAL REVIEW
IP Sepsis Screen (most recent)       Sepsis Screen (IP) - 02/17/24 0385       Is the patient's history or complaint suggestive of a possible infection? Yes  -CB    Are there at least two of the following signs and symptoms present? Yes  -CB    Sepsis signs/symptoms - Tachycardia Tachycardia     >90  -CB    Sepsis signs/symptoms - WBC WBC < 4,000 or WBC > 12,000  -CB    Are any of the following organ dysfunction criteria present and not considered to be due to a chronic condition? Yes  -CB    Organ Dysfunction Criteria Creatinine > 2.0  -CB    Initiate Sepsis Protocol No  -CB    Reason sepsis not considered Pt. receiving appropriate management  -CB              User Key  (r) = Recorded By, (t) = Taken By, (c) = Cosigned By      Initials Name    CB Meliza Lopez, RN

## 2024-02-17 NOTE — TRANSFER OF CARE
"Anesthesia Transfer of Care Note    Patient: Christine Mosqueda    Procedure(s) Performed: Procedure(s) (LRB):  INCISION AND DRAINAGE; WOUND DEBRIDEMENT, (N/A)    Patient location: PACU    Anesthesia Type: general    Transport from OR: Transported from OR on 100% O2 by closed face mask with adequate spontaneous ventilation    Post pain: pain needs to be addressed    Post assessment: no apparent anesthetic complications and tolerated procedure well    Post vital signs: stable    Level of consciousness: awake    Nausea/Vomiting: no nausea/vomiting    Complications: none    Transfer of care protocol was followed      Last vitals: Visit Vitals  /86 (BP Location: Left arm)   Pulse 85   Temp 36.6 °C (97.8 °F) (Temporal)   Resp 18   Ht 5' 4" (1.626 m)   Wt 86.6 kg (190 lb 14.7 oz)   LMP 01/29/2018 (Approximate)   SpO2 100%   BMI 32.77 kg/m²     " [place] PGY3, Chente Bishop

## 2024-02-17 NOTE — ANESTHESIA PREPROCEDURE EVALUATION
2024  Christine Mosqueda is a 53 y.o., female.  To undergo Procedure(s) (LRB):  DEBRIDEMENT; Librado's gangrene (Right)     Denies CP/MI/CVA/URI symptoms.  Endorses baseline SOB.  Endorses GERD that is poorly controlled.  METS > 4  NPO > 8    Past Medical History:  Past Medical History:   Diagnosis Date    Anxiety     Arthritis     Bronchitis     CHF (congestive heart failure)     Diabetic ketoacidosis associated with type 2 diabetes mellitus 3/10/2023    DM (diabetes mellitus)     Emphysema lung     Encounter for blood transfusion     HTN (hypertension)     Restrictive lung disease     Sleep apnea        Past Surgical History:  Past Surgical History:   Procedure Laterality Date     SECTION      PALATAL EXPANSION      WRIST SURGERY Right        Social History:  Social History     Socioeconomic History    Marital status:    Tobacco Use    Smoking status: Some Days     Current packs/day: 0.25     Types: Cigarettes    Smokeless tobacco: Never   Substance and Sexual Activity    Alcohol use: Yes    Drug use: Yes     Types: Marijuana    Sexual activity: Yes     Partners: Male     Social Determinants of Health     Financial Resource Strain: Low Risk  (2024)    Overall Financial Resource Strain (CARDIA)     Difficulty of Paying Living Expenses: Not very hard   Food Insecurity: No Food Insecurity (2024)    Hunger Vital Sign     Worried About Running Out of Food in the Last Year: Never true     Ran Out of Food in the Last Year: Never true   Transportation Needs: No Transportation Needs (2024)    PRAPARE - Transportation     Lack of Transportation (Medical): No     Lack of Transportation (Non-Medical): No   Physical Activity: Inactive (2024)    Exercise Vital Sign     Days of Exercise per Week: 0 days     Minutes of Exercise per Session: 0 min   Stress: No Stress Concern Present (2024)    [employer] [employer] - Occupational Stress Questionnaire     Feeling of  Stress : Only a little   Social Connections: Moderately Isolated (2/16/2024)    Social Connection and Isolation Panel [NHANES]     Frequency of Communication with Friends and Family: Three times a week     Frequency of Social Gatherings with Friends and Family: Three times a week     Attends Synagogue Services: Never     Active Member of Clubs or Organizations: No     Attends Club or Organization Meetings: Never     Marital Status:    Housing Stability: Low Risk  (2/16/2024)    Housing Stability Vital Sign     Unable to Pay for Housing in the Last Year: No     Number of Places Lived in the Last Year: 1     Unstable Housing in the Last Year: No       Medications:  No current facility-administered medications on file prior to encounter.     Current Outpatient Medications on File Prior to Encounter   Medication Sig Dispense Refill    albuterol (ACCUNEB) 1.25 mg/3 mL Nebu Take 1.25 mg by nebulization every 6 (six) hours as needed.      ALPRAZolam (XANAX) 2 MG Tab Take 2 mg by mouth daily as needed for Anxiety.      amlodipine (NORVASC) 10 MG tablet Take 10 mg by mouth once daily.      aspirin 81 MG Chew Take 81 mg by mouth once daily.      atorvastatin (LIPITOR) 20 MG tablet Take 20 mg by mouth once daily.      fluticasone-salmeterol diskus inhaler 500-50 mcg Inhale 1 puff into the lungs 2 (two) times daily.      furosemide (LASIX) 40 MG tablet Take 1 tablet (40 mg total) by mouth once daily. 30 tablet 0    gabapentin (NEURONTIN) 400 MG capsule Take 400 mg by mouth 2 (two) times daily.      insulin aspart U-100 (NOVOLOG) 100 unit/mL injection Inject 10 Units into the skin 3 (three) times daily before meals.      insulin detemir U-100 (LEVEMIR) 100 unit/mL injection Inject 22 Units into the skin once daily.  12    nicotine (NICODERM CQ) 14 mg/24 hr Place 1 patch onto the skin once daily. 28 patch 1    omeprazole (PRILOSEC) 40 MG capsule Take 40 mg by mouth once daily.      potassium chloride (KLOR-CON) 8 MEQ TbSR  Take 1 tablet (8 mEq total) by mouth once daily. 30 tablet 0    promethazine (PHENERGAN) 12.5 MG Tab Take 25 mg by mouth every 6 (six) hours as needed.      tiotropium (SPIRIVA) 18 mcg inhalation capsule Inhale 18 mcg into the lungs once daily.         Allergies:  Review of patient's allergies indicates:   Allergen Reactions    Hydrochlorothiazide plus        Active Problems:  Patient Active Problem List   Diagnosis    Chest pain    Acute on chronic systolic heart failure    Anxiety    Type 2 diabetes mellitus with hyperglycemia, without long-term current use of insulin    Emphysema lung    Restrictive lung disease    Tobacco abuse    E coli bacteremia    CARLINE (acute kidney injury)    Right shoulder pain    Microcytic anemia    Superficial vein thrombosis    Vasculopathy    Nonadherence to medical treatment    Hypomagnesemia    Librado's gangrene    ILD (interstitial lung disease)    Chronic diastolic congestive heart failure    Sepsis without acute organ dysfunction    Essential hypertension    Anemia of chronic disease    Cocaine abuse    ETOH abuse       Diagnostic Studies:   Latest Reference Range & Units 02/15/24 23:20   WBC 3.90 - 12.70 K/uL 26.34 (H)   RBC 4.00 - 5.40 M/uL 4.52   Hemoglobin 12.0 - 16.0 g/dL 9.8 (L)   Hematocrit 37.0 - 48.5 % 30.6 (L)   MCV 82 - 98 fL 68 (L)   MCH 27.0 - 31.0 pg 21.7 (L)   MCHC 32.0 - 36.0 g/dL 32.0   RDW 11.5 - 14.5 % 14.9 (H)   Platelet Count 150 - 450 K/uL 400 (C)   MPV 9.2 - 12.9 fL SEE COMMENT   Gran % 38.0 - 73.0 % 83.1 (H)   Lymph % 18.0 - 48.0 % 6.7 (L)   Mono % 4.0 - 15.0 % 7.0   Eos % 0.0 - 8.0 % 0.1   Basophil % 0.0 - 1.9 % 0.5   Immature Granulocytes 0.0 - 0.5 % 2.6 (H)   Gran # (ANC) 1.8 - 7.7 K/uL 21.9 (H)   Lymph # 1.0 - 4.8 K/uL 1.8   Mono # 0.3 - 1.0 K/uL 1.9 (H)   Eos # 0.0 - 0.5 K/uL 0.0   Baso # 0.00 - 0.20 K/uL 0.14   Immature Grans (Abs) 0.00 - 0.04 K/uL 0.68 (H)   nRBC 0 /100 WBC 0   Differential Method  Automated      Latest Reference Range & Units  02/15/24 23:20   Sodium 136 - 145 mmol/L 128 (L)   Potassium 3.5 - 5.1 mmol/L 3.8   Chloride 95 - 110 mmol/L 93 (L)   CO2 23 - 29 mmol/L 26   Anion Gap 8 - 16 mmol/L 9   BUN 6 - 20 mg/dL 16   Creatinine 0.5 - 1.4 mg/dL 1.8 (H)   eGFR >60 mL/min/1.73 m^2 33 !   Glucose 70 - 110 mg/dL 384 (H)   Calcium 8.7 - 10.5 mg/dL 8.7   ALP 55 - 135 U/L 153 (H)   PROTEIN TOTAL 6.0 - 8.4 g/dL 7.0   Albumin 3.5 - 5.2 g/dL 1.5 (L)   BILIRUBIN TOTAL 0.1 - 1.0 mg/dL 0.4   AST 10 - 40 U/L 11   ALT 10 - 44 U/L 7 (L)   CRP 0.0 - 8.2 mg/L 357.0 (H)     EKG (2/15/24):  NSR    Nuclear Stress (5/8/23):    Normal myocardial perfusion scan. There is no evidence of myocardial ischemia or infarction.    The gated perfusion images showed an ejection fraction of 47% at rest. The gated perfusion images showed an ejection fraction of 54% post stress. Normal ejection fraction is greater than 47%.    There is normal wall motion at rest and post stress.    LV cavity size is normal at rest and normal at stress.    The ECG portion of the study is negative for ischemia.    The patient reported no chest pain during the stress test.    There were no arrhythmias during stress.    There are no prior studies for comparison.    TTE (3/13/23):  The left ventricle is normal in size with low normal systolic function.  The estimated ejection fraction is 53%.  There is abnormal septal wall motion.  Indeterminate left ventricular diastolic function.  Mild left atrial enlargement.  Normal right ventricular size with low normal right ventricular systolic function.  Mild right atrial enlargement.  Mild mitral regurgitation.  Mild tricuspid regurgitation.  Normal central venous pressure (3 mmHg).  The estimated PA systolic pressure is 23 mmHg.    24 Hour Vitals:  Temp:  [36.4 °C (97.6 °F)-38.5 °C (101.3 °F)] 38.5 °C (101.3 °F)  Pulse:  [] 107  Resp:  [16-24] 20  SpO2:  [88 %-100 %] 93 %  BP: ()/(55-77) 122/56   See Nursing Charting For Additional  Vitals      Pre-op Assessment    I have reviewed the Patient Summary Reports.     I have reviewed the Nursing Notes. I have reviewed the NPO Status.   I have reviewed the Medications.     Review of Systems  Anesthesia Hx:  No problems with previous Anesthesia               Denies Personal Hx of Anesthesia complications.                    Social:  Smoker, Alcohol Use, Recreational Drugs MJ, crack cocaine      Cardiovascular:  Exercise tolerance: good   Hypertension       CHF       ECG has been reviewed.                          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            Neurological:  Neurology Normal                                      Endocrine:  Diabetes           Psych:   anxiety                 Physical Exam  General: Well nourished and Cooperative    Airway:  Mallampati: III   Mouth Opening: Normal  TM Distance: Normal    Dental:    Chest/Lungs:  Clear to auscultation, Normal Respiratory Rate    Heart:  Rate: Normal  Rhythm: Regular Rhythm      Wt Readings from Last 3 Encounters:   02/16/24 86.6 kg (190 lb 14.7 oz)   06/19/23 65.8 kg (145 lb)   06/20/23 66 kg (145 lb 8.1 oz)     Temp Readings from Last 3 Encounters:   02/16/24 (!) 38.5 °C (101.3 °F) (Oral)   06/20/23 36.9 °C (98.5 °F) (Oral)   04/08/23 36.7 °C (98.1 °F) (Oral)     BP Readings from Last 3 Encounters:   02/16/24 (!) 122/56   06/20/23 (!) 164/86   05/08/23 (!) 144/91     Pulse Readings from Last 3 Encounters:   02/16/24 107   06/20/23 71   05/08/23 69         Anesthesia Plan  Type of Anesthesia, risks & benefits discussed:    Anesthesia Type: Gen ETT  Intra-op Monitoring Plan: Standard ASA Monitors  Post Op Pain Control Plan: multimodal analgesia and IV/PO Opioids PRN  Induction:  IV and rapid sequence  Airway Plan: Direct and Video, Post-Induction  Informed Consent:  Informed consent signed with the Patient and all parties understand the risks and agree with anesthesia plan.  All questions answered. Patient consented to blood products? No  ASA Score: 4    Ready For Surgery From Anesthesia Perspective.     .

## 2024-02-17 NOTE — NURSING
Note that POCT BGL >500. Patient awake, alert and fully oriented, however diaphoretic.     Contacted NP Triche for orders.  Order: 5 units (0.05 mL) Insulin regular injection.     Will give and continue to monitor.

## 2024-02-17 NOTE — ASSESSMENT & PLAN NOTE
Defined by leukocytosis, tachycardia and groin abscess  Continue vanc/ meropenem/clindamycin  Fu blood/ wound cx's  Maintenance IVFs; gentle  Appreciate ID recs

## 2024-02-17 NOTE — SUBJECTIVE & OBJECTIVE
Subjective:   Interval History:  Pt had a second surgical debridement + washout done by general surgery this am, she tolerated surgery well w/o acute complications. Pt will be admitted to the MICU post-operatively for close monitoring.      Review of Systems   Constitutional:  Negative for chills and fever.   HENT:  Negative for congestion, sinus pressure, sore throat and tinnitus.    Respiratory:  Negative for cough, chest tightness, shortness of breath and wheezing.    Cardiovascular:  Negative for chest pain, palpitations and leg swelling.   Gastrointestinal:  Negative for abdominal distention, abdominal pain, nausea and vomiting.   Musculoskeletal:  Negative for arthralgias and back pain.   Skin:  Positive for wound (severe pain at the surgical site). Negative for rash.   Neurological:  Negative for syncope and light-headedness.   Psychiatric/Behavioral:  Negative for confusion.      Objective:     Vital Signs (Most Recent):  Temp: 99 °F (37.2 °C) (02/17/24 1230)  Pulse: 97 (02/17/24 1303)  Resp: (!) 26 (02/17/24 1350)  BP: (!) 136/58 (02/17/24 1303)  SpO2: 98 % (02/17/24 1303) Vital Signs (24h Range):  Temp:  [97.8 °F (36.6 °C)-101.3 °F (38.5 °C)] 99 °F (37.2 °C)  Pulse:  [] 97  Resp:  [15-33] 26  SpO2:  [79 %-100 %] 98 %  BP: (108-167)/(56-86) 136/58     Weight: 86.6 kg (190 lb 14.7 oz)  Body mass index is 32.77 kg/m².     Physical Exam  Vitals and nursing note reviewed.   Constitutional:       General: She is not in acute distress.     Appearance: She is well-developed. She is obese. She is ill-appearing (chronically ill appearing). She is not toxic-appearing.   HENT:      Head: Normocephalic and atraumatic.      Comments: +hirsutism     Right Ear: External ear normal.      Left Ear: External ear normal.      Nose: Nose normal.      Mouth/Throat:      Dentition: Dental caries present.      Pharynx: Uvula midline.      Comments: Hyperactive movements of mouth  Eyes:      General: Lids are normal.       Conjunctiva/sclera: Conjunctivae normal.      Pupils: Pupils are equal, round, and reactive to light.   Neck:      Thyroid: No thyroid mass.      Vascular: No JVD.      Trachea: Trachea normal.   Cardiovascular:      Rate and Rhythm: Normal rate and regular rhythm.      Heart sounds: Normal heart sounds, S1 normal and S2 normal.   Pulmonary:      Effort: Pulmonary effort is normal.      Breath sounds: Normal breath sounds.   Abdominal:      General: Bowel sounds are normal. There is no distension.      Palpations: Abdomen is soft.      Tenderness: There is no abdominal tenderness.   Genitourinary:     Comments: R inner upper thigh/ lateral R ext labia with gauze/ dressing. No oozing/ saturation. Surrounding thigh with mild swelling.  Musculoskeletal:      Cervical back: Full passive range of motion without pain, normal range of motion and neck supple.      Right lower leg: No edema.      Left lower leg: No edema.   Neurological:      Mental Status: She is alert.      Cranial Nerves: No cranial nerve deficit.      Sensory: No sensory deficit.   Psychiatric:         Speech: Speech normal.         Behavior: Behavior normal. Behavior is cooperative.         Thought Content: Thought content normal.          Significant Labs: All pertinent labs within the past 24 hours have been reviewed.  Blood Culture:   Recent Labs   Lab 02/15/24  2320 02/15/24  2321   LABBLOO Gram stain portia bottle: Gram positive rods  Results called to and read back by:Sandra HARVEY 02/17/2024  11:01 No Growth to date  No Growth to date       BMP:   Recent Labs   Lab 02/17/24  0407 02/17/24  1212   GLU 68* 101   * 131*   K 3.8 4.1   CL 97 96   CO2 25 22*   BUN 26* 28*   CREATININE 2.4* 2.3*   CALCIUM 7.7* 8.3*   MG 1.5*  --        CBC:   Recent Labs   Lab 02/15/24  2320 02/15/24  2328 02/16/24  0703 02/17/24  0407   WBC 26.34*  --  27.47* 29.46*   HGB 9.8*  --  9.3* 8.0*   HCT 30.6* 34* 30.0* 25.2*     --  139* 423       Urine Culture: No  "results for input(s): "LABURIN" in the last 48 hours.    Significant Imaging: I have reviewed all pertinent imaging results/findings within the past 24 hours.  "

## 2024-02-17 NOTE — PLAN OF CARE
Brief Nephrology Note    Consult received and chart reviewed.  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      CARLINE  - baseline Cr 0.8-1.2 in 2023  - suspect septic and ischemic ATN in the setting of Estephanie's gangrene  - Cr appears to be reaching a plateau, UOP is adequate  - can continue IVF for now w/ low threshold to hold if volume overload develops, ok to give lasix PRN hypervolemia.  Maintain euvolemia  - check UA, Ulytes, renal U/S  - no urgent indication for dialysis   - check BMP this PM  - monitor daily weights, strict I&Os  - avoid nephrotoxic agents including NSAIDs, renally dose medications    Hypomagnesemia  - replace IV and PO  - repeat level in AM    Hyponatremia  - Na trending up  - goal toward glycemic control  - recommend converting IVPB to NS when possible  - monitor serial levels    Full consult note with history and physical exam to follow.  Thank you for allowing me to participate in the care of your patient.  Please call with any questions.    Date of service: 2/17/2024  Madhuri Ortez  Nephrology    [facility]  Demetrice MORIN  Office [phone]

## 2024-02-17 NOTE — PROGRESS NOTES
[facility]  General Surgery  Progress Note    Subjective:     History of Present Illness:  Ms. Mosqueda is a 53 yoF with a PMH significant for insulin-dependent T2DM and CHF (last echo EF 53%, PA systolic 23) who presents with worsening pain and swelling of the right medial thigh. She developed an abscess in the area that underwent I+D at [facility] a few days ago, after which she was sent home on PO abx. Since that time the pain and swelling have worsened leading to her presentation to our ED. Workup here revealed normal vital signs, but with significantly elevated WBC and CRP, with other laboratory derangements leading to a LRINIC score of 11. CT scan demonstrates extensive inflammation extending from the right perineum to the right medial thigh with multiple foci of air. She is not on any blood thinners. No issues with anesthesia in the past.    Post-Op Info:  Procedure(s) (LRB):  INCISION AND DRAINAGE; WOUND DEBRIDEMENT, (N/A)   Day of Surgery     Interval History: CARLOEON. Taken to OR yesterday morning as class A for NSTI of right thigh. Admitted post oepratively to floor, seen by hospital medicine  Rising leukocytosis this am, febrile overnight, blood glucose labile. NPO since midnight, on mIVF.     Taken to OR for repeat wound exploration. Extensive remaining necrotic tissue and muscle with loculated subfascial purulence in multiple planes, fully released and explored.     Medications:  Continuous Infusions:   sodium chloride 0.9% 75 mL/hr at 02/16/24 1605     Scheduled Meds:   acetaminophen  1,000 mg Oral Q8H    amLODIPine  10 mg Oral Daily    budesonide  1 mg Nebulization Q12H    And    arformoteroL  15 mcg Nebulization BID    aspirin  81 mg Oral Daily    atorvastatin  20 mg Oral Daily    calcium gluconate IVPB  1 g Intravenous Q1H    chlordiazepoxide  5 mg Oral QID    clindamycin IV (PEDS and ADULTS)  600 mg Intravenous Q6H    enoxparin  40 mg Subcutaneous Daily    folic acid  1 mg Oral Daily    gabapentin   300 mg Oral TID    insulin detemir U-100  15 Units Subcutaneous QHS    insulin detemir U-100 (Levemir)  30 Units Subcutaneous Daily    magnesium oxide  500 mg Oral BID    magnesium sulfate IVPB  4 g Intravenous Once    meropenem (MERREM) IVPB  1 g Intravenous Q8H    pantoprazole  40 mg Oral Daily    potassium chloride  40 mEq Oral Daily    sodium chloride 0.9%  500 mL Intravenous Once    thiamine  100 mg Oral Daily    vancomycin (VANCOCIN) IV (PEDS and ADULTS)  750 mg Intravenous Q24H     PRN Meds:albuterol sulfate, dextrose 10%, dextrose 10%, glucagon (human recombinant), glucose, glucose, HYDROmorphone, HYDROmorphone, HYDROmorphone, insulin aspart U-100, LIDOcaine (PF) 10 mg/ml (1%), melatonin, ondansetron, oxyCODONE, oxyCODONE, prochlorperazine, sodium chloride 0.9%, sodium chloride 0.9%, Pharmacy to dose Vancomycin consult **AND** vancomycin - pharmacy to dose     Review of patient's allergies indicates:   Allergen Reactions    Hydrochlorothiazide plus      Objective:     Vital Signs (Most Recent):  Temp: 100.3 °F (37.9 °C) (02/17/24 0444)  Pulse: 102 (02/17/24 0715)  Resp: 18 (02/17/24 0715)  BP: 125/82 (02/17/24 0444)  SpO2: (!) 92 % (02/17/24 0715) Vital Signs (24h Range):  Temp:  [97.9 °F (36.6 °C)-101.3 °F (38.5 °C)] 100.3 °F (37.9 °C)  Pulse:  [] 102  Resp:  [16-20] 18  SpO2:  [88 %-95 %] 92 %  BP: (101-125)/(56-82) 125/82     Weight: 86.6 kg (190 lb 14.7 oz)  Body mass index is 32.77 kg/m².    Intake/Output - Last 3 Shifts         02/15 0700  02/16 0659 02/16 0700  02/17 0659 02/17 0700  02/18 0659    P.O.  360     IV Piggyback 3400  1000    Total Intake(mL/kg) 3400 (39.3) 360 (4.2) 1000 (11.5)    Urine (mL/kg/hr) 500 450 (0.2) 695 (2.9)    Blood   100    Total Output 500 450 795    Net +2900 -90 +205                    Physical Exam  Vitals and nursing note reviewed.   Constitutional:       Appearance: Normal appearance.   HENT:      Head: Normocephalic and atraumatic.   Cardiovascular:      Rate  and Rhythm: Normal rate and regular rhythm.   Pulmonary:      Effort: Pulmonary effort is normal. No respiratory distress.   Abdominal:      General: Abdomen is flat. There is no distension.      Palpations: Abdomen is soft. There is no mass.      Tenderness: There is no abdominal tenderness.      Hernia: No hernia is present.   Musculoskeletal:      Right lower leg: No edema.      Left lower leg: No edema.   Skin:     General: Skin is warm and dry.      Comments: Wound to right thigh dressed   Neurological:      General: No focal deficit present.      Mental Status: She is alert and oriented to person, place, and time.   Psychiatric:         Mood and Affect: Mood normal.         Behavior: Behavior normal.          Significant Labs:  I have reviewed all pertinent lab results within the past 24 hours.  CBC:   Recent Labs   Lab 02/17/24  0407   WBC 29.46*   RBC 3.68*   HGB 8.0*   HCT 25.2*      MCV 69*   MCH 21.7*   MCHC 31.7*     CMP:   Recent Labs   Lab 02/17/24  0407   GLU 68*   CALCIUM 7.7*   ALBUMIN 1.0*   PROT 5.4*   *   K 3.8   CO2 25   CL 97   BUN 26*   CREATININE 2.4*   ALKPHOS 128   ALT 5*   AST 11   BILITOT 0.3     LFTs:   Recent Labs   Lab 02/17/24  0407   ALT 5*   AST 11   ALKPHOS 128   BILITOT 0.3   PROT 5.4*   ALBUMIN 1.0*       Significant Diagnostics:  I have reviewed all pertinent imaging results/findings within the past 24 hours.  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     - Will need take back to OR for repeat exploration and debridement, possible wound vac application no later than Monday 2/19  - Post operatively we feel that she should transfer to the ICU for closer hemodynamic monitoring, serial labs, and close nursing  care  - Worsening leukocytosis and CARLINE in the setting of inadequate source control; continue serial monitoring, resuscitation, nephro consult   - ID consult; Vanc/zosyn/clinda started 2/16, switched to meropenam, vanc, clinda 2/17   - Blood cultures 2/16, NGTD    - Operative cultures 2/17, pending  - Poorly controlled DMII - insulin per HM  - Polysubstance abuse to alcohol dependency - librium taper on, CIWA protocol  - Multiple modal pain control limited by CARLINE - continue scheduled tylenol, combination of oral and IV narcotics  - Okay for DVT ppx   - Will have long term wound care needs; pending final disposition may need placement for this   - HM taking over as primary for this medically complex patient, appreciate their assistance greatly    Surgery will follow closely         Gabi Marques MD  General Surgery  [facility] [place]

## 2024-02-17 NOTE — NURSING
[facility] [place]  Nurses Note -- 4 Eyes      2/17/2024       Skin assessed on: Q Shift      [x] No Pressure Injuries Present    [x]Prevention Measures Documented    [] Yes LDA  for Pressure Injury Previously documented     [] Yes New Pressure Injury Discovered   [] LDA for New Pressure Injury Added      Attending RN:  CLAUDETTE BENNETT RN     Second RN:  Suyapa

## 2024-02-17 NOTE — ANESTHESIA POSTPROCEDURE EVALUATION
Anesthesia Post Evaluation    Patient: Christine Mosqueda    Procedure(s) Performed: Procedure(s) (LRB):  INCISION AND DRAINAGE; WOUND DEBRIDEMENT, (N/A)    Final Anesthesia Type: general      Patient location during evaluation: PACU  Patient participation: Yes- Able to Participate  Level of consciousness: awake and alert  Post-procedure vital signs: reviewed and stable  Pain management: adequate  Airway patency: patent    PONV status at discharge: No PONV  Anesthetic complications: no      Cardiovascular status: hemodynamically stable and blood pressure returned to baseline  Respiratory status: unassisted, spontaneous ventilation and nasal cannula  Hydration status: euvolemic  Follow-up not needed.              Vitals Value Taken Time   /58 02/17/24 1147   Temp 36.6 °C (97.8 °F) 02/17/24 0941   Pulse 92 02/17/24 1147   Resp 15 02/17/24 1147   SpO2 97 % 02/17/24 1147   Vitals shown include unvalidated device data.      No case tracking events are documented in the log.      Pain/Yoel Score: Pain Rating Prior to Med Admin: 7 (2/17/2024 10:10 AM)  Pain Rating Post Med Admin: 3 (2/17/2024 11:00 AM)  Yoel Score: 8 (2/17/2024 11:00 AM)

## 2024-02-18 LAB
ACINETOBACTER CALCOACETICUS/BAUMANNII COMPLEX: NOT DETECTED
ALBUMIN SERPL BCP-MCNC: 0.9 G/DL (ref 3.5–5.2)
ALP SERPL-CCNC: 124 U/L (ref 55–135)
ALT SERPL W/O P-5'-P-CCNC: 5 U/L (ref 10–44)
ANION GAP SERPL CALC-SCNC: 8 MMOL/L (ref 8–16)
AST SERPL-CCNC: 11 U/L (ref 10–40)
BACTERIA #/AREA URNS HPF: ABNORMAL /HPF
BACTEROIDES FRAGILIS: NOT DETECTED
BASOPHILS # BLD AUTO: 0.07 K/UL (ref 0–0.2)
BASOPHILS NFR BLD: 0.2 % (ref 0–1.9)
BILIRUB SERPL-MCNC: 0.4 MG/DL (ref 0.1–1)
BILIRUB UR QL STRIP: NEGATIVE
BUN SERPL-MCNC: 30 MG/DL (ref 6–20)
CALCIUM SERPL-MCNC: 7.2 MG/DL (ref 8.7–10.5)
CANDIDA ALBICANS: NOT DETECTED
CANDIDA AURIS: NOT DETECTED
CANDIDA GLABRATA: NOT DETECTED
CANDIDA KRUSEI: NOT DETECTED
CANDIDA PARAPSILOSIS: NOT DETECTED
CANDIDA TROPICALIS: NOT DETECTED
CHLORIDE SERPL-SCNC: 98 MMOL/L (ref 95–110)
CLARITY UR: ABNORMAL
CO2 SERPL-SCNC: 23 MMOL/L (ref 23–29)
COLOR UR: YELLOW
CREAT SERPL-MCNC: 2.3 MG/DL (ref 0.5–1.4)
CRYPTOCOCCUS NEOFORMANS/GATTII: NOT DETECTED
CTX-M GENE (ESBL PRODUCER): NORMAL
DIFFERENTIAL METHOD BLD: ABNORMAL
ENTEROBACTER CLOACAE COMPLEX: NOT DETECTED
ENTEROBACTERALES: NOT DETECTED
ENTEROCOCCUS FAECALIS: NOT DETECTED
ENTEROCOCCUS FAECIUM: NOT DETECTED
EOSINOPHIL # BLD AUTO: 0.1 K/UL (ref 0–0.5)
EOSINOPHIL NFR BLD: 0.3 % (ref 0–8)
ERYTHROCYTE [DISTWIDTH] IN BLOOD BY AUTOMATED COUNT: 16.1 % (ref 11.5–14.5)
ESCHERICHIA COLI: NOT DETECTED
EST. GFR  (NO RACE VARIABLE): 24 ML/MIN/1.73 M^2
GLUCOSE SERPL-MCNC: 173 MG/DL (ref 70–110)
GLUCOSE UR QL STRIP: ABNORMAL
HAEMOPHILUS INFLUENZAE: NOT DETECTED
HCT VFR BLD AUTO: 22.2 % (ref 37–48.5)
HGB BLD-MCNC: 7 G/DL (ref 12–16)
HGB UR QL STRIP: ABNORMAL
HYALINE CASTS #/AREA URNS LPF: 0 /LPF
IMM GRANULOCYTES # BLD AUTO: 1.04 K/UL (ref 0–0.04)
IMM GRANULOCYTES NFR BLD AUTO: 3.3 % (ref 0–0.5)
IMP GENE (CARBAPENEM RESISTANT): NORMAL
KETONES UR QL STRIP: NEGATIVE
KLEBSIELLA AEROGENES: NOT DETECTED
KLEBSIELLA OXYTOCA: NOT DETECTED
KLEBSIELLA PNEUMONIAE GROUP: NOT DETECTED
KPC RESISTANCE GENE (CARBAPENEM): NORMAL
LEUKOCYTE ESTERASE UR QL STRIP: NEGATIVE
LISTERIA MONOCYTOGENES: NOT DETECTED
LYMPHOCYTES # BLD AUTO: 1.9 K/UL (ref 1–4.8)
LYMPHOCYTES NFR BLD: 5.9 % (ref 18–48)
MAGNESIUM SERPL-MCNC: 1.4 MG/DL (ref 1.6–2.6)
MCH RBC QN AUTO: 21.3 PG (ref 27–31)
MCHC RBC AUTO-ENTMCNC: 31.5 G/DL (ref 32–36)
MCR-1: NORMAL
MCV RBC AUTO: 68 FL (ref 82–98)
MEC A/C AND MREJ (MRSA): NORMAL
MEC A/C: NORMAL
MICROSCOPIC COMMENT: ABNORMAL
MONOCYTES # BLD AUTO: 1.4 K/UL (ref 0.3–1)
MONOCYTES NFR BLD: 4.5 % (ref 4–15)
NDM GENE (CARBAPENEM RESISTANT): NORMAL
NEISSERIA MENINGITIDIS: NOT DETECTED
NEUTROPHILS # BLD AUTO: 27.2 K/UL (ref 1.8–7.7)
NEUTROPHILS NFR BLD: 85.8 % (ref 38–73)
NITRITE UR QL STRIP: NEGATIVE
NRBC BLD-RTO: 0 /100 WBC
OSMOLALITY SERPL: 288 MOSM/KG (ref 275–295)
OSMOLALITY UR: 338 MOSM/KG (ref 50–1200)
OXA-48-LIKE (CARBAPENEM RESISTANT): NORMAL
PH UR STRIP: 6 [PH] (ref 5–8)
PHOSPHATE SERPL-MCNC: 3.9 MG/DL (ref 2.7–4.5)
PLATELET # BLD AUTO: 477 K/UL (ref 150–450)
PLATELET BLD QL SMEAR: ABNORMAL
PMV BLD AUTO: ABNORMAL FL (ref 9.2–12.9)
POCT GLUCOSE: 125 MG/DL (ref 70–110)
POCT GLUCOSE: 136 MG/DL (ref 70–110)
POCT GLUCOSE: 200 MG/DL (ref 70–110)
POCT GLUCOSE: 205 MG/DL (ref 70–110)
POTASSIUM SERPL-SCNC: 4.3 MMOL/L (ref 3.5–5.1)
PROT SERPL-MCNC: 5 G/DL (ref 6–8.4)
PROT UR QL STRIP: ABNORMAL
PROTEUS SPECIES: NOT DETECTED
PSEUDOMONAS AERUGINOSA: NOT DETECTED
RBC # BLD AUTO: 3.29 M/UL (ref 4–5.4)
RBC #/AREA URNS HPF: 20 /HPF (ref 0–4)
SALMONELLA SP: NOT DETECTED
SERRATIA MARCESCENS: NOT DETECTED
SODIUM SERPL-SCNC: 129 MMOL/L (ref 136–145)
SP GR UR STRIP: 1.02 (ref 1–1.03)
SQUAMOUS #/AREA URNS HPF: 1 /HPF
STAPHYLOCOCCUS AUREUS: NOT DETECTED
STAPHYLOCOCCUS EPIDERMIDIS: NOT DETECTED
STAPHYLOCOCCUS LUGDUNESIS: NOT DETECTED
STAPHYLOCOCCUS SPECIES: NOT DETECTED
STENOTROPHOMONAS MALTOPHILIA: NOT DETECTED
STREPTOCOCCUS AGALACTIAE: NOT DETECTED
STREPTOCOCCUS PNEUMONIAE: NOT DETECTED
STREPTOCOCCUS PYOGENES: NOT DETECTED
STREPTOCOCCUS SPECIES: NOT DETECTED
URATE SERPL-MCNC: 8.1 MG/DL (ref 2.4–5.7)
URN SPEC COLLECT METH UR: ABNORMAL
UROBILINOGEN UR STRIP-ACNC: NEGATIVE EU/DL
UUN UR-MCNC: 346 MG/DL (ref 140–1050)
VAN A/B (VRE GENE): NORMAL
VANCOMYCIN SERPL-MCNC: 19.5 UG/ML
VIM GENE (CARBAPENEM RESISTANT): NORMAL
WBC # BLD AUTO: 31.73 K/UL (ref 3.9–12.7)
WBC #/AREA URNS HPF: 7 /HPF (ref 0–5)

## 2024-02-18 PROCEDURE — 84550 ASSAY OF BLOOD/URIC ACID: CPT | Performed by: INTERNAL MEDICINE

## 2024-02-18 PROCEDURE — 85025 COMPLETE CBC W/AUTO DIFF WBC: CPT

## 2024-02-18 PROCEDURE — 20000000 HC ICU ROOM

## 2024-02-18 PROCEDURE — 99233 SBSQ HOSP IP/OBS HIGH 50: CPT | Mod: ,,, | Performed by: SURGERY

## 2024-02-18 PROCEDURE — 83735 ASSAY OF MAGNESIUM: CPT

## 2024-02-18 PROCEDURE — 81000 URINALYSIS NONAUTO W/SCOPE: CPT | Performed by: INTERNAL MEDICINE

## 2024-02-18 PROCEDURE — 63600175 PHARM REV CODE 636 W HCPCS: Mod: JZ,JG

## 2024-02-18 PROCEDURE — 99900035 HC TECH TIME PER 15 MIN (STAT)

## 2024-02-18 PROCEDURE — 80053 COMPREHEN METABOLIC PANEL: CPT

## 2024-02-18 PROCEDURE — 36415 COLL VENOUS BLD VENIPUNCTURE: CPT | Mod: XB | Performed by: INTERNAL MEDICINE

## 2024-02-18 PROCEDURE — 25000003 PHARM REV CODE 250: Performed by: INTERNAL MEDICINE

## 2024-02-18 PROCEDURE — 25000003 PHARM REV CODE 250: Performed by: FAMILY MEDICINE

## 2024-02-18 PROCEDURE — 94761 N-INVAS EAR/PLS OXIMETRY MLT: CPT

## 2024-02-18 PROCEDURE — 27000221 HC OXYGEN, UP TO 24 HOURS

## 2024-02-18 PROCEDURE — 25000003 PHARM REV CODE 250: Mod: JZ,JG | Performed by: SURGERY

## 2024-02-18 PROCEDURE — 25000003 PHARM REV CODE 250: Performed by: STUDENT IN AN ORGANIZED HEALTH CARE EDUCATION/TRAINING PROGRAM

## 2024-02-18 PROCEDURE — 25000003 PHARM REV CODE 250: Performed by: SURGERY

## 2024-02-18 PROCEDURE — 63600175 PHARM REV CODE 636 W HCPCS: Performed by: SURGERY

## 2024-02-18 PROCEDURE — 86920 COMPATIBILITY TEST SPIN: CPT | Performed by: STUDENT IN AN ORGANIZED HEALTH CARE EDUCATION/TRAINING PROGRAM

## 2024-02-18 PROCEDURE — 36415 COLL VENOUS BLD VENIPUNCTURE: CPT | Performed by: INTERNAL MEDICINE

## 2024-02-18 PROCEDURE — 84100 ASSAY OF PHOSPHORUS: CPT

## 2024-02-18 PROCEDURE — 99232 SBSQ HOSP IP/OBS MODERATE 35: CPT | Mod: ,,, | Performed by: INTERNAL MEDICINE

## 2024-02-18 PROCEDURE — 25000003 PHARM REV CODE 250

## 2024-02-18 PROCEDURE — P9047 ALBUMIN (HUMAN), 25%, 50ML: HCPCS | Mod: JZ,JG

## 2024-02-18 PROCEDURE — 80202 ASSAY OF VANCOMYCIN: CPT | Performed by: INTERNAL MEDICINE

## 2024-02-18 RX ORDER — CALCIUM GLUCONATE 20 MG/ML
1 INJECTION, SOLUTION INTRAVENOUS
Status: COMPLETED | OUTPATIENT
Start: 2024-02-18 | End: 2024-02-18

## 2024-02-18 RX ORDER — MAGNESIUM SULFATE HEPTAHYDRATE 40 MG/ML
2 INJECTION, SOLUTION INTRAVENOUS ONCE
Status: COMPLETED | OUTPATIENT
Start: 2024-02-18 | End: 2024-02-18

## 2024-02-18 RX ORDER — ALBUMIN HUMAN 250 G/1000ML
25 SOLUTION INTRAVENOUS ONCE
Status: COMPLETED | OUTPATIENT
Start: 2024-02-18 | End: 2024-02-18

## 2024-02-18 RX ORDER — HYDROCODONE BITARTRATE AND ACETAMINOPHEN 500; 5 MG/1; MG/1
TABLET ORAL
Status: DISCONTINUED | OUTPATIENT
Start: 2024-02-18 | End: 2024-02-19

## 2024-02-18 RX ADMIN — ACETAMINOPHEN 1000 MG: 500 TABLET ORAL at 01:02

## 2024-02-18 RX ADMIN — ASPIRIN 81 MG CHEWABLE TABLET 81 MG: 81 TABLET CHEWABLE at 08:02

## 2024-02-18 RX ADMIN — OXYCODONE 5 MG: 5 TABLET ORAL at 07:02

## 2024-02-18 RX ADMIN — CALCIUM GLUCONATE 1 G: 20 INJECTION, SOLUTION INTRAVENOUS at 08:02

## 2024-02-18 RX ADMIN — GABAPENTIN 100 MG: 100 CAPSULE ORAL at 08:02

## 2024-02-18 RX ADMIN — THIAMINE HCL TAB 100 MG 100 MG: 100 TAB at 08:02

## 2024-02-18 RX ADMIN — CHLORDIAZEPOXIDE HYDROCHLORIDE 5 MG: 5 CAPSULE ORAL at 06:02

## 2024-02-18 RX ADMIN — MEROPENEM 1 G: 1 INJECTION INTRAVENOUS at 04:02

## 2024-02-18 RX ADMIN — MEROPENEM 1 G: 1 INJECTION INTRAVENOUS at 12:02

## 2024-02-18 RX ADMIN — MEROPENEM 1 G: 1 INJECTION INTRAVENOUS at 07:02

## 2024-02-18 RX ADMIN — CALCIUM GLUCONATE 1 G: 20 INJECTION, SOLUTION INTRAVENOUS at 10:02

## 2024-02-18 RX ADMIN — CHLORDIAZEPOXIDE HYDROCHLORIDE 5 MG: 5 CAPSULE ORAL at 08:02

## 2024-02-18 RX ADMIN — CLINDAMYCIN IN 5 PERCENT DEXTROSE 600 MG: 12 INJECTION, SOLUTION INTRAVENOUS at 02:02

## 2024-02-18 RX ADMIN — ENOXAPARIN SODIUM 40 MG: 40 INJECTION SUBCUTANEOUS at 06:02

## 2024-02-18 RX ADMIN — MUPIROCIN: 20 OINTMENT TOPICAL at 09:02

## 2024-02-18 RX ADMIN — SODIUM BICARBONATE 1300 MG: 325 TABLET ORAL at 08:02

## 2024-02-18 RX ADMIN — GABAPENTIN 100 MG: 100 CAPSULE ORAL at 09:02

## 2024-02-18 RX ADMIN — HYDROMORPHONE HYDROCHLORIDE 0.5 MG: 1 INJECTION, SOLUTION INTRAMUSCULAR; INTRAVENOUS; SUBCUTANEOUS at 01:02

## 2024-02-18 RX ADMIN — ACETAMINOPHEN 1000 MG: 500 TABLET ORAL at 06:02

## 2024-02-18 RX ADMIN — INSULIN ASPART 6 UNITS: 100 INJECTION, SOLUTION INTRAVENOUS; SUBCUTANEOUS at 09:02

## 2024-02-18 RX ADMIN — OXYCODONE 10 MG: 5 TABLET ORAL at 11:02

## 2024-02-18 RX ADMIN — ATORVASTATIN CALCIUM 20 MG: 10 TABLET, FILM COATED ORAL at 08:02

## 2024-02-18 RX ADMIN — CHLORDIAZEPOXIDE HYDROCHLORIDE 5 MG: 5 CAPSULE ORAL at 09:02

## 2024-02-18 RX ADMIN — VANCOMYCIN HYDROCHLORIDE 750 MG: 750 INJECTION, POWDER, LYOPHILIZED, FOR SOLUTION INTRAVENOUS at 12:02

## 2024-02-18 RX ADMIN — Medication 500 MG: at 09:02

## 2024-02-18 RX ADMIN — ALBUMIN (HUMAN) 25 G: 12.5 SOLUTION INTRAVENOUS at 10:02

## 2024-02-18 RX ADMIN — PANTOPRAZOLE SODIUM 40 MG: 40 TABLET, DELAYED RELEASE ORAL at 08:02

## 2024-02-18 RX ADMIN — MAGNESIUM SULFATE HEPTAHYDRATE 2 G: 40 INJECTION, SOLUTION INTRAVENOUS at 08:02

## 2024-02-18 RX ADMIN — OXYCODONE 10 MG: 5 TABLET ORAL at 08:02

## 2024-02-18 RX ADMIN — CHLORDIAZEPOXIDE HYDROCHLORIDE 5 MG: 5 CAPSULE ORAL at 01:02

## 2024-02-18 RX ADMIN — SODIUM BICARBONATE 1300 MG: 325 TABLET ORAL at 09:02

## 2024-02-18 RX ADMIN — INSULIN DETEMIR 10 UNITS: 100 INJECTION, SOLUTION SUBCUTANEOUS at 09:02

## 2024-02-18 RX ADMIN — FOLIC ACID 1 MG: 1 TABLET ORAL at 08:02

## 2024-02-18 NOTE — CONSULTS
"[facility] [place]  [place]fectious Disease  Consult Note    Patient Name: Christine Mosqueda  MRN: [id number]  Admission Date: 2/15/2024  Hospital Length of Stay: 2 days  Attending Physician: Yasmany Cary MD  Primary Care Provider: [facility]     Isolation Status: No active isolations    Patient information was obtained from patient and ER records.      Consults  Assessment/Plan:     Renal/  * Librado's gangrene  53F with h/o DM, admitted 2/15 with abscess in R medial thigh. CT c/f abscess/nec fascitis and now s/p several surgical debridements. Muscle necrosis noted. Cultures sent. Gram stain with GPC in pairs and chains and GNR.  Bcx on admit positive for GPR in one of two bottles. On vanc/meropenem. ID consulted for "fourniers gangrene, diabetic, CARLINE; no cultures taken at initial debridement "    Treating necrotizing fascitis, which is oftentimes polymicrobial including anaerobes. Likely started as a skin boil. Risk factor- uncontrolled DM, tobacco abuse (reportedly quit 2 weeks ago). Appreciate surgery's help with source control. Spoke with microbiology about the GPR in blood culture. they said it's not a diptheroid or bacillus (so it's unlikely to be a contaminant). It is growing aerobically. micro sent specimen to main campus to put on MALDI, so hopefully we'll get an identification in the next day or two. Actinomyces?    Recommendations:   - continue vanc/meropenem. Stop clindamycin. Plan on de-escalating antibiotics tomorrow pending GPR identification on MALDI  - wound care/additional debridement per surgery  - f/u bcx identification;and  document clearance.   - 2d echo to day for veg  - needs outpatient dental f/u for tooth extractions    Discussed assessment/plan with hospitalist        Thank you for your consult. I will follow-up with patient. Please contact us if you have any additional questions.    Germaine Dobbs MD  Infectious Disease  [facility] [place]    Subjective:     Principal Problem: Librado's gangrene    HPI: 53F with h/o DM, admitted 2/15 with abscess in R medial thigh. Reports going to [facility] on 2/10 for the same thing. Given doxycycline, which she says she was taking. Wound on R leg became more painful, so came to hospital. Reports fever. Denies indwelling hardware. Reports dental caries, but no recent extractions. Reports aches all over, but no other areas of localized pain other than R thigh. Denies abx allergies.     CT 2/15-  Extensive inflammatory changes extending from the right perineum to the right medial thigh. Linear area of fluid attenuation in the right labial region, which may represent a small abscess. Multiple foci of air in the right medial thigh, which is difficult to measure. Considerations may include developing abscess, phlegmonous tissue, and or instrumentation resulting in subcutaneous air.       Bcx on admit positive for GPR in one of two bottles      Component 2 d ago   Blood Culture, Routine Gram stain portia bottle: Gram positive rods P   Blood Culture, Routine Results called to and read back by:Sandra HARVEY 2024  11:01 P          S/p I&D with general surgery on  and  with plans for another debridment Monday. Muscle necrosis noted. Cultures sent. Gram stain positive:       Component 09:11   Gram Stain Result Rare WBC's   Gram Stain Result Many Gram positive cocci in pairs and chains   Gram Stain Result Rare Gram negative rods     Hiv and hep c testing negative    On vanc/meropenem    ID consulted for "fourniers gangrene, diabetic, CARLINE; no cultures taken at initial debridement "    Interval history: NAEO.  at bedside. Tolerating antibiotics. Reports some ongoing pain in R inner thigh      Past Surgical History:   Procedure Laterality Date     SECTION      PALATAL EXPANSION      WRIST SURGERY Right        Review of patient's allergies indicates:   Allergen Reactions    Hydrochlorothiazide plus        No " current facility-administered medications on file prior to encounter.     Current Outpatient Medications on File Prior to Encounter   Medication Sig    albuterol (ACCUNEB) 1.25 mg/3 mL Nebu Take 1.25 mg by nebulization every 6 (six) hours as needed.    ALPRAZolam (XANAX) 2 MG Tab Take 2 mg by mouth daily as needed for Anxiety.    amlodipine (NORVASC) 10 MG tablet Take 10 mg by mouth once daily.    aspirin 81 MG Chew Take 81 mg by mouth once daily.    atorvastatin (LIPITOR) 20 MG tablet Take 20 mg by mouth once daily.    fluticasone-salmeterol diskus inhaler 500-50 mcg Inhale 1 puff into the lungs 2 (two) times daily.    furosemide (LASIX) 40 MG tablet Take 1 tablet (40 mg total) by mouth once daily.    gabapentin (NEURONTIN) 400 MG capsule Take 400 mg by mouth 2 (two) times daily.    insulin aspart U-100 (NOVOLOG) 100 unit/mL injection Inject 10 Units into the skin 3 (three) times daily before meals.    insulin detemir U-100 (LEVEMIR) 100 unit/mL injection Inject 22 Units into the skin once daily.    nicotine (NICODERM CQ) 14 mg/24 hr Place 1 patch onto the skin once daily.    omeprazole (PRILOSEC) 40 MG capsule Take 40 mg by mouth once daily.    potassium chloride (KLOR-CON) 8 MEQ TbSR Take 1 tablet (8 mEq total) by mouth once daily.    promethazine (PHENERGAN) 12.5 MG Tab Take 25 mg by mouth every 6 (six) hours as needed.    tiotropium (SPIRIVA) 18 mcg inhalation capsule Inhale 18 mcg into the lungs once daily.     Family History       Problem Relation (Age of Onset)    Diabetes Mother, Father, Sister    Hypertension Mother, Father          Tobacco Use    Smoking status: Some Days     Current packs/day: 0.25     Types: Cigarettes    Smokeless tobacco: Never   Substance and Sexual Activity    Alcohol use: Yes    Drug use: Yes     Types: Marijuana    Sexual activity: Yes     Partners: Male     Review of Systems   Constitutional:  Negative for activity change, appetite change, chills, diaphoresis, fatigue and fever.    HENT:  Positive for dental problem. Negative for congestion, sinus pressure, sore throat and tinnitus.    Eyes:  Negative for visual disturbance.   Respiratory:  Negative for cough, chest tightness, shortness of breath and wheezing.    Cardiovascular:  Negative for chest pain, palpitations and leg swelling.   Gastrointestinal:  Negative for abdominal distention, abdominal pain, nausea and vomiting.   Endocrine: Negative for polydipsia, polyphagia and polyuria.   Genitourinary:  Negative for dysuria.   Musculoskeletal:  Negative for arthralgias and back pain.   Skin:  Positive for wound. Negative for rash.   Neurological:  Negative for dizziness, syncope, light-headedness and headaches.   Psychiatric/Behavioral:  Negative for confusion.      Objective:     Vital Signs (Most Recent):  Temp: 98.8 °F (37.1 °C) (02/18/24 1328)  Pulse: 84 (02/18/24 1200)  Resp: (!) 34 (02/18/24 1200)  BP: (!) 117/59 (02/18/24 1200)  SpO2: (!) 91 % (02/18/24 1200) Vital Signs (24h Range):  Temp:  [98.4 °F (36.9 °C)-100.2 °F (37.9 °C)] 98.8 °F (37.1 °C)  Pulse:  [] 84  Resp:  [12-41] 34  SpO2:  [88 %-98 %] 91 %  BP: ()/(54-91) 117/59     Weight: 93.4 kg (205 lb 14.6 oz)  Body mass index is 35.34 kg/m².     Physical Exam  Vitals and nursing note reviewed.   Constitutional:       General: She is not in acute distress.     Appearance: She is well-developed. She is obese. She is ill-appearing (chronically ill appearing). She is not toxic-appearing.   HENT:      Head: Normocephalic and atraumatic.      Comments: Teeth decayed and loose     Right Ear: External ear normal.      Left Ear: External ear normal.      Nose: Nose normal.      Mouth/Throat:      Dentition: Dental caries present.      Pharynx: Uvula midline.   Eyes:      General: Lids are normal.      Conjunctiva/sclera: Conjunctivae normal.      Pupils: Pupils are equal, round, and reactive to light.   Neck:      Thyroid: No thyroid mass.      Vascular: No JVD.       "Trachea: Trachea normal.   Cardiovascular:      Rate and Rhythm: Normal rate and regular rhythm.      Heart sounds: Normal heart sounds, S1 normal and S2 normal.   Pulmonary:      Effort: Pulmonary effort is normal.      Breath sounds: Normal breath sounds.   Abdominal:      General: Bowel sounds are normal. There is no distension.      Palpations: Abdomen is soft.      Tenderness: There is no abdominal tenderness.   Genitourinary:     Comments: jansen  Musculoskeletal:      Cervical back: Full passive range of motion without pain, normal range of motion and neck supple.      Right lower leg: No edema.      Left lower leg: No edema.   Skin:     Comments: Fresh Surgical dressing on R medial thigh. No drains   Neurological:      General: No focal deficit present.      Mental Status: She is alert and oriented to person, place, and time.      Cranial Nerves: No cranial nerve deficit.      Sensory: No sensory deficit.   Psychiatric:         Speech: Speech normal.         Behavior: Behavior normal. Behavior is cooperative.         Thought Content: Thought content normal.        Photo from admit        Significant Labs: All pertinent labs within the past 24 hours have been reviewed.  Blood Culture:   Recent Labs   Lab 02/17/24 2005 02/17/24 2006   LABBLOO No Growth to date No Growth to date       BMP:   Recent Labs   Lab 02/18/24  0323   *   *   K 4.3   CL 98   CO2 23   BUN 30*   CREATININE 2.3*   CALCIUM 7.2*   MG 1.4*       CBC:   Recent Labs   Lab 02/17/24  0407 02/18/24  0323   WBC 29.46* 31.73*   HGB 8.0* 7.0*   HCT 25.2* 22.2*    477*       Urine Culture: No results for input(s): "LABURIN" in the last 48 hours.    Significant Imaging: I have reviewed all pertinent imaging results/findings within the past 24 hours.              "

## 2024-02-18 NOTE — SUBJECTIVE & OBJECTIVE
Interval History: NAEON. Low grade fever, vitals otherwise fairly normal. Slight uptrend in leukocytosis, not unexpected given yesterdays findings.     Admitted to ICU post operatively. Pain control is an issue.     Glucose control globally improving but still labile. Cr stabilizing.     Seen by nephrology and ID     Tearful this am. Reassurance provided     Medications:  Continuous Infusions:   sodium chloride 0.9% 75 mL/hr at 02/18/24 0600     Scheduled Meds:   acetaminophen  1,000 mg Oral Q8H    albumin human 25%  25 g Intravenous Once    amLODIPine  10 mg Oral Daily    budesonide  1 mg Nebulization Q12H    And    arformoteroL  15 mcg Nebulization BID    aspirin  81 mg Oral Daily    atorvastatin  20 mg Oral Daily    calcium gluconate IVPB  1 g Intravenous Q1H    chlordiazepoxide  5 mg Oral QID    clindamycin IV (PEDS and ADULTS)  600 mg Intravenous Q6H    enoxparin  40 mg Subcutaneous Daily    folic acid  1 mg Oral Daily    gabapentin  100 mg Oral BID    insulin detemir U-100  15 Units Subcutaneous QHS    insulin detemir U-100 (Levemir)  30 Units Subcutaneous Daily    magnesium oxide  500 mg Oral BID    magnesium sulfate IVPB  2 g Intravenous Once    meropenem (MERREM) IVPB  1 g Intravenous Q8H    mupirocin   Nasal BID    pantoprazole  40 mg Oral Daily    sodium bicarbonate  1,300 mg Oral BID    sodium chloride 0.9%  500 mL Intravenous Once    thiamine  100 mg Oral Daily     PRN Meds:albuterol sulfate, dextrose 10%, dextrose 10%, glucagon (human recombinant), glucose, glucose, HYDROmorphone, insulin aspart U-100, LIDOcaine (PF) 10 mg/ml (1%), melatonin, ondansetron, oxyCODONE, oxyCODONE, prochlorperazine, sodium chloride 0.9%, Pharmacy to dose Vancomycin consult **AND** vancomycin - pharmacy to dose     Review of patient's allergies indicates:   Allergen Reactions    Hydrochlorothiazide plus      Objective:     Vital Signs (Most Recent):  Temp: 98.4 °F (36.9 °C) (02/18/24 0300)  Pulse: 96 (02/18/24 0500)  Resp:  (!) 23 (02/18/24 0839)  BP: 90/64 (02/18/24 0900)  SpO2: 96 % (02/18/24 0730) Vital Signs (24h Range):  Temp:  [97.8 °F (36.6 °C)-100.2 °F (37.9 °C)] 98.4 °F (36.9 °C)  Pulse:  [] 96  Resp:  [12-33] 23  SpO2:  [79 %-100 %] 96 %  BP: ()/(54-86) 90/64     Weight: 93.4 kg (205 lb 14.6 oz)  Body mass index is 35.34 kg/m².    Intake/Output - Last 3 Shifts         02/16 0700  02/17 0659 02/17 0700 02/18 0659 02/18 0700 02/19 0659    P.O. 360 400     I.V. (mL/kg)  863.2 (9.2)     IV Piggyback  2151.5     Total Intake(mL/kg) 360 (4.2) 3414.7 (36.6)     Urine (mL/kg/hr) 450 (0.2) 1645 (0.7)     Blood  100     Total Output 450 1745     Net -90 +1669.7                     Physical Exam  Vitals and nursing note reviewed.   Constitutional:       Appearance: Normal appearance.   HENT:      Head: Normocephalic and atraumatic.   Cardiovascular:      Rate and Rhythm: Normal rate and regular rhythm.   Pulmonary:      Effort: Pulmonary effort is normal. No respiratory distress.   Abdominal:      General: Abdomen is flat. There is no distension.      Palpations: Abdomen is soft. There is no mass.      Tenderness: There is no abdominal tenderness.      Hernia: No hernia is present.   Genitourinary:     Comments: Hernandez catheter  Musculoskeletal:      Right lower leg: No edema.      Left lower leg: No edema.   Skin:     General: Skin is warm and dry.      Comments: Wound to right thigh dressed   Neurological:      General: No focal deficit present.      Mental Status: She is alert and oriented to person, place, and time.   Psychiatric:         Mood and Affect: Mood normal.         Behavior: Behavior normal.          Significant Labs:  I have reviewed all pertinent lab results within the past 24 hours.  CBC:   Recent Labs   Lab 02/18/24  0323   WBC 31.73*   RBC 3.29*   HGB 7.0*   HCT 22.2*   *   MCV 68*   MCH 21.3*   MCHC 31.5*       CMP:   Recent Labs   Lab 02/18/24  0323   *   CALCIUM 7.2*   ALBUMIN 0.9*    PROT 5.0*   *   K 4.3   CO2 23   CL 98   BUN 30*   CREATININE 2.3*   ALKPHOS 124   ALT 5*   AST 11   BILITOT 0.4       LFTs:   Recent Labs   Lab 02/18/24  0323   ALT 5*   AST 11   ALKPHOS 124   BILITOT 0.4   PROT 5.0*   ALBUMIN 0.9*         Significant Diagnostics:  I have reviewed all pertinent imaging results/findings within the past 24 hours.

## 2024-02-18 NOTE — ASSESSMENT & PLAN NOTE
Patient's FSGs are uncontrolled due to hyperglycemia on current medication regimen.  Last A1c reviewed-   Lab Results   Component Value Date    HGBA1C 8.2 (H) 04/06/2023     Most recent fingerstick glucose reviewed-   Recent Labs   Lab 02/17/24  1618 02/17/24 2034 02/18/24  0045   POCTGLUCOSE 154* 386* 200*       Current correctional scale  High  Maintain anti-hyperglycemic dose as follows-   Antihyperglycemics (From admission, onward)      Start     Stop Route Frequency Ordered    02/17/24 0900  insulin detemir U-100 (Levemir) pen 30 Units         -- SubQ Daily 02/16/24 0931    02/16/24 2100  insulin detemir U-100 (Levemir) pen 15 Units         -- SubQ Nightly 02/16/24 0931    02/16/24 1514  insulin aspart U-100 pen 0-15 Units         -- SubQ Before meals & nightly PRN 02/16/24 1515          Hold Oral hypoglycemics while patient is in the hospital.  Will give only the night dose of Levemir this pm, hold am dose for surgery

## 2024-02-18 NOTE — ASSESSMENT & PLAN NOTE
Patient with acute kidney injury/acute renal failure likely due to pre-renal azotemia due to IVVD and acute tubular necrosis caused by sepsis  CARLINE is currently worsening. Baseline creatinine  0.9  - Labs reviewed- Renal function/electrolytes with Estimated Creatinine Clearance: 30.6 mL/min (A) (based on SCr of 2.3 mg/dL (H)). according to latest data. Monitor urine output and serial BMP and adjust therapy as needed. Avoid nephrotoxins and renally dose meds for GFR listed above.    - Highly suspect ATN as an etiology at this time  - Nephrology consult requested, appreciate recs  - Cont gentle IVFs/ treat infection.

## 2024-02-18 NOTE — PROGRESS NOTES
Pharmacokinetic Assessment Follow Up: IV Vancomycin    Vancomycin serum concentration assessment(s):    The trough level was drawn correctly and can be used to guide therapy at this time. The measurement is within the desired definitive target range of 10 to 20 mcg/mL.    Vancomycin Regimen Plan:    Discontinue the scheduled vancomycin regimen and re-dose when the random level is less than 20 mcg/mL, next level to be drawn at 1300 on 2/18/24.    Drug levels (last 3 results):  Recent Labs   Lab Result Units 02/17/24  2229   Vancomycin-Trough ug/mL 18.3       Pharmacy will continue to follow and monitor vancomycin.    Please contact pharmacy at extension [phone] for questions regarding this assessment.    Thank you for the consult,   Terence Unger       Patient brief summary:  Christine Mosqueda is a 53 y.o. female initiated on antimicrobial therapy with IV Vancomycin for treatment of skin & soft tissue infection    The patient's current regimen is random pulse dosing due to rising Scr.    Drug Allergies:   Review of patient's allergies indicates:   Allergen Reactions    Hydrochlorothiazide plus        Actual Body Weight:   86.6 kg    Renal Function:   Estimated Creatinine Clearance: 26.1 mL/min (A) (based on SCr of 2.6 mg/dL (H)).,     Dialysis Method (if applicable):  N/A    CBC (last 72 hours):  Recent Labs   Lab Result Units 02/15/24  2320 02/16/24  0703 02/17/24  0407   WBC K/uL 26.34* 27.47* 29.46*   Hemoglobin g/dL 9.8* 9.3* 8.0*   Hematocrit % 30.6* 30.0* 25.2*   Platelets K/uL 400 139* 423   Gran % % 83.1* 84.3* 84.4*   Lymph % % 6.7* 7.9* 7.6*   Mono % % 7.0 5.9 5.8   Eosinophil % % 0.1 0.3 0.5   Basophil % % 0.5 0.6 0.3   Differential Method  Automated Automated Automated       Metabolic Panel (last 72 hours):  Recent Labs   Lab Result Units 02/15/24  2320 02/16/24  0703 02/16/24  1601 02/17/24  0407 02/17/24  1212 02/17/24  1533 02/17/24  2128   Sodium mmol/L 128* 130* 129* 130* 131*  --  128*   Sodium,  Urine mmol/L  --   --   --   --   --  <20*  --    Potassium mmol/L 3.8 3.7 4.1 3.8 4.1  --  4.3   Chloride mmol/L 93* 95 94* 97 96  --  96   CO2 mmol/L 26 24 24 25 22*  --  24   Glucose mg/dL 384* 252* 371* 68* 101  --  356*   BUN mg/dL 16 16 21* 26* 28*  --  30*   Creatinine mg/dL 1.8* 1.5* 2.1* 2.4* 2.3*  --  2.6*   Creatinine, Urine mg/dL  --   --   --   --   --  131.7  --    Albumin g/dL 1.5* 1.2*  --  1.0*  --   --   --    Total Bilirubin mg/dL 0.4 0.3  --  0.3  --   --   --    Alkaline Phosphatase U/L 153* 117  --  128  --   --   --    AST U/L 11 6*  --  11  --   --   --    ALT U/L 7* 5*  --  5*  --   --   --    Magnesium mg/dL  --  1.5* 1.6 1.5*  --   --   --    Phosphorus mg/dL  --  2.6*  --  3.0  --   --   --        Vancomycin Administrations:  vancomycin given in the last 96 hours                     vancomycin 750 mg in dextrose 5 % (D5W) 250 mL IVPB (Vial-Mate) (mg) 750 mg New Bag 02/16/24 2324    vancomycin (VANCOCIN) 1,750 mg in dextrose 5 % (D5W) 500 mL IVPB (mg) 1,750 mg New Bag 02/15/24 2344                    Microbiologic Results:  Microbiology Results (last 7 days)       Procedure Component Value Units Date/Time    Blood culture [[id number]] Collected: 02/17/24 2005    Order Status: Sent Specimen: Blood from Peripheral, Hand, Right Updated: 02/17/24 2020    Blood culture [[id number]] Collected: 02/17/24 2006    Order Status: Sent Specimen: Blood from Peripheral, Hand, Right Updated: 02/17/24 2020    Blood culture x two cultures. Draw prior to antibiotics. [[id number]] Collected: 02/15/24 2321    Order Status: Completed Specimen: Blood from Peripheral, Antecubital, Right Updated: 02/17/24 1902     Blood Culture, Routine Gram stain portia bottle: Gram positive rods      Results called to and read back by:KAMALA STEELE  02/17/2024  18:30    Narrative:      Aerobic and anaerobic    Gram stain [[id number]] Collected: 02/17/24 0911    Order Status: Completed Specimen: Abscess from Groin Updated: 02/17/24 1447      Gram Stain Result Rare WBC's      Many Gram positive cocci in pairs and chains      Rare Gram negative rods    Narrative:      RIGHT THIGH WOUND    AFB Culture & Smear [[id number]] Collected: 02/17/24 0911    Order Status: Sent Specimen: Abscess from Groin Updated: 02/17/24 1118    Fungus culture [[id number]] Collected: 02/17/24 0911    Order Status: Sent Specimen: Abscess from Groin Updated: 02/17/24 1117    Culture, Anaerobe [[id number]] Collected: 02/17/24 0911    Order Status: Sent Specimen: Abscess from Groin Updated: 02/17/24 1117    Aerobic culture [[id number]] Collected: 02/17/24 0911    Order Status: Sent Specimen: Abscess from Groin Updated: 02/17/24 1116    Blood culture x two cultures. Draw prior to antibiotics. [[id number]] Collected: 02/15/24 2320    Order Status: Completed Specimen: Blood from Peripheral, Antecubital, Left Updated: 02/17/24 1101     Blood Culture, Routine Gram stain portia bottle: Gram positive rods      Results called to and read back by:Sandra HARVEY 02/17/2024  11:01    Narrative:      Aerobic and anaerobic

## 2024-02-18 NOTE — PLAN OF CARE
Problem: Adult Inpatient Plan of Care  Goal: Plan of Care Review  Outcome: Ongoing, Progressing  Goal: Patient-Specific Goal (Individualized)  Outcome: Ongoing, Progressing  Goal: Absence of Hospital-Acquired Illness or Injury  Outcome: Ongoing, Progressing  Goal: Optimal Comfort and Wellbeing  Outcome: Ongoing, Progressing  Goal: Readiness for Transition of Care  Outcome: Ongoing, Progressing     Problem: Skin Injury Risk Increased  Goal: Skin Health and Integrity  Outcome: Ongoing, Progressing     Problem: Adjustment to Illness (Sepsis/Septic Shock)  Goal: Optimal Coping  Outcome: Ongoing, Progressing     Problem: Bleeding (Sepsis/Septic Shock)  Goal: Absence of Bleeding  Outcome: Ongoing, Progressing     Problem: Glycemic Control Impaired (Sepsis/Septic Shock)  Goal: Blood Glucose Level Within Desired Range  Outcome: Ongoing, Progressing     Problem: Infection Progression (Sepsis/Septic Shock)  Goal: Absence of Infection Signs and Symptoms  Outcome: Ongoing, Progressing     Problem: Nutrition Impaired (Sepsis/Septic Shock)  Goal: Optimal Nutrition Intake  Outcome: Ongoing, Progressing     Problem: Fluid and Electrolyte Imbalance (Acute Kidney Injury/Impairment)  Goal: Fluid and Electrolyte Balance  Outcome: Ongoing, Progressing     Problem: Oral Intake Inadequate (Acute Kidney Injury/Impairment)  Goal: Optimal Nutrition Intake  Outcome: Ongoing, Progressing     Problem: Renal Function Impairment (Acute Kidney Injury/Impairment)  Goal: Effective Renal Function  Outcome: Ongoing, Progressing

## 2024-02-18 NOTE — ASSESSMENT & PLAN NOTE
"53F with h/o DM, admitted 2/15 with abscess in R medial thigh. CT c/f abscess/nec fascitis and now s/p several surgical debridements. Muscle necrosis noted. Cultures sent. Gram stain with GPC in pairs and chains and GNR.  Bcx on admit positive for GPR in one of two bottles. On vanc/meropenem. ID consulted for "fourniers gangrene, diabetic, CARLINE; no cultures taken at initial debridement "    Treating necrotizing fascitis, which is oftentimes polymicrobial including anaerobes. Likely started as a skin boil. Risk factor- uncontrolled DM, tobacco abuse (reportedly quit 2 weeks ago). Appreciate surgery's help with source control. Unclear significance of the GPR in one of two bcx- true bacteremia vs contaminant     Recommendations:   - continue vanc/meropenem. Will de-escalate pending surgical culture results  - wound care/additional debridement per surgery  - f/u bcx identification; repeat bcx ordered to document clearance. May need 2d echo to eval for veg, but waiting on culture results  - needs outpatient dental f/u for tooth extractions      "

## 2024-02-18 NOTE — EICU
Glucose 386<-154<-135    On detemir 15 units SQ nightly(received  second dose today)  On detemir 30 units SQ in AM but did not receive dose today    On aspart ACHS and high dose scale and received 15 units as per scale    Creatinine 2.3      Plan:  Check basic metabolic panel now  May need extra dose of short acting insulin at midnight

## 2024-02-18 NOTE — CONSULTS
"[facility] - Intensive Care  Infectious Disease  Consult Note    Patient Name: Christine Mosqueda  MRN: [id number]  Admission Date: 2/15/2024  Hospital Length of Stay: 1 days  Attending Physician: Radha Snider MD  Primary Care Provider: [facility]     Isolation Status: No active isolations    Patient information was obtained from patient and ER records.      Inpatient consult to Infectious Diseases  Consult performed by: Germaine Dobbs MD  Consult ordered by: Gabi Marques MD        Assessment/Plan:     Renal/  * Librado's gangrene  53F with h/o DM, admitted 2/15 with abscess in R medial thigh. CT c/f abscess/nec fascitis and now s/p several surgical debridements. Muscle necrosis noted. Cultures sent. Gram stain with GPC in pairs and chains and GNR.  Bcx on admit positive for GPR in one of two bottles. On vanc/meropenem. ID consulted for "fourniers gangrene, diabetic, CARLINE; no cultures taken at initial debridement "    Treating necrotizing fascitis, which is oftentimes polymicrobial including anaerobes. Likely started as a skin boil. Risk factor- uncontrolled DM, tobacco abuse (reportedly quit 2 weeks ago). Appreciate surgery's help with source control. Unclear significance of the GPR in one of two bcx- true bacteremia vs contaminant     Recommendations:   - continue vanc/meropenem. Will de-escalate pending surgical culture results  - wound care/additional debridement per surgery  - f/u bcx identification; repeat bcx ordered to document clearance. May need 2d echo to eval for veg, but waiting on culture results  - needs outpatient dental f/u for tooth extractions            Thank you for your consult. I will follow-up with patient. Please contact us if you have any additional questions.    Germaine Dobbs MD  Infectious Disease  [facility] - Intensive Care    Subjective:     Principal Problem: Librado's gangrene    HPI: 53F with h/o DM, admitted 2/15 with abscess in R medial thigh. Reports " "going to [facility] on 2/10 for the same thing. Given doxycycline, which she says she was taking. Wound on R leg became more painful, so came to hospital. Reports fever. Denies indwelling hardware. Reports dental caries, but no recent extractions. Reports aches all over, but no other areas of localized pain other than R thigh. Denies abx allergies.     CT 2/15-  Extensive inflammatory changes extending from the right perineum to the right medial thigh. Linear area of fluid attenuation in the right labial region, which may represent a small abscess. Multiple foci of air in the right medial thigh, which is difficult to measure. Considerations may include developing abscess, phlegmonous tissue, and or instrumentation resulting in subcutaneous air.       Bcx on admit positive for GPR in one of two bottles      Component 2 d ago   Blood Culture, Routine Gram stain portia bottle: Gram positive rods P   Blood Culture, Routine Results called to and read back by:Sandra HARVEY 2024  11:01 P          S/p I&D with general surgery on  and  with plans for another debridment Monday. Muscle necrosis noted. Cultures sent. Gram stain positive:       Component 09:11   Gram Stain Result Rare WBC's   Gram Stain Result Many Gram positive cocci in pairs and chains   Gram Stain Result Rare Gram negative rods     Hiv and hep c testing negative    On vanc/meropenem    ID consulted for "fourniers gangrene, diabetic, CARLINE; no cultures taken at initial debridement "    Past Medical History:   Diagnosis Date    Anxiety     Arthritis     Bronchitis     CHF (congestive heart failure)     Diabetic ketoacidosis associated with type 2 diabetes mellitus 3/10/2023    DM (diabetes mellitus)     Emphysema lung     Encounter for blood transfusion     HTN (hypertension)     Restrictive lung disease     Sleep apnea        Past Surgical History:   Procedure Laterality Date     SECTION      PALATAL EXPANSION      WRIST SURGERY Right        Review of " patient's allergies indicates:   Allergen Reactions    Hydrochlorothiazide plus        No current facility-administered medications on file prior to encounter.     Current Outpatient Medications on File Prior to Encounter   Medication Sig    albuterol (ACCUNEB) 1.25 mg/3 mL Nebu Take 1.25 mg by nebulization every 6 (six) hours as needed.    ALPRAZolam (XANAX) 2 MG Tab Take 2 mg by mouth daily as needed for Anxiety.    amlodipine (NORVASC) 10 MG tablet Take 10 mg by mouth once daily.    aspirin 81 MG Chew Take 81 mg by mouth once daily.    atorvastatin (LIPITOR) 20 MG tablet Take 20 mg by mouth once daily.    fluticasone-salmeterol diskus inhaler 500-50 mcg Inhale 1 puff into the lungs 2 (two) times daily.    furosemide (LASIX) 40 MG tablet Take 1 tablet (40 mg total) by mouth once daily.    gabapentin (NEURONTIN) 400 MG capsule Take 400 mg by mouth 2 (two) times daily.    insulin aspart U-100 (NOVOLOG) 100 unit/mL injection Inject 10 Units into the skin 3 (three) times daily before meals.    insulin detemir U-100 (LEVEMIR) 100 unit/mL injection Inject 22 Units into the skin once daily.    nicotine (NICODERM CQ) 14 mg/24 hr Place 1 patch onto the skin once daily.    omeprazole (PRILOSEC) 40 MG capsule Take 40 mg by mouth once daily.    potassium chloride (KLOR-CON) 8 MEQ TbSR Take 1 tablet (8 mEq total) by mouth once daily.    promethazine (PHENERGAN) 12.5 MG Tab Take 25 mg by mouth every 6 (six) hours as needed.    tiotropium (SPIRIVA) 18 mcg inhalation capsule Inhale 18 mcg into the lungs once daily.     Family History       Problem Relation (Age of Onset)    Diabetes Mother, Father, Sister    Hypertension Mother, Father          Tobacco Use    Smoking status: Some Days     Current packs/day: 0.25     Types: Cigarettes    Smokeless tobacco: Never   Substance and Sexual Activity    Alcohol use: Yes    Drug use: Yes     Types: Marijuana    Sexual activity: Yes     Partners: Male     Review of Systems    Constitutional:  Negative for activity change, appetite change, chills, diaphoresis, fatigue and fever.   HENT:  Positive for dental problem. Negative for congestion, sinus pressure, sore throat and tinnitus.    Eyes:  Negative for visual disturbance.   Respiratory:  Negative for cough, chest tightness, shortness of breath and wheezing.    Cardiovascular:  Negative for chest pain, palpitations and leg swelling.   Gastrointestinal:  Negative for abdominal distention, abdominal pain, nausea and vomiting.   Endocrine: Negative for polydipsia, polyphagia and polyuria.   Genitourinary:  Negative for dysuria.   Musculoskeletal:  Negative for arthralgias and back pain.   Skin:  Positive for wound. Negative for rash.   Neurological:  Negative for dizziness, syncope, light-headedness and headaches.   Psychiatric/Behavioral:  Negative for confusion.      Objective:     Vital Signs (Most Recent):  Temp: 100.2 °F (37.9 °C) (02/17/24 1615)  Pulse: 102 (02/17/24 1800)  Resp: 19 (02/17/24 1800)  BP: (!) 108/56 (02/17/24 1800)  SpO2: (!) 94 % (02/17/24 1800) Vital Signs (24h Range):  Temp:  [97.8 °F (36.6 °C)-101.3 °F (38.5 °C)] 100.2 °F (37.9 °C)  Pulse:  [] 102  Resp:  [12-33] 19  SpO2:  [79 %-100 %] 94 %  BP: ()/(54-86) 108/56     Weight: 86.6 kg (190 lb 14.7 oz)  Body mass index is 32.77 kg/m².     Physical Exam  Vitals and nursing note reviewed.   Constitutional:       General: She is not in acute distress.     Appearance: She is well-developed. She is obese. She is ill-appearing (chronically ill appearing). She is not toxic-appearing.   HENT:      Head: Normocephalic and atraumatic.      Comments: Teeth decayed and loose     Right Ear: External ear normal.      Left Ear: External ear normal.      Nose: Nose normal.      Mouth/Throat:      Dentition: Dental caries present.      Pharynx: Uvula midline.      Comments: Hyperactive movements of mouth  Eyes:      General: Lids are normal.      Conjunctiva/sclera:  Conjunctivae normal.      Pupils: Pupils are equal, round, and reactive to light.   Neck:      Thyroid: No thyroid mass.      Vascular: No JVD.      Trachea: Trachea normal.   Cardiovascular:      Rate and Rhythm: Normal rate and regular rhythm.      Heart sounds: Normal heart sounds, S1 normal and S2 normal.   Pulmonary:      Effort: Pulmonary effort is normal.      Breath sounds: Normal breath sounds.   Abdominal:      General: Bowel sounds are normal. There is no distension.      Palpations: Abdomen is soft.      Tenderness: There is no abdominal tenderness.   Genitourinary:     Comments: jansen  Musculoskeletal:      Cervical back: Full passive range of motion without pain, normal range of motion and neck supple.      Right lower leg: No edema.      Left lower leg: No edema.   Skin:     Comments: Fresh Surgical dressing on R medial thigh. No drains   Neurological:      General: No focal deficit present.      Mental Status: She is alert and oriented to person, place, and time.      Cranial Nerves: No cranial nerve deficit.      Sensory: No sensory deficit.   Psychiatric:         Speech: Speech normal.         Behavior: Behavior normal. Behavior is cooperative.         Thought Content: Thought content normal.        Photo from admit        Significant Labs: All pertinent labs within the past 24 hours have been reviewed.  Blood Culture:   Recent Labs   Lab 02/15/24  2320 02/15/24  2321   LABBLOO Gram stain portia bottle: Gram positive rods  Results called to and read back by:Sandra HARVEY 02/17/2024  11:01 No Growth to date  No Growth to date       BMP:   Recent Labs   Lab 02/17/24  0407 02/17/24  1212   GLU 68* 101   * 131*   K 3.8 4.1   CL 97 96   CO2 25 22*   BUN 26* 28*   CREATININE 2.4* 2.3*   CALCIUM 7.7* 8.3*   MG 1.5*  --        CBC:   Recent Labs   Lab 02/15/24  2320 02/15/24  2328 02/16/24  0703 02/17/24  0407   WBC 26.34*  --  27.47* 29.46*   HGB 9.8*  --  9.3* 8.0*   HCT 30.6* 34* 30.0* 25.2*     --  " 139* 423       Urine Culture: No results for input(s): "LABURIN" in the last 48 hours.    Significant Imaging: I have reviewed all pertinent imaging results/findings within the past 24 hours.              "

## 2024-02-18 NOTE — NURSING
[facility] [place]  Nurses Note -- 4 Eyes      2/18/2024       Skin assessed on: Q Shift      [x] No Pressure Injuries Present    [x]Prevention Measures Documented    [] Yes LDA  for Pressure Injury Previously documented     [] Yes New Pressure Injury Discovered   [] LDA for New Pressure Injury Added    Large right lower extremity wound.    Attending RN:  Yari Daniel RN     Second RN:  CALEB Locke

## 2024-02-18 NOTE — ASSESSMENT & PLAN NOTE
Chronic, controlled. Latest blood pressure and vitals reviewed-     Temp:  [98.4 °F (36.9 °C)-100.2 °F (37.9 °C)]   Pulse:  []   Resp:  [19-41]   BP: ()/(55-91)   SpO2:  [88 %-97 %] .   Home meds for hypertension were reviewed and noted below.   Hypertension Medications               amlodipine (NORVASC) 10 MG tablet Take 10 mg by mouth once daily.    furosemide (LASIX) 40 MG tablet Take 1 tablet (40 mg total) by mouth once daily.            While in the hospital, will manage blood pressure as follows; Adjust home antihypertensive regimen as follows- hold meds for now given infection/ narcotic use. Resume as warranted . May develop rebound HTN from drug/ alcohol withdrawal.    Will utilize p.r.n. blood pressure medication only if patient's blood pressure greater than 180/110 and she develops symptoms such as worsening chest pain or shortness of breath.

## 2024-02-18 NOTE — ASSESSMENT & PLAN NOTE
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     - Will need take back to OR for repeat exploration and debridement, possible wound vac application no later than Monday 2/19  - Would keep her in the ICU for close nursing care  - Nephrology consult; CARLINE ?stablizing. UOP adequate   - ID consult; Vanc/zosyn/clinda started 2/16, switched to meropenam, vanc, clinda 2/17. Continue    - Blood cultures 2/16, NGTD    - Operative cultures 2/17, pending  - Poorly controlled DMII - insulin per HM, remains labile  - Nutrition consult, boost supplements, severely hypoalbuminemia   - Polysubstance abuse to alcohol dependency - librium taper on, CIWA protocol  - Multiple modal pain control limited by CARLINE - continue scheduled tylenol, combination of oral and IV narcotics  - Okay for DVT ppx   - PT consult - okay to attempt ambulation, get up to chair.   - Will have long term wound care needs; pending final disposition may need placement for this

## 2024-02-18 NOTE — SUBJECTIVE & OBJECTIVE
Subjective:   Interval History:  No acute issues this morning, vitals have been stable. All questions answered.       Review of Systems  Objective:     Vital Signs (Most Recent):  Temp: 98.8 °F (37.1 °C) (02/18/24 1328)  Pulse: 84 (02/18/24 1200)  Resp: (!) 34 (02/18/24 1200)  BP: (!) 117/59 (02/18/24 1200)  SpO2: (!) 91 % (02/18/24 1200) Vital Signs (24h Range):  Temp:  [98.4 °F (36.9 °C)-100.2 °F (37.9 °C)] 98.8 °F (37.1 °C)  Pulse:  [] 84  Resp:  [12-41] 34  SpO2:  [88 %-97 %] 91 %  BP: ()/(54-91) 117/59     Weight: 93.4 kg (205 lb 14.6 oz)  Body mass index is 35.34 kg/m².     Physical Exam  Vitals and nursing note reviewed.   Constitutional:       General: She is not in acute distress.     Appearance: She is ill-appearing.   HENT:      Head: Normocephalic.      Mouth/Throat:      Mouth: Mucous membranes are dry.   Eyes:      Conjunctiva/sclera: Conjunctivae normal.   Cardiovascular:      Rate and Rhythm: Normal rate and regular rhythm.      Pulses: Normal pulses.   Pulmonary:      Effort: Pulmonary effort is normal. No respiratory distress.   Abdominal:      General: Bowel sounds are normal.   Musculoskeletal:      Comments: Thigh dressing intact   Skin:     General: Skin is warm.   Neurological:      Mental Status: She is alert and oriented to person, place, and time. Mental status is at baseline.   Psychiatric:         Mood and Affect: Mood normal.         Thought Content: Thought content normal.          Significant Labs: All pertinent labs within the past 24 hours have been reviewed.  Blood Culture:   Recent Labs   Lab 02/17/24 2005 02/17/24 2006   LABBLOO No Growth to date No Growth to date       BMP:   Recent Labs   Lab 02/18/24  0323   *   *   K 4.3   CL 98   CO2 23   BUN 30*   CREATININE 2.3*   CALCIUM 7.2*   MG 1.4*       CBC:   Recent Labs   Lab 02/17/24  0407 02/18/24  0323   WBC 29.46* 31.73*   HGB 8.0* 7.0*   HCT 25.2* 22.2*    477*       Urine Culture: No results  "for input(s): "LABURIN" in the last 48 hours.    Significant Imaging: I have reviewed all pertinent imaging results/findings within the past 24 hours.  "

## 2024-02-18 NOTE — PROGRESS NOTES
[facility] Medicine  Progress Note    Patient Name: Christine Mosqueda  MRN: [id number]  Patient Class: IP- Inpatient   Admission Date: 2/15/2024  Length of Stay: 2 days  Attending Physician: Yasmany Cary MD  Primary Care Provider: [facility]        Subjective:     Principal Problem:Estephanie's gangrene        HPI:  Ms. Mosqueda is a 53 yoF with a PMHx of diastolic heart failure, HTN, T2DM on insulin. She presented to the hospital with worsening pain and swelling of the right medial thigh. She developed an abscess in the area that underwent I+D at [facility] a few days ago. She was sent home on PO abx. Since that time the pain and swelling have worsened.  In ED, CT scan demonstrated extensive inflammation extending from the right perineum to the right medial thigh with multiple foci of air. She was admitted to general surgery for estephanie's gangrene. She underwent extensive debridement under general surgery today in OR. She is currently doing well with good pain control.  consulted for medical management.     Overview/Hospital Course:  A 53 y.o F w/ PMHx of poorly controlled type II DM, who was admitted w/ worsening pain and swelling of the Rt medial thigh. CT scan findings were consistent w/ a possible Estephanie's gangrene, pt had surgical debridement done on 02/16 and again she had wound washout and necrotic tissue removal on 02/17, currently she is being covered w/ IV Vanc + Meropenem + Clindamycin, admitted to the MICU postoperatively for close monitoring. Medicine service will be primary on this pt. Plan for repeat washout 2/19.    Subjective:   Interval History:  No acute issues this morning, vitals have been stable. All questions answered.       Review of Systems  Objective:     Vital Signs (Most Recent):  Temp: 98.8 °F (37.1 °C) (02/18/24 1328)  Pulse: 84 (02/18/24 1200)  Resp: (!) 34 (02/18/24 1200)  BP: (!) 117/59 (02/18/24 1200)  SpO2: (!) 91 % (02/18/24 1200)  "Vital Signs (24h Range):  Temp:  [98.4 °F (36.9 °C)-100.2 °F (37.9 °C)] 98.8 °F (37.1 °C)  Pulse:  [] 84  Resp:  [12-41] 34  SpO2:  [88 %-97 %] 91 %  BP: ()/(54-91) 117/59     Weight: 93.4 kg (205 lb 14.6 oz)  Body mass index is 35.34 kg/m².     Physical Exam  Vitals and nursing note reviewed.   Constitutional:       General: She is not in acute distress.     Appearance: She is ill-appearing.   HENT:      Head: Normocephalic.      Mouth/Throat:      Mouth: Mucous membranes are dry.   Eyes:      Conjunctiva/sclera: Conjunctivae normal.   Cardiovascular:      Rate and Rhythm: Normal rate and regular rhythm.      Pulses: Normal pulses.   Pulmonary:      Effort: Pulmonary effort is normal. No respiratory distress.   Abdominal:      General: Bowel sounds are normal.   Musculoskeletal:      Comments: Thigh dressing intact   Skin:     General: Skin is warm.   Neurological:      Mental Status: She is alert and oriented to person, place, and time. Mental status is at baseline.   Psychiatric:         Mood and Affect: Mood normal.         Thought Content: Thought content normal.          Significant Labs: All pertinent labs within the past 24 hours have been reviewed.  Blood Culture:   Recent Labs   Lab 02/17/24 2005 02/17/24 2006   LABBLOO No Growth to date No Growth to date       BMP:   Recent Labs   Lab 02/18/24  0323   *   *   K 4.3   CL 98   CO2 23   BUN 30*   CREATININE 2.3*   CALCIUM 7.2*   MG 1.4*       CBC:   Recent Labs   Lab 02/17/24  0407 02/18/24  0323   WBC 29.46* 31.73*   HGB 8.0* 7.0*   HCT 25.2* 22.2*    477*       Urine Culture: No results for input(s): "LABURIN" in the last 48 hours.    Significant Imaging: I have reviewed all pertinent imaging results/findings within the past 24 hours.    Assessment/Plan:      * Librado's gangrene  S/p initial debridement on 02/16 and again on 02/17/2024  Op note reviewed:  Cont Abx coverage w/ IV Vanc + Meropenem + Clindamycin  Blood " culture w/ NGTD, surgical wound cultures pending   ID consult requested, appreciate recs  Plan to return to OR for an additional wound exploration/debridement/washout on Monday 2/19/24.       ETOH abuse  Patient drinks at least a 6 pack beer daily  Start scheduled librium. Taper.   Thiamine, folate, potassium, magnesium      Cocaine abuse  Patient sprinkles crack crystals in her marijuana  Wants help. Tearful. Emotional support provided.   CM consulted.       Anemia of chronic disease  Patient's anemia is currently controlled. Has not received any PRBCs to date. Etiology likely d/t chronic disease and acute blood loss post surgery.  Current CBC reviewed-   Lab Results   Component Value Date    HGB 7.0 (L) 02/18/2024    HCT 22.2 (L) 02/18/2024     Monitor serial CBC and transfuse if patient becomes hemodynamically unstable, symptomatic or H/H drops below 7/21.  - Type and screen and have 2 units on hold in preparation for surgery 2/19    Essential hypertension  Chronic, controlled. Latest blood pressure and vitals reviewed-     Temp:  [98.4 °F (36.9 °C)-100.2 °F (37.9 °C)]   Pulse:  []   Resp:  [19-41]   BP: ()/(55-91)   SpO2:  [88 %-97 %] .   Home meds for hypertension were reviewed and noted below.   Hypertension Medications               amlodipine (NORVASC) 10 MG tablet Take 10 mg by mouth once daily.    furosemide (LASIX) 40 MG tablet Take 1 tablet (40 mg total) by mouth once daily.            While in the hospital, will manage blood pressure as follows; Adjust home antihypertensive regimen as follows- hold meds for now given infection/ narcotic use. Resume as warranted . May develop rebound HTN from drug/ alcohol withdrawal.    Will utilize p.r.n. blood pressure medication only if patient's blood pressure greater than 180/110 and she develops symptoms such as worsening chest pain or shortness of breath.    Sepsis without acute organ dysfunction  Defined by leukocytosis, tachycardia and groin  abscess  Continue vanc/ meropenem/clindamycin  Fu blood/ wound cx's  Maintenance IVFs; gentle  Appreciate ID recs      Chronic diastolic congestive heart failure  Patient is identified as having Diastolic (HFpEF) heart failure that is Chronic. CHF is currently controlled. Latest ECHO performed and demonstrates- Results for orders placed during the hospital encounter of 03/10/23    Echo    Interpretation Summary  · The left ventricle is normal in size with low normal systolic function.  · The estimated ejection fraction is 53%.  · There is abnormal septal wall motion.  · Indeterminate left ventricular diastolic function.  · Mild left atrial enlargement.  · Normal right ventricular size with low normal right ventricular systolic function.  · Mild right atrial enlargement.  · Mild mitral regurgitation.  · Mild tricuspid regurgitation.  · Normal central venous pressure (3 mmHg).  · The estimated PA systolic pressure is 23 mmHg.    No acute issues  Monitor fluid status    ILD (interstitial lung disease)  Noted on imaging; CT chest 2022  Stable, no acute issues. Not on home O2.      CARLINE (acute kidney injury)  Patient with acute kidney injury/acute renal failure likely due to pre-renal azotemia due to IVVD and acute tubular necrosis caused by sepsis  CARLINE is currently worsening. Baseline creatinine  0.9  - Labs reviewed- Renal function/electrolytes with Estimated Creatinine Clearance: 30.6 mL/min (A) (based on SCr of 2.3 mg/dL (H)). according to latest data. Monitor urine output and serial BMP and adjust therapy as needed. Avoid nephrotoxins and renally dose meds for GFR listed above.    - Highly suspect ATN as an etiology at this time  - Nephrology consult requested, appreciate recs  - Cont gentle IVFs/ treat infection.    Tobacco abuse  - Pt was counseled about cessation      Type 2 diabetes mellitus with hyperglycemia, without long-term current use of insulin  Patient's FSGs are uncontrolled due to hyperglycemia on  current medication regimen.  Last A1c reviewed-   Lab Results   Component Value Date    HGBA1C 8.2 (H) 04/06/2023     Most recent fingerstick glucose reviewed-   Recent Labs   Lab 02/17/24  1618 02/17/24 2034 02/18/24  0045   POCTGLUCOSE 154* 386* 200*       Current correctional scale  High  Maintain anti-hyperglycemic dose as follows-   Antihyperglycemics (From admission, onward)      Start     Stop Route Frequency Ordered    02/17/24 0900  insulin detemir U-100 (Levemir) pen 30 Units         -- SubQ Daily 02/16/24 0931    02/16/24 2100  insulin detemir U-100 (Levemir) pen 15 Units         -- SubQ Nightly 02/16/24 0931    02/16/24 1514  insulin aspart U-100 pen 0-15 Units         -- SubQ Before meals & nightly PRN 02/16/24 1515          Hold Oral hypoglycemics while patient is in the hospital.  Will give only the night dose of Levemir this pm, hold am dose for surgery          VTE Risk Mitigation (From admission, onward)           Ordered     enoxaparin injection 40 mg  Daily         02/16/24 0506     IP VTE HIGH RISK PATIENT  Once         02/16/24 0506     Place sequential compression device  Until discontinued         02/16/24 0506                    Discharge Planning   AVTAR: 2/17/2024     Code Status: Full Code   Is the patient medically ready for discharge?:     Reason for patient still in hospital (select all that apply): Treatment  Discharge Plan A: Home with family            Critical care time spent on the evaluation and treatment of severe organ dysfunction, review of pertinent labs and imaging studies, discussions with consulting providers and discussions with patient/family: 25 minutes.      Yasmany Cary MD  Department of Hospital Medicine   [facility] [place] - Intensive Care

## 2024-02-18 NOTE — PROGRESS NOTES
[facility] - Intensive Care  Nephrology  Progress Note    Patient Name: Christine Mosqueda  MRN: [id number]  Admission Date: 2/15/2024  Hospital Length of Stay: 2 days  Attending Provider: Yasmany Cary MD   Primary Care Physician: [facility]  Principal Problem:Librado's gangrene  Date of service:  2/18/2024    Subjective:     Interval History: Cr on downtrend. 1.6L UOP in 24 hours. IVF running at 75 ml/hr, fluid positive will hold IVF. Patient restless in bed, laying sideways. Pt was on phone upon entry to room, conversation seemed heated. VSS on 2L NC. Na+, Ca+, Mag, albumin and hemoglobin low this mornings labs. Back to OR tomorrow further debridement.     Review of patient's allergies indicates:   Allergen Reactions    Hydrochlorothiazide plus      Current Facility-Administered Medications   Medication Frequency    0.9%  NaCl infusion Continuous    acetaminophen tablet 1,000 mg Q8H    albumin human 25% bottle 25 g Once    albuterol sulfate nebulizer solution 2.5 mg Q4H PRN    amLODIPine tablet 10 mg Daily    budesonide nebulizer solution 1 mg Q12H    And    arformoteroL nebulizer solution 15 mcg BID    aspirin chewable tablet 81 mg Daily    atorvastatin tablet 20 mg Daily    calcium gluconate 1 g in NS IVPB (premixed) Q1H    chlordiazepoxide capsule 5 mg QID    clindamycin in D5W 600 mg/50 mL IVPB 600 mg Q6H    dextrose 10% bolus 125 mL 125 mL PRN    dextrose 10% bolus 250 mL 250 mL PRN    enoxaparin injection 40 mg Daily    folic acid tablet 1 mg Daily    gabapentin capsule 100 mg BID    glucagon (human recombinant) injection 1 mg PRN    glucose chewable tablet 16 g PRN    glucose chewable tablet 24 g PRN    HYDROmorphone injection 0.5 mg Q2H PRN    insulin aspart U-100 pen 0-15 Units QID (AC + HS) PRN    insulin detemir U-100 (Levemir) pen 15 Units QHS    insulin detemir U-100 (Levemir) pen 30 Units Daily    LIDOcaine (PF) 10 mg/ml (1%) injection 10 mg Once PRN    magnesium oxide tablet 500  mg BID    magnesium sulfate 2g in water 50mL IVPB (premix) Once    melatonin tablet 6 mg Nightly PRN    meropenem (MERREM) 1 g in sodium chloride 0.9 % 100 mL IVPB (MB+) Q8H    mupirocin 2 % ointment BID    ondansetron disintegrating tablet 8 mg Q8H PRN    oxyCODONE immediate release tablet 10 mg Q4H PRN    oxyCODONE immediate release tablet 5 mg Q4H PRN    pantoprazole EC tablet 40 mg Daily    prochlorperazine injection Soln 5 mg Q6H PRN    sodium bicarbonate tablet 1,300 mg BID    sodium chloride 0.9% bolus 500 mL 500 mL Once    sodium chloride 0.9% flush 10 mL PRN    thiamine tablet 100 mg Daily    vancomycin - pharmacy to dose pharmacy to manage frequency       Objective:     Vital Signs (Most Recent):  Temp: 98.4 °F (36.9 °C) (02/18/24 0300)  Pulse: 96 (02/18/24 0500)  Resp: (!) 23 (02/18/24 0839)  BP: 90/64 (02/18/24 0900)  SpO2: 96 % (02/18/24 0730) Vital Signs (24h Range):  Temp:  [97.8 °F (36.6 °C)-100.2 °F (37.9 °C)] 98.4 °F (36.9 °C)  Pulse:  [] 96  Resp:  [12-33] 23  SpO2:  [79 %-100 %] 96 %  BP: ()/(54-86) 90/64     Weight: 93.4 kg (205 lb 14.6 oz) (02/18/24 0300)  Body mass index is 35.34 kg/m².  Body surface area is 2.05 meters squared.    I/O last 3 completed shifts:  In: 3414.7 [P.O.:400; I.V.:863.2; IV Piggyback:2151.5]  Out: 2195 [Urine:2095; Blood:100]    Physical Exam  Vitals and nursing note reviewed.   Constitutional:       General: She is not in acute distress.     Appearance: She is well-developed. She is obese. She is ill-appearing (chronically ill appearing). She is not toxic-appearing.   HENT:      Head: Normocephalic and atraumatic.      Comments: Teeth decayed and loose     Right Ear: External ear normal.      Left Ear: External ear normal.      Nose: Nose normal.      Mouth/Throat:      Dentition: Dental caries present.      Pharynx: Uvula midline.   Eyes:      General: Lids are normal.      Conjunctiva/sclera: Conjunctivae normal.      Pupils: Pupils are equal, round, and  reactive to light.   Neck:      Thyroid: No thyroid mass.      Vascular: No JVD.      Trachea: Trachea normal.   Cardiovascular:      Rate and Rhythm: Normal rate and regular rhythm.      Heart sounds: Normal heart sounds, S1 normal and S2 normal.   Pulmonary:      Effort: Pulmonary effort is normal.      Breath sounds: Normal breath sounds.   Abdominal:      General: Bowel sounds are normal. There is no distension.      Palpations: Abdomen is soft.      Tenderness: There is no abdominal tenderness.   Genitourinary:     Comments: jansen  Musculoskeletal:      Cervical back: Full passive range of motion without pain, normal range of motion and neck supple.      Right lower leg: No edema.      Left lower leg: No edema.   Skin:     Comments: Fresh Surgical dressing on R medial thigh. No drains   Neurological:      General: No focal deficit present.      Mental Status: She is alert and oriented to person, place, and time.      Cranial Nerves: No cranial nerve deficit.      Sensory: No sensory deficit.   Psychiatric:         Speech: Speech normal.         Behavior: Inattentive. Fidgeting with phone and blanket.        Thought Content: Thought content normal.        Significant Labs:sureBMP:   Recent Labs   Lab 02/18/24  0323   *   *   K 4.3   CL 98   CO2 23   BUN 30*   CREATININE 2.3*   CALCIUM 7.2*   MG 1.4*     CBC:   Recent Labs   Lab 02/18/24  0323   WBC 31.73*   RBC 3.29*   HGB 7.0*   HCT 22.2*   *   MCV 68*   MCH 21.3*   MCHC 31.5*     CMP:   Recent Labs   Lab 02/18/24  0323   *   CALCIUM 7.2*   ALBUMIN 0.9*   PROT 5.0*   *   K 4.3   CO2 23   CL 98   BUN 30*   CREATININE 2.3*   ALKPHOS 124   ALT 5*   AST 11   BILITOT 0.4     LFTs:   Recent Labs   Lab 02/18/24  0323   ALT 5*   AST 11   ALKPHOS 124   BILITOT 0.4   PROT 5.0*   ALBUMIN 0.9*     Microbiology Results (last 7 days)       Procedure Component Value Units Date/Time    Blood culture x two cultures. Draw prior to antibiotics.  [[id number]]  (Abnormal) Collected: 02/15/24 2320    Order Status: Completed Specimen: Blood from Peripheral, Antecubital, Left Updated: 02/18/24 0841     Blood Culture, Routine Gram stain portia bottle: Gram positive rods      Results called to and read back by:Sandra HARVEY 02/17/2024  11:01      GRAM POSITIVE RODS  Isolate sent out for reference testing      Narrative:      Aerobic and anaerobic    Blood culture x two cultures. Draw prior to antibiotics. [[id number]]  (Abnormal) Collected: 02/15/24 2321    Order Status: Completed Specimen: Blood from Peripheral, Antecubital, Right Updated: 02/18/24 0841     Blood Culture, Routine Gram stain portia bottle: Gram positive rods      Results called to and read back by:KAMALA STEELE  02/17/2024  18:30      GRAM POSITIVE RODS  Isolate sent out for reference testing      Narrative:      Aerobic and anaerobic    Culture, ID (Consult) [[id number]] Collected: 02/15/24 2321    Order Status: No result Specimen: Blood from Peripheral, Antecubital, Right Updated: 02/18/24 0836    Rapid Organism ID by PCR (from Blood culture) [[id number]] Collected: 02/15/24 2320    Order Status: No result Updated: 02/18/24 0729    Blood culture [[id number]] Collected: 02/17/24 2005    Order Status: Sent Specimen: Blood from Peripheral, Hand, Right Updated: 02/17/24 2020    Blood culture [[id number]] Collected: 02/17/24 2006    Order Status: Sent Specimen: Blood from Peripheral, Hand, Right Updated: 02/17/24 2020    Gram stain [[id number]] Collected: 02/17/24 0911    Order Status: Completed Specimen: Abscess from Groin Updated: 02/17/24 1447     Gram Stain Result Rare WBC's      Many Gram positive cocci in pairs and chains      Rare Gram negative rods    Narrative:      RIGHT THIGH WOUND    AFB Culture & Smear [[id number]] Collected: 02/17/24 0911    Order Status: Sent Specimen: Abscess from Groin Updated: 02/17/24 1118    Fungus culture [[id number]] Collected: 02/17/24 0911    Order Status: Sent Specimen:  Abscess from Groin Updated: 02/17/24 1117    Culture, Anaerobe [[id number]] Collected: 02/17/24 0911    Order Status: Sent Specimen: Abscess from Groin Updated: 02/17/24 1117    Aerobic culture [[id number]] Collected: 02/17/24 0911    Order Status: Sent Specimen: Abscess from Groin Updated: 02/17/24 1116          Recent Labs   Lab 02/18/24  0255   COLORU Yellow   SPECGRAV 1.020   PHUR 6.0   PROTEINUA 2+*   BACTERIA Many*   NITRITE Negative   LEUKOCYTESUR Negative   UROBILINOGEN Negative   HYALINECASTS 0     All labs within the past 24 hours have been reviewed.    Significant Imaging:  US retroperitoneal 2/17  Bilateral elevated renal indices.  Findings may suggest medical renal disease.  Cholelithiasis.    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] 02/16/2024 Yes    ILD (interstitial lung disease) [J84.9] 02/16/2024 Yes    Chronic diastolic congestive heart failure [I50.32] 02/16/2024 Yes    Sepsis without acute organ dysfunction [A41.9] 02/16/2024 Yes    Essential hypertension [I10] 02/16/2024 Yes    Anemia of chronic disease [D63.8] 02/16/2024 Yes    Cocaine abuse [F14.10] 02/16/2024 Yes    ETOH abuse [F10.10] 02/16/2024 Yes    CARLINE (acute kidney injury) [N17.9] 03/10/2023 Yes    Tobacco abuse [Z72.0] 02/05/2018 Yes     Chronic    Type 2 diabetes mellitus with hyperglycemia, without long-term current use of insulin [E11.65] 02/05/2018 Yes      Problems Resolved During this Admission:       CARLINE  - baseline Cr 0.8-1.2 in 2023  - suspect septic and ischemic ATN in the setting of Librado's gangrene  - Cr appears to be reaching a plateau, UOP is adequate  - Maintain euvolemia - hoId IVF for now - fluid positive, ok to give lasix PRN hypervolemia.   - UA, Ulytes, renal U/S results as above  - no urgent indication for dialysis   - monitor daily weights, strict I&Os, daily labs  - avoid nephrotoxic agents including NSAIDs, renally dose  medications    Anemia of chronic disease/blood loss anemia  - Hgb down likely 2/2 debridements  - monitor CBC, transfuse hgb < 7      Hypomagnesemia/Hypocalcemia  - replace IV and PO  - repeat level in AM     Hyponatremia  - Na trending up  - goal toward glycemic control  - recommend converting IVPB to NS when possible  - monitor serial levels     Acidosis  - level better   - PO bicarb  - monitor level    Hypoalbuminemia  - IV albumin bolus today  - diabetic renal diet  - Boost supplement drinks TID    Thank you for your consult. I will follow-up with patient. Please contact us if you have any additional questions.    MILLICENT GONSALES, FNP-C  Nephrology  [facility] - [place] - Intensive Care

## 2024-02-18 NOTE — PROGRESS NOTES
[facility] - Intensive Care  General Surgery  Progress Note    Subjective:     History of Present Illness:  Ms. Mosqueda is a 53 yoF with a PMH significant for insulin-dependent T2DM and CHF (last echo EF 53%, PA systolic 23) who presents with worsening pain and swelling of the right medial thigh. She developed an abscess in the area that [place] [facility] [place] after which she was sent home on PO abx. Since that time the pain and swelling have worsened leading to her presentation to our ED. Workup here revealed normal vital signs, but with significantly elevated WBC and CRP, with other laboratory derangements leading to a LRINIC score of 11. CT scan demonstrates extensive inflammation extending from the right perineum to the right medial thigh with multiple foci of air. She is not on any blood thinners. No issues with anesthesia in the past.    Post-Op Info:  Procedure(s) (LRB):  INCISION AND DRAINAGE; WOUND DEBRIDEMENT, (N/A)   1 Day Post-Op     Interval History: NAEON. Low grade fever, vitals otherwise fairly normal. Slight uptrend in leukocytosis, not unexpected given yesterdays findings.     Admitted to ICU post operatively. Pain control is an issue.     Glucose control globally improving but still labile. Cr stabilizing.     Seen by nephrology and ID     Tearful this am. Reassurance provided     Medications:  Continuous Infusions:   sodium chloride 0.9% 75 mL/hr at 02/18/24 0600     Scheduled Meds:   acetaminophen  1,000 mg Oral Q8H    albumin human 25%  25 g Intravenous Once    amLODIPine  10 mg Oral Daily    budesonide  1 mg Nebulization Q12H    And    arformoteroL  15 mcg Nebulization BID    aspirin  81 mg Oral Daily    atorvastatin  20 mg Oral Daily    calcium gluconate IVPB  1 g Intravenous Q1H    chlordiazepoxide  5 mg Oral QID    clindamycin IV (PEDS and ADULTS)  600 mg Intravenous Q6H    enoxparin  40 mg Subcutaneous Daily    folic acid  1 mg Oral Daily    gabapentin  100 mg Oral BID     insulin detemir U-100  15 Units Subcutaneous QHS    insulin detemir U-100 (Levemir)  30 Units Subcutaneous Daily    magnesium oxide  500 mg Oral BID    magnesium sulfate IVPB  2 g Intravenous Once    meropenem (MERREM) IVPB  1 g Intravenous Q8H    mupirocin   Nasal BID    pantoprazole  40 mg Oral Daily    sodium bicarbonate  1,300 mg Oral BID    sodium chloride 0.9%  500 mL Intravenous Once    thiamine  100 mg Oral Daily     PRN Meds:albuterol sulfate, dextrose 10%, dextrose 10%, glucagon (human recombinant), glucose, glucose, HYDROmorphone, insulin aspart U-100, LIDOcaine (PF) 10 mg/ml (1%), melatonin, ondansetron, oxyCODONE, oxyCODONE, prochlorperazine, sodium chloride 0.9%, Pharmacy to dose Vancomycin consult **AND** vancomycin - pharmacy to dose     Review of patient's allergies indicates:   Allergen Reactions    Hydrochlorothiazide plus      Objective:     Vital Signs (Most Recent):  Temp: 98.4 °F (36.9 °C) (02/18/24 0300)  Pulse: 96 (02/18/24 0500)  Resp: (!) 23 (02/18/24 0839)  BP: 90/64 (02/18/24 0900)  SpO2: 96 % (02/18/24 0730) Vital Signs (24h Range):  Temp:  [97.8 °F (36.6 °C)-100.2 °F (37.9 °C)] 98.4 °F (36.9 °C)  Pulse:  [] 96  Resp:  [12-33] 23  SpO2:  [79 %-100 %] 96 %  BP: ()/(54-86) 90/64     Weight: 93.4 kg (205 lb 14.6 oz)  Body mass index is 35.34 kg/m².    Intake/Output - Last 3 Shifts         02/16 0700 02/17 0659 02/17 0700 02/18 0659 02/18 0700 02/19 0659    P.O. 360 400     I.V. (mL/kg)  863.2 (9.2)     IV Piggyback  2151.5     Total Intake(mL/kg) 360 (4.2) 3414.7 (36.6)     Urine (mL/kg/hr) 450 (0.2) 1645 (0.7)     Blood  100     Total Output 450 1745     Net -90 +1669.7                    Physical Exam  Vitals and nursing note reviewed.   Constitutional:       Appearance: Normal appearance.   HENT:      Head: Normocephalic and atraumatic.   Cardiovascular:      Rate and Rhythm: Normal rate and regular rhythm.   Pulmonary:      Effort: Pulmonary effort is normal. No  respiratory distress.   Abdominal:      General: Abdomen is flat. There is no distension.      Palpations: Abdomen is soft. There is no mass.      Tenderness: There is no abdominal tenderness.      Hernia: No hernia is present.   Genitourinary:     Comments: Hernandez catheter  Musculoskeletal:      Right lower leg: No edema.      Left lower leg: No edema.   Skin:     General: Skin is warm and dry.      Comments: Wound to right thigh dressed   Neurological:      General: No focal deficit present.      Mental Status: She is alert and oriented to person, place, and time.   Psychiatric:         Mood and Affect: Mood normal.         Behavior: Behavior normal.          Significant Labs:  I have reviewed all pertinent lab results within the past 24 hours.  CBC:   Recent Labs   Lab 02/18/24  0323   WBC 31.73*   RBC 3.29*   HGB 7.0*   HCT 22.2*   *   MCV 68*   MCH 21.3*   MCHC 31.5*       CMP:   Recent Labs   Lab 02/18/24  0323   *   CALCIUM 7.2*   ALBUMIN 0.9*   PROT 5.0*   *   K 4.3   CO2 23   CL 98   BUN 30*   CREATININE 2.3*   ALKPHOS 124   ALT 5*   AST 11   BILITOT 0.4       LFTs:   Recent Labs   Lab 02/18/24  0323   ALT 5*   AST 11   ALKPHOS 124   BILITOT 0.4   PROT 5.0*   ALBUMIN 0.9*         Significant Diagnostics:  I have reviewed all pertinent imaging results/findings within the past 24 hours.  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     - Will need take back to OR for repeat exploration and debridement, possible wound vac application no later than Monday 2/19  - Would keep her in the ICU for close nursing care  - Nephrology consult; CARLINE ?stablizing. UOP adequate   - ID consult; Vanc/zosyn/clinda started 2/16, switched to meropenam, vanc,  clinda 2/17. Continue    - Blood cultures 2/16, NGTD    - Operative cultures 2/17, pending  - Poorly controlled DMII - insulin per HM, remains labile  - Nutrition consult, boost supplements, severely hypoalbuminemia   - Polysubstance abuse to alcohol dependency - librium taper on, CIWA protocol  - Multiple modal pain control limited by CARLINE - continue scheduled tylenol, combination of oral and IV narcotics  - Okay for DVT ppx   - PT consult - okay to attempt ambulation, get up to chair.   - Will have long term wound care needs; pending final disposition may need placement for this           Gabi Marques MD  General Surgery  [facility] - Intensive Care

## 2024-02-18 NOTE — ASSESSMENT & PLAN NOTE
Patient's anemia is currently controlled. Has not received any PRBCs to date. Etiology likely d/t chronic disease and acute blood loss post surgery.  Current CBC reviewed-   Lab Results   Component Value Date    HGB 7.0 (L) 02/18/2024    HCT 22.2 (L) 02/18/2024     Monitor serial CBC and transfuse if patient becomes hemodynamically unstable, symptomatic or H/H drops below 7/21.  - Type and screen and have 2 units on hold in preparation for surgery 2/19

## 2024-02-18 NOTE — ASSESSMENT & PLAN NOTE
"53F with h/o DM, admitted 2/15 with abscess in R medial thigh. CT c/f abscess/nec fascitis and now s/p several surgical debridements. Muscle necrosis noted. Cultures sent. Gram stain with GPC in pairs and chains and GNR.  Bcx on admit positive for GPR in one of two bottles. On vanc/meropenem. ID consulted for "fourniers gangrene, diabetic, CARLINE; no cultures taken at initial debridement "    Treating necrotizing fascitis, which is oftentimes polymicrobial including anaerobes. Likely started as a skin boil. Risk factor- uncontrolled DM, tobacco abuse (reportedly quit 2 weeks ago). Appreciate surgery's help with source control. Spoke with microbiology about the GPR in blood culture. they said it's not a diptheroid or bacillus (so it's unlikely to be a contaminant). It is growing aerobically. micro sent specimen to main [place] to put on MALDI, so hopefully we'll get an identification in the next day or two. Actinomyces?    Recommendations:   - continue vanc/meropenem. Stop clindamycin. Plan on de-escalating antibiotics tomorrow pending GPR identification on MALDI  - wound care/additional debridement per surgery  - f/u bcx identification;and  document clearance.   - 2d echo to day for veg  - needs outpatient dental f/u for tooth extractions    Discussed assessment/plan with hospitalist  "

## 2024-02-18 NOTE — SUBJECTIVE & OBJECTIVE
Interval history: NAEO.  at bedside. Tolerating antibiotics. Reports some ongoing pain in R inner thigh      Past Surgical History:   Procedure Laterality Date     SECTION      PALATAL EXPANSION      WRIST SURGERY Right        Review of patient's allergies indicates:   Allergen Reactions    Hydrochlorothiazide plus        No current facility-administered medications on file prior to encounter.     Current Outpatient Medications on File Prior to Encounter   Medication Sig    albuterol (ACCUNEB) 1.25 mg/3 mL Nebu Take 1.25 mg by nebulization every 6 (six) hours as needed.    ALPRAZolam (XANAX) 2 MG Tab Take 2 mg by mouth daily as needed for Anxiety.    amlodipine (NORVASC) 10 MG tablet Take 10 mg by mouth once daily.    aspirin 81 MG Chew Take 81 mg by mouth once daily.    atorvastatin (LIPITOR) 20 MG tablet Take 20 mg by mouth once daily.    fluticasone-salmeterol diskus inhaler 500-50 mcg Inhale 1 puff into the lungs 2 (two) times daily.    furosemide (LASIX) 40 MG tablet Take 1 tablet (40 mg total) by mouth once daily.    gabapentin (NEURONTIN) 400 MG capsule Take 400 mg by mouth 2 (two) times daily.    insulin aspart U-100 (NOVOLOG) 100 unit/mL injection Inject 10 Units into the skin 3 (three) times daily before meals.    insulin detemir U-100 (LEVEMIR) 100 unit/mL injection Inject 22 Units into the skin once daily.    nicotine (NICODERM CQ) 14 mg/24 hr Place 1 patch onto the skin once daily.    omeprazole (PRILOSEC) 40 MG capsule Take 40 mg by mouth once daily.    potassium chloride (KLOR-CON) 8 MEQ TbSR Take 1 tablet (8 mEq total) by mouth once daily.    promethazine (PHENERGAN) 12.5 MG Tab Take 25 mg by mouth every 6 (six) hours as needed.    tiotropium (SPIRIVA) 18 mcg inhalation capsule Inhale 18 mcg into the lungs once daily.     Family History       Problem Relation (Age of Onset)    Diabetes Mother, Father, Sister    Hypertension Mother, Father          Tobacco Use    Smoking status: Some  Pt here for UTI symptoms.  Per orders urine collected for culture.  Temp 97.9  Urinary frequency: yes  Burning upon urination:  yes  Dysuria: yes  Hematuria: no  Abdominal Pain: yes  Back pain: yes  N/V/D: diarrhea for 3 days 3 days ago, nausea yesterday  Fever: no   Length of symptoms: UTI symptoms on and off for 3 months  Medications tried:  Azo.  Last dose this AM @ 0815.     Days     Current packs/day: 0.25     Types: Cigarettes    Smokeless tobacco: Never   Substance and Sexual Activity    Alcohol use: Yes    Drug use: Yes     Types: Marijuana    Sexual activity: Yes     Partners: Male     Review of Systems   Constitutional:  Negative for activity change, appetite change, chills, diaphoresis, fatigue and fever.   HENT:  Positive for dental problem. Negative for congestion, sinus pressure, sore throat and tinnitus.    Eyes:  Negative for visual disturbance.   Respiratory:  Negative for cough, chest tightness, shortness of breath and wheezing.    Cardiovascular:  Negative for chest pain, palpitations and leg swelling.   Gastrointestinal:  Negative for abdominal distention, abdominal pain, nausea and vomiting.   Endocrine: Negative for polydipsia, polyphagia and polyuria.   Genitourinary:  Negative for dysuria.   Musculoskeletal:  Negative for arthralgias and back pain.   Skin:  Positive for wound. Negative for rash.   Neurological:  Negative for dizziness, syncope, light-headedness and headaches.   Psychiatric/Behavioral:  Negative for confusion.      Objective:     Vital Signs (Most Recent):  Temp: 98.8 °F (37.1 °C) (02/18/24 1328)  Pulse: 84 (02/18/24 1200)  Resp: (!) 34 (02/18/24 1200)  BP: (!) 117/59 (02/18/24 1200)  SpO2: (!) 91 % (02/18/24 1200) Vital Signs (24h Range):  Temp:  [98.4 °F (36.9 °C)-100.2 °F (37.9 °C)] 98.8 °F (37.1 °C)  Pulse:  [] 84  Resp:  [12-41] 34  SpO2:  [88 %-98 %] 91 %  BP: ()/(54-91) 117/59     Weight: 93.4 kg (205 lb 14.6 oz)  Body mass index is 35.34 kg/m².     Physical Exam  Vitals and nursing note reviewed.   Constitutional:       General: She is not in acute distress.     Appearance: She is well-developed. She is obese. She is ill-appearing (chronically ill appearing). She is not toxic-appearing.   HENT:      Head: Normocephalic and atraumatic.      Comments: Teeth decayed and loose     Right Ear: External ear normal.      Left Ear: External  ear normal.      Nose: Nose normal.      Mouth/Throat:      Dentition: Dental caries present.      Pharynx: Uvula midline.   Eyes:      General: Lids are normal.      Conjunctiva/sclera: Conjunctivae normal.      Pupils: Pupils are equal, round, and reactive to light.   Neck:      Thyroid: No thyroid mass.      Vascular: No JVD.      Trachea: Trachea normal.   Cardiovascular:      Rate and Rhythm: Normal rate and regular rhythm.      Heart sounds: Normal heart sounds, S1 normal and S2 normal.   Pulmonary:      Effort: Pulmonary effort is normal.      Breath sounds: Normal breath sounds.   Abdominal:      General: Bowel sounds are normal. There is no distension.      Palpations: Abdomen is soft.      Tenderness: There is no abdominal tenderness.   Genitourinary:     Comments: jansen  Musculoskeletal:      Cervical back: Full passive range of motion without pain, normal range of motion and neck supple.      Right lower leg: No edema.      Left lower leg: No edema.   Skin:     Comments: Fresh Surgical dressing on R medial thigh. No drains   Neurological:      General: No focal deficit present.      Mental Status: She is alert and oriented to person, place, and time.      Cranial Nerves: No cranial nerve deficit.      Sensory: No sensory deficit.   Psychiatric:         Speech: Speech normal.         Behavior: Behavior normal. Behavior is cooperative.         Thought Content: Thought content normal.        Photo from admit        Significant Labs: All pertinent labs within the past 24 hours have been reviewed.  Blood Culture:   Recent Labs   Lab 02/17/24 2005 02/17/24 2006   LABBLOO No Growth to date No Growth to date       BMP:   Recent Labs   Lab 02/18/24  0323   *   *   K 4.3   CL 98   CO2 23   BUN 30*   CREATININE 2.3*   CALCIUM 7.2*   MG 1.4*       CBC:   Recent Labs   Lab 02/17/24  0407 02/18/24  0323   WBC 29.46* 31.73*   HGB 8.0* 7.0*   HCT 25.2* 22.2*    477*       Urine Culture: No results  "for input(s): "LABURIN" in the last 48 hours.    Significant Imaging: I have reviewed all pertinent imaging results/findings within the past 24 hours.  "

## 2024-02-18 NOTE — SUBJECTIVE & OBJECTIVE
Past Medical History:   Diagnosis Date    Anxiety     Arthritis     Bronchitis     CHF (congestive heart failure)     Diabetic ketoacidosis associated with type 2 diabetes mellitus 3/10/2023    DM (diabetes mellitus)     Emphysema lung     Encounter for blood transfusion     HTN (hypertension)     Restrictive lung disease     Sleep apnea        Past Surgical History:   Procedure Laterality Date     SECTION      PALATAL EXPANSION      WRIST SURGERY Right        Review of patient's allergies indicates:   Allergen Reactions    Hydrochlorothiazide plus        No current facility-administered medications on file prior to encounter.     Current Outpatient Medications on File Prior to Encounter   Medication Sig    albuterol (ACCUNEB) 1.25 mg/3 mL Nebu Take 1.25 mg by nebulization every 6 (six) hours as needed.    ALPRAZolam (XANAX) 2 MG Tab Take 2 mg by mouth daily as needed for Anxiety.    amlodipine (NORVASC) 10 MG tablet Take 10 mg by mouth once daily.    aspirin 81 MG Chew Take 81 mg by mouth once daily.    atorvastatin (LIPITOR) 20 MG tablet Take 20 mg by mouth once daily.    fluticasone-salmeterol diskus inhaler 500-50 mcg Inhale 1 puff into the lungs 2 (two) times daily.    furosemide (LASIX) 40 MG tablet Take 1 tablet (40 mg total) by mouth once daily.    gabapentin (NEURONTIN) 400 MG capsule Take 400 mg by mouth 2 (two) times daily.    insulin aspart U-100 (NOVOLOG) 100 unit/mL injection Inject 10 Units into the skin 3 (three) times daily before meals.    insulin detemir U-100 (LEVEMIR) 100 unit/mL injection Inject 22 Units into the skin once daily.    nicotine (NICODERM CQ) 14 mg/24 hr Place 1 patch onto the skin once daily.    omeprazole (PRILOSEC) 40 MG capsule Take 40 mg by mouth once daily.    potassium chloride (KLOR-CON) 8 MEQ TbSR Take 1 tablet (8 mEq total) by mouth once daily.    promethazine (PHENERGAN) 12.5 MG Tab Take 25 mg by mouth every 6 (six) hours as needed.    tiotropium (SPIRIVA) 18 mcg  inhalation capsule Inhale 18 mcg into the lungs once daily.     Family History       Problem Relation (Age of Onset)    Diabetes Mother, Father, Sister    Hypertension Mother, Father          Tobacco Use    Smoking status: Some Days     Current packs/day: 0.25     Types: Cigarettes    Smokeless tobacco: Never   Substance and Sexual Activity    Alcohol use: Yes    Drug use: Yes     Types: Marijuana    Sexual activity: Yes     Partners: Male     Review of Systems   Constitutional:  Negative for activity change, appetite change, chills, diaphoresis, fatigue and fever.   HENT:  Positive for dental problem. Negative for congestion, sinus pressure, sore throat and tinnitus.    Eyes:  Negative for visual disturbance.   Respiratory:  Negative for cough, chest tightness, shortness of breath and wheezing.    Cardiovascular:  Negative for chest pain, palpitations and leg swelling.   Gastrointestinal:  Negative for abdominal distention, abdominal pain, nausea and vomiting.   Endocrine: Negative for polydipsia, polyphagia and polyuria.   Genitourinary:  Negative for dysuria.   Musculoskeletal:  Negative for arthralgias and back pain.   Skin:  Positive for wound. Negative for rash.   Neurological:  Negative for dizziness, syncope, light-headedness and headaches.   Psychiatric/Behavioral:  Negative for confusion.      Objective:     Vital Signs (Most Recent):  Temp: 100.2 °F (37.9 °C) (02/17/24 1615)  Pulse: 102 (02/17/24 1800)  Resp: 19 (02/17/24 1800)  BP: (!) 108/56 (02/17/24 1800)  SpO2: (!) 94 % (02/17/24 1800) Vital Signs (24h Range):  Temp:  [97.8 °F (36.6 °C)-101.3 °F (38.5 °C)] 100.2 °F (37.9 °C)  Pulse:  [] 102  Resp:  [12-33] 19  SpO2:  [79 %-100 %] 94 %  BP: ()/(54-86) 108/56     Weight: 86.6 kg (190 lb 14.7 oz)  Body mass index is 32.77 kg/m².     Physical Exam  Vitals and nursing note reviewed.   Constitutional:       General: She is not in acute distress.     Appearance: She is well-developed. She is  obese. She is ill-appearing (chronically ill appearing). She is not toxic-appearing.   HENT:      Head: Normocephalic and atraumatic.      Comments: Teeth decayed and loose     Right Ear: External ear normal.      Left Ear: External ear normal.      Nose: Nose normal.      Mouth/Throat:      Dentition: Dental caries present.      Pharynx: Uvula midline.      Comments: Hyperactive movements of mouth  Eyes:      General: Lids are normal.      Conjunctiva/sclera: Conjunctivae normal.      Pupils: Pupils are equal, round, and reactive to light.   Neck:      Thyroid: No thyroid mass.      Vascular: No JVD.      Trachea: Trachea normal.   Cardiovascular:      Rate and Rhythm: Normal rate and regular rhythm.      Heart sounds: Normal heart sounds, S1 normal and S2 normal.   Pulmonary:      Effort: Pulmonary effort is normal.      Breath sounds: Normal breath sounds.   Abdominal:      General: Bowel sounds are normal. There is no distension.      Palpations: Abdomen is soft.      Tenderness: There is no abdominal tenderness.   Genitourinary:     Comments: jansen  Musculoskeletal:      Cervical back: Full passive range of motion without pain, normal range of motion and neck supple.      Right lower leg: No edema.      Left lower leg: No edema.   Skin:     Comments: Fresh Surgical dressing on R medial thigh. No drains   Neurological:      General: No focal deficit present.      Mental Status: She is alert and oriented to person, place, and time.      Cranial Nerves: No cranial nerve deficit.      Sensory: No sensory deficit.   Psychiatric:         Speech: Speech normal.         Behavior: Behavior normal. Behavior is cooperative.         Thought Content: Thought content normal.        Photo from admit        Significant Labs: All pertinent labs within the past 24 hours have been reviewed.  Blood Culture:   Recent Labs   Lab 02/15/24  2320 02/15/24  2321   LABBLOO Gram stain portia bottle: Gram positive rods  Results called to and  "read back by:Sandra HARVEY 02/17/2024  11:01 No Growth to date  No Growth to date       BMP:   Recent Labs   Lab 02/17/24  0407 02/17/24  1212   GLU 68* 101   * 131*   K 3.8 4.1   CL 97 96   CO2 25 22*   BUN 26* 28*   CREATININE 2.4* 2.3*   CALCIUM 7.7* 8.3*   MG 1.5*  --        CBC:   Recent Labs   Lab 02/15/24  2320 02/15/24  2328 02/16/24  0703 02/17/24  0407   WBC 26.34*  --  27.47* 29.46*   HGB 9.8*  --  9.3* 8.0*   HCT 30.6* 34* 30.0* 25.2*     --  139* 423       Urine Culture: No results for input(s): "LABURIN" in the last 48 hours.    Significant Imaging: I have reviewed all pertinent imaging results/findings within the past 24 hours.  "

## 2024-02-18 NOTE — PLAN OF CARE
POC reviewed with patient. All questions and concerns addressed. Pain managed with prn medications. VS stable on 2L NS. Nightly glucose check 386. Dosed per insulin order (see MAR) and physician notified. MD ordered BMP and repeat finger stick at midnight. Midnight fingerstick 200.   Otherwise, No acute events overnight.

## 2024-02-19 ENCOUNTER — ANESTHESIA (OUTPATIENT)
Dept: SURGERY | Facility: HOSPITAL | Age: 54
DRG: 853 | End: 2024-02-19
Payer: MEDICAID

## 2024-02-19 ENCOUNTER — ANESTHESIA EVENT (OUTPATIENT)
Dept: SURGERY | Facility: HOSPITAL | Age: 54
DRG: 853 | End: 2024-02-19
Payer: MEDICAID

## 2024-02-19 LAB
ALBUMIN SERPL BCP-MCNC: 1.2 G/DL (ref 3.5–5.2)
ALP SERPL-CCNC: 131 U/L (ref 55–135)
ALT SERPL W/O P-5'-P-CCNC: 5 U/L (ref 10–44)
ANION GAP SERPL CALC-SCNC: 6 MMOL/L (ref 8–16)
ANISOCYTOSIS BLD QL SMEAR: SLIGHT
ASCENDING AORTA: 3.07 CM
AST SERPL-CCNC: 10 U/L (ref 10–40)
AV INDEX (PROSTH): 0.72
AV MEAN GRADIENT: 12 MMHG
AV PEAK GRADIENT: 22 MMHG
AV REGURGITATION PRESSURE HALF TIME: 706.66 MS
AV VALVE AREA BY VELOCITY RATIO: 2.75 CM²
AV VALVE AREA: 2.48 CM²
AV VELOCITY RATIO: 0.8
BACTERIA ISLT: ABNORMAL
BASOPHILS # BLD AUTO: 0.06 K/UL (ref 0–0.2)
BASOPHILS NFR BLD: 0.2 % (ref 0–1.9)
BILIRUB SERPL-MCNC: 0.6 MG/DL (ref 0.1–1)
BSA FOR ECHO PROCEDURE: 1.72 M2
BUN SERPL-MCNC: 34 MG/DL (ref 6–20)
CALCIUM SERPL-MCNC: 7.6 MG/DL (ref 8.7–10.5)
CHLORIDE SERPL-SCNC: 102 MMOL/L (ref 95–110)
CO2 SERPL-SCNC: 25 MMOL/L (ref 23–29)
CREAT SERPL-MCNC: 1.6 MG/DL (ref 0.5–1.4)
CV ECHO LV RWT: 0.33 CM
DIFFERENTIAL METHOD BLD: ABNORMAL
DOP CALC AO PEAK VEL: 2.32 M/S
DOP CALC AO VTI: 45.4 CM
DOP CALC LVOT AREA: 3.4 CM2
DOP CALC LVOT DIAMETER: 2.09 CM
DOP CALC LVOT PEAK VEL: 1.86 M/S
DOP CALC LVOT STROKE VOLUME: 112.81 CM3
DOP CALC MV VTI: 43 CM
DOP CALCLVOT PEAK VEL VTI: 32.9 CM
E WAVE DECELERATION TIME: 204.96 MSEC
E/A RATIO: 0.88
E/E' RATIO: 14.48 M/S
ECHO LV POSTERIOR WALL: 0.89 CM (ref 0.6–1.1)
EOSINOPHIL # BLD AUTO: 0.2 K/UL (ref 0–0.5)
EOSINOPHIL NFR BLD: 0.7 % (ref 0–8)
ERYTHROCYTE [DISTWIDTH] IN BLOOD BY AUTOMATED COUNT: 16.6 % (ref 11.5–14.5)
EST. GFR  (NO RACE VARIABLE): 38 ML/MIN/1.73 M^2
FRACTIONAL SHORTENING: 30 % (ref 28–44)
GLUCOSE SERPL-MCNC: 164 MG/DL (ref 70–110)
HCT VFR BLD AUTO: 21.3 % (ref 37–48.5)
HGB BLD-MCNC: 12.5 G/DL (ref 12–16)
HGB BLD-MCNC: 6.8 G/DL (ref 12–16)
HYPOCHROMIA BLD QL SMEAR: ABNORMAL
IMM GRANULOCYTES # BLD AUTO: 0.63 K/UL (ref 0–0.04)
IMM GRANULOCYTES NFR BLD AUTO: 2.6 % (ref 0–0.5)
INTERVENTRICULAR SEPTUM: 0.91 CM (ref 0.6–1.1)
IVC DIAMETER: 2 CM
IVRT: 133.21 MSEC
LA MAJOR: 6.3 CM
LA MINOR: 6.34 CM
LA WIDTH: 4.3 CM
LEFT ATRIUM SIZE: 4.38 CM
LEFT ATRIUM VOLUME INDEX: 51.1 ML/M2
LEFT ATRIUM VOLUME: 101.18 CM3
LEFT INTERNAL DIMENSION IN SYSTOLE: 3.8 CM (ref 2.1–4)
LEFT VENTRICLE DIASTOLIC VOLUME INDEX: 72.12 ML/M2
LEFT VENTRICLE DIASTOLIC VOLUME: 142.8 ML
LEFT VENTRICLE MASS INDEX: 92 G/M2
LEFT VENTRICLE SYSTOLIC VOLUME INDEX: 31.4 ML/M2
LEFT VENTRICLE SYSTOLIC VOLUME: 62.14 ML
LEFT VENTRICULAR INTERNAL DIMENSION IN DIASTOLE: 5.42 CM (ref 3.5–6)
LEFT VENTRICULAR MASS: 181.27 G
LV LATERAL E/E' RATIO: 15.2 M/S
LV SEPTAL E/E' RATIO: 13.82 M/S
LVOT MG: 7.25 MMHG
LVOT MV: 1.25 CM/S
LYMPHOCYTES # BLD AUTO: 1.6 K/UL (ref 1–4.8)
LYMPHOCYTES NFR BLD: 6.8 % (ref 18–48)
MAGNESIUM SERPL-MCNC: 1.9 MG/DL (ref 1.6–2.6)
MCH RBC QN AUTO: 21.9 PG (ref 27–31)
MCHC RBC AUTO-ENTMCNC: 31.9 G/DL (ref 32–36)
MCV RBC AUTO: 69 FL (ref 82–98)
MONOCYTES # BLD AUTO: 1 K/UL (ref 0.3–1)
MONOCYTES NFR BLD: 4.3 % (ref 4–15)
MV MEAN GRADIENT: 6 MMHG
MV PEAK A VEL: 1.73 M/S
MV PEAK E VEL: 1.52 M/S
MV PEAK GRADIENT: 12 MMHG
MV STENOSIS PRESSURE HALF TIME: 59.44 MS
MV VALVE AREA BY CONTINUITY EQUATION: 2.62 CM2
MV VALVE AREA P 1/2 METHOD: 3.7 CM2
NEUTROPHILS # BLD AUTO: 20.5 K/UL (ref 1.8–7.7)
NEUTROPHILS NFR BLD: 85.4 % (ref 38–73)
NRBC BLD-RTO: 0 /100 WBC
OVALOCYTES BLD QL SMEAR: ABNORMAL
PHOSPHATE SERPL-MCNC: 4.1 MG/DL (ref 2.7–4.5)
PISA AR MAX VEL: 4.07 M/S
PISA TR MAX VEL: 3.41 M/S
PLATELET # BLD AUTO: 499 K/UL (ref 150–450)
PLATELET BLD QL SMEAR: ABNORMAL
PMV BLD AUTO: ABNORMAL FL (ref 9.2–12.9)
POCT GLUCOSE: 175 MG/DL (ref 70–110)
POCT GLUCOSE: 186 MG/DL (ref 70–110)
POCT GLUCOSE: 246 MG/DL (ref 70–110)
POCT GLUCOSE: 274 MG/DL (ref 70–110)
POCT GLUCOSE: 63 MG/DL (ref 70–110)
POCT GLUCOSE: 81 MG/DL (ref 70–110)
POTASSIUM SERPL-SCNC: 4.6 MMOL/L (ref 3.5–5.1)
PROT SERPL-MCNC: 5.4 G/DL (ref 6–8.4)
PV PEAK GRADIENT: 5 MMHG
PV PEAK VELOCITY: 1.13 M/S
RA MAJOR: 5.78 CM
RA PRESSURE ESTIMATED: 8 MMHG
RA WIDTH: 3.1 CM
RBC # BLD AUTO: 3.11 M/UL (ref 4–5.4)
RIGHT VENTRICULAR END-DIASTOLIC DIMENSION: 3.49 CM
RV TB RVSP: 11 MMHG
RV TISSUE DOPPLER FREE WALL SYSTOLIC VELOCITY 1 (APICAL 4 CHAMBER VIEW): 20.11 CM/S
SINUS: 3 CM
SODIUM SERPL-SCNC: 133 MMOL/L (ref 136–145)
TARGETS BLD QL SMEAR: ABNORMAL
TDI LATERAL: 0.1 M/S
TDI SEPTAL: 0.11 M/S
TDI: 0.11 M/S
TR MAX PG: 47 MMHG
TRICUSPID ANNULAR PLANE SYSTOLIC EXCURSION: 2.63 CM
TV REST PULMONARY ARTERY PRESSURE: 55 MMHG
VANCOMYCIN SERPL-MCNC: 13.5 UG/ML
WBC # BLD AUTO: 24.07 K/UL (ref 3.9–12.7)
Z-SCORE OF LEFT VENTRICULAR DIMENSION IN END DIASTOLE: -0.53
Z-SCORE OF LEFT VENTRICULAR DIMENSION IN END SYSTOLE: 0.62

## 2024-02-19 PROCEDURE — 36000707: Performed by: SURGERY

## 2024-02-19 PROCEDURE — 85018 HEMOGLOBIN: CPT | Performed by: INTERNAL MEDICINE

## 2024-02-19 PROCEDURE — D9220A PRA ANESTHESIA: Mod: CRNA,,, | Performed by: STUDENT IN AN ORGANIZED HEALTH CARE EDUCATION/TRAINING PROGRAM

## 2024-02-19 PROCEDURE — 36415 COLL VENOUS BLD VENIPUNCTURE: CPT | Performed by: INTERNAL MEDICINE

## 2024-02-19 PROCEDURE — 85025 COMPLETE CBC W/AUTO DIFF WBC: CPT

## 2024-02-19 PROCEDURE — 36415 COLL VENOUS BLD VENIPUNCTURE: CPT | Mod: XB | Performed by: INTERNAL MEDICINE

## 2024-02-19 PROCEDURE — 20000000 HC ICU ROOM

## 2024-02-19 PROCEDURE — 99233 SBSQ HOSP IP/OBS HIGH 50: CPT | Mod: 25,,, | Performed by: SURGERY

## 2024-02-19 PROCEDURE — 0KBQ0ZZ EXCISION OF RIGHT UPPER LEG MUSCLE, OPEN APPROACH: ICD-10-PCS | Performed by: SURGERY

## 2024-02-19 PROCEDURE — 99900035 HC TECH TIME PER 15 MIN (STAT)

## 2024-02-19 PROCEDURE — 36000706: Performed by: SURGERY

## 2024-02-19 PROCEDURE — 36430 TRANSFUSION BLD/BLD COMPNT: CPT

## 2024-02-19 PROCEDURE — 25000003 PHARM REV CODE 250: Performed by: STUDENT IN AN ORGANIZED HEALTH CARE EDUCATION/TRAINING PROGRAM

## 2024-02-19 PROCEDURE — 99900031 HC PATIENT EDUCATION (STAT)

## 2024-02-19 PROCEDURE — 30233N1 TRANSFUSION OF NONAUTOLOGOUS RED BLOOD CELLS INTO PERIPHERAL VEIN, PERCUTANEOUS APPROACH: ICD-10-PCS | Performed by: HOSPITALIST

## 2024-02-19 PROCEDURE — D9220A PRA ANESTHESIA: Mod: ANES,,, | Performed by: ANESTHESIOLOGY

## 2024-02-19 PROCEDURE — P9016 RBC LEUKOCYTES REDUCED: HCPCS | Performed by: STUDENT IN AN ORGANIZED HEALTH CARE EDUCATION/TRAINING PROGRAM

## 2024-02-19 PROCEDURE — 11004 DBRDMT SKIN XTRNL GENT&PER: CPT | Mod: ,,, | Performed by: SURGERY

## 2024-02-19 PROCEDURE — 27201423 OPTIME MED/SURG SUP & DEVICES STERILE SUPPLY: Performed by: SURGERY

## 2024-02-19 PROCEDURE — 80202 ASSAY OF VANCOMYCIN: CPT | Performed by: INTERNAL MEDICINE

## 2024-02-19 PROCEDURE — 63600175 PHARM REV CODE 636 W HCPCS

## 2024-02-19 PROCEDURE — 99233 SBSQ HOSP IP/OBS HIGH 50: CPT | Mod: ,,, | Performed by: INTERNAL MEDICINE

## 2024-02-19 PROCEDURE — 25000003 PHARM REV CODE 250: Performed by: NURSE ANESTHETIST, CERTIFIED REGISTERED

## 2024-02-19 PROCEDURE — 25000003 PHARM REV CODE 250: Performed by: FAMILY MEDICINE

## 2024-02-19 PROCEDURE — 37000008 HC ANESTHESIA 1ST 15 MINUTES: Performed by: SURGERY

## 2024-02-19 PROCEDURE — 84100 ASSAY OF PHOSPHORUS: CPT

## 2024-02-19 PROCEDURE — 63600175 PHARM REV CODE 636 W HCPCS: Performed by: STUDENT IN AN ORGANIZED HEALTH CARE EDUCATION/TRAINING PROGRAM

## 2024-02-19 PROCEDURE — 25000003 PHARM REV CODE 250

## 2024-02-19 PROCEDURE — 25000003 PHARM REV CODE 250: Performed by: SURGERY

## 2024-02-19 PROCEDURE — 94761 N-INVAS EAR/PLS OXIMETRY MLT: CPT

## 2024-02-19 PROCEDURE — 37000009 HC ANESTHESIA EA ADD 15 MINS: Performed by: SURGERY

## 2024-02-19 PROCEDURE — 83735 ASSAY OF MAGNESIUM: CPT

## 2024-02-19 PROCEDURE — 63600175 PHARM REV CODE 636 W HCPCS: Performed by: SURGERY

## 2024-02-19 PROCEDURE — 80053 COMPREHEN METABOLIC PANEL: CPT

## 2024-02-19 RX ORDER — ONDANSETRON HYDROCHLORIDE 2 MG/ML
INJECTION, SOLUTION INTRAVENOUS
Status: DISCONTINUED | OUTPATIENT
Start: 2024-02-19 | End: 2024-02-19

## 2024-02-19 RX ORDER — PROPOFOL 10 MG/ML
VIAL (ML) INTRAVENOUS
Status: DISCONTINUED | OUTPATIENT
Start: 2024-02-19 | End: 2024-02-19

## 2024-02-19 RX ORDER — SUCCINYLCHOLINE CHLORIDE 20 MG/ML
INJECTION INTRAMUSCULAR; INTRAVENOUS
Status: DISCONTINUED | OUTPATIENT
Start: 2024-02-19 | End: 2024-02-19

## 2024-02-19 RX ORDER — FENTANYL CITRATE 50 UG/ML
INJECTION, SOLUTION INTRAMUSCULAR; INTRAVENOUS
Status: DISCONTINUED | OUTPATIENT
Start: 2024-02-19 | End: 2024-02-19

## 2024-02-19 RX ORDER — LIDOCAINE HYDROCHLORIDE 20 MG/ML
INJECTION INTRAVENOUS
Status: DISCONTINUED | OUTPATIENT
Start: 2024-02-19 | End: 2024-02-19

## 2024-02-19 RX ORDER — ROCURONIUM BROMIDE 10 MG/ML
INJECTION, SOLUTION INTRAVENOUS
Status: DISCONTINUED | OUTPATIENT
Start: 2024-02-19 | End: 2024-02-19

## 2024-02-19 RX ORDER — HYDROCODONE BITARTRATE AND ACETAMINOPHEN 500; 5 MG/1; MG/1
TABLET ORAL
Status: DISCONTINUED | OUTPATIENT
Start: 2024-02-19 | End: 2024-02-20

## 2024-02-19 RX ORDER — MIDAZOLAM HYDROCHLORIDE 1 MG/ML
INJECTION INTRAMUSCULAR; INTRAVENOUS
Status: DISCONTINUED | OUTPATIENT
Start: 2024-02-19 | End: 2024-02-19

## 2024-02-19 RX ADMIN — GABAPENTIN 100 MG: 100 CAPSULE ORAL at 08:02

## 2024-02-19 RX ADMIN — HYDROMORPHONE HYDROCHLORIDE 0.5 MG: 1 INJECTION, SOLUTION INTRAMUSCULAR; INTRAVENOUS; SUBCUTANEOUS at 05:02

## 2024-02-19 RX ADMIN — FENTANYL CITRATE 100 MCG: 50 INJECTION, SOLUTION INTRAMUSCULAR; INTRAVENOUS at 02:02

## 2024-02-19 RX ADMIN — PROPOFOL 120 MG: 10 INJECTION, EMULSION INTRAVENOUS at 02:02

## 2024-02-19 RX ADMIN — ACETAMINOPHEN 1000 MG: 500 TABLET ORAL at 01:02

## 2024-02-19 RX ADMIN — ASPIRIN 81 MG CHEWABLE TABLET 81 MG: 81 TABLET CHEWABLE at 08:02

## 2024-02-19 RX ADMIN — PROPOFOL 50 MG: 10 INJECTION, EMULSION INTRAVENOUS at 03:02

## 2024-02-19 RX ADMIN — SUGAMMADEX 200 MG: 100 INJECTION, SOLUTION INTRAVENOUS at 03:02

## 2024-02-19 RX ADMIN — GABAPENTIN 100 MG: 100 CAPSULE ORAL at 09:02

## 2024-02-19 RX ADMIN — OXYCODONE 10 MG: 5 TABLET ORAL at 09:02

## 2024-02-19 RX ADMIN — Medication 500 MG: at 09:02

## 2024-02-19 RX ADMIN — INSULIN ASPART 9 UNITS: 100 INJECTION, SOLUTION INTRAVENOUS; SUBCUTANEOUS at 09:02

## 2024-02-19 RX ADMIN — SODIUM BICARBONATE 1300 MG: 325 TABLET ORAL at 09:02

## 2024-02-19 RX ADMIN — ACETAMINOPHEN 1000 MG: 500 TABLET ORAL at 09:02

## 2024-02-19 RX ADMIN — INSULIN ASPART 3 UNITS: 100 INJECTION, SOLUTION INTRAVENOUS; SUBCUTANEOUS at 11:02

## 2024-02-19 RX ADMIN — ONDANSETRON 4 MG: 2 INJECTION, SOLUTION INTRAMUSCULAR; INTRAVENOUS at 02:02

## 2024-02-19 RX ADMIN — GLYCOPYRROLATE 0.1 MG: 0.2 INJECTION, SOLUTION INTRAMUSCULAR; INTRAVITREAL at 02:02

## 2024-02-19 RX ADMIN — LIDOCAINE HYDROCHLORIDE 60 MG: 20 INJECTION, SOLUTION INTRAVENOUS at 02:02

## 2024-02-19 RX ADMIN — PROCHLORPERAZINE EDISYLATE 10 MG: 5 INJECTION INTRAMUSCULAR; INTRAVENOUS at 02:02

## 2024-02-19 RX ADMIN — CHLORDIAZEPOXIDE HYDROCHLORIDE 5 MG: 5 CAPSULE ORAL at 04:02

## 2024-02-19 RX ADMIN — SODIUM CHLORIDE, SODIUM LACTATE, POTASSIUM CHLORIDE, AND CALCIUM CHLORIDE: .6; .31; .03; .02 INJECTION, SOLUTION INTRAVENOUS at 02:02

## 2024-02-19 RX ADMIN — INSULIN ASPART 6 UNITS: 100 INJECTION, SOLUTION INTRAVENOUS; SUBCUTANEOUS at 08:02

## 2024-02-19 RX ADMIN — SUCCINYLCHOLINE CHLORIDE 100 MG: 20 INJECTION, SOLUTION INTRAMUSCULAR; INTRAVENOUS at 02:02

## 2024-02-19 RX ADMIN — ROCURONIUM BROMIDE 25 MG: 10 INJECTION, SOLUTION INTRAVENOUS at 02:02

## 2024-02-19 RX ADMIN — CHLORDIAZEPOXIDE HYDROCHLORIDE 5 MG: 5 CAPSULE ORAL at 08:02

## 2024-02-19 RX ADMIN — FOLIC ACID 1 MG: 1 TABLET ORAL at 08:02

## 2024-02-19 RX ADMIN — ACETAMINOPHEN 1000 MG: 500 TABLET ORAL at 05:02

## 2024-02-19 RX ADMIN — THIAMINE HCL TAB 100 MG 100 MG: 100 TAB at 08:02

## 2024-02-19 RX ADMIN — ATORVASTATIN CALCIUM 20 MG: 10 TABLET, FILM COATED ORAL at 08:02

## 2024-02-19 RX ADMIN — SODIUM BICARBONATE 1300 MG: 325 TABLET ORAL at 08:02

## 2024-02-19 RX ADMIN — Medication 16 G: at 03:02

## 2024-02-19 RX ADMIN — PANTOPRAZOLE SODIUM 40 MG: 40 TABLET, DELAYED RELEASE ORAL at 08:02

## 2024-02-19 RX ADMIN — HYDROMORPHONE HYDROCHLORIDE 0.5 MG: 1 INJECTION, SOLUTION INTRAMUSCULAR; INTRAVENOUS; SUBCUTANEOUS at 03:02

## 2024-02-19 RX ADMIN — CHLORDIAZEPOXIDE HYDROCHLORIDE 5 MG: 5 CAPSULE ORAL at 12:02

## 2024-02-19 RX ADMIN — MEROPENEM 1 G: 1 INJECTION INTRAVENOUS at 03:02

## 2024-02-19 RX ADMIN — MIDAZOLAM HYDROCHLORIDE 1 MG: 1 INJECTION INTRAMUSCULAR; INTRAVENOUS at 02:02

## 2024-02-19 RX ADMIN — MEROPENEM 1 G: 1 INJECTION INTRAVENOUS at 04:02

## 2024-02-19 RX ADMIN — VANCOMYCIN HYDROCHLORIDE 750 MG: 750 INJECTION, POWDER, LYOPHILIZED, FOR SOLUTION INTRAVENOUS at 06:02

## 2024-02-19 RX ADMIN — OXYCODONE 5 MG: 5 TABLET ORAL at 12:02

## 2024-02-19 RX ADMIN — Medication 500 MG: at 08:02

## 2024-02-19 RX ADMIN — CHLORDIAZEPOXIDE HYDROCHLORIDE 5 MG: 5 CAPSULE ORAL at 09:02

## 2024-02-19 RX ADMIN — INSULIN DETEMIR 10 UNITS: 100 INJECTION, SOLUTION SUBCUTANEOUS at 09:02

## 2024-02-19 RX ADMIN — MUPIROCIN: 20 OINTMENT TOPICAL at 08:02

## 2024-02-19 RX ADMIN — MUPIROCIN: 20 OINTMENT TOPICAL at 09:02

## 2024-02-19 NOTE — ANESTHESIA PREPROCEDURE EVALUATION
02/19/2024    Pre-operative evaluation for Procedure(s) (LRB):  EXPLORATION, WOUND (Right)    Christine Mosqueda is a 53 y.o. female     Patient Active Problem List   Diagnosis    Chest pain    Acute on chronic systolic heart failure    Anxiety    Type 2 diabetes mellitus with hyperglycemia, without long-term current use of insulin    Emphysema lung    Restrictive lung disease    Tobacco abuse    E coli bacteremia    CARLINE (acute kidney injury)    Right shoulder pain    Microcytic anemia    Superficial vein thrombosis    Vasculopathy    Nonadherence to medical treatment    Hypomagnesemia    Librado's gangrene    ILD (interstitial lung disease)    Chronic diastolic congestive heart failure    Sepsis without acute organ dysfunction    Essential hypertension    Anemia of chronic disease    Cocaine abuse    ETOH abuse       Review of patient's allergies indicates:   Allergen Reactions    Hydrochlorothiazide plus        No current facility-administered medications on file prior to encounter.     Current Outpatient Medications on File Prior to Encounter   Medication Sig Dispense Refill    albuterol (ACCUNEB) 1.25 mg/3 mL Nebu Take 1.25 mg by nebulization every 6 (six) hours as needed.      ALPRAZolam (XANAX) 2 MG Tab Take 2 mg by mouth daily as needed for Anxiety.      amlodipine (NORVASC) 10 MG tablet Take 10 mg by mouth once daily.      aspirin 81 MG Chew Take 81 mg by mouth once daily.      atorvastatin (LIPITOR) 20 MG tablet Take 20 mg by mouth once daily.      fluticasone-salmeterol diskus inhaler 500-50 mcg Inhale 1 puff into the lungs 2 (two) times daily.      furosemide (LASIX) 40 MG tablet Take 1 tablet (40 mg total) by mouth once daily. 30 tablet 0    gabapentin (NEURONTIN) 400 MG capsule Take 400 mg by mouth 2 (two) times daily.      insulin aspart U-100 (NOVOLOG) 100 unit/mL injection Inject 10 Units into  the skin 3 (three) times daily before meals.      insulin detemir U-100 (LEVEMIR) 100 unit/mL injection Inject 22 Units into the skin once daily.  12    nicotine (NICODERM CQ) 14 mg/24 hr Place 1 patch onto the skin once daily. 28 patch 1    omeprazole (PRILOSEC) 40 MG capsule Take 40 mg by mouth once daily.      potassium chloride (KLOR-CON) 8 MEQ TbSR Take 1 tablet (8 mEq total) by mouth once daily. 30 tablet 0    promethazine (PHENERGAN) 12.5 MG Tab Take 25 mg by mouth every 6 (six) hours as needed.      tiotropium (SPIRIVA) 18 mcg inhalation capsule Inhale 18 mcg into the lungs once daily.         Past Surgical History:   Procedure Laterality Date     SECTION      DEBRIDEMENT Right 2024    Procedure: DEBRIDEMENT; Librado's gangrene;  Surgeon: Manish Nazario MD;  Location: [facility] OR;  Service: General;  Laterality: Right;  Lithotomy    PALATAL EXPANSION      WOUND EXPLORATION N/A 2024    Procedure: INCISION AND DRAINAGE; WOUND DEBRIDEMENT,;  Surgeon: Scott Koroma MD;  Location: [facility] OR;  Service: General;  Laterality: N/A;    WRIST SURGERY Right        Social History     Socioeconomic History    Marital status:    Tobacco Use    Smoking status: Some Days     Current packs/day: 0.25     Types: Cigarettes    Smokeless tobacco: Never   Substance and Sexual Activity    Alcohol use: Yes    Drug use: Yes     Types: Marijuana    Sexual activity: Yes     Partners: Male     Social Determinants of Health     Financial Resource Strain: Low Risk  (2024)    Overall Financial Resource Strain (CARDIA)     Difficulty of Paying Living Expenses: Not very hard   Food Insecurity: No Food Insecurity (2024)    Hunger Vital Sign     Worried About Running Out of Food in the Last Year: Never true     Ran Out of Food in the Last Year: Never true   Transportation Needs: No Transportation Needs (2024)    PRAPARE - Transportation     Lack of Transportation (Medical): No     Lack of  Transportation (Non-Medical): No   Physical Activity: Inactive (2/16/2024)    Exercise Vital Sign     Days of Exercise per Week: 0 days     Minutes of Exercise per Session: 0 min   Stress: No Stress Concern Present (2/16/2024)    [employer] [place] [employer] - Occupational Stress Questionnaire     Feeling of Stress : Only a little   Social Connections: Moderately Isolated (2/16/2024)    Social Connection and Isolation Panel [NHANES]     Frequency of Communication with Friends and Family: Three times a week     Frequency of Social Gatherings with Friends and Family: Three times a week     Attends Baptism Services: Never     Active Member of Clubs or Organizations: No     Attends Club or Organization Meetings: Never     Marital Status:    Housing Stability: Low Risk  (2/16/2024)    Housing Stability Vital Sign     Unable to Pay for Housing in the Last Year: No     Number of Places Lived in the Last Year: 1     Unstable Housing in the Last Year: No         CBC:   Recent Labs     02/18/24  0323 02/19/24  0341 02/19/24  1246   WBC 31.73* 24.07*  --    RBC 3.29* 3.11*  --    HGB 7.0* 6.8* 12.5   HCT 22.2* 21.3*  --    * 499*  --    MCV 68* 69*  --    MCH 21.3* 21.9*  --    MCHC 31.5* 31.9*  --        CMP:   Recent Labs     02/18/24 0323 02/19/24  0341   * 133*   K 4.3 4.6   CL 98 102   CO2 23 25   BUN 30* 34*   CREATININE 2.3* 1.6*   * 164*   MG 1.4* 1.9   PHOS 3.9 4.1   CALCIUM 7.2* 7.6*   ALBUMIN 0.9* 1.2*   PROT 5.0* 5.4*   ALKPHOS 124 131   ALT 5* 5*   AST 11 10   BILITOT 0.4 0.6       Pre-op Assessment    I have reviewed the Patient Summary Reports.     I have reviewed the Nursing Notes. I have reviewed the NPO Status.   I have reviewed the Medications.     Review of Systems  Anesthesia Hx:  No problems with previous Anesthesia               Denies Personal Hx of Anesthesia complications.                    Social:  Alcohol Use, Social Alcohol Use, Recreational Drugs, Smoker  Patient is a heavy smoker, alcohol use, and crack cocaine use       Hematology/Oncology:    Oncology Normal                                   EENT/Dental:  EENT/Dental Normal           Cardiovascular:  Exercise tolerance: good   Hypertension       CHF       ECG has been reviewed.                          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            Neurological:  Neurology Normal                                      Endocrine:  Diabetes, poorly controlled, type 2   Librado's gangrene      Obesity / BMI > 30  Psych:   anxiety                 Physical Exam  General: Alert, Oriented, Cooperative and Well nourished    Airway:  Mallampati: III / III  Mouth Opening: Small, but > 3cm  TM Distance: Normal  Tongue: Large  Neck ROM: Normal ROM    Dental:  Dentures, Edentulous    Chest/Lungs:  Clear to auscultation, Normal Respiratory Rate    Heart:  Rate: Normal  Rhythm: Regular Rhythm  Sounds: Normal        Anesthesia Plan  Type of Anesthesia, risks & benefits discussed:    Anesthesia Type: Gen ETT  Intra-op Monitoring Plan: Standard ASA Monitors  Post Op Pain Control Plan: multimodal analgesia  Induction:  IV  Airway Plan: Direct, Post-Induction  Informed Consent: Informed consent signed with the Patient and all parties understand the risks and agree with anesthesia plan.  All questions answered. Patient consented to blood products? Yes  ASA Score: 3  Day of Surgery Review of History & Physical: H&P Update referred to the surgeon/provider.    Ready For Surgery From Anesthesia Perspective.     .

## 2024-02-19 NOTE — PT/OT/SLP PROGRESS
Physical Therapy      Patient Name:  Christine Mosqueda   MRN:  [id number]    Patient not seen today secondary to Off the floor for procedure/surgery. Will follow-up .

## 2024-02-19 NOTE — EICU
Hg at 6.9, from 7 from 18th.  S/p estephanie I and D 2 days ago. Anemia of chronic disease.    - 1 PRBC and follow levels in noon.  Discussed with RN, no bleeding.

## 2024-02-19 NOTE — PROGRESS NOTES
[facility] - Intensive Care  General Surgery  Progress Note    Subjective:     History of Present Illness:  Ms. Mosqueda is a 53 yoF with a PMH significant for insulin-dependent T2DM and CHF (last echo EF 53%, PA systolic 23) who presents with worsening pain and swelling of the right medial thigh. She developed an abscess in the area that underwent [facility] a few days ago, after which she was sent home on PO abx. Since that time the pain and swelling have worsened leading to her presentation to our ED. Workup here revealed normal vital signs, but with significantly elevated WBC and CRP, with other laboratory derangements leading to a LRINIC score of 11. CT scan demonstrates extensive inflammation extending from the right perineum to the right medial thigh with multiple foci of air. She is not on any blood thinners. No issues with anesthesia in the past.    Post-Op Info:  Procedure(s) (LRB):  INCISION AND DRAINAGE; WOUND DEBRIDEMENT, (N/A)   2 Days Post-Op     Interval History: NAEON. Pain better controlled this morning. AVSS. Lab work with down trending Cr and WBC.    Medications:  Continuous Infusions:      Scheduled Meds:   acetaminophen  1,000 mg Oral Q8H    budesonide  1 mg Nebulization Q12H    And    arformoteroL  15 mcg Nebulization BID    aspirin  81 mg Oral Daily    atorvastatin  20 mg Oral Daily    chlordiazepoxide  5 mg Oral QID    enoxparin  40 mg Subcutaneous Daily    folic acid  1 mg Oral Daily    gabapentin  100 mg Oral BID    insulin detemir U-100  10 Units Subcutaneous QHS    insulin detemir U-100 (Levemir)  15 Units Subcutaneous Daily    magnesium oxide  500 mg Oral BID    meropenem (MERREM) IVPB  1 g Intravenous Q8H    mupirocin   Nasal BID    pantoprazole  40 mg Oral Daily    sodium bicarbonate  1,300 mg Oral BID    thiamine  100 mg Oral Daily    vancomycin (VANCOCIN) IV (PEDS and ADULTS)  750 mg Intravenous Once     PRN Meds:0.9%  NaCl infusion (for blood administration), 0.9%  NaCl infusion (for  blood administration), albuterol sulfate, dextrose 10%, dextrose 10%, glucagon (human recombinant), glucose, glucose, HYDROmorphone, insulin aspart U-100, LIDOcaine (PF) 10 mg/ml (1%), melatonin, ondansetron, oxyCODONE, oxyCODONE, prochlorperazine, sodium chloride 0.9%, Pharmacy to dose Vancomycin consult **AND** vancomycin - pharmacy to dose     Review of patient's allergies indicates:   Allergen Reactions    Hydrochlorothiazide plus      Objective:     Vital Signs (Most Recent):  Temp: 98.2 °F (36.8 °C) (02/19/24 0320)  Pulse: (!) 119 (02/19/24 0610)  Resp: (!) 21 (02/19/24 0610)  BP: 138/64 (02/19/24 0610)  SpO2: 95 % (02/19/24 0610) Vital Signs (24h Range):  Temp:  [98.2 °F (36.8 °C)-99 °F (37.2 °C)] 98.2 °F (36.8 °C)  Pulse:  [] 119  Resp:  [18-52] 21  SpO2:  [88 %-100 %] 95 %  BP: ()/(55-91) 138/64     Weight: 93.4 kg (205 lb 14.6 oz)  Body mass index is 35.34 kg/m².    Intake/Output - Last 3 Shifts         02/17 0700  02/18 0659 02/18 0700  02/19 0659 02/19 0700  02/20 0659    P.O. 400 420     I.V. (mL/kg) 863.2 (9.2) 202.3 (2.2)     IV Piggyback 2151.5 242.8     Total Intake(mL/kg) 3414.7 (36.6) 865 (9.3)     Urine (mL/kg/hr) 1645 (0.7) 2400 (1.1)     Emesis/NG output  0     Stool  2     Blood 100      Total Output 1745 2402     Net +1669.7 -1537            Stool Occurrence  5 x             Physical Exam  Vitals and nursing note reviewed.   Constitutional:       Appearance: Normal appearance.   HENT:      Head: Normocephalic and atraumatic.   Cardiovascular:      Rate and Rhythm: Normal rate and regular rhythm.   Pulmonary:      Effort: Pulmonary effort is normal. No respiratory distress.   Abdominal:      General: Abdomen is flat. There is no distension.      Palpations: Abdomen is soft. There is no mass.      Tenderness: There is no abdominal tenderness.      Hernia: No hernia is present.   Genitourinary:     Comments: Hernandez catheter  Musculoskeletal:      Right lower leg: No edema.      Left  lower leg: No edema.   Skin:     General: Skin is warm and dry.      Comments: Wound to right thigh dressed   Neurological:      General: No focal deficit present.      Mental Status: She is alert and oriented to person, place, and time.   Psychiatric:         Mood and Affect: Mood normal.         Behavior: Behavior normal.          Significant Labs:  I have reviewed all pertinent lab results within the past 24 hours.  CBC:   Recent Labs   Lab 02/19/24  0341   WBC 24.07*   RBC 3.11*   HGB 6.8*   HCT 21.3*   *   MCV 69*   MCH 21.9*   MCHC 31.9*       CMP:   Recent Labs   Lab 02/19/24  0341   *   CALCIUM 7.6*   ALBUMIN 1.2*   PROT 5.4*   *   K 4.6   CO2 25      BUN 34*   CREATININE 1.6*   ALKPHOS 131   ALT 5*   AST 10   BILITOT 0.6       LFTs:   Recent Labs   Lab 02/19/24  0341   ALT 5*   AST 10   ALKPHOS 131   BILITOT 0.6   PROT 5.4*   ALBUMIN 1.2*         Significant Diagnostics:  I have reviewed all pertinent imaging results/findings within the past 24 hours.  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     - Will return to the OR for repeat exploration and debridement, possible wound vac application today  - Keep NPO  - Consent in the paper chart  - Nephrology consult; CARLINE ?stablizing. UOP adequate   - ID consult; Vanc/zosyn/clinda started 2/16, switched to meropenam, vanc, clinda 2/17. Continue    - Blood cultures 2/16, NGTD    - Operative cultures 2/17, pending  - Poorly controlled DMII - insulin per HM  - Nutrition consult, boost supplements, severely hypoalbuminemia   - Polysubstance abuse to alcohol dependency - librium taper on, CIWA protocol  - Multiple modal pain control limited by CARLINE - continue scheduled tylenol, combination  of oral and IV narcotics  - Okay for DVT ppx   - PT consult - okay to attempt ambulation, get up to chair.   - Will have long term wound care needs; pending final disposition may need placement for this           Steven Paula MD  General Surgery  [facility] - Intensive Care

## 2024-02-19 NOTE — NURSING
[facility], [facility]  Nurses Note -- 4 Eyes      2/18/2024       Skin assessed on: Q Shift      [] No Pressure Injuries Present    []Prevention Measures Documented    [x] Yes LDA  for Pressure Injury Previously documented     [] Yes New Pressure Injury Discovered   [] LDA for New Pressure Injury Added      Attending RN:  DARLING YANEZ RN     Second RN:  oneil SHERMAN

## 2024-02-19 NOTE — SUBJECTIVE & OBJECTIVE
Interval History: Events noted. NPO for OR. Wound soiled with feces?    Review of Systems   Constitutional:  Negative for chills and fever.   Skin:  Positive for wound.   All other systems reviewed and are negative.    Objective:     Vital Signs (Most Recent):  Temp: 97.6 °F (36.4 °C) (02/19/24 0845)  Pulse: 79 (02/19/24 1000)  Resp: (!) 27 (02/19/24 1000)  BP: 133/69 (02/19/24 1000)  SpO2: 100 % (02/19/24 1000) Vital Signs (24h Range):  Temp:  [97.6 °F (36.4 °C)-99 °F (37.2 °C)] 97.6 °F (36.4 °C)  Pulse:  [] 79  Resp:  [18-52] 27  SpO2:  [88 %-100 %] 100 %  BP: (107-159)/(55-91) 133/69     Weight: 93 kg (205 lb)  Body mass index is 35.19 kg/m².    Estimated Creatinine Clearance: 43.9 mL/min (A) (based on SCr of 1.6 mg/dL (H)).     Physical Exam  Vitals and nursing note reviewed.   Constitutional:       Appearance: Normal appearance.   HENT:      Head: Normocephalic and atraumatic.   Eyes:      Pupils: Pupils are equal, round, and reactive to light.   Cardiovascular:      Rate and Rhythm: Normal rate.   Musculoskeletal:         General: Swelling and tenderness present.      Comments: Thigh packed   Skin:     Findings: No erythema or rash.   Neurological:      Mental Status: She is alert.   Psychiatric:         Mood and Affect: Mood normal.          Significant Labs: Blood Culture:   Recent Labs   Lab 02/15/24  2320 02/15/24  2321 02/17/24 2005 02/17/24  2006   LABBLOO Gram stain portia bottle: Gram positive rods  Results called to and read back by:Sandra HARVEY 02/17/2024  11:01  GRAM POSITIVE RODS  Isolate sent out for reference testing  * Gram stain portia bottle: Gram positive rods  Results called to and read back by:KAMALA STEELE  02/17/2024  18:30  GRAM POSITIVE RODS  Isolate sent out for reference testing  * No Growth to date  No Growth to date No Growth to date  No Growth to date     CBC:   Recent Labs   Lab 02/18/24  0323 02/19/24  0341   WBC 31.73* 24.07*   HGB 7.0* 6.8*   HCT 22.2* 21.3*   * 499*      Wound Culture:   Recent Labs   Lab 02/17/24  0911   LABAERO No significant isolate to date       Significant Imaging: I have reviewed all pertinent imaging results/findings within the past 24 hours.

## 2024-02-19 NOTE — CONSULTS
[facility] - Intensive Care  Adult Nutrition  Consult Note    SUMMARY     Recommendations    1. When medically acceptable, advance diet as tolerated to diabetic renal diet; monitoring PO intake of meals and snacks.   2. When diet advances, consider ONS Boost Glucose Control to help meet EEN/EPN.   3. Monitor weights and labs.   4. Collaboration with medical providers    Goals: 1. Pt to meet 50% EEN/EPN by RD follow up  Nutrition Goal Status: new  Communication of RD Recs:  (POC)    Assessment and Plan    Nutrition Problem  Inacreased nutrient needs; protein     Related to (etiology):   Wound healing    Signs and Symptoms (as evidenced by):   Estephanie's gangrene    Interventions/Recommendations (treatment strategy):  High protein diet  Collaboration with medical providers    Nutrition Diagnosis Status:   New    Reason for Assessment    Reason For Assessment: identified at risk by screening criteria, consult  Diagnosis:  (estephanie's gangrene)  Relevant Medical History:   Past Medical History:   Diagnosis Date    Anxiety     Arthritis     Bronchitis     CHF (congestive heart failure)     Diabetic ketoacidosis associated with type 2 diabetes mellitus 3/10/2023    DM (diabetes mellitus)     Emphysema lung     Encounter for blood transfusion     HTN (hypertension)     Restrictive lung disease     Sleep apnea      General Information Comments: Pt identified as at risk at admit. Pt is currently NPO, previously diabetic renal diet with 75% intake. BMI 35.34, obese grade ll. She is ill appearing. Pt with weight fluctuations. 60 lb weight gain x 8 months. LBM 2/18/24. NFPE to be performed at follow ups as warranted.   Interdisciplinary Rounds: did not attend  Nutrition Discharge Planning: diabetic renal diet    Nutrition Risk Screen    Nutrition Risk Screen: large or nonhealing wound, burn or pressure injury    Nutrition/Diet History    Food Allergies: NKFA    Anthropometrics    Temp: 97.7 °F (36.5 °C)  Height Method:  "Stated  Height: 5' 4" (162.6 cm)  Height (inches): 64 in  Weight Method: Bed Scale  Weight: 93 kg (205 lb)  Weight (lb): 205 lb  Ideal Body Weight (IBW), Female: 120 lb  % Ideal Body Weight, Female (lb): 170.83 %  BMI (Calculated): 35.2  BMI Grade: 35 - 39.9 - obesity - grade II       Lab/Procedures/Meds    Pertinent Labs Reviewed: reviewed  BMP  Lab Results   Component Value Date     (L) 02/19/2024    K 4.6 02/19/2024     02/19/2024    CO2 25 02/19/2024    BUN 34 (H) 02/19/2024    CREATININE 1.6 (H) 02/19/2024    CALCIUM 7.6 (L) 02/19/2024    ANIONGAP 6 (L) 02/19/2024    EGFRNORACEVR 38 (A) 02/19/2024      Pertinent Medications Reviewed: reviewed  Current Outpatient Medications   Medication Instructions    albuterol (ACCUNEB) 1.25 mg, Every 6 hours PRN    ALPRAZolam (XANAX) 2 mg, Oral, Daily PRN    amLODIPine (NORVASC) 10 mg, Daily    aspirin 81 mg, Daily    atorvastatin (LIPITOR) 20 mg, Oral, Daily    fluticasone-salmeterol diskus inhaler 500-50 mcg 1 puff, Inhalation, 2 times daily    furosemide (LASIX) 40 mg, Oral, Daily    gabapentin (NEURONTIN) 400 mg, Oral, 2 times daily    insulin aspart U-100 (NOVOLOG) 10 Units, Subcutaneous, 3 times daily before meals    insulin detemir U-100 (LEVEMIR) 22 Units, Subcutaneous, Daily    nicotine (NICODERM CQ) 14 mg/24 hr 1 patch, Transdermal, Daily    omeprazole (PRILOSEC) 40 mg, Daily    potassium chloride (KLOR-CON) 8 MEQ TbSR 8 mEq, Oral, Daily    promethazine (PHENERGAN) 25 mg, Oral, Every 6 hours PRN    tiotropium (SPIRIVA) 18 mcg, Daily        Estimated/Assessed Needs    Weight Used For Calorie Calculations: 54.4 kg (120 lb)  Energy Calorie Requirements (kcal): 1461 kcal  Energy Need Method: [place]-St Jeor (x 1.3)  Protein Requirements: 70 g  Weight Used For Protein Calculations: 54.4 kg (120 lb)     Estimated Fluid Requirement Method: RDA Method  RDA Method (mL): 1461         Nutrition Prescription Ordered    Current Diet Order: npo    Evaluation of " Received Nutrient/Fluid Intake    I/O: i/o  Energy Calories Required: not meeting needs  Protein Required: not meeting needs  Fluid Required: not meeting needs  Comments: LBM 2/18/24  % Intake of Estimated Energy Needs: 0 - 25 %  % Meal Intake: NPO    Nutrition Risk    Level of Risk/Frequency of Follow-up: low       Monitor and Evaluation    Food and Nutrient Intake: food and beverage intake  Food and Nutrient Adminstration: diet order  Knowledge/Beliefs/Attitudes: food and nutrition knowledge/skill, beliefs and attitudes  Physical Activity and Function: nutrition-related ADLs and IADLs, factors affecting access to physical activity  Anthropometric Measurements: weight, weight change, body mass index  Biochemical Data, Medical Tests and Procedures: inflammatory profile  Nutrition-Focused Physical Findings: overall appearance       Nutrition Follow-Up    RD Follow-up?: Yes    Mikayla Da Silva, Registration Eligible, provisional LDN

## 2024-02-19 NOTE — TRANSFER OF CARE
"Anesthesia Transfer of Care Note    Patient: Christine Mosqueda    Procedure(s) Performed: Procedure(s) (LRB):  EXPLORATION, WOUND (Right)    Patient location: ICU    Anesthesia Type: general    Transport from OR: Transported from OR on 100% O2 by closed face mask with adequate spontaneous ventilation. Continuous ECG monitoring in transport. Continuous SpO2 monitoring in transport. Continuos invasive BP monitoring in transport    Post pain: adequate analgesia    Post assessment: no apparent anesthetic complications and tolerated procedure well    Post vital signs: stable    Level of consciousness: awake    Nausea/Vomiting: no nausea/vomiting    Complications: none    Transfer of care protocol was followed      Last vitals: Visit Vitals  BP (!) 154/74 (BP Location: Right forearm)   Pulse 76   Temp 36.6 °C (97.9 °F) (Oral)   Resp 19   Ht 5' 4" (1.626 m)   Wt 93 kg (205 lb)   LMP 01/29/2018 (Approximate)   SpO2 97%   BMI 35.19 kg/m²     "

## 2024-02-19 NOTE — PROGRESS NOTES
Pharmacokinetic Assessment Follow Up: IV Vancomycin    Vancomycin serum concentration assessment(s):    The random level was drawn correctly and can be used to guide therapy at this time. The measurement is within the desired definitive target range of 10 to 20 mcg/mL.    Vancomycin Regimen Plan:    Vancomycin 750mg dose scheduled.    Re-dose when the random level is less than 20 mcg/mL, next level to be drawn at 0300 on 2/20/24    Drug levels (last 3 results):  Recent Labs   Lab Result Units 02/17/24  2229 02/18/24  1332 02/19/24  0341   Vancomycin, Random ug/mL  --  19.5 13.5   Vancomycin-Trough ug/mL 18.3  --   --        Pharmacy will continue to follow and monitor vancomycin.    Please contact pharmacy at extension [phone] for questions regarding this assessment.    Thank you for the consult,   Terence Unger       Patient brief summary:  Christine Mosqueda is a 53 y.o. female initiated on antimicrobial therapy with IV Vancomycin for treatment of skin & soft tissue infection    The patient's current regimen is random pulse dosing    Drug Allergies:   Review of patient's allergies indicates:   Allergen Reactions    Hydrochlorothiazide plus        Actual Body Weight:   93.4 kg    Renal Function:   Estimated Creatinine Clearance: 30.6 mL/min (A) (based on SCr of 2.3 mg/dL (H)).,     Dialysis Method (if applicable):  N/A    CBC (last 72 hours):  Recent Labs   Lab Result Units 02/16/24  0703 02/17/24  0407 02/18/24  0323 02/19/24  0341   WBC K/uL 27.47* 29.46* 31.73* 24.07*   Hemoglobin g/dL 9.3* 8.0* 7.0* 6.8*   Hematocrit % 30.0* 25.2* 22.2* 21.3*   Platelets K/uL 139* 423 477*  --    Gran % % 84.3* 84.4* 85.8*  --    Lymph % % 7.9* 7.6* 5.9*  --    Mono % % 5.9 5.8 4.5  --    Eosinophil % % 0.3 0.5 0.3  --    Basophil % % 0.6 0.3 0.2  --    Differential Method  Automated Automated Automated  --        Metabolic Panel (last 72 hours):  Recent Labs   Lab Result Units 02/16/24  0703 02/16/24  1601 02/17/24  0407  02/17/24  1212 02/17/24  1533 02/17/24  2128 02/18/24  0255 02/18/24  0323   Sodium mmol/L 130* 129* 130* 131*  --  128*  --  129*   Sodium, Urine mmol/L  --   --   --   --  <20*  --   --   --    Potassium mmol/L 3.7 4.1 3.8 4.1  --  4.3  --  4.3   Chloride mmol/L 95 94* 97 96  --  96  --  98   CO2 mmol/L 24 24 25 22*  --  24  --  23   Glucose mg/dL 252* 371* 68* 101  --  356*  --  173*   Glucose, UA   --   --   --   --   --   --  2+*  --    BUN mg/dL 16 21* 26* 28*  --  30*  --  30*   Creatinine mg/dL 1.5* 2.1* 2.4* 2.3*  --  2.6*  --  2.3*   Creatinine, Urine mg/dL  --   --   --   --  131.7  --   --   --    Albumin g/dL 1.2*  --  1.0*  --   --   --   --  0.9*   Total Bilirubin mg/dL 0.3  --  0.3  --   --   --   --  0.4   Alkaline Phosphatase U/L 117  --  128  --   --   --   --  124   AST U/L 6*  --  11  --   --   --   --  11   ALT U/L 5*  --  5*  --   --   --   --  5*   Magnesium mg/dL 1.5* 1.6 1.5*  --   --   --   --  1.4*   Phosphorus mg/dL 2.6*  --  3.0  --   --   --   --  3.9       Vancomycin Administrations:  vancomycin given in the last 96 hours                     vancomycin 750 mg in dextrose 5 % (D5W) 250 mL IVPB (Vial-Mate) (mg) 750 mg New Bag 02/18/24 0015     750 mg New Bag 02/16/24 2324    vancomycin (VANCOCIN) 1,750 mg in dextrose 5 % (D5W) 500 mL IVPB (mg) 1,750 mg New Bag 02/15/24 2344                    Microbiologic Results:  Microbiology Results (last 7 days)       Procedure Component Value Units Date/Time    Blood culture [[id number]] Collected: 02/17/24 2006    Order Status: Completed Specimen: Blood from Peripheral, Hand, Right Updated: 02/19/24 0303     Blood Culture, Routine No Growth to date      No Growth to date    Blood culture [[id number]] Collected: 02/17/24 2005    Order Status: Completed Specimen: Blood from Peripheral, Hand, Right Updated: 02/19/24 0303     Blood Culture, Routine No Growth to date      No Growth to date    Rapid Organism ID by PCR (from Blood culture) [[id number]]  Collected: 02/15/24 2320    Order Status: Completed Updated: 02/18/24 2241     Enterococcus faecalis Not Detected     Enterococcus faecium Not Detected     Listeria monocytogenes Not Detected     Staphylococcus spp. Not Detected     Staphylococcus aureus Not Detected     Staphylococcus epidermidis Not Detected     Staphylococcus lugdunensis Not Detected     Streptococcus species Not Detected     Streptococcus agalactiae Not Detected     Streptococcus pneumoniae Not Detected     Streptococcus pyogenes Not Detected     Acinetobacter calcoaceticus/baumannii complex Not Detected     Bacteroides fragilis Not Detected     Enterobacterales Not Detected     Enterobacter cloacae complex Not Detected     Escherichia coli Not Detected     Klebsiella aerogenes Not Detected     Klebsiella oxytoca Not Detected     Klebsiella pneumoniae group Not Detected     Proteus Not Detected     Salmonella sp Not Detected     Serratia marcescens Not Detected     Haemophilus influenzae Not Detected     Neisseria meningtidis Not Detected     Pseudomonas aeruginosa Not Detected     Stenotrophomonas maltophilia Not Detected     Candida albicans Not Detected     Candida auris Not Detected     Candida glabrata Not Detected     Candida krusei Not Detected     Candida parapsilosis Not Detected     Candida tropicalis Not Detected     Cryptococcus neoformans/gattii Not Detected     CTX-M (ESBL ) Test Not Applicable     IMP (Carbapenem resistant) Test Not Applicable     KPC resistance gene (Carbapenem resistant) Test Not Applicable     mcr-1  Test Not Applicable     mec A/C  Test Not Applicable     mec A/C and MREJ (MRSA) gene Test Not Applicable     NDM (Carbapenem resistant) Test Not Applicable     OXA-48-like (Carbapenem resistant) Test Not Applicable     van A/B (VRE gene) Test Not Applicable     VIM (Carbapenem resistant) Test Not Applicable    Narrative:      Aerobic and anaerobic    AFB Culture & Smear [[id number]] Collected: 02/17/24  0911    Order Status: Sent Specimen: Abscess from Groin Updated: 02/18/24 1405    Culture, ID (Consult) [[id number]] Collected: 02/15/24 2321    Order Status: No result Specimen: Blood from Peripheral, Antecubital, Right Updated: 02/18/24 1328    Aerobic culture [[id number]] Collected: 02/17/24 0911    Order Status: Completed Specimen: Abscess from Groin Updated: 02/18/24 1108     Aerobic Bacterial Culture No significant isolate to date    Narrative:      RIGHT THIGH WOUND    Blood culture x two cultures. Draw prior to antibiotics. [[id number]]  (Abnormal) Collected: 02/15/24 2320    Order Status: Completed Specimen: Blood from Peripheral, Antecubital, Left Updated: 02/18/24 0841     Blood Culture, Routine Gram stain portia bottle: Gram positive rods      Results called to and read back by:Sandra HARVEY 02/17/2024  11:01      GRAM POSITIVE RODS  Isolate sent out for reference testing      Narrative:      Aerobic and anaerobic    Blood culture x two cultures. Draw prior to antibiotics. [[id number]]  (Abnormal) Collected: 02/15/24 2321    Order Status: Completed Specimen: Blood from Peripheral, Antecubital, Right Updated: 02/18/24 0841     Blood Culture, Routine Gram stain portia bottle: Gram positive rods      Results called to and read back by:KAMALA STEELE  02/17/2024  18:30      GRAM POSITIVE RODS  Isolate sent out for reference testing      Narrative:      Aerobic and anaerobic    Gram stain [[id number]] Collected: 02/17/24 0911    Order Status: Completed Specimen: Abscess from Groin Updated: 02/17/24 1447     Gram Stain Result Rare WBC's      Many Gram positive cocci in pairs and chains      Rare Gram negative rods    Narrative:      RIGHT THIGH WOUND    Fungus culture [[id number]] Collected: 02/17/24 0911    Order Status: Sent Specimen: Abscess from Groin Updated: 02/17/24 1117    Culture, Anaerobe [[id number]] Collected: 02/17/24 0911    Order Status: Sent Specimen: Abscess from Groin Updated: 02/17/24 1117

## 2024-02-19 NOTE — PLAN OF CARE
Neuro: AO X 4    Cardiac: SR    Respiratory: NC @ 2L    GI: NPO @ MN    : Hernandez catheter    Lines: PIV X 2    GTT: None    Events: Pain medication PRN. Hgb 6.8; 1 unit of blood given.     NPO since MN. Wound care dressing changed. PIV removed by pt and new inserted. Hernandez secured with adequate output. Bed locked, bed in lowest position, and call light near pt. Care explained. Free from fall and injury.

## 2024-02-19 NOTE — ASSESSMENT & PLAN NOTE
Patient with acute kidney injury/acute renal failure likely due to pre-renal azotemia due to IVVD and acute tubular necrosis caused by sepsis  CARLINE is currently worsening. Baseline creatinine  0.9  - Labs reviewed- Renal function/electrolytes with Estimated Creatinine Clearance: 43.9 mL/min (A) (based on SCr of 1.6 mg/dL (H)). according to latest data. Monitor urine output and serial BMP and adjust therapy as needed. Avoid nephrotoxins and renally dose meds for GFR listed above.    - Highly suspect ATN as an etiology at this time  - Nephrology consult requested, appreciate recs  - Cont gentle IVFs/ treat infection.  - improving

## 2024-02-19 NOTE — OP NOTE
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 female with admission for right groin and thigh necrotizing soft tissue infection which has had at the prior debridements now indicated for take back for further wound exploration washout and possible further debridement. After discussion of disease process, we discussed options and have elected for operation to perform wound debridement and wound exploration of right thigh and right groin.    Description of Procedure: After consent was obtained, patient was taken to the OR. The patient was placed in lithotomy position and the right groin extending to the right thigh were all prepped and draped in a standard sterile fashion after general anesthesia was started. Time out was performed. Antibiotics were started with meropenem and Zosyn. We began the procedure by removing the prior packing and exploring the wound base.  There were few areas immediately noted of some purulent drainage.  We explored circumferentially and found scattered areas purulent drainage some within the muscle and some of the subcutaneous tissues they did not track very extensively.  There were 2 separate areas of demarcated necrotic skin anteriorly and posteriorly and these were cut out until we reached healthy bleeding skin dermis and subcutaneous fat.  We then evaluated medially and the evaluated the underlying tissue the labia and there was some tissue that had some superficial slough/infection and this was trimmed away  with sharp dissection.  We did not have to remove a significant amount of tissue medially and superiorly there is some superficial muscle which we cut away.  The center of the wound there was an additional muscle belly which had some pus coming from within it and had no contractility and had very pale color..  This was deemed necrotic and was excised.  Distally there was similarly some superficially infected muscle which we sharply and bluntly cleaned up and then we reached almost immediately healthy underlying muscle.  We then used the Pulsavac with several L of warm normal saline and mechanically debrided the entire wound with this Pulsavac and after completion we had washed out thoroughly and muscle the muscle revealed healthy pink vascularized tissue.  Because we did have some questionable tissue and had debrided significant amount of tissue we opted to plan to packed the patient's well with saline soaked Kerlix and plan to return in the next 24-48 hours for 1 more debridement before placing a wound VAC and I anticipate this will beyond the next procedure.  We placed ABD pads on top of the Kerlix and tape them in place  All counts were correct x2. Patient was awakened from anesthesia and taken to the recovery room in a stable condition having suffered no issues at this time.    Description of the findings of the procedure:  Additional debridement of scattered muscle as well as debridement of skin dermis and subcutaneous fat in several areas, this is completed with sharp dissection    Total wound dimensions after debridement 22 cm x 21 cm x 6 cm    Estimated Blood Loss: 25 mL         Specimens:   Specimen (24h ago, onward)      None

## 2024-02-19 NOTE — SUBJECTIVE & OBJECTIVE
Interval History: NAEON. Pain better controlled this morning. AVSS. Lab work with down trending Cr and WBC.    Medications:  Continuous Infusions:      Scheduled Meds:   acetaminophen  1,000 mg Oral Q8H    budesonide  1 mg Nebulization Q12H    And    arformoteroL  15 mcg Nebulization BID    aspirin  81 mg Oral Daily    atorvastatin  20 mg Oral Daily    chlordiazepoxide  5 mg Oral QID    enoxparin  40 mg Subcutaneous Daily    folic acid  1 mg Oral Daily    gabapentin  100 mg Oral BID    insulin detemir U-100  10 Units Subcutaneous QHS    insulin detemir U-100 (Levemir)  15 Units Subcutaneous Daily    magnesium oxide  500 mg Oral BID    meropenem (MERREM) IVPB  1 g Intravenous Q8H    mupirocin   Nasal BID    pantoprazole  40 mg Oral Daily    sodium bicarbonate  1,300 mg Oral BID    thiamine  100 mg Oral Daily    vancomycin (VANCOCIN) IV (PEDS and ADULTS)  750 mg Intravenous Once     PRN Meds:0.9%  NaCl infusion (for blood administration), 0.9%  NaCl infusion (for blood administration), albuterol sulfate, dextrose 10%, dextrose 10%, glucagon (human recombinant), glucose, glucose, HYDROmorphone, insulin aspart U-100, LIDOcaine (PF) 10 mg/ml (1%), melatonin, ondansetron, oxyCODONE, oxyCODONE, prochlorperazine, sodium chloride 0.9%, Pharmacy to dose Vancomycin consult **AND** vancomycin - pharmacy to dose     Review of patient's allergies indicates:   Allergen Reactions    Hydrochlorothiazide plus      Objective:     Vital Signs (Most Recent):  Temp: 98.2 °F (36.8 °C) (02/19/24 0320)  Pulse: (!) 119 (02/19/24 0610)  Resp: (!) 21 (02/19/24 0610)  BP: 138/64 (02/19/24 0610)  SpO2: 95 % (02/19/24 0610) Vital Signs (24h Range):  Temp:  [98.2 °F (36.8 °C)-99 °F (37.2 °C)] 98.2 °F (36.8 °C)  Pulse:  [] 119  Resp:  [18-52] 21  SpO2:  [88 %-100 %] 95 %  BP: ()/(55-91) 138/64     Weight: 93.4 kg (205 lb 14.6 oz)  Body mass index is 35.34 kg/m².    Intake/Output - Last 3 Shifts         02/17 0700  02/18 0659 02/18  0700  02/19 0659 02/19 0700  02/20 0659    P.O. 400 420     I.V. (mL/kg) 863.2 (9.2) 202.3 (2.2)     IV Piggyback 2151.5 242.8     Total Intake(mL/kg) 3414.7 (36.6) 865 (9.3)     Urine (mL/kg/hr) 1645 (0.7) 2400 (1.1)     Emesis/NG output  0     Stool  2     Blood 100      Total Output 1745 2402     Net +1669.7 -1537            Stool Occurrence  5 x              Physical Exam  Vitals and nursing note reviewed.   Constitutional:       Appearance: Normal appearance.   HENT:      Head: Normocephalic and atraumatic.   Cardiovascular:      Rate and Rhythm: Normal rate and regular rhythm.   Pulmonary:      Effort: Pulmonary effort is normal. No respiratory distress.   Abdominal:      General: Abdomen is flat. There is no distension.      Palpations: Abdomen is soft. There is no mass.      Tenderness: There is no abdominal tenderness.      Hernia: No hernia is present.   Genitourinary:     Comments: Hernandez catheter  Musculoskeletal:      Right lower leg: No edema.      Left lower leg: No edema.   Skin:     General: Skin is warm and dry.      Comments: Wound to right thigh dressed   Neurological:      General: No focal deficit present.      Mental Status: She is alert and oriented to person, place, and time.   Psychiatric:         Mood and Affect: Mood normal.         Behavior: Behavior normal.          Significant Labs:  I have reviewed all pertinent lab results within the past 24 hours.  CBC:   Recent Labs   Lab 02/19/24  0341   WBC 24.07*   RBC 3.11*   HGB 6.8*   HCT 21.3*   *   MCV 69*   MCH 21.9*   MCHC 31.9*       CMP:   Recent Labs   Lab 02/19/24  0341   *   CALCIUM 7.6*   ALBUMIN 1.2*   PROT 5.4*   *   K 4.6   CO2 25      BUN 34*   CREATININE 1.6*   ALKPHOS 131   ALT 5*   AST 10   BILITOT 0.6       LFTs:   Recent Labs   Lab 02/19/24  0341   ALT 5*   AST 10   ALKPHOS 131   BILITOT 0.6   PROT 5.4*   ALBUMIN 1.2*         Significant Diagnostics:  I have reviewed all pertinent imaging  results/findings within the past 24 hours.

## 2024-02-19 NOTE — SUBJECTIVE & OBJECTIVE
Subjective:   Interval History:  No new issues, Hb dropped this AM and being transfused. Plan for OR today      Review of Systems  Objective:     Vital Signs (Most Recent):  Temp: 97.9 °F (36.6 °C) (02/19/24 1115)  Pulse: 78 (02/19/24 1300)  Resp: (!) 25 (02/19/24 1300)  BP: (!) 153/93 (02/19/24 1300)  SpO2: 97 % (02/19/24 1300) Vital Signs (24h Range):  Temp:  [97.6 °F (36.4 °C)-98.7 °F (37.1 °C)] 97.9 °F (36.6 °C)  Pulse:  [] 78  Resp:  [18-39] 25  SpO2:  [92 %-100 %] 97 %  BP: (115-159)/(55-93) 153/93     Weight: 93 kg (205 lb)  Body mass index is 35.19 kg/m².     Physical Exam  Vitals and nursing note reviewed.   Constitutional:       General: She is not in acute distress.     Appearance: She is ill-appearing.   HENT:      Head: Normocephalic.      Mouth/Throat:      Mouth: Mucous membranes are dry.   Eyes:      Conjunctiva/sclera: Conjunctivae normal.   Cardiovascular:      Rate and Rhythm: Normal rate and regular rhythm.      Pulses: Normal pulses.   Pulmonary:      Effort: Pulmonary effort is normal. No respiratory distress.   Abdominal:      General: Bowel sounds are normal.   Musculoskeletal:      Comments: Thigh dressing intact   Skin:     General: Skin is warm.   Neurological:      Mental Status: She is alert and oriented to person, place, and time. Mental status is at baseline.   Psychiatric:         Mood and Affect: Mood normal.         Thought Content: Thought content normal.          Significant Labs: All pertinent labs within the past 24 hours have been reviewed.  Blood Culture:   Recent Labs   Lab 02/17/24 2005 02/17/24 2006   LABBLOO No Growth to date  No Growth to date No Growth to date  No Growth to date       BMP:   Recent Labs   Lab 02/19/24  0341   *   *   K 4.6      CO2 25   BUN 34*   CREATININE 1.6*   CALCIUM 7.6*   MG 1.9       CBC:   Recent Labs   Lab 02/18/24  0323 02/19/24  0341 02/19/24  1246   WBC 31.73* 24.07*  --    HGB 7.0* 6.8* 12.5   HCT 22.2* 21.3*   "--    * 499*  --        Urine Culture: No results for input(s): "LABURIN" in the last 48 hours.    Significant Imaging: I have reviewed all pertinent imaging results/findings within the past 24 hours.  "

## 2024-02-19 NOTE — ASSESSMENT & PLAN NOTE
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     - Will return to the OR for repeat exploration and debridement, possible wound vac application today  - Keep NPO  - Consent in the paper chart  - Nephrology consult; CARLINE ?stablizing. UOP adequate   - ID consult; Vanc/zosyn/clinda started 2/16, switched to meropenam, vanc, clinda 2/17. Continue    - Blood cultures 2/16, NGTD    - Operative cultures 2/17, pending  - Poorly controlled DMII - insulin per HM  - Nutrition consult, boost supplements, severely hypoalbuminemia   - Polysubstance abuse to alcohol dependency - librium taper on, CIWA protocol  - Multiple modal pain control limited by CARLINE - continue scheduled tylenol, combination of oral and IV narcotics  - Okay for DVT ppx   - PT consult - okay to attempt ambulation, get up to chair.   - Will have long term wound care needs; pending final disposition may need placement for this

## 2024-02-19 NOTE — PROGRESS NOTES
[facility] - [place] Intensive Care  Nephrology  Progress Note    Patient Name: Christine Mosqueda  MRN: [id number]  Admission Date: 2/15/2024  Hospital Length of Stay: 3 days  Attending Provider: Yasmany Cary MD   Primary Care Physician: [facility]  Principal Problem:Librado's gangrene  Date of service:  02/19/2024    Subjective:     Interval History: Renal function continues to improve. Remains on NC O2, reports good appetite.     Review of patient's allergies indicates:   Allergen Reactions    Hydrochlorothiazide plus      Current Facility-Administered Medications   Medication Frequency    0.9%  NaCl infusion (for blood administration) Q24H PRN    acetaminophen tablet 1,000 mg Q8H    albuterol sulfate nebulizer solution 2.5 mg Q4H PRN    budesonide nebulizer solution 1 mg Q12H    And    arformoteroL nebulizer solution 15 mcg BID    aspirin chewable tablet 81 mg Daily    atorvastatin tablet 20 mg Daily    chlordiazepoxide capsule 5 mg QID    dextrose 10% bolus 125 mL 125 mL PRN    dextrose 10% bolus 250 mL 250 mL PRN    enoxaparin injection 40 mg Daily    folic acid tablet 1 mg Daily    gabapentin capsule 100 mg BID    glucagon (human recombinant) injection 1 mg PRN    glucose chewable tablet 16 g PRN    glucose chewable tablet 24 g PRN    HYDROmorphone injection 0.5 mg Q2H PRN    insulin aspart U-100 pen 0-15 Units QID (AC + HS) PRN    insulin detemir U-100 (Levemir) pen 10 Units QHS    insulin detemir U-100 (Levemir) pen 15 Units Daily    LIDOcaine (PF) 10 mg/ml (1%) injection 10 mg Once PRN    magnesium oxide tablet 500 mg BID    melatonin tablet 6 mg Nightly PRN    meropenem (MERREM) 1 g in sodium chloride 0.9 % 100 mL IVPB (MB+) Q12H    mupirocin 2 % ointment BID    ondansetron disintegrating tablet 8 mg Q8H PRN    oxyCODONE immediate release tablet 10 mg Q4H PRN    oxyCODONE immediate release tablet 5 mg Q4H PRN    pantoprazole EC tablet 40 mg Daily    prochlorperazine injection Soln 5 mg Q6H PRN     sodium bicarbonate tablet 1,300 mg BID    sodium chloride 0.9% flush 10 mL PRN    thiamine tablet 100 mg Daily    vancomycin - pharmacy to dose pharmacy to manage frequency       Objective:     Vital Signs (Most Recent):  Temp: 97.6 °F (36.4 °C) (02/19/24 0845)  Pulse: 79 (02/19/24 1000)  Resp: (!) 27 (02/19/24 1000)  BP: 133/69 (02/19/24 1000)  SpO2: 100 % (02/19/24 1000) Vital Signs (24h Range):  Temp:  [97.6 °F (36.4 °C)-99 °F (37.2 °C)] 97.6 °F (36.4 °C)  Pulse:  [] 79  Resp:  [18-52] 27  SpO2:  [88 %-100 %] 100 %  BP: (107-159)/(55-86) 133/69     Weight: 93 kg (205 lb) (02/19/24 0958)  Body mass index is 35.19 kg/m².  Body surface area is 2.05 meters squared.    I/O last 3 completed shifts:  In: 1875.4 [P.O.:420; I.V.:720.5; IV Piggyback:734.9]  Out: 2907 [Urine:2905; Stool:2]    Physical Exam  Vitals and nursing note reviewed.   Constitutional:       General: She is not in acute distress.     Appearance: She is well-developed. She is ill-appearing (chronically ill appearing). She is not toxic-appearing.   HENT:      Head: Normocephalic and atraumatic.      Comments: tooth decay     Right Ear: External ear normal.      Left Ear: External ear normal.      Nose: Nose normal.      Eyes:      General: Lids are normal.      Conjunctiva/sclera: Conjunctivae normal.      Pupils: Pupils are equal, round, and reactive to light.   Neck:      Thyroid: No thyroid mass.      Vascular: No JVD.      Trachea: Trachea normal.   Cardiovascular:      Rate and Rhythm: Normal rate and regular rhythm.      Heart sounds: Normal heart sounds, S1 normal and S2 normal.   Pulmonary:      Effort: Pulmonary effort is normal.      Breath sounds: Normal breath sounds.   Abdominal:      General: Bowel sounds are normal. There is no distension.      Palpations: Abdomen is soft.      Tenderness: There is no abdominal tenderness.   Genitourinary:     Comments: jansen  Musculoskeletal:      Cervical back: Full passive range of motion  without pain, normal range of motion and neck supple.      Right lower leg: No edema.      Left lower leg: No edema.   Skin:     Comments: No visible rash, warm and dry  Neurological:      General: No focal deficit present.      Mental Status: She is alert and oriented to person, place, and time.      Cranial Nerves: No cranial nerve deficit.      Sensory: No sensory deficit.   Psychiatric:         Speech: Speech normal.         Thought Content: Thought content normal.        Significant Labs:sureBMP:   Recent Labs   Lab 02/19/24  0341   *   *   K 4.6      CO2 25   BUN 34*   CREATININE 1.6*   CALCIUM 7.6*   MG 1.9     CBC:   Recent Labs   Lab 02/19/24  0341   WBC 24.07*   RBC 3.11*   HGB 6.8*   HCT 21.3*   *   MCV 69*   MCH 21.9*   MCHC 31.9*     CMP:   Recent Labs   Lab 02/19/24  0341   *   CALCIUM 7.6*   ALBUMIN 1.2*   PROT 5.4*   *   K 4.6   CO2 25      BUN 34*   CREATININE 1.6*   ALKPHOS 131   ALT 5*   AST 10   BILITOT 0.6     LFTs:   Recent Labs   Lab 02/19/24  0341   ALT 5*   AST 10   ALKPHOS 131   BILITOT 0.6   PROT 5.4*   ALBUMIN 1.2*         Recent Labs   Lab 02/18/24  0255   COLORU Yellow   SPECGRAV 1.020   PHUR 6.0   PROTEINUA 2+*   BACTERIA Many*   NITRITE Negative   LEUKOCYTESUR Negative   UROBILINOGEN Negative   HYALINECASTS 0     All labs within the past 24 hours have been reviewed.    Significant Imaging:  TTE reviewed from 2/19    Assessment/Plan:     CARLINE  - baseline Cr 0.8-1.2 in 2023  - suspect septic and ischemic ATN in the setting of Librado's gangrene  - Cr trending down, non oliguric and lytes are stable   - ok to give lasix PRN  - UA, Ulytes, renal U/S results as above  - no indication for dialysis   - monitor daily weights, strict I&Os, daily labs  - avoid nephrotoxic agents including NSAIDs, renally dose medications    Anemia of chronic disease/blood loss anemia  - pRBC transfusion today  - Hgb down likely 2/2 debridements  - monitor CBC,  transfuse hgb < 7      Hyponatremia  - persistent, though Na trending up  - recommend glycemic control  - recommend converting IVPB to NS when possible  - monitor serial levels     Acidosis  - level better   - continue PO bicarb  - monitor level    Hypoalbuminemia  - encourage PO intake  - diabetic diet, no need for renal diet restrictions  - Boost supplement drinks TID    Thank you for your consult. I will follow-up with patient. Please contact us if you have any additional questions.    PATIENCE Corrales  Nephrology  [facility] - [place] - Intensive Care

## 2024-02-19 NOTE — ANESTHESIA PROCEDURE NOTES
Intubation    Date/Time: 2/19/2024 2:22 PM    Performed by: Crow Cantrell CRNA  Authorized by: Jessica Burgos MD    Intubation:     Induction:  Rapid sequence induction    Intubated:  Postinduction    Mask Ventilation:  Not attempted    Attempts:  1    Attempted By:  CRNA    Method of Intubation:  Video laryngoscopy    Blade:  Jama 3    Laryngeal View Grade: Grade I - full view of cords      Difficult Airway Encountered?: No      Complications:  None    Airway Device:  Oral endotracheal tube    Airway Device Size:  7.0    Style/Cuff Inflation:  Cuffed (inflated to minimal occlusive pressure)    Tube secured:  21    Secured at:  The lips    Placement Verified By:  Capnometry    Complicating Factors:  None    Findings Post-Intubation:  BS equal bilateral

## 2024-02-19 NOTE — ASSESSMENT & PLAN NOTE
Patient's anemia is currently controlled. Has not received any PRBCs to date. Etiology likely d/t chronic disease and acute blood loss post surgery.  Current CBC reviewed-   Lab Results   Component Value Date    HGB 12.5 02/19/2024    HCT 21.3 (L) 02/19/2024     Monitor serial CBC and transfuse if patient becomes hemodynamically unstable, symptomatic or H/H drops below 7/21.  - Type and screen and have 2 units on hold in preparation for surgery 2/19  - Transfused 1 unit pRBC 2/19

## 2024-02-19 NOTE — ASSESSMENT & PLAN NOTE
"53F with h/o DM, admitted 2/15 with abscess in R medial thigh. CT c/f abscess/nec fascitis and now s/p several surgical debridements. Muscle necrosis noted. Cultures sent. Gram stain with GPC in pairs and chains and GNR.  Bcx on admit positive for GPR in one of two bottles. On vanc/meropenem. ID consulted for "fourniers gangrene, diabetic, CARLINE; no cultures taken at initial debridement "    Treating necrotizing fascitis, which is oftentimes polymicrobial including anaerobes. Likely started as a skin boil. Risk factor- uncontrolled DM, tobacco abuse (reportedly quit 2 weeks ago). Appreciate surgery's help with source control. Spoke with microbiology about the GPR in blood culture. they said it's not a diptheroid or bacillus (so it's unlikely to be a contaminant). It is growing aerobically. micro sent specimen to main campus to put on MALDI, so hopefully we'll get an identification in the next day or two. Actinomyces? Nocardia? NTM?    Recommendations:   - continue vanc/meropenem  - wound care/additional debridement per surgery  - f/u bcx identification;and  document clearance.   - needs outpatient dental f/u for tooth extractions  "

## 2024-02-19 NOTE — PLAN OF CARE
Recommendations    1. When medically acceptable, advance diet as tolerated to diabetic renal diet; monitoring PO intake of meals and snacks.   2. When diet advances, consider ONS Boost Glucose Control to help meet EEN/EPN.   3. Monitor weights and labs.   4. Collaboration with medical providers    Goals: 1. Pt to meet 50% EEN/EPN by RD follow up  Nutrition Goal Status: new  Communication of RD Recs:  (POC)    Assessment and Plan    Nutrition Problem  Inacreased nutrient needs; protein     Related to (etiology):   Wound healing    Signs and Symptoms (as evidenced by):   Librado's gangrene    Interventions/Recommendations (treatment strategy):  High protein diet  Collaboration with medical providers    Nutrition Diagnosis Status:   New

## 2024-02-19 NOTE — PROGRESS NOTES
"[facility] Intensive Care  Infectious Disease  Progress Note    Patient Name: Christine Mosqueda  MRN: [id number]  Admission Date: 2/15/2024  Length of Stay: 3 days  Attending Physician: Yasmany Cary MD  Primary Care Provider: [facility]    Isolation Status: No active isolations    Assessment/Plan:      * Librado's gangrene    52 yo woman with h/o DM, admitted 2/15 with abscess in R medial thigh. CT c/f abscess/nec fascitis and now s/p several surgical debridements. Muscle necrosis noted. Cultures sent. Gram stain with GPC in pairs and chains and GNR.  Bcx on admit positive for GPR in one of two bottles. On vanc/meropenem. ID consulted for "fourniers gangrene, diabetic, CARLINE; no cultures taken at initial debridement "    Treating necrotizing fascitis, which is oftentimes polymicrobial including anaerobes. Likely started as a skin boil. Risk factor- uncontrolled DM, tobacco abuse (reportedly quit 2 weeks ago). Appreciate surgery's help with source control. Spoke with microbiology about the GPR in blood culture. they said it's not a diptheroid or bacillus (so it's unlikely to be a contaminant). It is growing aerobically. micro sent specimen to main campus to put on MALDI, so hopefully we'll get an identification in the next day or two. Actinomyces? Nocardia? NTM?    Recommendations:   - continue vanc/meropenem  - wound care/additional debridement per surgery  - f/u bcx identification;and  document clearance.   - needs outpatient dental f/u for tooth extractions    Steven Hernandez MD  Infectious Disease  [facility] - Intensive Care    Subjective:     Principal Problem:Librado's gangrene    HPI: 53F with h/o DM, admitted 2/15 with abscess in R medial thigh. Reports going to [facility] on 2/10 for the same thing. Given doxycycline, which she says she was taking. Wound on R leg became more painful, so came to hospital. Reports fever. Denies indwelling hardware. Reports dental caries, but no recent " "extractions. Reports aches all over, but no other areas of localized pain other than R thigh. Denies abx allergies.     CT 2/15-  Extensive inflammatory changes extending from the right perineum to the right medial thigh. Linear area of fluid attenuation in the right labial region, which may represent a small abscess. Multiple foci of air in the right medial thigh, which is difficult to measure. Considerations may include developing abscess, phlegmonous tissue, and or instrumentation resulting in subcutaneous air.       Bcx on admit positive for GPR in one of two bottles      Component 2 d ago   Blood Culture, Routine Gram stain portia bottle: Gram positive rods P   Blood Culture, Routine Results called to and read back by:Sandar HARVEY 02/17/2024  11:01 P          S/p I&D with general surgery on 2/16 and 2/17 with plans for another debridment Monday. Muscle necrosis noted. Cultures sent. Gram stain positive:       Component 09:11   Gram Stain Result Rare WBC's   Gram Stain Result Many Gram positive cocci in pairs and chains   Gram Stain Result Rare Gram negative rods     Hiv and hep c testing negative    On vanc/meropenem    ID consulted for "fourniers gangrene, diabetic, CARLINE; no cultures taken at initial debridement "  Interval History: Events noted. NPO for OR. Wound soiled with feces?    Review of Systems   Constitutional:  Negative for chills and fever.   Skin:  Positive for wound.   All other systems reviewed and are negative.    Objective:     Vital Signs (Most Recent):  Temp: 97.6 °F (36.4 °C) (02/19/24 0845)  Pulse: 79 (02/19/24 1000)  Resp: (!) 27 (02/19/24 1000)  BP: 133/69 (02/19/24 1000)  SpO2: 100 % (02/19/24 1000) Vital Signs (24h Range):  Temp:  [97.6 °F (36.4 °C)-99 °F (37.2 °C)] 97.6 °F (36.4 °C)  Pulse:  [] 79  Resp:  [18-52] 27  SpO2:  [88 %-100 %] 100 %  BP: (107-159)/(55-91) 133/69     Weight: 93 kg (205 lb)  Body mass index is 35.19 kg/m².    Estimated Creatinine Clearance: 43.9 mL/min (A) (based " on SCr of 1.6 mg/dL (H)).     Physical Exam  Vitals and nursing note reviewed.   Constitutional:       Appearance: Normal appearance.   HENT:      Head: Normocephalic and atraumatic.   Eyes:      Pupils: Pupils are equal, round, and reactive to light.   Cardiovascular:      Rate and Rhythm: Normal rate.   Musculoskeletal:         General: Swelling and tenderness present.      Comments: Thigh packed   Skin:     Findings: No erythema or rash.   Neurological:      Mental Status: She is alert.   Psychiatric:         Mood and Affect: Mood normal.          Significant Labs: Blood Culture:   Recent Labs   Lab 02/15/24  2320 02/15/24  2321 02/17/24  2005 02/17/24  2006   LABBLOO Gram stain portia bottle: Gram positive rods  Results called to and read back by:Sandra HARVEY 02/17/2024  11:01  GRAM POSITIVE RODS  Isolate sent out for reference testing  * Gram stain portia bottle: Gram positive rods  Results called to and read back by:KAMALA STEELE  02/17/2024  18:30  GRAM POSITIVE RODS  Isolate sent out for reference testing  * No Growth to date  No Growth to date No Growth to date  No Growth to date     CBC:   Recent Labs   Lab 02/18/24  0323 02/19/24  0341   WBC 31.73* 24.07*   HGB 7.0* 6.8*   HCT 22.2* 21.3*   * 499*     Wound Culture:   Recent Labs   Lab 02/17/24  0911   LABAERO No significant isolate to date       Significant Imaging: I have reviewed all pertinent imaging results/findings within the past 24 hours.

## 2024-02-19 NOTE — PROGRESS NOTES
Pharmacist Renal Dose Adjustment Note    Christine Mosqueda is a 53 y.o. female being treated with the medication Meropenem    Patient Data:    Vital Signs (Most Recent):  Temp: 97.7 °F (36.5 °C) (02/19/24 0828)  Pulse: 85 (02/19/24 0800)  Resp: (!) 23 (02/19/24 0800)  BP: (!) 117/56 (02/19/24 0828)  SpO2: (!) 92 % (02/19/24 0800) Vital Signs (72h Range):  Temp:  [97.7 °F (36.5 °C)-101.3 °F (38.5 °C)]   Pulse:  []   Resp:  [12-52]   BP: ()/(54-91)   SpO2:  [79 %-100 %]      Recent Labs   Lab 02/17/24 2128 02/18/24  0323 02/19/24  0341   CREATININE 2.6* 2.3* 1.6*     Serum creatinine: 1.6 mg/dL (H) 02/19/24 0341  Estimated creatinine clearance: 44 mL/min (A)    Medication:Meropenem 1 gram every 8 hours will be changed to Meropenem 1 gram every 12 hours    Pharmacist's Name: Walter Cosby Jr  Pharmacist's Extension: [phone]

## 2024-02-19 NOTE — ASSESSMENT & PLAN NOTE
S/p initial debridement on 02/16 and again on 02/17/2024  Op note reviewed:  Cont Abx coverage w/ IV Vanc + Meropenem + Clindamycin  Blood culture w GPR on 2/15, surgical wound cultures with GPR and lactobacillus species  - repeat cultures neg on 2/17  ID consult requested, appreciate recs  Plan to return to OR for an additional wound exploration/debridement/washout on Monday 2/19/24.

## 2024-02-20 ENCOUNTER — ANESTHESIA EVENT (OUTPATIENT)
Dept: SURGERY | Facility: HOSPITAL | Age: 54
DRG: 853 | End: 2024-02-20
Payer: MEDICAID

## 2024-02-20 PROBLEM — R78.81 E COLI BACTEREMIA: Status: RESOLVED | Noted: 2023-03-10 | Resolved: 2024-02-20

## 2024-02-20 PROBLEM — E83.42 HYPOMAGNESEMIA: Status: RESOLVED | Noted: 2023-04-07 | Resolved: 2024-02-20

## 2024-02-20 PROBLEM — M25.511 RIGHT SHOULDER PAIN: Status: RESOLVED | Noted: 2023-03-17 | Resolved: 2024-02-20

## 2024-02-20 PROBLEM — N17.0 SEPSIS WITH ACUTE RENAL FAILURE AND TUBULAR NECROSIS WITHOUT SEPTIC SHOCK: Status: ACTIVE | Noted: 2024-02-16

## 2024-02-20 PROBLEM — R65.20 SEPSIS WITH ACUTE RENAL FAILURE AND TUBULAR NECROSIS WITHOUT SEPTIC SHOCK: Status: ACTIVE | Noted: 2024-02-16

## 2024-02-20 PROBLEM — B96.20 E COLI BACTEREMIA: Status: RESOLVED | Noted: 2023-03-10 | Resolved: 2024-02-20

## 2024-02-20 PROBLEM — R07.9 CHEST PAIN: Status: RESOLVED | Noted: 2018-02-05 | Resolved: 2024-02-20

## 2024-02-20 LAB
ALBUMIN SERPL BCP-MCNC: 1.2 G/DL (ref 3.5–5.2)
ALP SERPL-CCNC: 131 U/L (ref 55–135)
ALT SERPL W/O P-5'-P-CCNC: 6 U/L (ref 10–44)
ANION GAP SERPL CALC-SCNC: 10 MMOL/L (ref 8–16)
AST SERPL-CCNC: 14 U/L (ref 10–40)
BACTERIA BLD CULT: ABNORMAL
BACTERIA SPEC AEROBE CULT: ABNORMAL
BASOPHILS # BLD AUTO: 0.04 K/UL (ref 0–0.2)
BASOPHILS NFR BLD: 0.2 % (ref 0–1.9)
BILIRUB SERPL-MCNC: 0.5 MG/DL (ref 0.1–1)
BLD PROD TYP BPU: NORMAL
BLD PROD TYP BPU: NORMAL
BLOOD UNIT EXPIRATION DATE: NORMAL
BLOOD UNIT EXPIRATION DATE: NORMAL
BLOOD UNIT TYPE CODE: 5100
BLOOD UNIT TYPE CODE: 5100
BLOOD UNIT TYPE: NORMAL
BLOOD UNIT TYPE: NORMAL
BUN SERPL-MCNC: 25 MG/DL (ref 6–20)
CALCIUM SERPL-MCNC: 7.7 MG/DL (ref 8.7–10.5)
CHLORIDE SERPL-SCNC: 103 MMOL/L (ref 95–110)
CO2 SERPL-SCNC: 25 MMOL/L (ref 23–29)
CODING SYSTEM: NORMAL
CODING SYSTEM: NORMAL
CREAT SERPL-MCNC: 1.2 MG/DL (ref 0.5–1.4)
CROSSMATCH INTERPRETATION: NORMAL
CROSSMATCH INTERPRETATION: NORMAL
DIFFERENTIAL METHOD BLD: ABNORMAL
DISPENSE STATUS: NORMAL
DISPENSE STATUS: NORMAL
EOSINOPHIL # BLD AUTO: 0.2 K/UL (ref 0–0.5)
EOSINOPHIL NFR BLD: 0.9 % (ref 0–8)
ERYTHROCYTE [DISTWIDTH] IN BLOOD BY AUTOMATED COUNT: 18.6 % (ref 11.5–14.5)
EST. GFR  (NO RACE VARIABLE): 53 ML/MIN/1.73 M^2
GLUCOSE SERPL-MCNC: 129 MG/DL (ref 70–110)
HCT VFR BLD AUTO: 23.8 % (ref 37–48.5)
HGB BLD-MCNC: 7.7 G/DL (ref 12–16)
IMM GRANULOCYTES # BLD AUTO: 0.49 K/UL (ref 0–0.04)
IMM GRANULOCYTES NFR BLD AUTO: 2.5 % (ref 0–0.5)
LYMPHOCYTES # BLD AUTO: 2 K/UL (ref 1–4.8)
LYMPHOCYTES NFR BLD: 10.6 % (ref 18–48)
MAGNESIUM SERPL-MCNC: 2 MG/DL (ref 1.6–2.6)
MCH RBC QN AUTO: 23.3 PG (ref 27–31)
MCHC RBC AUTO-ENTMCNC: 32.4 G/DL (ref 32–36)
MCV RBC AUTO: 72 FL (ref 82–98)
MONOCYTES # BLD AUTO: 1.2 K/UL (ref 0.3–1)
MONOCYTES NFR BLD: 5.9 % (ref 4–15)
NEUTROPHILS # BLD AUTO: 15.4 K/UL (ref 1.8–7.7)
NEUTROPHILS NFR BLD: 79.9 % (ref 38–73)
NRBC BLD-RTO: 0 /100 WBC
NUM UNITS TRANS PACKED RBC: NORMAL
NUM UNITS TRANS PACKED RBC: NORMAL
OHS QRS DURATION: 82 MS
OHS QTC CALCULATION: 462 MS
PHOSPHATE SERPL-MCNC: 3.2 MG/DL (ref 2.7–4.5)
PLATELET # BLD AUTO: 301 K/UL (ref 150–450)
PMV BLD AUTO: 11 FL (ref 9.2–12.9)
POCT GLUCOSE: 140 MG/DL (ref 70–110)
POCT GLUCOSE: 143 MG/DL (ref 70–110)
POCT GLUCOSE: 198 MG/DL (ref 70–110)
POTASSIUM SERPL-SCNC: 4.8 MMOL/L (ref 3.5–5.1)
PROT SERPL-MCNC: 5.3 G/DL (ref 6–8.4)
RBC # BLD AUTO: 3.3 M/UL (ref 4–5.4)
SODIUM SERPL-SCNC: 138 MMOL/L (ref 136–145)
VANCOMYCIN SERPL-MCNC: 11 UG/ML
WBC # BLD AUTO: 19.33 K/UL (ref 3.9–12.7)

## 2024-02-20 PROCEDURE — 63600175 PHARM REV CODE 636 W HCPCS: Performed by: PHYSICIAN ASSISTANT

## 2024-02-20 PROCEDURE — 80053 COMPREHEN METABOLIC PANEL: CPT

## 2024-02-20 PROCEDURE — 85025 COMPLETE CBC W/AUTO DIFF WBC: CPT

## 2024-02-20 PROCEDURE — 25000003 PHARM REV CODE 250: Performed by: FAMILY MEDICINE

## 2024-02-20 PROCEDURE — 99900035 HC TECH TIME PER 15 MIN (STAT)

## 2024-02-20 PROCEDURE — 80202 ASSAY OF VANCOMYCIN: CPT | Performed by: INTERNAL MEDICINE

## 2024-02-20 PROCEDURE — 99233 SBSQ HOSP IP/OBS HIGH 50: CPT | Mod: ,,, | Performed by: INTERNAL MEDICINE

## 2024-02-20 PROCEDURE — 25000003 PHARM REV CODE 250: Performed by: HOSPITALIST

## 2024-02-20 PROCEDURE — 25000242 PHARM REV CODE 250 ALT 637 W/ HCPCS: Performed by: HOSPITALIST

## 2024-02-20 PROCEDURE — 97530 THERAPEUTIC ACTIVITIES: CPT

## 2024-02-20 PROCEDURE — 84100 ASSAY OF PHOSPHORUS: CPT

## 2024-02-20 PROCEDURE — 83735 ASSAY OF MAGNESIUM: CPT

## 2024-02-20 PROCEDURE — 21400001 HC TELEMETRY ROOM

## 2024-02-20 PROCEDURE — 94640 AIRWAY INHALATION TREATMENT: CPT

## 2024-02-20 PROCEDURE — 99232 SBSQ HOSP IP/OBS MODERATE 35: CPT | Mod: ,,, | Performed by: SURGERY

## 2024-02-20 PROCEDURE — 25000003 PHARM REV CODE 250: Performed by: SURGERY

## 2024-02-20 PROCEDURE — 27000221 HC OXYGEN, UP TO 24 HOURS

## 2024-02-20 PROCEDURE — 94761 N-INVAS EAR/PLS OXIMETRY MLT: CPT

## 2024-02-20 PROCEDURE — 25000003 PHARM REV CODE 250

## 2024-02-20 PROCEDURE — 97161 PT EVAL LOW COMPLEX 20 MIN: CPT

## 2024-02-20 PROCEDURE — 25000003 PHARM REV CODE 250: Performed by: STUDENT IN AN ORGANIZED HEALTH CARE EDUCATION/TRAINING PROGRAM

## 2024-02-20 PROCEDURE — 63600175 PHARM REV CODE 636 W HCPCS: Performed by: STUDENT IN AN ORGANIZED HEALTH CARE EDUCATION/TRAINING PROGRAM

## 2024-02-20 PROCEDURE — 63600175 PHARM REV CODE 636 W HCPCS: Performed by: SURGERY

## 2024-02-20 PROCEDURE — 36415 COLL VENOUS BLD VENIPUNCTURE: CPT | Performed by: INTERNAL MEDICINE

## 2024-02-20 RX ORDER — CHLORDIAZEPOXIDE HYDROCHLORIDE 5 MG/1
5 CAPSULE, GELATIN COATED ORAL 3 TIMES DAILY
Status: COMPLETED | OUTPATIENT
Start: 2024-02-20 | End: 2024-02-21

## 2024-02-20 RX ORDER — HYDROMORPHONE HYDROCHLORIDE 1 MG/ML
0.5 INJECTION, SOLUTION INTRAMUSCULAR; INTRAVENOUS; SUBCUTANEOUS ONCE
Status: COMPLETED | OUTPATIENT
Start: 2024-02-20 | End: 2024-02-20

## 2024-02-20 RX ORDER — HYDROMORPHONE HYDROCHLORIDE 1 MG/ML
1 INJECTION, SOLUTION INTRAMUSCULAR; INTRAVENOUS; SUBCUTANEOUS DAILY PRN
Status: DISCONTINUED | OUTPATIENT
Start: 2024-02-20 | End: 2024-03-06 | Stop reason: HOSPADM

## 2024-02-20 RX ORDER — CHLORDIAZEPOXIDE HYDROCHLORIDE 5 MG/1
5 CAPSULE, GELATIN COATED ORAL 2 TIMES DAILY
Status: DISPENSED | OUTPATIENT
Start: 2024-02-22 | End: 2024-02-24

## 2024-02-20 RX ORDER — CHLORDIAZEPOXIDE HYDROCHLORIDE 5 MG/1
5 CAPSULE, GELATIN COATED ORAL DAILY
Status: COMPLETED | OUTPATIENT
Start: 2024-02-24 | End: 2024-02-26

## 2024-02-20 RX ORDER — CHLORDIAZEPOXIDE HYDROCHLORIDE 5 MG/1
5 CAPSULE, GELATIN COATED ORAL 3 TIMES DAILY
Status: DISCONTINUED | OUTPATIENT
Start: 2024-02-20 | End: 2024-02-20

## 2024-02-20 RX ORDER — GABAPENTIN 400 MG/1
400 CAPSULE ORAL 2 TIMES DAILY
Status: DISCONTINUED | OUTPATIENT
Start: 2024-02-20 | End: 2024-03-06 | Stop reason: HOSPADM

## 2024-02-20 RX ADMIN — PANTOPRAZOLE SODIUM 40 MG: 40 TABLET, DELAYED RELEASE ORAL at 08:02

## 2024-02-20 RX ADMIN — INSULIN DETEMIR 10 UNITS: 100 INJECTION, SOLUTION SUBCUTANEOUS at 08:02

## 2024-02-20 RX ADMIN — MEROPENEM 1 G: 1 INJECTION INTRAVENOUS at 03:02

## 2024-02-20 RX ADMIN — ATORVASTATIN CALCIUM 20 MG: 10 TABLET, FILM COATED ORAL at 08:02

## 2024-02-20 RX ADMIN — OXYCODONE 5 MG: 5 TABLET ORAL at 08:02

## 2024-02-20 RX ADMIN — THIAMINE HCL TAB 100 MG 100 MG: 100 TAB at 08:02

## 2024-02-20 RX ADMIN — GABAPENTIN 100 MG: 100 CAPSULE ORAL at 08:02

## 2024-02-20 RX ADMIN — HYDROMORPHONE HYDROCHLORIDE 0.5 MG: 1 INJECTION, SOLUTION INTRAMUSCULAR; INTRAVENOUS; SUBCUTANEOUS at 09:02

## 2024-02-20 RX ADMIN — Medication 6 MG: at 09:02

## 2024-02-20 RX ADMIN — ACETAMINOPHEN 1000 MG: 500 TABLET ORAL at 08:02

## 2024-02-20 RX ADMIN — OXYCODONE 5 MG: 5 TABLET ORAL at 04:02

## 2024-02-20 RX ADMIN — ASPIRIN 81 MG CHEWABLE TABLET 81 MG: 81 TABLET CHEWABLE at 08:02

## 2024-02-20 RX ADMIN — VANCOMYCIN HYDROCHLORIDE 1000 MG: 1 INJECTION, POWDER, LYOPHILIZED, FOR SOLUTION INTRAVENOUS at 05:02

## 2024-02-20 RX ADMIN — Medication 500 MG: at 08:02

## 2024-02-20 RX ADMIN — OXYCODONE 10 MG: 5 TABLET ORAL at 07:02

## 2024-02-20 RX ADMIN — CHLORDIAZEPOXIDE HYDROCHLORIDE 5 MG: 5 CAPSULE ORAL at 02:02

## 2024-02-20 RX ADMIN — HYDROMORPHONE HYDROCHLORIDE 0.5 MG: 1 INJECTION, SOLUTION INTRAMUSCULAR; INTRAVENOUS; SUBCUTANEOUS at 03:02

## 2024-02-20 RX ADMIN — BUDESONIDE 1 MG: 0.5 INHALANT RESPIRATORY (INHALATION) at 08:02

## 2024-02-20 RX ADMIN — MUPIROCIN: 20 OINTMENT TOPICAL at 08:02

## 2024-02-20 RX ADMIN — INSULIN ASPART 3 UNITS: 100 INJECTION, SOLUTION INTRAVENOUS; SUBCUTANEOUS at 07:02

## 2024-02-20 RX ADMIN — INSULIN ASPART 9 UNITS: 100 INJECTION, SOLUTION INTRAVENOUS; SUBCUTANEOUS at 11:02

## 2024-02-20 RX ADMIN — CHLORDIAZEPOXIDE HYDROCHLORIDE 5 MG: 5 CAPSULE ORAL at 08:02

## 2024-02-20 RX ADMIN — GABAPENTIN 400 MG: 400 CAPSULE ORAL at 08:02

## 2024-02-20 RX ADMIN — ACETAMINOPHEN 1000 MG: 500 TABLET ORAL at 02:02

## 2024-02-20 RX ADMIN — ARFORMOTEROL TARTRATE 15 MCG: 15 SOLUTION RESPIRATORY (INHALATION) at 08:02

## 2024-02-20 RX ADMIN — MEROPENEM 1 G: 1 INJECTION INTRAVENOUS at 04:02

## 2024-02-20 RX ADMIN — FOLIC ACID 1 MG: 1 TABLET ORAL at 08:02

## 2024-02-20 NOTE — ASSESSMENT & PLAN NOTE
53 yoF with multiple medical co-morbidities including HF, polysubstance abuse, alcohol and tobacco dependency, poorly controlled IDDM, obesity, ILD presenting with Librado's gangrene of the right medial thigh s/p initial debridement 2/16 with takeback 2/17, 2/19.    - Will return to the OR for repeat exploration and debridement, possible wound vac application on 2/21  - Ok for diet, NPO at midnight  - CARLINE resolving  - ID following; Vanc/zosyn/clinda started 2/16, switched to meropenam and vanc   - Operative cultures 2/17, pending  - Poorly controlled DMII - insulin per HM  - Nutrition consult, boost supplements, severely hypoalbuminemia   - Polysubstance abuse to alcohol dependency - librium taper on, CIWA protocol  - Multiple modal pain control limited by CARLINE - continue scheduled tylenol, combination of oral and IV narcotics  - Okay for DVT ppx   - PT consult - okay to attempt ambulation, get up to chair.   - Will have long term wound care needs; pending final disposition may need placement for this

## 2024-02-20 NOTE — SUBJECTIVE & OBJECTIVE
Interval History: Screaming with pain this AM with dressing change. Now eating lunch with  at bedside. Not currently in pain.     Review of Systems   Constitutional:  Negative for chills and fever.   Respiratory:  Negative for shortness of breath.    Cardiovascular:  Negative for chest pain.   Gastrointestinal:  Negative for abdominal pain.   Psychiatric/Behavioral:  Negative for confusion.      Objective:     Vital Signs (Most Recent):  Temp: 98 °F (36.7 °C) (02/20/24 1100)  Pulse: 85 (02/20/24 1205)  Resp: (!) 23 (02/20/24 1205)  BP: (!) 171/98 (02/20/24 1205)  SpO2: 100 % (02/20/24 1205) Vital Signs (24h Range):  Temp:  [97.2 °F (36.2 °C)-99.6 °F (37.6 °C)] 98 °F (36.7 °C)  Pulse:  [76-97] 85  Resp:  [10-30] 23  SpO2:  [95 %-100 %] 100 %  BP: (114-183)/(53-98) 171/98     Weight: 93 kg (205 lb)  Body mass index is 35.19 kg/m².    Intake/Output Summary (Last 24 hours) at 2/20/2024 1246  Last data filed at 2/20/2024 1215  Gross per 24 hour   Intake 1789.56 ml   Output 1715 ml   Net 74.56 ml         Physical Exam  Vitals and nursing note reviewed.   Constitutional:       General: She is not in acute distress.     Appearance: She is obese. She is not toxic-appearing.   HENT:      Head: Normocephalic and atraumatic.      Nose: Nose normal.      Mouth/Throat:      Mouth: Mucous membranes are moist.   Cardiovascular:      Rate and Rhythm: Normal rate and regular rhythm.   Pulmonary:      Effort: Pulmonary effort is normal.      Breath sounds: Wheezing present.      Comments: 2L NC SpO2 100%  Abdominal:      General: Bowel sounds are normal. There is distension.      Palpations: There is no mass.      Tenderness: There is no abdominal tenderness. There is no guarding.   Genitourinary:     Comments: jansen  Musculoskeletal:      Right lower leg: Edema present.      Left lower leg: Edema present.   Skin:     General: Skin is warm and dry.   Neurological:      Mental Status: She is alert and oriented to person, place,  and time. Mental status is at baseline.             Significant Labs: All pertinent labs within the past 24 hours have been reviewed.    Significant Imaging: I have reviewed all pertinent imaging results/findings within the past 24 hours.

## 2024-02-20 NOTE — SUBJECTIVE & OBJECTIVE
Interval History: NAEON. Having expected pain of the debridement site. AVSS. Dressing CDI. Lab work with down trending Cr and WBC.    Medications:  Continuous Infusions:      Scheduled Meds:   acetaminophen  1,000 mg Oral Q8H    budesonide  1 mg Nebulization Q12H    And    arformoteroL  15 mcg Nebulization BID    aspirin  81 mg Oral Daily    atorvastatin  20 mg Oral Daily    chlordiazepoxide  5 mg Oral QID    folic acid  1 mg Oral Daily    gabapentin  100 mg Oral BID    insulin detemir U-100  10 Units Subcutaneous QHS    insulin detemir U-100 (Levemir)  15 Units Subcutaneous Daily    magnesium oxide  500 mg Oral BID    meropenem (MERREM) IVPB  1 g Intravenous Q12H    mupirocin   Nasal BID    pantoprazole  40 mg Oral Daily    sodium bicarbonate  1,300 mg Oral BID    thiamine  100 mg Oral Daily     PRN Meds:0.9%  NaCl infusion (for blood administration), albuterol sulfate, dextrose 10%, dextrose 10%, glucagon (human recombinant), glucose, glucose, HYDROmorphone, insulin aspart U-100, LIDOcaine (PF) 10 mg/ml (1%), melatonin, ondansetron, oxyCODONE, oxyCODONE, prochlorperazine, sodium chloride 0.9%, Pharmacy to dose Vancomycin consult **AND** vancomycin - pharmacy to dose     Review of patient's allergies indicates:   Allergen Reactions    Hydrochlorothiazide plus      Objective:     Vital Signs (Most Recent):  Temp: 97.9 °F (36.6 °C) (02/20/24 0400)  Pulse: 91 (02/20/24 0600)  Resp: 20 (02/20/24 0600)  BP: (!) 167/84 (02/20/24 0600)  SpO2: 95 % (02/20/24 0400) Vital Signs (24h Range):  Temp:  [97.2 °F (36.2 °C)-98 °F (36.7 °C)] 97.9 °F (36.6 °C)  Pulse:  [75-93] 91  Resp:  [10-39] 20  SpO2:  [92 %-100 %] 95 %  BP: (114-183)/(53-96) 167/84     Weight: 93 kg (205 lb)  Body mass index is 35.19 kg/m².    Intake/Output - Last 3 Shifts         02/18 0700 02/19 0659 02/19 0700 02/20 0659 02/20 0700 02/21 0659    P.O. 420      I.V. (mL/kg) 202.3 (2.2) 43 (0.5)     Blood  333.8     IV Piggyback 242.8 1040.3     Total  Intake(mL/kg) 865 (9.3) 1417.1 (15.2)     Urine (mL/kg/hr) 2400 (1.1) 2515 (1.1)     Emesis/NG output 0      Stool 2      Blood  25     Total Output 2402 2540     Net -1537 -1122.9            Stool Occurrence 5 x               Physical Exam  Vitals and nursing note reviewed.   Constitutional:       Appearance: Normal appearance.   HENT:      Head: Normocephalic and atraumatic.   Cardiovascular:      Rate and Rhythm: Normal rate and regular rhythm.   Pulmonary:      Effort: Pulmonary effort is normal. No respiratory distress.   Abdominal:      General: Abdomen is flat. There is no distension.      Palpations: Abdomen is soft. There is no mass.      Tenderness: There is no abdominal tenderness.      Hernia: No hernia is present.   Genitourinary:     Comments: Hernandez catheter  Musculoskeletal:      Right lower leg: No edema.      Left lower leg: No edema.   Skin:     General: Skin is warm and dry.      Comments: Wound to right thigh dressed   Neurological:      General: No focal deficit present.      Mental Status: She is alert and oriented to person, place, and time.   Psychiatric:         Mood and Affect: Mood normal.         Behavior: Behavior normal.          Significant Labs:  I have reviewed all pertinent lab results within the past 24 hours.  CBC:   Recent Labs   Lab 02/20/24  0358   WBC 19.33*   RBC 3.30*   HGB 7.7*   HCT 23.8*      MCV 72*   MCH 23.3*   MCHC 32.4       CMP:   Recent Labs   Lab 02/20/24  0358   *   CALCIUM 7.7*   ALBUMIN 1.2*   PROT 5.3*      K 4.8   CO2 25      BUN 25*   CREATININE 1.2   ALKPHOS 131   ALT 6*   AST 14   BILITOT 0.5       LFTs:   Recent Labs   Lab 02/20/24  0358   ALT 6*   AST 14   ALKPHOS 131   BILITOT 0.5   PROT 5.3*   ALBUMIN 1.2*         Significant Diagnostics:  I have reviewed all pertinent imaging results/findings within the past 24 hours.

## 2024-02-20 NOTE — PT/OT/SLP EVAL
"Physical Therapy Evaluation    Patient Name:  Christine Mosqueda   MRN:  [id number]    Recommendations:     Discharge Recommendations: Moderate Intensity Therapy   Discharge Equipment Recommendations: bedside commode, bath bench, wheelchair   Barriers to discharge:  Pt in ICU with I&D scheduled for tomorrow, Not functioning at baseline and at increased risk for falls, readmission, and morbidity if she returns home at present time    Assessment:     Christine Mosqueda is a 53 y.o. 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  She presents with the following impairments/functional limitations: weakness, impaired balance, decreased safety awareness, impaired skin, impaired endurance, pain, impaired sensation, impaired self care skills, decreased coordination, decreased ROM, impaired functional mobility, impaired cardiopulmonary response to activity, impaired coordination, gait instability, decreased lower extremity function, impaired fine motor .    Rehab Prognosis: Good; patient would benefit from acute skilled PT services to address these deficits and reach maximum level of function.    Recent Surgery: Procedure(s) (LRB):  EXPLORATION, WOUND (Right) 1 Day Post-Op    Plan:     During this hospitalization, patient to be seen 4 x/week to address the identified rehab impairments via gait training, therapeutic activities, therapeutic exercises, neuromuscular re-education, wheelchair management/training and progress toward the following goals:    Plan of Care Expires:  03/05/24    Subjective     Chief Complaint: pain  Patient/Family Comments/goals: Pt agreeable to evaluation and to return to bed following.  "I've been sitting up all morning"  Pain/Comfort:  Pain Rating 1: 9/10  Location - Orientation 1:  (R LE)  Pain Addressed 1: Pre-medicate for activity, Reposition, Distraction, Cessation of Activity, Nurse notified    Patients cultural, spiritual, Catholic conflicts given the current situation: no    Living " Environment:  Pt lives with her spouse in a [facility] with incline at front entry  Prior to admission, patients level of function was ambulatory with rollator, but required assistance with ADL's.  Equipment used at home: rollator.  DME owned (not currently used): none.  Upon discharge, patient will have assistance from Spouse.    Objective:     Communicated with nsg prior to session.  Patient found up in chair with blood pressure cuff, pulse ox (continuous), telemetry, jansen catheter  upon PT entry to room.    General Precautions: Standard, fall  Orthopedic Precautions:N/A   Braces: N/A  Respiratory Status: Nasal cannula, flow 2.5 L/min    Exams:  Cognitive Exam:  Patient is oriented to Person, Place, and Time  Gross Motor Coordination:  Impaired 2/2 weakness and body habitus  Postural Exam:  Patient presented with the following abnormalities:    -       Rounded shoulders  -       Forward head  Sensation:    -       Intact  light/touch B LE's  Skin Integrity/Edema:      -       Skin integrity: R Thigh with dressings intact  -       Edema: Moderate R thigh  RLE ROM: PROM WFL's  RLE Strength: dflx 2+, knee ext 2-/5, QS FAir  LLE ROM: WFL  LLE Strength: WFL    Functional Mobility:  Bed Mobility:     Scooting: contact guard assistance and with Vc/TC for grabbing HB and pulling up in bed from supine  Bridging: contact guard assistance  Sit to Supine: maximal assistance and with Vc/TC for hand placement body mechanics  Transfers:     Sit to Stand:  maximal assistance and with Vc/TC for hand placment and forward weight shift over TOSHIA with rolling walker  Gait: Gait training with RW and Mod A and VC/TC for distribution of weight through UE's to walker, sequencing, and walker management/safety approx 5 steps from chair to bed.  Pt with decreased HS/TO and Knee ext on Right  Balance: Fair+ sit, Fair/FAir- stand      AM-PAC 6 CLICK MOBILITY  Total Score:11       Treatment & Education:  Pt found sitting up in chair.  /81, Hr  "89, SPO2 100% on 2.5L.  SPO2 on RA 86%, NC replaced.  Educated pt on Pursed lip breathing.  Transfer, gait, and bed mobility training as above.  Educated pt to perform B AP's, GS, and QS's while in bed throughout the day.  Educated pt and CG on PT POC, "to call before you fall"    Patient left HOB elevated with all lines intact, call button in reach, and nsg and spouse present.    GOALS:   Multidisciplinary Problems       Physical Therapy Goals          Problem: Physical Therapy    Goal Priority Disciplines Outcome Goal Variances Interventions   Physical Therapy Goal     PT, PT/OT Ongoing, Progressing     Description: Goals to be met by: 3/5/24     Patient will increase functional independence with mobility by performin. Supine to sit with Modified [place]  2. Sit to stand transfer with Modified [place]  3. Bed to chair transfer with Modified [place]   4. Gait  x 10-15 feet with Stand-by Assistance using Rolling Walker.   5. Lower extremity exercise program x10 reps per handout, with supervision  6. W/C propulsion x 50' with Supervision                           History:     Past Medical History:   Diagnosis Date    Anxiety     Arthritis     Bronchitis     CHF (congestive heart failure)     Diabetic ketoacidosis associated with type 2 diabetes mellitus 3/10/2023    DM (diabetes mellitus)     Emphysema lung     Encounter for blood transfusion     HTN (hypertension)     Restrictive lung disease     Sleep apnea        Past Surgical History:   Procedure Laterality Date     SECTION      DEBRIDEMENT Right 2024    Procedure: DEBRIDEMENT; Librado's gangrene;  Surgeon: Manish Nazario MD;  Location: [facility] OR;  Service: General;  Laterality: Right;  Lithotomy    PALATAL EXPANSION      WOUND EXPLORATION N/A 2024    Procedure: INCISION AND DRAINAGE; WOUND DEBRIDEMENT,;  Surgeon: Scott Koroma MD;  Location: [facility] OR;  Service: General;  Laterality: N/A;    WOUND EXPLORATION Right " 2/19/2024    Procedure: EXPLORATION, WOUND;  Surgeon: Scott Koroma MD;  Location: [facility];  Service: General;  Laterality: Right;  Lithotomy Position  wound vac (2 black sponges and white sponges)    WRIST SURGERY Right        Time Tracking:     PT Received On: 02/20/24  PT Start Time: 1108     PT Stop Time: 1141  PT Total Time (min): 33 min     Billable Minutes: Evaluation 15 and Therapeutic Activity 18      02/20/2024

## 2024-02-20 NOTE — PROGRESS NOTES
"[facility] [place] Intensive Care  Infectious Disease  Progress Note    Patient Name: Christine Mosqueda  MRN: [id number]  Admission Date: 2/15/2024  Length of Stay: 4 days  Attending Physician: Rebecca Alex MD  Primary Care Provider: [facility]    Isolation Status: No active isolations    Assessment/Plan:      * Librado's gangrene    53F with h/o DM, admitted 2/15 with abscess in R medial thigh. CT c/f abscess/nec fascitis and now s/p several surgical debridements. Muscle necrosis noted. Cultures sent. Gram stain with GPC in pairs and chains and GNR.  Bcx on admit positive for GPR in one of two bottles. On vanc/meropenem. ID consulted for "fourniers gangrene, diabetic, CARLINE; no cultures taken at initial debridement "    Lactobacillus growing from wound and blood.    Recommendations:   - continue abx (stop vancomycin)  - wound care/additional debridement per surgery    Steven Hernandez MD  Infectious Disease  [facility] - Intensive Care    Subjective:     Principal Problem:Librado's gangrene    HPI: 53F with h/o DM, admitted 2/15 with abscess in R medial thigh. Reports going to [facility] on 2/10 for the same thing. Given doxycycline, which she says she was taking. Wound on R leg became more painful, so came to hospital. Reports fever. Denies indwelling hardware. Reports dental caries, but no recent extractions. Reports aches all over, but no other areas of localized pain other than R thigh. Denies abx allergies.     CT 2/15-  Extensive inflammatory changes extending from the right perineum to the right medial thigh. Linear area of fluid attenuation in the right labial region, which may represent a small abscess. Multiple foci of air in the right medial thigh, which is difficult to measure. Considerations may include developing abscess, phlegmonous tissue, and or instrumentation resulting in subcutaneous air.       Bcx on admit positive for GPR in one of two bottles      Component 2 d ago   Blood " "Culture, Routine Gram stain portia bottle: Gram positive rods P   Blood Culture, Routine Results called to and read back by:Sandra HARVEY 02/17/2024  11:01 P          S/p I&D with general surgery on 2/16 and 2/17 with plans for another debridment Monday. Muscle necrosis noted. Cultures sent. Gram stain positive:       Component 09:11   Gram Stain Result Rare WBC's   Gram Stain Result Many Gram positive cocci in pairs and chains   Gram Stain Result Rare Gram negative rods     Hiv and hep c testing negative    On vanc/meropenem    ID consulted for "fourniers gangrene, diabetic, CARLINE; no cultures taken at initial debridement "  Interval History: Doing okay. Plan for OR tomorrow? Cultures being finalized.    Review of Systems   Skin:  Positive for wound.   All other systems reviewed and are negative.    Objective:     Vital Signs (Most Recent):  Temp: 98 °F (36.7 °C) (02/20/24 1100)  Pulse: 97 (02/20/24 1100)  Resp: (!) 23 (02/20/24 1100)  BP: 134/82 (02/20/24 1100)  SpO2: 96 % (02/20/24 1100) Vital Signs (24h Range):  Temp:  [97.2 °F (36.2 °C)-99.6 °F (37.6 °C)] 98 °F (36.7 °C)  Pulse:  [76-97] 97  Resp:  [10-30] 23  SpO2:  [95 %-100 %] 96 %  BP: (114-183)/(53-96) 134/82     Weight: 93 kg (205 lb)  Body mass index is 35.19 kg/m².    Estimated Creatinine Clearance: 58.5 mL/min (based on SCr of 1.2 mg/dL).     Physical Exam  Vitals and nursing note reviewed.   Constitutional:       General: She is not in acute distress.     Appearance: Normal appearance. She is not ill-appearing, toxic-appearing or diaphoretic.   HENT:      Head: Normocephalic.   Musculoskeletal:         General: Swelling and tenderness present.   Neurological:      General: No focal deficit present.      Mental Status: She is alert.   Psychiatric:         Judgment: Judgment normal.          Significant Labs: Blood Culture:   Recent Labs   Lab 02/15/24  2320 02/15/24  2321 02/17/24  2005 02/17/24  2006   LABBLOO Gram stain portia bottle: Gram positive rods  Results " called to and read back by:Sandra HARVEY 02/17/2024  11:01  LACTOBACILLUS SPECIES  Further identified as Lactobacillus jensenii  * Gram stain portia bottle: Gram positive rods  Results called to and read back by:KAMALA STEELE  02/17/2024  18:30  LACTOBACILLUS SPECIES  Further identified as Lactobacillus jensenii  * No Growth to date  No Growth to date  No Growth to date No Growth to date  No Growth to date  No Growth to date     Wound Culture:   Recent Labs   Lab 02/17/24  0911   LABAERO LACTOBACILLUS SPECIES  Many  *       Significant Imaging: I have reviewed all pertinent imaging results/findings within the past 24 hours.

## 2024-02-20 NOTE — ASSESSMENT & PLAN NOTE
Patient's anemia is currently controlled. Has received 1 units of PRBCs on 2/19 . Etiology likely d/t chronic disease and acute blood loss post surgery.  Current CBC reviewed-   Lab Results   Component Value Date    HGB 7.7 (L) 02/20/2024    HCT 23.8 (L) 02/20/2024     - monitor daily

## 2024-02-20 NOTE — ASSESSMENT & PLAN NOTE
Patient's FSGs are uncontrolled due to hyperglycemia on current medication regimen.  Last A1c reviewed-   Lab Results   Component Value Date    HGBA1C 8.2 (H) 04/06/2023     Most recent fingerstick glucose reviewed-   Recent Labs   Lab 02/19/24  1547 02/19/24  1610 02/19/24  2104 02/20/24  0727   POCTGLUCOSE 63* 81 274* 198*       Current correctional scale  High  Maintain anti-hyperglycemic dose as follows-   Antihyperglycemics (From admission, onward)      Start     Stop Route Frequency Ordered    02/19/24 0900  insulin detemir U-100 (Levemir) pen 15 Units         -- SubQ Daily 02/18/24 1519    02/18/24 2100  insulin detemir U-100 (Levemir) pen 10 Units         -- SubQ Nightly 02/18/24 1519    02/16/24 1514  insulin aspart U-100 pen 0-15 Units         -- SubQ Before meals & nightly PRN 02/16/24 1515          Hold Oral hypoglycemics while patient is in the hospital.

## 2024-02-20 NOTE — PROGRESS NOTES
[place] - Intensive Care  Nephrology  Progress Note    Patient Name: Christine Mosqueda  MRN: [id number]  Admission Date: 2/15/2024  Hospital Length of Stay: 4 days  Attending Provider: Rebecca Alex MD   Primary Care Physician: [facility]  Principal Problem:Librado's gangrene  Date of service:  02/20/2024    Subjective:     Interval History: Renal function continues to improve, adequate UOP. Feeling well, no complaints     Review of patient's allergies indicates:   Allergen Reactions    Hydrochlorothiazide plus      Current Facility-Administered Medications   Medication Frequency    acetaminophen tablet 1,000 mg Q8H    albuterol sulfate nebulizer solution 2.5 mg Q4H PRN    budesonide nebulizer solution 1 mg Q12H    And    arformoteroL nebulizer solution 15 mcg BID    aspirin chewable tablet 81 mg Daily    atorvastatin tablet 20 mg Daily    chlordiazepoxide capsule 5 mg TID    Followed by    [START ON 2/22/2024] chlordiazepoxide capsule 5 mg BID    Followed by    [START ON 2/24/2024] chlordiazepoxide capsule 5 mg Daily    dextrose 10% bolus 125 mL 125 mL PRN    dextrose 10% bolus 250 mL 250 mL PRN    folic acid tablet 1 mg Daily    gabapentin capsule 400 mg BID    glucagon (human recombinant) injection 1 mg PRN    glucose chewable tablet 16 g PRN    glucose chewable tablet 24 g PRN    HYDROmorphone injection 1 mg Daily PRN    insulin aspart U-100 pen 0-15 Units QID (AC + HS) PRN    insulin detemir U-100 (Levemir) pen 10 Units QHS    insulin detemir U-100 (Levemir) pen 15 Units Daily    melatonin tablet 6 mg Nightly PRN    meropenem (MERREM) 1 g in sodium chloride 0.9 % 100 mL IVPB (MB+) Q12H    mupirocin 2 % ointment BID    ondansetron disintegrating tablet 8 mg Q8H PRN    oxyCODONE immediate release tablet 10 mg Q4H PRN    oxyCODONE immediate release tablet 5 mg Q4H PRN    pantoprazole EC tablet 40 mg Daily    prochlorperazine injection Soln 5 mg Q6H PRN    sodium chloride 0.9% flush 10 mL  PRN    thiamine tablet 100 mg Daily       Objective:     Vital Signs (Most Recent):  Temp: 98 °F (36.7 °C) (02/20/24 1100)  Pulse: 88 (02/20/24 1400)  Resp: (!) 24 (02/20/24 1400)  BP: (!) 148/73 (02/20/24 1400)  SpO2: 100 % (02/20/24 1400) Vital Signs (24h Range):  Temp:  [97.9 °F (36.6 °C)-99.6 °F (37.6 °C)] 98 °F (36.7 °C)  Pulse:  [81-97] 88  Resp:  [10-30] 24  SpO2:  [95 %-100 %] 100 %  BP: (114-172)/(53-98) 148/73     Weight: 93 kg (205 lb) (02/19/24 0958)  Body mass index is 35.19 kg/m².  Body surface area is 2.05 meters squared.    I/O last 3 completed shifts:  In: 1766.7 [P.O.:150; I.V.:43; Blood:333.8; IV Piggyback:1240]  Out: 4415 [Urine:4390; Blood:25]    Physical Exam  Vitals and nursing note reviewed.   Constitutional:       General: She is not in acute distress.     Appearance: She is well-developed. She is ill-appearing (chronically ill appearing). She is not toxic-appearing.   HENT:      Head: Normocephalic and atraumatic.      Comments: tooth decay     Right Ear: External ear normal.      Left Ear: External ear normal.      Nose: Nose normal.      Eyes:      General: Lids are normal.      Conjunctiva/sclera: Conjunctivae normal.      Pupils: Pupils are equal, round, and reactive to light.   Neck:      Thyroid: No thyroid mass.      Vascular: No JVD.      Trachea: Trachea normal.   Cardiovascular:      Rate and Rhythm: Normal rate and regular rhythm.      Heart sounds: Normal heart sounds, S1 normal and S2 normal.   Pulmonary:      Effort: Pulmonary effort is normal.      Breath sounds: Normal breath sounds.   Abdominal:      General: Bowel sounds are normal. There is no distension.      Palpations: Abdomen is soft.      Tenderness: There is no abdominal tenderness.   Musculoskeletal:      Cervical back: Full passive range of motion without pain, normal range of motion and neck supple.      Right lower leg: No edema.      Left lower leg: No edema.   Skin:     Comments: No visible rash, warm and  dry  Neurological:      General: No focal deficit present.      Mental Status: She is alert and oriented to person, place, and time.      Cranial Nerves: No cranial nerve deficit.      Sensory: No sensory deficit.   Psychiatric:         Speech: Speech normal.         Thought Content: Thought content normal.        Significant Labs:sureBMP:   Recent Labs   Lab 02/20/24  0358   *      K 4.8      CO2 25   BUN 25*   CREATININE 1.2   CALCIUM 7.7*   MG 2.0     CBC:   Recent Labs   Lab 02/20/24  0358   WBC 19.33*   RBC 3.30*   HGB 7.7*   HCT 23.8*      MCV 72*   MCH 23.3*   MCHC 32.4     CMP:   Recent Labs   Lab 02/20/24  0358   *   CALCIUM 7.7*   ALBUMIN 1.2*   PROT 5.3*      K 4.8   CO2 25      BUN 25*   CREATININE 1.2   ALKPHOS 131   ALT 6*   AST 14   BILITOT 0.5     LFTs:   Recent Labs   Lab 02/20/24  0358   ALT 6*   AST 14   ALKPHOS 131   BILITOT 0.5   PROT 5.3*   ALBUMIN 1.2*         Recent Labs   Lab 02/18/24  0255   COLORU Yellow   SPECGRAV 1.020   PHUR 6.0   PROTEINUA 2+*   BACTERIA Many*   NITRITE Negative   LEUKOCYTESUR Negative   UROBILINOGEN Negative   HYALINECASTS 0     All labs within the past 24 hours have been reviewed.    Significant Imaging:  TTE reviewed from 2/19    Assessment/Plan:     CARLINE  - baseline Cr 0.8-1.2 in 2023  - suspect septic and ischemic ATN in the setting of Librado's gangrene  - Cr trending down, nearing baseline. non oliguric and lytes are stable   - ok to give lasix PRN  - UA, Ulytes, renal U/S results reviewed  - no indication for dialysis   - monitor daily weights, strict I&Os, daily labs  - avoid nephrotoxic agents including NSAIDs, renally dose medications    Anemia of chronic disease/blood loss anemia  - pRBC transfusion yesterday  - Hgb down likely 2/2 debridements  - monitor CBC, transfuse hgb < 7      Hyponatremia  - Na trending up  - recommend converting IVPB to NS when possible  - monitor serial levels     Acidosis  - agree with  discontinuation of PO bicarb  - monitor level    Hypoalbuminemia  - encourage PO intake  - diabetic diet, no need for renal diet restrictions  - Boost supplement drinks TID    Case discussed with Dr Ortez  Thank you for your consult. I will follow-up with patient. Please contact us if you have any additional questions.    PATIENCE Corrales  Nephrology  [facility] [place] - Intensive Care

## 2024-02-20 NOTE — ASSESSMENT & PLAN NOTE
Patient with acute kidney injury/acute renal failure likely due to pre-renal azotemia due to IVVD and acute tubular necrosis caused by sepsis  CARLINE is currently improving. Baseline creatinine  0.9  - Labs reviewed- Renal function/electrolytes with Estimated Creatinine Clearance: 58.5 mL/min (based on SCr of 1.2 mg/dL). according to latest data. Monitor urine output and serial BMP and adjust therapy as needed. Avoid nephrotoxins and renally dose meds for GFR listed above.    - Highly suspect ATN as an etiology at this time  - Nephrology consult requested, appreciate recs  - renal function is improving and almost at baseline.

## 2024-02-20 NOTE — ASSESSMENT & PLAN NOTE
"53F with h/o DM, admitted 2/15 with abscess in R medial thigh. CT c/f abscess/nec fascitis and now s/p several surgical debridements. Muscle necrosis noted. Cultures sent. Gram stain with GPC in pairs and chains and GNR.  Bcx on admit positive for GPR in one of two bottles. On vanc/meropenem. ID consulted for "fourniers gangrene, diabetic, CARLINE; no cultures taken at initial debridement "    Lactobacillus growing from wound and blood.    Recommendations:   - continue abx (stop vancomycin)  - wound care/additional debridement per surgery  "

## 2024-02-20 NOTE — PLAN OF CARE
Problem: Physical Therapy  Goal: Physical Therapy Goal  Description: Goals to be met by: 3/5/24     Patient will increase functional independence with mobility by performin. Supine to sit with Modified [place]  2. Sit to stand transfer with Modified [place]  3. Bed to chair transfer with Modified [place]   4. Gait  x 10-15 feet with Stand-by Assistance using Rolling Walker.   5. Lower extremity exercise program x10 reps per handout, with supervision  6. W/C propulsion x 50' with Supervision      Outcome: Ongoing, Progressing   Initial PT evaluation completed today.  Pt could benefit from skilled PT services 4x/wk in order to maximize function prior to D/C.  Moderate Intensity Therapy recommended as pt has severe R LE weakness and is at increased risk for falls, readmission, and morbidity if she returns home at present time.

## 2024-02-20 NOTE — ASSESSMENT & PLAN NOTE
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   - S/p debridement on 02/16, 2/17, 2/19  - blood and wound cultures with lactobacillus. On meropenem per ID recs  - plan repeat OR tomorrow  - jansen in place due to surgical wound site

## 2024-02-20 NOTE — SUBJECTIVE & OBJECTIVE
Interval History: Doing okay. Plan for OR tomorrow? Cultures being finalized.    Review of Systems   Skin:  Positive for wound.   All other systems reviewed and are negative.    Objective:     Vital Signs (Most Recent):  Temp: 98 °F (36.7 °C) (02/20/24 1100)  Pulse: 97 (02/20/24 1100)  Resp: (!) 23 (02/20/24 1100)  BP: 134/82 (02/20/24 1100)  SpO2: 96 % (02/20/24 1100) Vital Signs (24h Range):  Temp:  [97.2 °F (36.2 °C)-99.6 °F (37.6 °C)] 98 °F (36.7 °C)  Pulse:  [76-97] 97  Resp:  [10-30] 23  SpO2:  [95 %-100 %] 96 %  BP: (114-183)/(53-96) 134/82     Weight: 93 kg (205 lb)  Body mass index is 35.19 kg/m².    Estimated Creatinine Clearance: 58.5 mL/min (based on SCr of 1.2 mg/dL).     Physical Exam  Vitals and nursing note reviewed.   Constitutional:       General: She is not in acute distress.     Appearance: Normal appearance. She is not ill-appearing, toxic-appearing or diaphoretic.   HENT:      Head: Normocephalic.   Musculoskeletal:         General: Swelling and tenderness present.   Neurological:      General: No focal deficit present.      Mental Status: She is alert.   Psychiatric:         Judgment: Judgment normal.          Significant Labs: Blood Culture:   Recent Labs   Lab 02/15/24  2320 02/15/24  2321 02/17/24  2005 02/17/24  2006   LABBLOO Gram stain portia bottle: Gram positive rods  Results called to and read back by:Sandra HARVEY 02/17/2024  11:01  LACTOBACILLUS SPECIES  Further identified as Lactobacillus jensenii  * Gram stain portia bottle: Gram positive rods  Results called to and read back by:KAMALA STEELE  02/17/2024  18:30  LACTOBACILLUS SPECIES  Further identified as Lactobacillus jensenii  * No Growth to date  No Growth to date  No Growth to date No Growth to date  No Growth to date  No Growth to date     Wound Culture:   Recent Labs   Lab 02/17/24  0911   LABAERO LACTOBACILLUS SPECIES  Many  *       Significant Imaging: I have reviewed all pertinent imaging results/findings within the past 24  hours.

## 2024-02-20 NOTE — PROGRESS NOTES
[facility] [place] Intensive Care  General Surgery  Progress Note    Subjective:     History of Present Illness:  Ms. Mosqueda is a 53 yoF with a PMH significant for insulin-dependent T2DM and CHF (last echo EF 53%, PA systolic 23) who presents with worsening pain and swelling of the right medial thigh. She developed an abscess in the area that underwent I+D at [facility] a few days ago, after which she was sent home on PO abx. Since that time the pain and swelling have worsened leading to her presentation to our ED. Workup here revealed normal vital signs, but with significantly elevated WBC and CRP, with other laboratory derangements leading to a LRINIC score of 11. CT scan demonstrates extensive inflammation extending from the right perineum to the right medial thigh with multiple foci of air. She is not on any blood thinners. No issues with anesthesia in the past.    Post-Op Info:  Procedure(s) (LRB):  EXPLORATION, WOUND (Right)   1 Day Post-Op     Interval History: NAEON. Having expected pain of the debridement site. AVSS. Dressing CDI. Lab work with down trending Cr and WBC.    Medications:  Continuous Infusions:      Scheduled Meds:   acetaminophen  1,000 mg Oral Q8H    budesonide  1 mg Nebulization Q12H    And    arformoteroL  15 mcg Nebulization BID    aspirin  81 mg Oral Daily    atorvastatin  20 mg Oral Daily    chlordiazepoxide  5 mg Oral QID    folic acid  1 mg Oral Daily    gabapentin  100 mg Oral BID    insulin detemir U-100  10 Units Subcutaneous QHS    insulin detemir U-100 (Levemir)  15 Units Subcutaneous Daily    magnesium oxide  500 mg Oral BID    meropenem (MERREM) IVPB  1 g Intravenous Q12H    mupirocin   Nasal BID    pantoprazole  40 mg Oral Daily    sodium bicarbonate  1,300 mg Oral BID    thiamine  100 mg Oral Daily     PRN Meds:0.9%  NaCl infusion (for blood administration), albuterol sulfate, dextrose 10%, dextrose 10%, glucagon (human recombinant), glucose, glucose, HYDROmorphone, insulin aspart U-100,  LIDOcaine (PF) 10 mg/ml (1%), melatonin, ondansetron, oxyCODONE, oxyCODONE, prochlorperazine, sodium chloride 0.9%, Pharmacy to dose Vancomycin consult **AND** vancomycin - pharmacy to dose     Review of patient's allergies indicates:   Allergen Reactions    Hydrochlorothiazide plus      Objective:     Vital Signs (Most Recent):  Temp: 97.9 °F (36.6 °C) (02/20/24 0400)  Pulse: 91 (02/20/24 0600)  Resp: 20 (02/20/24 0600)  BP: (!) 167/84 (02/20/24 0600)  SpO2: 95 % (02/20/24 0400) Vital Signs (24h Range):  Temp:  [97.2 °F (36.2 °C)-98 °F (36.7 °C)] 97.9 °F (36.6 °C)  Pulse:  [75-93] 91  Resp:  [10-39] 20  SpO2:  [92 %-100 %] 95 %  BP: (114-183)/(53-96) 167/84     Weight: 93 kg (205 lb)  Body mass index is 35.19 kg/m².    Intake/Output - Last 3 Shifts         02/18 0700  02/19 0659 02/19 0700 02/20 0659 02/20 0700 02/21 0659    P.O. 420      I.V. (mL/kg) 202.3 (2.2) 43 (0.5)     Blood  333.8     IV Piggyback 242.8 1040.3     Total Intake(mL/kg) 865 (9.3) 1417.1 (15.2)     Urine (mL/kg/hr) 2400 (1.1) 2515 (1.1)     Emesis/NG output 0      Stool 2      Blood  25     Total Output 2402 2540     Net -1537 -1122.9            Stool Occurrence 5 x              Physical Exam  Vitals and nursing note reviewed.   Constitutional:       Appearance: Normal appearance.   HENT:      Head: Normocephalic and atraumatic.   Cardiovascular:      Rate and Rhythm: Normal rate and regular rhythm.   Pulmonary:      Effort: Pulmonary effort is normal. No respiratory distress.   Abdominal:      General: Abdomen is flat. There is no distension.      Palpations: Abdomen is soft. There is no mass.      Tenderness: There is no abdominal tenderness.      Hernia: No hernia is present.   Genitourinary:     Comments: Hernandez catheter  Musculoskeletal:      Right lower leg: No edema.      Left lower leg: No edema.   Skin:     General: Skin is warm and dry.      Comments: Wound to right thigh dressed   Neurological:      General: No focal deficit  present.      Mental Status: She is alert and oriented to person, place, and time.   Psychiatric:         Mood and Affect: Mood normal.         Behavior: Behavior normal.          Significant Labs:  I have reviewed all pertinent lab results within the past 24 hours.  CBC:   Recent Labs   Lab 02/20/24  0358   WBC 19.33*   RBC 3.30*   HGB 7.7*   HCT 23.8*      MCV 72*   MCH 23.3*   MCHC 32.4       CMP:   Recent Labs   Lab 02/20/24  0358   *   CALCIUM 7.7*   ALBUMIN 1.2*   PROT 5.3*      K 4.8   CO2 25      BUN 25*   CREATININE 1.2   ALKPHOS 131   ALT 6*   AST 14   BILITOT 0.5       LFTs:   Recent Labs   Lab 02/20/24  0358   ALT 6*   AST 14   ALKPHOS 131   BILITOT 0.5   PROT 5.3*   ALBUMIN 1.2*         Significant Diagnostics:  I have reviewed all pertinent imaging results/findings within the past 24 hours.  Assessment/Plan:     * Librado's gangrene  53 yoF with multiple medical co-morbidities including HF, polysubstance abuse, alcohol and tobacco dependency, poorly controlled IDDM, obesity, ILD presenting with Librado's gangrene of the right medial thigh s/p initial debridement 2/16 with takeback 2/17, 2/19.    - Will return to the OR for repeat exploration and debridement, possible wound vac application on 2/21  - Ok for diet, NPO at midnight  - CARLINE resolving  - ID following; Vanc/zosyn/clinda started 2/16, switched to meropenam and vanc   - Operative cultures 2/17, pending  - Poorly controlled DMII - insulin per HM  - Nutrition consult, boost supplements, severely hypoalbuminemia   - Polysubstance abuse to alcohol dependency - librium taper on, CIWA protocol  - Multiple modal pain control limited by CARLINE - continue scheduled tylenol, combination of oral and IV narcotics  - Okay for DVT ppx   - PT consult - okay to attempt ambulation, get up to chair.   - Will have long term wound care needs; pending final disposition may need placement for this           Steven Paula MD  General  Surgery  [facility] [place] - Intensive Care

## 2024-02-20 NOTE — ANESTHESIA POSTPROCEDURE EVALUATION
Anesthesia Post Evaluation    Patient: Christine Mosqueda    Procedure(s) Performed: Procedure(s) (LRB):  EXPLORATION, WOUND (Right)    Final Anesthesia Type: general      Patient location during evaluation: ICU  Patient participation: Yes- Able to Participate  Level of consciousness: awake and alert and oriented  Post-procedure vital signs: reviewed and stable  Pain management: adequate  Airway patency: patent    PONV status at discharge: No PONV  Anesthetic complications: no      Cardiovascular status: blood pressure returned to baseline, hemodynamically stable and stable  Respiratory status: unassisted, spontaneous ventilation and room air  Hydration status: euvolemic  Follow-up not needed.              Vitals Value Taken Time   /73 02/20/24 1401   Temp 36.7 °C (98 °F) 02/20/24 1100   Pulse 87 02/20/24 1438   Resp 27 02/20/24 1438   SpO2 100 % 02/20/24 1438   Vitals shown include unvalidated device data.      No case tracking events are documented in the log.      Pain/Yoel Score: Pain Rating Prior to Med Admin: 0 (2/20/2024  2:06 PM)  Pain Rating Post Med Admin: 0 (2/20/2024  9:36 AM)

## 2024-02-20 NOTE — PROGRESS NOTES
[facility]  [facility] Medicine  Progress Note    Patient Name: Christine Mosqueda  MRN: [id number]  Patient Class: IP- Inpatient   Admission Date: 2/15/2024  Length of Stay: 4 days  Attending Physician: Rebecca Alex MD  Primary Care Provider: [facility]        Subjective:     Principal Problem:Estephanie's gangrene        HPI:  Ms. Mosqueda is a 53 yoF with a PMHx of diastolic heart failure, HTN, T2DM on insulin. She presented to the hospital with worsening pain and swelling of the right medial thigh. She developed an abscess in the area that underwent I+D at [facility] a few days ago. She was sent home on PO abx. Since that time the pain and swelling have worsened.  In ED, CT scan demonstrated extensive inflammation extending from the right perineum to the right medial thigh with multiple foci of air. She was admitted to general surgery for estephanie's gangrene. She underwent extensive debridement under general surgery today in OR. She is currently doing well with good pain control. HM consulted for medical management.     Overview/Hospital Course:  Ms Christine Mosqueda is a 53 y.o. woman with poorly controlled type II DM who was admitted w/ worsening pain and swelling of the Rt medial thigh. CT scan findings were consistent w/ Estephanie's gangrene. S/p surgical debridement 02/16, 2/17, 2/19. Blood and wound cultures with lactobacillus. ID consulted- on meropenem.      Interval History: Screaming with pain this AM with dressing change. Now eating lunch with  at bedside. Not currently in pain.     Review of Systems   Constitutional:  Negative for chills and fever.   Respiratory:  Negative for shortness of breath.    Cardiovascular:  Negative for chest pain.   Gastrointestinal:  Negative for abdominal pain.   Psychiatric/Behavioral:  Negative for confusion.      Objective:     Vital Signs (Most Recent):  Temp: 98 °F (36.7 °C) (02/20/24 1100)  Pulse: 85 (02/20/24 1205)  Resp: (!) 23  (02/20/24 1205)  BP: (!) 171/98 (02/20/24 1205)  SpO2: 100 % (02/20/24 1205) Vital Signs (24h Range):  Temp:  [97.2 °F (36.2 °C)-99.6 °F (37.6 °C)] 98 °F (36.7 °C)  Pulse:  [76-97] 85  Resp:  [10-30] 23  SpO2:  [95 %-100 %] 100 %  BP: (114-183)/(53-98) 171/98     Weight: 93 kg (205 lb)  Body mass index is 35.19 kg/m².    Intake/Output Summary (Last 24 hours) at 2/20/2024 1246  Last data filed at 2/20/2024 1215  Gross per 24 hour   Intake 1789.56 ml   Output 1715 ml   Net 74.56 ml         Physical Exam  Vitals and nursing note reviewed.   Constitutional:       General: She is not in acute distress.     Appearance: She is obese. She is not toxic-appearing.   HENT:      Head: Normocephalic and atraumatic.      Nose: Nose normal.      Mouth/Throat:      Mouth: Mucous membranes are moist.   Cardiovascular:      Rate and Rhythm: Normal rate and regular rhythm.   Pulmonary:      Effort: Pulmonary effort is normal.      Breath sounds: Wheezing present.      Comments: 2L NC SpO2 100%  Abdominal:      General: Bowel sounds are normal. There is distension.      Palpations: There is no mass.      Tenderness: There is no abdominal tenderness. There is no guarding.   Genitourinary:     Comments: jansen  Musculoskeletal:      Right lower leg: Edema present.      Left lower leg: Edema present.   Skin:     General: Skin is warm and dry.   Neurological:      Mental Status: She is alert and oriented to person, place, and time. Mental status is at baseline.             Significant Labs: All pertinent labs within the past 24 hours have been reviewed.    Significant Imaging: I have reviewed all pertinent imaging results/findings within the past 24 hours.    Assessment/Plan:      * Librado's gangrene  CT on admit with R perineum to R medial thigh [facility].   - S/p debridement on 02/16, 2/17, 2/19  - blood and wound cultures with lactobacillus. On meropenem per ID recs  - plan repeat OR tomorrow  - jansen in place due to surgical wound  site    ETOH abuse  Patient drinks at least a 6 pack beer daily  - Start scheduled librium- wean ordered  - Thiamine, folate, potassium, magnesium      Cocaine abuse  Patient sprinkles crack crystals in her marijuana  Wants help. Tearful. Emotional support provided.   CM consulted.       Anemia of chronic disease  Patient's anemia is currently controlled. Has received 1 units of PRBCs on 2/19 . Etiology likely d/t chronic disease and acute blood loss post surgery.  Current CBC reviewed-   Lab Results   Component Value Date    HGB 7.7 (L) 02/20/2024    HCT 23.8 (L) 02/20/2024     - monitor daily     Essential hypertension  Chronic, controlled. Latest blood pressure and vitals reviewed-     Temp:  [97.2 °F (36.2 °C)-99.6 °F (37.6 °C)]   Pulse:  [76-97]   Resp:  [10-30]   BP: (114-183)/(53-98)   SpO2:  [95 %-100 %] .   Home meds for hypertension were reviewed and noted below.   Hypertension Medications               amlodipine (NORVASC) 10 MG tablet Take 10 mg by mouth once daily.    furosemide (LASIX) 40 MG tablet Take 1 tablet (40 mg total) by mouth once daily.            While in the hospital, will manage blood pressure as follows; Adjust home antihypertensive regimen as follows- hold meds for now given infection/ narcotic use. Resume as warranted . May develop rebound HTN from drug/ alcohol withdrawal.    Will utilize p.r.n. blood pressure medication only if patient's blood pressure greater than 180/110 and she develops symptoms such as worsening chest pain or shortness of breath.    Sepsis with acute renal failure and tubular necrosis without septic shock  Defined by leukocytosis, tachycardia and groin abscess. Source is [facility].   - surgical management with debridement  - antibiotics= meropenem per ID    Chronic diastolic congestive heart failure  Patient is identified as having Diastolic (HFpEF) heart failure that is Chronic. CHF is currently controlled. Latest ECHO performed and demonstrates- Results for  orders placed during the hospital encounter of 03/10/23    Echo    Interpretation Summary  · The left ventricle is normal in size with low normal systolic function.  · The estimated ejection fraction is 53%.  · There is abnormal septal wall motion.  · Indeterminate left ventricular diastolic function.  · Mild left atrial enlargement.  · Normal right ventricular size with low normal right ventricular systolic function.  · Mild right atrial enlargement.  · Mild mitral regurgitation.  · Mild tricuspid regurgitation.  · Normal central venous pressure (3 mmHg).  · The estimated PA systolic pressure is 23 mmHg.    No acute issues  Monitor fluid status- does have lower extremity edema and will need to resume lasix at some point     ILD (interstitial lung disease)  Noted on imaging; CT chest 2022  Stable, no acute issues. Not on home O2 but says she should be-- would test prior to discharge .      CARLINE (acute kidney injury)  Patient with acute kidney injury/acute renal failure likely due to pre-renal azotemia due to IVVD and acute tubular necrosis caused by sepsis  CARLINE is currently improving. Baseline creatinine  0.9  - Labs reviewed- Renal function/electrolytes with Estimated Creatinine Clearance: 58.5 mL/min (based on SCr of 1.2 mg/dL). according to latest data. Monitor urine output and serial BMP and adjust therapy as needed. Avoid nephrotoxins and renally dose meds for GFR listed above.    - Highly suspect ATN as an etiology at this time  - Nephrology consult requested, appreciate recs  - renal function is improving and almost at baseline.     Tobacco abuse  - Pt was counseled about cessation x4 minutes      Type 2 diabetes mellitus with hyperglycemia, without long-term current use of insulin  Patient's FSGs are uncontrolled due to hyperglycemia on current medication regimen.  Last A1c reviewed-   Lab Results   Component Value Date    HGBA1C 8.2 (H) 04/06/2023     Most recent fingerstick glucose reviewed-   Recent Labs    Lab 02/19/24  1547 02/19/24  1610 02/19/24  2104 02/20/24  0727   POCTGLUCOSE 63* 81 274* 198*       Current correctional scale  High  Maintain anti-hyperglycemic dose as follows-   Antihyperglycemics (From admission, onward)      Start     Stop Route Frequency Ordered    02/19/24 0900  insulin detemir U-100 (Levemir) pen 15 Units         -- SubQ Daily 02/18/24 1519    02/18/24 2100  insulin detemir U-100 (Levemir) pen 10 Units         -- SubQ Nightly 02/18/24 1519    02/16/24 1514  insulin aspart U-100 pen 0-15 Units         -- SubQ Before meals & nightly PRN 02/16/24 1515          Hold Oral hypoglycemics while patient is in the hospital.            VTE Risk Mitigation (From admission, onward)           Ordered     IP VTE HIGH RISK PATIENT  Once         02/16/24 0506     Place sequential compression device  Until discontinued         02/16/24 0506                    Discharge Planning   AVTAR: 2/17/2024     Code Status: Full Code   Is the patient medically ready for discharge?:     Reason for patient still in hospital (select all that apply): Patient trending condition  Discharge Plan A: Home with family        Updated patient and  at bedside.     Critical care time spent on the evaluation and treatment of severe organ dysfunction, review of pertinent labs and imaging studies, discussions with consulting providers and discussions with patient/family: 40 minutes.      Rebecca Alex MD  Department of Hospital Medicine   [facility] [place] - Intensive Care

## 2024-02-20 NOTE — ASSESSMENT & PLAN NOTE
Chronic, controlled. Latest blood pressure and vitals reviewed-     Temp:  [97.2 °F (36.2 °C)-99.6 °F (37.6 °C)]   Pulse:  [76-97]   Resp:  [10-30]   BP: (114-183)/(53-98)   SpO2:  [95 %-100 %] .   Home meds for hypertension were reviewed and noted below.   Hypertension Medications               amlodipine (NORVASC) 10 MG tablet Take 10 mg by mouth once daily.    furosemide (LASIX) 40 MG tablet Take 1 tablet (40 mg total) by mouth once daily.            While in the hospital, will manage blood pressure as follows; Adjust home antihypertensive regimen as follows- hold meds for now given infection/ narcotic use. Resume as warranted . May develop rebound HTN from drug/ alcohol withdrawal.    Will utilize p.r.n. blood pressure medication only if patient's blood pressure greater than 180/110 and she develops symptoms such as worsening chest pain or shortness of breath.

## 2024-02-20 NOTE — ASSESSMENT & PLAN NOTE
Noted on imaging; CT chest 2022  Stable, no acute issues. Not on home O2 but says she should be-- would test prior to discharge .

## 2024-02-20 NOTE — ASSESSMENT & PLAN NOTE
Patient is identified as having Diastolic (HFpEF) heart failure that is Chronic. CHF is currently controlled. Latest ECHO performed and demonstrates- Results for orders placed during the hospital encounter of 03/10/23    Echo    Interpretation Summary  · The left ventricle is normal in size with low normal systolic function.  · The estimated ejection fraction is 53%.  · There is abnormal septal wall motion.  · Indeterminate left ventricular diastolic function.  · Mild left atrial enlargement.  · Normal right ventricular size with low normal right ventricular systolic function.  · Mild right atrial enlargement.  · Mild mitral regurgitation.  · Mild tricuspid regurgitation.  · Normal central venous pressure (3 mmHg).  · The estimated PA systolic pressure is 23 mmHg.    No acute issues  Monitor fluid status- does have lower extremity edema and will need to resume lasix at some point

## 2024-02-20 NOTE — ASSESSMENT & PLAN NOTE
Patient drinks at least a 6 pack beer daily  - Start scheduled librium- wean ordered  - Thiamine, folate, potassium, magnesium

## 2024-02-20 NOTE — PROGRESS NOTES
Pharmacokinetic Assessment Follow Up: IV Vancomycin    Vancomycin serum concentration assessment(s):    The random level was drawn correctly and can be used to guide therapy at this time. The measurement is within the desired definitive target range of 10 to 20 mcg/mL.    Vancomycin Regimen Plan:    Vancomycin 1000mg dose scheduled.    Re-dose when the random level is less than 20 mcg/mL, next level to be drawn at 0300 on 2/21/24    Drug levels (last 3 results):  Recent Labs   Lab Result Units 02/17/24  2229 02/18/24  1332 02/19/24  0341 02/20/24  0358   Vancomycin, Random ug/mL  --  19.5 13.5 11.0   Vancomycin-Trough ug/mL 18.3  --   --   --        Pharmacy will continue to follow and monitor vancomycin.    Please contact pharmacy at extension [phone] for questions regarding this assessment.    Thank you for the consult,   Terence Unger       Patient brief summary:  Christine Mosqueda is a 53 y.o. female initiated on antimicrobial therapy with IV Vancomycin for treatment of skin & soft tissue infection    The patient's current regimen is random pulse dosing    Drug Allergies:   Review of patient's allergies indicates:   Allergen Reactions    Hydrochlorothiazide plus        Actual Body Weight:   93 kg    Renal Function:   Estimated Creatinine Clearance: 43.9 mL/min (A) (based on SCr of 1.6 mg/dL (H)).,     Dialysis Method (if applicable):  N/A    CBC (last 72 hours):  Recent Labs   Lab Result Units 02/18/24  0323 02/19/24  0341 02/19/24  1246 02/20/24  0358   WBC K/uL 31.73* 24.07*  --  19.33*   Hemoglobin g/dL 7.0* 6.8* 12.5 7.7*   Hematocrit % 22.2* 21.3*  --  23.8*   Platelets K/uL 477* 499*  --  301   Gran % % 85.8* 85.4*  --  79.9*   Lymph % % 5.9* 6.8*  --  10.6*   Mono % % 4.5 4.3  --  5.9   Eosinophil % % 0.3 0.7  --  0.9   Basophil % % 0.2 0.2  --  0.2   Differential Method  Automated Automated  --  Automated       Metabolic Panel (last 72 hours):  Recent Labs   Lab Result Units 02/17/24  1214  02/17/24  1533 02/17/24  2128 02/18/24  0255 02/18/24  0323 02/19/24  0341   Sodium mmol/L 131*  --  128*  --  129* 133*   Sodium, Urine mmol/L  --  <20*  --   --   --   --    Potassium mmol/L 4.1  --  4.3  --  4.3 4.6   Chloride mmol/L 96  --  96  --  98 102   CO2 mmol/L 22*  --  24  --  23 25   Glucose mg/dL 101  --  356*  --  173* 164*   Glucose, UA   --   --   --  2+*  --   --    BUN mg/dL 28*  --  30*  --  30* 34*   Creatinine mg/dL 2.3*  --  2.6*  --  2.3* 1.6*   Creatinine, Urine mg/dL  --  131.7  --   --   --   --    Albumin g/dL  --   --   --   --  0.9* 1.2*   Total Bilirubin mg/dL  --   --   --   --  0.4 0.6   Alkaline Phosphatase U/L  --   --   --   --  124 131   AST U/L  --   --   --   --  11 10   ALT U/L  --   --   --   --  5* 5*   Magnesium mg/dL  --   --   --   --  1.4* 1.9   Phosphorus mg/dL  --   --   --   --  3.9 4.1       Vancomycin Administrations:  vancomycin given in the last 96 hours                     vancomycin 750 mg in dextrose 5 % (D5W) 250 mL IVPB (Vial-Mate) (mg) 750 mg New Bag 02/19/24 0606    vancomycin 750 mg in dextrose 5 % (D5W) 250 mL IVPB (Vial-Mate) (mg) 750 mg New Bag 02/18/24 0015     750 mg New Bag 02/16/24 2324                    Microbiologic Results:  Microbiology Results (last 7 days)       Procedure Component Value Units Date/Time    Blood culture [[id number]] Collected: 02/17/24 2006    Order Status: Completed Specimen: Blood from Peripheral, Hand, Right Updated: 02/20/24 0303     Blood Culture, Routine No Growth to date      No Growth to date      No Growth to date    Blood culture [[id number]] Collected: 02/17/24 2005    Order Status: Completed Specimen: Blood from Peripheral, Hand, Right Updated: 02/20/24 0303     Blood Culture, Routine No Growth to date      No Growth to date      No Growth to date    AFB Culture & Smear [[id number]] Collected: 02/17/24 0911    Order Status: Completed Specimen: Abscess from Groin Updated: 02/19/24 2127     AFB Culture & Smear  Culture in progress     AFB CULTURE STAIN No acid fast bacilli seen.    Narrative:      RIGHT THIGH WOUND    Culture, ID (Consult) [[id number]]  (Abnormal) Collected: 02/15/24 2321    Order Status: Completed Specimen: Blood from Peripheral, Antecubital, Right Updated: 02/19/24 1134     Culture, Identification (consult) LACTOBACILLUS SPECIES  Further identified as Lactobacillus jensenii      Narrative:      Aerobic and anaerobic    Culture, Anaerobe [[id number]] Collected: 02/17/24 0911    Order Status: Completed Specimen: Abscess from Groin Updated: 02/19/24 0834     Anaerobic Culture Culture in progress    Narrative:      RIGHT THIGH WOUND    Aerobic culture [[id number]] Collected: 02/17/24 0911    Order Status: Completed Specimen: Abscess from Groin Updated: 02/19/24 0745     Aerobic Bacterial Culture No significant isolate to date    Narrative:      RIGHT THIGH WOUND    Rapid Organism ID by PCR (from Blood culture) [[id number]] Collected: 02/15/24 2320    Order Status: Completed Updated: 02/18/24 2241     Enterococcus faecalis Not Detected     Enterococcus faecium Not Detected     Listeria monocytogenes Not Detected     Staphylococcus spp. Not Detected     Staphylococcus aureus Not Detected     Staphylococcus epidermidis Not Detected     Staphylococcus lugdunensis Not Detected     Streptococcus species Not Detected     Streptococcus agalactiae Not Detected     Streptococcus pneumoniae Not Detected     Streptococcus pyogenes Not Detected     Acinetobacter calcoaceticus/baumannii complex Not Detected     Bacteroides fragilis Not Detected     Enterobacterales Not Detected     Enterobacter cloacae complex Not Detected     Escherichia coli Not Detected     Klebsiella aerogenes Not Detected     Klebsiella oxytoca Not Detected     Klebsiella pneumoniae group Not Detected     Proteus Not Detected     Salmonella sp Not Detected     Serratia marcescens Not Detected     Haemophilus influenzae Not Detected     Neisseria  meningtidis Not Detected     Pseudomonas aeruginosa Not Detected     Stenotrophomonas maltophilia Not Detected     Candida albicans Not Detected     Candida auris Not Detected     Candida glabrata Not Detected     Candida krusei Not Detected     Candida parapsilosis Not Detected     Candida tropicalis Not Detected     Cryptococcus neoformans/gattii Not Detected     CTX-M (ESBL ) Test Not Applicable     IMP (Carbapenem resistant) Test Not Applicable     KPC resistance gene (Carbapenem resistant) Test Not Applicable     mcr-1  Test Not Applicable     mec A/C  Test Not Applicable     mec A/C and MREJ (MRSA) gene Test Not Applicable     NDM (Carbapenem resistant) Test Not Applicable     OXA-48-like (Carbapenem resistant) Test Not Applicable     van A/B (VRE gene) Test Not Applicable     VIM (Carbapenem resistant) Test Not Applicable    Narrative:      Aerobic and anaerobic    Blood culture x two cultures. Draw prior to antibiotics. [[id number]]  (Abnormal) Collected: 02/15/24 2320    Order Status: Completed Specimen: Blood from Peripheral, Antecubital, Left Updated: 02/18/24 0841     Blood Culture, Routine Gram stain portia bottle: Gram positive rods      Results called to and read back by:Sandra HARVEY 02/17/2024  11:01      GRAM POSITIVE RODS  Isolate sent out for reference testing      Narrative:      Aerobic and anaerobic    Blood culture x two cultures. Draw prior to antibiotics. [[id number]]  (Abnormal) Collected: 02/15/24 2321    Order Status: Completed Specimen: Blood from Peripheral, Antecubital, Right Updated: 02/18/24 0841     Blood Culture, Routine Gram stain portia bottle: Gram positive rods      Results called to and read back by:KAMALA STEELE  02/17/2024  18:30      GRAM POSITIVE RODS  Isolate sent out for reference testing      Narrative:      Aerobic and anaerobic    Gram stain [[id number]] Collected: 02/17/24 0911    Order Status: Completed Specimen: Abscess from Groin Updated: 02/17/24 1447     Gram Stain  Result Rare WBC's      Many Gram positive cocci in pairs and chains      Rare Gram negative rods    Narrative:      RIGHT THIGH WOUND    Fungus culture [[id number]] Collected: 02/17/24 0911    Order Status: Sent Specimen: Abscess from Groin Updated: 02/17/24 1115

## 2024-02-20 NOTE — ASSESSMENT & PLAN NOTE
Defined by leukocytosis, tachycardia and groin abscess. Source is [facility].   - surgical management with debridement  - antibiotics= meropenem per ID

## 2024-02-20 NOTE — PROVIDER TRANSFER
Ms Christine Mosqueda is a 53 y.o. woman with poorly controlled type II DM who was admitted w/ worsening pain and swelling of the Rt medial thigh. 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 S/p surgical debridement 02/16, 2/17, 2/19. Blood and wound cultures with lactobacillus. ID consulted- on meropenem. Plan back to OR on 2/21/24 for possible wound vac placement. Hernandez is in place due to location of surgical wound. PT, OT consulted.     Rebecca Alex MD  02/20/2024 12:54 PM

## 2024-02-21 ENCOUNTER — ANESTHESIA (OUTPATIENT)
Dept: SURGERY | Facility: HOSPITAL | Age: 54
DRG: 853 | End: 2024-02-21
Payer: MEDICAID

## 2024-02-21 PROBLEM — Z71.89 ADVANCED CARE PLANNING/COUNSELING DISCUSSION: Status: ACTIVE | Noted: 2024-02-21

## 2024-02-21 PROBLEM — E66.01 CLASS 2 SEVERE OBESITY DUE TO EXCESS CALORIES WITH SERIOUS COMORBIDITY AND BODY MASS INDEX (BMI) OF 35.0 TO 35.9 IN ADULT: Status: ACTIVE | Noted: 2024-02-21

## 2024-02-21 PROBLEM — E87.1 HYPONATREMIA: Status: RESOLVED | Noted: 2024-02-21 | Resolved: 2024-02-21

## 2024-02-21 PROBLEM — E87.1 HYPONATREMIA: Status: ACTIVE | Noted: 2024-02-21

## 2024-02-21 PROBLEM — E66.812 CLASS 2 SEVERE OBESITY DUE TO EXCESS CALORIES WITH SERIOUS COMORBIDITY AND BODY MASS INDEX (BMI) OF 35.0 TO 35.9 IN ADULT: Status: ACTIVE | Noted: 2024-02-21

## 2024-02-21 LAB
ABO + RH BLD: NORMAL
ALBUMIN SERPL BCP-MCNC: 1.2 G/DL (ref 3.5–5.2)
ALP SERPL-CCNC: 120 U/L (ref 55–135)
ALT SERPL W/O P-5'-P-CCNC: 10 U/L (ref 10–44)
ANION GAP SERPL CALC-SCNC: 3 MMOL/L (ref 8–16)
AST SERPL-CCNC: 11 U/L (ref 10–40)
BACTERIA SPEC ANAEROBE CULT: NORMAL
BASOPHILS # BLD AUTO: 0.05 K/UL (ref 0–0.2)
BASOPHILS NFR BLD: 0.3 % (ref 0–1.9)
BILIRUB SERPL-MCNC: 0.2 MG/DL (ref 0.1–1)
BLD GP AB SCN CELLS X3 SERPL QL: NORMAL
BLD PROD TYP BPU: NORMAL
BLOOD UNIT EXPIRATION DATE: NORMAL
BLOOD UNIT TYPE CODE: 5100
BLOOD UNIT TYPE: NORMAL
BUN SERPL-MCNC: 16 MG/DL (ref 6–20)
CALCIUM SERPL-MCNC: 7.9 MG/DL (ref 8.7–10.5)
CHLORIDE SERPL-SCNC: 105 MMOL/L (ref 95–110)
CO2 SERPL-SCNC: 27 MMOL/L (ref 23–29)
CODING SYSTEM: NORMAL
CREAT SERPL-MCNC: 1 MG/DL (ref 0.5–1.4)
CROSSMATCH INTERPRETATION: NORMAL
DIFFERENTIAL METHOD BLD: ABNORMAL
DISPENSE STATUS: NORMAL
EOSINOPHIL # BLD AUTO: 0.1 K/UL (ref 0–0.5)
EOSINOPHIL NFR BLD: 0.9 % (ref 0–8)
ERYTHROCYTE [DISTWIDTH] IN BLOOD BY AUTOMATED COUNT: 18.9 % (ref 11.5–14.5)
EST. GFR  (NO RACE VARIABLE): >60 ML/MIN/1.73 M^2
GLUCOSE SERPL-MCNC: 207 MG/DL (ref 70–110)
HCT VFR BLD AUTO: 22.1 % (ref 37–48.5)
HGB BLD-MCNC: 6.9 G/DL (ref 12–16)
IMM GRANULOCYTES # BLD AUTO: 0.51 K/UL (ref 0–0.04)
IMM GRANULOCYTES NFR BLD AUTO: 3.1 % (ref 0–0.5)
LYMPHOCYTES # BLD AUTO: 2.1 K/UL (ref 1–4.8)
LYMPHOCYTES NFR BLD: 12.9 % (ref 18–48)
MAGNESIUM SERPL-MCNC: 1.8 MG/DL (ref 1.6–2.6)
MCH RBC QN AUTO: 22.1 PG (ref 27–31)
MCHC RBC AUTO-ENTMCNC: 31.2 G/DL (ref 32–36)
MCV RBC AUTO: 71 FL (ref 82–98)
MONOCYTES # BLD AUTO: 1.1 K/UL (ref 0.3–1)
MONOCYTES NFR BLD: 6.7 % (ref 4–15)
NEUTROPHILS # BLD AUTO: 12.4 K/UL (ref 1.8–7.7)
NEUTROPHILS NFR BLD: 76.1 % (ref 38–73)
NRBC BLD-RTO: 0 /100 WBC
NUM UNITS TRANS PACKED RBC: NORMAL
PHOSPHATE SERPL-MCNC: 3.5 MG/DL (ref 2.7–4.5)
PLATELET # BLD AUTO: 440 K/UL (ref 150–450)
PLATELET BLD QL SMEAR: ABNORMAL
PMV BLD AUTO: ABNORMAL FL (ref 9.2–12.9)
POCT GLUCOSE: 115 MG/DL (ref 70–110)
POCT GLUCOSE: 131 MG/DL (ref 70–110)
POCT GLUCOSE: 150 MG/DL (ref 70–110)
POCT GLUCOSE: 227 MG/DL (ref 70–110)
POCT GLUCOSE: 246 MG/DL (ref 70–110)
POCT GLUCOSE: 255 MG/DL (ref 70–110)
POCT GLUCOSE: 313 MG/DL (ref 70–110)
POCT GLUCOSE: 449 MG/DL (ref 70–110)
POTASSIUM SERPL-SCNC: 5.1 MMOL/L (ref 3.5–5.1)
POTASSIUM SERPL-SCNC: 5.4 MMOL/L (ref 3.5–5.1)
PROT SERPL-MCNC: 5.4 G/DL (ref 6–8.4)
RBC # BLD AUTO: 3.12 M/UL (ref 4–5.4)
SODIUM SERPL-SCNC: 135 MMOL/L (ref 136–145)
SPECIMEN OUTDATE: NORMAL
VANCOMYCIN SERPL-MCNC: 7.1 UG/ML
WBC # BLD AUTO: 16.32 K/UL (ref 3.9–12.7)

## 2024-02-21 PROCEDURE — 25000003 PHARM REV CODE 250: Performed by: HOSPITALIST

## 2024-02-21 PROCEDURE — 37000008 HC ANESTHESIA 1ST 15 MINUTES: Performed by: SURGERY

## 2024-02-21 PROCEDURE — 36000706: Performed by: SURGERY

## 2024-02-21 PROCEDURE — 63600175 PHARM REV CODE 636 W HCPCS: Performed by: HOSPITALIST

## 2024-02-21 PROCEDURE — 97605 NEG PRS WND THER DME<=50SQCM: CPT | Mod: ,,, | Performed by: SURGERY

## 2024-02-21 PROCEDURE — 63600175 PHARM REV CODE 636 W HCPCS: Performed by: PHYSICIAN ASSISTANT

## 2024-02-21 PROCEDURE — 25000003 PHARM REV CODE 250: Performed by: PHYSICIAN ASSISTANT

## 2024-02-21 PROCEDURE — 0JDL0ZZ EXTRACTION OF RIGHT UPPER LEG SUBCUTANEOUS TISSUE AND FASCIA, OPEN APPROACH: ICD-10-PCS | Performed by: SURGERY

## 2024-02-21 PROCEDURE — 25000003 PHARM REV CODE 250: Performed by: NURSE ANESTHETIST, CERTIFIED REGISTERED

## 2024-02-21 PROCEDURE — 71000033 HC RECOVERY, INTIAL HOUR: Performed by: SURGERY

## 2024-02-21 PROCEDURE — 63600175 PHARM REV CODE 636 W HCPCS: Performed by: ANESTHESIOLOGY

## 2024-02-21 PROCEDURE — 36415 COLL VENOUS BLD VENIPUNCTURE: CPT | Mod: XB | Performed by: HOSPITALIST

## 2024-02-21 PROCEDURE — 36000707: Performed by: SURGERY

## 2024-02-21 PROCEDURE — 63600175 PHARM REV CODE 636 W HCPCS: Performed by: NURSE ANESTHETIST, CERTIFIED REGISTERED

## 2024-02-21 PROCEDURE — 63600175 PHARM REV CODE 636 W HCPCS: Performed by: STUDENT IN AN ORGANIZED HEALTH CARE EDUCATION/TRAINING PROGRAM

## 2024-02-21 PROCEDURE — 85025 COMPLETE CBC W/AUTO DIFF WBC: CPT | Performed by: HOSPITALIST

## 2024-02-21 PROCEDURE — 87040 BLOOD CULTURE FOR BACTERIA: CPT | Performed by: STUDENT IN AN ORGANIZED HEALTH CARE EDUCATION/TRAINING PROGRAM

## 2024-02-21 PROCEDURE — D9220A PRA ANESTHESIA: Mod: ANES,,, | Performed by: ANESTHESIOLOGY

## 2024-02-21 PROCEDURE — 84132 ASSAY OF SERUM POTASSIUM: CPT | Performed by: ANESTHESIOLOGY

## 2024-02-21 PROCEDURE — P9016 RBC LEUKOCYTES REDUCED: HCPCS | Performed by: STUDENT IN AN ORGANIZED HEALTH CARE EDUCATION/TRAINING PROGRAM

## 2024-02-21 PROCEDURE — 25000242 PHARM REV CODE 250 ALT 637 W/ HCPCS: Performed by: NURSE ANESTHETIST, CERTIFIED REGISTERED

## 2024-02-21 PROCEDURE — 36415 COLL VENOUS BLD VENIPUNCTURE: CPT | Mod: XB | Performed by: ANESTHESIOLOGY

## 2024-02-21 PROCEDURE — 83735 ASSAY OF MAGNESIUM: CPT | Performed by: HOSPITALIST

## 2024-02-21 PROCEDURE — 80202 ASSAY OF VANCOMYCIN: CPT | Performed by: STUDENT IN AN ORGANIZED HEALTH CARE EDUCATION/TRAINING PROGRAM

## 2024-02-21 PROCEDURE — 21400001 HC TELEMETRY ROOM

## 2024-02-21 PROCEDURE — 25000003 PHARM REV CODE 250: Performed by: STUDENT IN AN ORGANIZED HEALTH CARE EDUCATION/TRAINING PROGRAM

## 2024-02-21 PROCEDURE — 0HBHXZZ EXCISION OF RIGHT UPPER LEG SKIN, EXTERNAL APPROACH: ICD-10-PCS | Performed by: SURGERY

## 2024-02-21 PROCEDURE — 97597 DBRDMT OPN WND 1ST 20 CM/<: CPT | Mod: ,,, | Performed by: SURGERY

## 2024-02-21 PROCEDURE — 86850 RBC ANTIBODY SCREEN: CPT | Performed by: STUDENT IN AN ORGANIZED HEALTH CARE EDUCATION/TRAINING PROGRAM

## 2024-02-21 PROCEDURE — 86920 COMPATIBILITY TEST SPIN: CPT | Performed by: STUDENT IN AN ORGANIZED HEALTH CARE EDUCATION/TRAINING PROGRAM

## 2024-02-21 PROCEDURE — D9220A PRA ANESTHESIA: Mod: CRNA,,, | Performed by: NURSE ANESTHETIST, CERTIFIED REGISTERED

## 2024-02-21 PROCEDURE — 37000009 HC ANESTHESIA EA ADD 15 MINS: Performed by: SURGERY

## 2024-02-21 PROCEDURE — 80053 COMPREHEN METABOLIC PANEL: CPT | Performed by: HOSPITALIST

## 2024-02-21 PROCEDURE — 27201423 OPTIME MED/SURG SUP & DEVICES STERILE SUPPLY: Performed by: SURGERY

## 2024-02-21 PROCEDURE — 84100 ASSAY OF PHOSPHORUS: CPT | Performed by: HOSPITALIST

## 2024-02-21 PROCEDURE — 99900035 HC TECH TIME PER 15 MIN (STAT)

## 2024-02-21 PROCEDURE — 36415 COLL VENOUS BLD VENIPUNCTURE: CPT | Performed by: STUDENT IN AN ORGANIZED HEALTH CARE EDUCATION/TRAINING PROGRAM

## 2024-02-21 RX ORDER — LIDOCAINE HYDROCHLORIDE 20 MG/ML
INJECTION INTRAVENOUS
Status: DISCONTINUED | OUTPATIENT
Start: 2024-02-21 | End: 2024-02-21

## 2024-02-21 RX ORDER — MIDAZOLAM HYDROCHLORIDE 1 MG/ML
INJECTION INTRAMUSCULAR; INTRAVENOUS
Status: DISCONTINUED | OUTPATIENT
Start: 2024-02-21 | End: 2024-02-21

## 2024-02-21 RX ORDER — ALBUTEROL SULFATE 90 UG/1
AEROSOL, METERED RESPIRATORY (INHALATION)
Status: DISCONTINUED | OUTPATIENT
Start: 2024-02-21 | End: 2024-02-21

## 2024-02-21 RX ORDER — PHENYLEPHRINE HYDROCHLORIDE 10 MG/ML
INJECTION INTRAVENOUS
Status: DISCONTINUED | OUTPATIENT
Start: 2024-02-21 | End: 2024-02-21

## 2024-02-21 RX ORDER — HYDROCODONE BITARTRATE AND ACETAMINOPHEN 500; 5 MG/1; MG/1
TABLET ORAL
Status: DISCONTINUED | OUTPATIENT
Start: 2024-02-21 | End: 2024-03-06 | Stop reason: HOSPADM

## 2024-02-21 RX ORDER — HYDROMORPHONE HYDROCHLORIDE 2 MG/ML
0.2 INJECTION, SOLUTION INTRAMUSCULAR; INTRAVENOUS; SUBCUTANEOUS EVERY 5 MIN PRN
Status: DISCONTINUED | OUTPATIENT
Start: 2024-02-21 | End: 2024-02-21 | Stop reason: HOSPADM

## 2024-02-21 RX ORDER — SODIUM CHLORIDE 0.9 % (FLUSH) 0.9 %
10 SYRINGE (ML) INJECTION
Status: DISCONTINUED | OUTPATIENT
Start: 2024-02-21 | End: 2024-02-21 | Stop reason: HOSPADM

## 2024-02-21 RX ORDER — PROPOFOL 10 MG/ML
VIAL (ML) INTRAVENOUS
Status: DISCONTINUED | OUTPATIENT
Start: 2024-02-21 | End: 2024-02-21

## 2024-02-21 RX ORDER — INSULIN ASPART 100 [IU]/ML
5 INJECTION, SOLUTION INTRAVENOUS; SUBCUTANEOUS ONCE
Status: COMPLETED | OUTPATIENT
Start: 2024-02-21 | End: 2024-02-21

## 2024-02-21 RX ORDER — DEXAMETHASONE SODIUM PHOSPHATE 4 MG/ML
INJECTION, SOLUTION INTRA-ARTICULAR; INTRALESIONAL; INTRAMUSCULAR; INTRAVENOUS; SOFT TISSUE
Status: DISCONTINUED | OUTPATIENT
Start: 2024-02-21 | End: 2024-02-21

## 2024-02-21 RX ORDER — ROCURONIUM BROMIDE 10 MG/ML
INJECTION, SOLUTION INTRAVENOUS
Status: DISCONTINUED | OUTPATIENT
Start: 2024-02-21 | End: 2024-02-21

## 2024-02-21 RX ORDER — ONDANSETRON HYDROCHLORIDE 2 MG/ML
INJECTION, SOLUTION INTRAVENOUS
Status: DISCONTINUED | OUTPATIENT
Start: 2024-02-21 | End: 2024-02-21

## 2024-02-21 RX ORDER — FENTANYL CITRATE 50 UG/ML
INJECTION, SOLUTION INTRAMUSCULAR; INTRAVENOUS
Status: DISCONTINUED | OUTPATIENT
Start: 2024-02-21 | End: 2024-02-21

## 2024-02-21 RX ADMIN — VANCOMYCIN HYDROCHLORIDE 2000 MG: 500 INJECTION, POWDER, LYOPHILIZED, FOR SOLUTION INTRAVENOUS at 08:02

## 2024-02-21 RX ADMIN — ONDANSETRON 4 MG: 2 INJECTION, SOLUTION INTRAMUSCULAR; INTRAVENOUS at 11:02

## 2024-02-21 RX ADMIN — LIDOCAINE HYDROCHLORIDE 90 MG: 20 INJECTION, SOLUTION INTRAVENOUS at 10:02

## 2024-02-21 RX ADMIN — ACETAMINOPHEN 1000 MG: 500 TABLET ORAL at 11:02

## 2024-02-21 RX ADMIN — SODIUM CHLORIDE: 0.9 INJECTION, SOLUTION INTRAVENOUS at 10:02

## 2024-02-21 RX ADMIN — ACETAMINOPHEN 1000 MG: 500 TABLET ORAL at 05:02

## 2024-02-21 RX ADMIN — PHENYLEPHRINE HYDROCHLORIDE 150 MCG: 10 INJECTION INTRAVENOUS at 10:02

## 2024-02-21 RX ADMIN — MUPIROCIN: 20 OINTMENT TOPICAL at 08:02

## 2024-02-21 RX ADMIN — MIDAZOLAM HYDROCHLORIDE 2 MG: 1 INJECTION INTRAMUSCULAR; INTRAVENOUS at 09:02

## 2024-02-21 RX ADMIN — DEXAMETHASONE SODIUM PHOSPHATE 4 MG: 4 INJECTION, SOLUTION INTRAMUSCULAR; INTRAVENOUS at 10:02

## 2024-02-21 RX ADMIN — SODIUM ZIRCONIUM CYCLOSILICATE 10 G: 10 POWDER, FOR SUSPENSION ORAL at 01:02

## 2024-02-21 RX ADMIN — HYDROMORPHONE HYDROCHLORIDE 0.2 MG: 2 INJECTION INTRAMUSCULAR; INTRAVENOUS; SUBCUTANEOUS at 11:02

## 2024-02-21 RX ADMIN — FENTANYL CITRATE 100 MCG: 50 INJECTION, SOLUTION INTRAMUSCULAR; INTRAVENOUS at 10:02

## 2024-02-21 RX ADMIN — INSULIN ASPART 6 UNITS: 100 INJECTION, SOLUTION INTRAVENOUS; SUBCUTANEOUS at 08:02

## 2024-02-21 RX ADMIN — SUGAMMADEX 200 MG: 100 INJECTION, SOLUTION INTRAVENOUS at 11:02

## 2024-02-21 RX ADMIN — ALBUTEROL SULFATE 4 PUFF: 90 AEROSOL, METERED RESPIRATORY (INHALATION) at 11:02

## 2024-02-21 RX ADMIN — HYDROMORPHONE HYDROCHLORIDE 1 MG: 1 INJECTION, SOLUTION INTRAMUSCULAR; INTRAVENOUS; SUBCUTANEOUS at 06:02

## 2024-02-21 RX ADMIN — ROCURONIUM BROMIDE 100 MG: 10 INJECTION, SOLUTION INTRAVENOUS at 10:02

## 2024-02-21 RX ADMIN — CHLORDIAZEPOXIDE HYDROCHLORIDE 5 MG: 5 CAPSULE ORAL at 02:02

## 2024-02-21 RX ADMIN — CHLORDIAZEPOXIDE HYDROCHLORIDE 5 MG: 5 CAPSULE ORAL at 08:02

## 2024-02-21 RX ADMIN — ROCURONIUM BROMIDE 50 MG: 10 INJECTION, SOLUTION INTRAVENOUS at 10:02

## 2024-02-21 RX ADMIN — PANTOPRAZOLE SODIUM 40 MG: 40 TABLET, DELAYED RELEASE ORAL at 08:02

## 2024-02-21 RX ADMIN — GABAPENTIN 400 MG: 400 CAPSULE ORAL at 08:02

## 2024-02-21 RX ADMIN — MEROPENEM 1 G: 1 INJECTION INTRAVENOUS at 04:02

## 2024-02-21 RX ADMIN — INSULIN ASPART 12 UNITS: 100 INJECTION, SOLUTION INTRAVENOUS; SUBCUTANEOUS at 05:02

## 2024-02-21 RX ADMIN — Medication 6 MG: at 08:02

## 2024-02-21 RX ADMIN — PROPOFOL 120 MG: 10 INJECTION, EMULSION INTRAVENOUS at 10:02

## 2024-02-21 RX ADMIN — THIAMINE HCL TAB 100 MG 100 MG: 100 TAB at 08:02

## 2024-02-21 RX ADMIN — INSULIN ASPART 5 UNITS: 100 INJECTION, SOLUTION INTRAVENOUS; SUBCUTANEOUS at 08:02

## 2024-02-21 RX ADMIN — OXYCODONE 10 MG: 5 TABLET ORAL at 08:02

## 2024-02-21 RX ADMIN — FOLIC ACID 1 MG: 1 TABLET ORAL at 08:02

## 2024-02-21 RX ADMIN — ASPIRIN 81 MG CHEWABLE TABLET 81 MG: 81 TABLET CHEWABLE at 08:02

## 2024-02-21 RX ADMIN — INSULIN DETEMIR 10 UNITS: 100 INJECTION, SOLUTION SUBCUTANEOUS at 08:02

## 2024-02-21 RX ADMIN — HYDROMORPHONE HYDROCHLORIDE 0.2 MG: 2 INJECTION INTRAMUSCULAR; INTRAVENOUS; SUBCUTANEOUS at 12:02

## 2024-02-21 RX ADMIN — OXYCODONE 10 MG: 5 TABLET ORAL at 12:02

## 2024-02-21 RX ADMIN — SODIUM CHLORIDE, SODIUM LACTATE, POTASSIUM CHLORIDE, AND CALCIUM CHLORIDE: .6; .31; .03; .02 INJECTION, SOLUTION INTRAVENOUS at 09:02

## 2024-02-21 RX ADMIN — ATORVASTATIN CALCIUM 20 MG: 10 TABLET, FILM COATED ORAL at 08:02

## 2024-02-21 NOTE — ASSESSMENT & PLAN NOTE
Defined by leukocytosis, tachycardia and groin abscess. Source is [facility].   - surgical management with debridement  - antibiotics= meropenem per ID  - Leukocytosis improving

## 2024-02-21 NOTE — ASSESSMENT & PLAN NOTE
-Noted on imaging; CT chest 2022  -Stable, no acute issues.   -Not on home O2 but says she should be (but never received because she left AMA at OSH)  -would test prior to discharge .

## 2024-02-21 NOTE — ASSESSMENT & PLAN NOTE
Patient is identified as having Diastolic (HFpEF) heart failure that is Chronic. CHF is currently controlled. Latest ECHO performed and demonstrates- Results for orders placed during the hospital encounter of 03/10/23    Echo    Interpretation Summary  · The left ventricle is normal in size with low normal systolic function.  · The estimated ejection fraction is 53%.  · There is abnormal septal wall motion.  · Indeterminate left ventricular diastolic function.  · Mild left atrial enlargement.  · Normal right ventricular size with low normal right ventricular systolic function.  · Mild right atrial enlargement.  · Mild mitral regurgitation.  · Mild tricuspid regurgitation.  · Normal central venous pressure (3 mmHg).  · The estimated PA systolic pressure is 23 mmHg.    No acute issues  Monitor fluid status- does have lower extremity edema and will need to resume lasix at some point, awaiting nephrology recs

## 2024-02-21 NOTE — ASSESSMENT & PLAN NOTE
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   - Surgery consulted: S/p debridement on 02/16, 2/17, 2/19. Plan for repeat OR debridement on 02/21  - blood and wound cultures with lactobacillus. On meropenem per ID recs  - jansen in place due to surgical wound site

## 2024-02-21 NOTE — ASSESSMENT & PLAN NOTE
Advance Care Planning     Date: 02/21/2024    Code Status  I engaged the the patient in a voluntary conversation about the patient's preferences for care  at the very end of life. The patient wishes to have CPR and other invasive treatments performed when her heart and/or breathing stops. I communicated to the patient that her wishes align with full code status and she agrees and verbalized understanding.   I spent a total of 16 minutes engaging the patient in this advance care planning discussion.            Code Status: Full Code

## 2024-02-21 NOTE — BRIEF OP NOTE
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 Wound vac placed.     Wound measured 21 cm in length, 15 cm in width, 8 cm in depth.     Estimated Blood Loss: * No values recorded between 2/21/2024 10:35 AM and 2/21/2024 11:23 AM *    Estimated Blood Loss has not been documented. EBL = <10 cc.         Specimens:   Specimen (24h ago, onward)      None            [id number]

## 2024-02-21 NOTE — NURSING
Patient is in NPO from midnight.CHG bath given.Clean gown without start given.Vital signs stable.Given PRN pain Medicines.

## 2024-02-21 NOTE — NURSING
Dressing change done as it was saturated.Hydromorphone1 mg IV given prior dressing change. Patient tolerated well.No obvious discomfort noted.

## 2024-02-21 NOTE — PT/OT/SLP PROGRESS
Physical Therapy      Patient Name:  Christine Mosqueda   MRN:  [id number]    Patient not seen today secondary to Off the floor for surgery. Will follow-up tomorrow .

## 2024-02-21 NOTE — SUBJECTIVE & OBJECTIVE
Interval History: NAEON. Feeling well this morning. AVSS. Wound vac in place and with good seal. Lab work stable with expected bump in leukocytosis.    Medications:  Continuous Infusions:      Scheduled Meds:   acetaminophen  1,000 mg Oral Q8H    budesonide  1 mg Nebulization Q12H    And    arformoteroL  15 mcg Nebulization BID    aspirin  81 mg Oral Daily    atorvastatin  20 mg Oral Daily    chlordiazepoxide  5 mg Oral TID    Followed by    [START ON 2/22/2024] chlordiazepoxide  5 mg Oral BID    Followed by    [START ON 2/24/2024] chlordiazepoxide  5 mg Oral Daily    folic acid  1 mg Oral Daily    gabapentin  400 mg Oral BID    insulin detemir U-100  10 Units Subcutaneous QHS    insulin detemir U-100 (Levemir)  15 Units Subcutaneous Daily    meropenem (MERREM) IVPB  1 g Intravenous Q12H    mupirocin   Nasal BID    pantoprazole  40 mg Oral Daily    sodium zirconium cyclosilicate  10 g Oral Once    thiamine  100 mg Oral Daily     PRN Meds:0.9%  NaCl infusion (for blood administration), albuterol sulfate, dextrose 10%, dextrose 10%, glucagon (human recombinant), glucose, glucose, HYDROmorphone, HYDROmorphone, insulin aspart U-100, melatonin, ondansetron, oxyCODONE, oxyCODONE, prochlorperazine, sodium chloride 0.9%, sodium chloride 0.9%     Review of patient's allergies indicates:   Allergen Reactions    Hydrochlorothiazide plus      Objective:     Vital Signs (Most Recent):  Temp: 97.9 °F (36.6 °C) (02/21/24 0808)  Pulse: 80 (02/21/24 0808)  Resp: 18 (02/21/24 0808)  BP: 128/78 (02/21/24 0945)  SpO2: 98 % (02/21/24 0808) Vital Signs (24h Range):  Temp:  [97.5 °F (36.4 °C)-98.8 °F (37.1 °C)] 97.9 °F (36.6 °C)  Pulse:  [78-92] 80  Resp:  [11-24] 18  SpO2:  [91 %-100 %] 98 %  BP: (125-175)/(62-98) 128/78     Weight: 93.6 kg (206 lb 5.6 oz)  Body mass index is 35.42 kg/m².    Intake/Output - Last 3 Shifts         02/19 0700 02/20 0659 02/20 0700 02/21 0659 02/21 0700 02/22 0659    P.O.  1100     I.V. (mL/kg) 43 (0.5)       Blood 333.8  345    IV Piggyback 1040.3 299.1 1000    Total Intake(mL/kg) 1417.1 (15.2) 1399.1 (14.9) 1345 (14.4)    Urine (mL/kg/hr) 2515 (1.1) 2250 (1)     Emesis/NG output       Stool  0     Blood 25      Total Output 2540 2250     Net -1122.9 -850.9 +1345           Stool Occurrence  1 x              Physical Exam  Vitals and nursing note reviewed.   Constitutional:       Appearance: Normal appearance.   HENT:      Head: Normocephalic and atraumatic.   Cardiovascular:      Rate and Rhythm: Normal rate and regular rhythm.   Pulmonary:      Effort: Pulmonary effort is normal. No respiratory distress.   Abdominal:      General: Abdomen is flat. There is no distension.      Palpations: Abdomen is soft. There is no mass.      Tenderness: There is no abdominal tenderness.      Hernia: No hernia is present.   Genitourinary:     Comments: Hernandez catheter  Musculoskeletal:      Right lower leg: No edema.      Left lower leg: No edema.   Skin:     General: Skin is warm and dry.      Comments: Wound vac to right thigh with good seal and SS output   Neurological:      General: No focal deficit present.      Mental Status: She is alert and oriented to person, place, and time.   Psychiatric:         Mood and Affect: Mood normal.         Behavior: Behavior normal.          Significant Labs:  I have reviewed all pertinent lab results within the past 24 hours.  CBC:   Recent Labs   Lab 02/21/24  0426 02/22/24  0431   WBC 16.32* 18.69*   RBC 3.12* 3.31*   HGB 6.9* 7.6*   HCT 22.1* 24.4*     --    MCV 71* 74*   MCH 22.1* 23.0*   MCHC 31.2* 31.1*     CMP:   Recent Labs   Lab 02/22/24  0431   *   CALCIUM 7.9*   ALBUMIN 1.3*   PROT 5.4*      K 4.9   CO2 26      BUN 17   CREATININE 1.1   ALKPHOS 104   ALT 6*   AST 11   BILITOT 0.2     LFTs:   Recent Labs   Lab 02/22/24  0431   ALT 6*   AST 11   ALKPHOS 104   BILITOT 0.2   PROT 5.4*   ALBUMIN 1.3*       Significant Diagnostics:  I have reviewed all pertinent  imaging results/findings within the past 24 hours.

## 2024-02-21 NOTE — ASSESSMENT & PLAN NOTE
Chronic, controlled.   -hold home meds at this time:  Amlodipine 10 mg, Lasix 40 mg p.o.    Latest blood pressure and vitals reviewed-     Temp:  [96.6 °F (35.9 °C)-98.8 °F (37.1 °C)]   Pulse:  [54-92]   Resp:  [16-27]   BP: (125-175)/()   SpO2:  [91 %-100 %] .   Home meds for hypertension were reviewed and noted below.   Hypertension Medications               amlodipine (NORVASC) 10 MG tablet Take 10 mg by mouth once daily.    furosemide (LASIX) 40 MG tablet Take 1 tablet (40 mg total) by mouth once daily.            While in the hospital, will manage blood pressure as follows; Adjust home antihypertensive regimen as follows- hold meds for now given infection/ narcotic use. Resume as warranted . May develop rebound HTN from drug/ alcohol withdrawal.    Will utilize p.r.n. blood pressure medication only if patient's blood pressure greater than 180/110 and she develops symptoms such as worsening chest pain or shortness of breath.

## 2024-02-21 NOTE — NURSING
Received report from CALEB Colindres from PACU. Wound vac exchange to (R) leg. Superficial debridement performed. Wound vac 125mm continuously. 1 unit of blood administered in the the OR. /79, HR 70-80, O2 94% on 3L NC, R 20. No nausea reported. AAOx4. Pt will be NPO at midnight for another I&D. Awaiting pt's arrival to the floor.

## 2024-02-21 NOTE — NURSING
Received a call from Patricia Shaw from lab. She reported a positive blood culture from 02/17/2024. 1 bottle out of 4 came back positive with gram positive rods with lactobacillus. Dr. White notified. Plan of care ongoing.

## 2024-02-21 NOTE — ASSESSMENT & PLAN NOTE
Patient with acute kidney injury/acute renal failure likely due to pre-renal azotemia due to IVVD and acute tubular necrosis caused by sepsis  CARLINE is currently improving. Baseline creatinine  0.9  - Labs reviewed- Renal function/electrolytes with Estimated Creatinine Clearance: 70.5 mL/min (based on SCr of 1 mg/dL). according to latest data. Monitor urine output and serial BMP and adjust therapy as needed. Avoid nephrotoxins and renally dose meds for GFR listed above.    - Highly suspect ATN as an etiology at this time  - Nephrology consult requested, appreciate recs  - renal function is improving and almost at baseline.

## 2024-02-21 NOTE — PT/OT/SLP PROGRESS
Occupational Therapy      Patient Name:  Christine Mosqueda   MRN:  [id number]    Patient not seen today secondary to Other (Comment) (in am, in surgery). Will follow-up tomorrow.    2/21/2024

## 2024-02-21 NOTE — ASSESSMENT & PLAN NOTE
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    - Will return to the OR 2/23 for first wound vac change   - If this goes well we will plan on performing the next vac change at bedside  - NPO at midnight  - ID following; Vanc/zosyn/clinda started 2/16, switched to meropenam and vanc   - Operative cultures 2/17 with lactobacillus  - Poorly controlled DMII - insulin per HM  - Nutrition consult, boost supplements, severely hypoalbuminemia   - Polysubstance abuse to alcohol dependency - librium taper on, CIWA protocol  - Multiple modal pain control limited by CARLINE - continue scheduled tylenol, combination of oral and IV narcotics  - Okay for DVT ppx   - PT consult - okay to attempt ambulation, get up to chair.   - Will have long term wound care needs; pending final disposition will need placement for this

## 2024-02-21 NOTE — ANESTHESIA POSTPROCEDURE EVALUATION
Anesthesia Post Evaluation    Patient: Christine Mosqueda    Procedure(s) Performed: Procedure(s) (LRB):  EXPLORATION, WOUND (Right)    Final Anesthesia Type: general      Patient location during evaluation: PACU  Patient participation: Yes- Able to Participate  Level of consciousness: awake and alert and oriented  Post-procedure vital signs: reviewed and stable  Pain management: adequate  Airway patency: patent    PONV status at discharge: No PONV  Anesthetic complications: no      Cardiovascular status: blood pressure returned to baseline, hemodynamically stable and stable  Respiratory status: unassisted, spontaneous ventilation and room air  Hydration status: euvolemic  Follow-up not needed.              Vitals Value Taken Time   /79 02/21/24 1217   Temp 36.1 °C (97 °F) 02/21/24 1215   Pulse 80 02/21/24 1226   Resp 16 02/21/24 1246   SpO2 95 % 02/21/24 1215   Vitals shown include unvalidated device data.      Event Time   Out of Recovery 02/21/2024 13:01:22         Pain/Yoel Score: Pain Rating Prior to Med Admin: 7 (2/21/2024 12:46 PM)  Pain Rating Post Med Admin: 4 (2/21/2024 12:16 PM)  Yoel Score: 8 (2/21/2024 12:16 PM)

## 2024-02-21 NOTE — NURSING TRANSFER
Nursing Transfer Note      2/20/2024   6:38 PM    Nurse giving handoff:CALEB Orellana  Nurse receiving handoff:April    Reason patient is being transferred: downgraded level of care    Transfer To: 333    Transfer via stretcher    Transfer with O2    Transported by Transporter    Transfer Vital Signs:  Blood Pressure:140/72  Heart Rate:78  O2:97  Temperature:97.5  Respirations:18    Telemetry:   Order for Tele Monitor? No    Additional Lines: Oxygen    4eyes on Skin: yes    Medicines sent: insulin    Any special needs or follow-up needed: surgery    Patient belongings transferred with patient: Yes    Chart send with patient: Yes    Notified: spouse    Patient reassessed at: 1830, 2/20/24 (date, time)  1  Upon arrival to floor: cardiac monitor applied, patient oriented to room, call bell in reach, and bed in lowest position

## 2024-02-21 NOTE — PLAN OF CARE
Problem: Adult Inpatient Plan of Care  Goal: Plan of Care Review  Outcome: Ongoing, Progressing     Problem: Skin Injury Risk Increased  Goal: Skin Health and Integrity  Outcome: Ongoing, Progressing     Problem: Adjustment to Illness (Sepsis/Septic Shock)  Goal: Optimal Coping  Outcome: Ongoing, Progressing     Problem: Fluid and Electrolyte Imbalance (Acute Kidney Injury/Impairment)  Goal: Fluid and Electrolyte Balance  Outcome: Ongoing, Progressing     Problem: Fall Injury Risk  Goal: Absence of Fall and Fall-Related Injury  Outcome: Ongoing, Progressing     Problem: Infection  Goal: Absence of Infection Signs and Symptoms  Outcome: Ongoing, Progressing

## 2024-02-21 NOTE — ASSESSMENT & PLAN NOTE
Patient's anemia is currently controlled. Has received 1 units of PRBCs on 2/19 and 02/21 . Etiology likely d/t chronic disease and acute blood loss post surgery.  Current CBC reviewed-   Lab Results   Component Value Date    HGB 6.9 (L) 02/21/2024    HCT 22.1 (L) 02/21/2024     - monitor daily   -s/p 1U pRBC on 02/19 and 02/21

## 2024-02-21 NOTE — TRANSFER OF CARE
"Anesthesia Transfer of Care Note    Patient: Christine Mosqueda    Procedure(s) Performed: Procedure(s) (LRB):  EXPLORATION, WOUND (Right)    Patient location: PACU    Anesthesia Type: general    Transport from OR: Transported from OR on 2-3 L/min O2 by NC with adequate spontaneous ventilation    Post pain: adequate analgesia    Post assessment: no apparent anesthetic complications and tolerated procedure well    Post vital signs: stable    Level of consciousness: awake    Nausea/Vomiting: no nausea/vomiting    Complications: none    Transfer of care protocol was followed      Last vitals: Visit Vitals  BP (!) 154/100   Pulse 79   Temp 35.9 °C (96.6 °F)   Resp (!) 25   Ht 5' 4" (1.626 m)   Wt 93.6 kg (206 lb 5.6 oz)   LMP 01/29/2018 (Approximate)   SpO2 (!) 93%   BMI 35.42 kg/m²     "

## 2024-02-21 NOTE — SUBJECTIVE & OBJECTIVE
Interval History:  No acute overnight events.  Patient remained afebrile.  States her pain in her right inner thigh is currently controlled.  The pain is worse with wound dressings.  Plan for OR debridement today.    Review of Systems   Constitutional:  Negative for chills and fever.   Respiratory:  Negative for shortness of breath.    Cardiovascular:  Negative for chest pain.   Gastrointestinal:  Negative for abdominal pain.   Skin:  Positive for wound.   Psychiatric/Behavioral:  Negative for confusion.      Objective:     Vital Signs (Most Recent):  Temp: 97.9 °F (36.6 °C) (02/21/24 0808)  Pulse: 80 (02/21/24 0808)  Resp: 18 (02/21/24 0808)  BP: 128/78 (02/21/24 0945)  SpO2: 98 % (02/21/24 0808) Vital Signs (24h Range):  Temp:  [97.5 °F (36.4 °C)-98.8 °F (37.1 °C)] 97.9 °F (36.6 °C)  Pulse:  [78-92] 80  Resp:  [11-24] 18  SpO2:  [91 %-100 %] 98 %  BP: (125-175)/(62-98) 128/78     Weight: 93.6 kg (206 lb 5.6 oz)  Body mass index is 35.42 kg/m².    Intake/Output Summary (Last 24 hours) at 2/21/2024 1104  Last data filed at 2/21/2024 1026  Gross per 24 hour   Intake 1144.25 ml   Output 2250 ml   Net -1105.75 ml           Physical Exam  Vitals and nursing note reviewed.   Constitutional:       General: She is not in acute distress.     Appearance: She is obese.   HENT:      Head: Atraumatic.      Nose: Nose normal.      Mouth/Throat:      Mouth: Mucous membranes are moist.   Eyes:      Extraocular Movements: Extraocular movements intact.   Cardiovascular:      Rate and Rhythm: Normal rate and regular rhythm.   Pulmonary:      Effort: Pulmonary effort is normal.      Breath sounds: No wheezing.      Comments: 2L NC SpO2 100%  Abdominal:      General: Bowel sounds are normal. There is distension.      Tenderness: There is no abdominal tenderness.   Genitourinary:     Comments: jansen  Musculoskeletal:      Right lower leg: Edema present.      Left lower leg: Edema present.   Skin:     General: Skin is warm and dry.    Neurological:      Mental Status: She is alert and oriented to person, place, and time. Mental status is at baseline.             Significant Labs: All pertinent labs within the past 24 hours have been reviewed.    Significant Imaging: I have reviewed all pertinent imaging results/findings within the past 24 hours.

## 2024-02-21 NOTE — PROGRESS NOTES
[facility]  Nurses Note -- 4 Eyes      2/21/2024       Skin assessed on: Q Shift      [x] No Pressure Injuries Present    []Prevention Measures Documented    [] Yes LDA  for Pressure Injury Previously documented     [] Yes New Pressure Injury Discovered   [] LDA for New Pressure Injury Added      Attending RN:  Ángel Devlin RN     Second RN:  Narciso Hdz RN

## 2024-02-21 NOTE — OP NOTE
[facility] - [place] - Surgery  Brief Operative Note     SUMMARY      Surgery Date: 2/21/2024      Surgeon(s) and Role:     * Scott Koroma MD - Primary    * Gabi Marques MD - Resident Chief   * Steven Paula MD, Resident Assist     Pre-op Diagnosis:  Librado's gangrene [N49.3]     Post-op Diagnosis:  Post-Op Diagnosis Codes:     * Librado's gangrene [N49.3]     Procedure(s) (LRB):  EXPLORATION, WOUND (Right)     Anesthesia: General     Implants:  * No implants in log *     Operative Findings: Minimal necrotic tissue requiring very superficial debridement. Wound vac placed.      Wound measured 22 cm in length, 15 cm in width, 8 cm in depth.    Indication: Christine Mosqueda is 53 y.o. female with admission for right groin and thigh necrotizing soft tissue infection which has had at the prior debridements now indicated for take back for further wound exploration washout and possible further debridement. After discussion of disease process, we discussed options and have elected for operation to perform wound debridement and wound exploration of right thigh and right groin.     Procedure:    The patient was identified in preoperative holding and brought back to the operating room. She was placed supine on the operating room table and padded appropriately. Monitors were applied and there was smooth induction of general endotracheal anesthesia. The patient was placed in lithotomy position. We removed the prior packing and exploring the wound base. The right groin extending to the right thigh were all prepped and draped in a standard sterile fashion. A timeout was performed and all team members present agreed that this was the correct procedure on the correct patient. We also confirmed administration of appropriate preoperative antibiotics.    We explored the wound in entirety and found a small section of fibrinous vs necrotic tissue a the medial central wound base which was debrided. A small area of suspicious appearing  skin and tissue at one of the inferior edges of the wound was sharply debrided. We then irrigated this field and obtained hemostasis. We then applied a wound vac negative pressure dressing.     Sterile dressings were applied. The patient was extubated in the operating room and transported to the recovery room in stable condition. All sponge, instrument, and needle counts were reported as correct at the end of the procedure.    Estimated Blood Loss: <10 cc    Complications: None    Drains: None    Specimen: None

## 2024-02-21 NOTE — ASSESSMENT & PLAN NOTE
Patient's FSGs are uncontrolled due to hyperglycemia on current medication regimen.  Last A1c reviewed-   Lab Results   Component Value Date    HGBA1C 8.2 (H) 04/06/2023     Most recent fingerstick glucose reviewed-   Recent Labs   Lab 02/20/24  2035 02/21/24  0808 02/21/24  1146 02/21/24  1334   POCTGLUCOSE 140* 227* 131* 150*       Current correctional scale  High  Maintain anti-hyperglycemic dose as follows-   Antihyperglycemics (From admission, onward)    Start     Stop Route Frequency Ordered    02/19/24 0900  insulin detemir U-100 (Levemir) pen 15 Units         -- SubQ Daily 02/18/24 1519    02/18/24 2100  insulin detemir U-100 (Levemir) pen 10 Units         -- SubQ Nightly 02/18/24 1519    02/16/24 1514  insulin aspart U-100 pen 0-15 Units         -- SubQ Before meals & nightly PRN 02/16/24 1515        Hold Oral hypoglycemics while patient is in the hospital.

## 2024-02-21 NOTE — PLAN OF CARE
Renal function back to baseline. Nephrology will sign off. Please reconsult should status change.     Thank you for allowing us to participate in the care of ms Mosqueda.   Please call with any questions or concerns.   Cathy Robledo PA-C  [facility]   [phone]

## 2024-02-21 NOTE — NURSING
Patient arrived to floor in stable condition. Visualized patient and assessed patient's overall condition and appearance. No complaints. NAD noted. Wound vac intact. No complaints of pain. Plan of care ongoing

## 2024-02-21 NOTE — PROGRESS NOTES
[facility] Medicine  Progress Note    Patient Name: Christine Mosqueda  MRN: [id number]  Patient Class: IP- Inpatient   Admission Date: 2/15/2024  Length of Stay: 5 days  Attending Physician: Mayra White DO  Primary Care Provider: [facility]        Subjective:     Principal Problem:Estephanie's gangrene        HPI:  Ms. Mosqueda is a 53 yoF with a PMHx of diastolic heart failure, HTN, T2DM on insulin. She presented to the hospital with worsening pain and swelling of the right medial thigh. She developed an abscess in the area that underwent I+D at [facility] a few days ago. She was sent home on PO abx. Since that time the pain and swelling have worsened.  In ED, CT scan demonstrated extensive inflammation extending from the right perineum to the right medial thigh with multiple foci of air. She was admitted to general surgery for estephanie's gangrene. She underwent extensive debridement under general surgery today in OR. She is currently doing well with good pain control. HM consulted for medical management.     Overview/Hospital Course:  Ms Christine Mosqueda is a 53 y.o.  woman with poorly controlled type II DM who was admitted for sepsis secondary to bacteremia and Estephanie's gangrene. Pt presented w/ worsening pain and swelling of the Rt medial thigh. CT scan findings were consistent w/ Estephanie's gangrene. General surgery team consulted. S/p surgical debridement 02/16, 2/17, 2/19. Plan for repeat debridement on 02/21. Had acute on chronic anemia, likely exacerbated with multiple debridements. Required blood transfusions on 02/19 and again on 02/21.  Blood and wound cultures with lactobacillus. ID consulted- on meropenem. Repeat blood cx with no growth thus far. Also found to have CARLINE on admission, nephrology following. CARLINE resolved. PT/OT recommends SNF on discharge.     Interval History:  No acute overnight events.  Patient remained afebrile.  States her pain in her right inner  thigh is currently controlled.  The pain is worse with wound dressings.  Plan for OR debridement today.    Review of Systems   Constitutional:  Negative for chills and fever.   Respiratory:  Negative for shortness of breath.    Cardiovascular:  Negative for chest pain.   Gastrointestinal:  Negative for abdominal pain.   Skin:  Positive for wound.   Psychiatric/Behavioral:  Negative for confusion.      Objective:     Vital Signs (Most Recent):  Temp: 97.9 °F (36.6 °C) (02/21/24 0808)  Pulse: 80 (02/21/24 0808)  Resp: 18 (02/21/24 0808)  BP: 128/78 (02/21/24 0945)  SpO2: 98 % (02/21/24 0808) Vital Signs (24h Range):  Temp:  [97.5 °F (36.4 °C)-98.8 °F (37.1 °C)] 97.9 °F (36.6 °C)  Pulse:  [78-92] 80  Resp:  [11-24] 18  SpO2:  [91 %-100 %] 98 %  BP: (125-175)/(62-98) 128/78     Weight: 93.6 kg (206 lb 5.6 oz)  Body mass index is 35.42 kg/m².    Intake/Output Summary (Last 24 hours) at 2/21/2024 1104  Last data filed at 2/21/2024 1026  Gross per 24 hour   Intake 1144.25 ml   Output 2250 ml   Net -1105.75 ml           Physical Exam  Vitals and nursing note reviewed.   Constitutional:       General: She is not in acute distress.     Appearance: She is obese.   HENT:      Head: Atraumatic.      Nose: Nose normal.      Mouth/Throat:      Mouth: Mucous membranes are moist.   Eyes:      Extraocular Movements: Extraocular movements intact.   Cardiovascular:      Rate and Rhythm: Normal rate and regular rhythm.   Pulmonary:      Effort: Pulmonary effort is normal.      Breath sounds: No wheezing.      Comments: 2L NC SpO2 100%  Abdominal:      General: Bowel sounds are normal. There is distension.      Tenderness: There is no abdominal tenderness.   Genitourinary:     Comments: jansen  Musculoskeletal:      Right lower leg: Edema present.      Left lower leg: Edema present.   Skin:     General: Skin is warm and dry.   Neurological:      Mental Status: She is alert and oriented to person, place, and time. Mental status is at  baseline.             Significant Labs: All pertinent labs within the past 24 hours have been reviewed.    Significant Imaging: I have reviewed all pertinent imaging results/findings within the past 24 hours.    Assessment/Plan:      * Librado's gangrene  CT on admit with R perineum to R medial thigh [place].   - Surgery consulted: S/p debridement on 02/16, 2/17, 2/19. Plan for repeat OR debridement on 02/21  - blood and wound cultures with lactobacillus. On meropenem per ID recs  - jansen in place due to surgical wound site    Sepsis with acute renal failure and tubular necrosis without septic shock  Defined by leukocytosis, tachycardia and groin abscess. Source is [place].   - surgical management with debridement  - antibiotics= meropenem per ID  - Leukocytosis improving     CARLINE (acute kidney injury)  Patient with acute kidney injury/acute renal failure likely due to pre-renal azotemia due to IVVD and acute tubular necrosis caused by sepsis  CARLINE is currently improving. Baseline creatinine  0.9  - Labs reviewed- Renal function/electrolytes with Estimated Creatinine Clearance: 70.5 mL/min (based on SCr of 1 mg/dL). according to latest data. Monitor urine output and serial BMP and adjust therapy as needed. Avoid nephrotoxins and renally dose meds for GFR listed above.    - Highly suspect ATN as an etiology at this time  - Nephrology consult requested, appreciate recs  - renal function is improving and almost at baseline.     Anemia of chronic disease  Patient's anemia is currently controlled. Has received 1 units of PRBCs on 2/19 and 02/21 . Etiology likely d/t chronic disease and acute blood loss post surgery.  Current CBC reviewed-   Lab Results   Component Value Date    HGB 6.9 (L) 02/21/2024    HCT 22.1 (L) 02/21/2024     - monitor daily   -s/p 1U pRBC on 02/19 and 02/21    Chronic diastolic congestive heart failure  Patient is identified as having Diastolic (HFpEF) heart failure that is Chronic. CHF is  currently controlled. Latest ECHO performed and demonstrates- Results for orders placed during the hospital encounter of 03/10/23    Echo    Interpretation Summary  · The left ventricle is normal in size with low normal systolic function.  · The estimated ejection fraction is 53%.  · There is abnormal septal wall motion.  · Indeterminate left ventricular diastolic function.  · Mild left atrial enlargement.  · Normal right ventricular size with low normal right ventricular systolic function.  · Mild right atrial enlargement.  · Mild mitral regurgitation.  · Mild tricuspid regurgitation.  · Normal central venous pressure (3 mmHg).  · The estimated PA systolic pressure is 23 mmHg.    No acute issues  Monitor fluid status- does have lower extremity edema and will need to resume lasix at some point, awaiting nephrology recs    Essential hypertension  Chronic, controlled.   -hold home meds at this time:  Amlodipine 10 mg, Lasix 40 mg p.o.    Latest blood pressure and vitals reviewed-     Temp:  [96.6 °F (35.9 °C)-98.8 °F (37.1 °C)]   Pulse:  [54-92]   Resp:  [16-27]   BP: (125-175)/()   SpO2:  [91 %-100 %] .   Home meds for hypertension were reviewed and noted below.   Hypertension Medications               amlodipine (NORVASC) 10 MG tablet Take 10 mg by mouth once daily.    furosemide (LASIX) 40 MG tablet Take 1 tablet (40 mg total) by mouth once daily.            While in the hospital, will manage blood pressure as follows; Adjust home antihypertensive regimen as follows- hold meds for now given infection/ narcotic use. Resume as warranted . May develop rebound HTN from drug/ alcohol withdrawal.    Will utilize p.r.n. blood pressure medication only if patient's blood pressure greater than 180/110 and she develops symptoms such as worsening chest pain or shortness of breath.    Type 2 diabetes mellitus with hyperglycemia, without long-term current use of insulin  Patient's FSGs are uncontrolled due to hyperglycemia  on current medication regimen.  Last A1c reviewed-   Lab Results   Component Value Date    HGBA1C 8.2 (H) 04/06/2023     Most recent fingerstick glucose reviewed-   Recent Labs   Lab 02/20/24  2035 02/21/24  0808 02/21/24  1146 02/21/24  1334   POCTGLUCOSE 140* 227* 131* 150*       Current correctional scale  High  Maintain anti-hyperglycemic dose as follows-   Antihyperglycemics (From admission, onward)      Start     Stop Route Frequency Ordered    02/19/24 0900  insulin detemir U-100 (Levemir) pen 15 Units         -- SubQ Daily 02/18/24 1519    02/18/24 2100  insulin detemir U-100 (Levemir) pen 10 Units         -- SubQ Nightly 02/18/24 1519    02/16/24 1514  insulin aspart U-100 pen 0-15 Units         -- SubQ Before meals & nightly PRN 02/16/24 1515          Hold Oral hypoglycemics while patient is in the hospital.          ILD (interstitial lung disease)  -Noted on imaging; CT chest 2022  -Stable, no acute issues.   -Not on home O2 but says she should be (but never received because she left AMA at OSH)  -would test prior to discharge .      Tobacco abuse  - Pt was counseled about cessation x4 minutes      Cocaine abuse  Patient sprinkles crack crystals in her marijuana  Wants help. Tearful. Emotional support provided.   CM consulted.       ETOH abuse  Patient drinks at least a 6 pack beer daily  - Start scheduled librium- wean ordered  - Thiamine, folate, potassium, magnesium      Class 2 severe obesity due to excess calories with serious comorbidity and body mass index (BMI) of 35.0 to 35.9 in adult  Body mass index is 35.42 kg/m². Morbid obesity complicates all aspects of disease management from diagnostic modalities to treatment. Weight loss encouraged and health benefits explained to patient.         Advanced care planning/counseling discussion  Advance Care Planning    Date: 02/21/2024    Code Status  I engaged the the patient in a voluntary conversation about the patient's preferences for care  at the very  end of life. The patient wishes to have CPR and other invasive treatments performed when her heart and/or breathing stops. I communicated to the patient that her wishes align with full code status and she agrees and verbalized understanding.   I spent a total of 16 minutes engaging the patient in this advance care planning discussion.            Code Status: Full Code        VTE Risk Mitigation (From admission, onward)           Ordered     IP VTE HIGH RISK PATIENT  Once         02/16/24 0506     Place sequential compression device  Until discontinued         02/16/24 0506                    Discharge Planning   AVTAR: 2/17/2024     Code Status: Full Code   Is the patient medically ready for discharge?:     Reason for patient still in hospital (select all that apply): Patient trending condition, Treatment, Consult recommendations, and PT / OT recommendations  Discharge Plan A: Home with family                  Dalia-Lyndsey Whiet DO  Department of Hospital Medicine   [facility] [place] - Telemetry

## 2024-02-21 NOTE — ANESTHESIA PREPROCEDURE EVALUATION
[facility]  Anesthesia Pre-Operative Evaluation         Patient Name: Christine Mosqueda  YOB: 1970  MRN: [id number]    SUBJECTIVE:     Pre-operative evaluation for Procedure(s) (LRB):  EXPLORATION, WOUND (Right)     02/21/2024    Christine Mosqueda is a 53 y.o. 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 To OR for repeat debridement and possible wound vac placement.    PMHx includes HTN, T2DM (A1c 8.2), polysubstance abuse, and HFpEF (PAP 50).    Nuclear Stress (5/8/23):    Normal myocardial perfusion scan. There is no evidence of myocardial ischemia or infarction.    The gated perfusion images showed an ejection fraction of 47% at rest. The gated perfusion images showed an ejection fraction of 54% post stress. Normal ejection fraction is greater than 47%.    There is normal wall motion at rest and post stress.    LV cavity size is normal at rest and normal at stress.    The ECG portion of the study is negative for ischemia.    The patient reported no chest pain during the stress test.    There were no arrhythmias during stress.    There are no prior studies for comparison.    TTE 2/19/24  Left Ventricle: There is hyperdynamic systolic function with a visually estimated ejection fraction of 70 - 75%.    Right Ventricle: Normal right ventricular cavity size. Systolic function is normal.    Left Atrium: Left atrium is mildly dilated.    Tricuspid Valve: There is mild regurgitation.    Pulmonary Artery: The estimated pulmonary artery systolic pressure is 55 mmHg.    IVC/SVC: Intermediate venous pressure at 8 mmHg.    Pericardium: There is a trivial posterior effusion.    LDA:        Peripheral IV - Single Lumen 02/19/24 0515 20 G Anterior;Left Upper Arm (Active)   Site Assessment Clean;Dry;Intact 02/20/24 2134   Extremity Assessment Distal to IV No warmth;No swelling;No redness;No abnormal discoloration 02/20/24 2134   Line Status Saline  "locked;Flushed;Blood return noted 02/20/24 2134   Dressing Status Clean;Dry;Intact 02/20/24 2134   Dressing Intervention Integrity maintained 02/20/24 2134   Dressing Change Due 02/23/24 02/20/24 2134   Site Change Due 02/23/24 02/20/24 2134   Reason Not Rotated Not due 02/20/24 2134   Number of days: 2            Urethral Catheter 02/16/24 0414 Non-latex;Straight-tip;Silicone 16 Fr. (Active)   $ Hernandez Insertion Bedside Insertion Performed 02/16/24 0746   Site Assessment Clean;Intact;Dry 02/20/24 1600   Collection Container Urimeter 02/20/24 1600   Securement Method secured to top of thigh w/ adhesive device 02/20/24 1600   Catheter Care Performed yes 02/20/24 1600   Reason for Continuing Urinary Catheterization Critically ill in ICU and requiring hourly monitoring of intake/output 02/20/24 1600   CAUTI Prevention Bundle Securement Device in place with 1" slack;Intact seal between catheter & drainage tubing;Drainage bag/urimeter off the floor;Sheeting clip in use;No dependent loops or kinks;Drainage bag/urimeter not overfilled (<2/3 full);Drainage bag/urimeter below bladder 02/20/24 0800   Output (mL) 700 mL 02/20/24 2332   Number of days: 5     Prev airway: RSI, gr1v bhandari 3    Patient Active Problem List   Diagnosis    Acute on chronic systolic heart failure    Anxiety    Type 2 diabetes mellitus with hyperglycemia, without long-term current use of insulin    Emphysema lung    Restrictive lung disease    Tobacco abuse    CARLINE (acute kidney injury)    Microcytic anemia    Superficial vein thrombosis    Vasculopathy    Nonadherence to medical treatment    Librado's gangrene    ILD (interstitial lung disease)    Chronic diastolic congestive heart failure    Sepsis with acute renal failure and tubular necrosis without septic shock    Essential hypertension    Anemia of chronic disease    Cocaine abuse    ETOH abuse       Review of patient's allergies indicates:   Allergen Reactions    Hydrochlorothiazide plus  "       Current Inpatient Medications:   acetaminophen  1,000 mg Oral Q8H    budesonide  1 mg Nebulization Q12H    And    arformoteroL  15 mcg Nebulization BID    aspirin  81 mg Oral Daily    atorvastatin  20 mg Oral Daily    chlordiazepoxide  5 mg Oral TID    Followed by    [START ON 2/22/2024] chlordiazepoxide  5 mg Oral BID    Followed by    [START ON 2/24/2024] chlordiazepoxide  5 mg Oral Daily    folic acid  1 mg Oral Daily    gabapentin  400 mg Oral BID    insulin detemir U-100  10 Units Subcutaneous QHS    insulin detemir U-100 (Levemir)  15 Units Subcutaneous Daily    meropenem (MERREM) IVPB  1 g Intravenous Q12H    mupirocin   Nasal BID    pantoprazole  40 mg Oral Daily    sodium zirconium cyclosilicate  10 g Oral Once    thiamine  100 mg Oral Daily       No current facility-administered medications on file prior to encounter.     Current Outpatient Medications on File Prior to Encounter   Medication Sig Dispense Refill    albuterol (ACCUNEB) 1.25 mg/3 mL Nebu Take 1.25 mg by nebulization every 6 (six) hours as needed.      ALPRAZolam (XANAX) 2 MG Tab Take 2 mg by mouth daily as needed for Anxiety.      amlodipine (NORVASC) 10 MG tablet Take 10 mg by mouth once daily.      aspirin 81 MG Chew Take 81 mg by mouth once daily.      atorvastatin (LIPITOR) 20 MG tablet Take 20 mg by mouth once daily.      fluticasone-salmeterol diskus inhaler 500-50 mcg Inhale 1 puff into the lungs 2 (two) times daily.      furosemide (LASIX) 40 MG tablet Take 1 tablet (40 mg total) by mouth once daily. 30 tablet 0    gabapentin (NEURONTIN) 400 MG capsule Take 400 mg by mouth 2 (two) times daily.      insulin aspart U-100 (NOVOLOG) 100 unit/mL injection Inject 10 Units into the skin 3 (three) times daily before meals.      insulin detemir U-100 (LEVEMIR) 100 unit/mL injection Inject 22 Units into the skin once daily.  12    nicotine (NICODERM CQ) 14 mg/24 hr Place 1 patch onto the skin once daily. 28 patch 1    omeprazole  (PRILOSEC) 40 MG capsule Take 40 mg by mouth once daily.      potassium chloride (KLOR-CON) 8 MEQ TbSR Take 1 tablet (8 mEq total) by mouth once daily. 30 tablet 0    promethazine (PHENERGAN) 12.5 MG Tab Take 25 mg by mouth every 6 (six) hours as needed.      tiotropium (SPIRIVA) 18 mcg inhalation capsule Inhale 18 mcg into the lungs once daily.         Past Surgical History:   Procedure Laterality Date     SECTION      DEBRIDEMENT Right 2024    Procedure: DEBRIDEMENT; Librado's gangrene;  Surgeon: Manish Nazario MD;  Location: [facility] OR;  Service: General;  Laterality: Right;  Lithotomy    PALATAL EXPANSION      WOUND EXPLORATION N/A 2024    Procedure: INCISION AND DRAINAGE; WOUND DEBRIDEMENT,;  Surgeon: Scott Koroma MD;  Location: [facility] OR;  Service: General;  Laterality: N/A;    WOUND EXPLORATION Right 2024    Procedure: EXPLORATION, WOUND;  Surgeon: Scott Koroma MD;  Location: [facility] OR;  Service: General;  Laterality: Right;  Lithotomy Position  wound vac (2 black sponges and white sponges)    WRIST SURGERY Right        Social History     Socioeconomic History    Marital status:    Tobacco Use    Smoking status: Some Days     Current packs/day: 0.25     Types: Cigarettes    Smokeless tobacco: Never   Substance and Sexual Activity    Alcohol use: Yes    Drug use: Yes     Types: Marijuana    Sexual activity: Yes     Partners: Male     Social Determinants of Health     Financial Resource Strain: Low Risk  (2024)    Overall Financial Resource Strain (CARDIA)     Difficulty of Paying Living Expenses: Not very hard   Food Insecurity: No Food Insecurity (2024)    Hunger Vital Sign     Worried About Running Out of Food in the Last Year: Never true     Ran Out of Food in the Last Year: Never true   Transportation Needs: No Transportation Needs (2024)    PRAPARE - Transportation     Lack of Transportation (Medical): No     Lack of Transportation (Non-Medical): No    Physical Activity: Inactive (2/16/2024)    Exercise Vital Sign     Days of Exercise per Week: 0 days     Minutes of Exercise per Session: 0 min   Stress: No Stress Concern Present (2/16/2024)    [employer] [place] [employer] - Occupational Stress Questionnaire     Feeling of Stress : Only a little   Social Connections: Moderately Isolated (2/16/2024)    Social Connection and Isolation Panel [NHANES]     Frequency of Communication with Friends and Family: Three times a week     Frequency of Social Gatherings with Friends and Family: Three times a week     Attends Rastafari Services: Never     Active Member of Clubs or Organizations: No     Attends Club or Organization Meetings: Never     Marital Status:    Housing Stability: Low Risk  (2/16/2024)    Housing Stability Vital Sign     Unable to Pay for Housing in the Last Year: No     Number of Places Lived in the Last Year: 1     Unstable Housing in the Last Year: No       OBJECTIVE:     Vital Signs Range (Last 24H):  Temp:  [36.4 °C (97.5 °F)-37.1 °C (98.8 °F)]   Pulse:  [78-97]   Resp:  [11-24]   BP: (125-175)/(62-98)   SpO2:  [91 %-100 %]       Significant Labs:  Lab Results   Component Value Date    WBC 16.32 (H) 02/21/2024    HGB 6.9 (L) 02/21/2024    HCT 22.1 (L) 02/21/2024     02/21/2024    CHOL 194 03/13/2020    TRIG 139 03/13/2020    HDL 65 (H) 03/13/2020    ALT 10 02/21/2024    AST 11 02/21/2024     (L) 02/21/2024    K 5.4 (H) 02/21/2024     02/21/2024    CREATININE 1.0 02/21/2024    BUN 16 02/21/2024    CO2 27 02/21/2024    TSH 1.703 06/19/2023    INR 0.8 (L) 03/05/2022    HGBA1C 8.2 (H) 04/06/2023       Diagnostic Studies: No relevant studies.    EKG:   Results for orders placed or performed during the hospital encounter of 02/15/24   EKG 12-lead    Collection Time: 02/15/24 11:07 PM   Result Value Ref Range    QRS Duration 82 ms    [facility] QTC Calculation 462 ms    Narrative    Test Reason : A41.9,    Vent. Rate : 095 BPM      Atrial Rate : 095 BPM     P-R Int : 132 ms          QRS Dur : 082 ms      QT Int : 368 ms       P-R-T Axes : 065 047 069 degrees     QTc Int : 462 ms    Normal sinus rhythm  Possible Anterior infarct (cited on or before 19-JUN-2023)  Abnormal ECG  When compared with ECG of 19-JUN-2023 17:50,  Significant changes have occurred  Confirmed by Ra Meek MD ([id number]) on 2/20/2024 3:25:24 PM    Referred By: [email or web address]   SELF           Confirmed By:Ra Meek MD       2D ECHO:  TTE:  Results for orders placed or performed during the hospital encounter of 02/15/24   Echo   Result Value Ref Range    BSA 1.72 m2    LA WIDTH 4.3 cm    RA Width 3.1 cm    LVOT stroke volume 112.81 cm3    LVIDd 5.42 3.5 - 6.0 cm    LV Systolic Volume 62.14 mL    LV Systolic Volume Index 31.4 mL/m2    LVIDs 3.80 2.1 - 4.0 cm    LV Diastolic Volume 142.80 mL    LV Diastolic Volume Index 72.12 mL/m2    IVS 0.91 0.6 - 1.1 cm    LVOT diameter 2.09 cm    LVOT area 3.4 cm2    FS 30 28 - 44 %    Left Ventricle Relative Wall Thickness 0.33 cm    Posterior Wall 0.89 0.6 - 1.1 cm    LV mass 181.27 g    LV Mass Index 92 g/m2    MV Peak E Bari 1.52 m/s    TDI LATERAL 0.10 m/s    TDI SEPTAL 0.11 m/s    E/E' ratio 14.48 m/s    MV Peak A Bari 1.73 m/s    TR Max Bari 3.41 m/s    E/A ratio 0.88     IVRT 133.21 msec    E wave deceleration time 204.96 msec    LV SEPTAL E/E' RATIO 13.82 m/s    LA Volume Index 51.1 mL/m2    LV LATERAL E/E' RATIO 15.20 m/s    LA volume 101.18 cm3    LVOT peak bari 1.86 m/s    Left Ventricular Outflow Tract Mean Velocity 1.25 cm/s    Left Ventricular Outflow Tract Mean Gradient 7.25 mmHg    RVDD 3.49 cm    RV S' 20.11 cm/s    TAPSE 2.63 cm    LA size 4.38 cm    Left Atrium Minor Axis 6.34 cm    Left Atrium Major Axis 6.30 cm    RA Major Axis 5.78 cm    AV regurgitation pressure 1/2 time 706.109929450189003 ms    AR Max Bari 4.07 m/s    AV mean gradient 12 mmHg    AV peak gradient 22 mmHg    Ao peak bari 2.32 m/s    Ao VTI 45.40 cm     LVOT peak VTI 32.90 cm    AV valve area 2.48 cm²    AV Velocity Ratio 0.80     AV index (prosthetic) 0.72     MICHELLE by Velocity Ratio 2.75 cm²    MV mean gradient 6 mmHg    MV peak gradient 12 mmHg    MV stenosis pressure 1/2 time 59.44 ms    MV valve area p 1/2 method 3.70 cm2    MV valve area by continuity eq 2.62 cm2    MV VTI 43.0 cm    Triscuspid Valve Regurgitation Peak Gradient 47 mmHg    PV PEAK VELOCITY 1.13 m/s    PV peak gradient 5 mmHg    Sinus 3.00 cm    Ascending aorta 3.07 cm    IVC diameter 2.00 cm    Mean e' 0.11 m/s    ZLVIDS 0.62     ZLVIDD -0.53     TV resting pulmonary artery pressure 55 mmHg    RV TB RVSP 11 mmHg    Est. RA pres 8 mmHg    Narrative      Left Ventricle: There is hyperdynamic systolic function with a visually   estimated ejection fraction of 70 - 75%.    Right Ventricle: Normal right ventricular cavity size. Systolic   function is normal.    Left Atrium: Left atrium is mildly dilated.    Tricuspid Valve: There is mild regurgitation.    Pulmonary Artery: The estimated pulmonary artery systolic pressure is   55 mmHg.    IVC/SVC: Intermediate venous pressure at 8 mmHg.    Pericardium: There is a trivial posterior effusion.         FRANCISCO:  No results found for this or any previous visit.    ASSESSMENT/PLAN:           Pre-op Assessment    I have reviewed the Patient Summary Reports.     I have reviewed the Nursing Notes. I have reviewed the NPO Status.   I have reviewed the Medications.     Review of Systems  Anesthesia Hx:  No problems with previous Anesthesia   History of prior surgery of interest to airway management or planning:          Denies Family Hx of Anesthesia complications.    Denies Personal Hx of Anesthesia complications.                    Social:  Recreational Drugs, Alcohol Use       Hematology/Oncology:    Oncology Normal    -- Anemia:                                  EENT/Dental:  EENT/Dental Normal           Cardiovascular:  Exercise tolerance: good   Hypertension     Denies CAD.     Denies Dysrhythmias.   CHF     WONG                            Pulmonary:    Denies COPD.      Denies Sleep Apnea.                Renal/:  Chronic Renal Disease, CKD                Hepatic/GI:     GERD             Neurological:    Denies Neuromuscular Disease.                                   Endocrine:  Diabetes           Psych:  Denies Psychiatric History.                  Physical Exam  General: Cooperative, Oriented and Lethargic    Airway:  Mouth Opening: Normal  TM Distance: Normal  Tongue: Normal  Neck ROM: Normal ROM    Dental:  Periodontal disease    Chest/Lungs:  Clear to auscultation, Normal Respiratory Rate  2L NC  Heart:  Rate: Normal  Rhythm: Regular Rhythm  Sounds: Normal    Abdomen:  Nontender, Soft, Normal        Anesthesia Plan  Type of Anesthesia, risks & benefits discussed:    Anesthesia Type: Gen ETT  Intra-op Monitoring Plan: Standard ASA Monitors  Post Op Pain Control Plan: multimodal analgesia  Induction:  IV  Airway Plan: Video, Post-Induction  Informed Consent: Informed consent signed with the Patient and all parties understand the risks and agree with anesthesia plan.  All questions answered.   ASA Score: 3  Day of Surgery Review of History & Physical: H&P Update referred to the surgeon/provider.    Ready For Surgery From Anesthesia Perspective.     .

## 2024-02-21 NOTE — ANESTHESIA PROCEDURE NOTES
Intubation    Date/Time: 2/21/2024 10:10 AM    Performed by: Brian Salcedo CRNA  Authorized by: Jessica Burgos MD    Intubation:     Induction:  Intravenous    Intubated:  Postinduction    Mask Ventilation:  Easy mask    Attempts:  1    Attempted By:  JESUS MANUEL    Blade:  Jama 3    Laryngeal View Grade: Grade I - full view of cords      Difficult Airway Encountered?: No      Complications:  None    Airway Device:  Oral endotracheal tube    Airway Device Size:  7.0    Style/Cuff Inflation:  Cuffed    Inflation Amount (mL):  5    Tube secured:  21    Secured at:  The teeth    Placement Verified By:  Capnometry    Complicating Factors:  None    Findings Post-Intubation:  BS equal bilateral and atraumatic/condition of teeth unchanged

## 2024-02-22 ENCOUNTER — ANESTHESIA EVENT (OUTPATIENT)
Dept: SURGERY | Facility: HOSPITAL | Age: 54
DRG: 853 | End: 2024-02-22
Payer: MEDICAID

## 2024-02-22 PROBLEM — R53.81 DEBILITY: Status: ACTIVE | Noted: 2024-02-22

## 2024-02-22 LAB
ALBUMIN SERPL BCP-MCNC: 1.3 G/DL (ref 3.5–5.2)
ALP SERPL-CCNC: 104 U/L (ref 55–135)
ALT SERPL W/O P-5'-P-CCNC: 6 U/L (ref 10–44)
ANION GAP SERPL CALC-SCNC: 7 MMOL/L (ref 8–16)
AST SERPL-CCNC: 11 U/L (ref 10–40)
BACTERIA BLD CULT: ABNORMAL
BACTERIA BLD CULT: NORMAL
BASOPHILS # BLD AUTO: 0.04 K/UL (ref 0–0.2)
BASOPHILS NFR BLD: 0.2 % (ref 0–1.9)
BILIRUB SERPL-MCNC: 0.2 MG/DL (ref 0.1–1)
BUN SERPL-MCNC: 17 MG/DL (ref 6–20)
CALCIUM SERPL-MCNC: 7.9 MG/DL (ref 8.7–10.5)
CHLORIDE SERPL-SCNC: 103 MMOL/L (ref 95–110)
CO2 SERPL-SCNC: 26 MMOL/L (ref 23–29)
CREAT SERPL-MCNC: 1.1 MG/DL (ref 0.5–1.4)
DIFFERENTIAL METHOD BLD: ABNORMAL
EOSINOPHIL # BLD AUTO: 0.1 K/UL (ref 0–0.5)
EOSINOPHIL NFR BLD: 0.4 % (ref 0–8)
ERYTHROCYTE [DISTWIDTH] IN BLOOD BY AUTOMATED COUNT: 19.3 % (ref 11.5–14.5)
EST. GFR  (NO RACE VARIABLE): 59 ML/MIN/1.73 M^2
GLUCOSE SERPL-MCNC: 210 MG/DL (ref 70–110)
HCT VFR BLD AUTO: 24.4 % (ref 37–48.5)
HGB BLD-MCNC: 7.6 G/DL (ref 12–16)
IMM GRANULOCYTES # BLD AUTO: 0.53 K/UL (ref 0–0.04)
IMM GRANULOCYTES NFR BLD AUTO: 2.8 % (ref 0–0.5)
LYMPHOCYTES # BLD AUTO: 2.3 K/UL (ref 1–4.8)
LYMPHOCYTES NFR BLD: 12.2 % (ref 18–48)
MAGNESIUM SERPL-MCNC: 1.7 MG/DL (ref 1.6–2.6)
MCH RBC QN AUTO: 23 PG (ref 27–31)
MCHC RBC AUTO-ENTMCNC: 31.1 G/DL (ref 32–36)
MCV RBC AUTO: 74 FL (ref 82–98)
MONOCYTES # BLD AUTO: 1.1 K/UL (ref 0.3–1)
MONOCYTES NFR BLD: 5.7 % (ref 4–15)
NEUTROPHILS # BLD AUTO: 14.7 K/UL (ref 1.8–7.7)
NEUTROPHILS NFR BLD: 78.7 % (ref 38–73)
NRBC BLD-RTO: 0 /100 WBC
PHOSPHATE SERPL-MCNC: 3.7 MG/DL (ref 2.7–4.5)
PLATELET # BLD AUTO: 326 K/UL (ref 150–450)
PLATELET BLD QL SMEAR: ABNORMAL
PMV BLD AUTO: ABNORMAL FL (ref 9.2–12.9)
POCT GLUCOSE: 115 MG/DL (ref 70–110)
POCT GLUCOSE: 204 MG/DL (ref 70–110)
POCT GLUCOSE: 301 MG/DL (ref 70–110)
POCT GLUCOSE: 387 MG/DL (ref 70–110)
POTASSIUM SERPL-SCNC: 4.9 MMOL/L (ref 3.5–5.1)
PROT SERPL-MCNC: 5.4 G/DL (ref 6–8.4)
RBC # BLD AUTO: 3.31 M/UL (ref 4–5.4)
SODIUM SERPL-SCNC: 136 MMOL/L (ref 136–145)
WBC # BLD AUTO: 18.69 K/UL (ref 3.9–12.7)

## 2024-02-22 PROCEDURE — 94761 N-INVAS EAR/PLS OXIMETRY MLT: CPT

## 2024-02-22 PROCEDURE — 94640 AIRWAY INHALATION TREATMENT: CPT

## 2024-02-22 PROCEDURE — 83735 ASSAY OF MAGNESIUM: CPT | Performed by: HOSPITALIST

## 2024-02-22 PROCEDURE — 84100 ASSAY OF PHOSPHORUS: CPT | Performed by: HOSPITALIST

## 2024-02-22 PROCEDURE — 25000003 PHARM REV CODE 250: Performed by: HOSPITALIST

## 2024-02-22 PROCEDURE — 99232 SBSQ HOSP IP/OBS MODERATE 35: CPT | Mod: ,,, | Performed by: SURGERY

## 2024-02-22 PROCEDURE — 85025 COMPLETE CBC W/AUTO DIFF WBC: CPT | Performed by: HOSPITALIST

## 2024-02-22 PROCEDURE — 99233 SBSQ HOSP IP/OBS HIGH 50: CPT | Mod: ,,, | Performed by: INTERNAL MEDICINE

## 2024-02-22 PROCEDURE — 27000221 HC OXYGEN, UP TO 24 HOURS

## 2024-02-22 PROCEDURE — 97166 OT EVAL MOD COMPLEX 45 MIN: CPT

## 2024-02-22 PROCEDURE — 21400001 HC TELEMETRY ROOM

## 2024-02-22 PROCEDURE — 97110 THERAPEUTIC EXERCISES: CPT | Mod: CQ

## 2024-02-22 PROCEDURE — 97535 SELF CARE MNGMENT TRAINING: CPT

## 2024-02-22 PROCEDURE — 80053 COMPREHEN METABOLIC PANEL: CPT | Performed by: HOSPITALIST

## 2024-02-22 PROCEDURE — 63600175 PHARM REV CODE 636 W HCPCS: Performed by: HOSPITALIST

## 2024-02-22 PROCEDURE — 63600175 PHARM REV CODE 636 W HCPCS: Performed by: STUDENT IN AN ORGANIZED HEALTH CARE EDUCATION/TRAINING PROGRAM

## 2024-02-22 PROCEDURE — 36415 COLL VENOUS BLD VENIPUNCTURE: CPT | Performed by: HOSPITALIST

## 2024-02-22 PROCEDURE — 25000003 PHARM REV CODE 250: Performed by: STUDENT IN AN ORGANIZED HEALTH CARE EDUCATION/TRAINING PROGRAM

## 2024-02-22 PROCEDURE — 25000242 PHARM REV CODE 250 ALT 637 W/ HCPCS: Performed by: HOSPITALIST

## 2024-02-22 PROCEDURE — 97530 THERAPEUTIC ACTIVITIES: CPT | Mod: CQ

## 2024-02-22 RX ORDER — FUROSEMIDE 40 MG/1
40 TABLET ORAL DAILY
Status: DISCONTINUED | OUTPATIENT
Start: 2024-02-23 | End: 2024-03-06 | Stop reason: HOSPADM

## 2024-02-22 RX ORDER — INSULIN ASPART 100 [IU]/ML
5 INJECTION, SOLUTION INTRAVENOUS; SUBCUTANEOUS
Status: DISCONTINUED | OUTPATIENT
Start: 2024-02-22 | End: 2024-03-06 | Stop reason: HOSPADM

## 2024-02-22 RX ADMIN — THIAMINE HCL TAB 100 MG 100 MG: 100 TAB at 08:02

## 2024-02-22 RX ADMIN — INSULIN ASPART 15 UNITS: 100 INJECTION, SOLUTION INTRAVENOUS; SUBCUTANEOUS at 08:02

## 2024-02-22 RX ADMIN — INSULIN ASPART 4 UNITS: 100 INJECTION, SOLUTION INTRAVENOUS; SUBCUTANEOUS at 04:02

## 2024-02-22 RX ADMIN — ACETAMINOPHEN 1000 MG: 500 TABLET ORAL at 03:02

## 2024-02-22 RX ADMIN — FOLIC ACID 1 MG: 1 TABLET ORAL at 08:02

## 2024-02-22 RX ADMIN — MEROPENEM 1 G: 1 INJECTION INTRAVENOUS at 05:02

## 2024-02-22 RX ADMIN — INSULIN DETEMIR 5 UNITS: 100 INJECTION, SOLUTION SUBCUTANEOUS at 11:02

## 2024-02-22 RX ADMIN — GABAPENTIN 400 MG: 400 CAPSULE ORAL at 09:02

## 2024-02-22 RX ADMIN — ARFORMOTEROL TARTRATE 15 MCG: 15 SOLUTION RESPIRATORY (INHALATION) at 07:02

## 2024-02-22 RX ADMIN — OXYCODONE 10 MG: 5 TABLET ORAL at 10:02

## 2024-02-22 RX ADMIN — PANTOPRAZOLE SODIUM 40 MG: 40 TABLET, DELAYED RELEASE ORAL at 08:02

## 2024-02-22 RX ADMIN — GABAPENTIN 400 MG: 400 CAPSULE ORAL at 08:02

## 2024-02-22 RX ADMIN — ASPIRIN 81 MG CHEWABLE TABLET 81 MG: 81 TABLET CHEWABLE at 08:02

## 2024-02-22 RX ADMIN — CHLORDIAZEPOXIDE HYDROCHLORIDE 5 MG: 5 CAPSULE ORAL at 09:02

## 2024-02-22 RX ADMIN — ATORVASTATIN CALCIUM 20 MG: 10 TABLET, FILM COATED ORAL at 08:02

## 2024-02-22 RX ADMIN — MEROPENEM 1 G: 1 INJECTION INTRAVENOUS at 03:02

## 2024-02-22 RX ADMIN — OXYCODONE 10 MG: 5 TABLET ORAL at 05:02

## 2024-02-22 RX ADMIN — Medication 6 MG: at 10:02

## 2024-02-22 RX ADMIN — CHLORDIAZEPOXIDE HYDROCHLORIDE 5 MG: 5 CAPSULE ORAL at 08:02

## 2024-02-22 RX ADMIN — INSULIN DETEMIR 15 UNITS: 100 INJECTION, SOLUTION SUBCUTANEOUS at 09:02

## 2024-02-22 RX ADMIN — MUPIROCIN: 20 OINTMENT TOPICAL at 08:02

## 2024-02-22 RX ADMIN — ACETAMINOPHEN 1000 MG: 500 TABLET ORAL at 10:02

## 2024-02-22 RX ADMIN — INSULIN ASPART 5 UNITS: 100 INJECTION, SOLUTION INTRAVENOUS; SUBCUTANEOUS at 04:02

## 2024-02-22 RX ADMIN — INSULIN ASPART 5 UNITS: 100 INJECTION, SOLUTION INTRAVENOUS; SUBCUTANEOUS at 11:02

## 2024-02-22 RX ADMIN — OXYCODONE 10 MG: 5 TABLET ORAL at 03:02

## 2024-02-22 RX ADMIN — INSULIN ASPART 12 UNITS: 100 INJECTION, SOLUTION INTRAVENOUS; SUBCUTANEOUS at 11:02

## 2024-02-22 NOTE — ASSESSMENT & PLAN NOTE
Patient with Acute debility due to other reduced mobility. valuation for etiology is complete. Plan includes progressive mobility protocol initated and PT/OT consulted.    -PTOT recommend SNF  -CM to assist with placement

## 2024-02-22 NOTE — PROGRESS NOTES
Pharmacokinetic Assessment Follow Up: IV Vancomycin    Vancomycin serum concentration assessment(s):  Vancomycin therapy restarted  The random level was drawn correctly and can be used to guide therapy at this time. The measurement is below the desired definitive target range of 10 to 20 mcg/mL.    Vancomycin Regimen Plan:    Change regimen to Vancomycin 1000 mg IV every 12 hours with next serum trough concentration measured at 07:00 prior to 4th dose on 2/23/24    Drug levels (last 3 results):  Recent Labs   Lab Result Units 02/19/24  0341 02/20/24  0358 02/21/24  1731   Vancomycin, Random ug/mL 13.5 11.0 7.1       Pharmacy will continue to follow and monitor vancomycin.    Please contact pharmacy at extension [phone] for questions regarding this assessment.    Thank you for the consult,   Laura Huber       Patient brief summary:  Christine Mosqueda is a 53 y.o. female initiated on antimicrobial therapy with IV Vancomycin for treatment of bacteremia    The patient's current regimen is vancomycin 1000mg IV q12hr    Drug Allergies:   Review of patient's allergies indicates:   Allergen Reactions    Hydrochlorothiazide plus        Actual Body Weight:   93.6 kg    Renal Function:   Estimated Creatinine Clearance: 70.5 mL/min (based on SCr of 1 mg/dL).,     Dialysis Method (if applicable):  N/A    CBC (last 72 hours):  Recent Labs   Lab Result Units 02/19/24  0341 02/19/24  1246 02/20/24  0358 02/21/24  0426   WBC K/uL 24.07*  --  19.33* 16.32*   Hemoglobin g/dL 6.8* 12.5 7.7* 6.9*   Hematocrit % 21.3*  --  23.8* 22.1*   Platelets K/uL 499*  --  301 440   Gran % % 85.4*  --  79.9* 76.1*   Lymph % % 6.8*  --  10.6* 12.9*   Mono % % 4.3  --  5.9 6.7   Eosinophil % % 0.7  --  0.9 0.9   Basophil % % 0.2  --  0.2 0.3   Differential Method  Automated  --  Automated Automated       Metabolic Panel (last 72 hours):  Recent Labs   Lab Result Units 02/19/24  0341 02/20/24  0358 02/21/24  0426 02/21/24  0929   Sodium mmol/L 133* 138  135*  --    Potassium mmol/L 4.6 4.8 5.4* 5.1   Chloride mmol/L 102 103 105  --    CO2 mmol/L 25 25 27  --    Glucose mg/dL 164* 129* 207*  --    BUN mg/dL 34* 25* 16  --    Creatinine mg/dL 1.6* 1.2 1.0  --    Albumin g/dL 1.2* 1.2* 1.2*  --    Total Bilirubin mg/dL 0.6 0.5 0.2  --    Alkaline Phosphatase U/L 131 131 120  --    AST U/L 10 14 11  --    ALT U/L 5* 6* 10  --    Magnesium mg/dL 1.9 2.0 1.8  --    Phosphorus mg/dL 4.1 3.2 3.5  --        Vancomycin Administrations:  vancomycin given in the last 96 hours                     vancomycin (VANCOCIN) 1,000 mg in dextrose 5 % (D5W) 250 mL IVPB (Vial-Mate) (mg) 1,000 mg New Bag 02/20/24 0542    vancomycin 750 mg in dextrose 5 % (D5W) 250 mL IVPB (Vial-Mate) (mg) 750 mg New Bag 02/19/24 0606    vancomycin 750 mg in dextrose 5 % (D5W) 250 mL IVPB (Vial-Mate) (mg) 750 mg New Bag 02/18/24 0015                    Microbiologic Results:  Microbiology Results (last 7 days)       Procedure Component Value Units Date/Time    Blood culture [[id number]] Collected: 02/21/24 1732    Order Status: Sent Specimen: Blood from Peripheral, Hand, Left Updated: 02/21/24 1754    Blood culture [[id number]] Collected: 02/21/24 1744    Order Status: Sent Specimen: Blood from Peripheral, Hand, Right Updated: 02/21/24 1754    Blood culture [[id number]] Collected: 02/17/24 2006    Order Status: Completed Specimen: Blood from Peripheral, Hand, Right Updated: 02/21/24 1428     Blood Culture, Routine Gram stain aer bottle: Gram positive rods      Results called to and read back by:Norma arreola 02/21/2024  14:27    Culture, Anaerobe [[id number]] Collected: 02/17/24 0911    Order Status: Completed Specimen: Abscess from Groin Updated: 02/21/24 0546     Anaerobic Culture No anaerobes isolated    Narrative:      RIGHT THIGH WOUND    Blood culture [[id number]] Collected: 02/17/24 2005    Order Status: Completed Specimen: Blood from Peripheral, Hand, Right Updated: 02/21/24 0303     Blood  Culture, Routine No Growth to date      No Growth to date      No Growth to date      No Growth to date    Blood culture x two cultures. Draw prior to antibiotics. [[id number]]  (Abnormal) Collected: 02/15/24 2320    Order Status: Completed Specimen: Blood from Peripheral, Antecubital, Left Updated: 02/20/24 0807     Blood Culture, Routine Gram stain portia bottle: Gram positive rods      Results called to and read back by:Sandra HARVEY 02/17/2024  11:01      LACTOBACILLUS SPECIES  Further identified as Lactobacillus jensenii      Narrative:      Aerobic and anaerobic    Blood culture x two cultures. Draw prior to antibiotics. [[id number]]  (Abnormal) Collected: 02/15/24 2321    Order Status: Completed Specimen: Blood from Peripheral, Antecubital, Right Updated: 02/20/24 0806     Blood Culture, Routine Gram stain portia bottle: Gram positive rods      Results called to and read back by:KAMALA STEELE  02/17/2024  18:30      LACTOBACILLUS SPECIES  Further identified as Lactobacillus jensenii      Narrative:      Aerobic and anaerobic    Aerobic culture [[id number]]  (Abnormal) Collected: 02/17/24 0911    Order Status: Completed Specimen: Abscess from Groin Updated: 02/20/24 0755     Aerobic Bacterial Culture LACTOBACILLUS SPECIES  Many      Narrative:      RIGHT THIGH WOUND    AFB Culture & Smear [[id number]] Collected: 02/17/24 0911    Order Status: Completed Specimen: Abscess from Groin Updated: 02/19/24 2127     AFB Culture & Smear Culture in progress     AFB CULTURE STAIN No acid fast bacilli seen.    Narrative:      RIGHT THIGH WOUND    Culture, ID (Consult) [[id number]]  (Abnormal) Collected: 02/15/24 2321    Order Status: Completed Specimen: Blood from Peripheral, Antecubital, Right Updated: 02/19/24 1134     Culture, Identification (consult) LACTOBACILLUS SPECIES  Further identified as Lactobacillus jensenii      Narrative:      Aerobic and anaerobic    Rapid Organism ID by PCR (from Blood culture) [[id number]] Collected:  02/15/24 2320    Order Status: Completed Updated: 02/18/24 2241     Enterococcus faecalis Not Detected     Enterococcus faecium Not Detected     Listeria monocytogenes Not Detected     Staphylococcus spp. Not Detected     Staphylococcus aureus Not Detected     Staphylococcus epidermidis Not Detected     Staphylococcus lugdunensis Not Detected     Streptococcus species Not Detected     Streptococcus agalactiae Not Detected     Streptococcus pneumoniae Not Detected     Streptococcus pyogenes Not Detected     Acinetobacter calcoaceticus/baumannii complex Not Detected     Bacteroides fragilis Not Detected     Enterobacterales Not Detected     Enterobacter cloacae complex Not Detected     Escherichia coli Not Detected     Klebsiella aerogenes Not Detected     Klebsiella oxytoca Not Detected     Klebsiella pneumoniae group Not Detected     Proteus Not Detected     Salmonella sp Not Detected     Serratia marcescens Not Detected     Haemophilus influenzae Not Detected     Neisseria meningtidis Not Detected     Pseudomonas aeruginosa Not Detected     Stenotrophomonas maltophilia Not Detected     Candida albicans Not Detected     Candida auris Not Detected     Candida glabrata Not Detected     Candida krusei Not Detected     Candida parapsilosis Not Detected     Candida tropicalis Not Detected     Cryptococcus neoformans/gattii Not Detected     CTX-M (ESBL ) Test Not Applicable     IMP (Carbapenem resistant) Test Not Applicable     KPC resistance gene (Carbapenem resistant) Test Not Applicable     mcr-1  Test Not Applicable     mec A/C  Test Not Applicable     mec A/C and MREJ (MRSA) gene Test Not Applicable     NDM (Carbapenem resistant) Test Not Applicable     OXA-48-like (Carbapenem resistant) Test Not Applicable     van A/B (VRE gene) Test Not Applicable     VIM (Carbapenem resistant) Test Not Applicable    Narrative:      Aerobic and anaerobic    Gram stain [[id number]] Collected: 02/17/24 0911    Order Status:  Completed Specimen: Abscess from Groin Updated: 02/17/24 1447     Gram Stain Result Rare WBC's      Many Gram positive cocci in pairs and chains      Rare Gram negative rods    Narrative:      RIGHT THIGH WOUND    Fungus culture [[id number]] Collected: 02/17/24 0911    Order Status: Sent Specimen: Abscess from Groin Updated: 02/17/24 1113

## 2024-02-22 NOTE — PT/OT/SLP PROGRESS
Physical Therapy Treatment    Patient Name:  Christine Mosqueda   MRN:  [id number]    Recommendations:     Discharge Recommendations: Moderate Intensity Therapy  Discharge Equipment Recommendations: bedside commode, bath bench, wheelchair  Barriers to discharge: None    Assessment:     Christine Mosqueda is a 53 y.o. 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  She presents with the following impairments/functional limitations: weakness, impaired endurance, impaired self care skills, impaired functional mobility, gait instability, decreased lower extremity function, decreased upper extremity function, decreased coordination, decreased ROM, pain, decreased safety awareness, impaired balance, impaired cardiopulmonary response to activity, impaired skin, edema .    Rehab Prognosis: Good; patient would benefit from acute skilled PT services to address these deficits and reach maximum level of function.    Recent Surgery: Procedure(s) (LRB):  EXPLORATION, WOUND (Right) 1 Day Post-Op    Plan:     During this hospitalization, patient to be seen 4 x/week to address the identified rehab impairments via gait training, therapeutic activities, therapeutic exercises, neuromuscular re-education, wheelchair management/training and progress toward the following goals:    Plan of Care Expires:  03/05/24    Subjective     Chief Complaint: weakness and pain   Patient/Family Comments/goals: pt is pleasant and agreeable to therapy  Pain/Comfort:  Pain Addressed 1: ( I& D site pain)  Pre-medicate for activity, Reposition, Cessation of Activity, Nurse notified      Objective:     Communicated with nurse  prior to session.  Patient found up in chair with telemetry, peripheral IV, wound vac, jansen catheter, oxygen upon PT entry to room.     General Precautions: Standard, fall, respiratory  Orthopedic Precautions: N/A  Braces: N/A  Respiratory Status: Nasal cannula, flow 2 L/min   SpO2: 98% on 2L   Functional Mobility:  Bed  Mobility:     Rolling Right: stand by assistance  Scooting: contact guard assistance  Bridging: contact guard assistance  Sit to Supine: moderate/max  assistance with BLE   Transfers:     Sit to Stand: x 3 trials from chair  maximal assistance with rolling walker. V/T cues for safety technique and hands placement to push off from chair arm rests.   Chair to bed : moderate assistance with  rolling walker  using  Step Transfer.   Gait: Patient ambulated 6-7 steps on level tile with Rolling Walker with Mod Assistance.  Pt with demonstarting a  3-point gait with fwd head posture ,  decreased bre, increased time in double stance, decreased velocity of limb motion, decreased step length, decreased swing-to-stance ratio, decreased toe-to-floor clearance and decreased weight-shifting ability.Impairments contributing to gait deviations include impaired balance, decreased flexibility, pain, impaired postural control, decreased ROM and decreased strength. V/T cues for safety technique and walker management.     Balance: good in sitting, poor in standing       AM-PAC 6 CLICK MOBILITY  Turning over in bed (including adjusting bedclothes, sheets and blankets)?: 2  Sitting down on and standing up from a chair with arms (e.g., wheelchair, bedside commode, etc.): 2  Moving from lying on back to sitting on the side of the bed?: 2  Moving to and from a bed to a chair (including a wheelchair)?: 2  Need to walk in hospital room?: 2  Climbing 3-5 steps with a railing?: 1  Basic Mobility Total Score: 11       Treatment & Education:  Lower Extremity Exercises.    Patient educated on: Purpose and Benefits of Therapeutic Exercise(s).    Patient verbalized acceptance/understanding of instruction(s), expectation(s), and limitation(s) (for safety).  Patient performed: 2 sets of 10 reps (each) of B LE Ther Ex: AP, LAQ, Hip abd/add while sitting up on chair .       Patient required  verbal cues/tactile cues to ensure correct sequence, to  maintain proper form, and to allow for self-correction.  Pt tolerated static standing with RW, MIN A for balance from PTA while getting posteriro wipe and diaper placement via OT.     Patient left HOB elevated with all lines intact, call button in reach, bed alarm on, nurse notified, and PCT and family  present..    GOALS:   Multidisciplinary Problems       Physical Therapy Goals          Problem: Physical Therapy    Goal Priority Disciplines Outcome Goal Variances Interventions   Physical Therapy Goal     PT, PT/OT Ongoing, Progressing     Description: Goals to be met by: 3/5/24     Patient will increase functional independence with mobility by performin. Supine to sit with Modified K[place]t  2. Sit to stand transfer with Modified [place]  3. Bed to chair transfer with Modified [place]   4. Gait  x 10-15 feet with Stand-by Assistance using Rolling Walker.   5. Lower extremity exercise program x10 reps per handout, with supervision  6. W/C propulsion x 50' with Supervision                           Time Tracking:     PT Received On: 24  PT Start Time: 1328     PT Stop Time: 1356  PT Total Time (min): 28 min     Billable Minutes: Therapeutic Activity 14, Therapeutic Exercise 14 , and Total Time 28 min with OT     Treatment Type: Treatment  PT/PTA: PTA     Number of PTA visits since last PT visit: 2024

## 2024-02-22 NOTE — ASSESSMENT & PLAN NOTE
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   - Surgery consulted: S/p debridement on 02/16, 2/17, 2/19, and 2/21 (wound vac placed.)  -Plan for OR 2/23 for first wound vac change  - blood and wound cultures with lactobacillus. On meropenem per ID recs  - jansen in place due to surgical wound site

## 2024-02-22 NOTE — PROGRESS NOTES
Pharmacokinetic Assessment Follow Up: IV Vancomycin    Vancomycin serum concentration assessment(s):    Vancomycin therapy restarted  The random level was drawn correctly and can be used to guide therapy at this time. The measurement is below the desired definitive target range of 10 to 20 mcg/mL.    Vancomycin Regimen Plan:    Change regimen to Vancomycin 2000 mg IV every 24 hours with next serum trough concentration measured at 19:00 prior to 3rd dose on 2/23    Drug levels (last 3 results):  Recent Labs   Lab Result Units 02/19/24  0341 02/20/24  0358 02/21/24  1731   Vancomycin, Random ug/mL 13.5 11.0 7.1       Pharmacy will continue to follow and monitor vancomycin.    Please contact pharmacy at extension [phone] for questions regarding this assessment.    Thank you for the consult,   Laura Huber       Patient brief summary:  Christine Mosqueda is a 53 y.o. female initiated on antimicrobial therapy with IV Vancomycin for treatment of bacteremia    The patient's current regimen is Vancomycin 2000mg IV q24hr    Drug Allergies:   Review of patient's allergies indicates:   Allergen Reactions    Hydrochlorothiazide plus        Actual Body Weight:   93.6 kg    Renal Function:   Estimated Creatinine Clearance: 70.5 mL/min (based on SCr of 1 mg/dL).,     Dialysis Method (if applicable):  N/A    CBC (last 72 hours):  Recent Labs   Lab Result Units 02/19/24  0341 02/19/24  1246 02/20/24 0358 02/21/24  0426   WBC K/uL 24.07*  --  19.33* 16.32*   Hemoglobin g/dL 6.8* 12.5 7.7* 6.9*   Hematocrit % 21.3*  --  23.8* 22.1*   Platelets K/uL 499*  --  301 440   Gran % % 85.4*  --  79.9* 76.1*   Lymph % % 6.8*  --  10.6* 12.9*   Mono % % 4.3  --  5.9 6.7   Eosinophil % % 0.7  --  0.9 0.9   Basophil % % 0.2  --  0.2 0.3   Differential Method  Automated  --  Automated Automated       Metabolic Panel (last 72 hours):  Recent Labs   Lab Result Units 02/19/24  0341 02/20/24  0358 02/21/24  0426 02/21/24  0929   Sodium mmol/L 133* 138  135*  --    Potassium mmol/L 4.6 4.8 5.4* 5.1   Chloride mmol/L 102 103 105  --    CO2 mmol/L 25 25 27  --    Glucose mg/dL 164* 129* 207*  --    BUN mg/dL 34* 25* 16  --    Creatinine mg/dL 1.6* 1.2 1.0  --    Albumin g/dL 1.2* 1.2* 1.2*  --    Total Bilirubin mg/dL 0.6 0.5 0.2  --    Alkaline Phosphatase U/L 131 131 120  --    AST U/L 10 14 11  --    ALT U/L 5* 6* 10  --    Magnesium mg/dL 1.9 2.0 1.8  --    Phosphorus mg/dL 4.1 3.2 3.5  --        Vancomycin Administrations:  vancomycin given in the last 96 hours                     vancomycin (VANCOCIN) 1,000 mg in dextrose 5 % (D5W) 250 mL IVPB (Vial-Mate) (mg) 1,000 mg New Bag 02/20/24 0542    vancomycin 750 mg in dextrose 5 % (D5W) 250 mL IVPB (Vial-Mate) (mg) 750 mg New Bag 02/19/24 0606    vancomycin 750 mg in dextrose 5 % (D5W) 250 mL IVPB (Vial-Mate) (mg) 750 mg New Bag 02/18/24 0015                    Microbiologic Results:  Microbiology Results (last 7 days)       Procedure Component Value Units Date/Time    Blood culture [[id number]] Collected: 02/21/24 1732    Order Status: Sent Specimen: Blood from Peripheral, Hand, Left Updated: 02/21/24 1754    Blood culture [[id number]] Collected: 02/21/24 1744    Order Status: Sent Specimen: Blood from Peripheral, Hand, Right Updated: 02/21/24 1754    Blood culture [[id number]] Collected: 02/17/24 2006    Order Status: Completed Specimen: Blood from Peripheral, Hand, Right Updated: 02/21/24 1428     Blood Culture, Routine Gram stain aer bottle: Gram positive rods      Results called to and read back by:Norma arreola 02/21/2024  14:27    Culture, Anaerobe [[id number]] Collected: 02/17/24 0911    Order Status: Completed Specimen: Abscess from Groin Updated: 02/21/24 0546     Anaerobic Culture No anaerobes isolated    Narrative:      RIGHT THIGH WOUND    Blood culture [[id number]] Collected: 02/17/24 2005    Order Status: Completed Specimen: Blood from Peripheral, Hand, Right Updated: 02/21/24 0303     Blood  Culture, Routine No Growth to date      No Growth to date      No Growth to date      No Growth to date    Blood culture x two cultures. Draw prior to antibiotics. [[id number]]  (Abnormal) Collected: 02/15/24 2320    Order Status: Completed Specimen: Blood from Peripheral, Antecubital, Left Updated: 02/20/24 0807     Blood Culture, Routine Gram stain portia bottle: Gram positive rods      Results called to and read back by:Sandra HARVEY 02/17/2024  11:01      LACTOBACILLUS SPECIES  Further identified as Lactobacillus jensenii      Narrative:      Aerobic and anaerobic    Blood culture x two cultures. Draw prior to antibiotics. [[id number]]  (Abnormal) Collected: 02/15/24 2321    Order Status: Completed Specimen: Blood from Peripheral, Antecubital, Right Updated: 02/20/24 0806     Blood Culture, Routine Gram stain portia bottle: Gram positive rods      Results called to and read back by:KAMALA STEELE  02/17/2024  18:30      LACTOBACILLUS SPECIES  Further identified as Lactobacillus jensenii      Narrative:      Aerobic and anaerobic    Aerobic culture [[id number]]  (Abnormal) Collected: 02/17/24 0911    Order Status: Completed Specimen: Abscess from Groin Updated: 02/20/24 0755     Aerobic Bacterial Culture LACTOBACILLUS SPECIES  Many      Narrative:      RIGHT THIGH WOUND    AFB Culture & Smear [[id number]] Collected: 02/17/24 0911    Order Status: Completed Specimen: Abscess from Groin Updated: 02/19/24 2127     AFB Culture & Smear Culture in progress     AFB CULTURE STAIN No acid fast bacilli seen.    Narrative:      RIGHT THIGH WOUND    Culture, ID (Consult) [[id number]]  (Abnormal) Collected: 02/15/24 2321    Order Status: Completed Specimen: Blood from Peripheral, Antecubital, Right Updated: 02/19/24 1134     Culture, Identification (consult) LACTOBACILLUS SPECIES  Further identified as Lactobacillus jensenii      Narrative:      Aerobic and anaerobic    Rapid Organism ID by PCR (from Blood culture) [[id number]] Collected:  02/15/24 2320    Order Status: Completed Updated: 02/18/24 2241     Enterococcus faecalis Not Detected     Enterococcus faecium Not Detected     Listeria monocytogenes Not Detected     Staphylococcus spp. Not Detected     Staphylococcus aureus Not Detected     Staphylococcus epidermidis Not Detected     Staphylococcus lugdunensis Not Detected     Streptococcus species Not Detected     Streptococcus agalactiae Not Detected     Streptococcus pneumoniae Not Detected     Streptococcus pyogenes Not Detected     Acinetobacter calcoaceticus/baumannii complex Not Detected     Bacteroides fragilis Not Detected     Enterobacterales Not Detected     Enterobacter cloacae complex Not Detected     Escherichia coli Not Detected     Klebsiella aerogenes Not Detected     Klebsiella oxytoca Not Detected     Klebsiella pneumoniae group Not Detected     Proteus Not Detected     Salmonella sp Not Detected     Serratia marcescens Not Detected     Haemophilus influenzae Not Detected     Neisseria meningtidis Not Detected     Pseudomonas aeruginosa Not Detected     Stenotrophomonas maltophilia Not Detected     Candida albicans Not Detected     Candida auris Not Detected     Candida glabrata Not Detected     Candida krusei Not Detected     Candida parapsilosis Not Detected     Candida tropicalis Not Detected     Cryptococcus neoformans/gattii Not Detected     CTX-M (ESBL ) Test Not Applicable     IMP (Carbapenem resistant) Test Not Applicable     KPC resistance gene (Carbapenem resistant) Test Not Applicable     mcr-1  Test Not Applicable     mec A/C  Test Not Applicable     mec A/C and MREJ (MRSA) gene Test Not Applicable     NDM (Carbapenem resistant) Test Not Applicable     OXA-48-like (Carbapenem resistant) Test Not Applicable     van A/B (VRE gene) Test Not Applicable     VIM (Carbapenem resistant) Test Not Applicable    Narrative:      Aerobic and anaerobic    Gram stain [[id number]] Collected: 02/17/24 0911    Order Status:  Completed Specimen: Abscess from Groin Updated: 02/17/24 1447     Gram Stain Result Rare WBC's      Many Gram positive cocci in pairs and chains      Rare Gram negative rods    Narrative:      RIGHT THIGH WOUND    Fungus culture [[id number]] Collected: 02/17/24 0911    Order Status: Sent Specimen: Abscess from Groin Updated: 02/17/24 1111

## 2024-02-22 NOTE — NURSING
, order to give 5 units aspart now and recheck at 2200. Last aspart dose was at 1705.  Also will give scheduled levemir now.

## 2024-02-22 NOTE — ASSESSMENT & PLAN NOTE
"53F with h/o DM, admitted 2/15 with abscess in R medial thigh. CT c/f abscess/nec fascitis and now s/p several surgical debridements. Muscle necrosis noted. Cultures sent. Gram stain with GPC in pairs and chains and GNR.  Bcx on admit positive for GPR in one of two bottles. On vanc/meropenem. ID consulted for "fourniers gangrene, diabetic, CARLINE; no cultures taken at initial debridement "    Lactobacillus growing from wound and blood. Repeat blood cultures NGTD. Leukocytosis persists. OR trip planned for tomorrow.    Recommendations:   - continue abx  - wound care/additional debridement per surgery  - f/u OR findings  "

## 2024-02-22 NOTE — PROGRESS NOTES
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 CT c/f abscess/nec fascitis and now s/p several surgical debridements. Muscle necrosis noted. Cultures sent. Gram stain with GPC in pairs and chains and GNR.  Bcx on admit positive for GPR in one of two bottles. On vanc/meropenem. ID consulted for "Fourniers gangrene, diabetic, CARLINE; no cultures taken at initial debridement "    Lactobacillus growing from wound and blood. Repeat blood cultures NGTD. Leukocytosis persists. OR trip planned for tomorrow for vac change.    Recommendations:   - continue abx  - wound care/additional debridement per surgery  - f/u OR findings    Steven Hernandez MD  Infectious Disease  [facility]    Subjective:     Principal Problem:Librado's gangrene    HPI: 53F with h/o DM, admitted 2/15 with abscess in R medial thigh. Reports going to [facility] on 2/10 for the same thing. Given doxycycline, which she says she was taking. Wound on R leg became more painful, so came to hospital. Reports fever. Denies indwelling hardware. Reports dental caries, but no recent extractions. Reports aches all over, but no other areas of localized pain other than R thigh. Denies abx allergies.     CT 2/15-  Extensive inflammatory changes extending from the right perineum to the right medial thigh. Linear area of fluid attenuation in the right labial region, which may represent a small abscess. Multiple foci of air in the right medial thigh, which is difficult to measure. Considerations may include developing abscess, phlegmonous tissue, and or instrumentation resulting " "in subcutaneous air.       Bcx on admit positive for GPR in one of two bottles      Component 2 d ago   Blood Culture, Routine Gram stain portia bottle: Gram positive rods P   Blood Culture, Routine Results called to and read back by:Sandra HARVEY 02/17/2024  11:01 P          S/p I&D with general surgery on 2/16 and 2/17 with plans for another debridment Monday. Muscle necrosis noted. Cultures sent. Gram stain positive:       Component 09:11   Gram Stain Result Rare WBC's   Gram Stain Result Many Gram positive cocci in pairs and chains   Gram Stain Result Rare Gram negative rods     Hiv and hep c testing negative    On vanc/meropenem    ID consulted for "fourniers gangrene, diabetic, CARLINE; no cultures taken at initial debridement "  Interval History: Doing well. Sitting in chair. No complaints. Vac placed in OR yesterday.    Review of Systems   Skin:  Positive for wound.   All other systems reviewed and are negative.    Objective:     Vital Signs (Most Recent):  Temp: 98.3 °F (36.8 °C) (02/22/24 1114)  Pulse: 86 (02/22/24 1114)  Resp: 18 (02/22/24 1114)  BP: 133/72 (02/22/24 1114)  SpO2: 97 % (02/22/24 1114) Vital Signs (24h Range):  Temp:  [97.5 °F (36.4 °C)-98.6 °F (37 °C)] 98.3 °F (36.8 °C)  Pulse:  [77-93] 86  Resp:  [16-18] 18  SpO2:  [90 %-99 %] 97 %  BP: (133-152)/(68-96) 133/72     Weight: 93.6 kg (206 lb 5.6 oz)  Body mass index is 35.42 kg/m².    Estimated Creatinine Clearance: 64.1 mL/min (based on SCr of 1.1 mg/dL).     Physical Exam  Vitals and nursing note reviewed.   Constitutional:       Appearance: Normal appearance.   HENT:      Head: Normocephalic.   Eyes:      Pupils: Pupils are equal, round, and reactive to light.   Abdominal:      General: There is no distension.      Tenderness: There is no guarding.   Musculoskeletal:         General: Swelling present.   Skin:     Findings: No erythema.   Neurological:      Mental Status: She is alert.   Psychiatric:         Mood and Affect: Mood normal.         Thought " Content: Thought content normal.         Judgment: Judgment normal.          Significant Labs: Blood Culture:   Recent Labs   Lab 02/15/24  2321 02/17/24  2005 02/17/24 2006 02/21/24  1732 02/21/24  1744   LABBLOO Gram stain portia bottle: Gram positive rods  Results called to and read back by:KAMALA STEELE  02/17/2024  18:30  LACTOBACILLUS SPECIES  Further identified as Lactobacillus jensenii  * No Growth after 4 days. Gram stain aer bottle: Gram positive rods  Results called to and read back by:Norma arreola 02/21/2024  14:27  LACTOBACILLUS SPECIES* No Growth to date No Growth to date     BMP:   Recent Labs   Lab 02/22/24  0431   *      K 4.9      CO2 26   BUN 17   CREATININE 1.1   CALCIUM 7.9*   MG 1.7     CBC:   Recent Labs   Lab 02/21/24  0426 02/22/24  0431   WBC 16.32* 18.69*   HGB 6.9* 7.6*   HCT 22.1* 24.4*    326     Wound Culture:   Recent Labs   Lab 02/17/24  0911   LABAERO LACTOBACILLUS SPECIES  Many  *       Significant Imaging: I have reviewed all pertinent imaging results/findings within the past 24 hours.

## 2024-02-22 NOTE — SUBJECTIVE & OBJECTIVE
Interval History: Doing well. Sitting in chair. No complaints. Vac placed in OR yesterday.    Review of Systems   Skin:  Positive for wound.   All other systems reviewed and are negative.    Objective:     Vital Signs (Most Recent):  Temp: 98.3 °F (36.8 °C) (02/22/24 1114)  Pulse: 86 (02/22/24 1114)  Resp: 18 (02/22/24 1114)  BP: 133/72 (02/22/24 1114)  SpO2: 97 % (02/22/24 1114) Vital Signs (24h Range):  Temp:  [97.5 °F (36.4 °C)-98.6 °F (37 °C)] 98.3 °F (36.8 °C)  Pulse:  [77-93] 86  Resp:  [16-18] 18  SpO2:  [90 %-99 %] 97 %  BP: (133-152)/(68-96) 133/72     Weight: 93.6 kg (206 lb 5.6 oz)  Body mass index is 35.42 kg/m².    Estimated Creatinine Clearance: 64.1 mL/min (based on SCr of 1.1 mg/dL).     Physical Exam  Vitals and nursing note reviewed.   Constitutional:       Appearance: Normal appearance.   HENT:      Head: Normocephalic.   Eyes:      Pupils: Pupils are equal, round, and reactive to light.   Abdominal:      General: There is no distension.      Tenderness: There is no guarding.   Musculoskeletal:         General: Swelling present.   Skin:     Findings: No erythema.   Neurological:      Mental Status: She is alert.   Psychiatric:         Mood and Affect: Mood normal.         Thought Content: Thought content normal.         Judgment: Judgment normal.          Significant Labs: Blood Culture:   Recent Labs   Lab 02/15/24  2321 02/17/24 2005 02/17/24  2006 02/21/24  1732 02/21/24  1744   LABBLOO Gram stain portia bottle: Gram positive rods  Results called to and read back by:KAMALA STEELE  02/17/2024  18:30  LACTOBACILLUS SPECIES  Further identified as Lactobacillus jensenii  * No Growth after 4 days. Gram stain aer bottle: Gram positive rods  Results called to and read back by:Norma arreola 02/21/2024  14:27  LACTOBACILLUS SPECIES* No Growth to date No Growth to date     BMP:   Recent Labs   Lab 02/22/24  0431   *      K 4.9      CO2 26   BUN 17   CREATININE 1.1   CALCIUM 7.9*    MG 1.7     CBC:   Recent Labs   Lab 02/21/24  0426 02/22/24  0431   WBC 16.32* 18.69*   HGB 6.9* 7.6*   HCT 22.1* 24.4*    326     Wound Culture:   Recent Labs   Lab 02/17/24  0911   LABAERO LACTOBACILLUS SPECIES  Many  *       Significant Imaging: I have reviewed all pertinent imaging results/findings within the past 24 hours.

## 2024-02-22 NOTE — ASSESSMENT & PLAN NOTE
Patient is identified as having Diastolic (HFpEF) heart failure that is Chronic. CHF is currently controlled. Latest ECHO performed and demonstrates- Results for orders placed during the hospital encounter of 03/10/23    Echo    Interpretation Summary  · The left ventricle is normal in size with low normal systolic function.  · The estimated ejection fraction is 53%.  · There is abnormal septal wall motion.  · Indeterminate left ventricular diastolic function.  · Mild left atrial enlargement.  · Normal right ventricular size with low normal right ventricular systolic function.  · Mild right atrial enlargement.  · Mild mitral regurgitation.  · Mild tricuspid regurgitation.  · Normal central venous pressure (3 mmHg).  · The estimated PA systolic pressure is 23 mmHg.    No acute issues  Monitor fluid status- does have lower extremity edema   Discussed with nephrology, glo to resume lasix 02/23

## 2024-02-22 NOTE — ASSESSMENT & PLAN NOTE
Patient with acute kidney injury/acute renal failure likely due to pre-renal azotemia due to IVVD and acute tubular necrosis caused by sepsis  CARLINE is currently improving. Baseline creatinine  0.9  - Labs reviewed- Renal function/electrolytes with Estimated Creatinine Clearance: 64.1 mL/min (based on SCr of 1.1 mg/dL). according to latest data. Monitor urine output and serial BMP and adjust therapy as needed. Avoid nephrotoxins and renally dose meds for GFR listed above.    - Highly suspect ATN as an etiology at this time  - Nephrology consult requested, appreciate recs  - renal function is improving and near baseline.   -Resolved

## 2024-02-22 NOTE — SUBJECTIVE & OBJECTIVE
Interval History: NAEON. Feeling well this morning. AVSS. Wound vac in place and with good seal with appropriate output. Lab work with slight increase in WBC with overall hemoconcentration.    Medications:  Continuous Infusions:      Scheduled Meds:   acetaminophen  1,000 mg Oral Q8H    budesonide  1 mg Nebulization Q12H    And    arformoteroL  15 mcg Nebulization BID    aspirin  81 mg Oral Daily    atorvastatin  20 mg Oral Daily    chlordiazepoxide  5 mg Oral BID    Followed by    [START ON 2/24/2024] chlordiazepoxide  5 mg Oral Daily    folic acid  1 mg Oral Daily    furosemide  40 mg Oral Daily    gabapentin  400 mg Oral BID    insulin aspart U-100  5 Units Subcutaneous TIDWM    insulin detemir U-100  15 Units Subcutaneous QHS    insulin detemir U-100 (Levemir)  20 Units Subcutaneous Daily    meropenem (MERREM) IVPB  1 g Intravenous Q12H    pantoprazole  40 mg Oral Daily    thiamine  100 mg Oral Daily     PRN Meds:0.9%  NaCl infusion (for blood administration), albuterol sulfate, dextrose 10%, dextrose 10%, glucagon (human recombinant), glucose, glucose, HYDROmorphone, insulin aspart U-100, melatonin, ondansetron, oxyCODONE, oxyCODONE, prochlorperazine, sodium chloride 0.9%     Review of patient's allergies indicates:   Allergen Reactions    Hydrochlorothiazide plus      Objective:     Vital Signs (Most Recent):  Temp: 98.2 °F (36.8 °C) (02/23/24 0430)  Pulse: 81 (02/23/24 0430)  Resp: 16 (02/23/24 0524)  BP: (!) 132/95 (02/23/24 0430)  SpO2: 96 % (02/23/24 0430) Vital Signs (24h Range):  Temp:  [97.9 °F (36.6 °C)-98.4 °F (36.9 °C)] 98.2 °F (36.8 °C)  Pulse:  [78-91] 81  Resp:  [16-20] 16  SpO2:  [94 %-97 %] 96 %  BP: (102-145)/(51-95) 132/95     Weight: 93.6 kg (206 lb 5.6 oz)  Body mass index is 35.42 kg/m².    Intake/Output - Last 3 Shifts         02/21 0700  02/22 0659 02/22 0700 02/23 0659 02/23 0700 02/24 0659    P.O.  840     Blood 345      IV Piggyback 1677 197.7     Total Intake(mL/kg) 2022 (21.6)  1037.7 (11.1)     Urine (mL/kg/hr) 1100 (0.5) 2450 (1.1)     Other  200     Stool 0 0     Total Output 1100 2650     Net +922 -1612.4            Stool Occurrence 1 x 2 x              Physical Exam  Vitals and nursing note reviewed.   Constitutional:       Appearance: Normal appearance.   HENT:      Head: Normocephalic and atraumatic.   Cardiovascular:      Rate and Rhythm: Normal rate and regular rhythm.   Pulmonary:      Effort: Pulmonary effort is normal. No respiratory distress.   Abdominal:      General: Abdomen is flat. There is no distension.      Palpations: Abdomen is soft. There is no mass.      Tenderness: There is no abdominal tenderness.      Hernia: No hernia is present.   Genitourinary:     Comments: Hernandez catheter  Musculoskeletal:      Right lower leg: No edema.      Left lower leg: No edema.   Skin:     General: Skin is warm and dry.      Comments: Wound vac to right thigh with good seal and SS output   Neurological:      General: No focal deficit present.      Mental Status: She is alert and oriented to person, place, and time.   Psychiatric:         Mood and Affect: Mood normal.         Behavior: Behavior normal.          Significant Labs:  I have reviewed all pertinent lab results within the past 24 hours.  CBC:   Recent Labs   Lab 02/23/24 0412   WBC 19.69*   RBC 3.50*   HGB 8.4*   HCT 26.6*      MCV 76*   MCH 24.0*   MCHC 31.6*       CMP:   Recent Labs   Lab 02/23/24 0412   GLU 50*   CALCIUM 8.2*   ALBUMIN 1.4*   PROT 5.8*      K 5.0   CO2 27      BUN 17   CREATININE 0.8   ALKPHOS 122   ALT 11   AST 17   BILITOT 0.1       LFTs:   Recent Labs   Lab 02/23/24 0412   ALT 11   AST 17   ALKPHOS 122   BILITOT 0.1   PROT 5.8*   ALBUMIN 1.4*         Significant Diagnostics:  I have reviewed all pertinent imaging results/findings within the past 24 hours.

## 2024-02-22 NOTE — PLAN OF CARE
Problem: Diabetes Comorbidity  Goal: Blood Glucose Level Within Targeted Range  2/22/2024 0338 by Shanel Smith RN  Outcome: Ongoing, Progressing  2/22/2024 0337 by Shanel Smith RN  Reactivated     Problem: Adult Inpatient Plan of Care  Goal: Plan of Care Review  Outcome: Ongoing, Progressing     Problem: Skin Injury Risk Increased  Goal: Skin Health and Integrity  Outcome: Ongoing, Progressing     Problem: Adjustment to Illness (Sepsis/Septic Shock)  Goal: Optimal Coping  Outcome: Ongoing, Progressing     Problem: Bleeding (Sepsis/Septic Shock)  Goal: Absence of Bleeding  Outcome: Ongoing, Progressing     Problem: Glycemic Control Impaired (Sepsis/Septic Shock)  Goal: Blood Glucose Level Within Desired Range  Outcome: Ongoing, Progressing     Problem: Infection Progression (Sepsis/Septic Shock)  Goal: Absence of Infection Signs and Symptoms  Outcome: Ongoing, Progressing     Problem: Nutrition Impaired (Sepsis/Septic Shock)  Goal: Optimal Nutrition Intake  Outcome: Ongoing, Progressing     Problem: Fluid and Electrolyte Imbalance (Acute Kidney Injury/Impairment)  Goal: Fluid and Electrolyte Balance  Outcome: Ongoing, Progressing     Problem: Oral Intake Inadequate (Acute Kidney Injury/Impairment)  Goal: Optimal Nutrition Intake  Outcome: Ongoing, Progressing     Problem: Renal Function Impairment (Acute Kidney Injury/Impairment)  Goal: Effective Renal Function  Outcome: Ongoing, Progressing     Problem: Fall Injury Risk  Goal: Absence of Fall and Fall-Related Injury  Outcome: Ongoing, Progressing     Problem: Infection  Goal: Absence of Infection Signs and Symptoms  Outcome: Ongoing, Progressing

## 2024-02-22 NOTE — PLAN OF CARE
Plan of care is ongoing.      Problem: Diabetes Comorbidity  Goal: Blood Glucose Level Within Targeted Range  Outcome: Ongoing, Progressing     Problem: Adult Inpatient Plan of Care  Goal: Plan of Care Review  Outcome: Ongoing, Progressing  Goal: Patient-Specific Goal (Individualized)  Outcome: Ongoing, Progressing  Goal: Absence of Hospital-Acquired Illness or Injury  Outcome: Ongoing, Progressing  Goal: Optimal Comfort and Wellbeing  Outcome: Ongoing, Progressing  Goal: Readiness for Transition of Care  Outcome: Ongoing, Progressing     Problem: Skin Injury Risk Increased  Goal: Skin Health and Integrity  Outcome: Ongoing, Progressing     Problem: Adjustment to Illness (Sepsis/Septic Shock)  Goal: Optimal Coping  Outcome: Ongoing, Progressing     Problem: Bleeding (Sepsis/Septic Shock)  Goal: Absence of Bleeding  Outcome: Ongoing, Progressing     Problem: Glycemic Control Impaired (Sepsis/Septic Shock)  Goal: Blood Glucose Level Within Desired Range  Outcome: Ongoing, Progressing     Problem: Infection Progression (Sepsis/Septic Shock)  Goal: Absence of Infection Signs and Symptoms  Outcome: Ongoing, Progressing     Problem: Nutrition Impaired (Sepsis/Septic Shock)  Goal: Optimal Nutrition Intake  Outcome: Ongoing, Progressing     Problem: Fluid and Electrolyte Imbalance (Acute Kidney Injury/Impairment)  Goal: Fluid and Electrolyte Balance  Outcome: Ongoing, Progressing

## 2024-02-22 NOTE — NURSING
Bedside Report given to night nurse CALEB Ugarte. Walking rounds completed. Visualized and assessed patient NAD noted. Safety precautions maintained and call light within reach.     Chart check completed.

## 2024-02-22 NOTE — PT/OT/SLP EVAL
Occupational Therapy Evaluation       Name: Christine Mosqueda  MRN: [id number]  Admitting Diagnosis: Librado's gangrene  Recent Surgery: Procedure(s) (LRB):  EXPLORATION, WOUND (Right) 1 Day Post-Op    Recommendations:     Discharge Recommendations: Moderate Intensity Therapy  Level of Assistance Recommended: 24 hours significant assistance  Discharge Equipment Recommendations: bath bench, bedside commode, wheelchair  Barriers to discharge: Other (Comment) (patient not at PLOF)    Assessment:     Christine Mosqueda is a 53 y.o. female with a medical diagnosis of Librado's gangrene. She presents with performance deficits affecting function including weakness, impaired endurance, impaired sensation, impaired self care skills, impaired functional mobility, gait instability, impaired balance, decreased coordination, decreased upper extremity function, decreased lower extremity function, decreased safety awareness, pain, abnormal tone, decreased ROM, impaired cardiopulmonary response to activity. Pt. Highly motivated to work with therapy and had been sitting up in chair when occupational therapy arrived with wound vac and jansen catheter attached.     Rehab Prognosis: Good; patient would benefit from acute OT services to address these deficits and reach maximum level of function.    Plan:     Patient to be seen 5 x/week to address the above listed problems via self-care/home management, therapeutic activities, therapeutic exercises  Plan of Care Expires: 03/07/24  Plan of Care Reviewed with: patient, spouse, other (see comments) (niece)    Subjective     Chief Complaint: right lateral thigh pain, need for a bowel movement   Patient Comments/Goals: patient said she was considering going to a skilled facility   Pain/Comfort:  Pain Rating 1: other (see comments) (not rated-lateral thigh)  Location - Side 1: Right  Location - Orientation 1: upper  Location 1: leg    Patients cultural, spiritual, Protestant conflicts given the  current situation: no    Social History:  Living Environment: Patient lives with their spouse in a single story home with  include at front entry and 3 back door steps   Prior Level of Function: Prior to admission, patient requires assistance with ADLs including bating and toileting   Roles and Routines: Patient was not driving and not working prior to admission.  Equipment Used at Home: rollator  DME owned (not currently used): none  Assistance Upon Discharge: significant other and family    Objective:     Communicated with RNBerlin, prior to session. Patient found up in chair with telemetry, peripheral IV, wound vac, jansen catheter, oxygen upon OT entry to room.    General Precautions: Standard, fall, respiratory   Orthopedic Precautions: N/A   Braces: N/A    Respiratory Status: Nasal cannula, flow 2  L/min; pt. Stayed in mid 90s throughout session     Occupational Performance    Gait belt applied - Yes    Bed Mobility:   Scooting to HOB in supine: contact guard assistance  Sit to Supine with maximum assistance on right  side of bed for lower extremity management   Rolling: CGA     Functional Mobility/Transfers:  Sit <> Stand Transfer with maximum assistance x 2 people x 3 trials to assist with toileting with rolling walker  Bed <> Chair Transfer using Step Transfer technique with  moderate/maximum assistance with rolling walker  Functional Mobility: Patient walked 6-7 steps from chair to bed with RW with max/mod assistance.     Activities of Daily Living:  Upper Body Dressing: set up assistance to don outer gown   Toileting: total assistance for hygiene, brief management and cleanup of BM from chair     Cognitive/Visual Perceptual:  Cognitive/Psychosocial Skills:    -     Oriented to: Person, Place, Time, Situation  -     Follows Commands/attention: Follows multistep  commands  -     Communication: clear/fluent  -     Memory: No Deficits noted  -     Safety awareness/insight to disability: impaired  -      Mood/Affect/Coping skills/emotional control: Appropriate to situation  Visual/Perceptual:    -     Intact    Physical Exam:  Balance:    -     Sitting: supervision  -     Standing: moderate assistance  Postural examination/scapula alignment:    -       Rounded shoulders  -       Forward head  Skin integrity: Visible skin intact  Edema:  Moderate right lower extremity   Sensation: -       Intact  Motor Planning: Intact  Dominant hand: Right  Upper Extremity Range of Motion:     -       Right Upper Extremity: patient has limited shoulder AROM due to recent fall? With possible subluxation noted   -       Left Upper Extremity: WNL  Upper Extremity Strength:    -       Right Upper Extremity: 2/5 right shoulder; 3+/5 elbow and    -       Left Upper Extremity: WFL   Strength:    -       Right Upper Extremity: WFL  -       Left Upper Extremity: WNL  Fine Motor Coordination:    -       Intact  Gross motor coordination:   limited due to right shoulder AROM restrictions     AMPAC 6 Click ADL:  AMPAC Total Score: 16    Treatment & Education:  Patient educated on role of OT, POC, and goals for therapy  Patient educated on importance of OOB activities with staff member assistance and sitting OOB majority of the day  Occupational therapy educated patient re:  scapular retraction exercises to increase scapula strength  Patient has drop of shoulder with AAROM and PROM of right shoulder     Patient clear to stand pivot transfer with RN/PCT, assist x1-2  .    Patient left HOB elevated with all lines intact, call button in reach, RN notified, and spouse present.    GOALS:   Multidisciplinary Problems       Occupational Therapy Goals          Problem: Occupational Therapy    Goal Priority Disciplines Outcome Interventions   Occupational Therapy Goal     OT, PT/OT Ongoing, Progressing    Description: Goals to be met by: 03/07/2024     Patient will increase functional independence with ADLs by performing:    UE Dressing with  [place].  LE Dressing with Minimal Assistance.  Grooming while seated at sink with Set-up Assistance.  Toileting from bedside commode with Minimal Assistance for hygiene and clothing management.   Sitting in chair  x60  minutes with Supervision.  Toilet transfer to bedside commode with Supervision.  Upper extremity exercise program x10  reps per handout, with assistance as needed for right upper extremity.                         History:     Past Medical History:   Diagnosis Date    Anxiety     Arthritis     Bronchitis     CHF (congestive heart failure)     Diabetic ketoacidosis associated with type 2 diabetes mellitus 3/10/2023    DM (diabetes mellitus)     Emphysema lung     Encounter for blood transfusion     HTN (hypertension)     Restrictive lung disease     Sleep apnea          Past Surgical History:   Procedure Laterality Date     SECTION      DEBRIDEMENT Right 2024    Procedure: DEBRIDEMENT; Librado's gangrene;  Surgeon: Manish Nazario MD;  Location: [facility] OR;  Service: General;  Laterality: Right;  Lithotomy    PALATAL EXPANSION      WOUND EXPLORATION N/A 2024    Procedure: INCISION AND DRAINAGE; WOUND DEBRIDEMENT,;  Surgeon: Scott Koroma MD;  Location: [facility] OR;  Service: General;  Laterality: N/A;    WOUND EXPLORATION Right 2024    Procedure: EXPLORATION, WOUND;  Surgeon: Scott Koroma MD;  Location: [facility] OR;  Service: General;  Laterality: Right;  Lithotomy Position  wound vac (2 black sponges and white sponges)    WOUND EXPLORATION Right 2024    Procedure: EXPLORATION, WOUND;  Surgeon: Scott Koroma MD;  Location: [facility] OR;  Service: General;  Laterality: Right;  9:30am start    WRIST SURGERY Right        Time Tracking:     OT Date of Treatment: 24  OT Start Time: 1328  OT Stop Time: 1356  OT Total Time (min): 28 min    Billable Minutes: Evaluation 15  and Self Care/Home Management 13     Eval. - occupational therapy  PTA-co-treat   2024

## 2024-02-22 NOTE — SUBJECTIVE & OBJECTIVE
Interval History:  No acute overnight events.  Patient remained afebrile.  States her pain in her right inner thigh is currently controlled.  Wound VAC placed.  Discussed with patient about PT/OT recommendation for skilled nursing facility, patient agreeable.  Blood glucose uncontrolled, we will titrate insulin    Review of Systems   Constitutional:  Negative for chills and fever.   Respiratory:  Negative for shortness of breath.    Cardiovascular:  Negative for chest pain.   Gastrointestinal:  Negative for abdominal pain.   Skin:  Positive for wound.   Psychiatric/Behavioral:  Negative for confusion.      Objective:     Vital Signs (Most Recent):  Temp: 98 °F (36.7 °C) (02/22/24 1616)  Pulse: 91 (02/22/24 1616)  Resp: 18 (02/22/24 1616)  BP: (!) 145/81 (02/22/24 1616)  SpO2: 95 % (02/22/24 1616) Vital Signs (24h Range):  Temp:  [97.5 °F (36.4 °C)-98.6 °F (37 °C)] 98 °F (36.7 °C)  Pulse:  [77-93] 91  Resp:  [16-18] 18  SpO2:  [90 %-97 %] 95 %  BP: (133-152)/(68-96) 145/81     Weight: 93.6 kg (206 lb 5.6 oz)  Body mass index is 35.42 kg/m².    Intake/Output Summary (Last 24 hours) at 2/22/2024 1629  Last data filed at 2/22/2024 1404  Gross per 24 hour   Intake 1037.02 ml   Output 1550 ml   Net -512.98 ml           Physical Exam  Vitals and nursing note reviewed.   Constitutional:       General: She is not in acute distress.     Appearance: She is obese.   HENT:      Head: Atraumatic.      Nose: Nose normal.      Mouth/Throat:      Mouth: Mucous membranes are moist.   Eyes:      Extraocular Movements: Extraocular movements intact.   Cardiovascular:      Rate and Rhythm: Normal rate and regular rhythm.   Pulmonary:      Effort: Pulmonary effort is normal.      Breath sounds: No wheezing.      Comments: 2L NC SpO2 >94%  Abdominal:      General: Bowel sounds are normal. There is distension.      Tenderness: There is no abdominal tenderness.   Genitourinary:     Comments: jansen  Musculoskeletal:      Right lower leg:  Edema present.      Left lower leg: Edema present.   Skin:     General: Skin is warm and dry.   Neurological:      Mental Status: She is alert and oriented to person, place, and time. Mental status is at baseline.             Significant Labs: All pertinent labs within the past 24 hours have been reviewed.    Significant Imaging: I have reviewed all pertinent imaging results/findings within the past 24 hours.

## 2024-02-22 NOTE — PROGRESS NOTES
[facility]  General Surgery  Progress Note    Subjective:     History of Present Illness:  Ms. Mosqueda is a 53 yoF with a PMH significant for insulin-dependent T2DM and CHF (last echo EF 53%, PA systolic 23) who presents with worsening pain and swelling of the right medial thigh. She developed an abscess in the area that underwent I+D at [facility] a few days ago, after which she was sent home on PO abx. Since that time the pain and swelling have worsened leading to her presentation to our ED. Workup here revealed normal vital signs, but with significantly elevated WBC and CRP, with other laboratory derangements leading to a LRINIC score of 11. CT scan demonstrates extensive inflammation extending from the right perineum to the right medial thigh with multiple foci of air. She is not on any blood thinners. No issues with anesthesia in the past.    Post-Op Info:  Procedure(s) (LRB):  EXPLORATION, WOUND (Right)   1 Day Post-Op     Interval History: NAEON. Feeling well this morning. AVSS. Wound vac in place and with good seal. Lab work stable with expected bump in leukocytosis.    Medications:  Continuous Infusions:      Scheduled Meds:   acetaminophen  1,000 mg Oral Q8H    budesonide  1 mg Nebulization Q12H    And    arformoteroL  15 mcg Nebulization BID    aspirin  81 mg Oral Daily    atorvastatin  20 mg Oral Daily    chlordiazepoxide  5 mg Oral TID    Followed by    [START ON 2/22/2024] chlordiazepoxide  5 mg Oral BID    Followed by    [START ON 2/24/2024] chlordiazepoxide  5 mg Oral Daily    folic acid  1 mg Oral Daily    gabapentin  400 mg Oral BID    insulin detemir U-100  10 Units Subcutaneous QHS    insulin detemir U-100 (Levemir)  15 Units Subcutaneous Daily    meropenem (MERREM) IVPB  1 g Intravenous Q12H    mupirocin   Nasal BID    pantoprazole  40 mg Oral Daily    sodium zirconium cyclosilicate  10 g Oral Once    thiamine  100 mg Oral Daily     PRN Meds:0.9%  NaCl infusion (for blood administration),  albuterol sulfate, dextrose 10%, dextrose 10%, glucagon (human recombinant), glucose, glucose, HYDROmorphone, HYDROmorphone, insulin aspart U-100, melatonin, ondansetron, oxyCODONE, oxyCODONE, prochlorperazine, sodium chloride 0.9%, sodium chloride 0.9%     Review of patient's allergies indicates:   Allergen Reactions    Hydrochlorothiazide plus      Objective:     Vital Signs (Most Recent):  Temp: 97.9 °F (36.6 °C) (02/21/24 0808)  Pulse: 80 (02/21/24 0808)  Resp: 18 (02/21/24 0808)  BP: 128/78 (02/21/24 0945)  SpO2: 98 % (02/21/24 0808) Vital Signs (24h Range):  Temp:  [97.5 °F (36.4 °C)-98.8 °F (37.1 °C)] 97.9 °F (36.6 °C)  Pulse:  [78-92] 80  Resp:  [11-24] 18  SpO2:  [91 %-100 %] 98 %  BP: (125-175)/(62-98) 128/78     Weight: 93.6 kg (206 lb 5.6 oz)  Body mass index is 35.42 kg/m².    Intake/Output - Last 3 Shifts         02/19 0700  02/20 0659 02/20 0700  02/21 0659 02/21 0700  02/22 0659    P.O.  1100     I.V. (mL/kg) 43 (0.5)      Blood 333.8  345    IV Piggyback 1040.3 299.1 1000    Total Intake(mL/kg) 1417.1 (15.2) 1399.1 (14.9) 1345 (14.4)    Urine (mL/kg/hr) 2515 (1.1) 2250 (1)     Emesis/NG output       Stool  0     Blood 25      Total Output 2540 2250     Net -1122.9 -850.9 +1345           Stool Occurrence  1 x             Physical Exam  Vitals and nursing note reviewed.   Constitutional:       Appearance: Normal appearance.   HENT:      Head: Normocephalic and atraumatic.   Cardiovascular:      Rate and Rhythm: Normal rate and regular rhythm.   Pulmonary:      Effort: Pulmonary effort is normal. No respiratory distress.   Abdominal:      General: Abdomen is flat. There is no distension.      Palpations: Abdomen is soft. There is no mass.      Tenderness: There is no abdominal tenderness.      Hernia: No hernia is present.   Genitourinary:     Comments: Hernandez catheter  Musculoskeletal:      Right lower leg: No edema.      Left lower leg: No edema.   Skin:     General: Skin is warm and dry.       Comments: Wound vac to right thigh with good seal and SS output   Neurological:      General: No focal deficit present.      Mental Status: She is alert and oriented to person, place, and time.   Psychiatric:         Mood and Affect: Mood normal.         Behavior: Behavior normal.          Significant Labs:  I have reviewed all pertinent lab results within the past 24 hours.  CBC:   Recent Labs   Lab 02/21/24  0426 02/22/24  0431   WBC 16.32* 18.69*   RBC 3.12* 3.31*   HGB 6.9* 7.6*   HCT 22.1* 24.4*     --    MCV 71* 74*   MCH 22.1* 23.0*   MCHC 31.2* 31.1*     CMP:   Recent Labs   Lab 02/22/24  0431   *   CALCIUM 7.9*   ALBUMIN 1.3*   PROT 5.4*      K 4.9   CO2 26      BUN 17   CREATININE 1.1   ALKPHOS 104   ALT 6*   AST 11   BILITOT 0.2     LFTs:   Recent Labs   Lab 02/22/24  0431   ALT 6*   AST 11   ALKPHOS 104   BILITOT 0.2   PROT 5.4*   ALBUMIN 1.3*       Significant Diagnostics:  I have reviewed all pertinent imaging results/findings within the past 24 hours.  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    - Will return to the OR 2/23 for first wound vac change   - If this goes well we will plan on performing the next vac change at bedside  - NPO at midnight  - ID following; Vanc/zosyn/clinda started 2/16, switched to meropenam and vanc   - Operative cultures 2/17 with lactobacillus  - Poorly controlled DMII - insulin per HM  - Nutrition consult, boost supplements, severely hypoalbuminemia   - Polysubstance abuse to alcohol dependency - librium taper on, CIWA protocol  - Multiple modal pain control limited by CARLINE - continue scheduled tylenol, combination of oral and IV narcotics  -  Okay for DVT ppx   - PT consult - okay to attempt ambulation, get up to chair.   - Will have long term wound care needs; pending final disposition will need placement for this           Steven Paula MD  General Surgery  [facility] - Telemetry

## 2024-02-22 NOTE — ASSESSMENT & PLAN NOTE
Patient's FSGs are uncontrolled due to hyperglycemia on current medication regimen.  Last A1c reviewed-   Lab Results   Component Value Date    HGBA1C 8.2 (H) 04/06/2023     Most recent fingerstick glucose reviewed-   Recent Labs   Lab 02/21/24  2302 02/22/24  0812 02/22/24  1116 02/22/24  1615   POCTGLUCOSE 246* 387* 301* 204*       Current correctional scale  High  Maintain anti-hyperglycemic dose as follows-   Antihyperglycemics (From admission, onward)      Start     Stop Route Frequency Ordered    02/23/24 0900  insulin detemir U-100 (Levemir) pen 20 Units         -- SubQ Daily 02/22/24 1031    02/22/24 2100  insulin detemir U-100 (Levemir) pen 15 Units         -- SubQ Nightly 02/22/24 1031    02/22/24 1145  insulin aspart U-100 pen 5 Units         -- SubQ 3 times daily with meals 02/22/24 1031    02/16/24 1514  insulin aspart U-100 pen 0-15 Units         -- SubQ Before meals & nightly PRN 02/16/24 1515          Hold Oral hypoglycemics while patient is in the hospital.  A1c: 8.2  Meds: basal bolus insulin + SSI PRN to maintain goal 140-180  Titrate as needed. Basal BID (20AM, 15U PM), prandia at 5U TIDWM  ADA diet, accuchecks ACHS, hypoglycemic protocol

## 2024-02-22 NOTE — PROGRESS NOTES
[facility]  Progress Note    Patient Name: Christine Mosqueda  MRN: [id number]  Patient Class: IP- Inpatient   Admission Date: 2/15/2024  Length of Stay: 6 days  Attending Physician: Mayra White DO  Primary Care Provider: [facility]        Subjective:     Principal Problem:Estephanie's gangrene        HPI:  Ms. Mosqueda is a 53 yoF with a PMHx of diastolic heart failure, HTN, T2DM on insulin. She presented to the hospital with worsening pain and swelling of the right medial thigh. She developed an abscess in the area that underwent I+D at [facility] a few days ago. She was sent home on PO abx. Since that time the pain and swelling have worsened.  In ED, CT scan demonstrated extensive inflammation extending from the right perineum to the right medial thigh with multiple foci of air. She was admitted to general surgery for estephanie's gangrene. She underwent extensive debridement under general surgery today in OR. She is currently doing well with good pain control. HM consulted for medical management.     Overview/Hospital Course:  Ms Christine Mosqueda is a 53 y.o.  woman with poorly controlled type II DM who was admitted for sepsis secondary to bacteremia and Estephanie's gangrene. Pt presented w/ worsening pain and swelling of the Rt medial thigh. CT scan findings were consistent w/ Estephanie's gangrene. General surgery team consulted. S/p surgical debridement 02/16, 2/17, 2/19, and on 02/21 (wound vac placed. Surgery plans for OR 2/23 for first wound vac change. Had acute on chronic anemia, likely exacerbated with multiple debridements. Required blood transfusions on 02/19 and again on 02/21.  Blood and wound cultures with lactobacillus. ID consulted- on meropenem. Repeat blood cx with no growth thus far. Also found to have CARLINE on admission, nephrology following. CARLINE resolved. PT/OT recommends SNF on discharge.     Interval History:  No acute overnight events.  Patient  remained afebrile.  States her pain in her right inner thigh is currently controlled.  Wound VAC placed.  Discussed with patient about PT/OT recommendation for skilled nursing facility, patient agreeable.  Blood glucose uncontrolled, we will titrate insulin    Review of Systems   Constitutional:  Negative for chills and fever.   Respiratory:  Negative for shortness of breath.    Cardiovascular:  Negative for chest pain.   Gastrointestinal:  Negative for abdominal pain.   Skin:  Positive for wound.   Psychiatric/Behavioral:  Negative for confusion.      Objective:     Vital Signs (Most Recent):  Temp: 98 °F (36.7 °C) (02/22/24 1616)  Pulse: 91 (02/22/24 1616)  Resp: 18 (02/22/24 1616)  BP: (!) 145/81 (02/22/24 1616)  SpO2: 95 % (02/22/24 1616) Vital Signs (24h Range):  Temp:  [97.5 °F (36.4 °C)-98.6 °F (37 °C)] 98 °F (36.7 °C)  Pulse:  [77-93] 91  Resp:  [16-18] 18  SpO2:  [90 %-97 %] 95 %  BP: (133-152)/(68-96) 145/81     Weight: 93.6 kg (206 lb 5.6 oz)  Body mass index is 35.42 kg/m².    Intake/Output Summary (Last 24 hours) at 2/22/2024 1629  Last data filed at 2/22/2024 1404  Gross per 24 hour   Intake 1037.02 ml   Output 1550 ml   Net -512.98 ml           Physical Exam  Vitals and nursing note reviewed.   Constitutional:       General: She is not in acute distress.     Appearance: She is obese.   HENT:      Head: Atraumatic.      Nose: Nose normal.      Mouth/Throat:      Mouth: Mucous membranes are moist.   Eyes:      Extraocular Movements: Extraocular movements intact.   Cardiovascular:      Rate and Rhythm: Normal rate and regular rhythm.   Pulmonary:      Effort: Pulmonary effort is normal.      Breath sounds: No wheezing.      Comments: 2L NC SpO2 >94%  Abdominal:      General: Bowel sounds are normal. There is distension.      Tenderness: There is no abdominal tenderness.   Genitourinary:     Comments: jansen  Musculoskeletal:      Right lower leg: Edema present.      Left lower leg: Edema present.    Skin:     General: Skin is warm and dry.   Neurological:      Mental Status: She is alert and oriented to person, place, and time. Mental status is at baseline.             Significant Labs: All pertinent labs within the past 24 hours have been reviewed.    Significant Imaging: I have reviewed all pertinent imaging results/findings within the past 24 hours.    Assessment/Plan:      * Librado's gangrene  CT on admit with R perineum to R medial thigh [facility].   - Surgery consulted: S/p debridement on 02/16, 2/17, 2/19, and 2/21 (wound vac placed.)  -Plan for OR 2/23 for first wound vac change  - blood and wound cultures with lactobacillus. On meropenem per ID recs  - jansen in place due to surgical wound site    Sepsis with acute renal failure and tubular necrosis without septic shock  Defined by leukocytosis, tachycardia and groin abscess. Source is [facility].   - surgical management with debridement  - antibiotics= meropenem per ID  - Leukocytosis improved ib 02/21, but worsen on 02/22  -ID following    CARLINE (acute kidney injury)  Patient with acute kidney injury/acute renal failure likely due to pre-renal azotemia due to IVVD and acute tubular necrosis caused by sepsis  CARLINE is currently improving. Baseline creatinine  0.9  - Labs reviewed- Renal function/electrolytes with Estimated Creatinine Clearance: 64.1 mL/min (based on SCr of 1.1 mg/dL). according to latest data. Monitor urine output and serial BMP and adjust therapy as needed. Avoid nephrotoxins and renally dose meds for GFR listed above.    - Highly suspect ATN as an etiology at this time  - Nephrology consult requested, appreciate recs  - renal function is improving and near baseline.   -Resolved    Anemia of chronic disease  Patient's anemia is currently controlled. Has received 1 units of PRBCs on 2/19 and 02/21 . Etiology likely d/t chronic disease and acute blood loss post surgery.  Current CBC reviewed-   Lab Results   Component Value Date    HGB  7.6 (L) 02/22/2024    HCT 24.4 (L) 02/22/2024     - monitor daily   -s/p 1U pRBC on 02/19 and 02/21    Chronic diastolic congestive heart failure  Patient is identified as having Diastolic (HFpEF) heart failure that is Chronic. CHF is currently controlled. Latest ECHO performed and demonstrates- Results for orders placed during the hospital encounter of 03/10/23    Echo    Interpretation Summary  · The left ventricle is normal in size with low normal systolic function.  · The estimated ejection fraction is 53%.  · There is abnormal septal wall motion.  · Indeterminate left ventricular diastolic function.  · Mild left atrial enlargement.  · Normal right ventricular size with low normal right ventricular systolic function.  · Mild right atrial enlargement.  · Mild mitral regurgitation.  · Mild tricuspid regurgitation.  · Normal central venous pressure (3 mmHg).  · The estimated PA systolic pressure is 23 mmHg.    No acute issues  Monitor fluid status- does have lower extremity edema   Discussed with nephrology, glo to resume lasix 02/23    Essential hypertension  Chronic, controlled.   -hold home meds at this time:  Amlodipine 10 mg,   Resume Lasix 40 mg p.o.    Latest blood pressure and vitals reviewed-     Temp:  [97.5 °F (36.4 °C)-98.6 °F (37 °C)]   Pulse:  [77-93]   Resp:  [16-18]   BP: (133-152)/(68-96)   SpO2:  [90 %-97 %] .   Home meds for hypertension were reviewed and noted below.   Hypertension Medications               amlodipine (NORVASC) 10 MG tablet Take 10 mg by mouth once daily.    furosemide (LASIX) 40 MG tablet Take 1 tablet (40 mg total) by mouth once daily.            While in the hospital, will manage blood pressure as follows; Adjust home antihypertensive regimen as follows- hold meds for now given infection/ narcotic use. Resume as warranted . May develop rebound HTN from drug/ alcohol withdrawal.    Will utilize p.r.n. blood pressure medication only if patient's blood pressure greater than  180/110 and she develops symptoms such as worsening chest pain or shortness of breath.    Type 2 diabetes mellitus with hyperglycemia, without long-term current use of insulin  Patient's FSGs are uncontrolled due to hyperglycemia on current medication regimen.  Last A1c reviewed-   Lab Results   Component Value Date    HGBA1C 8.2 (H) 04/06/2023     Most recent fingerstick glucose reviewed-   Recent Labs   Lab 02/21/24  2302 02/22/24  0812 02/22/24  1116 02/22/24  1615   POCTGLUCOSE 246* 387* 301* 204*       Current correctional scale  High  Maintain anti-hyperglycemic dose as follows-   Antihyperglycemics (From admission, onward)      Start     Stop Route Frequency Ordered    02/23/24 0900  insulin detemir U-100 (Levemir) pen 20 Units         -- SubQ Daily 02/22/24 1031    02/22/24 2100  insulin detemir U-100 (Levemir) pen 15 Units         -- SubQ Nightly 02/22/24 1031    02/22/24 1145  insulin aspart U-100 pen 5 Units         -- SubQ 3 times daily with meals 02/22/24 1031    02/16/24 1514  insulin aspart U-100 pen 0-15 Units         -- SubQ Before meals & nightly PRN 02/16/24 1515          Hold Oral hypoglycemics while patient is in the hospital.  A1c: 8.2  Meds: basal bolus insulin + SSI PRN to maintain goal 140-180  Titrate as needed. Basal BID (20AM, 15U PM), prandia at 5U TIDWM  ADA diet, accuchecks ACHS, hypoglycemic protocol          ILD (interstitial lung disease)  -Noted on imaging; CT chest 2022  -Stable, no acute issues.   -Not on home O2 but says she should be (but never received because she left AMA at OSH)  -would test prior to discharge .      Tobacco abuse  - Pt was counseled about cessation x4 minutes      Cocaine abuse  Patient sprinkles crack crystals in her marijuana  Wants help. Tearful. Emotional support provided.   CM consulted.       ETOH abuse  Patient drinks at least a 6 pack beer daily  - Start scheduled librium- wean ordered  - Thiamine, folate, potassium, magnesium      Debility  Patient  with Acute debility due to other reduced mobility. valuation for etiology is complete. Plan includes progressive mobility protocol initated and PT/OT consulted.    -PTOT recommend SNF  -CM to assist with placement     Class 2 severe obesity due to excess calories with serious comorbidity and body mass index (BMI) of 35.0 to 35.9 in adult  Body mass index is 35.42 kg/m². Morbid obesity complicates all aspects of disease management from diagnostic modalities to treatment. Weight loss encouraged and health benefits explained to patient.         Advanced care planning/counseling discussion  Advance Care Planning    Date: 02/21/2024    Code Status  I engaged the the patient in a voluntary conversation about the patient's preferences for care  at the very end of life. The patient wishes to have CPR and other invasive treatments performed when her heart and/or breathing stops. I communicated to the patient that her wishes align with full code status and she agrees and verbalized understanding.   I spent a total of 16 minutes engaging the patient in this advance care planning discussion.           Code Status: Full Code        VTE Risk Mitigation (From admission, onward)           Ordered     IP VTE HIGH RISK PATIENT  Once         02/16/24 0506     Place sequential compression device  Until discontinued         02/16/24 0506                    Discharge Planning   AVTAR: 2/17/2024     Code Status: Full Code   Is the patient medically ready for discharge?:     Reason for patient still in hospital (select all that apply): Patient trending condition, Laboratory test, Treatment, Consult recommendations, and PT / OT recommendations  Discharge Plan A: Home with family                  DaliaLiyah White DO  Department of Hospital Medicine   [facility] - Telemetry

## 2024-02-22 NOTE — PLAN OF CARE
Problem: Occupational Therapy  Goal: Occupational Therapy Goal  Description: Goals to be met by: 03/07/2024     Patient will increase functional independence with ADLs by performing:    UE Dressing with [place].  LE Dressing with Minimal Assistance.  Grooming while seated at sink with Set-up Assistance.  Toileting from bedside commode with Minimal Assistance for hygiene and clothing management.   Sitting in chair  x60  minutes with Supervision.  Toilet transfer to bedside commode with Supervision.  Upper extremity exercise program x10  reps per handout, with assistance as needed for right upper extremity.    Outcome: Ongoing, Progressing   Patient recommended for moderate intensive therapy and occupational therapy to see 5x/week. DME TBD depending on next level of care.

## 2024-02-22 NOTE — ASSESSMENT & PLAN NOTE
Chronic, controlled.   -hold home meds at this time:  Amlodipine 10 mg,   Resume Lasix 40 mg p.o.    Latest blood pressure and vitals reviewed-     Temp:  [97.5 °F (36.4 °C)-98.6 °F (37 °C)]   Pulse:  [77-93]   Resp:  [16-18]   BP: (133-152)/(68-96)   SpO2:  [90 %-97 %] .   Home meds for hypertension were reviewed and noted below.   Hypertension Medications               amlodipine (NORVASC) 10 MG tablet Take 10 mg by mouth once daily.    furosemide (LASIX) 40 MG tablet Take 1 tablet (40 mg total) by mouth once daily.            While in the hospital, will manage blood pressure as follows; Adjust home antihypertensive regimen as follows- hold meds for now given infection/ narcotic use. Resume as warranted . May develop rebound HTN from drug/ alcohol withdrawal.    Will utilize p.r.n. blood pressure medication only if patient's blood pressure greater than 180/110 and she develops symptoms such as worsening chest pain or shortness of breath.

## 2024-02-22 NOTE — NURSING
[facility]  Nurses Note -- 4 Eyes      2/21/2024       Skin assessed on: Q Shift      [x] No Pressure Injuries Present    [x]Prevention Measures Documented    [] Yes LDA  for Pressure Injury Previously documented     [] Yes New Pressure Injury Discovered   [] LDA for New Pressure Injury Added      Attending RN:  Shanel mSith, RN     Second :  JOSE R Green

## 2024-02-22 NOTE — ASSESSMENT & PLAN NOTE
Defined by leukocytosis, tachycardia and groin abscess. Source is [facility].   - surgical management with debridement  - antibiotics= meropenem per ID  - Leukocytosis improved ib 02/21, but worsen on 02/22  -ID following

## 2024-02-22 NOTE — ASSESSMENT & PLAN NOTE
Patient's anemia is currently controlled. Has received 1 units of PRBCs on 2/19 and 02/21 . Etiology likely d/t chronic disease and acute blood loss post surgery.  Current CBC reviewed-   Lab Results   Component Value Date    HGB 7.6 (L) 02/22/2024    HCT 24.4 (L) 02/22/2024     - monitor daily   -s/p 1U pRBC on 02/19 and 02/21

## 2024-02-22 NOTE — PROGRESS NOTES
Vancomycin consult follow-up:    Patient reviewed, renal function stable, no new levels, continue current therapy; Next levels due: trough due 2/23/2024 at 1900

## 2024-02-23 ENCOUNTER — ANESTHESIA (OUTPATIENT)
Dept: SURGERY | Facility: HOSPITAL | Age: 54
DRG: 853 | End: 2024-02-23
Payer: MEDICAID

## 2024-02-23 LAB
ALBUMIN SERPL BCP-MCNC: 1.4 G/DL (ref 3.5–5.2)
ALP SERPL-CCNC: 122 U/L (ref 55–135)
ALT SERPL W/O P-5'-P-CCNC: 11 U/L (ref 10–44)
ANION GAP SERPL CALC-SCNC: 7 MMOL/L (ref 8–16)
AST SERPL-CCNC: 17 U/L (ref 10–40)
BASOPHILS # BLD AUTO: 0.06 K/UL (ref 0–0.2)
BASOPHILS NFR BLD: 0.3 % (ref 0–1.9)
BILIRUB SERPL-MCNC: 0.1 MG/DL (ref 0.1–1)
BUN SERPL-MCNC: 17 MG/DL (ref 6–20)
CALCIUM SERPL-MCNC: 8.2 MG/DL (ref 8.7–10.5)
CHLORIDE SERPL-SCNC: 104 MMOL/L (ref 95–110)
CO2 SERPL-SCNC: 27 MMOL/L (ref 23–29)
CREAT SERPL-MCNC: 0.8 MG/DL (ref 0.5–1.4)
DIFFERENTIAL METHOD BLD: ABNORMAL
EOSINOPHIL # BLD AUTO: 0.2 K/UL (ref 0–0.5)
EOSINOPHIL NFR BLD: 1 % (ref 0–8)
ERYTHROCYTE [DISTWIDTH] IN BLOOD BY AUTOMATED COUNT: 20.2 % (ref 11.5–14.5)
EST. GFR  (NO RACE VARIABLE): >60 ML/MIN/1.73 M^2
GLUCOSE SERPL-MCNC: 50 MG/DL (ref 70–110)
HCT VFR BLD AUTO: 26.6 % (ref 37–48.5)
HGB BLD-MCNC: 8.4 G/DL (ref 12–16)
IMM GRANULOCYTES # BLD AUTO: 0.67 K/UL (ref 0–0.04)
IMM GRANULOCYTES NFR BLD AUTO: 3.4 % (ref 0–0.5)
LYMPHOCYTES # BLD AUTO: 3.7 K/UL (ref 1–4.8)
LYMPHOCYTES NFR BLD: 19 % (ref 18–48)
MAGNESIUM SERPL-MCNC: 1.6 MG/DL (ref 1.6–2.6)
MCH RBC QN AUTO: 24 PG (ref 27–31)
MCHC RBC AUTO-ENTMCNC: 31.6 G/DL (ref 32–36)
MCV RBC AUTO: 76 FL (ref 82–98)
MONOCYTES # BLD AUTO: 1.3 K/UL (ref 0.3–1)
MONOCYTES NFR BLD: 6.8 % (ref 4–15)
NEUTROPHILS # BLD AUTO: 13.7 K/UL (ref 1.8–7.7)
NEUTROPHILS NFR BLD: 69.5 % (ref 38–73)
NRBC BLD-RTO: 0 /100 WBC
PHOSPHATE SERPL-MCNC: 3.7 MG/DL (ref 2.7–4.5)
PLATELET # BLD AUTO: 203 K/UL (ref 150–450)
PMV BLD AUTO: 9.8 FL (ref 9.2–12.9)
POCT GLUCOSE: 131 MG/DL (ref 70–110)
POCT GLUCOSE: 241 MG/DL (ref 70–110)
POCT GLUCOSE: 76 MG/DL (ref 70–110)
POCT GLUCOSE: 91 MG/DL (ref 70–110)
POTASSIUM SERPL-SCNC: 5 MMOL/L (ref 3.5–5.1)
PROT SERPL-MCNC: 5.8 G/DL (ref 6–8.4)
RBC # BLD AUTO: 3.5 M/UL (ref 4–5.4)
SODIUM SERPL-SCNC: 138 MMOL/L (ref 136–145)
WBC # BLD AUTO: 19.69 K/UL (ref 3.9–12.7)

## 2024-02-23 PROCEDURE — D9220A PRA ANESTHESIA: Mod: CRNA,,, | Performed by: NURSE ANESTHETIST, CERTIFIED REGISTERED

## 2024-02-23 PROCEDURE — 25000003 PHARM REV CODE 250: Performed by: NURSE ANESTHETIST, CERTIFIED REGISTERED

## 2024-02-23 PROCEDURE — 25000003 PHARM REV CODE 250: Performed by: HOSPITALIST

## 2024-02-23 PROCEDURE — 80053 COMPREHEN METABOLIC PANEL: CPT | Performed by: HOSPITALIST

## 2024-02-23 PROCEDURE — 99900035 HC TECH TIME PER 15 MIN (STAT)

## 2024-02-23 PROCEDURE — 85025 COMPLETE CBC W/AUTO DIFF WBC: CPT | Performed by: HOSPITALIST

## 2024-02-23 PROCEDURE — 27000221 HC OXYGEN, UP TO 24 HOURS

## 2024-02-23 PROCEDURE — D9220A PRA ANESTHESIA: Mod: ANES,,, | Performed by: ANESTHESIOLOGY

## 2024-02-23 PROCEDURE — 36000705 HC OR TIME LEV I EA ADD 15 MIN: Performed by: SURGERY

## 2024-02-23 PROCEDURE — 63600175 PHARM REV CODE 636 W HCPCS: Performed by: ANESTHESIOLOGY

## 2024-02-23 PROCEDURE — 25000003 PHARM REV CODE 250: Performed by: STUDENT IN AN ORGANIZED HEALTH CARE EDUCATION/TRAINING PROGRAM

## 2024-02-23 PROCEDURE — 25000003 PHARM REV CODE 250: Performed by: INTERNAL MEDICINE

## 2024-02-23 PROCEDURE — 37000009 HC ANESTHESIA EA ADD 15 MINS: Performed by: SURGERY

## 2024-02-23 PROCEDURE — 94761 N-INVAS EAR/PLS OXIMETRY MLT: CPT

## 2024-02-23 PROCEDURE — 84100 ASSAY OF PHOSPHORUS: CPT | Performed by: HOSPITALIST

## 2024-02-23 PROCEDURE — 21400001 HC TELEMETRY ROOM

## 2024-02-23 PROCEDURE — 97597 DBRDMT OPN WND 1ST 20 CM/<: CPT | Mod: ,,, | Performed by: SURGERY

## 2024-02-23 PROCEDURE — 71000033 HC RECOVERY, INTIAL HOUR: Performed by: SURGERY

## 2024-02-23 PROCEDURE — 37000008 HC ANESTHESIA 1ST 15 MINUTES: Performed by: SURGERY

## 2024-02-23 PROCEDURE — 83735 ASSAY OF MAGNESIUM: CPT | Performed by: HOSPITALIST

## 2024-02-23 PROCEDURE — 97605 NEG PRS WND THER DME<=50SQCM: CPT | Mod: 59,,, | Performed by: SURGERY

## 2024-02-23 PROCEDURE — 94640 AIRWAY INHALATION TREATMENT: CPT

## 2024-02-23 PROCEDURE — 25000242 PHARM REV CODE 250 ALT 637 W/ HCPCS: Performed by: HOSPITALIST

## 2024-02-23 PROCEDURE — 63600175 PHARM REV CODE 636 W HCPCS: Performed by: INTERNAL MEDICINE

## 2024-02-23 PROCEDURE — 71000039 HC RECOVERY, EACH ADD'L HOUR: Performed by: SURGERY

## 2024-02-23 PROCEDURE — 36000704 HC OR TIME LEV I 1ST 15 MIN: Performed by: SURGERY

## 2024-02-23 PROCEDURE — 63600175 PHARM REV CODE 636 W HCPCS: Performed by: HOSPITALIST

## 2024-02-23 PROCEDURE — 63600175 PHARM REV CODE 636 W HCPCS: Performed by: NURSE ANESTHETIST, CERTIFIED REGISTERED

## 2024-02-23 PROCEDURE — 99232 SBSQ HOSP IP/OBS MODERATE 35: CPT | Mod: 25,,, | Performed by: SURGERY

## 2024-02-23 PROCEDURE — 99900031 HC PATIENT EDUCATION (STAT)

## 2024-02-23 PROCEDURE — 36415 COLL VENOUS BLD VENIPUNCTURE: CPT | Performed by: HOSPITALIST

## 2024-02-23 RX ORDER — ONDANSETRON HYDROCHLORIDE 2 MG/ML
4 INJECTION, SOLUTION INTRAVENOUS DAILY PRN
Status: DISCONTINUED | OUTPATIENT
Start: 2024-02-23 | End: 2024-02-23 | Stop reason: HOSPADM

## 2024-02-23 RX ORDER — ONDANSETRON HYDROCHLORIDE 2 MG/ML
INJECTION, SOLUTION INTRAVENOUS
Status: DISCONTINUED | OUTPATIENT
Start: 2024-02-23 | End: 2024-02-23

## 2024-02-23 RX ORDER — FENTANYL CITRATE 50 UG/ML
INJECTION, SOLUTION INTRAMUSCULAR; INTRAVENOUS
Status: DISCONTINUED | OUTPATIENT
Start: 2024-02-23 | End: 2024-02-23

## 2024-02-23 RX ORDER — MEPERIDINE HYDROCHLORIDE 50 MG/ML
12.5 INJECTION INTRAMUSCULAR; INTRAVENOUS; SUBCUTANEOUS EVERY 10 MIN PRN
Status: DISCONTINUED | OUTPATIENT
Start: 2024-02-23 | End: 2024-02-23 | Stop reason: HOSPADM

## 2024-02-23 RX ORDER — HYDROMORPHONE HYDROCHLORIDE 2 MG/ML
0.2 INJECTION, SOLUTION INTRAMUSCULAR; INTRAVENOUS; SUBCUTANEOUS EVERY 5 MIN PRN
Status: DISCONTINUED | OUTPATIENT
Start: 2024-02-23 | End: 2024-02-23 | Stop reason: HOSPADM

## 2024-02-23 RX ORDER — DIPHENHYDRAMINE HYDROCHLORIDE 50 MG/ML
25 INJECTION INTRAMUSCULAR; INTRAVENOUS EVERY 6 HOURS PRN
Status: DISCONTINUED | OUTPATIENT
Start: 2024-02-23 | End: 2024-02-23 | Stop reason: HOSPADM

## 2024-02-23 RX ORDER — LORAZEPAM 2 MG/ML
0.25 INJECTION INTRAMUSCULAR ONCE AS NEEDED
Status: DISCONTINUED | OUTPATIENT
Start: 2024-02-23 | End: 2024-02-23 | Stop reason: HOSPADM

## 2024-02-23 RX ORDER — PROPOFOL 10 MG/ML
VIAL (ML) INTRAVENOUS
Status: DISCONTINUED | OUTPATIENT
Start: 2024-02-23 | End: 2024-02-23

## 2024-02-23 RX ORDER — PROPOFOL 10 MG/ML
VIAL (ML) INTRAVENOUS CONTINUOUS PRN
Status: DISCONTINUED | OUTPATIENT
Start: 2024-02-23 | End: 2024-02-23

## 2024-02-23 RX ORDER — KETAMINE HYDROCHLORIDE 100 MG/ML
INJECTION, SOLUTION INTRAMUSCULAR; INTRAVENOUS
Status: DISCONTINUED | OUTPATIENT
Start: 2024-02-23 | End: 2024-02-23

## 2024-02-23 RX ORDER — LIDOCAINE HYDROCHLORIDE 20 MG/ML
INJECTION INTRAVENOUS
Status: DISCONTINUED | OUTPATIENT
Start: 2024-02-23 | End: 2024-02-23

## 2024-02-23 RX ADMIN — OXYCODONE 5 MG: 5 TABLET ORAL at 10:02

## 2024-02-23 RX ADMIN — HYDROMORPHONE HYDROCHLORIDE 0.2 MG: 2 INJECTION INTRAMUSCULAR; INTRAVENOUS; SUBCUTANEOUS at 10:02

## 2024-02-23 RX ADMIN — MEROPENEM 1 G: 1 INJECTION, POWDER, FOR SOLUTION INTRAVENOUS at 09:02

## 2024-02-23 RX ADMIN — GABAPENTIN 400 MG: 400 CAPSULE ORAL at 09:02

## 2024-02-23 RX ADMIN — MEROPENEM 1 G: 1 INJECTION, POWDER, FOR SOLUTION INTRAVENOUS at 01:02

## 2024-02-23 RX ADMIN — PROPOFOL 20 MG: 10 INJECTION, EMULSION INTRAVENOUS at 09:02

## 2024-02-23 RX ADMIN — CHLORDIAZEPOXIDE HYDROCHLORIDE 5 MG: 5 CAPSULE ORAL at 09:02

## 2024-02-23 RX ADMIN — PANTOPRAZOLE SODIUM 40 MG: 40 TABLET, DELAYED RELEASE ORAL at 11:02

## 2024-02-23 RX ADMIN — INSULIN ASPART 5 UNITS: 100 INJECTION, SOLUTION INTRAVENOUS; SUBCUTANEOUS at 05:02

## 2024-02-23 RX ADMIN — INSULIN ASPART 6 UNITS: 100 INJECTION, SOLUTION INTRAVENOUS; SUBCUTANEOUS at 05:02

## 2024-02-23 RX ADMIN — FENTANYL CITRATE 50 MCG: 50 INJECTION, SOLUTION INTRAMUSCULAR; INTRAVENOUS at 09:02

## 2024-02-23 RX ADMIN — THIAMINE HCL TAB 100 MG 100 MG: 100 TAB at 11:02

## 2024-02-23 RX ADMIN — OXYCODONE 10 MG: 5 TABLET ORAL at 03:02

## 2024-02-23 RX ADMIN — GLYCOPYRROLATE 0.1 MG: 0.2 INJECTION, SOLUTION INTRAMUSCULAR; INTRAVITREAL at 09:02

## 2024-02-23 RX ADMIN — ACETAMINOPHEN 1000 MG: 500 TABLET ORAL at 09:02

## 2024-02-23 RX ADMIN — ARFORMOTEROL TARTRATE 15 MCG: 15 SOLUTION RESPIRATORY (INHALATION) at 07:02

## 2024-02-23 RX ADMIN — KETAMINE HYDROCHLORIDE 20 MG: 100 INJECTION, SOLUTION, CONCENTRATE INTRAMUSCULAR; INTRAVENOUS at 09:02

## 2024-02-23 RX ADMIN — PROPOFOL 50 MCG/KG/MIN: 10 INJECTION, EMULSION INTRAVENOUS at 09:02

## 2024-02-23 RX ADMIN — BUDESONIDE 1 MG: 0.5 INHALANT RESPIRATORY (INHALATION) at 07:02

## 2024-02-23 RX ADMIN — SODIUM CHLORIDE, SODIUM LACTATE, POTASSIUM CHLORIDE, AND CALCIUM CHLORIDE: .6; .31; .03; .02 INJECTION, SOLUTION INTRAVENOUS at 09:02

## 2024-02-23 RX ADMIN — OXYCODONE 10 MG: 5 TABLET ORAL at 09:02

## 2024-02-23 RX ADMIN — ATORVASTATIN CALCIUM 20 MG: 10 TABLET, FILM COATED ORAL at 11:02

## 2024-02-23 RX ADMIN — FUROSEMIDE 40 MG: 40 TABLET ORAL at 11:02

## 2024-02-23 RX ADMIN — MEROPENEM 1 G: 1 INJECTION INTRAVENOUS at 05:02

## 2024-02-23 RX ADMIN — GABAPENTIN 400 MG: 400 CAPSULE ORAL at 11:02

## 2024-02-23 RX ADMIN — LIDOCAINE HYDROCHLORIDE 60 MG: 20 INJECTION, SOLUTION INTRAVENOUS at 09:02

## 2024-02-23 RX ADMIN — PROPOFOL 30 MG: 10 INJECTION, EMULSION INTRAVENOUS at 09:02

## 2024-02-23 RX ADMIN — FOLIC ACID 1 MG: 1 TABLET ORAL at 11:02

## 2024-02-23 RX ADMIN — OXYCODONE 10 MG: 5 TABLET ORAL at 05:02

## 2024-02-23 RX ADMIN — ONDANSETRON 4 MG: 2 INJECTION, SOLUTION INTRAMUSCULAR; INTRAVENOUS at 09:02

## 2024-02-23 RX ADMIN — INSULIN DETEMIR 15 UNITS: 100 INJECTION, SOLUTION SUBCUTANEOUS at 09:02

## 2024-02-23 RX ADMIN — ACETAMINOPHEN 1000 MG: 500 TABLET ORAL at 05:02

## 2024-02-23 NOTE — ANESTHESIA PREPROCEDURE EVALUATION
[facility]  Anesthesia Pre-Operative Evaluation         Patient Name: Christine Mosqueda  YOB: 1970  MRN: [id number]    SUBJECTIVE:     Pre-operative evaluation for Procedure(s) (LRB):  EXPLORATION, WOUND (Right)     02/22/2024    Christine Mosqueda is a 53 y.o. 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 Wound vac placement 2/21    PMHx includes HTN, T2DM (A1c 8.2), polysubstance abuse, and HFpEF (PAP 50).    Nuclear Stress (5/8/23):    Normal myocardial perfusion scan. There is no evidence of myocardial ischemia or infarction.    The gated perfusion images showed an ejection fraction of 47% at rest. The gated perfusion images showed an ejection fraction of 54% post stress. Normal ejection fraction is greater than 47%.    There is normal wall motion at rest and post stress.    LV cavity size is normal at rest and normal at stress.    The ECG portion of the study is negative for ischemia.    The patient reported no chest pain during the stress test.    There were no arrhythmias during stress.    There are no prior studies for comparison.    TTE 2/19/24  Left Ventricle: There is hyperdynamic systolic function with a visually estimated ejection fraction of 70 - 75%.    Right Ventricle: Normal right ventricular cavity size. Systolic function is normal.    Left Atrium: Left atrium is mildly dilated.    Tricuspid Valve: There is mild regurgitation.    Pulmonary Artery: The estimated pulmonary artery systolic pressure is 55 mmHg.    IVC/SVC: Intermediate venous pressure at 8 mmHg.    Pericardium: There is a trivial posterior effusion.    LDA:        Peripheral IV - Single Lumen 02/19/24 0515 20 G Anterior;Left Upper Arm (Active)   Site Assessment Clean;Dry;Intact 02/20/24 2134   Extremity Assessment Distal to IV No warmth;No swelling;No redness;No abnormal discoloration 02/20/24 2134   Line Status Saline locked;Flushed;Blood return noted 02/20/24  "2134   Dressing Status Clean;Dry;Intact 02/20/24 2134   Dressing Intervention Integrity maintained 02/20/24 2134   Dressing Change Due 02/23/24 02/20/24 2134   Site Change Due 02/23/24 02/20/24 2134   Reason Not Rotated Not due 02/20/24 2134   Number of days: 2            Urethral Catheter 02/16/24 0414 Non-latex;Straight-tip;Silicone 16 Fr. (Active)   $ Hernandez Insertion Bedside Insertion Performed 02/16/24 0746   Site Assessment Clean;Intact;Dry 02/20/24 1600   Collection Container Urimeter 02/20/24 1600   Securement Method secured to top of thigh w/ adhesive device 02/20/24 1600   Catheter Care Performed yes 02/20/24 1600   Reason for Continuing Urinary Catheterization Critically ill in ICU and requiring hourly monitoring of intake/output 02/20/24 1600   CAUTI Prevention Bundle Securement Device in place with 1" slack;Intact seal between catheter & drainage tubing;Drainage bag/urimeter off the floor;Sheeting clip in use;No dependent loops or kinks;Drainage bag/urimeter not overfilled (<2/3 full);Drainage bag/urimeter below bladder 02/20/24 0800   Output (mL) 700 mL 02/20/24 2332   Number of days: 5     Prev airway: RSI, gr1v bhandari 3    Patient Active Problem List   Diagnosis    Acute on chronic systolic heart failure    Anxiety    Type 2 diabetes mellitus with hyperglycemia, without long-term current use of insulin    Emphysema lung    Restrictive lung disease    Tobacco abuse    CARLINE (acute kidney injury)    Microcytic anemia    Superficial vein thrombosis    Vasculopathy    Nonadherence to medical treatment    Librado's gangrene    ILD (interstitial lung disease)    Chronic diastolic congestive heart failure    Sepsis with acute renal failure and tubular necrosis without septic shock    Essential hypertension    Anemia of chronic disease    Cocaine abuse    ETOH abuse    Class 2 severe obesity due to excess calories with serious comorbidity and body mass index (BMI) of 35.0 to 35.9 in adult    Advanced care " planning/counseling discussion    Debility       Review of patient's allergies indicates:   Allergen Reactions    Hydrochlorothiazide plus        Current Inpatient Medications:   acetaminophen  1,000 mg Oral Q8H    budesonide  1 mg Nebulization Q12H    And    arformoteroL  15 mcg Nebulization BID    aspirin  81 mg Oral Daily    atorvastatin  20 mg Oral Daily    chlordiazepoxide  5 mg Oral BID    Followed by    [START ON 2/24/2024] chlordiazepoxide  5 mg Oral Daily    folic acid  1 mg Oral Daily    [START ON 2/23/2024] furosemide  40 mg Oral Daily    gabapentin  400 mg Oral BID    insulin aspart U-100  5 Units Subcutaneous TIDWM    insulin detemir U-100  15 Units Subcutaneous QHS    [START ON 2/23/2024] insulin detemir U-100 (Levemir)  20 Units Subcutaneous Daily    meropenem (MERREM) IVPB  1 g Intravenous Q12H    pantoprazole  40 mg Oral Daily    thiamine  100 mg Oral Daily       No current facility-administered medications on file prior to encounter.     Current Outpatient Medications on File Prior to Encounter   Medication Sig Dispense Refill    albuterol (ACCUNEB) 1.25 mg/3 mL Nebu Take 1.25 mg by nebulization every 6 (six) hours as needed.      ALPRAZolam (XANAX) 2 MG Tab Take 2 mg by mouth daily as needed for Anxiety.      amlodipine (NORVASC) 10 MG tablet Take 10 mg by mouth once daily.      aspirin 81 MG Chew Take 81 mg by mouth once daily.      atorvastatin (LIPITOR) 20 MG tablet Take 20 mg by mouth once daily.      fluticasone-salmeterol diskus inhaler 500-50 mcg Inhale 1 puff into the lungs 2 (two) times daily.      furosemide (LASIX) 40 MG tablet Take 1 tablet (40 mg total) by mouth once daily. 30 tablet 0    gabapentin (NEURONTIN) 400 MG capsule Take 400 mg by mouth 2 (two) times daily.      insulin aspart U-100 (NOVOLOG) 100 unit/mL injection Inject 10 Units into the skin 3 (three) times daily before meals.      insulin detemir U-100 (LEVEMIR) 100 unit/mL injection Inject 22 Units into the skin once  daily.  12    nicotine (NICODERM CQ) 14 mg/24 hr Place 1 patch onto the skin once daily. 28 patch 1    omeprazole (PRILOSEC) 40 MG capsule Take 40 mg by mouth once daily.      potassium chloride (KLOR-CON) 8 MEQ TbSR Take 1 tablet (8 mEq total) by mouth once daily. 30 tablet 0    promethazine (PHENERGAN) 12.5 MG Tab Take 25 mg by mouth every 6 (six) hours as needed.      tiotropium (SPIRIVA) 18 mcg inhalation capsule Inhale 18 mcg into the lungs once daily.         Past Surgical History:   Procedure Laterality Date     SECTION      DEBRIDEMENT Right 2024    Procedure: DEBRIDEMENT; Librado's gangrene;  Surgeon: Manish Nazario MD;  Location: [facility] OR;  Service: General;  Laterality: Right;  Lithotomy    PALATAL EXPANSION      WOUND EXPLORATION N/A 2024    Procedure: INCISION AND DRAINAGE; WOUND DEBRIDEMENT,;  Surgeon: Scott Koroma MD;  Location: [facility] OR;  Service: General;  Laterality: N/A;    WOUND EXPLORATION Right 2024    Procedure: EXPLORATION, WOUND;  Surgeon: Scott Koroma MD;  Location: [facility] OR;  Service: General;  Laterality: Right;  Lithotomy Position  wound vac (2 black sponges and white sponges)    WOUND EXPLORATION Right 2024    Procedure: EXPLORATION, WOUND;  Surgeon: Scott Koroma MD;  Location: [facility] OR;  Service: General;  Laterality: Right;  9:30am start    WRIST SURGERY Right        Social History     Socioeconomic History    Marital status:    Tobacco Use    Smoking status: Some Days     Current packs/day: 0.25     Types: Cigarettes    Smokeless tobacco: Never   Substance and Sexual Activity    Alcohol use: Yes    Drug use: Yes     Types: Marijuana    Sexual activity: Yes     Partners: Male     Social Determinants of Health     Financial Resource Strain: Low Risk  (2024)    Overall Financial Resource Strain (CARDIA)     Difficulty of Paying Living Expenses: Not very hard   Food Insecurity: No Food Insecurity (2024)    Hunger Vital Sign      Worried About Running Out of Food in the Last Year: Never true     Ran Out of Food in the Last Year: Never true   Transportation Needs: No Transportation Needs (2/16/2024)    PRAPARE - Transportation     Lack of Transportation (Medical): No     Lack of Transportation (Non-Medical): No   Physical Activity: Inactive (2/16/2024)    Exercise Vital Sign     Days of Exercise per Week: 0 days     Minutes of Exercise per Session: 0 min   Stress: No Stress Concern Present (2/16/2024)    [employer] [place] [employer] - Occupational Stress Questionnaire     Feeling of Stress : Only a little   Social Connections: Moderately Isolated (2/16/2024)    Social Connection and Isolation Panel [NHANES]     Frequency of Communication with Friends and Family: Three times a week     Frequency of Social Gatherings with Friends and Family: Three times a week     Attends Adventism Services: Never     Active Member of Clubs or Organizations: No     Attends Club or Organization Meetings: Never     Marital Status:    Housing Stability: Low Risk  (2/16/2024)    Housing Stability Vital Sign     Unable to Pay for Housing in the Last Year: No     Number of Places Lived in the Last Year: 1     Unstable Housing in the Last Year: No       OBJECTIVE:     Vital Signs Range (Last 24H):  Temp:  [36.4 °C (97.5 °F)-36.9 °C (98.4 °F)]   Pulse:  [77-93]   Resp:  [16-18]   BP: (133-152)/(68-81)   SpO2:  [90 %-97 %]       Significant Labs:  Lab Results   Component Value Date    WBC 18.69 (H) 02/22/2024    HGB 7.6 (L) 02/22/2024    HCT 24.4 (L) 02/22/2024     02/22/2024    CHOL 194 03/13/2020    TRIG 139 03/13/2020    HDL 65 (H) 03/13/2020    ALT 6 (L) 02/22/2024    AST 11 02/22/2024     02/22/2024    K 4.9 02/22/2024     02/22/2024    CREATININE 1.1 02/22/2024    BUN 17 02/22/2024    CO2 26 02/22/2024    TSH 1.703 06/19/2023    INR 0.8 (L) 03/05/2022    HGBA1C 8.2 (H) 04/06/2023       Diagnostic Studies: No relevant  studies.    EKG:   Results for orders placed or performed during the hospital encounter of 02/15/24   EKG 12-lead    Collection Time: 02/15/24 11:07 PM   Result Value Ref Range    QRS Duration 82 ms    [facility] QTC Calculation 462 ms    Narrative    Test Reason : A41.9,    Vent. Rate : 095 BPM     Atrial Rate : 095 BPM     P-R Int : 132 ms          QRS Dur : 082 ms      QT Int : 368 ms       P-R-T Axes : 065 047 069 degrees     QTc Int : 462 ms    Normal sinus rhythm  Possible Anterior infarct (cited on or before 19-JUN-2023)  Abnormal ECG  When compared with ECG of 19-JUN-2023 17:50,  Significant changes have occurred  Confirmed by Ra Meek MD ([id number]) on 2/20/2024 3:25:24 PM    Referred By: [email or web address]   SELF           Confirmed By:Ra Meek MD       2D ECHO:  TTE:  Results for orders placed or performed during the hospital encounter of 02/15/24   Echo   Result Value Ref Range    BSA 1.72 m2    LA WIDTH 4.3 cm    RA Width 3.1 cm    LVOT stroke volume 112.81 cm3    LVIDd 5.42 3.5 - 6.0 cm    LV Systolic Volume 62.14 mL    LV Systolic Volume Index 31.4 mL/m2    LVIDs 3.80 2.1 - 4.0 cm    LV Diastolic Volume 142.80 mL    LV Diastolic Volume Index 72.12 mL/m2    IVS 0.91 0.6 - 1.1 cm    LVOT diameter 2.09 cm    LVOT area 3.4 cm2    FS 30 28 - 44 %    Left Ventricle Relative Wall Thickness 0.33 cm    Posterior Wall 0.89 0.6 - 1.1 cm    LV mass 181.27 g    LV Mass Index 92 g/m2    MV Peak E Bari 1.52 m/s    TDI LATERAL 0.10 m/s    TDI SEPTAL 0.11 m/s    E/E' ratio 14.48 m/s    MV Peak A Bari 1.73 m/s    TR Max Bari 3.41 m/s    E/A ratio 0.88     IVRT 133.21 msec    E wave deceleration time 204.96 msec    LV SEPTAL E/E' RATIO 13.82 m/s    LA Volume Index 51.1 mL/m2    LV LATERAL E/E' RATIO 15.20 m/s    LA volume 101.18 cm3    LVOT peak bari 1.86 m/s    Left Ventricular Outflow Tract Mean Velocity 1.25 cm/s    Left Ventricular Outflow Tract Mean Gradient 7.25 mmHg    RVDD 3.49 cm    RV S' 20.11 cm/s    TAPSE 2.63 cm    LA size  4.38 cm    Left Atrium Minor Axis 6.34 cm    Left Atrium Major Axis 6.30 cm    RA Major Axis 5.78 cm    AV regurgitation pressure 1/2 time [id number] ms    AR Max Bari 4.07 m/s    AV mean gradient 12 mmHg    AV peak gradient 22 mmHg    Ao peak bari 2.32 m/s    Ao VTI 45.40 cm    LVOT peak VTI 32.90 cm    AV valve area 2.48 cm²    AV Velocity Ratio 0.80     AV index (prosthetic) 0.72     MICHELLE by Velocity Ratio 2.75 cm²    MV mean gradient 6 mmHg    MV peak gradient 12 mmHg    MV stenosis pressure 1/2 time 59.44 ms    MV valve area p 1/2 method 3.70 cm2    MV valve area by continuity eq 2.62 cm2    MV VTI 43.0 cm    Triscuspid Valve Regurgitation Peak Gradient 47 mmHg    PV PEAK VELOCITY 1.13 m/s    PV peak gradient 5 mmHg    Sinus 3.00 cm    Ascending aorta 3.07 cm    IVC diameter 2.00 cm    Mean e' 0.11 m/s    ZLVIDS 0.62     ZLVIDD -0.53     TV resting pulmonary artery pressure 55 mmHg    RV TB RVSP 11 mmHg    Est. RA pres 8 mmHg    Narrative      Left Ventricle: There is hyperdynamic systolic function with a visually   estimated ejection fraction of 70 - 75%.    Right Ventricle: Normal right ventricular cavity size. Systolic   function is normal.    Left Atrium: Left atrium is mildly dilated.    Tricuspid Valve: There is mild regurgitation.    Pulmonary Artery: The estimated pulmonary artery systolic pressure is   55 mmHg.    IVC/SVC: Intermediate venous pressure at 8 mmHg.    Pericardium: There is a trivial posterior effusion.         FRANCISCO:  No results found for this or any previous visit.    ASSESSMENT/PLAN:           Pre-op Assessment    I have reviewed the Patient Summary Reports.     I have reviewed the Nursing Notes. I have reviewed the NPO Status.   I have reviewed the Medications.     Review of Systems  Anesthesia Hx:  No problems with previous Anesthesia   History of prior surgery of interest to airway management or planning:          Denies Family Hx of Anesthesia complications.    Denies Personal  Hx of Anesthesia complications.                    Social:  Recreational Drugs, Alcohol Use       Hematology/Oncology:    Oncology Normal    -- Anemia:                                  EENT/Dental:  EENT/Dental Normal           Cardiovascular:  Exercise tolerance: good   Hypertension    Denies CAD.     Denies Dysrhythmias.   CHF     WONG                            Pulmonary:    Denies COPD.      Denies Sleep Apnea.                Renal/:  Chronic Renal Disease, CKD                Hepatic/GI:     GERD             Neurological:    Denies Neuromuscular Disease.                                   Endocrine:  Diabetes           Psych:  Denies Psychiatric History.                  Physical Exam  General: Cooperative, Oriented and Lethargic    Airway:  Mouth Opening: Normal  TM Distance: Normal  Tongue: Normal  Neck ROM: Normal ROM    Dental:  Periodontal disease    Chest/Lungs:  Clear to auscultation, Normal Respiratory Rate  2L NC  Heart:  Rate: Normal  Rhythm: Regular Rhythm  Sounds: Normal    Abdomen:  Nontender, Soft, Normal        Anesthesia Plan  Type of Anesthesia, risks & benefits discussed:    Anesthesia Type: Gen ETT, Gen Supraglottic Airway, Gen Natural Airway  Intra-op Monitoring Plan: Standard ASA Monitors  Post Op Pain Control Plan: multimodal analgesia  Induction:  IV  Informed Consent: Informed consent signed with the Patient and all parties understand the risks and agree with anesthesia plan.  All questions answered.   ASA Score: 3    Ready For Surgery From Anesthesia Perspective.     .

## 2024-02-23 NOTE — NURSING
[facility] [place]  Nurses Note -- 4 Eyes      2/22/2024       Skin assessed on: Q Shift      [x] No Pressure Injuries Present    []Prevention Measures Documented    [] Yes LDA  for Pressure Injury Previously documented     [] Yes New Pressure Injury Discovered   [] LDA for New Pressure Injury Added      Attending RN:  Tabitha Jacobs RN     Second RN:  Shanel PAEZ RN

## 2024-02-23 NOTE — NURSING
[facility] [place]  Nurses Note -- 4 Eyes      2/23/2024       Skin assessed on: Q Shift      [x] No Pressure Injuries Present    [x]Prevention Measures Documented    [] Yes LDA  for Pressure Injury Previously documented     [] Yes New Pressure Injury Discovered   [] LDA for New Pressure Injury Added      Attending RN:  Brigitte Price LPN     Second RN:  Shanel ELLIS

## 2024-02-23 NOTE — PT/OT/SLP PROGRESS
Occupational Therapy      Patient Name:  Christine Mosqueda   MRN:  [id number]    Patient not seen today secondary to Off the floor for procedure/surgery. Will follow-up when able.    2/23/2024

## 2024-02-23 NOTE — ASSESSMENT & PLAN NOTE
Patient's FSGs are uncontrolled due to hyperglycemia on current medication regimen.  Last A1c reviewed-   Lab Results   Component Value Date    HGBA1C 8.2 (H) 04/06/2023     Most recent fingerstick glucose reviewed-   Recent Labs   Lab 02/22/24  1615 02/22/24 2011 02/23/24  0749 02/23/24  1208   POCTGLUCOSE 204* 115* 131* 76       Current correctional scale  High  Maintain anti-hyperglycemic dose as follows-   Antihyperglycemics (From admission, onward)    Start     Stop Route Frequency Ordered    02/23/24 0900  insulin detemir U-100 (Levemir) pen 20 Units         -- SubQ Daily 02/22/24 1031    02/22/24 2100  insulin detemir U-100 (Levemir) pen 15 Units         -- SubQ Nightly 02/22/24 1031    02/22/24 1145  insulin aspart U-100 pen 5 Units         -- SubQ 3 times daily with meals 02/22/24 1031    02/16/24 1514  insulin aspart U-100 pen 0-15 Units         -- SubQ Before meals & nightly PRN 02/16/24 1515        Hold Oral hypoglycemics while patient is in the hospital.  A1c: 8.2  Meds: basal bolus insulin + SSI PRN to maintain goal 140-180  Titrate as needed. Basal BID (20AM, 15U PM), prandia at 5U TIDWM  ADA diet, accuchecks ACHS, hypoglycemic protocol

## 2024-02-23 NOTE — NURSING
Patient off the unit via bed on 2L nc no distress noted. Wound vac in place. Going to OR

## 2024-02-23 NOTE — PLAN OF CARE
Brief Nephrology Note    Labs reviewed, will sign off.  Please re-consult if any changes should arise.  Thank you for allowing me to participate in the care of your patient.  Please call with any questions.    Date of service: 2/23/2024  Madhuri Ortez  Nephrology    [facility]  Demetrice MORIN  Office [phone]

## 2024-02-23 NOTE — NURSING
Patient has arrived back on the unit via bed, on room air, no distress noted. Site assessed, Dressing clean dry and intact, jansen in place. Emptied 1100 ml clear yellow urine. Wound vac suction 125.  at bedside. Call light in reach. Instructed to call for assistance

## 2024-02-23 NOTE — ASSESSMENT & PLAN NOTE
Defined by leukocytosis, tachycardia and groin abscess. Source is Fourniers.   - surgical management with debridement  - antibiotics= meropenem per ID  - Leukocytosis improved on  02/21, but worsen on 02/22  - ID following

## 2024-02-23 NOTE — OP NOTE
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 Wound vac placed.      Wound measured 22 cm in length, 15 cm in width, 8 cm in depth.     Indication: Christine Mosqueda is 53 y.o. female with admission for right groin and thigh necrotizing soft tissue infection which has had at the prior debridements now indicated for take back for further wound exploration washout and possible further debridement. After discussion of disease process, we discussed options and have elected for operation to perform wound debridement and wound exploration of right thigh and right groin.      Procedure:     The patient was identified in preoperative holding and brought back to the operating room. She was placed supine on the operating room table and padded appropriately. Monitors were applied and there was smooth induction of general endotracheal anesthesia. The patient was placed in lithotomy position. We removed the prior packing and exploring the wound base. The right groin extending to the right thigh were all prepped and draped in a standard sterile fashion. A timeout was performed and all team members present agreed that this was the correct procedure on the correct patient. We also confirmed administration of appropriate preoperative antibiotics.     We explored the wound in entirety and found a small section of fibrinous vs necrotic tissue a the medial central wound base which was debrided. A small area of suspicious appearing skin and tissue at one of the inferior edges of the  wound was sharply debrided.      A wound vac was applied and found to have good seal. The patient was extubated in the operating room and transported to the recovery room in stable condition. All sponge, instrument, and needle counts were reported as correct at the end of the procedure.     Estimated Blood Loss: <10 cc     Complications: None     Drains: None     Specimen: None      Andrew Paula MD  General Surgery Resident, PGY-3

## 2024-02-23 NOTE — OP NOTE
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 One small area of fibrinous exudate on the thigh debrided. Wound vac replaced.    Wound measured 22 cm in length, 15 cm in width, 8 cm in depth.     INDICATION: Christine Mosqueda is 53 y.o. female with admission for right groin and thigh necrotizing soft tissue infection which has had at the prior debridements now indicated for take back for further wound exploration washout and possible further debridement. After discussion of disease process, we discussed options and have elected for operation to perform wound debridement and wound exploration of right thigh and right groin.      PROCEDURE IN DETAIL: Patient was brought to the operating room where she was placed in supine position on the operating room table and underwent initiation of MAC anesthesia and was placed in lithotomy. The wound vac was removed and the field was sterilely prepped out and draped in standard fashion, and a timeout identifying the correct patient, placement, procedure, and preoperative antibiotics was called with everyone in agreement.    We explored the wound and found mostly healthy granulating tissue throughout. There was a small 2 cm area of fibrinous exudate of the medial thigh that was debrided with electrocautery. The remaining wound was thoroughly irrigated and a new wound vac was placed. The wound vac was found to have a good seal.    This completed the proposed operation. All instruments, needles, and sponge counts were reported as correct x2 by the nursing staff. Patient was extubated and awakened from general  anesthesia without complication. She was sent to the recovery unit in stable condition.     EBL: minimal.    COMPLICATIONS: none apparent.    SPECIMEN: none    DRAINS: wound vac    DISPOSITION: PACU.      Andrew Paula MD  General Surgery Resident, PGY-3

## 2024-02-23 NOTE — PLAN OF CARE
1619:  Call received from Beryl at [facility] who stated that patient is in review but clinically looks good.  Will rvisit on Monday.         02/23/24 1537   Discharge Reassessment   Assessment Type Discharge Planning Reassessment     Patient will need SNF at time of DC.  To patient's room this am to speak with patient.  Patient off unit.CM called , no answer, detailed message left requesting call back.      Referral sent to the following [facility] SNF's via careport:  [facility]  Dhara Hines  [place]

## 2024-02-23 NOTE — PROGRESS NOTES
[facility] Medicine  Progress Note    Patient Name: Christine Mosqueda  MRN: [id number]  Patient Class: IP- Inpatient   Admission Date: 2/15/2024  Length of Stay: 7 days  Attending Physician: Mayra White DO  Primary Care Provider: [facility]        Subjective:     Principal Problem:Estephanie's gangrene        HPI:  Ms. Mosqueda is a 53 yoF with a PMHx of diastolic heart failure, HTN, T2DM on insulin. She presented to the hospital with worsening pain and swelling of the right medial thigh. She developed an abscess in the area that underwent I+D at [facility] a few days ago. She was sent home on PO abx. Since that time the pain and swelling have worsened.  In ED, CT scan demonstrated extensive inflammation extending from the right perineum to the right medial thigh with multiple foci of air. She was admitted to general surgery for estephanie's gangrene. She underwent extensive debridement under general surgery today in OR. She is currently doing well with good pain control. HM consulted for medical management.     Overview/Hospital Course:  Ms Christine Mosqueda is a 53 y.o.  woman with poorly controlled type II DM who was admitted for sepsis secondary to bacteremia and Estephanie's gangrene. Pt presented w/ worsening pain and swelling of the Rt medial thigh. CT scan findings were consistent w/ Estephanie's gangrene. General surgery team consulted. S/p surgical debridement 02/16, 2/17, 2/19, and on 02/21 (wound vac placed. Surgery plans for OR 2/23 for first wound vac change. Had acute on chronic anemia, likely exacerbated with multiple debridements. Required blood transfusions on 02/19 and again on 02/21.  Blood and wound cultures with lactobacillus. ID consulted- on meropenem. Repeat blood cx with no growth thus far. Also found to have CARLINE on admission, nephrology following. CARLINE resolved. PT/OT recommends SNF on discharge.     Interval History:  No acute overnight events.  Patient  remained afebrile.  Wound VAC changed in the OR today.  Pain controlled at this time     Review of Systems   Constitutional:  Negative for chills and fever.   Respiratory:  Negative for shortness of breath.    Cardiovascular:  Negative for chest pain.   Gastrointestinal:  Negative for abdominal pain.   Skin:  Positive for wound.   Psychiatric/Behavioral:  Negative for confusion.      Objective:     Vital Signs (Most Recent):  Temp: 97.5 °F (36.4 °C) (02/23/24 1207)  Pulse: 70 (02/23/24 1207)  Resp: 20 (02/23/24 1533)  BP: (!) 149/88 (02/23/24 1207)  SpO2: (!) 93 % (02/23/24 1207) Vital Signs (24h Range):  Temp:  [97.4 °F (36.3 °C)-98.3 °F (36.8 °C)] 97.5 °F (36.4 °C)  Pulse:  [70-91] 70  Resp:  [16-20] 20  SpO2:  [92 %-99 %] 93 %  BP: (102-149)/(51-95) 149/88     Weight: 93.6 kg (206 lb 5.6 oz)  Body mass index is 35.42 kg/m².    Intake/Output Summary (Last 24 hours) at 2/23/2024 1546  Last data filed at 2/23/2024 1419  Gross per 24 hour   Intake 1327.65 ml   Output 3915 ml   Net -2587.35 ml           Physical Exam  Vitals and nursing note reviewed.   Constitutional:       General: She is not in acute distress.     Appearance: She is obese.   HENT:      Head: Atraumatic.      Nose: Nose normal.      Mouth/Throat:      Mouth: Mucous membranes are moist.   Eyes:      Extraocular Movements: Extraocular movements intact.   Cardiovascular:      Rate and Rhythm: Normal rate and regular rhythm.   Pulmonary:      Effort: Pulmonary effort is normal.      Breath sounds: No wheezing.      Comments: 2L NC SpO2 >94%  Abdominal:      General: Bowel sounds are normal.      Tenderness: There is no abdominal tenderness.   Genitourinary:     Comments: jansen  Musculoskeletal:      Right lower leg: Edema present.      Left lower leg: Edema present.   Skin:     General: Skin is warm and dry.   Neurological:      Mental Status: She is alert and oriented to person, place, and time. Mental status is at baseline.             Significant  Labs: All pertinent labs within the past 24 hours have been reviewed.    Significant Imaging: I have reviewed all pertinent imaging results/findings within the past 24 hours.    Assessment/Plan:      * Librado's gangrene  CT on admit with R perineum to R medial thigh Fourniers.   - Surgery consulted: S/p debridement on 02/16, 2/17, 2/19, and 2/21 (wound vac placed.)  - s/p OR 2/23 for first wound vac change. Plan for repeat vac change on Mon 02/26  - blood and wound cultures with lactobacillus. On meropenem per ID recs  - jansen in place due to surgical wound site    Sepsis with acute renal failure and tubular necrosis without septic shock  Defined by leukocytosis, tachycardia and groin abscess. Source is [place]niers.   - surgical management with debridement  - antibiotics= meropenem per ID  - Leukocytosis improved on  02/21, but worsen on 02/22  - ID following    CARLINE (acute kidney injury)  Patient with acute kidney injury/acute renal failure likely due to pre-renal azotemia due to IVVD and acute tubular necrosis caused by sepsis  CARLINE is currently improving. Baseline creatinine  0.9  - Labs reviewed- Renal function/electrolytes with Estimated Creatinine Clearance: 88.2 mL/min (based on SCr of 0.8 mg/dL). according to latest data. Monitor urine output and serial BMP and adjust therapy as needed. Avoid nephrotoxins and renally dose meds for GFR listed above.    - Highly suspect ATN as an etiology at this time  - Nephrology consult requested, appreciate recs  - renal function is improving and near baseline.   -Resolved    Anemia of chronic disease  Patient's anemia is currently controlled. Has received 1 units of PRBCs on 2/19 and 02/21 . Etiology likely d/t chronic disease and acute blood loss post surgery.  Current CBC reviewed-   Lab Results   Component Value Date    HGB 8.4 (L) 02/23/2024    HCT 26.6 (L) 02/23/2024     - monitor daily   -s/p 1U pRBC on 02/19 and 02/21    Chronic diastolic congestive heart  failure  Patient is identified as having Diastolic (HFpEF) heart failure that is Chronic. CHF is currently controlled. Latest ECHO performed and demonstrates- Results for orders placed during the hospital encounter of 03/10/23    Echo    Interpretation Summary  · The left ventricle is normal in size with low normal systolic function.  · The estimated ejection fraction is 53%.  · There is abnormal septal wall motion.  · Indeterminate left ventricular diastolic function.  · Mild left atrial enlargement.  · Normal right ventricular size with low normal right ventricular systolic function.  · Mild right atrial enlargement.  · Mild mitral regurgitation.  · Mild tricuspid regurgitation.  · Normal central venous pressure (3 mmHg).  · The estimated PA systolic pressure is 23 mmHg.    No acute issues  Monitor fluid status- does have lower extremity edema   Discussed with nephrologyglo to resume lasix 02/23    Essential hypertension  Chronic, controlled.   -hold home meds at this time:  Amlodipine 10 mg,   Resume Lasix 40 mg p.o.    Latest blood pressure and vitals reviewed-     Temp:  [97.4 °F (36.3 °C)-98.3 °F (36.8 °C)]   Pulse:  [70-91]   Resp:  [16-20]   BP: (102-149)/(51-95)   SpO2:  [92 %-99 %] .   Home meds for hypertension were reviewed and noted below.   Hypertension Medications               amlodipine (NORVASC) 10 MG tablet Take 10 mg by mouth once daily.    furosemide (LASIX) 40 MG tablet Take 1 tablet (40 mg total) by mouth once daily.            While in the hospital, will manage blood pressure as follows; Adjust home antihypertensive regimen as follows- hold meds for now given infection/ narcotic use. Resume as warranted . May develop rebound HTN from drug/ alcohol withdrawal.    Will utilize p.r.n. blood pressure medication only if patient's blood pressure greater than 180/110 and she develops symptoms such as worsening chest pain or shortness of breath.    Type 2 diabetes mellitus with hyperglycemia,  without long-term current use of insulin  Patient's FSGs are uncontrolled due to hyperglycemia on current medication regimen.  Last A1c reviewed-   Lab Results   Component Value Date    HGBA1C 8.2 (H) 04/06/2023     Most recent fingerstick glucose reviewed-   Recent Labs   Lab 02/22/24  1615 02/22/24 2011 02/23/24  0749 02/23/24  1208   POCTGLUCOSE 204* 115* 131* 76       Current correctional scale  High  Maintain anti-hyperglycemic dose as follows-   Antihyperglycemics (From admission, onward)      Start     Stop Route Frequency Ordered    02/23/24 0900  insulin detemir U-100 (Levemir) pen 20 Units         -- SubQ Daily 02/22/24 1031    02/22/24 2100  insulin detemir U-100 (Levemir) pen 15 Units         -- SubQ Nightly 02/22/24 1031    02/22/24 1145  insulin aspart U-100 pen 5 Units         -- SubQ 3 times daily with meals 02/22/24 1031 02/16/24 1514  insulin aspart U-100 pen 0-15 Units         -- SubQ Before meals & nightly PRN 02/16/24 1515          Hold Oral hypoglycemics while patient is in the hospital.  A1c: 8.2  Meds: basal bolus insulin + SSI PRN to maintain goal 140-180  Titrate as needed. Basal BID (20AM, 15U PM), prandia at 5U TIDWM  ADA diet, accuchecks ACHS, hypoglycemic protocol          ILD (interstitial lung disease)  -Noted on imaging; CT chest 2022  -Stable, no acute issues.   -Not on home O2 but says she should be (but never received because she left AMA at OSH)  -would test prior to discharge .      Tobacco abuse  - Pt was counseled about cessation x4 minutes      Cocaine abuse  Patient sprinkles crack crystals in her marijuana  Wants help. Tearful. Emotional support provided.   CM consulted.       ETOH abuse  Patient drinks at least a 6 pack beer daily  - Start scheduled librium- wean ordered  - Thiamine, folate, potassium, magnesium      Debility  Patient with Acute debility due to other reduced mobility. valuation for etiology is complete. Plan includes progressive mobility protocol  initated and PT/OT consulted.    -PTOT recommend SNF  -CM to assist with placement     Class 2 severe obesity due to excess calories with serious comorbidity and body mass index (BMI) of 35.0 to 35.9 in adult  Body mass index is 35.42 kg/m². Morbid obesity complicates all aspects of disease management from diagnostic modalities to treatment. Weight loss encouraged and health benefits explained to patient.         Advanced care planning/counseling discussion  Advance Care Planning    Date: 02/21/2024    Code Status  I engaged the the patient in a voluntary conversation about the patient's preferences for care  at the very end of life. The patient wishes to have CPR and other invasive treatments performed when her heart and/or breathing stops. I communicated to the patient that her wishes align with full code status and she agrees and verbalized understanding.   I spent a total of 16 minutes engaging the patient in this advance care planning discussion.           Code Status: Full Code        VTE Risk Mitigation (From admission, onward)           Ordered     IP VTE HIGH RISK PATIENT  Once         02/16/24 0506     Place sequential compression device  Until discontinued         02/16/24 0506                    Discharge Planning   AVTAR: 2/17/2024     Code Status: Full Code   Is the patient medically ready for discharge?:     Reason for patient still in hospital (select all that apply): Patient trending condition, Treatment, Consult recommendations, and Pending disposition  Discharge Plan A: Home with family                  DaliaLiyah White DO  Department of Hospital Medicine   [facility] - Telemetry

## 2024-02-23 NOTE — ASSESSMENT & PLAN NOTE
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   - Surgery consulted: S/p debridement on 02/16, 2/17, 2/19, and 2/21 (wound vac placed.)  - s/p OR 2/23 for first wound vac change. Plan for repeat vac change on Mon 02/26  - blood and wound cultures with lactobacillus. On meropenem per ID recs  - jansen in place due to surgical wound site

## 2024-02-23 NOTE — ASSESSMENT & PLAN NOTE
Patient with acute kidney injury/acute renal failure likely due to pre-renal azotemia due to IVVD and acute tubular necrosis caused by sepsis  CARLINE is currently improving. Baseline creatinine  0.9  - Labs reviewed- Renal function/electrolytes with Estimated Creatinine Clearance: 88.2 mL/min (based on SCr of 0.8 mg/dL). according to latest data. Monitor urine output and serial BMP and adjust therapy as needed. Avoid nephrotoxins and renally dose meds for GFR listed above.    - Highly suspect ATN as an etiology at this time  - Nephrology consult requested, appreciate recs  - renal function is improving and near baseline.   -Resolved

## 2024-02-23 NOTE — PT/OT/SLP PROGRESS
Physical Therapy      Patient Name:  Christine Mosqueda   MRN:  [id number]  Patient not seen today secondary to Off the floor for wound vac change and possible debridement  . Will follow-up as able later hour/day .

## 2024-02-23 NOTE — ASSESSMENT & PLAN NOTE
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    - Return to OR today for first wound vac change and possible debridement   - If this goes well we will plan on performing the next vac change at bedside  - Keep NPO  - ID following; Vanc/zosyn/clinda started 2/16, switched to meropenam and vanc   - Operative cultures 2/17 with lactobacillus  - Poorly controlled DMII - insulin per HM  - Nutrition consult, boost supplements, severely hypoalbuminemia   - Polysubstance abuse to alcohol dependency - librium taper on, CIWA protocol  - Multiple modal pain control limited by CARLINE - continue scheduled tylenol, combination of oral and IV narcotics  - Okay for DVT ppx   - PT consult - okay to attempt ambulation, get up to chair.   - Will have long term wound care needs; pending final disposition will need placement for this

## 2024-02-23 NOTE — ASSESSMENT & PLAN NOTE
Chronic, controlled.   -hold home meds at this time:  Amlodipine 10 mg,   Resume Lasix 40 mg p.o.    Latest blood pressure and vitals reviewed-     Temp:  [97.4 °F (36.3 °C)-98.3 °F (36.8 °C)]   Pulse:  [70-91]   Resp:  [16-20]   BP: (102-149)/(51-95)   SpO2:  [92 %-99 %] .   Home meds for hypertension were reviewed and noted below.   Hypertension Medications               amlodipine (NORVASC) 10 MG tablet Take 10 mg by mouth once daily.    furosemide (LASIX) 40 MG tablet Take 1 tablet (40 mg total) by mouth once daily.            While in the hospital, will manage blood pressure as follows; Adjust home antihypertensive regimen as follows- hold meds for now given infection/ narcotic use. Resume as warranted . May develop rebound HTN from drug/ alcohol withdrawal.    Will utilize p.r.n. blood pressure medication only if patient's blood pressure greater than 180/110 and she develops symptoms such as worsening chest pain or shortness of breath.

## 2024-02-23 NOTE — ASSESSMENT & PLAN NOTE
Patient's anemia is currently controlled. Has received 1 units of PRBCs on 2/19 and 02/21 . Etiology likely d/t chronic disease and acute blood loss post surgery.  Current CBC reviewed-   Lab Results   Component Value Date    HGB 8.4 (L) 02/23/2024    HCT 26.6 (L) 02/23/2024     - monitor daily   -s/p 1U pRBC on 02/19 and 02/21

## 2024-02-23 NOTE — SUBJECTIVE & OBJECTIVE
Interval History:  No acute overnight events.  Patient remained afebrile.  Wound VAC changed in the OR today.  Pain controlled at this time     Review of Systems   Constitutional:  Negative for chills and fever.   Respiratory:  Negative for shortness of breath.    Cardiovascular:  Negative for chest pain.   Gastrointestinal:  Negative for abdominal pain.   Skin:  Positive for wound.   Psychiatric/Behavioral:  Negative for confusion.      Objective:     Vital Signs (Most Recent):  Temp: 97.5 °F (36.4 °C) (02/23/24 1207)  Pulse: 70 (02/23/24 1207)  Resp: 20 (02/23/24 1533)  BP: (!) 149/88 (02/23/24 1207)  SpO2: (!) 93 % (02/23/24 1207) Vital Signs (24h Range):  Temp:  [97.4 °F (36.3 °C)-98.3 °F (36.8 °C)] 97.5 °F (36.4 °C)  Pulse:  [70-91] 70  Resp:  [16-20] 20  SpO2:  [92 %-99 %] 93 %  BP: (102-149)/(51-95) 149/88     Weight: 93.6 kg (206 lb 5.6 oz)  Body mass index is 35.42 kg/m².    Intake/Output Summary (Last 24 hours) at 2/23/2024 1546  Last data filed at 2/23/2024 1419  Gross per 24 hour   Intake 1327.65 ml   Output 3915 ml   Net -2587.35 ml           Physical Exam  Vitals and nursing note reviewed.   Constitutional:       General: She is not in acute distress.     Appearance: She is obese.   HENT:      Head: Atraumatic.      Nose: Nose normal.      Mouth/Throat:      Mouth: Mucous membranes are moist.   Eyes:      Extraocular Movements: Extraocular movements intact.   Cardiovascular:      Rate and Rhythm: Normal rate and regular rhythm.   Pulmonary:      Effort: Pulmonary effort is normal.      Breath sounds: No wheezing.      Comments: 2L NC SpO2 >94%  Abdominal:      General: Bowel sounds are normal.      Tenderness: There is no abdominal tenderness.   Genitourinary:     Comments: jansen  Musculoskeletal:      Right lower leg: Edema present.      Left lower leg: Edema present.   Skin:     General: Skin is warm and dry.   Neurological:      Mental Status: She is alert and oriented to person, place, and time.  Mental status is at baseline.             Significant Labs: All pertinent labs within the past 24 hours have been reviewed.    Significant Imaging: I have reviewed all pertinent imaging results/findings within the past 24 hours.

## 2024-02-23 NOTE — NURSING
[facility] [place]  Nurses Note -- 4 Eyes      2/22/2024       Skin assessed on: Q Shift      [x] No Pressure Injuries Present    [x]Prevention Measures Documented    [] Yes LDA  for Pressure Injury Previously documented     [] Yes New Pressure Injury Discovered   [] LDA for New Pressure Injury Added      Attending RN:  Shanel Smith RN     Second RN:  CALEB Lu

## 2024-02-23 NOTE — TRANSFER OF CARE
"Anesthesia Transfer of Care Note    Patient: Christine Mosqueda    Procedure(s) Performed: Procedure(s) (LRB):  REPLACEMENT, WOUND VAC (Right)    Patient location: PACU    Anesthesia Type: general    Transport from OR: Transported from OR on 100% O2 by closed face mask with adequate spontaneous ventilation    Post pain: adequate analgesia    Post assessment: no apparent anesthetic complications and tolerated procedure well    Post vital signs: stable    Level of consciousness: sedated and responds to stimulation    Nausea/Vomiting: no nausea/vomiting    Complications: none    Transfer of care protocol was followed      Last vitals: Visit Vitals  /70 (BP Location: Left arm)   Pulse 73   Temp 36.3 °C (97.4 °F) (Oral)   Resp 18   Ht 5' 4" (1.626 m)   Wt 93.6 kg (206 lb 5.6 oz)   LMP 01/29/2018 (Approximate)   SpO2 99%   BMI 35.42 kg/m²     "

## 2024-02-23 NOTE — PROGRESS NOTES
[facility]  General Surgery  Progress Note    Subjective:     History of Present Illness:  Ms. Mosqueda is a 53 yoF with a PMH significant for insulin-dependent T2DM and CHF (last echo EF 53%, PA systolic 23) who presents with worsening pain and swelling of the right medial thigh. She developed an abscess in the area that underwent I+D at [facility] [place] a few days ago, after which she was sent home on PO abx. Since that time the pain and swelling have worsened leading to her presentation to our ED. Workup here revealed normal vital signs, but with significantly elevated WBC and CRP, with other laboratory derangements leading to a LRINIC score of 11. CT scan demonstrates extensive inflammation extending from the right perineum to the right medial thigh with multiple foci of air. She is not on any blood thinners. No issues with anesthesia in the past.    Post-Op Info:  Procedure(s) (LRB):  EXPLORATION, WOUND (Right)   2 Days Post-Op     Interval History: NAEON. Feeling well this morning. AVSS. Wound vac in place and with good seal with appropriate output. Lab work with slight increase in WBC with overall hemoconcentration.    Medications:  Continuous Infusions:      Scheduled Meds:   acetaminophen  1,000 mg Oral Q8H    budesonide  1 mg Nebulization Q12H    And    arformoteroL  15 mcg Nebulization BID    aspirin  81 mg Oral Daily    atorvastatin  20 mg Oral Daily    chlordiazepoxide  5 mg Oral BID    Followed by    [START ON 2/24/2024] chlordiazepoxide  5 mg Oral Daily    folic acid  1 mg Oral Daily    furosemide  40 mg Oral Daily    gabapentin  400 mg Oral BID    insulin aspart U-100  5 Units Subcutaneous TIDWM    insulin detemir U-100  15 Units Subcutaneous QHS    insulin detemir U-100 (Levemir)  20 Units Subcutaneous Daily    meropenem (MERREM) IVPB  1 g Intravenous Q12H    pantoprazole  40 mg Oral Daily    thiamine  100 mg Oral Daily     PRN Meds:0.9%  NaCl infusion (for blood administration), albuterol sulfate,  dextrose 10%, dextrose 10%, glucagon (human recombinant), glucose, glucose, HYDROmorphone, insulin aspart U-100, melatonin, ondansetron, oxyCODONE, oxyCODONE, prochlorperazine, sodium chloride 0.9%     Review of patient's allergies indicates:   Allergen Reactions    Hydrochlorothiazide plus      Objective:     Vital Signs (Most Recent):  Temp: 98.2 °F (36.8 °C) (02/23/24 0430)  Pulse: 81 (02/23/24 0430)  Resp: 16 (02/23/24 0524)  BP: (!) 132/95 (02/23/24 0430)  SpO2: 96 % (02/23/24 0430) Vital Signs (24h Range):  Temp:  [97.9 °F (36.6 °C)-98.4 °F (36.9 °C)] 98.2 °F (36.8 °C)  Pulse:  [78-91] 81  Resp:  [16-20] 16  SpO2:  [94 %-97 %] 96 %  BP: (102-145)/(51-95) 132/95     Weight: 93.6 kg (206 lb 5.6 oz)  Body mass index is 35.42 kg/m².    Intake/Output - Last 3 Shifts         02/21 0700  02/22 0659 02/22 0700  02/23 0659 02/23 0700  02/24 0659    P.O.  840     Blood 345      IV Piggyback 1677 197.7     Total Intake(mL/kg) 2022 (21.6) 1037.7 (11.1)     Urine (mL/kg/hr) 1100 (0.5) 2450 (1.1)     Other  200     Stool 0 0     Total Output 1100 2650     Net +922 -1612.4            Stool Occurrence 1 x 2 x             Physical Exam  Vitals and nursing note reviewed.   Constitutional:       Appearance: Normal appearance.   HENT:      Head: Normocephalic and atraumatic.   Cardiovascular:      Rate and Rhythm: Normal rate and regular rhythm.   Pulmonary:      Effort: Pulmonary effort is normal. No respiratory distress.   Abdominal:      General: Abdomen is flat. There is no distension.      Palpations: Abdomen is soft. There is no mass.      Tenderness: There is no abdominal tenderness.      Hernia: No hernia is present.   Genitourinary:     Comments: Hernandez catheter  Musculoskeletal:      Right lower leg: No edema.      Left lower leg: No edema.   Skin:     General: Skin is warm and dry.      Comments: Wound vac to right thigh with good seal and SS output   Neurological:      General: No focal deficit present.      Mental  Status: She is alert and oriented to person, place, and time.   Psychiatric:         Mood and Affect: Mood normal.         Behavior: Behavior normal.          Significant Labs:  I have reviewed all pertinent lab results within the past 24 hours.  CBC:   Recent Labs   Lab 02/23/24 0412   WBC 19.69*   RBC 3.50*   HGB 8.4*   HCT 26.6*      MCV 76*   MCH 24.0*   MCHC 31.6*       CMP:   Recent Labs   Lab 02/23/24 0412   GLU 50*   CALCIUM 8.2*   ALBUMIN 1.4*   PROT 5.8*      K 5.0   CO2 27      BUN 17   CREATININE 0.8   ALKPHOS 122   ALT 11   AST 17   BILITOT 0.1       LFTs:   Recent Labs   Lab 02/23/24 0412   ALT 11   AST 17   ALKPHOS 122   BILITOT 0.1   PROT 5.8*   ALBUMIN 1.4*         Significant Diagnostics:  I have reviewed all pertinent imaging results/findings within the past 24 hours.  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    - Return to OR today for first wound vac change and possible debridement   - If this goes well we will plan on performing the next vac change at bedside  - Keep NPO  - ID following; Vanc/zosyn/clinda started 2/16, switched to meropenam and vanc   - Operative cultures 2/17 with lactobacillus  - Poorly controlled DMII - insulin per HM  - Nutrition consult, boost supplements, severely hypoalbuminemia   - Polysubstance abuse to alcohol dependency - librium taper on, CIWA protocol  - Multiple modal pain control limited by CARLINE - continue scheduled tylenol, combination of oral and IV narcotics  - Okay for DVT ppx   - PT consult - okay to attempt ambulation, get up to chair.   - Will have long term wound care needs; pending final disposition will need placement for this            Steven Paula MD  General Surgery  [facility] [place] - Telemetry

## 2024-02-23 NOTE — PLAN OF CARE
Problem: Diabetes Comorbidity  Goal: Blood Glucose Level Within Targeted Range  Outcome: Ongoing, Progressing     Problem: Adult Inpatient Plan of Care  Goal: Plan of Care Review  Outcome: Ongoing, Progressing     Problem: Skin Injury Risk Increased  Goal: Skin Health and Integrity  Outcome: Ongoing, Progressing     Problem: Adjustment to Illness (Sepsis/Septic Shock)  Goal: Optimal Coping  Outcome: Ongoing, Progressing     Problem: Bleeding (Sepsis/Septic Shock)  Goal: Absence of Bleeding  Outcome: Ongoing, Progressing     Problem: Glycemic Control Impaired (Sepsis/Septic Shock)  Goal: Blood Glucose Level Within Desired Range  Outcome: Ongoing, Progressing     Problem: Infection Progression (Sepsis/Septic Shock)  Goal: Absence of Infection Signs and Symptoms  Outcome: Ongoing, Progressing     Problem: Nutrition Impaired (Sepsis/Septic Shock)  Goal: Optimal Nutrition Intake  Outcome: Ongoing, Progressing     Problem: Fluid and Electrolyte Imbalance (Acute Kidney Injury/Impairment)  Goal: Fluid and Electrolyte Balance  Outcome: Ongoing, Progressing     Problem: Oral Intake Inadequate (Acute Kidney Injury/Impairment)  Goal: Optimal Nutrition Intake  Outcome: Ongoing, Progressing     Problem: Renal Function Impairment (Acute Kidney Injury/Impairment)  Goal: Effective Renal Function  Outcome: Ongoing, Progressing     Problem: Fall Injury Risk  Goal: Absence of Fall and Fall-Related Injury  Outcome: Ongoing, Progressing     Problem: Infection  Goal: Absence of Infection Signs and Symptoms  Outcome: Ongoing, Progressing     Problem: Impaired Wound Healing  Goal: Optimal Wound Healing  Outcome: Ongoing, Progressing

## 2024-02-23 NOTE — NURSING
Patient given CHG bath for I&D with wound vac dressing change today.  Dressing to wound vac remains in place with NPWT at 125mmHg.  100ml serosanguinous drainage in canister this shift

## 2024-02-23 NOTE — PLAN OF CARE
Diana completed.  Locet called in to [facility] @ [phone].  PASSR faxed to [facility] at:  [phone].   Awaiting 142.

## 2024-02-24 LAB
ALBUMIN SERPL BCP-MCNC: 1.4 G/DL (ref 3.5–5.2)
ALP SERPL-CCNC: 102 U/L (ref 55–135)
ALT SERPL W/O P-5'-P-CCNC: 11 U/L (ref 10–44)
ANION GAP SERPL CALC-SCNC: 8 MMOL/L (ref 8–16)
AST SERPL-CCNC: 12 U/L (ref 10–40)
BASOPHILS # BLD AUTO: 0.08 K/UL (ref 0–0.2)
BASOPHILS NFR BLD: 0.4 % (ref 0–1.9)
BILIRUB SERPL-MCNC: 0.1 MG/DL (ref 0.1–1)
BUN SERPL-MCNC: 17 MG/DL (ref 6–20)
CALCIUM SERPL-MCNC: 8 MG/DL (ref 8.7–10.5)
CHLORIDE SERPL-SCNC: 102 MMOL/L (ref 95–110)
CO2 SERPL-SCNC: 28 MMOL/L (ref 23–29)
CREAT SERPL-MCNC: 0.9 MG/DL (ref 0.5–1.4)
DIFFERENTIAL METHOD BLD: ABNORMAL
EOSINOPHIL # BLD AUTO: 0.3 K/UL (ref 0–0.5)
EOSINOPHIL NFR BLD: 1.2 % (ref 0–8)
ERYTHROCYTE [DISTWIDTH] IN BLOOD BY AUTOMATED COUNT: 20.3 % (ref 11.5–14.5)
EST. GFR  (NO RACE VARIABLE): >60 ML/MIN/1.73 M^2
GLUCOSE SERPL-MCNC: 155 MG/DL (ref 70–110)
HCT VFR BLD AUTO: 25 % (ref 37–48.5)
HGB BLD-MCNC: 7.6 G/DL (ref 12–16)
IMM GRANULOCYTES # BLD AUTO: 0.6 K/UL (ref 0–0.04)
IMM GRANULOCYTES NFR BLD AUTO: 2.9 % (ref 0–0.5)
LYMPHOCYTES # BLD AUTO: 2.9 K/UL (ref 1–4.8)
LYMPHOCYTES NFR BLD: 13.7 % (ref 18–48)
MAGNESIUM SERPL-MCNC: 1.6 MG/DL (ref 1.6–2.6)
MCH RBC QN AUTO: 23.4 PG (ref 27–31)
MCHC RBC AUTO-ENTMCNC: 30.4 G/DL (ref 32–36)
MCV RBC AUTO: 77 FL (ref 82–98)
MONOCYTES # BLD AUTO: 1.4 K/UL (ref 0.3–1)
MONOCYTES NFR BLD: 6.5 % (ref 4–15)
NEUTROPHILS # BLD AUTO: 15.8 K/UL (ref 1.8–7.7)
NEUTROPHILS NFR BLD: 75.3 % (ref 38–73)
NRBC BLD-RTO: 0 /100 WBC
PHOSPHATE SERPL-MCNC: 4.3 MG/DL (ref 2.7–4.5)
PLATELET # BLD AUTO: 254 K/UL (ref 150–450)
PMV BLD AUTO: 10.8 FL (ref 9.2–12.9)
POCT GLUCOSE: 149 MG/DL (ref 70–110)
POCT GLUCOSE: 161 MG/DL (ref 70–110)
POCT GLUCOSE: 263 MG/DL (ref 70–110)
POCT GLUCOSE: 78 MG/DL (ref 70–110)
POTASSIUM SERPL-SCNC: 5.4 MMOL/L (ref 3.5–5.1)
PROT SERPL-MCNC: 5.4 G/DL (ref 6–8.4)
RBC # BLD AUTO: 3.25 M/UL (ref 4–5.4)
SODIUM SERPL-SCNC: 138 MMOL/L (ref 136–145)
WBC # BLD AUTO: 20.96 K/UL (ref 3.9–12.7)

## 2024-02-24 PROCEDURE — 36415 COLL VENOUS BLD VENIPUNCTURE: CPT | Performed by: HOSPITALIST

## 2024-02-24 PROCEDURE — 83735 ASSAY OF MAGNESIUM: CPT | Performed by: HOSPITALIST

## 2024-02-24 PROCEDURE — 84100 ASSAY OF PHOSPHORUS: CPT | Performed by: HOSPITALIST

## 2024-02-24 PROCEDURE — 63600175 PHARM REV CODE 636 W HCPCS: Performed by: STUDENT IN AN ORGANIZED HEALTH CARE EDUCATION/TRAINING PROGRAM

## 2024-02-24 PROCEDURE — 99233 SBSQ HOSP IP/OBS HIGH 50: CPT | Mod: ,,, | Performed by: INTERNAL MEDICINE

## 2024-02-24 PROCEDURE — 25000003 PHARM REV CODE 250

## 2024-02-24 PROCEDURE — 25000003 PHARM REV CODE 250: Performed by: HOSPITALIST

## 2024-02-24 PROCEDURE — 27000221 HC OXYGEN, UP TO 24 HOURS

## 2024-02-24 PROCEDURE — 94761 N-INVAS EAR/PLS OXIMETRY MLT: CPT

## 2024-02-24 PROCEDURE — 94640 AIRWAY INHALATION TREATMENT: CPT

## 2024-02-24 PROCEDURE — 25000003 PHARM REV CODE 250: Performed by: STUDENT IN AN ORGANIZED HEALTH CARE EDUCATION/TRAINING PROGRAM

## 2024-02-24 PROCEDURE — 85025 COMPLETE CBC W/AUTO DIFF WBC: CPT | Performed by: HOSPITALIST

## 2024-02-24 PROCEDURE — 80053 COMPREHEN METABOLIC PANEL: CPT | Performed by: HOSPITALIST

## 2024-02-24 PROCEDURE — 25000003 PHARM REV CODE 250: Performed by: INTERNAL MEDICINE

## 2024-02-24 PROCEDURE — 21400001 HC TELEMETRY ROOM

## 2024-02-24 PROCEDURE — 63600175 PHARM REV CODE 636 W HCPCS: Performed by: INTERNAL MEDICINE

## 2024-02-24 PROCEDURE — 25000242 PHARM REV CODE 250 ALT 637 W/ HCPCS: Performed by: HOSPITALIST

## 2024-02-24 PROCEDURE — 99900035 HC TECH TIME PER 15 MIN (STAT)

## 2024-02-24 RX ORDER — FLUCONAZOLE 150 MG/1
150 TABLET ORAL ONCE
Status: COMPLETED | OUTPATIENT
Start: 2024-02-24 | End: 2024-02-24

## 2024-02-24 RX ORDER — ENOXAPARIN SODIUM 100 MG/ML
40 INJECTION SUBCUTANEOUS EVERY 24 HOURS
Status: DISCONTINUED | OUTPATIENT
Start: 2024-02-24 | End: 2024-03-06 | Stop reason: HOSPADM

## 2024-02-24 RX ADMIN — ACETAMINOPHEN 1000 MG: 500 TABLET ORAL at 06:02

## 2024-02-24 RX ADMIN — ATORVASTATIN CALCIUM 20 MG: 10 TABLET, FILM COATED ORAL at 08:02

## 2024-02-24 RX ADMIN — ACETAMINOPHEN 1000 MG: 500 TABLET ORAL at 02:02

## 2024-02-24 RX ADMIN — ARFORMOTEROL TARTRATE 15 MCG: 15 SOLUTION RESPIRATORY (INHALATION) at 08:02

## 2024-02-24 RX ADMIN — INSULIN ASPART 5 UNITS: 100 INJECTION, SOLUTION INTRAVENOUS; SUBCUTANEOUS at 12:02

## 2024-02-24 RX ADMIN — MEROPENEM 1 G: 1 INJECTION, POWDER, FOR SOLUTION INTRAVENOUS at 02:02

## 2024-02-24 RX ADMIN — FOLIC ACID 1 MG: 1 TABLET ORAL at 08:02

## 2024-02-24 RX ADMIN — CHLORDIAZEPOXIDE HYDROCHLORIDE 5 MG: 5 CAPSULE ORAL at 08:02

## 2024-02-24 RX ADMIN — INSULIN ASPART 9 UNITS: 100 INJECTION, SOLUTION INTRAVENOUS; SUBCUTANEOUS at 04:02

## 2024-02-24 RX ADMIN — INSULIN ASPART 5 UNITS: 100 INJECTION, SOLUTION INTRAVENOUS; SUBCUTANEOUS at 08:02

## 2024-02-24 RX ADMIN — THIAMINE HCL TAB 100 MG 100 MG: 100 TAB at 08:02

## 2024-02-24 RX ADMIN — GABAPENTIN 400 MG: 400 CAPSULE ORAL at 09:02

## 2024-02-24 RX ADMIN — FLUCONAZOLE 150 MG: 150 TABLET ORAL at 08:02

## 2024-02-24 RX ADMIN — ASPIRIN 81 MG CHEWABLE TABLET 81 MG: 81 TABLET CHEWABLE at 08:02

## 2024-02-24 RX ADMIN — OXYCODONE 10 MG: 5 TABLET ORAL at 11:02

## 2024-02-24 RX ADMIN — ENOXAPARIN SODIUM 40 MG: 40 INJECTION SUBCUTANEOUS at 04:02

## 2024-02-24 RX ADMIN — MEROPENEM 1 G: 1 INJECTION, POWDER, FOR SOLUTION INTRAVENOUS at 09:02

## 2024-02-24 RX ADMIN — PANTOPRAZOLE SODIUM 40 MG: 40 TABLET, DELAYED RELEASE ORAL at 08:02

## 2024-02-24 RX ADMIN — INSULIN ASPART 5 UNITS: 100 INJECTION, SOLUTION INTRAVENOUS; SUBCUTANEOUS at 04:02

## 2024-02-24 RX ADMIN — MEROPENEM 1 G: 1 INJECTION, POWDER, FOR SOLUTION INTRAVENOUS at 06:02

## 2024-02-24 RX ADMIN — OXYCODONE 10 MG: 5 TABLET ORAL at 05:02

## 2024-02-24 RX ADMIN — OXYCODONE 10 MG: 5 TABLET ORAL at 06:02

## 2024-02-24 RX ADMIN — GABAPENTIN 400 MG: 400 CAPSULE ORAL at 08:02

## 2024-02-24 RX ADMIN — INSULIN ASPART 3 UNITS: 100 INJECTION, SOLUTION INTRAVENOUS; SUBCUTANEOUS at 12:02

## 2024-02-24 RX ADMIN — FUROSEMIDE 40 MG: 40 TABLET ORAL at 08:02

## 2024-02-24 RX ADMIN — BUDESONIDE 1 MG: 0.5 INHALANT RESPIRATORY (INHALATION) at 08:02

## 2024-02-24 NOTE — ASSESSMENT & PLAN NOTE
Chronic, controlled.   -hold home meds at this time:  Amlodipine 10 mg,   Resume Lasix 40 mg p.o.    Latest blood pressure and vitals reviewed-     Temp:  [97.9 °F (36.6 °C)-99 °F (37.2 °C)]   Pulse:  [77-95]   Resp:  [16-20]   BP: (118-154)/(58-83)   SpO2:  [91 %-100 %] .   Home meds for hypertension were reviewed and noted below.   Hypertension Medications               amlodipine (NORVASC) 10 MG tablet Take 10 mg by mouth once daily.    furosemide (LASIX) 40 MG tablet Take 1 tablet (40 mg total) by mouth once daily.            While in the hospital, will manage blood pressure as follows; Adjust home antihypertensive regimen as follows- hold meds for now given infection/ narcotic use. Resume as warranted . May develop rebound HTN from drug/ alcohol withdrawal.    Will utilize p.r.n. blood pressure medication only if patient's blood pressure greater than 180/110 and she develops symptoms such as worsening chest pain or shortness of breath.

## 2024-02-24 NOTE — PLAN OF CARE
Problem: Adult Inpatient Plan of Care  Goal: Plan of Care Review  Outcome: Ongoing, Progressing  Flowsheets (Taken 2/24/2024 0638)  Plan of Care Reviewed With: patient     Problem: Adult Inpatient Plan of Care  Goal: Patient-Specific Goal (Individualized)  Outcome: Ongoing, Progressing     Problem: Skin Injury Risk Increased  Goal: Skin Health and Integrity  Outcome: Ongoing, Progressing  Intervention: Optimize Skin Protection  Flowsheets (Taken 2/24/2024 0638)  Pressure Reduction Techniques: frequent weight shift encouraged  Skin Protection:   adhesive use limited   transparent dressing maintained   tubing/devices free from skin contact  Head of Bed (HOB) Positioning: HOB elevated

## 2024-02-24 NOTE — PROGRESS NOTES
[facility]  General Surgery  Progress Note    Subjective:     History of Present Illness:  Ms. Mosqueda is a 53 yoF with a PMH significant for insulin-dependent T2DM and CHF (last echo EF 53%, PA systolic 23) who presents with worsening pain and swelling of the right medial thigh. She developed an abscess in the area that underwent [place]+D at [facility] [place] a few days ago, after which she was sent home on PO abx. Since that time the pain and swelling have worsened leading to her presentation to our ED. Workup here revealed normal vital signs, but with significantly elevated WBC and CRP, with other laboratory derangements leading to a LRINIC score of 11. CT scan demonstrates extensive inflammation extending from the right perineum to the right medial thigh with multiple foci of air. She is not on any blood thinners. No issues with anesthesia in the past.    Post-Op Info:  Procedure(s) (LRB):  REPLACEMENT, WOUND VAC (Right)   1 Day Post-Op     Interval History: NAEON. Feeling well this morning aside prom expected pain of the wound vac site. AVSS. Wound vac in place and with good seal with SS output. Lab work with continued uptrend in WBC.    Medications:  Continuous Infusions:      Scheduled Meds:   acetaminophen  1,000 mg Oral Q8H    budesonide  1 mg Nebulization Q12H    And    arformoteroL  15 mcg Nebulization BID    aspirin  81 mg Oral Daily    atorvastatin  20 mg Oral Daily    chlordiazepoxide  5 mg Oral BID    Followed by    chlordiazepoxide  5 mg Oral Daily    folic acid  1 mg Oral Daily    furosemide  40 mg Oral Daily    gabapentin  400 mg Oral BID    insulin aspart U-100  5 Units Subcutaneous TIDWM    insulin detemir U-100  15 Units Subcutaneous QHS    insulin detemir U-100 (Levemir)  20 Units Subcutaneous Daily    meropenem (MERREM) IVPB  1 g Intravenous Q8H    pantoprazole  40 mg Oral Daily    thiamine  100 mg Oral Daily     PRN Meds:0.9%  NaCl infusion (for blood administration), albuterol sulfate, dextrose 10%,  dextrose 10%, glucagon (human recombinant), glucose, glucose, HYDROmorphone, insulin aspart U-100, melatonin, ondansetron, oxyCODONE, oxyCODONE, prochlorperazine, sodium chloride 0.9%     Review of patient's allergies indicates:   Allergen Reactions    Hydrochlorothiazide plus      Objective:     Vital Signs (Most Recent):  Temp: 98.3 °F (36.8 °C) (02/24/24 0348)  Pulse: 83 (02/24/24 0348)  Resp: 20 (02/24/24 0630)  BP: (!) 154/74 (02/24/24 0348)  SpO2: (!) 93 % (02/24/24 0348) Vital Signs (24h Range):  Temp:  [97.4 °F (36.3 °C)-99 °F (37.2 °C)] 98.3 °F (36.8 °C)  Pulse:  [70-95] 83  Resp:  [16-20] 20  SpO2:  [91 %-99 %] 93 %  BP: (120-158)/(70-88) 154/74     Weight: 93.6 kg (206 lb 5.6 oz)  Body mass index is 35.42 kg/m².    Intake/Output - Last 3 Shifts         02/22 0700  02/23 0659 02/23 0700  02/24 0659 02/24 0700  02/25 0659    P.O. 840 480     Blood       IV Piggyback 197.7 650     Total Intake(mL/kg) 1037.7 (11.1) 1130 (12.1)     Urine (mL/kg/hr) 2450 (1.1) 3500 (1.6)     Other 200 0     Stool 0 0     Blood  15     Total Output 2650 3515     Net -1612.4 -2385            Stool Occurrence 2 x 0 x             Physical Exam  Vitals and nursing note reviewed.   Constitutional:       Appearance: Normal appearance.   HENT:      Head: Normocephalic and atraumatic.   Cardiovascular:      Rate and Rhythm: Normal rate and regular rhythm.   Pulmonary:      Effort: Pulmonary effort is normal. No respiratory distress.   Abdominal:      General: Abdomen is flat. There is no distension.      Palpations: Abdomen is soft. There is no mass.      Tenderness: There is no abdominal tenderness.      Hernia: No hernia is present.   Genitourinary:     Comments: Hernandez catheter  Musculoskeletal:      Right lower leg: No edema.      Left lower leg: No edema.   Skin:     General: Skin is warm and dry.      Comments: Wound vac to right thigh with good seal and SS output   Neurological:      General: No focal deficit present.      Mental  Status: She is alert and oriented to person, place, and time.   Psychiatric:         Mood and Affect: Mood normal.         Behavior: Behavior normal.          Significant Labs:  I have reviewed all pertinent lab results within the past 24 hours.  CBC:   Recent Labs   Lab 02/24/24 0422   WBC 20.96*   RBC 3.25*   HGB 7.6*   HCT 25.0*      MCV 77*   MCH 23.4*   MCHC 30.4*       CMP:   Recent Labs   Lab 02/24/24 0422   *   CALCIUM 8.0*   ALBUMIN 1.4*   PROT 5.4*      K 5.4*   CO2 28      BUN 17   CREATININE 0.9   ALKPHOS 102   ALT 11   AST 12   BILITOT 0.1       LFTs:   Recent Labs   Lab 02/24/24 0422   ALT 11   AST 12   ALKPHOS 102   BILITOT 0.1   PROT 5.4*   ALBUMIN 1.4*         Significant Diagnostics:  I have reviewed all pertinent imaging results/findings within the past 24 hours.  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 S/p wound vac replacement on 2/23.    - Plan for return to the OR on 2/26 for wound vac change and partial closure  - Ok for diet  - ID following; Vanc/zosyn/clinda started 2/16, switched to meropenam and vanc   - Operative cultures 2/17 with lactobacillus   - White, thick vaginal secretions noted in the OR on 2/23; one-time dose of 150 mg of fluconazole 2/24  - Poorly controlled DMII - insulin per HM  - Nutrition consult, boost supplements, severely hypoalbuminemia   - Polysubstance abuse to alcohol dependency - librium taper on, CIWA protocol  - Multiple modal pain control limited by CARLINE - continue scheduled tylenol, combination of oral and IV narcotics  - Okay for DVT ppx   - PT consult - okay to attempt ambulation, get up to chair.   - Will have long term wound  care needs; pending final disposition will need placement for this           Steven Paula MD  General Surgery  [facility] [place] [facility]

## 2024-02-24 NOTE — SUBJECTIVE & OBJECTIVE
Interval History:  No acute overnight events.  Patient remained afebrile.  Wound VAC changed in the OR yesterday.  Pain controlled at this time . Partner at bedside    Review of Systems   Constitutional:  Negative for chills and fever.   Respiratory:  Negative for shortness of breath.    Cardiovascular:  Negative for chest pain.   Gastrointestinal:  Negative for abdominal pain.   Skin:  Positive for wound.   Psychiatric/Behavioral:  Negative for confusion.      Objective:     Vital Signs (Most Recent):  Temp: 98.1 °F (36.7 °C) (02/24/24 1537)  Pulse: 89 (02/24/24 1537)  Resp: 19 (02/24/24 1537)  BP: (!) 118/58 (02/24/24 1537)  SpO2: 100 % (02/24/24 1537) Vital Signs (24h Range):  Temp:  [97.7 °F (36.5 °C)-99 °F (37.2 °C)] 98.1 °F (36.7 °C)  Pulse:  [77-95] 89  Resp:  [16-20] 19  SpO2:  [91 %-100 %] 100 %  BP: (118-158)/(58-83) 118/58     Weight: 93.6 kg (206 lb 5.6 oz)  Body mass index is 35.42 kg/m².    Intake/Output Summary (Last 24 hours) at 2/24/2024 1615  Last data filed at 2/24/2024 1104  Gross per 24 hour   Intake 480 ml   Output 2300 ml   Net -1820 ml           Physical Exam  Vitals and nursing note reviewed.   Constitutional:       General: She is not in acute distress.     Appearance: She is obese.   HENT:      Head: Atraumatic.      Nose: Nose normal.      Mouth/Throat:      Mouth: Mucous membranes are moist.   Eyes:      Extraocular Movements: Extraocular movements intact.   Cardiovascular:      Rate and Rhythm: Normal rate and regular rhythm.   Pulmonary:      Effort: Pulmonary effort is normal.      Breath sounds: No wheezing.      Comments: 2L NC SpO2 >94%  Abdominal:      General: Bowel sounds are normal.      Tenderness: There is no abdominal tenderness.   Genitourinary:     Comments: jansen  Musculoskeletal:      Right lower leg: Edema present.      Left lower leg: Edema present.   Skin:     General: Skin is warm and dry.   Neurological:      Mental Status: She is alert and oriented to person,  place, and time. Mental status is at baseline.             Significant Labs: All pertinent labs within the past 24 hours have been reviewed.    Significant Imaging: I have reviewed all pertinent imaging results/findings within the past 24 hours.

## 2024-02-24 NOTE — ASSESSMENT & PLAN NOTE
"53F with h/o DM, admitted 2/15 with abscess in R medial thigh. CT c/f abscess/nec fascitis and now s/p several surgical debridements. Muscle necrosis noted. Cultures sent. Gram stain with GPC in pairs and chains and GNR.  Bcx on admit positive for GPR in one of two bottles. On vanc/meropenem. ID consulted for "fourniers gangrene, diabetic, CARLINE; no cultures taken at initial debridement "    Lactobacillus growing from wound and blood. Repeat blood cultures NGTD. Leukocytosis persists - likely multivariable (infection, stress of surgery, etc)    Recommendations:   - continue abx  - wound care/additional debridement per surgery  - trend WBC  "

## 2024-02-24 NOTE — PROGRESS NOTES
[facility] [place]  Nurses Note -- 4 Eyes      2/24/2024       Skin assessed on: Q Shift      [x] No Pressure Injuries Present    []Prevention Measures Documented    [] Yes LDA  for Pressure Injury Previously documented     [] Yes New Pressure Injury Discovered   [] LDA for New Pressure Injury Added      Attending RN:  Ángel Devlin RN     Second RN:  Patt Horn RN

## 2024-02-24 NOTE — ASSESSMENT & PLAN NOTE
Patient with acute kidney injury/acute renal failure likely due to pre-renal azotemia due to IVVD and acute tubular necrosis caused by sepsis  CARLINE is currently improving. Baseline creatinine  0.9  - Labs reviewed- Renal function/electrolytes with Estimated Creatinine Clearance: 78.4 mL/min (based on SCr of 0.9 mg/dL). according to latest data. Monitor urine output and serial BMP and adjust therapy as needed. Avoid nephrotoxins and renally dose meds for GFR listed above.    - Highly suspect ATN as an etiology at this time  - Nephrology consult requested, appreciate recs  - renal function is improving and near baseline.   -Resolved

## 2024-02-24 NOTE — ASSESSMENT & PLAN NOTE
Patient's anemia is currently controlled. Has received 1 units of PRBCs on 2/19 and 02/21 . Etiology likely d/t chronic disease and acute blood loss post surgery.  Current CBC reviewed-   Lab Results   Component Value Date    HGB 7.6 (L) 02/24/2024    HCT 25.0 (L) 02/24/2024     - monitor daily   -s/p 1U pRBC on 02/19 and 02/21

## 2024-02-24 NOTE — SUBJECTIVE & OBJECTIVE
Interval History: NAEON. Feeling well this morning aside prom expected pain of the wound vac site. AVSS. Wound vac in place and with good seal with SS output. Lab work with continued uptrend in WBC.    Medications:  Continuous Infusions:      Scheduled Meds:   acetaminophen  1,000 mg Oral Q8H    budesonide  1 mg Nebulization Q12H    And    arformoteroL  15 mcg Nebulization BID    aspirin  81 mg Oral Daily    atorvastatin  20 mg Oral Daily    chlordiazepoxide  5 mg Oral BID    Followed by    chlordiazepoxide  5 mg Oral Daily    folic acid  1 mg Oral Daily    furosemide  40 mg Oral Daily    gabapentin  400 mg Oral BID    insulin aspart U-100  5 Units Subcutaneous TIDWM    insulin detemir U-100  15 Units Subcutaneous QHS    insulin detemir U-100 (Levemir)  20 Units Subcutaneous Daily    meropenem (MERREM) IVPB  1 g Intravenous Q8H    pantoprazole  40 mg Oral Daily    thiamine  100 mg Oral Daily     PRN Meds:0.9%  NaCl infusion (for blood administration), albuterol sulfate, dextrose 10%, dextrose 10%, glucagon (human recombinant), glucose, glucose, HYDROmorphone, insulin aspart U-100, melatonin, ondansetron, oxyCODONE, oxyCODONE, prochlorperazine, sodium chloride 0.9%     Review of patient's allergies indicates:   Allergen Reactions    Hydrochlorothiazide plus      Objective:     Vital Signs (Most Recent):  Temp: 98.3 °F (36.8 °C) (02/24/24 0348)  Pulse: 83 (02/24/24 0348)  Resp: 20 (02/24/24 0630)  BP: (!) 154/74 (02/24/24 0348)  SpO2: (!) 93 % (02/24/24 0348) Vital Signs (24h Range):  Temp:  [97.4 °F (36.3 °C)-99 °F (37.2 °C)] 98.3 °F (36.8 °C)  Pulse:  [70-95] 83  Resp:  [16-20] 20  SpO2:  [91 %-99 %] 93 %  BP: (120-158)/(70-88) 154/74     Weight: 93.6 kg (206 lb 5.6 oz)  Body mass index is 35.42 kg/m².    Intake/Output - Last 3 Shifts         02/22 0700 02/23 0659 02/23 0700 02/24 0659 02/24 0700 02/25 0659    P.O. 840 480     Blood       IV Piggyback 197.7 650     Total Intake(mL/kg) 1037.7 (11.1) 1130 (12.1)      Urine (mL/kg/hr) 2450 (1.1) 3500 (1.6)     Other 200 0     Stool 0 0     Blood  15     Total Output 2650 3515     Net -1612.4 -2385            Stool Occurrence 2 x 0 x              Physical Exam  Vitals and nursing note reviewed.   Constitutional:       Appearance: Normal appearance.   HENT:      Head: Normocephalic and atraumatic.   Cardiovascular:      Rate and Rhythm: Normal rate and regular rhythm.   Pulmonary:      Effort: Pulmonary effort is normal. No respiratory distress.   Abdominal:      General: Abdomen is flat. There is no distension.      Palpations: Abdomen is soft. There is no mass.      Tenderness: There is no abdominal tenderness.      Hernia: No hernia is present.   Genitourinary:     Comments: Hernandez catheter  Musculoskeletal:      Right lower leg: No edema.      Left lower leg: No edema.   Skin:     General: Skin is warm and dry.      Comments: Wound vac to right thigh with good seal and SS output   Neurological:      General: No focal deficit present.      Mental Status: She is alert and oriented to person, place, and time.   Psychiatric:         Mood and Affect: Mood normal.         Behavior: Behavior normal.          Significant Labs:  I have reviewed all pertinent lab results within the past 24 hours.  CBC:   Recent Labs   Lab 02/24/24 0422   WBC 20.96*   RBC 3.25*   HGB 7.6*   HCT 25.0*      MCV 77*   MCH 23.4*   MCHC 30.4*       CMP:   Recent Labs   Lab 02/24/24 0422   *   CALCIUM 8.0*   ALBUMIN 1.4*   PROT 5.4*      K 5.4*   CO2 28      BUN 17   CREATININE 0.9   ALKPHOS 102   ALT 11   AST 12   BILITOT 0.1       LFTs:   Recent Labs   Lab 02/24/24  0422   ALT 11   AST 12   ALKPHOS 102   BILITOT 0.1   PROT 5.4*   ALBUMIN 1.4*         Significant Diagnostics:  I have reviewed all pertinent imaging results/findings within the past 24 hours.

## 2024-02-24 NOTE — PROGRESS NOTES
"[facility]  Infectious Disease  Progress Note    Patient Name: Christine Mosqueda  MRN: [id number]  Admission Date: 2/15/2024  Length of Stay: 8 days  Attending Physician: Mayra White DO  Primary Care Provider: [facility]    Isolation Status: No active isolations  Assessment/Plan:        * Librado's gangrene    53F with h/o DM, admitted 2/15 with abscess in R medial thigh. CT c/f abscess/nec fascitis and now s/p several surgical debridements. Muscle necrosis noted. Cultures sent. Gram stain with GPC in pairs and chains and GNR.  Bcx on admit positive for GPR in one of two bottles. On vanc/meropenem. ID consulted for "fourniers gangrene, diabetic, CARLINE; no cultures taken at initial debridement "    Lactobacillus growing from wound and blood. Repeat blood cultures NGTD. Leukocytosis persists - likely multivariable (infection, stress of surgery, etc).    Recommendations:   - continue abx (plan on de-escalating tomorrow, if all okay)  - wound care/vac per surgery  - trend WBC    Steven Hernandez MD  Infectious Disease  [facility] [place] [facility]    Subjective:     Principal Problem:Librado's gangrene    HPI: 53F with h/o DM, admitted 2/15 with abscess in R medial thigh. Reports going to [facility] on 2/10 for the same thing. Given doxycycline, which she says she was taking. Wound on R leg became more painful, so came to hospital. Reports fever. Denies indwelling hardware. Reports dental caries, but no recent extractions. Reports aches all over, but no other areas of localized pain other than R thigh. Denies abx allergies.     CT 2/15-  Extensive inflammatory changes extending from the right perineum to the right medial thigh. Linear area of fluid attenuation in the right labial region, which may represent a small abscess. Multiple foci of air in the right medial thigh, which is difficult to measure. Considerations may include developing abscess, phlegmonous tissue, and or instrumentation " "resulting in subcutaneous air.       Bcx on admit positive for GPR in one of two bottles      Component 2 d ago   Blood Culture, Routine Gram stain portia bottle: Gram positive rods P   Blood Culture, Routine Results called to and read back by:Sandra HARVEY 02/17/2024  11:01 P          S/p I&D with general surgery on 2/16 and 2/17 with plans for another debridment Monday. Muscle necrosis noted. Cultures sent. Gram stain positive:       Component 09:11   Gram Stain Result Rare WBC's   Gram Stain Result Many Gram positive cocci in pairs and chains   Gram Stain Result Rare Gram negative rods     Hiv and hep c testing negative    On vanc/meropenem    ID consulted for "fourniers gangrene, diabetic, CARLINE; no cultures taken at initial debridement "  Interval History: Feels well. Vac changed yesterday in OR. Pain controlled. Denies fever or chills.    Review of Systems   Constitutional:  Negative for chills and fever.   Respiratory:  Negative for cough.    Cardiovascular:  Positive for leg swelling.   Gastrointestinal:  Negative for diarrhea.   Genitourinary:  Negative for dysuria.   All other systems reviewed and are negative.    Objective:     Vital Signs (Most Recent):  Temp: 97.9 °F (36.6 °C) (02/24/24 1103)  Pulse: 81 (02/24/24 1103)  Resp: 19 (02/24/24 1103)  BP: 121/77 (02/24/24 1103)  SpO2: (!) 91 % (02/24/24 1103) Vital Signs (24h Range):  Temp:  [97.5 °F (36.4 °C)-99 °F (37.2 °C)] 97.9 °F (36.6 °C)  Pulse:  [70-95] 81  Resp:  [16-20] 19  SpO2:  [91 %-98 %] 91 %  BP: (120-158)/(70-88) 121/77     Weight: 93.6 kg (206 lb 5.6 oz)  Body mass index is 35.42 kg/m².    Estimated Creatinine Clearance: 78.4 mL/min (based on SCr of 0.9 mg/dL).     Physical Exam  Vitals and nursing note reviewed.   Constitutional:       Appearance: Normal appearance.   HENT:      Head: Normocephalic.      Mouth/Throat:      Mouth: Mucous membranes are moist.   Eyes:      Extraocular Movements: Extraocular movements intact.      Pupils: Pupils are equal, " round, and reactive to light.   Cardiovascular:      Rate and Rhythm: Normal rate.   Abdominal:      Tenderness: There is no abdominal tenderness. There is no guarding or rebound.   Musculoskeletal:         General: Tenderness and deformity present.   Neurological:      General: No focal deficit present.      Mental Status: She is alert and oriented to person, place, and time.   Psychiatric:         Mood and Affect: Mood normal.         Behavior: Behavior normal.         Thought Content: Thought content normal.          Significant Labs: Blood Culture:   Recent Labs   Lab 02/15/24  2321 02/17/24 2005 02/17/24 2006 02/21/24  1732 02/21/24  1744   LABBLOO Gram stain portia bottle: Gram positive rods  Results called to and read back by:KAMALA STEELE  02/17/2024  18:30  LACTOBACILLUS SPECIES  Further identified as Lactobacillus jensenii  * No Growth after 4 days. Gram stain aer bottle: Gram positive rods  Results called to and read back by:Norma Tierney west 02/21/2024  14:27  LACTOBACILLUS SPECIES* No Growth to date  No Growth to date  No Growth to date No Growth to date  No Growth to date  No Growth to date     CBC:   Recent Labs   Lab 02/23/24  0412 02/24/24  0422   WBC 19.69* 20.96*   HGB 8.4* 7.6*   HCT 26.6* 25.0*    254     CMP:   Recent Labs   Lab 02/23/24  0412 02/24/24  0422    138   K 5.0 5.4*    102   CO2 27 28   GLU 50* 155*   BUN 17 17   CREATININE 0.8 0.9   CALCIUM 8.2* 8.0*   PROT 5.8* 5.4*   ALBUMIN 1.4* 1.4*   BILITOT 0.1 0.1   ALKPHOS 122 102   AST 17 12   ALT 11 11   ANIONGAP 7* 8     Wound Culture:   Recent Labs   Lab 02/17/24  0911   LABAERO LACTOBACILLUS SPECIES  Many  *       Significant Imaging: I have reviewed all pertinent imaging results/findings within the past 24 hours.

## 2024-02-24 NOTE — NURSING
[facility]  Nurses Note -- 4 Eyes      2/24/2024       Skin assessed on: Q Shift      [x] No Pressure Injuries Present    []Prevention Measures Documented    [] Yes LDA  for Pressure Injury Previously documented     [] Yes New Pressure Injury Discovered   [] LDA for New Pressure Injury Added      Attending RN:  Patt Horn RN     Second RN:  Brigitte Price LPN

## 2024-02-24 NOTE — PROGRESS NOTES
[facility]  Progress Note    Patient Name: Christine Mosqueda  MRN: [id number]  Patient Class: IP- Inpatient   Admission Date: 2/15/2024  Length of Stay: 8 days  Attending Physician: Mayra White DO  Primary Care Provider: [facility]        Subjective:     Principal Problem:Estephanie's gangrene        HPI:  Ms. Mosqueda is a 53 yoF with a PMHx of diastolic heart failure, HTN, T2DM on insulin. She presented to the hospital with worsening pain and swelling of the right medial thigh. She developed an abscess in the area that underwent I+D at [facility] a few days ago. She was sent home on PO abx. Since that time the pain and swelling have worsened.  In ED, CT scan demonstrated extensive inflammation extending from the right perineum to the right medial thigh with multiple foci of air. She was admitted to general surgery for estephanie's gangrene. She underwent extensive debridement under general surgery today in OR. She is currently doing well with good pain control. HM consulted for medical management.     Overview/Hospital Course:  Ms Christine Mosqueda is a 53 y.o.  woman with poorly controlled type II DM who was admitted for sepsis secondary to bacteremia and Estephanie's gangrene. Pt presented w/ worsening pain and swelling of the Rt medial thigh. CT scan findings were consistent w/ Estephanie's gangrene. General surgery team consulted. S/p surgical debridement 02/16, 2/17, 2/19, and on 02/21 (wound vac placed. Surgery plans for OR 2/23 for first wound vac change. Had acute on chronic anemia, likely exacerbated with multiple debridements. Required blood transfusions on 02/19 and again on 02/21.  Blood and wound cultures with lactobacillus. ID consulted- on meropenem. Repeat blood cx with no growth thus far. Also found to have CARLINE on admission, nephrology following. CARLINE resolved. PT/OT recommends SNF on discharge.     Interval History:  No acute overnight events.  Patient  remained afebrile.  Wound VAC changed in the OR yesterday.  Pain controlled at this time . Partner at bedside    Review of Systems   Constitutional:  Negative for chills and fever.   Respiratory:  Negative for shortness of breath.    Cardiovascular:  Negative for chest pain.   Gastrointestinal:  Negative for abdominal pain.   Skin:  Positive for wound.   Psychiatric/Behavioral:  Negative for confusion.      Objective:     Vital Signs (Most Recent):  Temp: 98.1 °F (36.7 °C) (02/24/24 1537)  Pulse: 89 (02/24/24 1537)  Resp: 19 (02/24/24 1537)  BP: (!) 118/58 (02/24/24 1537)  SpO2: 100 % (02/24/24 1537) Vital Signs (24h Range):  Temp:  [97.7 °F (36.5 °C)-99 °F (37.2 °C)] 98.1 °F (36.7 °C)  Pulse:  [77-95] 89  Resp:  [16-20] 19  SpO2:  [91 %-100 %] 100 %  BP: (118-158)/(58-83) 118/58     Weight: 93.6 kg (206 lb 5.6 oz)  Body mass index is 35.42 kg/m².    Intake/Output Summary (Last 24 hours) at 2/24/2024 1615  Last data filed at 2/24/2024 1104  Gross per 24 hour   Intake 480 ml   Output 2300 ml   Net -1820 ml           Physical Exam  Vitals and nursing note reviewed.   Constitutional:       General: She is not in acute distress.     Appearance: She is obese.   HENT:      Head: Atraumatic.      Nose: Nose normal.      Mouth/Throat:      Mouth: Mucous membranes are moist.   Eyes:      Extraocular Movements: Extraocular movements intact.   Cardiovascular:      Rate and Rhythm: Normal rate and regular rhythm.   Pulmonary:      Effort: Pulmonary effort is normal.      Breath sounds: No wheezing.      Comments: 2L NC SpO2 >94%  Abdominal:      General: Bowel sounds are normal.      Tenderness: There is no abdominal tenderness.   Genitourinary:     Comments: jansen  Musculoskeletal:      Right lower leg: Edema present.      Left lower leg: Edema present.   Skin:     General: Skin is warm and dry.   Neurological:      Mental Status: She is alert and oriented to person, place, and time. Mental status is at baseline.              Significant Labs: All pertinent labs within the past 24 hours have been reviewed.    Significant Imaging: I have reviewed all pertinent imaging results/findings within the past 24 hours.    Assessment/Plan:      * Librado's gangrene  CT on admit with R perineum to R medial thigh Fourniers.   - Surgery consulted: S/p debridement on 02/16, 2/17, 2/19, and 2/21 (wound vac placed.)  - s/p OR 2/23 for first wound vac change. Plan for repeat vac change on Mon 02/26  - blood and wound cultures with lactobacillus. On meropenem per ID recs  - jansen in place due to surgical wound site    Sepsis with acute renal failure and tubular necrosis without septic shock  Defined by leukocytosis, tachycardia and groin abscess. Source is Fourniers.   - surgical management with debridement  - antibiotics= meropenem per ID  - Leukocytosis improved on  02/21, but worsen on 02/22  - ID following    CARLINE (acute kidney injury)  Patient with acute kidney injury/acute renal failure likely due to pre-renal azotemia due to IVVD and acute tubular necrosis caused by sepsis  CARLINE is currently improving. Baseline creatinine  0.9  - Labs reviewed- Renal function/electrolytes with Estimated Creatinine Clearance: 78.4 mL/min (based on SCr of 0.9 mg/dL). according to latest data. Monitor urine output and serial BMP and adjust therapy as needed. Avoid nephrotoxins and renally dose meds for GFR listed above.    - Highly suspect ATN as an etiology at this time  - Nephrology consult requested, appreciate recs  - renal function is improving and near baseline.   -Resolved    Anemia of chronic disease  Patient's anemia is currently controlled. Has received 1 units of PRBCs on 2/19 and 02/21 . Etiology likely d/t chronic disease and acute blood loss post surgery.  Current CBC reviewed-   Lab Results   Component Value Date    HGB 7.6 (L) 02/24/2024    HCT 25.0 (L) 02/24/2024     - monitor daily   -s/p 1U pRBC on 02/19 and 02/21    Chronic diastolic congestive  heart failure  Patient is identified as having Diastolic (HFpEF) heart failure that is Chronic. CHF is currently controlled. Latest ECHO performed and demonstrates- Results for orders placed during the hospital encounter of 03/10/23    Echo    Interpretation Summary  · The left ventricle is normal in size with low normal systolic function.  · The estimated ejection fraction is 53%.  · There is abnormal septal wall motion.  · Indeterminate left ventricular diastolic function.  · Mild left atrial enlargement.  · Normal right ventricular size with low normal right ventricular systolic function.  · Mild right atrial enlargement.  · Mild mitral regurgitation.  · Mild tricuspid regurgitation.  · Normal central venous pressure (3 mmHg).  · The estimated PA systolic pressure is 23 mmHg.    No acute issues  Monitor fluid status- does have lower extremity edema   Discussed with nephrologyglo to resume lasix 02/23    Essential hypertension  Chronic, controlled.   -hold home meds at this time:  Amlodipine 10 mg,   Resume Lasix 40 mg p.o.    Latest blood pressure and vitals reviewed-     Temp:  [97.9 °F (36.6 °C)-99 °F (37.2 °C)]   Pulse:  [77-95]   Resp:  [16-20]   BP: (118-154)/(58-83)   SpO2:  [91 %-100 %] .   Home meds for hypertension were reviewed and noted below.   Hypertension Medications               amlodipine (NORVASC) 10 MG tablet Take 10 mg by mouth once daily.    furosemide (LASIX) 40 MG tablet Take 1 tablet (40 mg total) by mouth once daily.            While in the hospital, will manage blood pressure as follows; Adjust home antihypertensive regimen as follows- hold meds for now given infection/ narcotic use. Resume as warranted . May develop rebound HTN from drug/ alcohol withdrawal.    Will utilize p.r.n. blood pressure medication only if patient's blood pressure greater than 180/110 and she develops symptoms such as worsening chest pain or shortness of breath.    Type 2 diabetes mellitus with hyperglycemia,  without long-term current use of insulin  Patient's FSGs are uncontrolled due to hyperglycemia on current medication regimen.  Last A1c reviewed-   Lab Results   Component Value Date    HGBA1C 8.2 (H) 04/06/2023     Most recent fingerstick glucose reviewed-   Recent Labs   Lab 02/23/24  2038 02/24/24  0805 02/24/24  1103 02/24/24  1535   POCTGLUCOSE 91 149* 161* 263*       Current correctional scale  High  Maintain anti-hyperglycemic dose as follows-   Antihyperglycemics (From admission, onward)      Start     Stop Route Frequency Ordered    02/23/24 0900  insulin detemir U-100 (Levemir) pen 20 Units         -- SubQ Daily 02/22/24 1031    02/22/24 2100  insulin detemir U-100 (Levemir) pen 15 Units         -- SubQ Nightly 02/22/24 1031    02/22/24 1145  insulin aspart U-100 pen 5 Units         -- SubQ 3 times daily with meals 02/22/24 1031 02/16/24 1514  insulin aspart U-100 pen 0-15 Units         -- SubQ Before meals & nightly PRN 02/16/24 1515          Hold Oral hypoglycemics while patient is in the hospital.  A1c: 8.2  Meds: basal bolus insulin + SSI PRN to maintain goal 140-180  Titrate as needed. Basal BID (20AM, 15U PM), prandia at 5U TIDWM  ADA diet, accuchecks ACHS, hypoglycemic protocol          ILD (interstitial lung disease)  -Noted on imaging; CT chest 2022  -Stable, no acute issues.   -Not on home O2 but says she should be (but never received because she left AMA at OSH)  -would test prior to discharge .      Tobacco abuse  - Pt was counseled about cessation x4 minutes      Cocaine abuse  Patient sprinkles crack crystals in her marijuana  Wants help. Tearful. Emotional support provided.   CM consulted.       ETOH abuse  Patient drinks at least a 6 pack beer daily  - Start scheduled librium- wean ordered  - Thiamine, folate, potassium, magnesium      Debility  Patient with Acute debility due to other reduced mobility. valuation for etiology is complete. Plan includes progressive mobility protocol  initated and PT/OT consulted.    -PTOT recommend SNF  -CM to assist with placement     Class 2 severe obesity due to excess calories with serious comorbidity and body mass index (BMI) of 35.0 to 35.9 in adult  Body mass index is 35.42 kg/m². Morbid obesity complicates all aspects of disease management from diagnostic modalities to treatment. Weight loss encouraged and health benefits explained to patient.         Advanced care planning/counseling discussion  Advance Care Planning    Date: 02/21/2024    Code Status  I engaged the the patient in a voluntary conversation about the patient's preferences for care  at the very end of life. The patient wishes to have CPR and other invasive treatments performed when her heart and/or breathing stops. I communicated to the patient that her wishes align with full code status and she agrees and verbalized understanding.   I spent a total of 16 minutes engaging the patient in this advance care planning discussion.           Code Status: Full Code        VTE Risk Mitigation (From admission, onward)           Ordered     enoxaparin injection 40 mg  Every 24 hours         02/24/24 1615     IP VTE HIGH RISK PATIENT  Once         02/16/24 0506     Place sequential compression device  Until discontinued         02/16/24 0506                    Discharge Planning   AVTAR: 2/17/2024     Code Status: Full Code   Is the patient medically ready for discharge?:     Reason for patient still in hospital (select all that apply): Patient trending condition, Treatment, Consult recommendations, and PT / OT recommendations  Discharge Plan A: Home with family                  DaliaLiyah White DO  Department of Hospital Medicine   [facility] [place] - Telemetry

## 2024-02-24 NOTE — ASSESSMENT & PLAN NOTE
Patient's FSGs are uncontrolled due to hyperglycemia on current medication regimen.  Last A1c reviewed-   Lab Results   Component Value Date    HGBA1C 8.2 (H) 04/06/2023     Most recent fingerstick glucose reviewed-   Recent Labs   Lab 02/23/24 2038 02/24/24  0805 02/24/24  1103 02/24/24  1535   POCTGLUCOSE 91 149* 161* 263*       Current correctional scale  High  Maintain anti-hyperglycemic dose as follows-   Antihyperglycemics (From admission, onward)    Start     Stop Route Frequency Ordered    02/23/24 0900  insulin detemir U-100 (Levemir) pen 20 Units         -- SubQ Daily 02/22/24 1031    02/22/24 2100  insulin detemir U-100 (Levemir) pen 15 Units         -- SubQ Nightly 02/22/24 1031    02/22/24 1145  insulin aspart U-100 pen 5 Units         -- SubQ 3 times daily with meals 02/22/24 1031    02/16/24 1514  insulin aspart U-100 pen 0-15 Units         -- SubQ Before meals & nightly PRN 02/16/24 1515        Hold Oral hypoglycemics while patient is in the hospital.  A1c: 8.2  Meds: basal bolus insulin + SSI PRN to maintain goal 140-180  Titrate as needed. Basal BID (20AM, 15U PM), prandia at 5U TIDWM  ADA diet, accuchecks ACHS, hypoglycemic protocol

## 2024-02-24 NOTE — ASSESSMENT & PLAN NOTE
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 S/p wound vac replacement on 2/23.    - Plan for return to the OR on 2/26 for wound vac change and partial closure  - Ok for diet  - ID following; Vanc/zosyn/clinda started 2/16, switched to meropenam and vanc   - Operative cultures 2/17 with lactobacillus   - White, thick vaginal secretions noted in the OR on 2/23; one-time dose of 150 mg of fluconazole 2/24  - Poorly controlled DMII - insulin per HM  - Nutrition consult, boost supplements, severely hypoalbuminemia   - Polysubstance abuse to alcohol dependency - librium taper on, CIWA protocol  - Multiple modal pain control limited by CARLINE - continue scheduled tylenol, combination of oral and IV narcotics  - Okay for DVT ppx   - PT consult - okay to attempt ambulation, get up to chair.   - Will have long term wound care needs; pending final disposition will need placement for this

## 2024-02-25 ENCOUNTER — ANESTHESIA EVENT (OUTPATIENT)
Dept: SURGERY | Facility: HOSPITAL | Age: 54
DRG: 853 | End: 2024-02-25
Payer: MEDICAID

## 2024-02-25 LAB
ALBUMIN SERPL BCP-MCNC: 1.5 G/DL (ref 3.5–5.2)
ALP SERPL-CCNC: 105 U/L (ref 55–135)
ALT SERPL W/O P-5'-P-CCNC: 7 U/L (ref 10–44)
ANION GAP SERPL CALC-SCNC: 6 MMOL/L (ref 8–16)
AST SERPL-CCNC: 11 U/L (ref 10–40)
BACTERIA BLD CULT: NORMAL
BACTERIA BLD CULT: NORMAL
BASOPHILS # BLD AUTO: 0.04 K/UL (ref 0–0.2)
BASOPHILS NFR BLD: 0.2 % (ref 0–1.9)
BILIRUB SERPL-MCNC: 0.2 MG/DL (ref 0.1–1)
BUN SERPL-MCNC: 15 MG/DL (ref 6–20)
CALCIUM SERPL-MCNC: 8.1 MG/DL (ref 8.7–10.5)
CHLORIDE SERPL-SCNC: 105 MMOL/L (ref 95–110)
CO2 SERPL-SCNC: 28 MMOL/L (ref 23–29)
CREAT SERPL-MCNC: 1 MG/DL (ref 0.5–1.4)
DIFFERENTIAL METHOD BLD: ABNORMAL
EOSINOPHIL # BLD AUTO: 0.2 K/UL (ref 0–0.5)
EOSINOPHIL NFR BLD: 1.4 % (ref 0–8)
ERYTHROCYTE [DISTWIDTH] IN BLOOD BY AUTOMATED COUNT: 20.6 % (ref 11.5–14.5)
EST. GFR  (NO RACE VARIABLE): >60 ML/MIN/1.73 M^2
GLUCOSE SERPL-MCNC: 147 MG/DL (ref 70–110)
HCT VFR BLD AUTO: 24.4 % (ref 37–48.5)
HGB BLD-MCNC: 7.5 G/DL (ref 12–16)
IMM GRANULOCYTES # BLD AUTO: 0.43 K/UL (ref 0–0.04)
IMM GRANULOCYTES NFR BLD AUTO: 2.7 % (ref 0–0.5)
LYMPHOCYTES # BLD AUTO: 2.7 K/UL (ref 1–4.8)
LYMPHOCYTES NFR BLD: 16.9 % (ref 18–48)
MAGNESIUM SERPL-MCNC: 1.7 MG/DL (ref 1.6–2.6)
MCH RBC QN AUTO: 23.1 PG (ref 27–31)
MCHC RBC AUTO-ENTMCNC: 30.7 G/DL (ref 32–36)
MCV RBC AUTO: 75 FL (ref 82–98)
MONOCYTES # BLD AUTO: 1.2 K/UL (ref 0.3–1)
MONOCYTES NFR BLD: 7.4 % (ref 4–15)
NEUTROPHILS # BLD AUTO: 11.5 K/UL (ref 1.8–7.7)
NEUTROPHILS NFR BLD: 71.4 % (ref 38–73)
NRBC BLD-RTO: 0 /100 WBC
PHOSPHATE SERPL-MCNC: 4.2 MG/DL (ref 2.7–4.5)
PLATELET # BLD AUTO: 550 K/UL (ref 150–450)
PMV BLD AUTO: 9.6 FL (ref 9.2–12.9)
POCT GLUCOSE: 123 MG/DL (ref 70–110)
POCT GLUCOSE: 170 MG/DL (ref 70–110)
POCT GLUCOSE: 197 MG/DL (ref 70–110)
POTASSIUM SERPL-SCNC: 5.3 MMOL/L (ref 3.5–5.1)
PROT SERPL-MCNC: 5.7 G/DL (ref 6–8.4)
RBC # BLD AUTO: 3.24 M/UL (ref 4–5.4)
SODIUM SERPL-SCNC: 139 MMOL/L (ref 136–145)
WBC # BLD AUTO: 16.06 K/UL (ref 3.9–12.7)

## 2024-02-25 PROCEDURE — 25000003 PHARM REV CODE 250: Performed by: STUDENT IN AN ORGANIZED HEALTH CARE EDUCATION/TRAINING PROGRAM

## 2024-02-25 PROCEDURE — 94761 N-INVAS EAR/PLS OXIMETRY MLT: CPT

## 2024-02-25 PROCEDURE — 99900035 HC TECH TIME PER 15 MIN (STAT)

## 2024-02-25 PROCEDURE — 97530 THERAPEUTIC ACTIVITIES: CPT | Mod: CQ

## 2024-02-25 PROCEDURE — 21400001 HC TELEMETRY ROOM

## 2024-02-25 PROCEDURE — 99233 SBSQ HOSP IP/OBS HIGH 50: CPT | Mod: ,,, | Performed by: INTERNAL MEDICINE

## 2024-02-25 PROCEDURE — 63600175 PHARM REV CODE 636 W HCPCS: Performed by: INTERNAL MEDICINE

## 2024-02-25 PROCEDURE — 25000003 PHARM REV CODE 250: Performed by: HOSPITALIST

## 2024-02-25 PROCEDURE — 63600175 PHARM REV CODE 636 W HCPCS: Performed by: STUDENT IN AN ORGANIZED HEALTH CARE EDUCATION/TRAINING PROGRAM

## 2024-02-25 PROCEDURE — 25000003 PHARM REV CODE 250: Performed by: INTERNAL MEDICINE

## 2024-02-25 PROCEDURE — 97116 GAIT TRAINING THERAPY: CPT | Mod: CQ

## 2024-02-25 PROCEDURE — 83735 ASSAY OF MAGNESIUM: CPT | Performed by: HOSPITALIST

## 2024-02-25 PROCEDURE — 84100 ASSAY OF PHOSPHORUS: CPT | Performed by: HOSPITALIST

## 2024-02-25 PROCEDURE — 27000221 HC OXYGEN, UP TO 24 HOURS

## 2024-02-25 PROCEDURE — 36415 COLL VENOUS BLD VENIPUNCTURE: CPT | Performed by: HOSPITALIST

## 2024-02-25 PROCEDURE — 80053 COMPREHEN METABOLIC PANEL: CPT | Performed by: HOSPITALIST

## 2024-02-25 PROCEDURE — 85025 COMPLETE CBC W/AUTO DIFF WBC: CPT | Performed by: HOSPITALIST

## 2024-02-25 RX ADMIN — OXYCODONE 10 MG: 5 TABLET ORAL at 04:02

## 2024-02-25 RX ADMIN — ACETAMINOPHEN 1000 MG: 500 TABLET ORAL at 09:02

## 2024-02-25 RX ADMIN — AMPICILLIN SODIUM AND SULBACTAM SODIUM 3 G: 2; 1 INJECTION, POWDER, FOR SOLUTION INTRAMUSCULAR; INTRAVENOUS at 06:02

## 2024-02-25 RX ADMIN — PANTOPRAZOLE SODIUM 40 MG: 40 TABLET, DELAYED RELEASE ORAL at 09:02

## 2024-02-25 RX ADMIN — THIAMINE HCL TAB 100 MG 100 MG: 100 TAB at 09:02

## 2024-02-25 RX ADMIN — GABAPENTIN 400 MG: 400 CAPSULE ORAL at 09:02

## 2024-02-25 RX ADMIN — MEROPENEM 1 G: 1 INJECTION, POWDER, FOR SOLUTION INTRAVENOUS at 05:02

## 2024-02-25 RX ADMIN — AMPICILLIN SODIUM AND SULBACTAM SODIUM 3 G: 2; 1 INJECTION, POWDER, FOR SOLUTION INTRAMUSCULAR; INTRAVENOUS at 12:02

## 2024-02-25 RX ADMIN — OXYCODONE 10 MG: 5 TABLET ORAL at 10:02

## 2024-02-25 RX ADMIN — INSULIN ASPART 3 UNITS: 100 INJECTION, SOLUTION INTRAVENOUS; SUBCUTANEOUS at 09:02

## 2024-02-25 RX ADMIN — ENOXAPARIN SODIUM 40 MG: 40 INJECTION SUBCUTANEOUS at 04:02

## 2024-02-25 RX ADMIN — INSULIN ASPART 5 UNITS: 100 INJECTION, SOLUTION INTRAVENOUS; SUBCUTANEOUS at 04:02

## 2024-02-25 RX ADMIN — CHLORDIAZEPOXIDE HYDROCHLORIDE 5 MG: 5 CAPSULE ORAL at 09:02

## 2024-02-25 RX ADMIN — ACETAMINOPHEN 1000 MG: 500 TABLET ORAL at 05:02

## 2024-02-25 RX ADMIN — INSULIN ASPART 5 UNITS: 100 INJECTION, SOLUTION INTRAVENOUS; SUBCUTANEOUS at 12:02

## 2024-02-25 RX ADMIN — ASPIRIN 81 MG CHEWABLE TABLET 81 MG: 81 TABLET CHEWABLE at 09:02

## 2024-02-25 RX ADMIN — AMPICILLIN SODIUM AND SULBACTAM SODIUM 3 G: 2; 1 INJECTION, POWDER, FOR SOLUTION INTRAMUSCULAR; INTRAVENOUS at 11:02

## 2024-02-25 RX ADMIN — FOLIC ACID 1 MG: 1 TABLET ORAL at 09:02

## 2024-02-25 RX ADMIN — ACETAMINOPHEN 1000 MG: 500 TABLET ORAL at 02:02

## 2024-02-25 RX ADMIN — INSULIN ASPART 5 UNITS: 100 INJECTION, SOLUTION INTRAVENOUS; SUBCUTANEOUS at 09:02

## 2024-02-25 RX ADMIN — ATORVASTATIN CALCIUM 20 MG: 10 TABLET, FILM COATED ORAL at 09:02

## 2024-02-25 RX ADMIN — INSULIN ASPART 3 UNITS: 100 INJECTION, SOLUTION INTRAVENOUS; SUBCUTANEOUS at 04:02

## 2024-02-25 RX ADMIN — FUROSEMIDE 40 MG: 40 TABLET ORAL at 09:02

## 2024-02-25 NOTE — ASSESSMENT & PLAN NOTE
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   - Surgery consulted: S/p debridement on 02/16, 2/17, 2/19, and 2/21 (wound vac placed.)  - s/p OR 2/23 for first wound vac change. Plan for repeat vac change on Mon 02/26  - blood and wound cultures with lactobacillus.   - ID consulted. Descalate from meropenem to Unasyn for 2 weeks. EOC 03/06/24  - jansen in place due to surgical wound site

## 2024-02-25 NOTE — PROGRESS NOTES
[facility] Medicine  Progress Note    Patient Name: Christine Mosqueda  MRN: [id number]  Patient Class: IP- Inpatient   Admission Date: 2/15/2024  Length of Stay: 9 days  Attending Physician: Mayra White DO  Primary Care Provider: [facility]        Subjective:     Principal Problem:Estephanie's gangrene        HPI:  Ms. Mosqueda is a 53 yoF with a PMHx of diastolic heart failure, HTN, T2DM on insulin. She presented to the hospital with worsening pain and swelling of the right medial thigh. She developed an abscess in the area that underwent I+D at [facility] a few days ago. She was sent home on PO abx. Since that time the pain and swelling have worsened.  In ED, CT scan demonstrated extensive inflammation extending from the right perineum to the right medial thigh with multiple foci of air. She was admitted to general surgery for estephanie's gangrene. She underwent extensive debridement under general surgery today in OR. She is currently doing well with good pain control. HM consulted for medical management.     Overview/Hospital Course:  Ms Christine Mosqueda is a 53 y.o.  woman with poorly controlled type II DM who was admitted for sepsis secondary to bacteremia and Estephanie's gangrene. Pt presented w/ worsening pain and swelling of the Rt medial thigh. CT scan findings were consistent w/ Estephanie's gangrene. General surgery team consulted. S/p surgical debridement 02/16, 2/17, 2/19, and on 02/21 (wound vac placed.) S/p OR 2/23 for first wound vac change. Plan for OR again on 02/26. Had acute on chronic anemia, likely exacerbated with multiple debridements. Required blood transfusions on 02/19 and again on 02/21.  Blood and wound cultures with lactobacillus. ID consulted- on meropenem. Repeat blood cx with no growth thus far. Also found to have CARLINE on admission, nephrology following. CARLINE resolved. PT/OT recommends SNF on discharge.     Interval History:  No acute overnight events.   Patient remained afebrile.  Wound VAC seal fell off this morning.Surgery plans to change wound vac in the AM.  Pain controlled at this time .  at bedside    Review of Systems   Constitutional:  Negative for chills and fever.   Respiratory:  Negative for shortness of breath.    Cardiovascular:  Negative for chest pain.   Gastrointestinal:  Negative for abdominal pain.   Skin:  Positive for wound.   Psychiatric/Behavioral:  Negative for confusion.      Objective:     Vital Signs (Most Recent):  Temp: 98.9 °F (37.2 °C) (02/25/24 1147)  Pulse: 86 (02/25/24 1147)  Resp: 18 (02/25/24 1147)  BP: 117/79 (02/25/24 1147)  SpO2: (!) 93 % (02/25/24 1147) Vital Signs (24h Range):  Temp:  [97.7 °F (36.5 °C)-98.9 °F (37.2 °C)] 98.9 °F (37.2 °C)  Pulse:  [79-90] 86  Resp:  [16-19] 18  SpO2:  [93 %-100 %] 93 %  BP: (116-171)/(58-96) 117/79     Weight: 93.6 kg (206 lb 5.6 oz)  Body mass index is 35.42 kg/m².    Intake/Output Summary (Last 24 hours) at 2/25/2024 1400  Last data filed at 2/25/2024 1210  Gross per 24 hour   Intake 240 ml   Output 1700 ml   Net -1460 ml           Physical Exam  Vitals and nursing note reviewed.   Constitutional:       General: She is not in acute distress.     Appearance: She is obese.   HENT:      Head: Atraumatic.      Nose: Nose normal.      Mouth/Throat:      Mouth: Mucous membranes are moist.   Eyes:      Extraocular Movements: Extraocular movements intact.   Cardiovascular:      Rate and Rhythm: Normal rate and regular rhythm.   Pulmonary:      Effort: Pulmonary effort is normal.      Breath sounds: No wheezing.      Comments: 2L NC SpO2 >94%  Abdominal:      General: Bowel sounds are normal.      Tenderness: There is no abdominal tenderness.   Genitourinary:     Comments: jansen  Musculoskeletal:      Right lower leg: Edema present.      Left lower leg: Edema present.   Skin:     General: Skin is warm and dry.   Neurological:      Mental Status: She is alert and oriented to person, place,  and time. Mental status is at baseline.             Significant Labs: All pertinent labs within the past 24 hours have been reviewed.    Significant Imaging: I have reviewed all pertinent imaging results/findings within the past 24 hours.    Assessment/Plan:      * Librado's gangrene  CT on admit with R perineum to R medial thigh Fourniers.   - Surgery consulted: S/p debridement on 02/16, 2/17, 2/19, and 2/21 (wound vac placed.)  - s/p OR 2/23 for first wound vac change. Plan for repeat vac change on Mon 02/26  - blood and wound cultures with lactobacillus.   - ID consulted. Descalate from meropenem to Unasyn for 2 weeks. EOC 03/06/24  - jansen in place due to surgical wound site    Sepsis with acute renal failure and tubular necrosis without septic shock  Defined by leukocytosis, tachycardia and groin abscess. Source is Fourniers.   - surgical management with debridement  - antibiotics= Unasyn per ID  - Leukocytosis improved on  02/21, but worsen on 02/22    CARLINE (acute kidney injury)  Patient with acute kidney injury/acute renal failure likely due to pre-renal azotemia due to IVVD and acute tubular necrosis caused by sepsis  CARLINE is currently improving. Baseline creatinine  0.9  - Labs reviewed- Renal function/electrolytes with Estimated Creatinine Clearance: 70.5 mL/min (based on SCr of 1 mg/dL). according to latest data. Monitor urine output and serial BMP and adjust therapy as needed. Avoid nephrotoxins and renally dose meds for GFR listed above.    - Highly suspect ATN as an etiology at this time  - Nephrology consult requested, appreciate recs  - renal function is improving and near baseline.   - Resolved    Anemia of chronic disease  Patient's anemia is currently controlled. Has received 1 units of PRBCs on 2/19 and 02/21 . Etiology likely d/t chronic disease and acute blood loss post surgery.  Current CBC reviewed-   Lab Results   Component Value Date    HGB 7.5 (L) 02/25/2024    HCT 24.4 (L) 02/25/2024     -  monitor daily   -s/p 1U pRBC on 02/19 and 02/21    Chronic diastolic congestive heart failure  Patient is identified as having Diastolic (HFpEF) heart failure that is Chronic. CHF is currently controlled. Latest ECHO performed and demonstrates- Results for orders placed during the hospital encounter of 03/10/23    Echo    Interpretation Summary  · The left ventricle is normal in size with low normal systolic function.  · The estimated ejection fraction is 53%.  · There is abnormal septal wall motion.  · Indeterminate left ventricular diastolic function.  · Mild left atrial enlargement.  · Normal right ventricular size with low normal right ventricular systolic function.  · Mild right atrial enlargement.  · Mild mitral regurgitation.  · Mild tricuspid regurgitation.  · Normal central venous pressure (3 mmHg).  · The estimated PA systolic pressure is 23 mmHg.    No acute issues  Monitor fluid status- does have lower extremity edema   Discussed with nephrologyglo to resume lasix 02/23    Essential hypertension  Chronic, controlled.   -hold home meds at this time:  Amlodipine 10 mg,   Resume Lasix 40 mg p.o.    Latest blood pressure and vitals reviewed-     Temp:  [97.7 °F (36.5 °C)-98.9 °F (37.2 °C)]   Pulse:  [79-90]   Resp:  [16-19]   BP: (116-171)/(58-96)   SpO2:  [93 %-100 %] .   Home meds for hypertension were reviewed and noted below.   Hypertension Medications               amlodipine (NORVASC) 10 MG tablet Take 10 mg by mouth once daily.    furosemide (LASIX) 40 MG tablet Take 1 tablet (40 mg total) by mouth once daily.            While in the hospital, will manage blood pressure as follows; Adjust home antihypertensive regimen as follows- hold meds for now given infection/ narcotic use. Resume as warranted . May develop rebound HTN from drug/ alcohol withdrawal.    Will utilize p.r.n. blood pressure medication only if patient's blood pressure greater than 180/110 and she develops symptoms such as worsening  chest pain or shortness of breath.    Type 2 diabetes mellitus with hyperglycemia, without long-term current use of insulin  Patient's FSGs are uncontrolled due to hyperglycemia on current medication regimen.  Last A1c reviewed-   Lab Results   Component Value Date    HGBA1C 8.2 (H) 04/06/2023     Most recent fingerstick glucose reviewed-   Recent Labs   Lab 02/24/24  1535 02/24/24  2017 02/25/24  0829 02/25/24  1146   POCTGLUCOSE 263* 78 197* 123*       Current correctional scale  High  Maintain anti-hyperglycemic dose as follows-   Antihyperglycemics (From admission, onward)      Start     Stop Route Frequency Ordered    02/27/24 0900  insulin detemir U-100 (Levemir) pen 20 Units         -- SubQ Daily 02/25/24 1359    02/26/24 2100  insulin detemir U-100 (Levemir) pen 15 Units         -- SubQ Nightly 02/25/24 1359    02/22/24 1145  insulin aspart U-100 pen 5 Units         -- SubQ 3 times daily with meals 02/22/24 1031    02/16/24 1514  insulin aspart U-100 pen 0-15 Units         -- SubQ Before meals & nightly PRN 02/16/24 1515          Hold Oral hypoglycemics while patient is in the hospital.  A1c: 8.2  Meds: basal bolus insulin + SSI PRN to maintain goal 140-180  Titrate as needed. Basal BID (20AM, 15U PM), prandia at 5U TIDWM  ADA diet, accuchecks ACHS, hypoglycemic protocol  Hold insulin 2/20 5:00 p.m. and 2/20 6:00 a.m. in setting of NPO status          ILD (interstitial lung disease)  -Noted on imaging; CT chest 2022  -Stable, no acute issues.   -Not on home O2 but says she should be (but never received because she left AMA at OSH)  -would test prior to discharge .      Tobacco abuse  - Pt was counseled about cessation x4 minutes      Cocaine abuse  Patient sprinkles crack crystals in her marijuana  Wants help. Tearful. Emotional support provided.   CM consulted.       ETOH abuse  Patient drinks at least a 6 pack beer daily  - Start scheduled librium- wean ordered  - Thiamine, folate, potassium,  magnesium      Debility  Patient with Acute debility due to other reduced mobility. valuation for etiology is complete. Plan includes progressive mobility protocol initated and PT/OT consulted.    -PTOT recommend SNF  -CM to assist with placement     Class 2 severe obesity due to excess calories with serious comorbidity and body mass index (BMI) of 35.0 to 35.9 in adult  Body mass index is 35.42 kg/m². Morbid obesity complicates all aspects of disease management from diagnostic modalities to treatment. Weight loss encouraged and health benefits explained to patient.         Advanced care planning/counseling discussion  Advance Care Planning    Date: 02/21/2024    Code Status  I engaged the the patient in a voluntary conversation about the patient's preferences for care  at the very end of life. The patient wishes to have CPR and other invasive treatments performed when her heart and/or breathing stops. I communicated to the patient that her wishes align with full code status and she agrees and verbalized understanding.   I spent a total of 16 minutes engaging the patient in this advance care planning discussion.           Code Status: Full Code        VTE Risk Mitigation (From admission, onward)           Ordered     enoxaparin injection 40 mg  Every 24 hours         02/24/24 1615     IP VTE HIGH RISK PATIENT  Once         02/16/24 0506     Place sequential compression device  Until discontinued         02/16/24 0506                    Discharge Planning   AVTAR: 2/17/2024     Code Status: Full Code   Is the patient medically ready for discharge?:     Reason for patient still in hospital (select all that apply): Patient trending condition, Treatment, Consult recommendations, and PT / OT recommendations  Discharge Plan A: Home with family                  DaliaLiyah White DO  Department of [facility] Medicine   [facility]

## 2024-02-25 NOTE — SUBJECTIVE & OBJECTIVE
Interval History: Patient feels well. Vac dressing beeping. Denies fever or chills, dysuria, or diarrhea.    Review of Systems   Skin:  Positive for wound.   All other systems reviewed and are negative.    Objective:     Vital Signs (Most Recent):  Temp: 98.1 °F (36.7 °C) (02/25/24 0830)  Pulse: 82 (02/25/24 0843)  Resp: 17 (02/25/24 0843)  BP: (!) 171/96 (02/25/24 0830)  SpO2: 95 % (02/25/24 0843) Vital Signs (24h Range):  Temp:  [97.7 °F (36.5 °C)-98.9 °F (37.2 °C)] 98.1 °F (36.7 °C)  Pulse:  [79-90] 82  Resp:  [16-19] 17  SpO2:  [91 %-100 %] 95 %  BP: (116-171)/(58-96) 171/96     Weight: 93.6 kg (206 lb 5.6 oz)  Body mass index is 35.42 kg/m².    Estimated Creatinine Clearance: 70.5 mL/min (based on SCr of 1 mg/dL).     Physical Exam  Vitals and nursing note reviewed.   Constitutional:       General: She is not in acute distress.     Appearance: Normal appearance. She is not ill-appearing, toxic-appearing or diaphoretic.   HENT:      Head: Normocephalic and atraumatic.   Eyes:      Extraocular Movements: Extraocular movements intact.      Pupils: Pupils are equal, round, and reactive to light.   Cardiovascular:      Rate and Rhythm: Normal rate and regular rhythm.   Pulmonary:      Breath sounds: No rhonchi.   Abdominal:      Tenderness: There is no abdominal tenderness. There is no guarding or rebound.   Musculoskeletal:         General: Swelling and tenderness present.      Right lower leg: Edema present.   Neurological:      General: No focal deficit present.      Mental Status: She is alert.   Psychiatric:         Mood and Affect: Mood normal.         Behavior: Behavior normal.          Significant Labs: Blood Culture:   Recent Labs   Lab 02/15/24  2321 02/17/24 2005 02/17/24 2006 02/21/24  1732 02/21/24  1744   LABBLOO Gram stain portia bottle: Gram positive rods  Results called to and read back by:KAMALA STEELE  02/17/2024  18:30  LACTOBACILLUS SPECIES  Further identified as Lactobacillus jensenii  * No  "Growth after 4 days. Gram stain aer bottle: Gram positive rods  Results called to and read back by:Norma Tierney west 02/21/2024  14:27  LACTOBACILLUS SPECIES* No Growth to date  No Growth to date  No Growth to date  No Growth to date No Growth to date  No Growth to date  No Growth to date  No Growth to date     CBC:   Recent Labs   Lab 02/24/24  0422 02/25/24  0450   WBC 20.96* 16.06*   HGB 7.6* 7.5*   HCT 25.0* 24.4*    550*     Procalcitonin: No results for input(s): "PROCAL" in the last 48 hours.  Respiratory Culture: No results for input(s): "GSRESP", "RESPIRATORYC" in the last 4320 hours.  Urine Culture: No results for input(s): "LABURIN" in the last 4320 hours.  Wound Culture:   Recent Labs   Lab 02/17/24  0911   LABAERO LACTOBACILLUS SPECIES  Many  *       Significant Imaging: I have reviewed all pertinent imaging results/findings within the past 24 hours.  "

## 2024-02-25 NOTE — PROGRESS NOTES
"[facility]  Infectious Disease  Progress Note    Patient Name: Christine Mosqueda  MRN: [id number]  Admission Date: 2/15/2024  Length of Stay: 9 days  Attending Physician: Mayra White DO  Primary Care Provider: [facility]    Isolation Status: No active isolations    Assessment/Plan:        * Librado's gangrene    53F with h/o DM, admitted 2/15 with abscess in R medial thigh. CT c/f abscess/nec fascitis and now s/p several surgical debridements. Muscle necrosis noted. Cultures sent. Gram stain with GPC in pairs and chains and GNR.  Bcx on admit positive for Lactobacillus in one of two bottles. Previously on vanc/meropenem. ID consulted for "fourniers gangrene, diabetic, CARLINE; no cultures taken at initial debridement "    Lactobacillus growing from wound and blood. Repeat blood cultures NGTD. Leukocytosis persists - likely multivariable (infection, stress of surgery, etc). On meropenem. No additional culture data.    Recommendations:   - continue abx: de-escalate to Unasyn (treat x 14 days for bacteremia/wound; EOC: 03/6/2024)  - wound care/additional debridement per surgery  - trend WBC    Thank you for your consult. I will sign off. Please contact us if you have any additional questions.    Steven Hernandez MD  Infectious Disease  [facility]    Subjective:     Principal Problem:Librado's gangrene    HPI: 53F with h/o DM, admitted 2/15 with abscess in R medial thigh. Reports going to [facility] on [facility]/10 for the same thing. Given doxycycline, which she says she was taking. Wound on R leg became more painful, so came to hospital. Reports fever. Denies indwelling hardware. Reports dental caries, but no recent extractions. Reports aches all over, but no other areas of localized pain other than R thigh. Denies abx allergies.     CT 2/15-  Extensive inflammatory changes extending from the right perineum to the right medial thigh. Linear area of fluid attenuation in the right labial " "region, which may represent a small abscess. Multiple foci of air in the right medial thigh, which is difficult to measure. Considerations may include developing abscess, phlegmonous tissue, and or instrumentation resulting in subcutaneous air.       Bcx on admit positive for GPR in one of two bottles      Component 2 d ago   Blood Culture, Routine Gram stain portia bottle: Gram positive rods P   Blood Culture, Routine Results called to and read back by:Sandra HARVEY 02/17/2024  11:01 P          S/p I&D with general surgery on 2/16 and 2/17 with plans for another debridment Monday. Muscle necrosis noted. Cultures sent. Gram stain positive:       Component 09:11   Gram Stain Result Rare WBC's   Gram Stain Result Many Gram positive cocci in pairs and chains   Gram Stain Result Rare Gram negative rods     Hiv and hep c testing negative    On vanc/meropenem    ID consulted for "fourniers gangrene, diabetic, CARLINE; no cultures taken at initial debridement "  Interval History: Patient feels well. Vac dressing beeping. Denies fever or chills, dysuria, or diarrhea.    Review of Systems   Skin:  Positive for wound.   All other systems reviewed and are negative.    Objective:     Vital Signs (Most Recent):  Temp: 98.1 °F (36.7 °C) (02/25/24 0830)  Pulse: 82 (02/25/24 0843)  Resp: 17 (02/25/24 0843)  BP: (!) 171/96 (02/25/24 0830)  SpO2: 95 % (02/25/24 0843) Vital Signs (24h Range):  Temp:  [97.7 °F (36.5 °C)-98.9 °F (37.2 °C)] 98.1 °F (36.7 °C)  Pulse:  [79-90] 82  Resp:  [16-19] 17  SpO2:  [91 %-100 %] 95 %  BP: (116-171)/(58-96) 171/96     Weight: 93.6 kg (206 lb 5.6 oz)  Body mass index is 35.42 kg/m².    Estimated Creatinine Clearance: 70.5 mL/min (based on SCr of 1 mg/dL).     Physical Exam  Vitals and nursing note reviewed.   Constitutional:       General: She is not in acute distress.     Appearance: Normal appearance. She is not ill-appearing, toxic-appearing or diaphoretic.   HENT:      Head: Normocephalic and atraumatic. " "  Eyes:      Extraocular Movements: Extraocular movements intact.      Pupils: Pupils are equal, round, and reactive to light.   Cardiovascular:      Rate and Rhythm: Normal rate and regular rhythm.   Pulmonary:      Breath sounds: No rhonchi.   Abdominal:      Tenderness: There is no abdominal tenderness. There is no guarding or rebound.   Musculoskeletal:         General: Swelling and tenderness present.      Right lower leg: Edema present.   Neurological:      General: No focal deficit present.      Mental Status: She is alert.   Psychiatric:         Mood and Affect: Mood normal.         Behavior: Behavior normal.          Significant Labs: Blood Culture:   Recent Labs   Lab 02/15/24  2321 02/17/24 2005 02/17/24 2006 02/21/24  1732 02/21/24  1744   LABBLOO Gram stain portia bottle: Gram positive rods  Results called to and read back by:KAMALA STEELE  02/17/2024  18:30  LACTOBACILLUS SPECIES  Further identified as Lactobacillus jensenii  * No Growth after 4 days. Gram stain aer bottle: Gram positive rods  Results called to and read back by:Norma Tierney west 02/21/2024  14:27  LACTOBACILLUS SPECIES* No Growth to date  No Growth to date  No Growth to date  No Growth to date No Growth to date  No Growth to date  No Growth to date  No Growth to date     CBC:   Recent Labs   Lab 02/24/24  0422 02/25/24  0450   WBC 20.96* 16.06*   HGB 7.6* 7.5*   HCT 25.0* 24.4*    550*     Procalcitonin: No results for input(s): "PROCAL" in the last 48 hours.  Respiratory Culture: No results for input(s): "GSRESP", "RESPIRATORYC" in the last 4320 hours.  Urine Culture: No results for input(s): "LABURIN" in the last 4320 hours.  Wound Culture:   Recent Labs   Lab 02/17/24  0911   LABAERO LACTOBACILLUS SPECIES  Many  *       Significant Imaging: I have reviewed all pertinent imaging results/findings within the past 24 hours.  "

## 2024-02-25 NOTE — PLAN OF CARE
Problem: Physical Therapy  Goal: Physical Therapy Goal  Description: Goals to be met by: 3/5/24     Patient will increase functional independence with mobility by performin. Supine to sit with Modified [place]  2. Sit to stand transfer with Modified [place]  3. Bed to chair transfer with Modified [place]   4. Gait  x 10-15 feet with Stand-by Assistance using Rolling Walker. Met   5. Lower extremity exercise program x10 reps per handout, with supervision  6. W/C propulsion x 50' with Supervision      Outcome: Ongoing, Progressing   Pt responded positively to current treatment. Pt able to ambulate in hallway with RW, CGA-SBA. Needs updated gait goal. Continue to progress as tolerated.

## 2024-02-25 NOTE — SUBJECTIVE & OBJECTIVE
Interval History:  No acute overnight events.  Patient remained afebrile.  Wound VAC seal fell off this morning.Surgery plans to change wound vac in the AM.  Pain controlled at this time .  at bedside    Review of Systems   Constitutional:  Negative for chills and fever.   Respiratory:  Negative for shortness of breath.    Cardiovascular:  Negative for chest pain.   Gastrointestinal:  Negative for abdominal pain.   Skin:  Positive for wound.   Psychiatric/Behavioral:  Negative for confusion.      Objective:     Vital Signs (Most Recent):  Temp: 98.9 °F (37.2 °C) (02/25/24 1147)  Pulse: 86 (02/25/24 1147)  Resp: 18 (02/25/24 1147)  BP: 117/79 (02/25/24 1147)  SpO2: (!) 93 % (02/25/24 1147) Vital Signs (24h Range):  Temp:  [97.7 °F (36.5 °C)-98.9 °F (37.2 °C)] 98.9 °F (37.2 °C)  Pulse:  [79-90] 86  Resp:  [16-19] 18  SpO2:  [93 %-100 %] 93 %  BP: (116-171)/(58-96) 117/79     Weight: 93.6 kg (206 lb 5.6 oz)  Body mass index is 35.42 kg/m².    Intake/Output Summary (Last 24 hours) at 2/25/2024 1400  Last data filed at 2/25/2024 1210  Gross per 24 hour   Intake 240 ml   Output 1700 ml   Net -1460 ml           Physical Exam  Vitals and nursing note reviewed.   Constitutional:       General: She is not in acute distress.     Appearance: She is obese.   HENT:      Head: Atraumatic.      Nose: Nose normal.      Mouth/Throat:      Mouth: Mucous membranes are moist.   Eyes:      Extraocular Movements: Extraocular movements intact.   Cardiovascular:      Rate and Rhythm: Normal rate and regular rhythm.   Pulmonary:      Effort: Pulmonary effort is normal.      Breath sounds: No wheezing.      Comments: 2L NC SpO2 >94%  Abdominal:      General: Bowel sounds are normal.      Tenderness: There is no abdominal tenderness.   Genitourinary:     Comments: jansen  Musculoskeletal:      Right lower leg: Edema present.      Left lower leg: Edema present.   Skin:     General: Skin is warm and dry.   Neurological:      Mental Status:  She is alert and oriented to person, place, and time. Mental status is at baseline.             Significant Labs: All pertinent labs within the past 24 hours have been reviewed.    Significant Imaging: I have reviewed all pertinent imaging results/findings within the past 24 hours.

## 2024-02-25 NOTE — ASSESSMENT & PLAN NOTE
"53F with h/o DM, admitted 2/15 with abscess in R medial thigh. CT c/f abscess/nec fascitis and now s/p several surgical debridements. Muscle necrosis noted. Cultures sent. Gram stain with GPC in pairs and chains and GNR.  Bcx on admit positive for Lactobacillus in one of two bottles. Previously on vanc/meropenem. ID consulted for "fourniers gangrene, diabetic, CARLINE; no cultures taken at initial debridement "    Lactobacillus growing from wound and blood. Repeat blood cultures NGTD. Leukocytosis persists - likely multivariable (infection, stress of surgery, etc). On meropenem. No additional culture data.    Recommendations:   - continue abx: de-escalate to Unasyn (treat x 14 days for bacteremia/wound; EOC: 03/16/2024)  - wound care/additional debridement per surgery  - trend WBC  "

## 2024-02-25 NOTE — ASSESSMENT & PLAN NOTE
Patient with acute kidney injury/acute renal failure likely due to pre-renal azotemia due to IVVD and acute tubular necrosis caused by sepsis  CARLINE is currently improving. Baseline creatinine  0.9  - Labs reviewed- Renal function/electrolytes with Estimated Creatinine Clearance: 70.5 mL/min (based on SCr of 1 mg/dL). according to latest data. Monitor urine output and serial BMP and adjust therapy as needed. Avoid nephrotoxins and renally dose meds for GFR listed above.    - Highly suspect ATN as an etiology at this time  - Nephrology consult requested, appreciate recs  - renal function is improving and near baseline.   - Resolved

## 2024-02-25 NOTE — SUBJECTIVE & OBJECTIVE
Interval History: NAEON. Feeling well this morning aside prom expected pain of the wound vac site. AVSS. Wound vac partially removed by patient overnight. Wound vac re-sealed this morning. WBC now down trending.    Medications:  Continuous Infusions:      Scheduled Meds:   acetaminophen  1,000 mg Oral Q8H    ampicillin-sulbactam  3 g Intravenous Q6H    budesonide  1 mg Nebulization Q12H    And    arformoteroL  15 mcg Nebulization BID    aspirin  81 mg Oral Daily    atorvastatin  20 mg Oral Daily    chlordiazepoxide  5 mg Oral Daily    enoxparin  40 mg Subcutaneous Q24H (prophylaxis, 1700)    folic acid  1 mg Oral Daily    furosemide  40 mg Oral Daily    gabapentin  400 mg Oral BID    insulin aspart U-100  5 Units Subcutaneous TIDWM    insulin detemir U-100  15 Units Subcutaneous QHS    insulin detemir U-100 (Levemir)  20 Units Subcutaneous Daily    pantoprazole  40 mg Oral Daily    thiamine  100 mg Oral Daily     PRN Meds:0.9%  NaCl infusion (for blood administration), albuterol sulfate, dextrose 10%, dextrose 10%, glucagon (human recombinant), glucose, glucose, HYDROmorphone, insulin aspart U-100, melatonin, ondansetron, oxyCODONE, oxyCODONE, prochlorperazine, sodium chloride 0.9%     Review of patient's allergies indicates:   Allergen Reactions    Hydrochlorothiazide plus      Objective:     Vital Signs (Most Recent):  Temp: 98.9 °F (37.2 °C) (02/25/24 1147)  Pulse: 86 (02/25/24 1147)  Resp: 18 (02/25/24 1147)  BP: 117/79 (02/25/24 1147)  SpO2: (!) 93 % (02/25/24 1147) Vital Signs (24h Range):  Temp:  [97.7 °F (36.5 °C)-98.9 °F (37.2 °C)] 98.9 °F (37.2 °C)  Pulse:  [79-90] 86  Resp:  [16-19] 18  SpO2:  [93 %-100 %] 93 %  BP: (116-171)/(58-96) 117/79     Weight: 93.6 kg (206 lb 5.6 oz)  Body mass index is 35.42 kg/m².    Intake/Output - Last 3 Shifts         02/23 0700 02/24 0659 02/24 0700 02/25 0659 02/25 0700 02/26 0659    P.O. 480 720     IV Piggyback 650      Total Intake(mL/kg) 1130 (12.1) 720 (7.7)      Urine (mL/kg/hr) 3500 (1.6) 900 (0.4) 650 (1.3)    Other 0 100     Stool 0 0 0    Blood 15      Total Output 3515 1000 650    Net -2385 -280 -650           Stool Occurrence 0 x 1 x 2 x             Physical Exam  Vitals and nursing note reviewed.   Constitutional:       Appearance: Normal appearance.   HENT:      Head: Normocephalic and atraumatic.   Cardiovascular:      Rate and Rhythm: Normal rate and regular rhythm.   Pulmonary:      Effort: Pulmonary effort is normal. No respiratory distress.   Abdominal:      General: Abdomen is flat. There is no distension.      Palpations: Abdomen is soft. There is no mass.      Tenderness: There is no abdominal tenderness.      Hernia: No hernia is present.   Genitourinary:     Comments: Hernandez catheter  Musculoskeletal:      Right lower leg: No edema.      Left lower leg: No edema.   Skin:     General: Skin is warm and dry.      Comments: Wound vac to right thigh with adequate seal this morning and SS output.    Neurological:      General: No focal deficit present.      Mental Status: She is alert and oriented to person, place, and time.   Psychiatric:         Mood and Affect: Mood normal.         Behavior: Behavior normal.          Significant Labs:  I have reviewed all pertinent lab results within the past 24 hours.  CBC:   Recent Labs   Lab 02/25/24  0450   WBC 16.06*   RBC 3.24*   HGB 7.5*   HCT 24.4*   *   MCV 75*   MCH 23.1*   MCHC 30.7*       CMP:   Recent Labs   Lab 02/25/24  0450   *   CALCIUM 8.1*   ALBUMIN 1.5*   PROT 5.7*      K 5.3*   CO2 28      BUN 15   CREATININE 1.0   ALKPHOS 105   ALT 7*   AST 11   BILITOT 0.2       LFTs:   Recent Labs   Lab 02/25/24  0450   ALT 7*   AST 11   ALKPHOS 105   BILITOT 0.2   PROT 5.7*   ALBUMIN 1.5*         Significant Diagnostics:  I have reviewed all pertinent imaging results/findings within the past 24 hours.

## 2024-02-25 NOTE — ASSESSMENT & PLAN NOTE
Defined by leukocytosis, tachycardia and groin abscess. Source is Fourniers.   - surgical management with debridement  - antibiotics= Unasyn per ID  - Leukocytosis improved on  02/21, but worsen on 02/22

## 2024-02-25 NOTE — ASSESSMENT & PLAN NOTE
Patient's FSGs are uncontrolled due to hyperglycemia on current medication regimen.  Last A1c reviewed-   Lab Results   Component Value Date    HGBA1C 8.2 (H) 04/06/2023     Most recent fingerstick glucose reviewed-   Recent Labs   Lab 02/24/24  1535 02/24/24  2017 02/25/24  0829 02/25/24  1146   POCTGLUCOSE 263* 78 197* 123*       Current correctional scale  High  Maintain anti-hyperglycemic dose as follows-   Antihyperglycemics (From admission, onward)      Start     Stop Route Frequency Ordered    02/27/24 0900  insulin detemir U-100 (Levemir) pen 20 Units         -- SubQ Daily 02/25/24 1359    02/26/24 2100  insulin detemir U-100 (Levemir) pen 15 Units         -- SubQ Nightly 02/25/24 1359    02/22/24 1145  insulin aspart U-100 pen 5 Units         -- SubQ 3 times daily with meals 02/22/24 1031    02/16/24 1514  insulin aspart U-100 pen 0-15 Units         -- SubQ Before meals & nightly PRN 02/16/24 1515          Hold Oral hypoglycemics while patient is in the hospital.  A1c: 8.2  Meds: basal bolus insulin + SSI PRN to maintain goal 140-180  Titrate as needed. Basal BID (20AM, 15U PM), prandia at 5U TIDWM  ADA diet, accuchecks ACHS, hypoglycemic protocol  Hold insulin 2/20 5:00 p.m. and 2/20 6:00 a.m. in setting of NPO status

## 2024-02-25 NOTE — PROGRESS NOTES
[facility]  General Surgery  Progress Note    Subjective:     History of Present Illness:  Ms. Mosqueda is a 53 yoF with a PMH significant for insulin-dependent T2DM and CHF (last echo EF 53%, PA systolic 23) who presents with worsening pain and swelling of the right medial thigh. She developed an abscess in the area that underwent I+D at [facility] a few days ago, after which she was sent home on PO abx. Since that time the pain and swelling have worsened leading to her presentation to our ED. Workup here revealed normal vital signs, but with significantly elevated WBC and CRP, with other laboratory derangements leading to a LRINIC score of 11. CT scan demonstrates extensive inflammation extending from the right perineum to the right medial thigh with multiple foci of air. She is not on any blood thinners. No issues with anesthesia in the past.    Post-Op Info:  Procedure(s) (LRB):  REPLACEMENT, WOUND VAC (Right)   2 Days Post-Op     Interval History: NAEON. Feeling well this morning aside prom expected pain of the wound vac site. AVSS. Wound vac partially removed by patient overnight. Wound vac re-sealed this morning. WBC now down trending.    Medications:  Continuous Infusions:      Scheduled Meds:   acetaminophen  1,000 mg Oral Q8H    ampicillin-sulbactam  3 g Intravenous Q6H    budesonide  1 mg Nebulization Q12H    And    arformoteroL  15 mcg Nebulization BID    aspirin  81 mg Oral Daily    atorvastatin  20 mg Oral Daily    chlordiazepoxide  5 mg Oral Daily    enoxparin  40 mg Subcutaneous Q24H (prophylaxis, 1700)    folic acid  1 mg Oral Daily    furosemide  40 mg Oral Daily    gabapentin  400 mg Oral BID    insulin aspart U-100  5 Units Subcutaneous TIDWM    insulin detemir U-100  15 Units Subcutaneous QHS    insulin detemir U-100 (Levemir)  20 Units Subcutaneous Daily    pantoprazole  40 mg Oral Daily    thiamine  100 mg Oral Daily     PRN Meds:0.9%  NaCl infusion (for blood administration), albuterol  sulfate, dextrose 10%, dextrose 10%, glucagon (human recombinant), glucose, glucose, HYDROmorphone, insulin aspart U-100, melatonin, ondansetron, oxyCODONE, oxyCODONE, prochlorperazine, sodium chloride 0.9%     Review of patient's allergies indicates:   Allergen Reactions    Hydrochlorothiazide plus      Objective:     Vital Signs (Most Recent):  Temp: 98.9 °F (37.2 °C) (02/25/24 1147)  Pulse: 86 (02/25/24 1147)  Resp: 18 (02/25/24 1147)  BP: 117/79 (02/25/24 1147)  SpO2: (!) 93 % (02/25/24 1147) Vital Signs (24h Range):  Temp:  [97.7 °F (36.5 °C)-98.9 °F (37.2 °C)] 98.9 °F (37.2 °C)  Pulse:  [79-90] 86  Resp:  [16-19] 18  SpO2:  [93 %-100 %] 93 %  BP: (116-171)/(58-96) 117/79     Weight: 93.6 kg (206 lb 5.6 oz)  Body mass index is 35.42 kg/m².    Intake/Output - Last 3 Shifts         02/23 0700  02/24 0659 02/24 0700  02/25 0659 02/25 0700  02/26 0659    P.O. 480 720     IV Piggyback 650      Total Intake(mL/kg) 1130 (12.1) 720 (7.7)     Urine (mL/kg/hr) 3500 (1.6) 900 (0.4) 650 (1.3)    Other 0 100     Stool 0 0 0    Blood 15      Total Output 3515 1000 650    Net -2385 -280 -650           Stool Occurrence 0 x 1 x 2 x            Physical Exam  Vitals and nursing note reviewed.   Constitutional:       Appearance: Normal appearance.   HENT:      Head: Normocephalic and atraumatic.   Cardiovascular:      Rate and Rhythm: Normal rate and regular rhythm.   Pulmonary:      Effort: Pulmonary effort is normal. No respiratory distress.   Abdominal:      General: Abdomen is flat. There is no distension.      Palpations: Abdomen is soft. There is no mass.      Tenderness: There is no abdominal tenderness.      Hernia: No hernia is present.   Genitourinary:     Comments: Hernandez catheter  Musculoskeletal:      Right lower leg: No edema.      Left lower leg: No edema.   Skin:     General: Skin is warm and dry.      Comments: Wound vac to right thigh with adequate seal this morning and SS output.    Neurological:      General: No  focal deficit present.      Mental Status: She is alert and oriented to person, place, and time.   Psychiatric:         Mood and Affect: Mood normal.         Behavior: Behavior normal.          Significant Labs:  I have reviewed all pertinent lab results within the past 24 hours.  CBC:   Recent Labs   Lab 02/25/24  0450   WBC 16.06*   RBC 3.24*   HGB 7.5*   HCT 24.4*   *   MCV 75*   MCH 23.1*   MCHC 30.7*       CMP:   Recent Labs   Lab 02/25/24  0450   *   CALCIUM 8.1*   ALBUMIN 1.5*   PROT 5.7*      K 5.3*   CO2 28      BUN 15   CREATININE 1.0   ALKPHOS 105   ALT 7*   AST 11   BILITOT 0.2       LFTs:   Recent Labs   Lab 02/25/24  0450   ALT 7*   AST 11   ALKPHOS 105   BILITOT 0.2   PROT 5.7*   ALBUMIN 1.5*         Significant Diagnostics:  I have reviewed all pertinent imaging results/findings within the past 24 hours.  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 S/p wound vac replacement on 2/23.    - Plan for return to the OR on 2/26 for wound vac change and partial closure  - Ok for diet, NPO at midnight  - Consent to be obtained  - ID following; Vanc/zosyn/clinda started 2/16, switched to meropenam and vanc, de-escalated to Unasyn on 2/25 with planned course through 3/6   - Operative cultures 2/17 with lactobacillus   - White, thick vaginal secretions noted in the OR on 2/23; one-time dose of 150 mg of fluconazole 2/24  - Poorly controlled DMII - insulin per HM  - Nutrition consult, boost supplements, severely hypoalbuminemia   - Polysubstance abuse to alcohol dependency - librium taper on, CIWA protocol  - Multiple modal pain control limited by CARLINE - continue scheduled tylenol,  combination of oral and IV narcotics  - Okay for DVT ppx   - PT consult - okay to attempt ambulation, get up to chair.   - Will have long term wound care needs; pending final disposition will need placement for this           Steven Paula MD  General Surgery  [facility] - Telemetry

## 2024-02-25 NOTE — ASSESSMENT & PLAN NOTE
Patient's anemia is currently controlled. Has received 1 units of PRBCs on 2/19 and 02/21 . Etiology likely d/t chronic disease and acute blood loss post surgery.  Current CBC reviewed-   Lab Results   Component Value Date    HGB 7.5 (L) 02/25/2024    HCT 24.4 (L) 02/25/2024     - monitor daily   -s/p 1U pRBC on 02/19 and 02/21

## 2024-02-25 NOTE — PROGRESS NOTES
Patient inadvertently pulled wound vac apart from wound. Unable to get tight seal and vac continuously alarmed . I reached to surgical team ( Dr Paula). MD instructed that if wound vac continued to alarm/beep to turn off. Pt is scheduled for another procedure on tomorrow with surgical team per surgeon .       Nurse turned WV off re: unable to maintain tight continuous seal and per order .

## 2024-02-25 NOTE — PROGRESS NOTES
[facility] [place]  Nurses Note -- 4 Eyes      2/25/2024       Skin assessed on: Q Shift      [x] No Pressure Injuries Present    []Prevention Measures Documented    [] Yes LDA  for Pressure Injury Previously documented     [] Yes New Pressure Injury Discovered   [] LDA for New Pressure Injury Added      Attending RN:  Ángel Devlin RN     Second RN:  Sara Wadsworth RN

## 2024-02-25 NOTE — ASSESSMENT & PLAN NOTE
Chronic, controlled.   -hold home meds at this time:  Amlodipine 10 mg,   Resume Lasix 40 mg p.o.    Latest blood pressure and vitals reviewed-     Temp:  [97.7 °F (36.5 °C)-98.9 °F (37.2 °C)]   Pulse:  [79-90]   Resp:  [16-19]   BP: (116-171)/(58-96)   SpO2:  [93 %-100 %] .   Home meds for hypertension were reviewed and noted below.   Hypertension Medications               amlodipine (NORVASC) 10 MG tablet Take 10 mg by mouth once daily.    furosemide (LASIX) 40 MG tablet Take 1 tablet (40 mg total) by mouth once daily.            While in the hospital, will manage blood pressure as follows; Adjust home antihypertensive regimen as follows- hold meds for now given infection/ narcotic use. Resume as warranted . May develop rebound HTN from drug/ alcohol withdrawal.    Will utilize p.r.n. blood pressure medication only if patient's blood pressure greater than 180/110 and she develops symptoms such as worsening chest pain or shortness of breath.

## 2024-02-25 NOTE — PLAN OF CARE
Problem: Diabetes Comorbidity  Goal: Blood Glucose Level Within Targeted Range  Outcome: Ongoing, Progressing     Problem: Adult Inpatient Plan of Care  Goal: Plan of Care Review  Outcome: Ongoing, Progressing  Goal: Patient-Specific Goal (Individualized)  Outcome: Ongoing, Progressing  Goal: Absence of Hospital-Acquired Illness or Injury  Outcome: Ongoing, Progressing  Goal: Optimal Comfort and Wellbeing  Outcome: Ongoing, Progressing  Goal: Readiness for Transition of Care  Outcome: Ongoing, Progressing     Problem: Skin Injury Risk Increased  Goal: Skin Health and Integrity  Outcome: Ongoing, Progressing     Problem: Adjustment to Illness (Sepsis/Septic Shock)  Goal: Optimal Coping  Outcome: Ongoing, Progressing     Problem: Bleeding (Sepsis/Septic Shock)  Goal: Absence of Bleeding  Outcome: Ongoing, Progressing     Problem: Glycemic Control Impaired (Sepsis/Septic Shock)  Goal: Blood Glucose Level Within Desired Range  Outcome: Ongoing, Progressing     Problem: Infection Progression (Sepsis/Septic Shock)  Goal: Absence of Infection Signs and Symptoms  Outcome: Ongoing, Progressing     Problem: Nutrition Impaired (Sepsis/Septic Shock)  Goal: Optimal Nutrition Intake  Outcome: Ongoing, Progressing     Problem: Fluid and Electrolyte Imbalance (Acute Kidney Injury/Impairment)  Goal: Fluid and Electrolyte Balance  Outcome: Ongoing, Progressing     Problem: Oral Intake Inadequate (Acute Kidney Injury/Impairment)  Goal: Optimal Nutrition Intake  Outcome: Ongoing, Progressing     Problem: Renal Function Impairment (Acute Kidney Injury/Impairment)  Goal: Effective Renal Function  Outcome: Ongoing, Progressing     Problem: Fall Injury Risk  Goal: Absence of Fall and Fall-Related Injury  Outcome: Ongoing, Progressing     Problem: Infection  Goal: Absence of Infection Signs and Symptoms  Outcome: Ongoing, Progressing     Problem: Impaired Wound Healing  Goal: Optimal Wound Healing  Outcome: Ongoing, Progressing

## 2024-02-25 NOTE — ASSESSMENT & PLAN NOTE
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 S/p wound vac replacement on 2/23.    - Plan for return to the OR on 2/26 for wound vac change and partial closure  - Ok for diet, NPO at midnight  - Consent to be obtained  - ID following; Vanc/zosyn/clinda started 2/16, switched to meropenam and vanc, de-escalated to Unasyn on 2/25 with planned course through 3/6   - Operative cultures 2/17 with lactobacillus   - White, thick vaginal secretions noted in the OR on 2/23; one-time dose of 150 mg of fluconazole 2/24  - Poorly controlled DMII - insulin per HM  - Nutrition consult, boost supplements, severely hypoalbuminemia   - Polysubstance abuse to alcohol dependency - librium taper on, CIWA protocol  - Multiple modal pain control limited by CARLINE - continue scheduled tylenol, combination of oral and IV narcotics  - Okay for DVT ppx   - PT consult - okay to attempt ambulation, get up to chair.   - Will have long term wound care needs; pending final disposition will need placement for this

## 2024-02-25 NOTE — PT/OT/SLP PROGRESS
"Physical Therapy Treatment    Patient Name:  Christine Mosqueda   MRN:  [id number]    Recommendations:     Discharge Recommendations: Moderate Intensity Therapy  Discharge Equipment Recommendations: bath bench, bedside commode, wheelchair  Barriers to discharge: None    Assessment:     Christine Mosqueda is a 53 y.o. 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  She presents with the following impairments/functional limitations: weakness, impaired endurance, impaired self care skills, impaired functional mobility, gait instability, impaired balance, decreased safety awareness, decreased lower extremity function, impaired skin, decreased coordination, decreased ROM.    Pt responded positively to current treatment. Pt able to ambulate in hallway with RW, CGA-SBA. Continue to progress as tolerated.    Rehab Prognosis: Good; patient would benefit from acute skilled PT services to address these deficits and reach maximum level of function.    Recent Surgery: Procedure(s) (LRB):  REPLACEMENT, WOUND VAC (Right) 2 Days Post-Op    Plan:     During this hospitalization, patient to be seen 4 x/week to address the identified rehab impairments via gait training, therapeutic activities, therapeutic exercises, neuromuscular re-education, wheelchair management/training and progress toward the following goals:    Plan of Care Expires:  03/05/24    Subjective     Chief Complaint: wanting to walk further  Patient/Family Comments/goals: Pt agreeable to session. Pt states " I want to keep walking down the [place]."  Pain/Comfort:  Pain Rating 1: 0/10      Objective:     Communicated with pt's nurseÁngel prior to session.  Patient found left sidelying with telemetry, peripheral IV, wound vac, jansen catheter, oxygen (wound vac still connected, however, turned off by RN due to not functioning properly) upon PT entry to room.     General Precautions: Standard, fall, respiratory  Orthopedic Precautions: N/A  Braces: " N/A  Respiratory Status: Nasal cannula, flow 1 L/min (Unable to get finger reading of O2 sats during session, despite multiple attempts.)     Functional Mobility: Pt on 1LO2 NC. Unable to get finger reading of O2 sats. Pt with minimal c/o SOB. Pt takes rest breaks as needed and compliant with PLB.   Bed Mobility:     Rolling Right: stand by assistance  Scooting: stand by assistance  Supine to Sit: stand by assistance  Transfers:  x2 trials from EOB, x1 trial from BSchair    Sit to Stand:  CGA from EOB, Min a from BSchair with rolling walker  Gait: Pt ambulated ~90ft x2 trials with RW, CGA-SBA with seated rest break in between trials. Requires cues for RW mgt/safety and upright posture due to fwd trunk flexion. Cues to increase WB to RLE. Displays decreased bre, step length, velocity of limb, postural control, step length, foot clearance and balance.   Balance: good sitting and fair + standing       AM-PAC 6 CLICK MOBILITY  Turning over in bed (including adjusting bedclothes, sheets and blankets)?: 3  Sitting down on and standing up from a chair with arms (e.g., wheelchair, bedside commode, etc.): 3  Moving from lying on back to sitting on the side of the bed?: 3  Moving to and from a bed to a chair (including a wheelchair)?: 3  Need to walk in hospital room?: 3  Climbing 3-5 steps with a railing?: 2  Basic Mobility Total Score: 17       Treatment & Education:  Pt performed bed mobility, transfers and ambulation.     Pt educated on PTA role/POC.   Importance of OOB activity and ambulation with therapy staff assistance.  Importance of sitting up in the chair throughout the day as tolerated, especially for meals   Safety during functional t/f and mobility with use of AD and staff   White board updated   Multiple self-care tasks/functional mobility completed- assistance level noted above   All questions/concerns answered within scope of practice  Pt and spouse encouraged to use call button for assistance for all  OOB/OOchair activity 2/2 fall risk. Pt verbalized understanding. Tray table and all needs within reach.      Patient left  up in BSchair with tray table and all needs within reach  with all lines intact, call button in reach, pt's nurse, Ángel, notified, and spouse present..    GOALS:   Multidisciplinary Problems       Physical Therapy Goals          Problem: Physical Therapy    Goal Priority Disciplines Outcome Goal Variances Interventions   Physical Therapy Goal     PT, PT/OT Ongoing, Progressing     Description: Goals to be met by: 3/5/24     Patient will increase functional independence with mobility by performin. Supine to sit with Modified [place]  2. Sit to stand transfer with Modified [place]  3. Bed to chair transfer with Modified [place]   4. Gait  x 10-15 feet with Stand-by Assistance using Rolling Walker.   5. Lower extremity exercise program x10 reps per handout, with supervision  6. W/C propulsion x 50' with Supervision                           Time Tracking:     PT Received On: 24  PT Start Time: 1046     PT Stop Time: 1124  PT Total Time (min): 38 min     Billable Minutes: Gait Training 25 and Therapeutic Activity 13       PT/PTA: PTA     Number of PTA visits since last PT visit: 2     2024

## 2024-02-26 ENCOUNTER — ANESTHESIA (OUTPATIENT)
Dept: SURGERY | Facility: HOSPITAL | Age: 54
DRG: 853 | End: 2024-02-26
Payer: MEDICAID

## 2024-02-26 LAB
ALBUMIN SERPL BCP-MCNC: 1.5 G/DL (ref 3.5–5.2)
ALP SERPL-CCNC: 95 U/L (ref 55–135)
ALT SERPL W/O P-5'-P-CCNC: 9 U/L (ref 10–44)
ANION GAP SERPL CALC-SCNC: 6 MMOL/L (ref 8–16)
AST SERPL-CCNC: 11 U/L (ref 10–40)
BASOPHILS # BLD AUTO: 0.06 K/UL (ref 0–0.2)
BASOPHILS NFR BLD: 0.4 % (ref 0–1.9)
BILIRUB SERPL-MCNC: 0.1 MG/DL (ref 0.1–1)
BUN SERPL-MCNC: 14 MG/DL (ref 6–20)
CALCIUM SERPL-MCNC: 8.1 MG/DL (ref 8.7–10.5)
CHLORIDE SERPL-SCNC: 104 MMOL/L (ref 95–110)
CO2 SERPL-SCNC: 29 MMOL/L (ref 23–29)
CREAT SERPL-MCNC: 0.9 MG/DL (ref 0.5–1.4)
DIFFERENTIAL METHOD BLD: ABNORMAL
EOSINOPHIL # BLD AUTO: 0.3 K/UL (ref 0–0.5)
EOSINOPHIL NFR BLD: 2.1 % (ref 0–8)
ERYTHROCYTE [DISTWIDTH] IN BLOOD BY AUTOMATED COUNT: 20.1 % (ref 11.5–14.5)
EST. GFR  (NO RACE VARIABLE): >60 ML/MIN/1.73 M^2
GLUCOSE SERPL-MCNC: 126 MG/DL (ref 70–110)
HCT VFR BLD AUTO: 23.1 % (ref 37–48.5)
HGB BLD-MCNC: 7.1 G/DL (ref 12–16)
IMM GRANULOCYTES # BLD AUTO: 0.15 K/UL (ref 0–0.04)
IMM GRANULOCYTES NFR BLD AUTO: 1.1 % (ref 0–0.5)
LYMPHOCYTES # BLD AUTO: 2.6 K/UL (ref 1–4.8)
LYMPHOCYTES NFR BLD: 18.4 % (ref 18–48)
MAGNESIUM SERPL-MCNC: 1.6 MG/DL (ref 1.6–2.6)
MCH RBC QN AUTO: 23.9 PG (ref 27–31)
MCHC RBC AUTO-ENTMCNC: 30.7 G/DL (ref 32–36)
MCV RBC AUTO: 78 FL (ref 82–98)
MONOCYTES # BLD AUTO: 0.9 K/UL (ref 0.3–1)
MONOCYTES NFR BLD: 6.6 % (ref 4–15)
NEUTROPHILS # BLD AUTO: 10.2 K/UL (ref 1.8–7.7)
NEUTROPHILS NFR BLD: 71.4 % (ref 38–73)
NRBC BLD-RTO: 0 /100 WBC
PHOSPHATE SERPL-MCNC: 4.3 MG/DL (ref 2.7–4.5)
PLATELET # BLD AUTO: 167 K/UL (ref 150–450)
PMV BLD AUTO: 9.9 FL (ref 9.2–12.9)
POCT GLUCOSE: 102 MG/DL (ref 70–110)
POCT GLUCOSE: 129 MG/DL (ref 70–110)
POCT GLUCOSE: 215 MG/DL (ref 70–110)
POCT GLUCOSE: 302 MG/DL (ref 70–110)
POTASSIUM SERPL-SCNC: 4.9 MMOL/L (ref 3.5–5.1)
PROT SERPL-MCNC: 5.8 G/DL (ref 6–8.4)
RBC # BLD AUTO: 2.97 M/UL (ref 4–5.4)
SODIUM SERPL-SCNC: 139 MMOL/L (ref 136–145)
WBC # BLD AUTO: 14.22 K/UL (ref 3.9–12.7)

## 2024-02-26 PROCEDURE — 25000003 PHARM REV CODE 250: Performed by: HOSPITALIST

## 2024-02-26 PROCEDURE — 94640 AIRWAY INHALATION TREATMENT: CPT

## 2024-02-26 PROCEDURE — 25000242 PHARM REV CODE 250 ALT 637 W/ HCPCS: Performed by: HOSPITALIST

## 2024-02-26 PROCEDURE — 83735 ASSAY OF MAGNESIUM: CPT

## 2024-02-26 PROCEDURE — 63600175 PHARM REV CODE 636 W HCPCS: Performed by: STUDENT IN AN ORGANIZED HEALTH CARE EDUCATION/TRAINING PROGRAM

## 2024-02-26 PROCEDURE — 27000221 HC OXYGEN, UP TO 24 HOURS

## 2024-02-26 PROCEDURE — 12020 TX SUPFC WND DEHSN SMPL CLSR: CPT | Mod: ,,, | Performed by: SURGERY

## 2024-02-26 PROCEDURE — 80053 COMPREHEN METABOLIC PANEL: CPT

## 2024-02-26 PROCEDURE — 84100 ASSAY OF PHOSPHORUS: CPT

## 2024-02-26 PROCEDURE — 25000003 PHARM REV CODE 250: Performed by: NURSE ANESTHETIST, CERTIFIED REGISTERED

## 2024-02-26 PROCEDURE — 25000003 PHARM REV CODE 250: Performed by: STUDENT IN AN ORGANIZED HEALTH CARE EDUCATION/TRAINING PROGRAM

## 2024-02-26 PROCEDURE — 94761 N-INVAS EAR/PLS OXIMETRY MLT: CPT

## 2024-02-26 PROCEDURE — 36000707: Performed by: SURGERY

## 2024-02-26 PROCEDURE — 27201423 OPTIME MED/SURG SUP & DEVICES STERILE SUPPLY: Performed by: SURGERY

## 2024-02-26 PROCEDURE — 99232 SBSQ HOSP IP/OBS MODERATE 35: CPT | Mod: ,,, | Performed by: SURGERY

## 2024-02-26 PROCEDURE — 21400001 HC TELEMETRY ROOM

## 2024-02-26 PROCEDURE — 37000008 HC ANESTHESIA 1ST 15 MINUTES: Performed by: SURGERY

## 2024-02-26 PROCEDURE — 37000009 HC ANESTHESIA EA ADD 15 MINS: Performed by: SURGERY

## 2024-02-26 PROCEDURE — 25000003 PHARM REV CODE 250: Performed by: INTERNAL MEDICINE

## 2024-02-26 PROCEDURE — D9220A PRA ANESTHESIA: Mod: CRNA,,, | Performed by: NURSE ANESTHETIST, CERTIFIED REGISTERED

## 2024-02-26 PROCEDURE — 36000706: Performed by: SURGERY

## 2024-02-26 PROCEDURE — 63600175 PHARM REV CODE 636 W HCPCS: Performed by: INTERNAL MEDICINE

## 2024-02-26 PROCEDURE — 71000033 HC RECOVERY, INTIAL HOUR: Performed by: SURGERY

## 2024-02-26 PROCEDURE — 85025 COMPLETE CBC W/AUTO DIFF WBC: CPT

## 2024-02-26 PROCEDURE — D9220A PRA ANESTHESIA: Mod: ANES,,, | Performed by: ANESTHESIOLOGY

## 2024-02-26 PROCEDURE — 36415 COLL VENOUS BLD VENIPUNCTURE: CPT

## 2024-02-26 PROCEDURE — 63600175 PHARM REV CODE 636 W HCPCS: Performed by: NURSE ANESTHETIST, CERTIFIED REGISTERED

## 2024-02-26 PROCEDURE — 97606 NEG PRS WND THER DME>50 SQCM: CPT | Mod: 59,,, | Performed by: SURGERY

## 2024-02-26 RX ORDER — PROPOFOL 10 MG/ML
VIAL (ML) INTRAVENOUS CONTINUOUS PRN
Status: DISCONTINUED | OUTPATIENT
Start: 2024-02-26 | End: 2024-02-27

## 2024-02-26 RX ORDER — PROPOFOL 10 MG/ML
VIAL (ML) INTRAVENOUS
Status: DISCONTINUED | OUTPATIENT
Start: 2024-02-26 | End: 2024-02-27

## 2024-02-26 RX ORDER — PROCHLORPERAZINE EDISYLATE 5 MG/ML
5 INJECTION INTRAMUSCULAR; INTRAVENOUS EVERY 30 MIN PRN
Status: DISCONTINUED | OUTPATIENT
Start: 2024-02-26 | End: 2024-02-26 | Stop reason: HOSPADM

## 2024-02-26 RX ORDER — MIDAZOLAM HYDROCHLORIDE 1 MG/ML
INJECTION, SOLUTION INTRAMUSCULAR; INTRAVENOUS
Status: DISCONTINUED | OUTPATIENT
Start: 2024-02-26 | End: 2024-02-27

## 2024-02-26 RX ORDER — LIDOCAINE HYDROCHLORIDE 20 MG/ML
INJECTION INTRAVENOUS
Status: DISCONTINUED | OUTPATIENT
Start: 2024-02-26 | End: 2024-02-27

## 2024-02-26 RX ORDER — FENTANYL CITRATE 50 UG/ML
INJECTION, SOLUTION INTRAMUSCULAR; INTRAVENOUS
Status: DISCONTINUED | OUTPATIENT
Start: 2024-02-26 | End: 2024-02-27

## 2024-02-26 RX ORDER — HYDROMORPHONE HYDROCHLORIDE 2 MG/ML
0.2 INJECTION, SOLUTION INTRAMUSCULAR; INTRAVENOUS; SUBCUTANEOUS EVERY 5 MIN PRN
Status: DISCONTINUED | OUTPATIENT
Start: 2024-02-26 | End: 2024-02-26 | Stop reason: HOSPADM

## 2024-02-26 RX ADMIN — PROPOFOL 50 MCG/KG/MIN: 10 INJECTION, EMULSION INTRAVENOUS at 09:02

## 2024-02-26 RX ADMIN — FENTANYL CITRATE 50 MCG: 0.05 INJECTION, SOLUTION INTRAMUSCULAR; INTRAVENOUS at 09:02

## 2024-02-26 RX ADMIN — ENOXAPARIN SODIUM 40 MG: 40 INJECTION SUBCUTANEOUS at 04:02

## 2024-02-26 RX ADMIN — FUROSEMIDE 40 MG: 40 TABLET ORAL at 12:02

## 2024-02-26 RX ADMIN — ARFORMOTEROL TARTRATE 15 MCG: 15 SOLUTION RESPIRATORY (INHALATION) at 07:02

## 2024-02-26 RX ADMIN — GABAPENTIN 400 MG: 400 CAPSULE ORAL at 10:02

## 2024-02-26 RX ADMIN — BUDESONIDE 1 MG: 0.5 INHALANT RESPIRATORY (INHALATION) at 07:02

## 2024-02-26 RX ADMIN — HYDROMORPHONE HYDROCHLORIDE 0.2 MG: 2 INJECTION INTRAMUSCULAR; INTRAVENOUS; SUBCUTANEOUS at 10:02

## 2024-02-26 RX ADMIN — ACETAMINOPHEN 1000 MG: 500 TABLET ORAL at 10:02

## 2024-02-26 RX ADMIN — AMPICILLIN SODIUM AND SULBACTAM SODIUM 3 G: 2; 1 INJECTION, POWDER, FOR SOLUTION INTRAMUSCULAR; INTRAVENOUS at 10:02

## 2024-02-26 RX ADMIN — LIDOCAINE HYDROCHLORIDE 60 MG: 20 INJECTION, SOLUTION INTRAVENOUS at 09:02

## 2024-02-26 RX ADMIN — PROPOFOL 20 MG: 10 INJECTION, EMULSION INTRAVENOUS at 09:02

## 2024-02-26 RX ADMIN — BUDESONIDE 1 MG: 0.5 INHALANT RESPIRATORY (INHALATION) at 09:02

## 2024-02-26 RX ADMIN — FOLIC ACID 1 MG: 1 TABLET ORAL at 12:02

## 2024-02-26 RX ADMIN — ARFORMOTEROL TARTRATE 15 MCG: 15 SOLUTION RESPIRATORY (INHALATION) at 09:02

## 2024-02-26 RX ADMIN — AMPICILLIN SODIUM AND SULBACTAM SODIUM 3 G: 2; 1 INJECTION, POWDER, FOR SOLUTION INTRAMUSCULAR; INTRAVENOUS at 12:02

## 2024-02-26 RX ADMIN — INSULIN ASPART 12 UNITS: 100 INJECTION, SOLUTION INTRAVENOUS; SUBCUTANEOUS at 04:02

## 2024-02-26 RX ADMIN — ALBUTEROL SULFATE 2.5 MG: 2.5 SOLUTION RESPIRATORY (INHALATION) at 07:02

## 2024-02-26 RX ADMIN — OXYCODONE 10 MG: 5 TABLET ORAL at 10:02

## 2024-02-26 RX ADMIN — CHLORDIAZEPOXIDE HYDROCHLORIDE 5 MG: 5 CAPSULE ORAL at 12:02

## 2024-02-26 RX ADMIN — GLYCOPYRROLATE 0.1 MG: 0.2 INJECTION, SOLUTION INTRAMUSCULAR; INTRAVITREAL at 08:02

## 2024-02-26 RX ADMIN — Medication 6 MG: at 10:02

## 2024-02-26 RX ADMIN — AMPICILLIN SODIUM AND SULBACTAM SODIUM 3 G: 2; 1 INJECTION, POWDER, FOR SOLUTION INTRAMUSCULAR; INTRAVENOUS at 04:02

## 2024-02-26 RX ADMIN — OXYCODONE 10 MG: 5 TABLET ORAL at 03:02

## 2024-02-26 RX ADMIN — ATORVASTATIN CALCIUM 20 MG: 10 TABLET, FILM COATED ORAL at 12:02

## 2024-02-26 RX ADMIN — AMPICILLIN SODIUM AND SULBACTAM SODIUM 3 G: 2; 1 INJECTION, POWDER, FOR SOLUTION INTRAMUSCULAR; INTRAVENOUS at 05:02

## 2024-02-26 RX ADMIN — GABAPENTIN 400 MG: 400 CAPSULE ORAL at 12:02

## 2024-02-26 RX ADMIN — SODIUM CHLORIDE, SODIUM LACTATE, POTASSIUM CHLORIDE, AND CALCIUM CHLORIDE: .6; .31; .03; .02 INJECTION, SOLUTION INTRAVENOUS at 08:02

## 2024-02-26 RX ADMIN — OXYCODONE 10 MG: 5 TABLET ORAL at 06:02

## 2024-02-26 RX ADMIN — PANTOPRAZOLE SODIUM 40 MG: 40 TABLET, DELAYED RELEASE ORAL at 12:02

## 2024-02-26 RX ADMIN — ACETAMINOPHEN 1000 MG: 500 TABLET ORAL at 05:02

## 2024-02-26 RX ADMIN — MIDAZOLAM HYDROCHLORIDE 2 MG: 1 INJECTION, SOLUTION INTRAMUSCULAR; INTRAVENOUS at 08:02

## 2024-02-26 RX ADMIN — THIAMINE HCL TAB 100 MG 100 MG: 100 TAB at 12:02

## 2024-02-26 RX ADMIN — OXYCODONE 10 MG: 5 TABLET ORAL at 02:02

## 2024-02-26 RX ADMIN — INSULIN ASPART 5 UNITS: 100 INJECTION, SOLUTION INTRAVENOUS; SUBCUTANEOUS at 04:02

## 2024-02-26 RX ADMIN — ASPIRIN 81 MG CHEWABLE TABLET 81 MG: 81 TABLET CHEWABLE at 12:02

## 2024-02-26 NOTE — PT/OT/SLP PROGRESS
Physical Therapy      Patient Name:  Christine Mosqueda   MRN:  [id number]    Patient not seen today secondary to Off the floor for procedure/surgery. Will follow-up .

## 2024-02-26 NOTE — OP NOTE
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 Partial closure performed of the posterior aspect.    16 x 12 x 5 cm wound following partial closure.    INDICATION: Christine Mosqueda is 53 y.o. female with admission for right groin and thigh necrotizing soft tissue infection which has had prior debridements and wound vac exchanges now indicated for wound vac exchange and possible partial closure. After discussion of disease process, we discussed options and have elected for operation to perform wound exploration of right thigh and right groin with wound vac exchange and partial closure.       PROCEDURE IN DETAIL: Patient was brought to the operating room where she was placed in supine position on the operating room table and underwent MAC anesthesia and was placed in lithotomy. The vac was removed and the field was sterilely prepped out and draped in standard fashion, and a timeout identifying the correct patient, placement, procedure, and preoperative antibiotics was called with everyone in agreement.    The wound was found to be healthy appearing with healthy appearing granulation tissue and appropriate contraction. The posterior aspect was closing down nicely and we elected to perform a closure of this aspect of the wound with 5x interrupted vertical mattress sutures with 2-0 nylon after injection of lidocaine. The remaining wound measured 16 x 12 x 5 cm deep and a medium black sponge and wound vac was placed with good seal at the end of the case.    This completed the proposed operation. All instruments, needles, and sponge counts were reported  as correct x2 by the nursing staff. Patient was extubated and awakened from general anesthesia without complication. She was sent to the recovery unit in stable condition.     EBL: minimal.    COMPLICATIONS: none apparent.    SPECIMEN: none    DRAINS: wound vac    DISPOSITION: PACU.      Andrew Paula MD  General Surgery Resident, PGY-3

## 2024-02-26 NOTE — ASSESSMENT & PLAN NOTE
Patient's anemia is currently controlled. Has received 1 units of PRBCs on 2/19 and 02/21 . Etiology likely d/t chronic disease and acute blood loss post surgery.  Current CBC reviewed-   Lab Results   Component Value Date    HGB 7.1 (L) 02/26/2024    HCT 23.1 (L) 02/26/2024     - monitor daily   -s/p 1U pRBC on 02/19 and 02/21

## 2024-02-26 NOTE — PLAN OF CARE
Recommendations    1. Consider Diabetic Renal Diet, monitoring PO intake of meals and snacks.  2. If intake remains below 50%, consider ONS Boost Glucose Control to help meet EEN/EPN.   3. Continue to monitor weights and labs.   4. Collaboration with medical providers    Goals: 1. Pt to meet 50% EEN/EPN by RD follow up  Nutrition Goal Status: continues  Communication of RD Recs:  (POC)    Assessment and Plan    Nutrition Problem  Inacreased nutrient needs; protein     Related to (etiology):   Wound healing    Signs and Symptoms (as evidenced by):   Librado's gangrene    Interventions/Recommendations (treatment strategy):  Diabetic Renal Diet  Collaboration with medical providers    Nutrition Diagnosis Status:   continues

## 2024-02-26 NOTE — ASSESSMENT & PLAN NOTE
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 S/p wound vac replacement on 2/23.    - OR today for wound vac change and potential partial closure  - Keep NPO  - Consent in the paper chart  - ID following; Vanc/zosyn/clinda started 2/16, switched to meropenam and vanc, de-escalated to Unasyn on 2/25 with planned course through 3/6   - Operative cultures 2/17 with lactobacillus   - White, thick vaginal secretions noted in the OR on 2/23; one-time dose of 150 mg of fluconazole 2/24  - Poorly controlled DMII - insulin per HM  - Nutrition consult, boost supplements, severe hypoalbuminemia   - Polysubstance abuse to alcohol dependency - librium taper on, CIWA protocol  - Multiple modal pain control limited by CARLINE - continue scheduled tylenol, combination of oral and IV narcotics  - Okay for DVT ppx   - PT consult - okay to attempt ambulation, get up to chair.   - Will have long term wound care needs; pending final disposition will need placement for this

## 2024-02-26 NOTE — ASSESSMENT & PLAN NOTE
Patient with acute kidney injury/acute renal failure likely due to pre-renal azotemia due to IVVD and acute tubular necrosis caused by sepsis  CARLINE is currently improving. Baseline creatinine  0.9  - Labs reviewed- Renal function/electrolytes with Estimated Creatinine Clearance: 78.4 mL/min (based on SCr of 0.9 mg/dL). according to latest data. Monitor urine output and serial BMP and adjust therapy as needed. Avoid nephrotoxins and renally dose meds for GFR listed above.    - Highly suspect ATN as an etiology at this time  - Nephrology consult requested, appreciate recs  - renal function is improving and near baseline.   - Resolved

## 2024-02-26 NOTE — ANESTHESIA PREPROCEDURE EVALUATION
[facility]  Anesthesia Pre-Operative Evaluation         Patient Name: Christine Mosqueda  YOB: 1970  MRN: [id number]    SUBJECTIVE:     Pre-operative evaluation for Procedure(s) (LRB):  EXPLORATION, WOUND (Right)     02/26/2024    Christine Mosqueda is a 53 y.o. female admitted with fourniers gangrene of L medial thigh s/p multiple I&D and debridements and wound vac application. To OR for wound vac exchange and possible partial closure.    PMHx includes HTN, T2DM (A1c 8.2), polysubstance abuse, and HFpEF (PAP 50).    Nuclear Stress (5/8/23):    Normal myocardial perfusion scan. There is no evidence of myocardial ischemia or infarction.    The gated perfusion images showed an ejection fraction of 47% at rest. The gated perfusion images showed an ejection fraction of 54% post stress. Normal ejection fraction is greater than 47%.    There is normal wall motion at rest and post stress.    LV cavity size is normal at rest and normal at stress.    The ECG portion of the study is negative for ischemia.    The patient reported no chest pain during the stress test.    There were no arrhythmias during stress.    There are no prior studies for comparison.    TTE 2/19/24  Left Ventricle: There is hyperdynamic systolic function with a visually estimated ejection fraction of 70 - 75%.    Right Ventricle: Normal right ventricular cavity size. Systolic function is normal.    Left Atrium: Left atrium is mildly dilated.    Tricuspid Valve: There is mild regurgitation.    Pulmonary Artery: The estimated pulmonary artery systolic pressure is 55 mmHg.    IVC/SVC: Intermediate venous pressure at 8 mmHg.    Pericardium: There is a trivial posterior effusion.    LDA:        Peripheral IV - Single Lumen 02/19/24 0515 20 G Anterior;Left Upper Arm (Active)   Site Assessment Clean;Dry;Intact 02/20/24 2134   Extremity Assessment Distal to IV No warmth;No swelling;No redness;No abnormal discoloration 02/20/24 2134  "  Line Status Saline locked;Flushed;Blood return noted 02/20/24 2134   Dressing Status Clean;Dry;Intact 02/20/24 2134   Dressing Intervention Integrity maintained 02/20/24 2134   Dressing Change Due 02/23/24 02/20/24 2134   Site Change Due 02/23/24 02/20/24 2134   Reason Not Rotated Not due 02/20/24 2134   Number of days: 2            Urethral Catheter 02/16/24 0414 Non-latex;Straight-tip;Silicone 16 Fr. (Active)   $ Hernandez Insertion Bedside Insertion Performed 02/16/24 0746   Site Assessment Clean;Intact;Dry 02/20/24 1600   Collection Container Urimeter 02/20/24 1600   Securement Method secured to top of thigh w/ adhesive device 02/20/24 1600   Catheter Care Performed yes 02/20/24 1600   Reason for Continuing Urinary Catheterization Critically ill in ICU and requiring hourly monitoring of intake/output 02/20/24 1600   CAUTI Prevention Bundle Securement Device in place with 1" slack;Intact seal between catheter & drainage tubing;Drainage bag/urimeter off the floor;Sheeting clip in use;No dependent loops or kinks;Drainage bag/urimeter not overfilled (<2/3 full);Drainage bag/urimeter below bladder 02/20/24 0800   Output (mL) 700 mL 02/20/24 2332   Number of days: 5     Prev airway: easy mask, gr1v bhandari 3    Patient Active Problem List   Diagnosis    Acute on chronic systolic heart failure    Anxiety    Type 2 diabetes mellitus with hyperglycemia, without long-term current use of insulin    Emphysema lung    Restrictive lung disease    Tobacco abuse    CARLINE (acute kidney injury)    Microcytic anemia    Superficial vein thrombosis    Vasculopathy    Nonadherence to medical treatment    Librado's gangrene    ILD (interstitial lung disease)    Chronic diastolic congestive heart failure    Sepsis with acute renal failure and tubular necrosis without septic shock    Essential hypertension    Anemia of chronic disease    Cocaine abuse    ETOH abuse    Class 2 severe obesity due to excess calories with serious comorbidity " and body mass index (BMI) of 35.0 to 35.9 in adult    Advanced care planning/counseling discussion    Debility       Review of patient's allergies indicates:   Allergen Reactions    Hydrochlorothiazide plus        Current Inpatient Medications:   acetaminophen  1,000 mg Oral Q8H    ampicillin-sulbactam  3 g Intravenous Q6H    budesonide  1 mg Nebulization Q12H    And    arformoteroL  15 mcg Nebulization BID    aspirin  81 mg Oral Daily    atorvastatin  20 mg Oral Daily    chlordiazepoxide  5 mg Oral Daily    enoxparin  40 mg Subcutaneous Q24H (prophylaxis, 1700)    folic acid  1 mg Oral Daily    furosemide  40 mg Oral Daily    gabapentin  400 mg Oral BID    insulin aspart U-100  5 Units Subcutaneous TIDWM    insulin detemir U-100  15 Units Subcutaneous QHS    [START ON 2/27/2024] insulin detemir U-100 (Levemir)  20 Units Subcutaneous Daily    pantoprazole  40 mg Oral Daily    thiamine  100 mg Oral Daily       No current facility-administered medications on file prior to encounter.     Current Outpatient Medications on File Prior to Encounter   Medication Sig Dispense Refill    albuterol (ACCUNEB) 1.25 mg/3 mL Nebu Take 1.25 mg by nebulization every 6 (six) hours as needed.      ALPRAZolam (XANAX) 2 MG Tab Take 2 mg by mouth daily as needed for Anxiety.      amlodipine (NORVASC) 10 MG tablet Take 10 mg by mouth once daily.      aspirin 81 MG Chew Take 81 mg by mouth once daily.      atorvastatin (LIPITOR) 20 MG tablet Take 20 mg by mouth once daily.      fluticasone-salmeterol diskus inhaler 500-50 mcg Inhale 1 puff into the lungs 2 (two) times daily.      furosemide (LASIX) 40 MG tablet Take 1 tablet (40 mg total) by mouth once daily. 30 tablet 0    gabapentin (NEURONTIN) 400 MG capsule Take 400 mg by mouth 2 (two) times daily.      insulin aspart U-100 (NOVOLOG) 100 unit/mL injection Inject 10 Units into the skin 3 (three) times daily before meals.      insulin detemir U-100 (LEVEMIR) 100 unit/mL injection Inject  22 Units into the skin once daily.  12    nicotine (NICODERM CQ) 14 mg/24 hr Place 1 patch onto the skin once daily. 28 patch 1    omeprazole (PRILOSEC) 40 MG capsule Take 40 mg by mouth once daily.      potassium chloride (KLOR-CON) 8 MEQ TbSR Take 1 tablet (8 mEq total) by mouth once daily. 30 tablet 0    promethazine (PHENERGAN) 12.5 MG Tab Take 25 mg by mouth every 6 (six) hours as needed.      tiotropium (SPIRIVA) 18 mcg inhalation capsule Inhale 18 mcg into the lungs once daily.         Past Surgical History:   Procedure Laterality Date     SECTION      DEBRIDEMENT Right 2024    Procedure: DEBRIDEMENT; Librado's gangrene;  Surgeon: Manish Nazario MD;  Location: [facility] OR;  Service: General;  Laterality: Right;  Lithotomy    PALATAL EXPANSION      WOUND EXPLORATION N/A 2024    Procedure: INCISION AND DRAINAGE; WOUND DEBRIDEMENT,;  Surgeon: Scott Koroma MD;  Location: [facility] OR;  Service: General;  Laterality: N/A;    WOUND EXPLORATION Right 2024    Procedure: EXPLORATION, WOUND;  Surgeon: Scott Koroma MD;  Location: [facility] OR;  Service: General;  Laterality: Right;  Lithotomy Position  wound vac (2 black sponges and white sponges)    WOUND EXPLORATION Right 2024    Procedure: EXPLORATION, WOUND;  Surgeon: Scott Koroma MD;  Location: [facility] OR;  Service: General;  Laterality: Right;  9:30am start    WRIST SURGERY Right        Social History     Socioeconomic History    Marital status:    Tobacco Use    Smoking status: Some Days     Current packs/day: 0.25     Types: Cigarettes    Smokeless tobacco: Never   Substance and Sexual Activity    Alcohol use: Yes    Drug use: Yes     Types: Marijuana    Sexual activity: Yes     Partners: Male     Social Determinants of Health     Financial Resource Strain: Low Risk  (2024)    Overall Financial Resource Strain (CARDIA)     Difficulty of Paying Living Expenses: Not very hard   Food Insecurity: No Food Insecurity (2024)     Hunger Vital Sign     Worried About Running Out of Food in the Last Year: Never true     Ran Out of Food in the Last Year: Never true   Transportation Needs: No Transportation Needs (2/16/2024)    PRAPARE - Transportation     Lack of Transportation (Medical): No     Lack of Transportation (Non-Medical): No   Physical Activity: Inactive (2/16/2024)    Exercise Vital Sign     Days of Exercise per Week: 0 days     Minutes of Exercise per Session: 0 min   Stress: No Stress Concern Present (2/16/2024)    [employer] [place] [employer] - Occupational Stress Questionnaire     Feeling of Stress : Only a little   Social Connections: Moderately Isolated (2/16/2024)    Social Connection and Isolation Panel [NHANES]     Frequency of Communication with Friends and Family: Three times a week     Frequency of Social Gatherings with Friends and Family: Three times a week     Attends [employer]ist Services: Never     Active Member of Clubs or Organizations: No     Attends Club or Organization Meetings: Never     Marital Status:    Housing Stability: Low Risk  (2/16/2024)    Housing Stability Vital Sign     Unable to Pay for Housing in the Last Year: No     Number of Places Lived in the Last Year: 1     Unstable Housing in the Last Year: No       OBJECTIVE:     Vital Signs Range (Last 24H):  Temp:  [36.6 °C (97.8 °F)-37.4 °C (99.4 °F)]   Pulse:  [72-87]   Resp:  [12-20]   BP: (117-171)/(69-96)   SpO2:  [93 %-96 %]       Significant Labs:  Lab Results   Component Value Date    WBC 14.22 (H) 02/26/2024    HGB 7.1 (L) 02/26/2024    HCT 23.1 (L) 02/26/2024     02/26/2024    CHOL 194 03/13/2020    TRIG 139 03/13/2020    HDL 65 (H) 03/13/2020    ALT 9 (L) 02/26/2024    AST 11 02/26/2024     02/26/2024    K 4.9 02/26/2024     02/26/2024    CREATININE 0.9 02/26/2024    BUN 14 02/26/2024    CO2 29 02/26/2024    TSH 1.703 06/19/2023    INR 0.8 (L) 03/05/2022    HGBA1C 8.2 (H) 04/06/2023       Diagnostic  Studies: No relevant studies.    EKG:   Results for orders placed or performed during the hospital encounter of 02/15/24   EKG 12-lead    Collection Time: 02/15/24 11:07 PM   Result Value Ref Range    QRS Duration 82 ms    [facility] QTC Calculation 462 ms    Narrative    Test Reason : A41.9,    Vent. Rate : 095 BPM     Atrial Rate : 095 BPM     P-R Int : 132 ms          QRS Dur : 082 ms      QT Int : 368 ms       P-R-T Axes : 065 047 069 degrees     QTc Int : 462 ms    Normal sinus rhythm  Possible Anterior infarct (cited on or before 19-JUN-2023)  Abnormal ECG  When compared with ECG of 19-JUN-2023 17:50,  Significant changes have occurred  Confirmed by Ra Meek MD ([id number]) on 2/20/2024 3:25:24 PM    Referred By: [email or web address]   SELF           Confirmed By:Ra Meek MD       2D ECHO:  TTE:  Results for orders placed or performed during the hospital encounter of 02/15/24   Echo   Result Value Ref Range    BSA 1.72 m2    LA WIDTH 4.3 cm    RA Width 3.1 cm    LVOT stroke volume 112.81 cm3    LVIDd 5.42 3.5 - 6.0 cm    LV Systolic Volume 62.14 mL    LV Systolic Volume Index 31.4 mL/m2    LVIDs 3.80 2.1 - 4.0 cm    LV Diastolic Volume 142.80 mL    LV Diastolic Volume Index 72.12 mL/m2    IVS 0.91 0.6 - 1.1 cm    LVOT diameter 2.09 cm    LVOT area 3.4 cm2    FS 30 28 - 44 %    Left Ventricle Relative Wall Thickness 0.33 cm    Posterior Wall 0.89 0.6 - 1.1 cm    LV mass 181.27 g    LV Mass Index 92 g/m2    MV Peak E Bari 1.52 m/s    TDI LATERAL 0.10 m/s    TDI SEPTAL 0.11 m/s    E/E' ratio 14.48 m/s    MV Peak A Bari 1.73 m/s    TR Max Bari 3.41 m/s    E/A ratio 0.88     IVRT 133.21 msec    E wave deceleration time 204.96 msec    LV SEPTAL E/E' RATIO 13.82 m/s    LA Volume Index 51.1 mL/m2    LV LATERAL E/E' RATIO 15.20 m/s    LA volume 101.18 cm3    LVOT peak bari 1.86 m/s    Left Ventricular Outflow Tract Mean Velocity 1.25 cm/s    Left Ventricular Outflow Tract Mean Gradient 7.25 mmHg    RVDD 3.49 cm    RV S' 20.11 cm/s     TAPSE 2.63 cm    LA size 4.38 cm    Left Atrium Minor Axis 6.34 cm    Left Atrium Major Axis 6.30 cm    RA Major Axis 5.78 cm    AV regurgitation pressure 1/2 time [id number] ms    AR Max Bari 4.07 m/s    AV mean gradient 12 mmHg    AV peak gradient 22 mmHg    Ao peak bari 2.32 m/s    Ao VTI 45.40 cm    LVOT peak VTI 32.90 cm    AV valve area 2.48 cm²    AV Velocity Ratio 0.80     AV index (prosthetic) 0.72     MICHELLE by Velocity Ratio 2.75 cm²    MV mean gradient 6 mmHg    MV peak gradient 12 mmHg    MV stenosis pressure 1/2 time 59.44 ms    MV valve area p 1/2 method 3.70 cm2    MV valve area by continuity eq 2.62 cm2    MV VTI 43.0 cm    Triscuspid Valve Regurgitation Peak Gradient 47 mmHg    PV PEAK VELOCITY 1.13 m/s    PV peak gradient 5 mmHg    Sinus 3.00 cm    Ascending aorta 3.07 cm    IVC diameter 2.00 cm    Mean e' 0.11 m/s    ZLVIDS 0.62     ZLVIDD -0.53     TV resting pulmonary artery pressure 55 mmHg    RV TB RVSP 11 mmHg    Est. RA pres 8 mmHg    Narrative      Left Ventricle: There is hyperdynamic systolic function with a visually   estimated ejection fraction of 70 - 75%.    Right Ventricle: Normal right ventricular cavity size. Systolic   function is normal.    Left Atrium: Left atrium is mildly dilated.    Tricuspid Valve: There is mild regurgitation.    Pulmonary Artery: The estimated pulmonary artery systolic pressure is   55 mmHg.    IVC/SVC: Intermediate venous pressure at 8 mmHg.    Pericardium: There is a trivial posterior effusion.         FRANCISCO:  No results found for this or any previous visit.    ASSESSMENT/PLAN:           Pre-op Assessment    I have reviewed the Patient Summary Reports.     I have reviewed the Nursing Notes. I have reviewed the NPO Status.   I have reviewed the Medications.     Review of Systems  Anesthesia Hx:  No problems with previous Anesthesia   History of prior surgery of interest to airway management or planning:          Denies Family Hx of Anesthesia  complications.    Denies Personal Hx of Anesthesia complications.                    Social:  Recreational Drugs, Alcohol Use       Hematology/Oncology:    Oncology Normal    -- Anemia:                                  EENT/Dental:  EENT/Dental Normal           Cardiovascular:  Exercise tolerance: good   Hypertension    Denies CAD.     Denies Dysrhythmias.   CHF     WONG                            Pulmonary:    Denies COPD.      Denies Sleep Apnea.                Renal/:  Chronic Renal Disease, CKD                Hepatic/GI:     GERD             Neurological:    Denies Neuromuscular Disease.                                   Endocrine:  Diabetes           Psych:  Denies Psychiatric History.                  Physical Exam  General: Cooperative, Oriented and Lethargic    Airway:  Mouth Opening: Normal  TM Distance: Normal  Tongue: Normal  Neck ROM: Normal ROM    Dental:  Periodontal disease    Chest/Lungs:  Clear to auscultation, Normal Respiratory Rate  2L NC  Heart:  Rate: Normal  Rhythm: Regular Rhythm  Sounds: Normal    Abdomen:  Nontender, Soft, Normal        Anesthesia Plan  Type of Anesthesia, risks & benefits discussed:    Anesthesia Type: Gen ETT  Intra-op Monitoring Plan: Standard ASA Monitors  Post Op Pain Control Plan: multimodal analgesia  Induction:  IV  Airway Plan: Video, Post-Induction  Informed Consent: Informed consent signed with the Patient and all parties understand the risks and agree with anesthesia plan.  All questions answered.   ASA Score: 3  Day of Surgery Review of History & Physical: H&P Update referred to the surgeon/provider.    Ready For Surgery From Anesthesia Perspective.     .

## 2024-02-26 NOTE — PROGRESS NOTES
[facility] Intensive Care  Adult Nutrition  Consult Note    SUMMARY     Recommendations    1. Consider Diabetic Renal Diet, monitoring PO intake of meals and snacks.  2. If intake remains below 50%, consider ONS Boost Glucose Control to help meet EEN/EPN.   3. Continue to monitor weights and labs.   4. Collaboration with medical providers    Goals: 1. Pt to meet 50% EEN/EPN by RD follow up  Nutrition Goal Status: continues  Communication of RD Recs:  (POC)    Assessment and Plan    Nutrition Problem  Inacreased nutrient needs; protein     Related to (etiology):   Wound healing    Signs and Symptoms (as evidenced by):   Estephanie's gangrene    Interventions/Recommendations (treatment strategy):  Diabetic Renal Diet  Collaboration with medical providers    Nutrition Diagnosis Status:   continues    Reason for Assessment    Reason For Assessment: identified at risk by screening criteria, consult  Diagnosis:  (estephanie's gangrene)  Relevant Medical History:   Past Medical History:   Diagnosis Date    Anxiety     Arthritis     Bronchitis     CHF (congestive heart failure)     Diabetic ketoacidosis associated with type 2 diabetes mellitus 3/10/2023    DM (diabetes mellitus)     Emphysema lung     Encounter for blood transfusion     HTN (hypertension)     Restrictive lung disease     Sleep apnea      General Information Comments: RD follow up. Pt  Pt currently on diabetic diet with 100% intake of meals since admit. LBM 2/25/24. BMI continues at 35.42, obese grade l. PT currently off the floor in pre-op. NFPE not warranted at this time. RD to continue to monitor and follow up.       Pt identified as at risk at admit. Pt is currently NPO, previously diabetic renal diet with 75% intake. BMI 35.34, obese grade ll. She is ill appearing. Pt with weight fluctuations. 60 lb weight gain x 8 months. LBM 2/18/24. NFPE to be performed at follow ups as warranted.   Interdisciplinary Rounds: did not attend  Nutrition Discharge  "Planning: diabetic renal diet    Nutrition Risk Screen    Nutrition Risk Screen: large or nonhealing wound, burn or pressure injury    Nutrition/Diet History    Spiritual, Cultural Beliefs, Islam Practices, Values that Affect Care: no  Food Allergies: NKFA    Anthropometrics    Temp: 97.4 °F (36.3 °C)  Height Method: Stated  Height: 5' 4" (162.6 cm)  Height (inches): 64 in  Weight Method: Bed Scale  Weight: 93.6 kg (206 lb 5.6 oz)  Weight (lb): 206.35 lb  Ideal Body Weight (IBW), Female: 120 lb  % Ideal Body Weight, Female (lb): 170.83 %  BMI (Calculated): 35.4  BMI Grade: 35 - 39.9 - obesity - grade II       Lab/Procedures/Meds    Pertinent Labs Reviewed: reviewed  BMP  Lab Results   Component Value Date     02/26/2024    K 4.9 02/26/2024     02/26/2024    CO2 29 02/26/2024    BUN 14 02/26/2024    CREATININE 0.9 02/26/2024    CALCIUM 8.1 (L) 02/26/2024    ANIONGAP 6 (L) 02/26/2024    EGFRNORACEVR >60 02/26/2024      Pertinent Medications Reviewed: reviewed  Current Outpatient Medications   Medication Instructions    albuterol (ACCUNEB) 1.25 mg, Every 6 hours PRN    ALPRAZolam (XANAX) 2 mg, Oral, Daily PRN    amLODIPine (NORVASC) 10 mg, Daily    aspirin 81 mg, Daily    atorvastatin (LIPITOR) 20 mg, Oral, Daily    fluticasone-salmeterol diskus inhaler 500-50 mcg 1 puff, Inhalation, 2 times daily    furosemide (LASIX) 40 mg, Oral, Daily    gabapentin (NEURONTIN) 400 mg, Oral, 2 times daily    insulin aspart U-100 (NOVOLOG) 10 Units, Subcutaneous, 3 times daily before meals    insulin detemir U-100 (LEVEMIR) 22 Units, Subcutaneous, Daily    nicotine (NICODERM CQ) 14 mg/24 hr 1 patch, Transdermal, Daily    omeprazole (PRILOSEC) 40 mg, Daily    potassium chloride (KLOR-CON) 8 MEQ TbSR 8 mEq, Oral, Daily    promethazine (PHENERGAN) 25 mg, Oral, Every 6 hours PRN    tiotropium (SPIRIVA) 18 mcg, Daily        Estimated/Assessed Needs    Weight Used For Calorie Calculations: 54.4 kg (120 lb)  Energy Calorie " Requirements (kcal): 1461 kcal  Energy Need Method: [place]-St Jeor (x 1.3)  Protein Requirements: 70 g  Weight Used For Protein Calculations: 54.4 kg (120 lb)     Estimated Fluid Requirement Method: RDA Method  RDA Method (mL): 1461         Nutrition Prescription Ordered    Current Diet Order: npo    Evaluation of Received Nutrient/Fluid Intake    I/O: i/o  Energy Calories Required: not meeting needs  Protein Required: not meeting needs  Fluid Required: not meeting needs  Comments: LBM 2/18/24  % Intake of Estimated Energy Needs: 0 - 25 %  % Meal Intake: NPO    Nutrition Risk    Level of Risk/Frequency of Follow-up: low       Monitor and Evaluation    Food and Nutrient Intake: food and beverage intake  Food and Nutrient Adminstration: diet order  Knowledge/Beliefs/Attitudes: food and nutrition knowledge/skill, beliefs and attitudes  Physical Activity and Function: nutrition-related ADLs and IADLs, factors affecting access to physical activity  Anthropometric Measurements: weight, weight change, body mass index  Biochemical Data, Medical Tests and Procedures: inflammatory profile  Nutrition-Focused Physical Findings: overall appearance       Nutrition Follow-Up    RD Follow-up?: Yes    Mikayla Da Silva, Registration Eligible, provisional LDN

## 2024-02-26 NOTE — NURSING
Patient to scheduled procedure as ordered. Patient awake, alert, oriented. No apparent distress noted.

## 2024-02-26 NOTE — TRANSFER OF CARE
"Anesthesia Transfer of Care Note    Patient: Christine Mosqueda    Procedure(s) Performed: Procedure(s) (LRB):  Wound vac change,  debridement,  partial wound closure (Right)    Patient location: PACU    Anesthesia Type: MAC    Transport from OR: Transported from OR on room air with adequate spontaneous ventilation    Post pain: adequate analgesia    Post assessment: no apparent anesthetic complications and tolerated procedure well    Post vital signs: stable    Level of consciousness: awake    Nausea/Vomiting: no nausea/vomiting    Complications: none    Transfer of care protocol was followed      Last vitals: Visit Vitals  BP (!) 148/76 (Patient Position: Lying)   Pulse 70   Temp 36.3 °C (97.4 °F)   Resp 20   Ht 5' 4" (1.626 m)   Wt 93.6 kg (206 lb 5.6 oz)   LMP 01/29/2018 (Approximate)   SpO2 96%   BMI 35.42 kg/m²     "

## 2024-02-26 NOTE — ASSESSMENT & PLAN NOTE
Patient's FSGs are uncontrolled due to hyperglycemia on current medication regimen.  Last A1c reviewed-   Lab Results   Component Value Date    HGBA1C 8.2 (H) 04/06/2023     Most recent fingerstick glucose reviewed-   Recent Labs   Lab 02/25/24  1634   POCTGLUCOSE 170*       Current correctional scale  High  Maintain anti-hyperglycemic dose as follows-   Antihyperglycemics (From admission, onward)    Start     Stop Route Frequency Ordered    02/27/24 0900  insulin detemir U-100 (Levemir) pen 20 Units         -- SubQ Daily 02/25/24 1359    02/26/24 2100  insulin detemir U-100 (Levemir) pen 15 Units         -- SubQ Nightly 02/25/24 1359    02/22/24 1145  insulin aspart U-100 pen 5 Units         -- SubQ 3 times daily with meals 02/22/24 1031    02/16/24 1514  insulin aspart U-100 pen 0-15 Units         -- SubQ Before meals & nightly PRN 02/16/24 1515        Hold Oral hypoglycemics while patient is in the hospital.  A1c: 8.2  Meds: basal bolus insulin + SSI PRN to maintain goal 140-180  Titrate as needed. Basal BID (20AM, 15U PM), prandia at 5U TIDWM  ADA diet, accuchecks ACHS, hypoglycemic protocol  Hold insulin 2/20 5:00 p.m. and 2/20 6:00 a.m. in setting of NPO status

## 2024-02-26 NOTE — ASSESSMENT & PLAN NOTE
Defined by leukocytosis, tachycardia and groin abscess. Source is [facility].   - surgical management with debridement  - antibiotics= Unasyn per ID  - Leukocytosis improving

## 2024-02-26 NOTE — PT/OT/SLP PROGRESS
Occupational Therapy      Patient Name:  Christine Mosqueda   MRN:  [id number]    Patient not seen today secondary to Off the floor for procedure/surgery. Will follow-up tomorrow.    2/26/2024

## 2024-02-26 NOTE — ANESTHESIA POSTPROCEDURE EVALUATION
Anesthesia Post Evaluation    Patient: Christine Mosqueda    Procedure(s) Performed: Procedure(s) (LRB):  REPLACEMENT, WOUND VAC (Right)    Final Anesthesia Type: MAC      Patient location during evaluation: PACU  Patient participation: Yes- Able to Participate  Level of consciousness: awake and alert, oriented and awake  Post-procedure vital signs: reviewed and stable  Airway patency: patent    PONV status at discharge: No PONV  Anesthetic complications: no      Cardiovascular status: blood pressure returned to baseline  Respiratory status: unassisted, spontaneous ventilation and room air  Hydration status: euvolemic  Follow-up not needed.              Vitals Value Taken Time   /97 02/26/24 1031   Temp 36.3 °C (97.4 °F) 02/26/24 1005   Pulse 68 02/26/24 1035   Resp 20 02/26/24 1027   SpO2 94 % 02/26/24 1035   Vitals shown include unvalidated device data.      Event Time   Out of Recovery 02/23/2024 11:10:38         Pain/Yoel Score: Pain Rating Prior to Med Admin: 5 (2/26/2024 10:27 AM)  Pain Rating Post Med Admin: 4 (2/26/2024  6:24 AM)  Yoel Score: 8 (2/26/2024 10:00 AM)

## 2024-02-26 NOTE — PROGRESS NOTES
[facility] Medicine  Progress Note    Patient Name: Christine Mosqueda  MRN: [id number]  Patient Class: IP- Inpatient   Admission Date: 2/15/2024  Length of Stay: 10 days  Attending Physician: Mayra White DO  Primary Care Provider: [facility]        Subjective:     Principal Problem:Estephanie's gangrene        HPI:  Ms. Mosqueda is a 53 yoF with a PMHx of diastolic heart failure, HTN, T2DM on insulin. She presented to the hospital with worsening pain and swelling of the right medial thigh. She developed an abscess in the area that underwent I+D at [facility] a few days ago. She was sent home on PO abx. Since that time the pain and swelling have worsened.  In ED, CT scan demonstrated extensive inflammation extending from the right perineum to the right medial thigh with multiple foci of air. She was admitted to general surgery for estephanie's gangrene. She underwent extensive debridement under general surgery today in OR. She is currently doing well with good pain control. HM consulted for medical management.     Overview/Hospital Course:  Ms Christine Mosqueda is a 53 y.o.  woman with poorly controlled type II DM who was admitted for sepsis secondary to bacteremia and Estephanie's gangrene. Pt presented w/ worsening pain and swelling of the Rt medial thigh. CT scan findings were consistent w/ Estephanie's gangrene. General surgery team consulted. S/p surgical debridement 02/16, 2/17, 2/19, and on 02/21 (wound vac placed.) S/p OR 2/23 for first wound vac change. Plan for OR again on 02/26. Had acute on chronic anemia, likely exacerbated with multiple debridements. Required blood transfusions on 02/19 and again on 02/21.  Blood and wound cultures with lactobacillus. ID consulted- on meropenem. Repeat blood cx with no growth thus far. Also found to have CARLINE on admission, nephrology following. CARLINE resolved. PT/OT recommends SNF on discharge.     Interval History:  No acute overnight events.   Patient remained afebrile.  Wound VAC changed today in the OR with partial closure. Pain controlled. Surgery plan for bedside wound vac change this Thursday    Review of Systems   Constitutional:  Negative for chills and fever.   Respiratory:  Negative for shortness of breath.    Cardiovascular:  Negative for chest pain.   Gastrointestinal:  Negative for abdominal pain.   Skin:  Positive for wound.   Psychiatric/Behavioral:  Negative for confusion.      Objective:     Vital Signs (Most Recent):  Temp: 97.4 °F (36.3 °C) (02/26/24 1005)  Pulse: 71 (02/26/24 1030)  Resp: 18 (02/26/24 1030)  BP: (!) 153/97 (02/26/24 1030)  SpO2: 96 % (02/26/24 1030) Vital Signs (24h Range):  Temp:  [97.4 °F (36.3 °C)-99.4 °F (37.4 °C)] 97.4 °F (36.3 °C)  Pulse:  [70-87] 71  Resp:  [12-20] 18  SpO2:  [93 %-96 %] 96 %  BP: (125-153)/(69-97) 153/97     Weight: 93.6 kg (206 lb 5.6 oz)  Body mass index is 35.42 kg/m².    Intake/Output Summary (Last 24 hours) at 2/26/2024 1157  Last data filed at 2/26/2024 0952  Gross per 24 hour   Intake 1280 ml   Output 2100 ml   Net -820 ml           Physical Exam  Vitals and nursing note reviewed.   Constitutional:       General: She is not in acute distress.     Appearance: She is obese.   HENT:      Head: Atraumatic.      Nose: Nose normal.      Mouth/Throat:      Mouth: Mucous membranes are moist.   Eyes:      Extraocular Movements: Extraocular movements intact.   Cardiovascular:      Rate and Rhythm: Normal rate and regular rhythm.   Pulmonary:      Effort: Pulmonary effort is normal.      Breath sounds: No wheezing.      Comments: 2L NC SpO2 >94%  Abdominal:      General: Bowel sounds are normal.      Tenderness: There is no abdominal tenderness.   Genitourinary:     Comments: jansen  Musculoskeletal:      Right lower leg: Edema present.      Left lower leg: Edema present.   Skin:     General: Skin is warm and dry.   Neurological:      Mental Status: She is alert and oriented to person, place, and  time. Mental status is at baseline.             Significant Labs: All pertinent labs within the past 24 hours have been reviewed.    Significant Imaging: I have reviewed all pertinent imaging results/findings within the past 24 hours.    Assessment/Plan:      * Librado's gangrene  CT on admit with R perineum to R medial thigh Fourniers.   - Surgery consulted: S/p debridement on 02/16, 2/17, 2/19, and 2/21 (wound vac placed.)  - s/p OR 2/23 for first wound vac change. S/p repeat vac change on Mon 02/26 with partial wound closure. Surgery plan for bedside wound vac change on Thursday (2/29)  - blood and wound cultures with lactobacillus.   - ID consulted. Descalate from meropenem to Unasyn for 2 weeks. EOC 03/06/24  - jansen in place due to surgical wound site  -Plan for SNF on discharge     Sepsis with acute renal failure and tubular necrosis without septic shock  Defined by leukocytosis, tachycardia and groin abscess. Source is [place].   - surgical management with debridement  - antibiotics= Unasyn per ID  - Leukocytosis improving     CARLINE (acute kidney injury)  Patient with acute kidney injury/acute renal failure likely due to pre-renal azotemia due to IVVD and acute tubular necrosis caused by sepsis  CARLINE is currently improving. Baseline creatinine  0.9  - Labs reviewed- Renal function/electrolytes with Estimated Creatinine Clearance: 78.4 mL/min (based on SCr of 0.9 mg/dL). according to latest data. Monitor urine output and serial BMP and adjust therapy as needed. Avoid nephrotoxins and renally dose meds for GFR listed above.    - Highly suspect ATN as an etiology at this time  - Nephrology consult requested, appreciate recs  - renal function is improving and near baseline.   - Resolved    Anemia of chronic disease  Patient's anemia is currently controlled. Has received 1 units of PRBCs on 2/19 and 02/21 . Etiology likely d/t chronic disease and acute blood loss post surgery.  Current CBC reviewed-   Lab Results    Component Value Date    HGB 7.1 (L) 02/26/2024    HCT 23.1 (L) 02/26/2024     - monitor daily   -s/p 1U pRBC on 02/19 and 02/21    Chronic diastolic congestive heart failure  Patient is identified as having Diastolic (HFpEF) heart failure that is Chronic. CHF is currently controlled. Latest ECHO performed and demonstrates- Results for orders placed during the hospital encounter of 03/10/23    Echo    Interpretation Summary  · The left ventricle is normal in size with low normal systolic function.  · The estimated ejection fraction is 53%.  · There is abnormal septal wall motion.  · Indeterminate left ventricular diastolic function.  · Mild left atrial enlargement.  · Normal right ventricular size with low normal right ventricular systolic function.  · Mild right atrial enlargement.  · Mild mitral regurgitation.  · Mild tricuspid regurgitation.  · Normal central venous pressure (3 mmHg).  · The estimated PA systolic pressure is 23 mmHg.    No acute issues  Monitor fluid status- does have lower extremity edema   Discussed with nephrology, glo to resume lasix 02/23    Essential hypertension  Chronic, controlled.   -hold home meds at this time:  Amlodipine 10 mg,   Resume Lasix 40 mg p.o.    Latest blood pressure and vitals reviewed-     Temp:  [97.4 °F (36.3 °C)-99.4 °F (37.4 °C)]   Pulse:  [70-87]   Resp:  [12-20]   BP: (125-153)/(69-97)   SpO2:  [93 %-96 %] .   Home meds for hypertension were reviewed and noted below.   Hypertension Medications               amlodipine (NORVASC) 10 MG tablet Take 10 mg by mouth once daily.    furosemide (LASIX) 40 MG tablet Take 1 tablet (40 mg total) by mouth once daily.            While in the hospital, will manage blood pressure as follows; Adjust home antihypertensive regimen as follows- hold meds for now given infection/ narcotic use. Resume as warranted . May develop rebound HTN from drug/ alcohol withdrawal.    Will utilize p.r.n. blood pressure medication only if patient's  blood pressure greater than 180/110 and she develops symptoms such as worsening chest pain or shortness of breath.    Type 2 diabetes mellitus with hyperglycemia, without long-term current use of insulin  Patient's FSGs are uncontrolled due to hyperglycemia on current medication regimen.  Last A1c reviewed-   Lab Results   Component Value Date    HGBA1C 8.2 (H) 04/06/2023     Most recent fingerstick glucose reviewed-   Recent Labs   Lab 02/25/24  1634   POCTGLUCOSE 170*       Current correctional scale  High  Maintain anti-hyperglycemic dose as follows-   Antihyperglycemics (From admission, onward)      Start     Stop Route Frequency Ordered    02/27/24 0900  insulin detemir U-100 (Levemir) pen 20 Units         -- SubQ Daily 02/25/24 1359    02/26/24 2100  insulin detemir U-100 (Levemir) pen 15 Units         -- SubQ Nightly 02/25/24 1359    02/22/24 1145  insulin aspart U-100 pen 5 Units         -- SubQ 3 times daily with meals 02/22/24 1031    02/16/24 1514  insulin aspart U-100 pen 0-15 Units         -- SubQ Before meals & nightly PRN 02/16/24 1515          Hold Oral hypoglycemics while patient is in the hospital.  A1c: 8.2  Meds: basal bolus insulin + SSI PRN to maintain goal 140-180  Titrate as needed. Basal BID (20AM, 15U PM), prandia at 5U TIDWM  ADA diet, accuchecks ACHS, hypoglycemic protocol  Hold insulin 2/20 5:00 p.m. and 2/20 6:00 a.m. in setting of NPO status          ILD (interstitial lung disease)  -Noted on imaging; CT chest 2022  -Stable, no acute issues.   -Not on home O2 but says she should be (but never received because she left AMA at OSH)  -would test prior to discharge .      Tobacco abuse  - Pt was counseled about cessation x4 minutes      Cocaine abuse  Patient sprinkles crack crystals in her marijuana  Wants help. Tearful. Emotional support provided.   CM consulted.       ETOH abuse  Patient drinks at least a 6 pack beer daily  - Start scheduled librium- wean ordered  - Thiamine, folate,  potassium, magnesium      Debility  Patient with Acute debility due to other reduced mobility. valuation for etiology is complete. Plan includes progressive mobility protocol initated and PT/OT consulted.    -PTOT recommend SNF  -CM to assist with placement     Class 2 severe obesity due to excess calories with serious comorbidity and body mass index (BMI) of 35.0 to 35.9 in adult  Body mass index is 35.42 kg/m². Morbid obesity complicates all aspects of disease management from diagnostic modalities to treatment. Weight loss encouraged and health benefits explained to patient.         Advanced care planning/counseling discussion  Advance Care Planning    Date: 02/21/2024    Code Status  I engaged the the patient in a voluntary conversation about the patient's preferences for care  at the very end of life. The patient wishes to have CPR and other invasive treatments performed when her heart and/or breathing stops. I communicated to the patient that her wishes align with full code status and she agrees and verbalized understanding.   I spent a total of 16 minutes engaging the patient in this advance care planning discussion.           Code Status: Full Code        VTE Risk Mitigation (From admission, onward)           Ordered     enoxaparin injection 40 mg  Every 24 hours         02/24/24 1615     IP VTE HIGH RISK PATIENT  Once         02/16/24 0506     Place sequential compression device  Until discontinued         02/16/24 0506                    Discharge Planning   AVTAR: 2/17/2024     Code Status: Full Code   Is the patient medically ready for discharge?:     Reason for patient still in hospital (select all that apply): Patient trending condition, Treatment, Consult recommendations, PT / OT recommendations, and Pending disposition  Discharge Plan A: Home with family                  Dalia-Lyndsey White DO  Department of Hospital Medicine   [facility] [place] - Telemetry

## 2024-02-26 NOTE — NURSING
[facility]  Nurses Note -- 4 Eyes      2/26/2024       Skin assessed on: Q Shift      [x] No Pressure Injuries Present    [x]Prevention Measures Documented    [] Yes LDA  for Pressure Injury Previously documented     [] Yes New Pressure Injury Discovered   [] LDA for New Pressure Injury Added      Attending RN:  Haydee Tobin RN     Second RN:  CALEB Ruiz

## 2024-02-26 NOTE — NURSING
PER handoff received from CALEB Ruiz     Pt resting in bed quietly. NAD noted. No c/o pain.  Fall and safety precautions maintained. Bed alarm activated and audible.. Bed locked in lowest position, with side rails up x2. Call bell and personal items within reach

## 2024-02-26 NOTE — ASSESSMENT & PLAN NOTE
Chronic, controlled.   -hold home meds at this time:  Amlodipine 10 mg,   Resume Lasix 40 mg p.o.    Latest blood pressure and vitals reviewed-     Temp:  [97.4 °F (36.3 °C)-99.4 °F (37.4 °C)]   Pulse:  [70-87]   Resp:  [12-20]   BP: (125-153)/(69-97)   SpO2:  [93 %-96 %] .   Home meds for hypertension were reviewed and noted below.   Hypertension Medications               amlodipine (NORVASC) 10 MG tablet Take 10 mg by mouth once daily.    furosemide (LASIX) 40 MG tablet Take 1 tablet (40 mg total) by mouth once daily.            While in the hospital, will manage blood pressure as follows; Adjust home antihypertensive regimen as follows- hold meds for now given infection/ narcotic use. Resume as warranted . May develop rebound HTN from drug/ alcohol withdrawal.    Will utilize p.r.n. blood pressure medication only if patient's blood pressure greater than 180/110 and she develops symptoms such as worsening chest pain or shortness of breath.

## 2024-02-26 NOTE — SUBJECTIVE & OBJECTIVE
Interval History: NAEON. Feeling well this morning aside from expected pain of the wound vac site. AVSS. Wound vac off suction this morning. Lab work with continued down trend of WBC.    Medications:  Continuous Infusions:      Scheduled Meds:   acetaminophen  1,000 mg Oral Q8H    ampicillin-sulbactam  3 g Intravenous Q6H    budesonide  1 mg Nebulization Q12H    And    arformoteroL  15 mcg Nebulization BID    aspirin  81 mg Oral Daily    atorvastatin  20 mg Oral Daily    chlordiazepoxide  5 mg Oral Daily    enoxparin  40 mg Subcutaneous Q24H (prophylaxis, 1700)    folic acid  1 mg Oral Daily    furosemide  40 mg Oral Daily    gabapentin  400 mg Oral BID    insulin aspart U-100  5 Units Subcutaneous TIDWM    insulin detemir U-100  15 Units Subcutaneous QHS    [START ON 2/27/2024] insulin detemir U-100 (Levemir)  20 Units Subcutaneous Daily    pantoprazole  40 mg Oral Daily    thiamine  100 mg Oral Daily     PRN Meds:0.9%  NaCl infusion (for blood administration), albuterol sulfate, dextrose 10%, dextrose 10%, glucagon (human recombinant), glucose, glucose, HYDROmorphone, insulin aspart U-100, melatonin, ondansetron, oxyCODONE, oxyCODONE, prochlorperazine, sodium chloride 0.9%     Review of patient's allergies indicates:   Allergen Reactions    Hydrochlorothiazide plus      Objective:     Vital Signs (Most Recent):  Temp: 98.9 °F (37.2 °C) (02/26/24 0344)  Pulse: 76 (02/26/24 0344)  Resp: 20 (02/26/24 0344)  BP: (!) 148/76 (02/26/24 0344)  SpO2: 96 % (02/25/24 2330) Vital Signs (24h Range):  Temp:  [97.8 °F (36.6 °C)-99.4 °F (37.4 °C)] 98.9 °F (37.2 °C)  Pulse:  [72-87] 76  Resp:  [12-20] 20  SpO2:  [93 %-96 %] 96 %  BP: (117-171)/(69-96) 148/76     Weight: 93.6 kg (206 lb 5.6 oz)  Body mass index is 35.42 kg/m².    Intake/Output - Last 3 Shifts         02/24 0700 02/25 0659 02/25 0700 02/26 0659 02/26 0700 02/27 0659    P.O. 720 480     IV Piggyback       Total Intake(mL/kg) 720 (7.7) 480 (5.1)     Urine  (mL/kg/hr) 900 (0.4) 2750 (1.2)     Other 100      Stool 0 0     Blood       Total Output 1000 2750     Net -280 -2270            Stool Occurrence 1 x 4 x              Physical Exam  Vitals and nursing note reviewed.   Constitutional:       Appearance: Normal appearance.   HENT:      Head: Normocephalic and atraumatic.   Cardiovascular:      Rate and Rhythm: Normal rate and regular rhythm.   Pulmonary:      Effort: Pulmonary effort is normal. No respiratory distress.   Abdominal:      General: Abdomen is flat. There is no distension.      Palpations: Abdomen is soft. There is no mass.      Tenderness: There is no abdominal tenderness.      Hernia: No hernia is present.   Genitourinary:     Comments: Hernandez catheter  Musculoskeletal:      Right lower leg: No edema.      Left lower leg: No edema.   Skin:     General: Skin is warm and dry.      Comments: Wound vac to right thigh off suction   Neurological:      General: No focal deficit present.      Mental Status: She is alert and oriented to person, place, and time.   Psychiatric:         Mood and Affect: Mood normal.         Behavior: Behavior normal.          Significant Labs:  I have reviewed all pertinent lab results within the past 24 hours.  CBC:   Recent Labs   Lab 02/26/24 0528   WBC 14.22*   RBC 2.97*   HGB 7.1*   HCT 23.1*      MCV 78*   MCH 23.9*   MCHC 30.7*       CMP:   Recent Labs   Lab 02/26/24 0528   *   CALCIUM 8.1*   ALBUMIN 1.5*   PROT 5.8*      K 4.9   CO2 29      BUN 14   CREATININE 0.9   ALKPHOS 95   ALT 9*   AST 11   BILITOT 0.1       LFTs:   Recent Labs   Lab 02/26/24 0528   ALT 9*   AST 11   ALKPHOS 95   BILITOT 0.1   PROT 5.8*   ALBUMIN 1.5*         Significant Diagnostics:  I have reviewed all pertinent imaging results/findings within the past 24 hours.

## 2024-02-26 NOTE — PROGRESS NOTES
[facility]  General Surgery  Progress Note    Subjective:     History of Present Illness:  Ms. Mosqueda is a 53 yoF with a PMH significant for insulin-dependent T2DM and CHF (last echo EF 53%, PA systolic 23) who presents with worsening pain and swelling of the right medial thigh. She developed an abscess in the area that underwent I+D at [facility] a few days ago, after which she was sent home on PO abx. Since that time the pain and swelling have worsened leading to her presentation to our ED. Workup here revealed normal vital signs, but with significantly elevated WBC and CRP, with other laboratory derangements leading to a LRINIC score of 11. CT scan demonstrates extensive inflammation extending from the right perineum to the right medial thigh with multiple foci of air. She is not on any blood thinners. No issues with anesthesia in the past.    Post-Op Info:  Procedure(s) (LRB):  REPLACEMENT, WOUND VAC (Right)   3 Days Post-Op     Interval History: NAEON. Feeling well this morning aside from expected pain of the wound vac site. AVSS. Wound vac off suction this morning. Lab work with continued down trend of WBC.    Medications:  Continuous Infusions:      Scheduled Meds:   acetaminophen  1,000 mg Oral Q8H    ampicillin-sulbactam  3 g Intravenous Q6H    budesonide  1 mg Nebulization Q12H    And    arformoteroL  15 mcg Nebulization BID    aspirin  81 mg Oral Daily    atorvastatin  20 mg Oral Daily    chlordiazepoxide  5 mg Oral Daily    enoxparin  40 mg Subcutaneous Q24H (prophylaxis, 1700)    folic acid  1 mg Oral Daily    furosemide  40 mg Oral Daily    gabapentin  400 mg Oral BID    insulin aspart U-100  5 Units Subcutaneous TIDWM    insulin detemir U-100  15 Units Subcutaneous QHS    [START ON 2/27/2024] insulin detemir U-100 (Levemir)  20 Units Subcutaneous Daily    pantoprazole  40 mg Oral Daily    thiamine  100 mg Oral Daily     PRN Meds:0.9%  NaCl infusion (for blood administration), albuterol sulfate,  dextrose 10%, dextrose 10%, glucagon (human recombinant), glucose, glucose, HYDROmorphone, insulin aspart U-100, melatonin, ondansetron, oxyCODONE, oxyCODONE, prochlorperazine, sodium chloride 0.9%     Review of patient's allergies indicates:   Allergen Reactions    Hydrochlorothiazide plus      Objective:     Vital Signs (Most Recent):  Temp: 98.9 °F (37.2 °C) (02/26/24 0344)  Pulse: 76 (02/26/24 0344)  Resp: 20 (02/26/24 0344)  BP: (!) 148/76 (02/26/24 0344)  SpO2: 96 % (02/25/24 2330) Vital Signs (24h Range):  Temp:  [97.8 °F (36.6 °C)-99.4 °F (37.4 °C)] 98.9 °F (37.2 °C)  Pulse:  [72-87] 76  Resp:  [12-20] 20  SpO2:  [93 %-96 %] 96 %  BP: (117-171)/(69-96) 148/76     Weight: 93.6 kg (206 lb 5.6 oz)  Body mass index is 35.42 kg/m².    Intake/Output - Last 3 Shifts         02/24 0700  02/25 0659 02/25 0700  02/26 0659 02/26 0700  02/27 0659    P.O. 720 480     IV Piggyback       Total Intake(mL/kg) 720 (7.7) 480 (5.1)     Urine (mL/kg/hr) 900 (0.4) 2750 (1.2)     Other 100      Stool 0 0     Blood       Total Output 1000 2750     Net -280 -2270            Stool Occurrence 1 x 4 x             Physical Exam  Vitals and nursing note reviewed.   Constitutional:       Appearance: Normal appearance.   HENT:      Head: Normocephalic and atraumatic.   Cardiovascular:      Rate and Rhythm: Normal rate and regular rhythm.   Pulmonary:      Effort: Pulmonary effort is normal. No respiratory distress.   Abdominal:      General: Abdomen is flat. There is no distension.      Palpations: Abdomen is soft. There is no mass.      Tenderness: There is no abdominal tenderness.      Hernia: No hernia is present.   Genitourinary:     Comments: Hernandez catheter  Musculoskeletal:      Right lower leg: No edema.      Left lower leg: No edema.   Skin:     General: Skin is warm and dry.      Comments: Wound vac to right thigh off suction   Neurological:      General: No focal deficit present.      Mental Status: She is alert and oriented to  person, place, and time.   Psychiatric:         Mood and Affect: Mood normal.         Behavior: Behavior normal.          Significant Labs:  I have reviewed all pertinent lab results within the past 24 hours.  CBC:   Recent Labs   Lab 02/26/24  0528   WBC 14.22*   RBC 2.97*   HGB 7.1*   HCT 23.1*      MCV 78*   MCH 23.9*   MCHC 30.7*       CMP:   Recent Labs   Lab 02/26/24  0528   *   CALCIUM 8.1*   ALBUMIN 1.5*   PROT 5.8*      K 4.9   CO2 29      BUN 14   CREATININE 0.9   ALKPHOS 95   ALT 9*   AST 11   BILITOT 0.1       LFTs:   Recent Labs   Lab 02/26/24  0528   ALT 9*   AST 11   ALKPHOS 95   BILITOT 0.1   PROT 5.8*   ALBUMIN 1.5*         Significant Diagnostics:  I have reviewed all pertinent imaging results/findings within the past 24 hours.  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 S/p wound vac replacement on 2/23.    - OR today for wound vac change and potential partial closure  - Keep NPO  - Consent in the paper chart  - ID following; Vanc/zosyn/clinda started 2/16, switched to meropenam and vanc, de-escalated to Unasyn on 2/25 with planned course through 3/6   - Operative cultures 2/17 with lactobacillus   - White, thick vaginal secretions noted in the OR on 2/23; one-time dose of 150 mg of fluconazole 2/24  - Poorly controlled DMII - insulin per HM  - Nutrition consult, boost supplements, severe hypoalbuminemia   - Polysubstance abuse to alcohol dependency - librium taper on, CIWA protocol  - Multiple modal pain control limited by CARLINE - continue scheduled tylenol, combination of oral and IV narcotics  - Okay for DVT ppx   - PT consult - okay to attempt ambulation, get up to  chair.   - Will have long term wound care needs; pending final disposition will need placement for this           Steven Paual MD  General Surgery  [facility] - Telemetry

## 2024-02-26 NOTE — SUBJECTIVE & OBJECTIVE
Interval History:  No acute overnight events.  Patient remained afebrile.  Wound VAC changed today in the OR with partial closure. Pain controlled. Surgery plan for bedside wound vac change this Thursday    Review of Systems   Constitutional:  Negative for chills and fever.   Respiratory:  Negative for shortness of breath.    Cardiovascular:  Negative for chest pain.   Gastrointestinal:  Negative for abdominal pain.   Skin:  Positive for wound.   Psychiatric/Behavioral:  Negative for confusion.      Objective:     Vital Signs (Most Recent):  Temp: 97.4 °F (36.3 °C) (02/26/24 1005)  Pulse: 71 (02/26/24 1030)  Resp: 18 (02/26/24 1030)  BP: (!) 153/97 (02/26/24 1030)  SpO2: 96 % (02/26/24 1030) Vital Signs (24h Range):  Temp:  [97.4 °F (36.3 °C)-99.4 °F (37.4 °C)] 97.4 °F (36.3 °C)  Pulse:  [70-87] 71  Resp:  [12-20] 18  SpO2:  [93 %-96 %] 96 %  BP: (125-153)/(69-97) 153/97     Weight: 93.6 kg (206 lb 5.6 oz)  Body mass index is 35.42 kg/m².    Intake/Output Summary (Last 24 hours) at 2/26/2024 1157  Last data filed at 2/26/2024 0952  Gross per 24 hour   Intake 1280 ml   Output 2100 ml   Net -820 ml           Physical Exam  Vitals and nursing note reviewed.   Constitutional:       General: She is not in acute distress.     Appearance: She is obese.   HENT:      Head: Atraumatic.      Nose: Nose normal.      Mouth/Throat:      Mouth: Mucous membranes are moist.   Eyes:      Extraocular Movements: Extraocular movements intact.   Cardiovascular:      Rate and Rhythm: Normal rate and regular rhythm.   Pulmonary:      Effort: Pulmonary effort is normal.      Breath sounds: No wheezing.      Comments: 2L NC SpO2 >94%  Abdominal:      General: Bowel sounds are normal.      Tenderness: There is no abdominal tenderness.   Genitourinary:     Comments: jansen  Musculoskeletal:      Right lower leg: Edema present.      Left lower leg: Edema present.   Skin:     General: Skin is warm and dry.   Neurological:      Mental Status: She  is alert and oriented to person, place, and time. Mental status is at baseline.             Significant Labs: All pertinent labs within the past 24 hours have been reviewed.    Significant Imaging: I have reviewed all pertinent imaging results/findings within the past 24 hours.

## 2024-02-26 NOTE — ASSESSMENT & PLAN NOTE
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   - Surgery consulted: S/p debridement on 02/16, 2/17, 2/19, and 2/21 (wound vac placed.)  - s/p OR 2/23 for first wound vac change. S/p repeat vac change on Mon 02/26 with partial wound closure. Surgery plan for bedside wound vac change on Thursday (2/29)  - blood and wound cultures with lactobacillus.   - ID consulted. Descalate from meropenem to Unasyn for 2 weeks. EOC 03/06/24  - jansen in place due to surgical wound site  -Plan for SNF on discharge

## 2024-02-27 LAB
ALBUMIN SERPL BCP-MCNC: 1.4 G/DL (ref 3.5–5.2)
ALP SERPL-CCNC: 93 U/L (ref 55–135)
ALT SERPL W/O P-5'-P-CCNC: 9 U/L (ref 10–44)
ANION GAP SERPL CALC-SCNC: 5 MMOL/L (ref 8–16)
ANION GAP SERPL CALC-SCNC: 8 MMOL/L (ref 8–16)
AST SERPL-CCNC: 13 U/L (ref 10–40)
BASOPHILS # BLD AUTO: 0.06 K/UL (ref 0–0.2)
BASOPHILS NFR BLD: 0.4 % (ref 0–1.9)
BILIRUB SERPL-MCNC: 0.1 MG/DL (ref 0.1–1)
BUN SERPL-MCNC: 14 MG/DL (ref 6–20)
BUN SERPL-MCNC: 15 MG/DL (ref 6–20)
CALCIUM SERPL-MCNC: 8.1 MG/DL (ref 8.7–10.5)
CALCIUM SERPL-MCNC: 8.2 MG/DL (ref 8.7–10.5)
CHLORIDE SERPL-SCNC: 102 MMOL/L (ref 95–110)
CHLORIDE SERPL-SCNC: 104 MMOL/L (ref 95–110)
CO2 SERPL-SCNC: 27 MMOL/L (ref 23–29)
CO2 SERPL-SCNC: 32 MMOL/L (ref 23–29)
CREAT SERPL-MCNC: 1 MG/DL (ref 0.5–1.4)
CREAT SERPL-MCNC: 1 MG/DL (ref 0.5–1.4)
DIFFERENTIAL METHOD BLD: ABNORMAL
EOSINOPHIL # BLD AUTO: 0.3 K/UL (ref 0–0.5)
EOSINOPHIL NFR BLD: 2 % (ref 0–8)
ERYTHROCYTE [DISTWIDTH] IN BLOOD BY AUTOMATED COUNT: 19.7 % (ref 11.5–14.5)
EST. GFR  (NO RACE VARIABLE): >60 ML/MIN/1.73 M^2
EST. GFR  (NO RACE VARIABLE): >60 ML/MIN/1.73 M^2
GLUCOSE SERPL-MCNC: 131 MG/DL (ref 70–110)
GLUCOSE SERPL-MCNC: 153 MG/DL (ref 70–110)
HCT VFR BLD AUTO: 23.7 % (ref 37–48.5)
HGB BLD-MCNC: 7.3 G/DL (ref 12–16)
IMM GRANULOCYTES # BLD AUTO: 0.08 K/UL (ref 0–0.04)
IMM GRANULOCYTES NFR BLD AUTO: 0.6 % (ref 0–0.5)
LYMPHOCYTES # BLD AUTO: 2.5 K/UL (ref 1–4.8)
LYMPHOCYTES NFR BLD: 17.8 % (ref 18–48)
MAGNESIUM SERPL-MCNC: 1.6 MG/DL (ref 1.6–2.6)
MCH RBC QN AUTO: 23 PG (ref 27–31)
MCHC RBC AUTO-ENTMCNC: 30.8 G/DL (ref 32–36)
MCV RBC AUTO: 75 FL (ref 82–98)
MONOCYTES # BLD AUTO: 1 K/UL (ref 0.3–1)
MONOCYTES NFR BLD: 6.8 % (ref 4–15)
NEUTROPHILS # BLD AUTO: 10.2 K/UL (ref 1.8–7.7)
NEUTROPHILS NFR BLD: 72.4 % (ref 38–73)
NRBC BLD-RTO: 0 /100 WBC
PHOSPHATE SERPL-MCNC: 5 MG/DL (ref 2.7–4.5)
PLATELET # BLD AUTO: ABNORMAL K/UL (ref 150–450)
PMV BLD AUTO: ABNORMAL FL (ref 9.2–12.9)
POCT GLUCOSE: 108 MG/DL (ref 70–110)
POCT GLUCOSE: 174 MG/DL (ref 70–110)
POCT GLUCOSE: 202 MG/DL (ref 70–110)
POCT GLUCOSE: 88 MG/DL (ref 70–110)
POTASSIUM SERPL-SCNC: 4.9 MMOL/L (ref 3.5–5.1)
POTASSIUM SERPL-SCNC: 6.3 MMOL/L (ref 3.5–5.1)
PROT SERPL-MCNC: 5.6 G/DL (ref 6–8.4)
RBC # BLD AUTO: 3.18 M/UL (ref 4–5.4)
SODIUM SERPL-SCNC: 139 MMOL/L (ref 136–145)
SODIUM SERPL-SCNC: 139 MMOL/L (ref 136–145)
WBC # BLD AUTO: 14.04 K/UL (ref 3.9–12.7)

## 2024-02-27 PROCEDURE — 25000003 PHARM REV CODE 250: Performed by: INTERNAL MEDICINE

## 2024-02-27 PROCEDURE — 36415 COLL VENOUS BLD VENIPUNCTURE: CPT | Mod: XB | Performed by: STUDENT IN AN ORGANIZED HEALTH CARE EDUCATION/TRAINING PROGRAM

## 2024-02-27 PROCEDURE — 63600175 PHARM REV CODE 636 W HCPCS: Performed by: STUDENT IN AN ORGANIZED HEALTH CARE EDUCATION/TRAINING PROGRAM

## 2024-02-27 PROCEDURE — 25000003 PHARM REV CODE 250: Performed by: STUDENT IN AN ORGANIZED HEALTH CARE EDUCATION/TRAINING PROGRAM

## 2024-02-27 PROCEDURE — 25000242 PHARM REV CODE 250 ALT 637 W/ HCPCS: Performed by: HOSPITALIST

## 2024-02-27 PROCEDURE — 99900035 HC TECH TIME PER 15 MIN (STAT)

## 2024-02-27 PROCEDURE — 99223 1ST HOSP IP/OBS HIGH 75: CPT | Mod: 24,,,

## 2024-02-27 PROCEDURE — 94761 N-INVAS EAR/PLS OXIMETRY MLT: CPT

## 2024-02-27 PROCEDURE — 97530 THERAPEUTIC ACTIVITIES: CPT

## 2024-02-27 PROCEDURE — 63600175 PHARM REV CODE 636 W HCPCS: Performed by: INTERNAL MEDICINE

## 2024-02-27 PROCEDURE — 97116 GAIT TRAINING THERAPY: CPT | Mod: CQ

## 2024-02-27 PROCEDURE — 27000221 HC OXYGEN, UP TO 24 HOURS

## 2024-02-27 PROCEDURE — 80053 COMPREHEN METABOLIC PANEL: CPT

## 2024-02-27 PROCEDURE — 94640 AIRWAY INHALATION TREATMENT: CPT

## 2024-02-27 PROCEDURE — 36415 COLL VENOUS BLD VENIPUNCTURE: CPT

## 2024-02-27 PROCEDURE — 85025 COMPLETE CBC W/AUTO DIFF WBC: CPT

## 2024-02-27 PROCEDURE — 25000003 PHARM REV CODE 250: Performed by: HOSPITALIST

## 2024-02-27 PROCEDURE — 97530 THERAPEUTIC ACTIVITIES: CPT | Mod: CQ

## 2024-02-27 PROCEDURE — 83735 ASSAY OF MAGNESIUM: CPT

## 2024-02-27 PROCEDURE — 97110 THERAPEUTIC EXERCISES: CPT | Mod: CQ

## 2024-02-27 PROCEDURE — 80048 BASIC METABOLIC PNL TOTAL CA: CPT | Mod: XB | Performed by: STUDENT IN AN ORGANIZED HEALTH CARE EDUCATION/TRAINING PROGRAM

## 2024-02-27 PROCEDURE — 84100 ASSAY OF PHOSPHORUS: CPT

## 2024-02-27 PROCEDURE — 21400001 HC TELEMETRY ROOM

## 2024-02-27 RX ADMIN — Medication 6 MG: at 09:02

## 2024-02-27 RX ADMIN — INSULIN ASPART 5 UNITS: 100 INJECTION, SOLUTION INTRAVENOUS; SUBCUTANEOUS at 08:02

## 2024-02-27 RX ADMIN — GABAPENTIN 400 MG: 400 CAPSULE ORAL at 09:02

## 2024-02-27 RX ADMIN — AMPICILLIN SODIUM AND SULBACTAM SODIUM 3 G: 2; 1 INJECTION, POWDER, FOR SOLUTION INTRAMUSCULAR; INTRAVENOUS at 06:02

## 2024-02-27 RX ADMIN — OXYCODONE 10 MG: 5 TABLET ORAL at 08:02

## 2024-02-27 RX ADMIN — ACETAMINOPHEN 1000 MG: 500 TABLET ORAL at 09:02

## 2024-02-27 RX ADMIN — ARFORMOTEROL TARTRATE 15 MCG: 15 SOLUTION RESPIRATORY (INHALATION) at 11:02

## 2024-02-27 RX ADMIN — INSULIN ASPART 3 UNITS: 100 INJECTION, SOLUTION INTRAVENOUS; SUBCUTANEOUS at 08:02

## 2024-02-27 RX ADMIN — PANTOPRAZOLE SODIUM 40 MG: 40 TABLET, DELAYED RELEASE ORAL at 08:02

## 2024-02-27 RX ADMIN — THIAMINE HCL TAB 100 MG 100 MG: 100 TAB at 08:02

## 2024-02-27 RX ADMIN — FOLIC ACID 1 MG: 1 TABLET ORAL at 08:02

## 2024-02-27 RX ADMIN — OXYCODONE 10 MG: 5 TABLET ORAL at 01:02

## 2024-02-27 RX ADMIN — ASPIRIN 81 MG CHEWABLE TABLET 81 MG: 81 TABLET CHEWABLE at 08:02

## 2024-02-27 RX ADMIN — ACETAMINOPHEN 1000 MG: 500 TABLET ORAL at 06:02

## 2024-02-27 RX ADMIN — AMPICILLIN SODIUM AND SULBACTAM SODIUM 3 G: 2; 1 INJECTION, POWDER, FOR SOLUTION INTRAMUSCULAR; INTRAVENOUS at 12:02

## 2024-02-27 RX ADMIN — GABAPENTIN 400 MG: 400 CAPSULE ORAL at 08:02

## 2024-02-27 RX ADMIN — ATORVASTATIN CALCIUM 20 MG: 10 TABLET, FILM COATED ORAL at 08:02

## 2024-02-27 RX ADMIN — OXYCODONE 10 MG: 5 TABLET ORAL at 09:02

## 2024-02-27 RX ADMIN — AMPICILLIN SODIUM AND SULBACTAM SODIUM 3 G: 2; 1 INJECTION, POWDER, FOR SOLUTION INTRAMUSCULAR; INTRAVENOUS at 04:02

## 2024-02-27 RX ADMIN — INSULIN DETEMIR 15 UNITS: 100 INJECTION, SOLUTION SUBCUTANEOUS at 09:02

## 2024-02-27 RX ADMIN — FUROSEMIDE 40 MG: 40 TABLET ORAL at 08:02

## 2024-02-27 RX ADMIN — ENOXAPARIN SODIUM 40 MG: 40 INJECTION SUBCUTANEOUS at 04:02

## 2024-02-27 RX ADMIN — BUDESONIDE 1 MG: 0.5 INHALANT RESPIRATORY (INHALATION) at 11:02

## 2024-02-27 RX ADMIN — INSULIN ASPART 5 UNITS: 100 INJECTION, SOLUTION INTRAVENOUS; SUBCUTANEOUS at 12:02

## 2024-02-27 NOTE — PROGRESS NOTES
Lab called about critical report of potassium being on K+ 6.3    Dr Skinner notified, stat BMP ordered by provider

## 2024-02-27 NOTE — NURSING
[facility] [place]  Nurses Note -- 4 Eyes      2/27/2024       Skin assessed on: Q Shift      [x] No Pressure Injuries Present    [x]Prevention Measures Documented    [] Yes LDA  for Pressure Injury Previously documented     [] Yes New Pressure Injury Discovered   [] LDA for New Pressure Injury Added      Attending RN:  Haydee Tobin RN     Second RN:  CALEB Carter

## 2024-02-27 NOTE — PROGRESS NOTES
[facility] Medicine  Progress Note    Patient Name: Christine Mosqueda  MRN: [id number]  Patient Class: IP- Inpatient   Admission Date: 2/15/2024  Length of Stay: 11 days  Attending Physician: Qasim Skinner III, MD  Primary Care Provider: [facility]        Subjective:     Principal Problem:Estephanie's gangrene        HPI:  Ms. Mosqueda is a 53 yoF with a PMHx of diastolic heart failure, HTN, T2DM on insulin. She presented to the hospital with worsening pain and swelling of the right medial thigh. She developed an abscess in the area that underwent I+D at [facility] a few days ago. She was sent home on PO abx. Since that time the pain and swelling have worsened.  In ED, CT scan demonstrated extensive inflammation extending from the right perineum to the right medial thigh with multiple foci of air. She was admitted to general surgery for estephanie's gangrene. She underwent extensive debridement under general surgery today in OR. She is currently doing well with good pain control. HM consulted for medical management.     Overview/Hospital Course:  Ms Christine Mosqueda is a 53 y.o.  woman with poorly controlled type II DM who was admitted for sepsis secondary to bacteremia and Estephanie's gangrene. Pt presented w/ worsening pain and swelling of the Rt medial thigh. CT scan findings were consistent w/ Estephanie's gangrene. General surgery team consulted. S/p surgical debridement 02/16, 2/17, 2/19, and on 02/21 (wound vac placed.) S/p OR 2/23 for first wound vac change. Plan for OR again on 02/26. Had acute on chronic anemia, likely exacerbated with multiple debridements. Required blood transfusions on 02/19 and again on 02/21.  Blood and wound cultures with lactobacillus. ID consulted- on meropenem. Repeat blood cx with no growth thus far. Also found to have CARLINE on admission, nephrology following. CARLINE resolved. PT/OT recommends SNF on discharge.     Interval History: Pt states she isfeeling  fine pain controlled. No fever or chills.    Review of Systems  Objective:     Vital Signs (Most Recent):  Temp: 98.1 °F (36.7 °C) (02/27/24 0747)  Pulse: 80 (02/27/24 1102)  Resp: 18 (02/27/24 1102)  BP: (!) 148/73 (02/27/24 0747)  SpO2: 97 % (02/27/24 1102) Vital Signs (24h Range):  Temp:  [98.1 °F (36.7 °C)-99.2 °F (37.3 °C)] 98.1 °F (36.7 °C)  Pulse:  [71-93] 80  Resp:  [16-20] 18  SpO2:  [91 %-97 %] 97 %  BP: (128-180)/(63-86) 148/73     Weight: 93.6 kg (206 lb 5.6 oz)  Body mass index is 35.42 kg/m².    Intake/Output Summary (Last 24 hours) at 2/27/2024 1230  Last data filed at 2/27/2024 0612  Gross per 24 hour   Intake 960 ml   Output 4430 ml   Net -3470 ml         Physical Exam      Vitals and nursing note reviewed.   Constitutional:       General: She is not in acute distress.     Appearance: She is obese.   HENT:      Head: Atraumatic.      Nose: Nose normal.      Mouth/Throat:      Mouth: Mucous membranes are moist.   Eyes:      Extraocular Movements: Extraocular movements intact.   Cardiovascular:      Rate and Rhythm: Normal rate and regular rhythm.   Pulmonary:      Effort: Pulmonary effort is normal.   Abdominal:      General: Bowel sounds are normal.      Tenderness: There is no abdominal tenderness.   Genitourinary:     Comments: jansen  Skin:     General: Skin is warm and dry.   Neurological:      Mental Status: She is alert and oriented to person, place, and time. Mental status is at baseline.   Significant Labs: All pertinent labs within the past 24 hours have been reviewed.    Significant Imaging: I have reviewed all pertinent imaging results/findings within the past 24 hours.    Assessment/Plan:      * Librado's gangrene  CT on admit with R perineum to R medial thigh [place].   - Surgery consulted: S/p debridement on 02/16, 2/17, 2/19, and 2/21 (wound vac placed.)  - s/p OR 2/23 for first wound vac change. S/p repeat vac change on Mon 02/26 with partial wound closure. Surgery plan for bedside  wound vac change on Thursday (2/29)  - blood and wound cultures with lactobacillus.   - ID consulted. Descalate from meropenem to Unasyn for 2 weeks. EOC 03/06/24  - jansen in place due to surgical wound site  -Plan for SNF on discharge     Debility  Patient with Acute debility due to other reduced mobility. valuation for etiology is complete. Plan includes progressive mobility protocol initated and PT/OT consulted.    -PTOT recommend SNF  -CM to assist with placement     Advanced care planning/counseling discussion  Advance Care Planning    Date: 02/21/2024    Code Status  I engaged the the patient in a voluntary conversation about the patient's preferences for care  at the very end of life. The patient wishes to have CPR and other invasive treatments performed when her heart and/or breathing stops. I communicated to the patient that her wishes align with full code status and she agrees and verbalized understanding.   I spent a total of 16 minutes engaging the patient in this advance care planning discussion.           Code Status: Full Code      Class 2 severe obesity due to excess calories with serious comorbidity and body mass index (BMI) of 35.0 to 35.9 in adult  Body mass index is 35.42 kg/m². Morbid obesity complicates all aspects of disease management from diagnostic modalities to treatment. Weight loss encouraged and health benefits explained to patient.         ETOH abuse  Patient drinks at least a 6 pack beer daily  - Start scheduled librium- wean ordered  - Thiamine, folate, potassium, magnesium      Cocaine abuse  Patient sprinkles crack crystals in her marijuana  Wants help. Tearful. Emotional support provided.   CM consulted.       Anemia of chronic disease  Patient's anemia is currently controlled. Has received 1 units of PRBCs on 2/19 and 02/21 . Etiology likely d/t chronic disease and acute blood loss post surgery.  Current CBC reviewed-   Lab Results   Component Value Date    HGB 7.3 (L) 02/27/2024     HCT 23.7 (L) 02/27/2024     - monitor daily   -s/p 1U pRBC on 02/19 and 02/21    Essential hypertension  Chronic, controlled.   -hold home meds at this time:  Amlodipine 10 mg,   Resume Lasix 40 mg p.o.    Latest blood pressure and vitals reviewed-     Temp:  [98.1 °F (36.7 °C)-99.2 °F (37.3 °C)]   Pulse:  [71-93]   Resp:  [16-20]   BP: (128-180)/(63-86)   SpO2:  [91 %-97 %] .   Home meds for hypertension were reviewed and noted below.   Hypertension Medications               amlodipine (NORVASC) 10 MG tablet Take 10 mg by mouth once daily.    furosemide (LASIX) 40 MG tablet Take 1 tablet (40 mg total) by mouth once daily.            While in the hospital, will manage blood pressure as follows; Adjust home antihypertensive regimen as follows- hold meds for now given infection/ narcotic use. Resume as warranted . May develop rebound HTN from drug/ alcohol withdrawal.    Will utilize p.r.n. blood pressure medication only if patient's blood pressure greater than 180/110 and she develops symptoms such as worsening chest pain or shortness of breath.    Sepsis with acute renal failure and tubular necrosis without septic shock  Defined by leukocytosis, tachycardia and groin abscess. Source is [place].   - surgical management with debridement  - antibiotics= Unasyn per ID  - Leukocytosis improving     Chronic diastolic congestive heart failure  Patient is identified as having Diastolic (HFpEF) heart failure that is Chronic. CHF is currently controlled. Latest ECHO performed and demonstrates- Results for orders placed during the hospital encounter of 03/10/23    Echo    Interpretation Summary  · The left ventricle is normal in size with low normal systolic function.  · The estimated ejection fraction is 53%.  · There is abnormal septal wall motion.  · Indeterminate left ventricular diastolic function.  · Mild left atrial enlargement.  · Normal right ventricular size with low normal right ventricular systolic  function.  · Mild right atrial enlargement.  · Mild mitral regurgitation.  · Mild tricuspid regurgitation.  · Normal central venous pressure (3 mmHg).  · The estimated PA systolic pressure is 23 mmHg.    No acute issues  Monitor fluid status- does have lower extremity edema   Discussed with nephrology, okay to resume lasix 02/23    ILD (interstitial lung disease)  -Noted on imaging; CT chest 2022  -Stable, no acute issues.   -Not on home O2 but says she should be (but never received because she left AMA at OSH)  -would test prior to discharge .      CARLINE (acute kidney injury)  Patient with acute kidney injury/acute renal failure likely due to pre-renal azotemia due to IVVD and acute tubular necrosis caused by sepsis  CARLINE is currently improving. Baseline creatinine  0.9  - Labs reviewed- Renal function/electrolytes with Estimated Creatinine Clearance: 70.5 mL/min (based on SCr of 1 mg/dL). according to latest data. Monitor urine output and serial BMP and adjust therapy as needed. Avoid nephrotoxins and renally dose meds for GFR listed above.    - Highly suspect ATN as an etiology at this time  - Nephrology consult requested, appreciate recs  - renal function is improving and near baseline.   - Resolved    Tobacco abuse  - Pt was counseled about cessation x4 minutes      Type 2 diabetes mellitus with hyperglycemia, without long-term current use of insulin  Patient's FSGs are uncontrolled due to hyperglycemia on current medication regimen.  Last A1c reviewed-   Lab Results   Component Value Date    HGBA1C 8.2 (H) 04/06/2023     Most recent fingerstick glucose reviewed-   Recent Labs   Lab 02/26/24  1622 02/26/24  2031 02/27/24  0749 02/27/24  1217   POCTGLUCOSE 302* 102 174* 108       Current correctional scale  High  Maintain anti-hyperglycemic dose as follows-   Antihyperglycemics (From admission, onward)      Start     Stop Route Frequency Ordered    02/27/24 0900  insulin detemir U-100 (Levemir) pen 20 Units          -- SubQ Daily 02/25/24 1359    02/26/24 2100  insulin detemir U-100 (Levemir) pen 15 Units         -- SubQ Nightly 02/25/24 1359    02/22/24 1145  insulin aspart U-100 pen 5 Units         -- SubQ 3 times daily with meals 02/22/24 1031    02/16/24 1514  insulin aspart U-100 pen 0-15 Units         -- SubQ Before meals & nightly PRN 02/16/24 1515          Hold Oral hypoglycemics while patient is in the hospital.  A1c: 8.2  Meds: basal bolus insulin + SSI PRN to maintain goal 140-180  Titrate as needed. Basal BID (20AM, 15U PM), prandia at 5U TIDWM  ADA diet, accuchecks ACHS, hypoglycemic protocol  Hold insulin 2/20 5:00 p.m. and 2/20 6:00 a.m. in setting of NPO status            VTE Risk Mitigation (From admission, onward)           Ordered     enoxaparin injection 40 mg  Every 24 hours         02/24/24 1615     IP VTE HIGH RISK PATIENT  Once         02/16/24 0506     Place sequential compression device  Until discontinued         02/16/24 0506                    Discharge Planning   AVTAR: 2/17/2024     Code Status: Full Code   Is the patient medically ready for discharge?:     Reason for patient still in hospital (select all that apply): Consult recommendations and PT / OT recommendations  Discharge Plan A: Home with family                  Qasim Skinner III, MD  Department of Hospital Medicine   [facility]

## 2024-02-27 NOTE — NURSING
PER handoff received from CALEB Carter     Pt resting in bed quietly. NAD noted.    Fall and safety precautions maintained. Bed alarm activated and audible.. Bed locked in lowest position, with side rails up x2. Call bell and personal items within reach

## 2024-02-27 NOTE — SUBJECTIVE & OBJECTIVE
Interval History: Pt states she isfeeling fine pain controlled. No fever or chills.    Review of Systems  Objective:     Vital Signs (Most Recent):  Temp: 98.1 °F (36.7 °C) (02/27/24 0747)  Pulse: 80 (02/27/24 1102)  Resp: 18 (02/27/24 1102)  BP: (!) 148/73 (02/27/24 0747)  SpO2: 97 % (02/27/24 1102) Vital Signs (24h Range):  Temp:  [98.1 °F (36.7 °C)-99.2 °F (37.3 °C)] 98.1 °F (36.7 °C)  Pulse:  [71-93] 80  Resp:  [16-20] 18  SpO2:  [91 %-97 %] 97 %  BP: (128-180)/(63-86) 148/73     Weight: 93.6 kg (206 lb 5.6 oz)  Body mass index is 35.42 kg/m².    Intake/Output Summary (Last 24 hours) at 2/27/2024 1230  Last data filed at 2/27/2024 0612  Gross per 24 hour   Intake 960 ml   Output 4430 ml   Net -3470 ml         Physical Exam      Vitals and nursing note reviewed.   Constitutional:       General: She is not in acute distress.     Appearance: She is obese.   HENT:      Head: Atraumatic.      Nose: Nose normal.      Mouth/Throat:      Mouth: Mucous membranes are moist.   Eyes:      Extraocular Movements: Extraocular movements intact.   Cardiovascular:      Rate and Rhythm: Normal rate and regular rhythm.   Pulmonary:      Effort: Pulmonary effort is normal.   Abdominal:      General: Bowel sounds are normal.      Tenderness: There is no abdominal tenderness.   Genitourinary:     Comments: jansen  Skin:     General: Skin is warm and dry.   Neurological:      Mental Status: She is alert and oriented to person, place, and time. Mental status is at baseline.   Significant Labs: All pertinent labs within the past 24 hours have been reviewed.    Significant Imaging: I have reviewed all pertinent imaging results/findings within the past 24 hours.

## 2024-02-27 NOTE — PT/OT/SLP PROGRESS
Occupational Therapy   Treatment    Name: Christine Mosqueda  MRN: [id number]  Admitting Diagnosis:  Librado's gangrene  1 Day Post-Op    Recommendations:     Discharge Recommendations: Moderate Intensity Therapy  Discharge Equipment Recommendations:  bath bench, bedside commode, wheelchair  Barriers to discharge:  Other (Comment)    Assessment:     Christine Mosqueda is a 53 y.o. female with a medical diagnosis of Librado's gangrene.  She presents with HOB elevated. Performance deficits affecting function are weakness, impaired endurance, impaired self care skills, impaired functional mobility, gait instability, impaired balance, impaired cognition, decreased lower extremity function, decreased safety awareness, pain, impaired cardiopulmonary response to activity, impaired skin, edema.     Rehab Prognosis:  Good; patient would benefit from acute skilled OT services to address these deficits and reach maximum level of function.       Plan:     Patient to be seen 5 x/week to address the above listed problems via self-care/home management, therapeutic activities, therapeutic exercises  Plan of Care Expires: 03/07/24  Plan of Care Reviewed with: patient    Subjective     Chief Complaint: wound vac leak therapy alarm   Patient/Family Comments/goals: pt. Agreeable to treatment and then  brought food for her to eat   Pain/Comfort:  Pain Rating 1: 0/10  Location 1: leg  Pain Addressed 1: Other (see comments) (awaiting wound care to address wound vac)    Objective:     Communicated with: Haydee ELLIS, prior to session.  Patient found HOB elevated with telemetry, wound vac, jansen catheter, peripheral IV, bed alarm, SCD upon OT entry to room.    General Precautions: Standard, fall, respiratory    Orthopedic Precautions:N/A  Braces: N/A  Respiratory Status: Nasal cannula, flow 2  L/min     Occupational Performance:     Bed Mobility:    Patient completed Supine to Sit with modified independence     Functional  Mobility/Transfers:  Patient did not get OOB, but sat up at EOB     Activities of Daily Living:  Feeding:  independence eating EOB       [facility] 6 Click ADL: 16    Treatment & Education:  Occupational therapy educated patient to use theraband and when occupational therapy returned, patient eating dinner with her .     Patient left sitting edge of bed with all lines intact, call button in reach, and RN notified    GOALS:   Multidisciplinary Problems       Occupational Therapy Goals          Problem: Occupational Therapy    Goal Priority Disciplines Outcome Interventions   Occupational Therapy Goal     OT, PT/OT Ongoing, Progressing    Description: Goals to be met by: 03/07/2024     Patient will increase functional independence with ADLs by performing:    UE Dressing with [place].  LE Dressing with Minimal Assistance.  Grooming while seated at sink with Set-up Assistance.  Toileting from bedside commode with Minimal Assistance for hygiene and clothing management.   Sitting in chair  x60  minutes with Supervision.  Toilet transfer to bedside commode with Supervision.  Upper extremity exercise program x10  reps per handout, with assistance as needed for right upper extremity.                         Time Tracking:     OT Date of Treatment: 02/27/24  OT Start Time: 1501  OT Stop Time: 1510  OT Total Time (min): 9 min    Billable Minutes:Therapeutic Activity 9     OT/BRIDGET: OT          2/27/2024

## 2024-02-27 NOTE — ASSESSMENT & PLAN NOTE
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 S/p wound vac replacement on 2/23 with wound vac exchange and partial closure of the wound in the OR on 2/26.    - Wound care consult placed for first bedside wound vac exchange in 2/29. We will coordinate the exchange so that we can be there too to ensure that the wound doesn't need further debridement and will be ok for ongoing wound vac changes at the SNF following discharge.  - ID consulted; Vanc/zosyn/clinda started 2/16, switched to meropenam and vanc, de-escalated to Unasyn on 2/25 with planned course through 3/6  - Poorly controlled DMII - insulin per HM  - Nutrition consult, boost supplements, severe hypoalbuminemia   - Polysubstance abuse to alcohol dependency - librium taper on, CIWA protocol  - Multiple modal pain control limited by CARLINE - continue scheduled tylenol, combination of oral and IV narcotics  - Okay for DVT ppx   - PT consult - okay to attempt ambulation, get up to chair.   - Dispo: SNF placement

## 2024-02-27 NOTE — ASSESSMENT & PLAN NOTE
Patient's anemia is currently controlled. Has received 1 units of PRBCs on 2/19 and 02/21 . Etiology likely d/t chronic disease and acute blood loss post surgery.  Current CBC reviewed-   Lab Results   Component Value Date    HGB 7.3 (L) 02/27/2024    HCT 23.7 (L) 02/27/2024     - monitor daily   -s/p 1U pRBC on 02/19 and 02/21

## 2024-02-27 NOTE — CONSULTS
[facility] [place]  [place] Care  WOC [facility]    Patient Name:  Christine Mosqueda   MRN:  [id number]  Date: 2/27/2024  Diagnosis: Librado's gangrene    History:     Past Medical History:   Diagnosis Date    Anxiety     Arthritis     Bronchitis     CHF (congestive heart failure)     Diabetic ketoacidosis associated with type 2 diabetes mellitus 3/10/2023    DM (diabetes mellitus)     Emphysema lung     Encounter for blood transfusion     HTN (hypertension)     Restrictive lung disease     Sleep apnea        Social History     Socioeconomic History    Marital status:    Tobacco Use    Smoking status: Some Days     Current packs/day: 0.25     Types: Cigarettes    Smokeless tobacco: Never   Substance and Sexual Activity    Alcohol use: Yes    Drug use: Yes     Types: Marijuana    Sexual activity: Yes     Partners: Male     Social Determinants of Health     Financial Resource Strain: Low Risk  (2/16/2024)    Overall Financial Resource Strain (CARDIA)     Difficulty of Paying Living Expenses: Not very hard   Food Insecurity: No Food Insecurity (2/16/2024)    Hunger Vital Sign     Worried About Running Out of Food in the Last Year: Never true     Ran Out of Food in the Last Year: Never true   Transportation Needs: No Transportation Needs (2/16/2024)    PRAPARE - Transportation     Lack of Transportation (Medical): No     Lack of Transportation (Non-Medical): No   Physical Activity: Inactive (2/16/2024)    Exercise Vital Sign     Days of Exercise per Week: 0 days     Minutes of Exercise per Session: 0 min   Stress: No Stress Concern Present (2/16/2024)    [employer] [place] [employer] - Occupational Stress Questionnaire     Feeling of Stress : Only a little   Social Connections: Moderately Isolated (2/16/2024)    Social Connection and Isolation Panel [NHANES]     Frequency of Communication with Friends and Family: Three times a week     Frequency of Social Gatherings with Friends and Family: Three times a week      Attends Nondenominational Services: Never     Active Member of Clubs or Organizations: No     Attends Club or Organization Meetings: Never     Marital Status:    Housing Stability: Low Risk  (2/16/2024)    Housing Stability Vital Sign     Unable to Pay for Housing in the Last Year: No     Number of Places Lived in the Last Year: 1     Unstable Housing in the Last Year: No       Precautions:     Allergies as of 02/15/2024 - Reviewed 02/15/2024   Allergen Reaction Noted    Hydrochlorothiazide plus  05/02/2013       [facility] Assessment Details/Treatment     Active Problem List with Overview Notes    Diagnosis Date Noted    Debility 02/22/2024    Class 2 severe obesity due to excess calories with serious comorbidity and body mass index (BMI) of 35.0 to 35.9 in adult 02/21/2024    Advanced care planning/counseling discussion 02/21/2024    Librado's gangrene 02/16/2024    ILD (interstitial lung disease) 02/16/2024    Chronic diastolic congestive heart failure 02/16/2024    Sepsis with acute renal failure and tubular necrosis without septic shock 02/16/2024    Essential hypertension 02/16/2024    Anemia of chronic disease 02/16/2024    Cocaine abuse 02/16/2024    ETOH abuse 02/16/2024    Nonadherence to medical treatment 03/23/2023    Superficial vein thrombosis 03/22/2023    Vasculopathy 03/22/2023    Microcytic anemia 03/13/2023    CARLINE (acute kidney injury) 03/10/2023    Acute on chronic systolic heart failure 02/05/2018    Anxiety 02/05/2018    Type 2 diabetes mellitus with hyperglycemia, without long-term current use of insulin 02/05/2018    Emphysema lung 02/05/2018    Restrictive lung disease 02/05/2018    Tobacco abuse 02/05/2018      Consulted for bedside wound VAC dressing change with surgical residents 2/29  A 53 year old female admitted 2/15/24 from home with worsening of pain and swelling at site of abscess labia - right medial thigh. I&D at [facility]  2/10/24; discharged with oral antibiotics and instructions for BG  management  2/27 WBC 14.04 Hgb 7.3 Hct 23.7   2/26 Platelets 167K  2/27 Alb 1.4 Weight 206 lbs   4/6/23 A1C 8.2   On Isoflex mattress; Ortega score; jansen catheter  2/17 2:00 pm 4 Eyes Skin Assessment- No Pressure Injuries Present  2/16 S/P Debridement lateral perineum and proximal medial right thigh for Librado gangrene per Dr. Nazario  2/17 S/P Incision and Drainage; Wound Debridement right thigh and groin for Librado's gangrene and necrotizing soft tissue infection/fascitis per Dr. Koroma  2/19 S/P Exploration wound right thigh/groin; wound debridement for sepsis; Librado's gangrene per Dr. Koroma  2/21 S/P Exploration wound right groin and thigh for Librado's gangrene per Dr. Koroma. KCI/3M VAC applied.  2/23 S/P Wound VAC dressing change; debridement right groin and thigh for Librado's gangrene per Dr. Koroma  2/26 S/P Wound VAC dressing change; partial wound closure right groin and thigh for Librado's gangrene per Dr. Koroma   Patient working with PT- exercising and sitting up in chair  Assessment:  VAC alarming- leak after ambulating.   Removed SensiTrac which was not adhering to foam dressing. Created a bridge to anterior right thigh. Placed SensiTrac on foam dressing on anterior thigh. Seal created and tubing anchored to thigh to prevent pulling of tubing.   Red drainage in VAC canister.  Treatment/Plan:  Discussed with  plan for transfer to SNF. Patient unable to transfer with hospital NPWT device and dressing must be removed from wound before transfer. Will coordinate dressing change/removal of dressing for transfer to SNF.   Currently plan is to change dressing on Thursday with Surgery/assessment of wound base/contraction/healing.    02/27/2024

## 2024-02-27 NOTE — PROGRESS NOTES
[facility]  General Surgery  Progress Note    Subjective:     History of Present Illness:  Ms. Mosqueda is a 53 yoF with a PMH significant for insulin-dependent T2DM and CHF (last echo EF 53%, PA systolic 23) who presents with worsening pain and swelling of the right medial thigh. She developed an abscess in the area that underwent I+D at [facility] a few days ago, after which she was sent home on PO abx. Since that time the pain and swelling have worsened leading to her presentation to our ED. Workup here revealed normal vital signs, but with significantly elevated WBC and CRP, with other laboratory derangements leading to a LRINIC score of 11. CT scan demonstrates extensive inflammation extending from the right perineum to the right medial thigh with multiple foci of air. She is not on any blood thinners. No issues with anesthesia in the past.    Post-Op Info:  Procedure(s) (LRB):  Wound vac change,  debridement,  partial wound closure (Right)   1 Day Post-Op     Interval History: NAEON. Feeling well this morning. AVSS. Wound vac off to suction with SS output. WBC stable, but K elevated this morning.    Medications:  Continuous Infusions:      Scheduled Meds:   acetaminophen  1,000 mg Oral Q8H    ampicillin-sulbactam  3 g Intravenous Q6H    budesonide  1 mg Nebulization Q12H    And    arformoteroL  15 mcg Nebulization BID    aspirin  81 mg Oral Daily    atorvastatin  20 mg Oral Daily    enoxparin  40 mg Subcutaneous Q24H (prophylaxis, 1700)    folic acid  1 mg Oral Daily    furosemide  40 mg Oral Daily    gabapentin  400 mg Oral BID    insulin aspart U-100  5 Units Subcutaneous TIDWM    insulin detemir U-100  15 Units Subcutaneous QHS    insulin detemir U-100 (Levemir)  20 Units Subcutaneous Daily    pantoprazole  40 mg Oral Daily    thiamine  100 mg Oral Daily     PRN Meds:0.9%  NaCl infusion (for blood administration), albuterol sulfate, dextrose 10%, dextrose 10%, glucagon (human recombinant), glucose,  glucose, HYDROmorphone, insulin aspart U-100, melatonin, ondansetron, oxyCODONE, oxyCODONE, prochlorperazine, sodium chloride 0.9%     Review of patient's allergies indicates:   Allergen Reactions    Hydrochlorothiazide plus      Objective:     Vital Signs (Most Recent):  Temp: 98.2 °F (36.8 °C) (02/27/24 0358)  Pulse: 75 (02/27/24 0358)  Resp: 16 (02/27/24 0358)  BP: 132/77 (02/27/24 0358)  SpO2: (!) 94 % (02/27/24 0358) Vital Signs (24h Range):  Temp:  [97.4 °F (36.3 °C)-99.2 °F (37.3 °C)] 98.2 °F (36.8 °C)  Pulse:  [70-93] 75  Resp:  [16-20] 16  SpO2:  [91 %-96 %] 94 %  BP: (125-180)/(63-97) 132/77     Weight: 93.6 kg (206 lb 5.6 oz)  Body mass index is 35.42 kg/m².    Intake/Output - Last 3 Shifts         02/25 0700  02/26 0659 02/26 0700  02/27 0659 02/27 0700  02/28 0659    P.O. 480 240     IV Piggyback  800     Total Intake(mL/kg) 480 (5.1) 1040 (11.1)     Urine (mL/kg/hr) 2750 (1.2) 2800 (1.2)     Other       Stool 0      Total Output 2750 2800     Net -2270 -1760            Stool Occurrence 4 x              Physical Exam  Vitals and nursing note reviewed.   Constitutional:       Appearance: Normal appearance.   HENT:      Head: Normocephalic and atraumatic.   Cardiovascular:      Rate and Rhythm: Normal rate and regular rhythm.   Pulmonary:      Effort: Pulmonary effort is normal. No respiratory distress.   Abdominal:      General: Abdomen is flat. There is no distension.      Palpations: Abdomen is soft. There is no mass.      Tenderness: There is no abdominal tenderness.      Hernia: No hernia is present.   Genitourinary:     Comments: Hernandez catheter  Musculoskeletal:      Right lower leg: No edema.      Left lower leg: No edema.   Skin:     General: Skin is warm and dry.      Comments: Wound vac to right thigh off suction   Neurological:      General: No focal deficit present.      Mental Status: She is alert and oriented to person, place, and time.   Psychiatric:         Mood and Affect: Mood normal.          Behavior: Behavior normal.          Significant Labs:  I have reviewed all pertinent lab results within the past 24 hours.  CBC:   Recent Labs   Lab 02/27/24  0535   WBC 14.04*   RBC 3.18*   HGB 7.3*   HCT 23.7*   PLT SEE COMMENT   MCV 75*   MCH 23.0*   MCHC 30.8*       CMP:   Recent Labs   Lab 02/27/24  0535 02/27/24  0631   * 153*   CALCIUM 8.1* 8.2*   ALBUMIN 1.4*  --    PROT 5.6*  --     139   K 6.3* 4.9   CO2 27 32*    102   BUN 15 14   CREATININE 1.0 1.0   ALKPHOS 93  --    ALT 9*  --    AST 13  --    BILITOT 0.1  --        LFTs:   Recent Labs   Lab 02/27/24  0535   ALT 9*   AST 13   ALKPHOS 93   BILITOT 0.1   PROT 5.6*   ALBUMIN 1.4*         Significant Diagnostics:  I have reviewed all pertinent imaging results/findings within the past 24 hours.  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 S/p wound vac replacement on 2/23 with wound vac exchange and partial closure of the wound in the OR on 2/26.    - Wound care consult placed for first bedside wound vac exchange in 2/29. We will coordinate the exchange so that we can be there too to ensure that the wound doesn't need further debridement and will be ok for ongoing wound vac changes at the SNF following discharge.  - ID consulted; Vanc/zosyn/clinda started 2/16, switched to meropenam and vanc, de-escalated to Unasyn on 2/25 with planned course through 3/6  - Poorly controlled DMII - insulin per HM  - Nutrition consult, boost supplements, severe hypoalbuminemia   - Polysubstance abuse to alcohol dependency - librium taper on, CIWA protocol  - Multiple modal pain control limited by CARLINE - continue scheduled tylenol, combination of oral  and IV narcotics  - Okay for DVT ppx   - PT consult - okay to attempt ambulation, get up to chair.   - Dispo: SNF placement          Steven Paula MD  General Surgery  [facility] - Telemetry

## 2024-02-27 NOTE — PLAN OF CARE
Spoke with Beryl at [facility] who is reviewing patient and will reach out to the family.  Per care[facility] multiple denials due to no beds or out of network.  Several no replies.    Pasrr completed.  Locet called in to [facility] @ [phone].  PASSR faxed to [facility] at:  [phone].   Awaiting 142.       02/27/24 1541   Discharge Reassessment   Assessment Type Discharge Planning Reassessment   Did the patient's condition or plan change since previous assessment? No   Discharge Plan discussed with:   (in MDT's)   Communicated AVTAR with patient/caregiver Yes   Discharge Plan A Skilled Nursing Facility   Discharge Plan B New Nursing Home placement - detention care facility   Transition of Care Barriers None   Why the patient remains in the hospital Requires continued medical care

## 2024-02-27 NOTE — PROGRESS NOTES
[facility]  Nurses Note -- 4 Eyes      2/27/2024       Skin assessed on: Q Shift      [x] No Pressure Injuries Present    [x]Prevention Measures Documented    [] Yes LDA  for Pressure Injury Previously documented     [] Yes New Pressure Injury Discovered   [] LDA for New Pressure Injury Added      Attending RN:  Emma Bearden RN     Second RN:  Haydee Tobin RN

## 2024-02-27 NOTE — ASSESSMENT & PLAN NOTE
Chronic, controlled.   -hold home meds at this time:  Amlodipine 10 mg,   Resume Lasix 40 mg p.o.    Latest blood pressure and vitals reviewed-     Temp:  [98.1 °F (36.7 °C)-99.2 °F (37.3 °C)]   Pulse:  [71-93]   Resp:  [16-20]   BP: (128-180)/(63-86)   SpO2:  [91 %-97 %] .   Home meds for hypertension were reviewed and noted below.   Hypertension Medications               amlodipine (NORVASC) 10 MG tablet Take 10 mg by mouth once daily.    furosemide (LASIX) 40 MG tablet Take 1 tablet (40 mg total) by mouth once daily.            While in the hospital, will manage blood pressure as follows; Adjust home antihypertensive regimen as follows- hold meds for now given infection/ narcotic use. Resume as warranted . May develop rebound HTN from drug/ alcohol withdrawal.    Will utilize p.r.n. blood pressure medication only if patient's blood pressure greater than 180/110 and she develops symptoms such as worsening chest pain or shortness of breath.

## 2024-02-27 NOTE — PROGRESS NOTES
On assessment, pt removed the wound vac tube unintentionally.  Charge Nurse Chilango notified, Negative pressure wound vac therapy reinitiated  Pt tolerated well.  Pain controlled by round the clock dosing. Pt educated on the importance of maintaining on extremities position and to be more cautious about her wound vac placement  Plan of care ongoing

## 2024-02-27 NOTE — PLAN OF CARE
Problem: Diabetes Comorbidity  Goal: Blood Glucose Level Within Targeted Range  Outcome: Ongoing, Progressing     Problem: Adult Inpatient Plan of Care  Goal: Plan of Care Review  Outcome: Ongoing, Progressing  Goal: Patient-Specific Goal (Individualized)  Outcome: Ongoing, Progressing  Goal: Absence of Hospital-Acquired Illness or Injury  Outcome: Ongoing, Progressing  Goal: Optimal Comfort and Wellbeing  Outcome: Ongoing, Progressing  Goal: Readiness for Transition of Care  Outcome: Ongoing, Progressing     Problem: Skin Injury Risk Increased  Goal: Skin Health and Integrity  Outcome: Ongoing, Progressing     Problem: Bleeding (Sepsis/Septic Shock)  Goal: Absence of Bleeding  Outcome: Ongoing, Progressing     Problem: Glycemic Control Impaired (Sepsis/Septic Shock)  Goal: Blood Glucose Level Within Desired Range  Outcome: Ongoing, Progressing     Problem: Infection Progression (Sepsis/Septic Shock)  Goal: Absence of Infection Signs and Symptoms  Outcome: Ongoing, Progressing     Problem: Fall Injury Risk  Goal: Absence of Fall and Fall-Related Injury  Outcome: Ongoing, Progressing     Problem: Infection  Goal: Absence of Infection Signs and Symptoms  Outcome: Ongoing, Progressing     Problem: Impaired Wound Healing  Goal: Optimal Wound Healing  Outcome: Ongoing, Progressing

## 2024-02-27 NOTE — SUBJECTIVE & OBJECTIVE
Interval History: NAEON. Feeling well this morning. AVSS. Wound vac off to suction with SS output. WBC stable, but K elevated this morning.    Medications:  Continuous Infusions:      Scheduled Meds:   acetaminophen  1,000 mg Oral Q8H    ampicillin-sulbactam  3 g Intravenous Q6H    budesonide  1 mg Nebulization Q12H    And    arformoteroL  15 mcg Nebulization BID    aspirin  81 mg Oral Daily    atorvastatin  20 mg Oral Daily    enoxparin  40 mg Subcutaneous Q24H (prophylaxis, 1700)    folic acid  1 mg Oral Daily    furosemide  40 mg Oral Daily    gabapentin  400 mg Oral BID    insulin aspart U-100  5 Units Subcutaneous TIDWM    insulin detemir U-100  15 Units Subcutaneous QHS    insulin detemir U-100 (Levemir)  20 Units Subcutaneous Daily    pantoprazole  40 mg Oral Daily    thiamine  100 mg Oral Daily     PRN Meds:0.9%  NaCl infusion (for blood administration), albuterol sulfate, dextrose 10%, dextrose 10%, glucagon (human recombinant), glucose, glucose, HYDROmorphone, insulin aspart U-100, melatonin, ondansetron, oxyCODONE, oxyCODONE, prochlorperazine, sodium chloride 0.9%     Review of patient's allergies indicates:   Allergen Reactions    Hydrochlorothiazide plus      Objective:     Vital Signs (Most Recent):  Temp: 98.2 °F (36.8 °C) (02/27/24 0358)  Pulse: 75 (02/27/24 0358)  Resp: 16 (02/27/24 0358)  BP: 132/77 (02/27/24 0358)  SpO2: (!) 94 % (02/27/24 0358) Vital Signs (24h Range):  Temp:  [97.4 °F (36.3 °C)-99.2 °F (37.3 °C)] 98.2 °F (36.8 °C)  Pulse:  [70-93] 75  Resp:  [16-20] 16  SpO2:  [91 %-96 %] 94 %  BP: (125-180)/(63-97) 132/77     Weight: 93.6 kg (206 lb 5.6 oz)  Body mass index is 35.42 kg/m².    Intake/Output - Last 3 Shifts         02/25 0700 02/26 0659 02/26 0700 02/27 0659 02/27 0700  02/28 0659    P.O. 480 240     IV Piggyback  800     Total Intake(mL/kg) 480 (5.1) 1040 (11.1)     Urine (mL/kg/hr) 2750 (1.2) 2800 (1.2)     Other       Stool 0      Total Output 2750 2800     Net -3499 -1840             Stool Occurrence 4 x               Physical Exam  Vitals and nursing note reviewed.   Constitutional:       Appearance: Normal appearance.   HENT:      Head: Normocephalic and atraumatic.   Cardiovascular:      Rate and Rhythm: Normal rate and regular rhythm.   Pulmonary:      Effort: Pulmonary effort is normal. No respiratory distress.   Abdominal:      General: Abdomen is flat. There is no distension.      Palpations: Abdomen is soft. There is no mass.      Tenderness: There is no abdominal tenderness.      Hernia: No hernia is present.   Genitourinary:     Comments: Hernadnez catheter  Musculoskeletal:      Right lower leg: No edema.      Left lower leg: No edema.   Skin:     General: Skin is warm and dry.      Comments: Wound vac to right thigh off suction   Neurological:      General: No focal deficit present.      Mental Status: She is alert and oriented to person, place, and time.   Psychiatric:         Mood and Affect: Mood normal.         Behavior: Behavior normal.          Significant Labs:  I have reviewed all pertinent lab results within the past 24 hours.  CBC:   Recent Labs   Lab 02/27/24  0535   WBC 14.04*   RBC 3.18*   HGB 7.3*   HCT 23.7*   PLT SEE COMMENT   MCV 75*   MCH 23.0*   MCHC 30.8*       CMP:   Recent Labs   Lab 02/27/24  0535 02/27/24  0631   * 153*   CALCIUM 8.1* 8.2*   ALBUMIN 1.4*  --    PROT 5.6*  --     139   K 6.3* 4.9   CO2 27 32*    102   BUN 15 14   CREATININE 1.0 1.0   ALKPHOS 93  --    ALT 9*  --    AST 13  --    BILITOT 0.1  --        LFTs:   Recent Labs   Lab 02/27/24  0535   ALT 9*   AST 13   ALKPHOS 93   BILITOT 0.1   PROT 5.6*   ALBUMIN 1.4*         Significant Diagnostics:  I have reviewed all pertinent imaging results/findings within the past 24 hours.

## 2024-02-27 NOTE — PT/OT/SLP PROGRESS
Physical Therapy Treatment    Patient Name:  Christine Mosqueda   MRN:  [id number]    Recommendations:     Discharge Recommendations: Moderate Intensity Therapy  Discharge Equipment Recommendations: bath bench, bedside commode, wheelchair  Barriers to discharge: None    Assessment:     Christine Mosqueda is a 53 y.o. 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  She presents with the following impairments/functional limitations: weakness, impaired endurance, impaired functional mobility, impaired self care skills, gait instability, decreased lower extremity function, decreased upper extremity function, decreased coordination, decreased ROM, decreased safety awareness, impaired balance, impaired cognition, pain .    Rehab Prognosis: Good; patient would benefit from acute skilled PT services to address these deficits and reach maximum level of function.    Recent Surgery: Procedure(s) (LRB):  Wound vac change,  debridement,  partial wound closure (Right) 1 Day Post-Op    Plan:     During this hospitalization, patient to be seen 4 x/week to address the identified rehab impairments via gait training, therapeutic activities, therapeutic exercises, neuromuscular re-education, wheelchair management/training and progress toward the following goals:    Plan of Care Expires:  03/05/24    Subjective     Chief Complaint: pain   Patient/Family Comments/goals: pt is pleasant and agreeable to therapy   Pain/Comfort:  Pain Rating 1: 0/10  Pain Rating Post-Intervention 1: 0/10      Objective:     Communicated with nurse prior to session.  Patient found HOB elevated with telemetry, wound vac, jansen catheter, peripheral IV, bed alarm, SCD upon PT entry to room.     General Precautions: Standard, fall, respiratory  Orthopedic Precautions: N/A  Braces: N/A  Respiratory Status: Room air   SpO2 : 89%-98% on RA on exertions. HR stable   Functional Mobility:  Bed Mobility:     Rolling Right: stand by assistance  Scooting: stand  by assistance and contact guard assistance  Supine to Sit: contact guard assistance and minimum assistance  Transfers:     Sit to Stand:  contact guard assistance and minimum assistance with rolling walker  Gait: Patient ambulated 80 ft x 2 trials on level tile with Rolling Walker with Contact Guard Assistance (chair followed) .  Pt with demonstarting min unsteady gait/ widen TOSHIA ,   with decreased bre, increased time in double stance, decreased velocity of limb motion, decreased step length, decreased swing-to-stance ratio, decreased toe-to-floor clearance and decreased weight-shifting ability.Impairments contributing to gait deviations include impaired balance, decreased flexibility, pain, impaired postural control, decreased ROM and decreased strength. V/T cues for safety technique and walker management.    Balance:  good in sitting, fair in standing       AM-PAC 6 CLICK MOBILITY  Turning over in bed (including adjusting bedclothes, sheets and blankets)?: 4  Sitting down on and standing up from a chair with arms (e.g., wheelchair, bedside commode, etc.): 3  Moving from lying on back to sitting on the side of the bed?: 3  Moving to and from a bed to a chair (including a wheelchair)?: 3  Need to walk in hospital room?: 3  Climbing 3-5 steps with a railing?: 3  Basic Mobility Total Score: 19       Treatment & Education:  Lower Extremity Exercises.    Patient educated on: Purpose and Benefits of Therapeutic Exercise(s).    Patient verbalized acceptance/understanding of instruction(s), expectation(s), and limitation(s) (for safety).  Patient performed: 10 reps x 2 (each) of B LE Ther Ex: AP, LAQ, HSC  while sitting up on EOB.       Patient required still requires verbal cues/tactile cues to ensure correct sequence, to maintain proper form, and to allow for self-correction.    Patient left up in chair on green air cushion, BLE elevated , heels offloading  with all lines intact, call button in reach, nurse notified,  and spouse and RT  present..    GOALS:   Multidisciplinary Problems       Physical Therapy Goals          Problem: Physical Therapy    Goal Priority Disciplines Outcome Goal Variances Interventions   Physical Therapy Goal     PT, PT/OT Ongoing, Progressing     Description: Goals to be met by: 3/5/24     Patient will increase functional independence with mobility by performin. Supine to sit with Modified Summers  2. Sit to stand transfer with Modified Summers  3. Bed to chair transfer with Modified Summers   4. Gait  x 10-15 feet with Stand-by Assistance using Rolling Walker.   5. Lower extremity exercise program x10 reps per handout, with supervision  6. W/C propulsion x 50' with Supervision                           Time Tracking:     PT Received On: 24  PT Start Time: 1036     PT Stop Time: 1105  PT Total Time (min): 29 min     Billable Minutes: Gait Training 19 and Therapeutic Exercise 10    Treatment Type: Treatment  PT/PTA: PTA     Number of PTA visits since last PT visit: 2     2024

## 2024-02-27 NOTE — ASSESSMENT & PLAN NOTE
Patient's FSGs are uncontrolled due to hyperglycemia on current medication regimen.  Last A1c reviewed-   Lab Results   Component Value Date    HGBA1C 8.2 (H) 04/06/2023     Most recent fingerstick glucose reviewed-   Recent Labs   Lab 02/26/24  1622 02/26/24  2031 02/27/24  0749 02/27/24  1217   POCTGLUCOSE 302* 102 174* 108       Current correctional scale  High  Maintain anti-hyperglycemic dose as follows-   Antihyperglycemics (From admission, onward)    Start     Stop Route Frequency Ordered    02/27/24 0900  insulin detemir U-100 (Levemir) pen 20 Units         -- SubQ Daily 02/25/24 1359    02/26/24 2100  insulin detemir U-100 (Levemir) pen 15 Units         -- SubQ Nightly 02/25/24 1359    02/22/24 1145  insulin aspart U-100 pen 5 Units         -- SubQ 3 times daily with meals 02/22/24 1031    02/16/24 1514  insulin aspart U-100 pen 0-15 Units         -- SubQ Before meals & nightly PRN 02/16/24 1515        Hold Oral hypoglycemics while patient is in the hospital.  A1c: 8.2  Meds: basal bolus insulin + SSI PRN to maintain goal 140-180  Titrate as needed. Basal BID (20AM, 15U PM), prandia at 5U TIDWM  ADA diet, accuchecks ACHS, hypoglycemic protocol  Hold insulin 2/20 5:00 p.m. and 2/20 6:00 a.m. in setting of NPO status

## 2024-02-28 LAB
ALBUMIN SERPL BCP-MCNC: 1.5 G/DL (ref 3.5–5.2)
ALP SERPL-CCNC: 94 U/L (ref 55–135)
ALT SERPL W/O P-5'-P-CCNC: 10 U/L (ref 10–44)
ANION GAP SERPL CALC-SCNC: 7 MMOL/L (ref 8–16)
AST SERPL-CCNC: 11 U/L (ref 10–40)
BASOPHILS # BLD AUTO: 0.03 K/UL (ref 0–0.2)
BASOPHILS NFR BLD: 0.3 % (ref 0–1.9)
BILIRUB SERPL-MCNC: 0.1 MG/DL (ref 0.1–1)
BUN SERPL-MCNC: 17 MG/DL (ref 6–20)
CALCIUM SERPL-MCNC: 8.1 MG/DL (ref 8.7–10.5)
CHLORIDE SERPL-SCNC: 104 MMOL/L (ref 95–110)
CO2 SERPL-SCNC: 30 MMOL/L (ref 23–29)
CREAT SERPL-MCNC: 1.1 MG/DL (ref 0.5–1.4)
DIFFERENTIAL METHOD BLD: ABNORMAL
EOSINOPHIL # BLD AUTO: 0.3 K/UL (ref 0–0.5)
EOSINOPHIL NFR BLD: 2.6 % (ref 0–8)
ERYTHROCYTE [DISTWIDTH] IN BLOOD BY AUTOMATED COUNT: 19.7 % (ref 11.5–14.5)
EST. GFR  (NO RACE VARIABLE): 59 ML/MIN/1.73 M^2
GLUCOSE SERPL-MCNC: 55 MG/DL (ref 70–110)
HCT VFR BLD AUTO: 23.6 % (ref 37–48.5)
HGB BLD-MCNC: 7.1 G/DL (ref 12–16)
IMM GRANULOCYTES # BLD AUTO: 0.05 K/UL (ref 0–0.04)
IMM GRANULOCYTES NFR BLD AUTO: 0.4 % (ref 0–0.5)
LYMPHOCYTES # BLD AUTO: 2.2 K/UL (ref 1–4.8)
LYMPHOCYTES NFR BLD: 18.4 % (ref 18–48)
MAGNESIUM SERPL-MCNC: 1.6 MG/DL (ref 1.6–2.6)
MCH RBC QN AUTO: 22.5 PG (ref 27–31)
MCHC RBC AUTO-ENTMCNC: 30.1 G/DL (ref 32–36)
MCV RBC AUTO: 75 FL (ref 82–98)
MONOCYTES # BLD AUTO: 1 K/UL (ref 0.3–1)
MONOCYTES NFR BLD: 8.4 % (ref 4–15)
NEUTROPHILS # BLD AUTO: 8.2 K/UL (ref 1.8–7.7)
NEUTROPHILS NFR BLD: 69.9 % (ref 38–73)
NRBC BLD-RTO: 0 /100 WBC
PHOSPHATE SERPL-MCNC: 4.6 MG/DL (ref 2.7–4.5)
PLATELET # BLD AUTO: 182 K/UL (ref 150–450)
PMV BLD AUTO: 9.8 FL (ref 9.2–12.9)
POCT GLUCOSE: 116 MG/DL (ref 70–110)
POCT GLUCOSE: 142 MG/DL (ref 70–110)
POCT GLUCOSE: 148 MG/DL (ref 70–110)
POCT GLUCOSE: 217 MG/DL (ref 70–110)
POCT GLUCOSE: 70 MG/DL (ref 70–110)
POTASSIUM SERPL-SCNC: 4.4 MMOL/L (ref 3.5–5.1)
PROT SERPL-MCNC: 5.8 G/DL (ref 6–8.4)
RBC # BLD AUTO: 3.15 M/UL (ref 4–5.4)
SODIUM SERPL-SCNC: 141 MMOL/L (ref 136–145)
WBC # BLD AUTO: 11.72 K/UL (ref 3.9–12.7)

## 2024-02-28 PROCEDURE — 27000221 HC OXYGEN, UP TO 24 HOURS

## 2024-02-28 PROCEDURE — 99900035 HC TECH TIME PER 15 MIN (STAT)

## 2024-02-28 PROCEDURE — 97530 THERAPEUTIC ACTIVITIES: CPT

## 2024-02-28 PROCEDURE — 25000003 PHARM REV CODE 250: Performed by: HOSPITALIST

## 2024-02-28 PROCEDURE — 97530 THERAPEUTIC ACTIVITIES: CPT | Mod: CQ

## 2024-02-28 PROCEDURE — 83735 ASSAY OF MAGNESIUM: CPT

## 2024-02-28 PROCEDURE — 94761 N-INVAS EAR/PLS OXIMETRY MLT: CPT

## 2024-02-28 PROCEDURE — 25000003 PHARM REV CODE 250: Performed by: INTERNAL MEDICINE

## 2024-02-28 PROCEDURE — 63600175 PHARM REV CODE 636 W HCPCS: Performed by: INTERNAL MEDICINE

## 2024-02-28 PROCEDURE — 85025 COMPLETE CBC W/AUTO DIFF WBC: CPT

## 2024-02-28 PROCEDURE — 63600175 PHARM REV CODE 636 W HCPCS: Performed by: STUDENT IN AN ORGANIZED HEALTH CARE EDUCATION/TRAINING PROGRAM

## 2024-02-28 PROCEDURE — 84100 ASSAY OF PHOSPHORUS: CPT

## 2024-02-28 PROCEDURE — 25000003 PHARM REV CODE 250: Performed by: STUDENT IN AN ORGANIZED HEALTH CARE EDUCATION/TRAINING PROGRAM

## 2024-02-28 PROCEDURE — 21400001 HC TELEMETRY ROOM

## 2024-02-28 PROCEDURE — 80053 COMPREHEN METABOLIC PANEL: CPT

## 2024-02-28 PROCEDURE — 97116 GAIT TRAINING THERAPY: CPT | Mod: CQ

## 2024-02-28 PROCEDURE — 36415 COLL VENOUS BLD VENIPUNCTURE: CPT

## 2024-02-28 PROCEDURE — 63600175 PHARM REV CODE 636 W HCPCS

## 2024-02-28 RX ORDER — HYDROMORPHONE HYDROCHLORIDE 1 MG/ML
0.2 INJECTION, SOLUTION INTRAMUSCULAR; INTRAVENOUS; SUBCUTANEOUS ONCE
Status: COMPLETED | OUTPATIENT
Start: 2024-02-28 | End: 2024-02-28

## 2024-02-28 RX ADMIN — INSULIN DETEMIR 15 UNITS: 100 INJECTION, SOLUTION SUBCUTANEOUS at 09:02

## 2024-02-28 RX ADMIN — ATORVASTATIN CALCIUM 20 MG: 10 TABLET, FILM COATED ORAL at 09:02

## 2024-02-28 RX ADMIN — AMPICILLIN SODIUM AND SULBACTAM SODIUM 3 G: 2; 1 INJECTION, POWDER, FOR SOLUTION INTRAMUSCULAR; INTRAVENOUS at 11:02

## 2024-02-28 RX ADMIN — Medication 6 MG: at 09:02

## 2024-02-28 RX ADMIN — ASPIRIN 81 MG CHEWABLE TABLET 81 MG: 81 TABLET CHEWABLE at 09:02

## 2024-02-28 RX ADMIN — PANTOPRAZOLE SODIUM 40 MG: 40 TABLET, DELAYED RELEASE ORAL at 09:02

## 2024-02-28 RX ADMIN — FUROSEMIDE 40 MG: 40 TABLET ORAL at 09:02

## 2024-02-28 RX ADMIN — GABAPENTIN 400 MG: 400 CAPSULE ORAL at 09:02

## 2024-02-28 RX ADMIN — INSULIN ASPART 6 UNITS: 100 INJECTION, SOLUTION INTRAVENOUS; SUBCUTANEOUS at 11:02

## 2024-02-28 RX ADMIN — THIAMINE HCL TAB 100 MG 100 MG: 100 TAB at 09:02

## 2024-02-28 RX ADMIN — ACETAMINOPHEN 1000 MG: 500 TABLET ORAL at 05:02

## 2024-02-28 RX ADMIN — FOLIC ACID 1 MG: 1 TABLET ORAL at 09:02

## 2024-02-28 RX ADMIN — AMPICILLIN SODIUM AND SULBACTAM SODIUM 3 G: 2; 1 INJECTION, POWDER, FOR SOLUTION INTRAMUSCULAR; INTRAVENOUS at 05:02

## 2024-02-28 RX ADMIN — HYDROMORPHONE HYDROCHLORIDE 0.2 MG: 1 INJECTION, SOLUTION INTRAMUSCULAR; INTRAVENOUS; SUBCUTANEOUS at 12:02

## 2024-02-28 RX ADMIN — OXYCODONE 10 MG: 5 TABLET ORAL at 07:02

## 2024-02-28 RX ADMIN — ENOXAPARIN SODIUM 40 MG: 40 INJECTION SUBCUTANEOUS at 05:02

## 2024-02-28 RX ADMIN — OXYCODONE 10 MG: 5 TABLET ORAL at 02:02

## 2024-02-28 RX ADMIN — AMPICILLIN SODIUM AND SULBACTAM SODIUM 3 G: 2; 1 INJECTION, POWDER, FOR SOLUTION INTRAMUSCULAR; INTRAVENOUS at 12:02

## 2024-02-28 RX ADMIN — INSULIN ASPART 5 UNITS: 100 INJECTION, SOLUTION INTRAVENOUS; SUBCUTANEOUS at 05:02

## 2024-02-28 RX ADMIN — ACETAMINOPHEN 1000 MG: 500 TABLET ORAL at 09:02

## 2024-02-28 RX ADMIN — OXYCODONE 10 MG: 5 TABLET ORAL at 09:02

## 2024-02-28 RX ADMIN — INSULIN ASPART 5 UNITS: 100 INJECTION, SOLUTION INTRAVENOUS; SUBCUTANEOUS at 11:02

## 2024-02-28 RX ADMIN — OXYCODONE 10 MG: 5 TABLET ORAL at 11:02

## 2024-02-28 NOTE — PROGRESS NOTES
Pt complaining she is in lot of pain.  Pt received oxy 10 earlier but pain scale still 10, crying  JARETT Evans notified, one time dose of hydromorphone 0.2 mg given as per the order. Nurse checked the BP before giving meds, WDL  Plan of care ongoing

## 2024-02-28 NOTE — ASSESSMENT & PLAN NOTE
Patient with acute kidney injury/acute renal failure likely due to pre-renal azotemia due to IVVD and acute tubular necrosis caused by sepsis  CARLINE is currently improving. Baseline creatinine  0.9  - Labs reviewed- Renal function/electrolytes with Estimated Creatinine Clearance: 64.1 mL/min (based on SCr of 1.1 mg/dL). according to latest data. Monitor urine output and serial BMP and adjust therapy as needed. Avoid nephrotoxins and renally dose meds for GFR listed above.    - Highly suspect ATN as an etiology at this time  - Nephrology consult requested, appreciate recs  - renal function is improving and near baseline.   - Resolved

## 2024-02-28 NOTE — SUBJECTIVE & OBJECTIVE
Interval History: Pt states she is doing good and her pain medication is working.     Review of Systems  Objective:     Vital Signs (Most Recent):  Temp: 99 °F (37.2 °C) (02/28/24 1153)  Pulse: 77 (02/28/24 1153)  Resp: 19 (02/28/24 1153)  BP: (!) 148/77 (02/28/24 1153)  SpO2: (!) 91 % (02/28/24 1153) Vital Signs (24h Range):  Temp:  [98.3 °F (36.8 °C)-99.2 °F (37.3 °C)] 99 °F (37.2 °C)  Pulse:  [75-92] 77  Resp:  [18-19] 19  SpO2:  [85 %-95 %] 91 %  BP: (130-159)/(61-89) 148/77     Weight: 93.6 kg (206 lb 5.6 oz)  Body mass index is 35.42 kg/m².    Intake/Output Summary (Last 24 hours) at 2/28/2024 1403  Last data filed at 2/28/2024 0620  Gross per 24 hour   Intake 1160 ml   Output 2350 ml   Net -1190 ml         Physical Exam  Vitals reviewed.   Constitutional:       General: She is not in acute distress.     Appearance: She is not toxic-appearing.   HENT:      Head: Normocephalic and atraumatic.      Mouth/Throat:      Mouth: Mucous membranes are moist.      Pharynx: Oropharynx is clear.   Eyes:      General: No scleral icterus.     Extraocular Movements: Extraocular movements intact.   Cardiovascular:      Rate and Rhythm: Normal rate and regular rhythm.   Pulmonary:      Effort: No respiratory distress.   Abdominal:      General: There is no distension.      Palpations: Abdomen is soft.      Tenderness: There is no abdominal tenderness.   Musculoskeletal:         General: No swelling or tenderness.   Skin:     General: Skin is warm and dry.   Neurological:      General: No focal deficit present.      Mental Status: She is alert and oriented to person, place, and time.   Psychiatric:         Mood and Affect: Mood normal.         Behavior: Behavior normal.             Significant Labs: All pertinent labs within the past 24 hours have been reviewed.    Significant Imaging: I have reviewed all pertinent imaging results/findings within the past 24 hours.

## 2024-02-28 NOTE — PLAN OF CARE
Problem: Occupational Therapy  Goal: Occupational Therapy Goal  Description: Goals to be met by: 03/07/2024     Patient will increase functional independence with ADLs by performing:    UE Dressing with [place].  LE Dressing with Minimal Assistance.  Grooming while seated at sink with Set-up Assistance.  Toileting from bedside commode with Minimal Assistance for hygiene and clothing management.   Sitting in chair  x60  minutes with Supervision.  Toilet transfer to bedside commode with Supervision.  Upper extremity exercise program x10  reps per handout, with assistance as needed for right upper extremity.    Outcome: Ongoing, Progressing   Patient recommended for BSC at home. Patient is inquiring about RW and w/c. Patient declined to do any UB therex today, said she will d/c tomorrow to home instead of facility.

## 2024-02-28 NOTE — PLAN OF CARE
Still no accepting facility.     Unable to accept:    Juan Pablo Polanco  [facility]  [facility]IN [facility]  [facility]    No response:    [facility]  [facility]    SW will continue to follow-up with the no response facilities.

## 2024-02-28 NOTE — PROGRESS NOTES
[facility]  Nurses Note -- 4 Eyes      2/28/2024       Skin assessed on: Q Shift      [x] No Pressure Injuries Present    [x]Prevention Measures Documented  Mepilex applied    [] Yes LDA  for Pressure Injury Previously documented     [] Yes New Pressure Injury Discovered   [] LDA for New Pressure Injury Added      Attending RN:  Emma Bearden RN     Second RN:  Haydee Tobin RN

## 2024-02-28 NOTE — PT/OT/SLP PROGRESS
Physical Therapy Treatment    Patient Name:  Christine Mosqueda   MRN:  [id number]    Recommendations:     Discharge Recommendations: Moderate Intensity Therapy  Discharge Equipment Recommendations: bath bench, bedside commode, wheelchair  Barriers to discharge: None    Assessment:     Christine Mosqueda is a 53 y.o. 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  She presents with the following impairments/functional limitations: weakness, impaired endurance, impaired self care skills, impaired functional mobility, gait instability, decreased lower extremity function, decreased upper extremity function, decreased coordination, impaired balance, impaired skin, pain, decreased safety awareness, decreased ROM, impaired cardiopulmonary response to activity .    Rehab Prognosis: Good; patient would benefit from acute skilled PT services to address these deficits and reach maximum level of function.    Recent Surgery: Procedure(s) (LRB):  Wound vac change,  debridement,  partial wound closure (Right) 2 Days Post-Op    Plan:     During this hospitalization, patient to be seen 4 x/week to address the identified rehab impairments via gait training, therapeutic activities, therapeutic exercises, neuromuscular re-education, wheelchair management/training and progress toward the following goals:    Plan of Care Expires:  03/05/24    Subjective     Chief Complaint: had a rough night   Patient/Family Comments/goals: pt is pleasant and agreeable to therapy   Pain/Comfort:  Pain Rating 1: 0/10  Pain Rating Post-Intervention 1: 0/10      Objective:     Communicated with nurse prior to session.  Patient found HOB elevated with peripheral IV, telemetry, oxygen, wound vac, jansen catheter upon PT entry to room.     General Precautions: Standard, fall, respiratory  Orthopedic Precautions: N/A  Braces: N/A  Respiratory Status: Room air   SpO2 : 90% - 95% on RA on exertions   Functional Mobility:  Bed Mobility:     Scooting:  stand by assistance and contact guard assistance  Supine to Sit: contact guard assistance / minimum assistance  Transfers:     Sit to Stand:  contact guard assistance  with rolling walker  Gait: Patient ambulated 80 ft x 2 trials on level tile with Rolling Walker with Contact Guard Assistance (chair followed) .  Pt with demonstarting min unsteady gait/ widen TOSHIA ,   with decreased bre, increased time in double stance, decreased velocity of limb motion, decreased step length, decreased swing-to-stance ratio, decreased toe-to-floor clearance and decreased weight-shifting ability.Impairments contributing to gait deviations include impaired balance, decreased flexibility, pain, impaired postural control, decreased ROM and decreased strength. V/T cues for safety technique and walker management.    Balance:  good in sitting, fair in standing       AM-PAC 6 CLICK MOBILITY  Turning over in bed (including adjusting bedclothes, sheets and blankets)?: 4  Sitting down on and standing up from a chair with arms (e.g., wheelchair, bedside commode, etc.): 3  Moving from lying on back to sitting on the side of the bed?: 3  Moving to and from a bed to a chair (including a wheelchair)?: 3  Need to walk in hospital room?: 3  Climbing 3-5 steps with a railing?: 2  Basic Mobility Total Score: 18       Treatment & Education:  Pt performed bed mobility, transfer and gait training as above.     Patient left up in chair with on green air cushion , lunch tray table set up  all lines intact, call button in reach, nurse notified, and spouse  present..    GOALS:   Multidisciplinary Problems       Physical Therapy Goals          Problem: Physical Therapy    Goal Priority Disciplines Outcome Goal Variances Interventions   Physical Therapy Goal     PT, PT/OT Ongoing, Progressing     Description: Goals to be met by: 3/5/24     Patient will increase functional independence with mobility by performin. Supine to sit with Modified  [place]  2. Sit to stand transfer with Modified [place]  3. Bed to chair transfer with Modified [place]   4. Gait  x 10-15 feet with Stand-by Assistance using Rolling Walker.   5. Lower extremity exercise program x10 reps per handout, with supervision  6. W/C propulsion x 50' with Supervision                           Time Tracking:     PT Received On: 02/28/24  PT Start Time: 1203     PT Stop Time: 1228  PT Total Time (min): 25 min     Billable Minutes: Gait Training 17 and Therapeutic Activity 8    Treatment Type: Treatment  PT/PTA: PTA     Number of PTA visits since last PT visit: 4     02/28/2024

## 2024-02-28 NOTE — PROGRESS NOTES
[facility] Medicine  Progress Note    Patient Name: Christine Mosqueda  MRN: [id number]  Patient Class: IP- Inpatient   Admission Date: 2/15/2024  Length of Stay: 12 days  Attending Physician: Qasim Skinner III, MD  Primary Care Provider: [facility]        Subjective:     Principal Problem:Estephanie's gangrene        HPI:  Ms. Mosqueda is a 53 yoF with a PMHx of diastolic heart failure, HTN, T2DM on insulin. She presented to the hospital with worsening pain and swelling of the right medial thigh. She developed an abscess in the area that underwent I+D at [facility] a few days ago. She was sent home on PO abx. Since that time the pain and swelling have worsened.  In ED, CT scan demonstrated extensive inflammation extending from the right perineum to the right medial thigh with multiple foci of air. She was admitted to general surgery for estephanie's gangrene. She underwent extensive debridement under general surgery today in OR. She is currently doing well with good pain control. HM consulted for medical management.     Overview/Hospital Course:  Ms Christine Mosqueda is a 53 y.o.  woman with poorly controlled type II DM who was admitted for sepsis secondary to bacteremia and Estephanie's gangrene. Pt presented w/ worsening pain and swelling of the Rt medial thigh. CT scan findings were consistent w/ Estephanie's gangrene. General surgery team consulted. S/p surgical debridement 02/16, 2/17, 2/19, and on 02/21 (wound vac placed.) S/p OR 2/23 for first wound vac change. Plan for OR again on 02/26. Had acute on chronic anemia, likely exacerbated with multiple debridements. Required blood transfusions on 02/19 and again on 02/21.  Blood and wound cultures with lactobacillus. ID consulted- on meropenem. Repeat blood cx with no growth thus far. Also found to have CARLINE on admission, nephrology following. CARLINE resolved. PT/OT recommends SNF on discharge.     Interval History: Pt states she is doing  good and her pain medication is working.     Review of Systems  Objective:     Vital Signs (Most Recent):  Temp: 99 °F (37.2 °C) (02/28/24 1153)  Pulse: 77 (02/28/24 1153)  Resp: 19 (02/28/24 1153)  BP: (!) 148/77 (02/28/24 1153)  SpO2: (!) 91 % (02/28/24 1153) Vital Signs (24h Range):  Temp:  [98.3 °F (36.8 °C)-99.2 °F (37.3 °C)] 99 °F (37.2 °C)  Pulse:  [75-92] 77  Resp:  [18-19] 19  SpO2:  [85 %-95 %] 91 %  BP: (130-159)/(61-89) 148/77     Weight: 93.6 kg (206 lb 5.6 oz)  Body mass index is 35.42 kg/m².    Intake/Output Summary (Last 24 hours) at 2/28/2024 1403  Last data filed at 2/28/2024 0620  Gross per 24 hour   Intake 1160 ml   Output 2350 ml   Net -1190 ml         Physical Exam  Vitals reviewed.   Constitutional:       General: She is not in acute distress.     Appearance: She is not toxic-appearing.   HENT:      Head: Normocephalic and atraumatic.      Mouth/Throat:      Mouth: Mucous membranes are moist.      Pharynx: Oropharynx is clear.   Eyes:      General: No scleral icterus.     Extraocular Movements: Extraocular movements intact.   Cardiovascular:      Rate and Rhythm: Normal rate and regular rhythm.   Pulmonary:      Effort: No respiratory distress.   Abdominal:      General: There is no distension.      Palpations: Abdomen is soft.      Tenderness: There is no abdominal tenderness.   Musculoskeletal:         General: No swelling or tenderness.   Skin:     General: Skin is warm and dry.   Neurological:      General: No focal deficit present.      Mental Status: She is alert and oriented to person, place, and time.   Psychiatric:         Mood and Affect: Mood normal.         Behavior: Behavior normal.             Significant Labs: All pertinent labs within the past 24 hours have been reviewed.    Significant Imaging: I have reviewed all pertinent imaging results/findings within the past 24 hours.    Assessment/Plan:      * Librado's gangrene  CT on admit with R perineum to R medial thigh [place].    - Surgery consulted: S/p debridement on 02/16, 2/17, 2/19, and 2/21 (wound vac placed.)  - s/p OR 2/23 for first wound vac change. S/p repeat vac change on Mon 02/26 with partial wound closure. Surgery plan for bedside wound vac change on Thursday (2/29)  - blood and wound cultures with lactobacillus.   - ID consulted. Descalate from meropenem to Unasyn for 2 weeks. EOC 03/06/24  - jansen in place due to surgical wound site  -Plan for SNF on discharge     Debility  Patient with Acute debility due to other reduced mobility. valuation for etiology is complete. Plan includes progressive mobility protocol initated and PT/OT consulted.    -PTOT recommend SNF  -CM to assist with placement     Advanced care planning/counseling discussion  Advance Care Planning    Date: 02/21/2024    Code Status  I engaged the the patient in a voluntary conversation about the patient's preferences for care  at the very end of life. The patient wishes to have CPR and other invasive treatments performed when her heart and/or breathing stops. I communicated to the patient that her wishes align with full code status and she agrees and verbalized understanding.   I spent a total of 16 minutes engaging the patient in this advance care planning discussion.           Code Status: Full Code      Class 2 severe obesity due to excess calories with serious comorbidity and body mass index (BMI) of 35.0 to 35.9 in adult  Body mass index is 35.42 kg/m². Morbid obesity complicates all aspects of disease management from diagnostic modalities to treatment. Weight loss encouraged and health benefits explained to patient.         ETOH abuse  Patient drinks at least a 6 pack beer daily  - Start scheduled librium- wean ordered  - Thiamine, folate, potassium, magnesium      Cocaine abuse  Patient sprinkles crack crystals in her marijuana  Wants help. Tearful. Emotional support provided.   CM consulted.       Anemia of chronic disease  Patient's anemia is  currently controlled. Has received 1 units of PRBCs on 2/19 and 02/21 . Etiology likely d/t chronic disease and acute blood loss post surgery.  Current CBC reviewed-   Lab Results   Component Value Date    HGB 7.1 (L) 02/28/2024    HCT 23.6 (L) 02/28/2024     - monitor daily   -s/p 1U pRBC on 02/19 and 02/21    Essential hypertension  Chronic, controlled.   -hold home meds at this time:  Amlodipine 10 mg,   Resume Lasix 40 mg p.o.    Latest blood pressure and vitals reviewed-     Temp:  [98.3 °F (36.8 °C)-99.2 °F (37.3 °C)]   Pulse:  [75-92]   Resp:  [18-19]   BP: (130-159)/(61-89)   SpO2:  [85 %-95 %] .   Home meds for hypertension were reviewed and noted below.   Hypertension Medications               amlodipine (NORVASC) 10 MG tablet Take 10 mg by mouth once daily.    furosemide (LASIX) 40 MG tablet Take 1 tablet (40 mg total) by mouth once daily.            While in the hospital, will manage blood pressure as follows; Adjust home antihypertensive regimen as follows- hold meds for now given infection/ narcotic use. Resume as warranted . May develop rebound HTN from drug/ alcohol withdrawal.    Will utilize p.r.n. blood pressure medication only if patient's blood pressure greater than 180/110 and she develops symptoms such as worsening chest pain or shortness of breath.    Sepsis with acute renal failure and tubular necrosis without septic shock  Defined by leukocytosis, tachycardia and groin abscess. Source is [place].   - surgical management with debridement  - antibiotics= Unasyn per ID  - Leukocytosis improving     Chronic diastolic congestive heart failure  Patient is identified as having Diastolic (HFpEF) heart failure that is Chronic. CHF is currently controlled. Latest ECHO performed and demonstrates- Results for orders placed during the hospital encounter of 03/10/23    Echo    Interpretation Summary  · The left ventricle is normal in size with low normal systolic function.  · The estimated ejection  fraction is 53%.  · There is abnormal septal wall motion.  · Indeterminate left ventricular diastolic function.  · Mild left atrial enlargement.  · Normal right ventricular size with low normal right ventricular systolic function.  · Mild right atrial enlargement.  · Mild mitral regurgitation.  · Mild tricuspid regurgitation.  · Normal central venous pressure (3 mmHg).  · The estimated PA systolic pressure is 23 mmHg.    No acute issues  Monitor fluid status- does have lower extremity edema   Discussed with nephrology, okay to resume lasix 02/23    ILD (interstitial lung disease)  -Noted on imaging; CT chest 2022  -Stable, no acute issues.   -Not on home O2 but says she should be (but never received because she left AMA at OSH)  -would test prior to discharge .      CARLINE (acute kidney injury)  Patient with acute kidney injury/acute renal failure likely due to pre-renal azotemia due to IVVD and acute tubular necrosis caused by sepsis  CARLINE is currently improving. Baseline creatinine  0.9  - Labs reviewed- Renal function/electrolytes with Estimated Creatinine Clearance: 64.1 mL/min (based on SCr of 1.1 mg/dL). according to latest data. Monitor urine output and serial BMP and adjust therapy as needed. Avoid nephrotoxins and renally dose meds for GFR listed above.    - Highly suspect ATN as an etiology at this time  - Nephrology consult requested, appreciate recs  - renal function is improving and near baseline.   - Resolved    Tobacco abuse  - Pt was counseled about cessation x4 minutes      Type 2 diabetes mellitus with hyperglycemia, without long-term current use of insulin  Patient's FSGs are uncontrolled due to hyperglycemia on current medication regimen.  Last A1c reviewed-   Lab Results   Component Value Date    HGBA1C 8.2 (H) 04/06/2023     Most recent fingerstick glucose reviewed-   Recent Labs   Lab 02/27/24 2001 02/28/24  0006 02/28/24  0745 02/28/24  1150   POCTGLUCOSE 202* 148* 70 217*       Current  correctional scale  High  Maintain anti-hyperglycemic dose as follows-   Antihyperglycemics (From admission, onward)      Start     Stop Route Frequency Ordered    02/27/24 0900  insulin detemir U-100 (Levemir) pen 20 Units         -- SubQ Daily 02/25/24 1359    02/26/24 2100  insulin detemir U-100 (Levemir) pen 15 Units         -- SubQ Nightly 02/25/24 1359    02/22/24 1145  insulin aspart U-100 pen 5 Units         -- SubQ 3 times daily with meals 02/22/24 1031    02/16/24 1514  insulin aspart U-100 pen 0-15 Units         -- SubQ Before meals & nightly PRN 02/16/24 1515          Hold Oral hypoglycemics while patient is in the hospital.  A1c: 8.2  Meds: basal bolus insulin + SSI PRN to maintain goal 140-180  Titrate as needed. Basal BID (20AM, 15U PM), prandia at 5U TIDWM  ADA diet, accuchecks ACHS, hypoglycemic protocol  Hold insulin 2/20 5:00 p.m. and 2/20 6:00 a.m. in setting of NPO status            VTE Risk Mitigation (From admission, onward)           Ordered     enoxaparin injection 40 mg  Every 24 hours         02/24/24 1615     IP VTE HIGH RISK PATIENT  Once         02/16/24 0506     Place sequential compression device  Until discontinued         02/16/24 0506                    Discharge Planning   AVTAR: 2/17/2024     Code Status: Full Code   Is the patient medically ready for discharge?:     Reason for patient still in hospital (select all that apply): Treatment and Consult recommendations  Discharge Plan A: Skilled Nursing Facility                  Qasim Skinner III, MD  Department of Hospital Medicine   [facility]

## 2024-02-28 NOTE — PLAN OF CARE
SW sent rehab referral to the following facilities via careport:    [facility]  [facility]

## 2024-02-28 NOTE — PROGRESS NOTES
Pt not given Insulin Aspart as pt's BG been on lower side during day shift. Pt received Insulin Detemir 15 units at 2136 pm.  Provider Veronica Evans notified , order received to recheck BG around [place] and to hold insulin Aspart for now.    [place]  [facility]  Pt resting well, but in pain  plan of care ongoing

## 2024-02-28 NOTE — ASSESSMENT & PLAN NOTE
Patient's anemia is currently controlled. Has received 1 units of PRBCs on 2/19 and 02/21 . Etiology likely d/t chronic disease and acute blood loss post surgery.  Current CBC reviewed-   Lab Results   Component Value Date    HGB 7.1 (L) 02/28/2024    HCT 23.6 (L) 02/28/2024     - monitor daily   -s/p 1U pRBC on 02/19 and 02/21

## 2024-02-28 NOTE — ASSESSMENT & PLAN NOTE
Patient's FSGs are uncontrolled due to hyperglycemia on current medication regimen.  Last A1c reviewed-   Lab Results   Component Value Date    HGBA1C 8.2 (H) 04/06/2023     Most recent fingerstick glucose reviewed-   Recent Labs   Lab 02/27/24 2001 02/28/24  0006 02/28/24  0745 02/28/24  1150   POCTGLUCOSE 202* 148* 70 217*       Current correctional scale  High  Maintain anti-hyperglycemic dose as follows-   Antihyperglycemics (From admission, onward)    Start     Stop Route Frequency Ordered    02/27/24 0900  insulin detemir U-100 (Levemir) pen 20 Units         -- SubQ Daily 02/25/24 1359    02/26/24 2100  insulin detemir U-100 (Levemir) pen 15 Units         -- SubQ Nightly 02/25/24 1359    02/22/24 1145  insulin aspart U-100 pen 5 Units         -- SubQ 3 times daily with meals 02/22/24 1031    02/16/24 1514  insulin aspart U-100 pen 0-15 Units         -- SubQ Before meals & nightly PRN 02/16/24 1515        Hold Oral hypoglycemics while patient is in the hospital.  A1c: 8.2  Meds: basal bolus insulin + SSI PRN to maintain goal 140-180  Titrate as needed. Basal BID (20AM, 15U PM), prandia at 5U TIDWM  ADA diet, accuchecks ACHS, hypoglycemic protocol  Hold insulin 2/20 5:00 p.m. and 2/20 6:00 a.m. in setting of NPO status

## 2024-02-28 NOTE — PT/OT/SLP PROGRESS
Occupational Therapy   Treatment    Name: Christine Mosqueda  MRN: [id number]  Admitting Diagnosis:  Librado's gangrene  2 Days Post-Op    Recommendations:     Discharge Recommendations: Moderate Intensity Therapy  Discharge Equipment Recommendations:  bedside commode  Barriers to discharge:  Other (Comment)    Assessment:     Christine Mosqueda is a 53 y.o. female with a medical diagnosis of Librado's gangrene.  She presents with HOB elevated and on 2L NC oxygen with  present. Occupational therapy re-educated her re: keeping feet off end of bed to prevent pressure areas, so she scooted up in bed. Performance deficits affecting function are weakness, impaired endurance, impaired sensation, impaired self care skills, impaired functional mobility, gait instability, impaired balance, impaired cognition, decreased lower extremity function, decreased safety awareness, pain, decreased ROM, impaired skin, edema, impaired cardiopulmonary response to activity.     Rehab Prognosis:   Fair+  ; patient would benefit from acute skilled OT services to address these deficits and reach maximum level of function.       Plan:     Patient to be seen 5 x/week to address the above listed problems via self-care/home management, therapeutic activities, therapeutic exercises  Plan of Care Expires: 03/07/24  Plan of Care Reviewed with: patient, spouse    Subjective     Chief Complaint: pain in right side/leg   Patient/Family Comments/goals: return home with home health with    Pain/Comfort:  Pain Rating 1: other (see comments) (she didn't rate, but was awaiting pain medicine)  Location - Side 1: Right  Location 1: leg  Pain Addressed 1: Nurse notified    Objective:     Communicated with: RN,  prior to session.  Patient found HOB elevated with peripheral IV, telemetry, oxygen, wound vac, jansen catheter upon OT entry to room.    General Precautions: Standard, fall, respiratory    Orthopedic Precautions:N/A  Braces: N/A  Respiratory  Status: Nasal cannula, flow 2 L/min     Occupational Performance:     Bed Mobility:    Patient completed Scooting/Bridging with minimum assistance from  at end of scooting     Functional Mobility/Transfers:  Patient declined     Activities of Daily Living:  Patient declined       [facility] 6 Click ADL: 16    Treatment & Education:  Occupational therapy educated her re:  UB therex, but she completed 1 rep of overhead exercises and then stopped and did not want to continue due to pain.   Occupational therapy asked her about precautions, such as lifting, but she and  said he would help her with activities of daily living and instrumental activities of daily living.     Patient left with bed in chair position with all lines intact, call button in reach, RN  notified, and   present    GOALS:   Multidisciplinary Problems       Occupational Therapy Goals          Problem: Occupational Therapy    Goal Priority Disciplines Outcome Interventions   Occupational Therapy Goal     OT, PT/OT Ongoing, Progressing    Description: Goals to be met by: 03/07/2024     Patient will increase functional independence with ADLs by performing:    UE Dressing with [place].  LE Dressing with Minimal Assistance.  Grooming while seated at sink with Set-up Assistance.  Toileting from bedside commode with Minimal Assistance for hygiene and clothing management.   Sitting in chair  x60  minutes with Supervision.  Toilet transfer to bedside commode with Supervision.  Upper extremity exercise program x10  reps per handout, with assistance as needed for right upper extremity.                         Time Tracking:     OT Date of Treatment: 02/28/24  OT Start Time: 1423  OT Stop Time: 1432  OT Total Time (min): 9 min    Billable Minutes:Therapeutic Activity 9     OT/BRIDGET: OT          2/28/2024

## 2024-02-28 NOTE — ASSESSMENT & PLAN NOTE
Chronic, controlled.   -hold home meds at this time:  Amlodipine 10 mg,   Resume Lasix 40 mg p.o.    Latest blood pressure and vitals reviewed-     Temp:  [98.3 °F (36.8 °C)-99.2 °F (37.3 °C)]   Pulse:  [75-92]   Resp:  [18-19]   BP: (130-159)/(61-89)   SpO2:  [85 %-95 %] .   Home meds for hypertension were reviewed and noted below.   Hypertension Medications               amlodipine (NORVASC) 10 MG tablet Take 10 mg by mouth once daily.    furosemide (LASIX) 40 MG tablet Take 1 tablet (40 mg total) by mouth once daily.            While in the hospital, will manage blood pressure as follows; Adjust home antihypertensive regimen as follows- hold meds for now given infection/ narcotic use. Resume as warranted . May develop rebound HTN from drug/ alcohol withdrawal.    Will utilize p.r.n. blood pressure medication only if patient's blood pressure greater than 180/110 and she develops symptoms such as worsening chest pain or shortness of breath.

## 2024-02-28 NOTE — PROGRESS NOTES
KCI/3M VAC dressing intact to right groin/medial thigh at 125 mmHg continuous suction. VAC canister containing a red drainage. Patient aware of plan to change dressing at bedside tomorrow.   CM exploring Rehab units for discharge. When discharged to facility, will need to remove VAC dressing/NPWT device and apply dressing to wound. Accepting facility will place their contracted device.

## 2024-02-29 PROBLEM — M72.6 NECROTIZING FASCIITIS: Status: ACTIVE | Noted: 2024-02-29

## 2024-02-29 LAB
ALBUMIN SERPL BCP-MCNC: 1.6 G/DL (ref 3.5–5.2)
ALP SERPL-CCNC: 97 U/L (ref 55–135)
ALT SERPL W/O P-5'-P-CCNC: 9 U/L (ref 10–44)
ANION GAP SERPL CALC-SCNC: 8 MMOL/L (ref 8–16)
AST SERPL-CCNC: 14 U/L (ref 10–40)
BASOPHILS # BLD AUTO: 0.06 K/UL (ref 0–0.2)
BASOPHILS NFR BLD: 0.5 % (ref 0–1.9)
BILIRUB SERPL-MCNC: 0.2 MG/DL (ref 0.1–1)
BUN SERPL-MCNC: 17 MG/DL (ref 6–20)
CALCIUM SERPL-MCNC: 8.1 MG/DL (ref 8.7–10.5)
CHLORIDE SERPL-SCNC: 103 MMOL/L (ref 95–110)
CO2 SERPL-SCNC: 27 MMOL/L (ref 23–29)
CREAT SERPL-MCNC: 1.1 MG/DL (ref 0.5–1.4)
DIFFERENTIAL METHOD BLD: ABNORMAL
EOSINOPHIL # BLD AUTO: 0.5 K/UL (ref 0–0.5)
EOSINOPHIL NFR BLD: 4.3 % (ref 0–8)
ERYTHROCYTE [DISTWIDTH] IN BLOOD BY AUTOMATED COUNT: 19.4 % (ref 11.5–14.5)
EST. GFR  (NO RACE VARIABLE): 59 ML/MIN/1.73 M^2
GLUCOSE SERPL-MCNC: 81 MG/DL (ref 70–110)
HCT VFR BLD AUTO: 25.1 % (ref 37–48.5)
HGB BLD-MCNC: 7.6 G/DL (ref 12–16)
IMM GRANULOCYTES # BLD AUTO: 0.16 K/UL (ref 0–0.04)
IMM GRANULOCYTES NFR BLD AUTO: 1.5 % (ref 0–0.5)
LYMPHOCYTES # BLD AUTO: 2.6 K/UL (ref 1–4.8)
LYMPHOCYTES NFR BLD: 24 % (ref 18–48)
MAGNESIUM SERPL-MCNC: 1.6 MG/DL (ref 1.6–2.6)
MCH RBC QN AUTO: 22.8 PG (ref 27–31)
MCHC RBC AUTO-ENTMCNC: 30.3 G/DL (ref 32–36)
MCV RBC AUTO: 75 FL (ref 82–98)
MONOCYTES # BLD AUTO: 1 K/UL (ref 0.3–1)
MONOCYTES NFR BLD: 9.1 % (ref 4–15)
NEUTROPHILS # BLD AUTO: 6.7 K/UL (ref 1.8–7.7)
NEUTROPHILS NFR BLD: 60.6 % (ref 38–73)
NRBC BLD-RTO: 0 /100 WBC
PHOSPHATE SERPL-MCNC: 4.9 MG/DL (ref 2.7–4.5)
PLATELET # BLD AUTO: ABNORMAL K/UL (ref 150–450)
PLATELET BLD QL SMEAR: ABNORMAL
PMV BLD AUTO: ABNORMAL FL (ref 9.2–12.9)
POCT GLUCOSE: 121 MG/DL (ref 70–110)
POCT GLUCOSE: 167 MG/DL (ref 70–110)
POCT GLUCOSE: 176 MG/DL (ref 70–110)
POCT GLUCOSE: 86 MG/DL (ref 70–110)
POCT GLUCOSE: 88 MG/DL (ref 70–110)
POTASSIUM SERPL-SCNC: 5.1 MMOL/L (ref 3.5–5.1)
PROT SERPL-MCNC: 6.2 G/DL (ref 6–8.4)
RBC # BLD AUTO: 3.34 M/UL (ref 4–5.4)
SODIUM SERPL-SCNC: 138 MMOL/L (ref 136–145)
WBC # BLD AUTO: 10.97 K/UL (ref 3.9–12.7)

## 2024-02-29 PROCEDURE — 97110 THERAPEUTIC EXERCISES: CPT | Mod: CQ

## 2024-02-29 PROCEDURE — 21400001 HC TELEMETRY ROOM

## 2024-02-29 PROCEDURE — 99900035 HC TECH TIME PER 15 MIN (STAT)

## 2024-02-29 PROCEDURE — 36415 COLL VENOUS BLD VENIPUNCTURE: CPT

## 2024-02-29 PROCEDURE — 36410 VNPNXR 3YR/> PHY/QHP DX/THER: CPT

## 2024-02-29 PROCEDURE — 97606 NEG PRS WND THER DME>50 SQCM: CPT

## 2024-02-29 PROCEDURE — 25000003 PHARM REV CODE 250: Performed by: STUDENT IN AN ORGANIZED HEALTH CARE EDUCATION/TRAINING PROGRAM

## 2024-02-29 PROCEDURE — 27000221 HC OXYGEN, UP TO 24 HOURS

## 2024-02-29 PROCEDURE — C1751 CATH, INF, PER/CENT/MIDLINE: HCPCS

## 2024-02-29 PROCEDURE — 63600175 PHARM REV CODE 636 W HCPCS: Performed by: HOSPITALIST

## 2024-02-29 PROCEDURE — 63600175 PHARM REV CODE 636 W HCPCS: Performed by: STUDENT IN AN ORGANIZED HEALTH CARE EDUCATION/TRAINING PROGRAM

## 2024-02-29 PROCEDURE — 25000003 PHARM REV CODE 250: Performed by: INTERNAL MEDICINE

## 2024-02-29 PROCEDURE — 63600175 PHARM REV CODE 636 W HCPCS: Performed by: INTERNAL MEDICINE

## 2024-02-29 PROCEDURE — 97116 GAIT TRAINING THERAPY: CPT | Mod: CQ

## 2024-02-29 PROCEDURE — 83735 ASSAY OF MAGNESIUM: CPT

## 2024-02-29 PROCEDURE — 97530 THERAPEUTIC ACTIVITIES: CPT | Mod: CQ

## 2024-02-29 PROCEDURE — 80053 COMPREHEN METABOLIC PANEL: CPT

## 2024-02-29 PROCEDURE — 94761 N-INVAS EAR/PLS OXIMETRY MLT: CPT

## 2024-02-29 PROCEDURE — 25000003 PHARM REV CODE 250: Performed by: HOSPITALIST

## 2024-02-29 PROCEDURE — 85025 COMPLETE CBC W/AUTO DIFF WBC: CPT

## 2024-02-29 PROCEDURE — 84100 ASSAY OF PHOSPHORUS: CPT

## 2024-02-29 PROCEDURE — A4216 STERILE WATER/SALINE, 10 ML: HCPCS | Performed by: STUDENT IN AN ORGANIZED HEALTH CARE EDUCATION/TRAINING PROGRAM

## 2024-02-29 RX ORDER — ALPRAZOLAM 0.25 MG/1
0.25 TABLET ORAL 2 TIMES DAILY PRN
Status: DISCONTINUED | OUTPATIENT
Start: 2024-02-29 | End: 2024-03-06 | Stop reason: HOSPADM

## 2024-02-29 RX ORDER — SODIUM CHLORIDE 0.9 % (FLUSH) 0.9 %
10 SYRINGE (ML) INJECTION EVERY 6 HOURS
Status: DISCONTINUED | OUTPATIENT
Start: 2024-02-29 | End: 2024-03-06 | Stop reason: HOSPADM

## 2024-02-29 RX ORDER — SODIUM CHLORIDE 0.9 % (FLUSH) 0.9 %
10 SYRINGE (ML) INJECTION
Status: DISCONTINUED | OUTPATIENT
Start: 2024-02-29 | End: 2024-03-06 | Stop reason: HOSPADM

## 2024-02-29 RX ADMIN — AMPICILLIN SODIUM AND SULBACTAM SODIUM 3 G: 2; 1 INJECTION, POWDER, FOR SOLUTION INTRAMUSCULAR; INTRAVENOUS at 11:02

## 2024-02-29 RX ADMIN — ATORVASTATIN CALCIUM 20 MG: 10 TABLET, FILM COATED ORAL at 09:02

## 2024-02-29 RX ADMIN — GABAPENTIN 400 MG: 400 CAPSULE ORAL at 09:02

## 2024-02-29 RX ADMIN — AMPICILLIN SODIUM AND SULBACTAM SODIUM 3 G: 2; 1 INJECTION, POWDER, FOR SOLUTION INTRAMUSCULAR; INTRAVENOUS at 12:02

## 2024-02-29 RX ADMIN — HYDROMORPHONE HYDROCHLORIDE 1 MG: 1 INJECTION, SOLUTION INTRAMUSCULAR; INTRAVENOUS; SUBCUTANEOUS at 12:02

## 2024-02-29 RX ADMIN — FOLIC ACID 1 MG: 1 TABLET ORAL at 09:02

## 2024-02-29 RX ADMIN — AMPICILLIN SODIUM AND SULBACTAM SODIUM 3 G: 2; 1 INJECTION, POWDER, FOR SOLUTION INTRAMUSCULAR; INTRAVENOUS at 05:02

## 2024-02-29 RX ADMIN — Medication 10 ML: at 11:02

## 2024-02-29 RX ADMIN — ALPRAZOLAM 0.25 MG: 0.25 TABLET ORAL at 11:02

## 2024-02-29 RX ADMIN — PANTOPRAZOLE SODIUM 40 MG: 40 TABLET, DELAYED RELEASE ORAL at 09:02

## 2024-02-29 RX ADMIN — OXYCODONE 10 MG: 5 TABLET ORAL at 08:02

## 2024-02-29 RX ADMIN — ACETAMINOPHEN 1000 MG: 500 TABLET ORAL at 02:02

## 2024-02-29 RX ADMIN — ENOXAPARIN SODIUM 40 MG: 40 INJECTION SUBCUTANEOUS at 05:02

## 2024-02-29 RX ADMIN — ACETAMINOPHEN 1000 MG: 500 TABLET ORAL at 09:02

## 2024-02-29 RX ADMIN — ACETAMINOPHEN 1000 MG: 500 TABLET ORAL at 05:02

## 2024-02-29 RX ADMIN — GABAPENTIN 400 MG: 400 CAPSULE ORAL at 08:02

## 2024-02-29 RX ADMIN — OXYCODONE 10 MG: 5 TABLET ORAL at 10:02

## 2024-02-29 RX ADMIN — OXYCODONE 10 MG: 5 TABLET ORAL at 04:02

## 2024-02-29 RX ADMIN — FUROSEMIDE 40 MG: 40 TABLET ORAL at 09:02

## 2024-02-29 RX ADMIN — INSULIN ASPART 5 UNITS: 100 INJECTION, SOLUTION INTRAVENOUS; SUBCUTANEOUS at 12:02

## 2024-02-29 RX ADMIN — Medication 10 ML: at 05:02

## 2024-02-29 RX ADMIN — INSULIN ASPART 3 UNITS: 100 INJECTION, SOLUTION INTRAVENOUS; SUBCUTANEOUS at 12:02

## 2024-02-29 RX ADMIN — ASPIRIN 81 MG CHEWABLE TABLET 81 MG: 81 TABLET CHEWABLE at 09:02

## 2024-02-29 RX ADMIN — THIAMINE HCL TAB 100 MG 100 MG: 100 TAB at 09:02

## 2024-02-29 NOTE — ASSESSMENT & PLAN NOTE
Patient's anemia is currently controlled. Has received 1 units of PRBCs on 2/19 and 02/21 . Etiology likely d/t chronic disease and acute blood loss post surgery.  Current CBC reviewed-   Lab Results   Component Value Date    HGB 7.6 (L) 02/29/2024    HCT 25.1 (L) 02/29/2024     - monitor daily   -s/p 1U pRBC on 02/19 and 02/21

## 2024-02-29 NOTE — SUBJECTIVE & OBJECTIVE
Interval History: Pt states she is doing good now. Plan for wound vac change today still awaiting placement.     Review of Systems  Objective:     Vital Signs (Most Recent):  Temp: 98.3 °F (36.8 °C) (02/29/24 1150)  Pulse: 72 (02/29/24 1150)  Resp: 18 (02/29/24 1214)  BP: (!) 159/84 (02/29/24 1150)  SpO2: 98 % (02/29/24 1150) Vital Signs (24h Range):  Temp:  [97.6 °F (36.4 °C)-98.9 °F (37.2 °C)] 98.3 °F (36.8 °C)  Pulse:  [68-79] 72  Resp:  [16-19] 18  SpO2:  [88 %-100 %] 98 %  BP: (136-165)/(74-98) 159/84     Weight: 93.6 kg (206 lb 5.6 oz)  Body mass index is 35.42 kg/m².    Intake/Output Summary (Last 24 hours) at 2/29/2024 1406  Last data filed at 2/29/2024 1309  Gross per 24 hour   Intake 860 ml   Output 5425 ml   Net -4565 ml         Physical Exam      Vitals reviewed.   Constitutional:       General: She is not in acute distress.     Appearance: She is not toxic-appearing.   HENT:      Head: Normocephalic and atraumatic.      Mouth/Throat:      Mouth: Mucous membranes are moist.      Pharynx: Oropharynx is clear.   Eyes:      General: No scleral icterus.     Extraocular Movements: Extraocular movements intact.   Cardiovascular:      Rate and Rhythm: Normal rate and regular rhythm.   Pulmonary:      Effort: No respiratory distress.   Abdominal:      General: There is no distension.      Palpations: Abdomen is soft.      Tenderness: There is no abdominal tenderness.   Musculoskeletal:         General: No swelling or tenderness.   Skin:     General: Skin is warm and dry.   Neurological:      General: No focal deficit present.      Mental Status: She is alert and oriented to person, place, and time.   Psychiatric:         Mood and Affect: Mood normal.         Behavior: Behavior normal.   Significant Labs: All pertinent labs within the past 24 hours have been reviewed.    Significant Imaging: I have reviewed all pertinent imaging results/findings within the past 24 hours.

## 2024-02-29 NOTE — NURSING
Patient with wound vac beeping air leak. Night shift nurse attempted to secure, and fix air leak with no success.   Dr. Paula notified by night nurse, and ordered to turn off until able to get to bedside to change the dressing.    Wound vac turned off.

## 2024-02-29 NOTE — NURSING
IV Ampicillin on hold due to loss of IV access. Tried to insert twice but failed. Pt request to have ultrasound guided IV insertion. House supervisor Will informed.

## 2024-02-29 NOTE — PLAN OF CARE
Patient is accepted by [facility] for rehab. R will submit for auth today.     02/29/24 1227   Discharge Reassessment   Assessment Type Discharge Planning Reassessment   Did the patient's condition or plan change since previous assessment? No   Discharge Plan discussed with: Patient   Name(s) and Number(s) Galen Shrestha (Spouse) [phone] (Home Phone)   Communicated AVTAR with patient/caregiver Date not available/Unable to determine   Discharge Plan A Rehab   Discharge Plan B Skilled Nursing Facility   DME Needed Upon Discharge  none   Transition of Care Barriers None   Why the patient remains in the hospital Requires continued medical care   Post-Acute Status   Coverage Medicaid   Discharge Delays None known at this time

## 2024-02-29 NOTE — PT/OT/SLP PROGRESS
Physical Therapy Treatment    Patient Name:  Christine Mosqueda   MRN:  [id number]    Recommendations:     Discharge Recommendations: Moderate Intensity Therapy  Discharge Equipment Recommendations: bath bench, bedside commode, wheelchair  Barriers to discharge: None    Assessment:     Christine Mosqueda is a 53 y.o. 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  She presents with the following impairments/functional limitations: weakness, impaired endurance, impaired self care skills, impaired functional mobility, gait instability, decreased lower extremity function, decreased upper extremity function, decreased coordination, decreased ROM, pain, decreased safety awareness, impaired balance, edema, impaired skin, impaired coordination, impaired cardiopulmonary response to activity.    Rehab Prognosis: Good; patient would benefit from acute skilled PT services to address these deficits and reach maximum level of function.    Recent Surgery: Procedure(s) (LRB):  Wound vac change,  debridement,  partial wound closure (Right) 3 Days Post-Op    Plan:     During this hospitalization, patient to be seen 4 x/week to address the identified rehab impairments via gait training, therapeutic activities, therapeutic exercises, neuromuscular re-education, wheelchair management/training and progress toward the following goals:    Plan of Care Expires:  03/05/24    Subjective     Chief Complaint: pain   Patient/Family Comments/goals: pt is very pleasant and motivated to participate  in therapy treatment.   Pain/Comfort:  Pain Rating 1: 8/10  Location - Side 1: Right  Location 1: leg  Pain Addressed 1: Pre-medicate for activity, Reposition  Pain Rating Post-Intervention 1: 8/10      Objective:     Communicated with nurse  prior to session.  Patient found with bed in chair position with telemetry, bed alarm, PICC line, oxygen, jansen catheter, wound vac upon PT entry to room.     General Precautions: Standard, fall,  respiratory  Orthopedic Precautions: N/A  Braces: N/A  Respiratory Status: Nasal cannula, flow 3 L/min   SpO2 : 92%-98% on 2L . Pt required VC's for pursed lip breathing technique.   Functional Mobility:  Bed Mobility:     Scooting: stand by assistance and contact guard assistance  Supine to Sit: contact guard assistance / minimum assistance  Transfers:     Sit to Stand:  contact guard assistance  with rolling walker  Bed to chair : contact guard assistance  with rolling walker using steps transfer   Gait: Patient ambulated 60 ft x 2 trials and 80 ft on level tile with Rolling Walker with Contact Guard Assistance ( 2L O2NC , chair followed) . Pt required 2 staning rest breaks 2* to pain and fatigue.  Pt with demonstarting min unsteady gait/ widen TOSHIA , increased R hip ER ,    with decreased bre, increased time in double stance, decreased velocity of limb motion, decreased step length, decreased swing-to-stance ratio, decreased toe-to-floor clearance and decreased weight-shifting ability.Impairments contributing to gait deviations include impaired balance, decreased flexibility, pain, impaired postural control, decreased ROM and decreased strength. V/T cues for safety technique and walker management.    Balance:  good in sitting, fair in standing       AM-PAC 6 CLICK MOBILITY  Turning over in bed (including adjusting bedclothes, sheets and blankets)?: 4  Sitting down on and standing up from a chair with arms (e.g., wheelchair, bedside commode, etc.): 3  Moving from lying on back to sitting on the side of the bed?: 3  Moving to and from a bed to a chair (including a wheelchair)?: 3  Need to walk in hospital room?: 3  Climbing 3-5 steps with a railing?: 2  Basic Mobility Total Score: 18       Treatment & Education:  Lower Extremity Exercises.    Patient educated on: Purpose and Benefits of Therapeutic Exercise(s).    Patient verbalized acceptance/understanding of instruction(s), expectation(s), and limitation(s)  (for safety).  Patient performed: 10 reps x 2 (each) of B LE Ther Ex: AP, LAQ, HSC, hip flexion   while sitting up on EOB/ chair .      Encouraged pt to perform BLE ex's as above throughput the day, pt verbalized understanding.     Patient required still requires verbal cues/tactile cues to ensure correct sequence, to maintain proper form, and to allow for self-correction.  Changed front/back gown in sitting with MIN A .   Patient left up in reclined chair on green air cushion , BLE elevated, heels offloading, with all lines intact, call button in reach, and nurse Lala notified..    GOALS:   Multidisciplinary Problems       Physical Therapy Goals          Problem: Physical Therapy    Goal Priority Disciplines Outcome Goal Variances Interventions   Physical Therapy Goal     PT, PT/OT Ongoing, Progressing     Description: Goals to be met by: 3/5/24     Patient will increase functional independence with mobility by performin. Supine to sit with Modified Mower  2. Sit to stand transfer with Modified Mower  3. Bed to chair transfer with Modified Mower   4. Gait  x 10-15 feet with Stand-by Assistance using Rolling Walker.   5. Lower extremity exercise program x10 reps per handout, with supervision  6. W/C propulsion x 50' with Supervision                           Time Tracking:     PT Received On: 24  PT Start Time: 1545     PT Stop Time: 1625  PT Total Time (min): 40 min     Billable Minutes: Gait Training 18, Therapeutic Activity 10, and Therapeutic Exercise 12    Treatment Type: Treatment  PT/PTA: PTA     Number of PTA visits since last PT visit: 5     2024

## 2024-02-29 NOTE — ASSESSMENT & PLAN NOTE
Patient's FSGs are uncontrolled due to hyperglycemia on current medication regimen.  Last A1c reviewed-   Lab Results   Component Value Date    HGBA1C 8.2 (H) 04/06/2023     Most recent fingerstick glucose reviewed-   Recent Labs   Lab 02/28/24  1713 02/28/24  2032 02/29/24  0759 02/29/24  1147   POCTGLUCOSE 142* 116* 86 176*       Current correctional scale  High  Maintain anti-hyperglycemic dose as follows-   Antihyperglycemics (From admission, onward)    Start     Stop Route Frequency Ordered    02/27/24 0900  insulin detemir U-100 (Levemir) pen 20 Units         -- SubQ Daily 02/25/24 1359    02/26/24 2100  insulin detemir U-100 (Levemir) pen 15 Units         -- SubQ Nightly 02/25/24 1359    02/22/24 1145  insulin aspart U-100 pen 5 Units         -- SubQ 3 times daily with meals 02/22/24 1031    02/16/24 1514  insulin aspart U-100 pen 0-15 Units         -- SubQ Before meals & nightly PRN 02/16/24 1515        Hold Oral hypoglycemics while patient is in the hospital.  A1c: 8.2  Meds: basal bolus insulin + SSI PRN to maintain goal 140-180  Titrate as needed. Basal BID (20AM, 15U PM), prandia at 5U TIDWM  ADA diet, accuchecks ACHS, hypoglycemic protocol  Hold insulin 2/20 5:00 p.m. and 2/20 6:00 a.m. in setting of NPO status

## 2024-02-29 NOTE — PLAN OF CARE
Problem: Diabetes Comorbidity  Goal: Blood Glucose Level Within Targeted Range  Outcome: Ongoing, Progressing  Intervention: Monitor and Manage Glycemia  Flowsheets (Taken 2/29/2024 3295)  Glycemic Management: blood glucose monitored     Problem: Adult Inpatient Plan of Care  Goal: Plan of Care Review  Outcome: Ongoing, Progressing  Goal: Patient-Specific Goal (Individualized)  Outcome: Ongoing, Progressing  Goal: Absence of Hospital-Acquired Illness or Injury  Outcome: Ongoing, Progressing  Goal: Optimal Comfort and Wellbeing  Outcome: Ongoing, Progressing  Goal: Readiness for Transition of Care  Outcome: Ongoing, Progressing

## 2024-02-29 NOTE — NURSING
[facility], [facility]  Nurses Note -- 4 Eyes      2/28/2024       Skin assessed on: Q Shift      [x] No Pressure Injuries Present    []Prevention Measures Documented    [] Yes LDA  for Pressure Injury Previously documented     [] Yes New Pressure Injury Discovered   [] LDA for New Pressure Injury Added      Attending RN:  Concepción Martinez RN     Second RN:  Bernadine Posadas RN

## 2024-02-29 NOTE — PROGRESS NOTES
POD # 13 Debridement lateral perineum and proximal medial  right thigh  For first bedside VAC dressing change with Surgery- assessing if need for additional debridement/treatment plan  Assessment:  Photodocumentation    Right medial thigh wound- Dressing removed by Surgery exposing opening 15 cm(L) 12 cm(W) 4 cm(D) with scant amount tan slough in base of wound. Developing multiple buds of granulation in base of wound. Sutures at proximal wound closing incision radiating posteriorly. Undermining 2.5 cm at 12 o'clock.  Small amount serosanguineous drainage in new VAC canister. Small amount induration surrounding wound.   Hernandez catheter to divert urine from wound.   Treatment/Plan:  Cleansed wound with Vashe. Placed Andrey ring over sutures and in crease by labia. Applied 3M Cavilon No Sting Film Barrier to periwound skin. Filled wound with Simplace dressing and created a bridge to anterior thigh for SensiTrak. Used 2 pieces of black foam. VAC at 125 mmHg continuous suction.  CM arranging for SNF or Rehab for discharge plan. When discharged, nursing to remove VAC dressing and recommend filling wound with Vashe moistened gauze until receiving facility applied NPWT.   Will continue to assist nursing with NPWT.

## 2024-02-29 NOTE — ASSESSMENT & PLAN NOTE
Chronic, controlled.   -hold home meds at this time:  Amlodipine 10 mg,   Resume Lasix 40 mg p.o.    Latest blood pressure and vitals reviewed-     Temp:  [97.6 °F (36.4 °C)-98.9 °F (37.2 °C)]   Pulse:  [68-79]   Resp:  [16-19]   BP: (136-165)/(74-98)   SpO2:  [88 %-100 %] .   Home meds for hypertension were reviewed and noted below.   Hypertension Medications               amlodipine (NORVASC) 10 MG tablet Take 10 mg by mouth once daily.    furosemide (LASIX) 40 MG tablet Take 1 tablet (40 mg total) by mouth once daily.            While in the hospital, will manage blood pressure as follows; Adjust home antihypertensive regimen as follows- hold meds for now given infection/ narcotic use. Resume as warranted . May develop rebound HTN from drug/ alcohol withdrawal.    Will utilize p.r.n. blood pressure medication only if patient's blood pressure greater than 180/110 and she develops symptoms such as worsening chest pain or shortness of breath.

## 2024-02-29 NOTE — PROGRESS NOTES
[facility] Medicine  Progress Note    Patient Name: Christine Mosqueda  MRN: [id number]  Patient Class: IP- Inpatient   Admission Date: 2/15/2024  Length of Stay: 13 days  Attending Physician: Qasim Skinner III, MD  Primary Care Provider: [facility]        Subjective:     Principal Problem:Estephanie's gangrene        HPI:  Ms. Mosqueda is a 53 yoF with a PMHx of diastolic heart failure, HTN, T2DM on insulin. She presented to the hospital with worsening pain and swelling of the right medial thigh. She developed an abscess in the area that underwent I+D at [facility] a few days ago. She was sent home on PO abx. Since that time the pain and swelling have worsened.  In ED, CT scan demonstrated extensive inflammation extending from the right perineum to the right medial thigh with multiple foci of air. She was admitted to general surgery for estephanie's gangrene. She underwent extensive debridement under general surgery today in OR. She is currently doing well with good pain control. HM consulted for medical management.     Overview/Hospital Course:  Ms Christine Mosqueda is a 53 y.o.  woman with poorly controlled type II DM who was admitted for sepsis secondary to bacteremia and Estephanie's gangrene. Pt presented w/ worsening pain and swelling of the Rt medial thigh. CT scan findings were consistent w/ Estephanie's gangrene. General surgery team consulted. S/p surgical debridement 02/16, 2/17, 2/19, and on 02/21 (wound vac placed.) S/p OR 2/23 for first wound vac change. Plan for OR again on 02/26. Had acute on chronic anemia, likely exacerbated with multiple debridements. Required blood transfusions on 02/19 and again on 02/21.  Blood and wound cultures with lactobacillus. ID consulted- on meropenem. Repeat blood cx with no growth thus far. Also found to have CARLINE on admission, nephrology following. CARLINE resolved. PT/OT recommends SNF on discharge.     Interval History: Pt states she is doing  good now. Plan for wound vac change today still awaiting placement.     Review of Systems  Objective:     Vital Signs (Most Recent):  Temp: 98.3 °F (36.8 °C) (02/29/24 1150)  Pulse: 72 (02/29/24 1150)  Resp: 18 (02/29/24 1214)  BP: (!) 159/84 (02/29/24 1150)  SpO2: 98 % (02/29/24 1150) Vital Signs (24h Range):  Temp:  [97.6 °F (36.4 °C)-98.9 °F (37.2 °C)] 98.3 °F (36.8 °C)  Pulse:  [68-79] 72  Resp:  [16-19] 18  SpO2:  [88 %-100 %] 98 %  BP: (136-165)/(74-98) 159/84     Weight: 93.6 kg (206 lb 5.6 oz)  Body mass index is 35.42 kg/m².    Intake/Output Summary (Last 24 hours) at 2/29/2024 1406  Last data filed at 2/29/2024 1309  Gross per 24 hour   Intake 860 ml   Output 5425 ml   Net -4565 ml         Physical Exam      Vitals reviewed.   Constitutional:       General: She is not in acute distress.     Appearance: She is not toxic-appearing.   HENT:      Head: Normocephalic and atraumatic.      Mouth/Throat:      Mouth: Mucous membranes are moist.      Pharynx: Oropharynx is clear.   Eyes:      General: No scleral icterus.     Extraocular Movements: Extraocular movements intact.   Cardiovascular:      Rate and Rhythm: Normal rate and regular rhythm.   Pulmonary:      Effort: No respiratory distress.   Abdominal:      General: There is no distension.      Palpations: Abdomen is soft.      Tenderness: There is no abdominal tenderness.   Musculoskeletal:         General: No swelling or tenderness.   Skin:     General: Skin is warm and dry.   Neurological:      General: No focal deficit present.      Mental Status: She is alert and oriented to person, place, and time.   Psychiatric:         Mood and Affect: Mood normal.         Behavior: Behavior normal.   Significant Labs: All pertinent labs within the past 24 hours have been reviewed.    Significant Imaging: I have reviewed all pertinent imaging results/findings within the past 24 hours.    Assessment/Plan:      * Librado's gangrene  CT on admit with R perineum to R  medial thigh [facility].   - Surgery consulted: S/p debridement on 02/16, 2/17, 2/19, and 2/21 (wound vac placed.)  - s/p OR 2/23 for first wound vac change. S/p repeat vac change on Mon 02/26 with partial wound closure. Surgery plan for bedside wound vac change on Thursday (2/29)  - blood and wound cultures with lactobacillus.   - ID consulted. Descalate from meropenem to Unasyn for 2 weeks. EOC 03/06/24  - jansen in place due to surgical wound site  -Plan for SNF on discharge     Necrotizing fasciitis        Debility  Patient with Acute debility due to other reduced mobility. valuation for etiology is complete. Plan includes progressive mobility protocol initated and PT/OT consulted.    -PTOT recommend SNF  -CM to assist with placement     Advanced care planning/counseling discussion  Advance Care Planning    Date: 02/21/2024    Code Status  I engaged the the patient in a voluntary conversation about the patient's preferences for care  at the very end of life. The patient wishes to have CPR and other invasive treatments performed when her heart and/or breathing stops. I communicated to the patient that her wishes align with full code status and she agrees and verbalized understanding.   I spent a total of 16 minutes engaging the patient in this advance care planning discussion.           Code Status: Full Code      Class 2 severe obesity due to excess calories with serious comorbidity and body mass index (BMI) of 35.0 to 35.9 in adult  Body mass index is 35.42 kg/m². Morbid obesity complicates all aspects of disease management from diagnostic modalities to treatment. Weight loss encouraged and health benefits explained to patient.         ETOH abuse  Patient drinks at least a 6 pack beer daily  - Start scheduled librium- wean ordered  - Thiamine, folate, potassium, magnesium      Cocaine abuse  Patient sprinkles crack crystals in her marijuana  Wants help. Tearful. Emotional support provided.   CM consulted.        Anemia of chronic disease  Patient's anemia is currently controlled. Has received 1 units of PRBCs on 2/19 and 02/21 . Etiology likely d/t chronic disease and acute blood loss post surgery.  Current CBC reviewed-   Lab Results   Component Value Date    HGB 7.6 (L) 02/29/2024    HCT 25.1 (L) 02/29/2024     - monitor daily   -s/p 1U pRBC on 02/19 and 02/21    Essential hypertension  Chronic, controlled.   -hold home meds at this time:  Amlodipine 10 mg,   Resume Lasix 40 mg p.o.    Latest blood pressure and vitals reviewed-     Temp:  [97.6 °F (36.4 °C)-98.9 °F (37.2 °C)]   Pulse:  [68-79]   Resp:  [16-19]   BP: (136-165)/(74-98)   SpO2:  [88 %-100 %] .   Home meds for hypertension were reviewed and noted below.   Hypertension Medications               amlodipine (NORVASC) 10 MG tablet Take 10 mg by mouth once daily.    furosemide (LASIX) 40 MG tablet Take 1 tablet (40 mg total) by mouth once daily.            While in the hospital, will manage blood pressure as follows; Adjust home antihypertensive regimen as follows- hold meds for now given infection/ narcotic use. Resume as warranted . May develop rebound HTN from drug/ alcohol withdrawal.    Will utilize p.r.n. blood pressure medication only if patient's blood pressure greater than 180/110 and she develops symptoms such as worsening chest pain or shortness of breath.    Sepsis with acute renal failure and tubular necrosis without septic shock  Defined by leukocytosis, tachycardia and groin abscess. Source is [place].   - surgical management with debridement  - antibiotics= Unasyn per ID  - Leukocytosis improving     Chronic diastolic congestive heart failure  Patient is identified as having Diastolic (HFpEF) heart failure that is Chronic. CHF is currently controlled. Latest ECHO performed and demonstrates- Results for orders placed during the hospital encounter of 03/10/23    Echo    Interpretation Summary  · The left ventricle is normal in size with low  normal systolic function.  · The estimated ejection fraction is 53%.  · There is abnormal septal wall motion.  · Indeterminate left ventricular diastolic function.  · Mild left atrial enlargement.  · Normal right ventricular size with low normal right ventricular systolic function.  · Mild right atrial enlargement.  · Mild mitral regurgitation.  · Mild tricuspid regurgitation.  · Normal central venous pressure (3 mmHg).  · The estimated PA systolic pressure is 23 mmHg.    No acute issues  Monitor fluid status- does have lower extremity edema   Discussed with nephrology, okay to resume lasix 02/23    ILD (interstitial lung disease)  -Noted on imaging; CT chest 2022  -Stable, no acute issues.   -Not on home O2 but says she should be (but never received because she left AMA at OSH)  -would test prior to discharge .      CARLINE (acute kidney injury)  Patient with acute kidney injury/acute renal failure likely due to pre-renal azotemia due to IVVD and acute tubular necrosis caused by sepsis  CARLINE is currently improving. Baseline creatinine  0.9  - Labs reviewed- Renal function/electrolytes with Estimated Creatinine Clearance: 64.1 mL/min (based on SCr of 1.1 mg/dL). according to latest data. Monitor urine output and serial BMP and adjust therapy as needed. Avoid nephrotoxins and renally dose meds for GFR listed above.    - Highly suspect ATN as an etiology at this time  - Nephrology consult requested, appreciate recs  - renal function is improving and near baseline.   - Resolved    Tobacco abuse  - Pt was counseled about cessation x4 minutes      Type 2 diabetes mellitus with hyperglycemia, without long-term current use of insulin  Patient's FSGs are uncontrolled due to hyperglycemia on current medication regimen.  Last A1c reviewed-   Lab Results   Component Value Date    HGBA1C 8.2 (H) 04/06/2023     Most recent fingerstick glucose reviewed-   Recent Labs   Lab 02/28/24  1713 02/28/24 2032 02/29/24  0759 02/29/24  1147    POCTGLUCOSE 142* 116* 86 176*       Current correctional scale  High  Maintain anti-hyperglycemic dose as follows-   Antihyperglycemics (From admission, onward)      Start     Stop Route Frequency Ordered    02/27/24 0900  insulin detemir U-100 (Levemir) pen 20 Units         -- SubQ Daily 02/25/24 1359    02/26/24 2100  insulin detemir U-100 (Levemir) pen 15 Units         -- SubQ Nightly 02/25/24 1359    02/22/24 1145  insulin aspart U-100 pen 5 Units         -- SubQ 3 times daily with meals 02/22/24 1031    02/16/24 1514  insulin aspart U-100 pen 0-15 Units         -- SubQ Before meals & nightly PRN 02/16/24 1515          Hold Oral hypoglycemics while patient is in the hospital.  A1c: 8.2  Meds: basal bolus insulin + SSI PRN to maintain goal 140-180  Titrate as needed. Basal BID (20AM, 15U PM), prandia at 5U TIDWM  ADA diet, accuchecks ACHS, hypoglycemic protocol  Hold insulin 2/20 5:00 p.m. and 2/20 6:00 a.m. in setting of NPO status            VTE Risk Mitigation (From admission, onward)           Ordered     enoxaparin injection 40 mg  Every 24 hours         02/24/24 1615     IP VTE HIGH RISK PATIENT  Once         02/16/24 0506     Place sequential compression device  Until discontinued         02/16/24 0506                    Discharge Planning   AVTAR: 2/17/2024     Code Status: Full Code   Is the patient medically ready for discharge?:     Reason for patient still in hospital (select all that apply): Treatment, Consult recommendations, and Pending disposition  Discharge Plan A: Rehab   Discharge Delays: None known at this time              Qasim Skinner III, MD  Department of [facility] Medicine   [facility]

## 2024-03-01 LAB
ALBUMIN SERPL BCP-MCNC: 1.6 G/DL (ref 3.5–5.2)
ALP SERPL-CCNC: 89 U/L (ref 55–135)
ALT SERPL W/O P-5'-P-CCNC: 9 U/L (ref 10–44)
ANION GAP SERPL CALC-SCNC: 7 MMOL/L (ref 8–16)
AST SERPL-CCNC: 12 U/L (ref 10–40)
BASOPHILS # BLD AUTO: 0.06 K/UL (ref 0–0.2)
BASOPHILS NFR BLD: 0.6 % (ref 0–1.9)
BILIRUB SERPL-MCNC: 0.1 MG/DL (ref 0.1–1)
BUN SERPL-MCNC: 15 MG/DL (ref 6–20)
CALCIUM SERPL-MCNC: 8.3 MG/DL (ref 8.7–10.5)
CHLORIDE SERPL-SCNC: 105 MMOL/L (ref 95–110)
CO2 SERPL-SCNC: 28 MMOL/L (ref 23–29)
CREAT SERPL-MCNC: 1 MG/DL (ref 0.5–1.4)
DIFFERENTIAL METHOD BLD: ABNORMAL
EOSINOPHIL # BLD AUTO: 0.5 K/UL (ref 0–0.5)
EOSINOPHIL NFR BLD: 5 % (ref 0–8)
ERYTHROCYTE [DISTWIDTH] IN BLOOD BY AUTOMATED COUNT: 19.5 % (ref 11.5–14.5)
EST. GFR  (NO RACE VARIABLE): >60 ML/MIN/1.73 M^2
GLUCOSE SERPL-MCNC: 101 MG/DL (ref 70–110)
HCT VFR BLD AUTO: 23.9 % (ref 37–48.5)
HGB BLD-MCNC: 7.3 G/DL (ref 12–16)
IMM GRANULOCYTES # BLD AUTO: 0.03 K/UL (ref 0–0.04)
IMM GRANULOCYTES NFR BLD AUTO: 0.3 % (ref 0–0.5)
LYMPHOCYTES # BLD AUTO: 1.9 K/UL (ref 1–4.8)
LYMPHOCYTES NFR BLD: 18.7 % (ref 18–48)
MAGNESIUM SERPL-MCNC: 1.7 MG/DL (ref 1.6–2.6)
MCH RBC QN AUTO: 23.9 PG (ref 27–31)
MCHC RBC AUTO-ENTMCNC: 30.5 G/DL (ref 32–36)
MCV RBC AUTO: 78 FL (ref 82–98)
MONOCYTES # BLD AUTO: 1 K/UL (ref 0.3–1)
MONOCYTES NFR BLD: 9.7 % (ref 4–15)
NEUTROPHILS # BLD AUTO: 6.5 K/UL (ref 1.8–7.7)
NEUTROPHILS NFR BLD: 65.7 % (ref 38–73)
NRBC BLD-RTO: 0 /100 WBC
PHOSPHATE SERPL-MCNC: 4.4 MG/DL (ref 2.7–4.5)
PLATELET # BLD AUTO: 224 K/UL (ref 150–450)
PMV BLD AUTO: 10.6 FL (ref 9.2–12.9)
POCT GLUCOSE: 102 MG/DL (ref 70–110)
POCT GLUCOSE: 115 MG/DL (ref 70–110)
POCT GLUCOSE: 128 MG/DL (ref 70–110)
POCT GLUCOSE: 88 MG/DL (ref 70–110)
POTASSIUM SERPL-SCNC: 4.3 MMOL/L (ref 3.5–5.1)
PROT SERPL-MCNC: 6 G/DL (ref 6–8.4)
RBC # BLD AUTO: 3.06 M/UL (ref 4–5.4)
SODIUM SERPL-SCNC: 140 MMOL/L (ref 136–145)
WBC # BLD AUTO: 9.93 K/UL (ref 3.9–12.7)

## 2024-03-01 PROCEDURE — 63600175 PHARM REV CODE 636 W HCPCS: Performed by: STUDENT IN AN ORGANIZED HEALTH CARE EDUCATION/TRAINING PROGRAM

## 2024-03-01 PROCEDURE — 80053 COMPREHEN METABOLIC PANEL: CPT

## 2024-03-01 PROCEDURE — 25000003 PHARM REV CODE 250: Performed by: HOSPITALIST

## 2024-03-01 PROCEDURE — 83735 ASSAY OF MAGNESIUM: CPT

## 2024-03-01 PROCEDURE — A4216 STERILE WATER/SALINE, 10 ML: HCPCS | Performed by: STUDENT IN AN ORGANIZED HEALTH CARE EDUCATION/TRAINING PROGRAM

## 2024-03-01 PROCEDURE — 94761 N-INVAS EAR/PLS OXIMETRY MLT: CPT

## 2024-03-01 PROCEDURE — 27000221 HC OXYGEN, UP TO 24 HOURS

## 2024-03-01 PROCEDURE — 21400001 HC TELEMETRY ROOM

## 2024-03-01 PROCEDURE — 99900035 HC TECH TIME PER 15 MIN (STAT)

## 2024-03-01 PROCEDURE — 25000003 PHARM REV CODE 250: Performed by: INTERNAL MEDICINE

## 2024-03-01 PROCEDURE — 36415 COLL VENOUS BLD VENIPUNCTURE: CPT

## 2024-03-01 PROCEDURE — 99233 SBSQ HOSP IP/OBS HIGH 50: CPT | Mod: 24,,,

## 2024-03-01 PROCEDURE — 84100 ASSAY OF PHOSPHORUS: CPT

## 2024-03-01 PROCEDURE — 85025 COMPLETE CBC W/AUTO DIFF WBC: CPT

## 2024-03-01 PROCEDURE — 25000003 PHARM REV CODE 250: Performed by: STUDENT IN AN ORGANIZED HEALTH CARE EDUCATION/TRAINING PROGRAM

## 2024-03-01 PROCEDURE — 99900031 HC PATIENT EDUCATION (STAT)

## 2024-03-01 PROCEDURE — 63600175 PHARM REV CODE 636 W HCPCS: Performed by: INTERNAL MEDICINE

## 2024-03-01 RX ORDER — ALPRAZOLAM 0.25 MG/1
0.25 TABLET ORAL 2 TIMES DAILY PRN
Qty: 30 TABLET | Refills: 0 | Status: SHIPPED | OUTPATIENT
Start: 2024-03-01 | End: 2024-03-06 | Stop reason: HOSPADM

## 2024-03-01 RX ORDER — LANOLIN ALCOHOL/MO/W.PET/CERES
100 CREAM (GRAM) TOPICAL DAILY
Qty: 30 TABLET | Refills: 0 | Status: SHIPPED | OUTPATIENT
Start: 2024-03-01 | End: 2024-03-06

## 2024-03-01 RX ORDER — OXYCODONE HYDROCHLORIDE 5 MG/1
5 TABLET ORAL EVERY 6 HOURS PRN
Qty: 20 TABLET | Refills: 0 | Status: SHIPPED | OUTPATIENT
Start: 2024-03-01 | End: 2024-03-06

## 2024-03-01 RX ADMIN — INSULIN ASPART 5 UNITS: 100 INJECTION, SOLUTION INTRAVENOUS; SUBCUTANEOUS at 08:03

## 2024-03-01 RX ADMIN — Medication 10 ML: at 12:03

## 2024-03-01 RX ADMIN — FOLIC ACID 1 MG: 1 TABLET ORAL at 08:03

## 2024-03-01 RX ADMIN — AMPICILLIN SODIUM AND SULBACTAM SODIUM 3 G: 2; 1 INJECTION, POWDER, FOR SOLUTION INTRAMUSCULAR; INTRAVENOUS at 12:03

## 2024-03-01 RX ADMIN — FUROSEMIDE 40 MG: 40 TABLET ORAL at 08:03

## 2024-03-01 RX ADMIN — ASPIRIN 81 MG CHEWABLE TABLET 81 MG: 81 TABLET CHEWABLE at 08:03

## 2024-03-01 RX ADMIN — OXYCODONE 10 MG: 5 TABLET ORAL at 02:03

## 2024-03-01 RX ADMIN — INSULIN ASPART 15 UNITS: 100 INJECTION, SOLUTION INTRAVENOUS; SUBCUTANEOUS at 09:03

## 2024-03-01 RX ADMIN — PANTOPRAZOLE SODIUM 40 MG: 40 TABLET, DELAYED RELEASE ORAL at 08:03

## 2024-03-01 RX ADMIN — THIAMINE HCL TAB 100 MG 100 MG: 100 TAB at 08:03

## 2024-03-01 RX ADMIN — ATORVASTATIN CALCIUM 20 MG: 10 TABLET, FILM COATED ORAL at 08:03

## 2024-03-01 RX ADMIN — INSULIN ASPART 5 UNITS: 100 INJECTION, SOLUTION INTRAVENOUS; SUBCUTANEOUS at 05:03

## 2024-03-01 RX ADMIN — OXYCODONE 10 MG: 5 TABLET ORAL at 05:03

## 2024-03-01 RX ADMIN — AMPICILLIN SODIUM AND SULBACTAM SODIUM 3 G: 2; 1 INJECTION, POWDER, FOR SOLUTION INTRAMUSCULAR; INTRAVENOUS at 11:03

## 2024-03-01 RX ADMIN — OXYCODONE 10 MG: 5 TABLET ORAL at 01:03

## 2024-03-01 RX ADMIN — Medication 10 ML: at 05:03

## 2024-03-01 RX ADMIN — GABAPENTIN 400 MG: 400 CAPSULE ORAL at 08:03

## 2024-03-01 RX ADMIN — AMPICILLIN SODIUM AND SULBACTAM SODIUM 3 G: 2; 1 INJECTION, POWDER, FOR SOLUTION INTRAMUSCULAR; INTRAVENOUS at 05:03

## 2024-03-01 RX ADMIN — INSULIN DETEMIR 15 UNITS: 100 INJECTION, SOLUTION SUBCUTANEOUS at 09:03

## 2024-03-01 RX ADMIN — AMPICILLIN SODIUM AND SULBACTAM SODIUM 3 G: 2; 1 INJECTION, POWDER, FOR SOLUTION INTRAMUSCULAR; INTRAVENOUS at 04:03

## 2024-03-01 RX ADMIN — GABAPENTIN 400 MG: 400 CAPSULE ORAL at 09:03

## 2024-03-01 RX ADMIN — ACETAMINOPHEN 1000 MG: 500 TABLET ORAL at 09:03

## 2024-03-01 RX ADMIN — ENOXAPARIN SODIUM 40 MG: 40 INJECTION SUBCUTANEOUS at 05:03

## 2024-03-01 RX ADMIN — ALPRAZOLAM 0.25 MG: 0.25 TABLET ORAL at 08:03

## 2024-03-01 RX ADMIN — OXYCODONE 10 MG: 5 TABLET ORAL at 06:03

## 2024-03-01 NOTE — ASSESSMENT & PLAN NOTE
Patient with acute kidney injury/acute renal failure likely due to pre-renal azotemia due to IVVD and acute tubular necrosis caused by sepsis  CARLINE is currently improving. Baseline creatinine  0.9  - Labs reviewed- Renal function/electrolytes with Estimated Creatinine Clearance: 71.7 mL/min (based on SCr of 1 mg/dL). according to latest data. Monitor urine output and serial BMP and adjust therapy as needed. Avoid nephrotoxins and renally dose meds for GFR listed above.    - Highly suspect ATN as an etiology at this time  - Nephrology consult requested, appreciate recs  - renal function is improving and near baseline.   - Resolved

## 2024-03-01 NOTE — ASSESSMENT & PLAN NOTE
Chronic, controlled.   -hold home meds at this time:  Amlodipine 10 mg,   Resume Lasix 40 mg p.o.    Latest blood pressure and vitals reviewed-     Temp:  [98 °F (36.7 °C)-99.4 °F (37.4 °C)]   Pulse:  [66-82]   Resp:  [18-20]   BP: (128-186)/(58-91)   SpO2:  [94 %-99 %] .   Home meds for hypertension were reviewed and noted below.   Hypertension Medications               amlodipine (NORVASC) 10 MG tablet Take 10 mg by mouth once daily.    furosemide (LASIX) 40 MG tablet Take 1 tablet (40 mg total) by mouth once daily.            While in the hospital, will manage blood pressure as follows; Adjust home antihypertensive regimen as follows- hold meds for now given infection/ narcotic use. Resume as warranted . May develop rebound HTN from drug/ alcohol withdrawal.    Will utilize p.r.n. blood pressure medication only if patient's blood pressure greater than 180/110 and she develops symptoms such as worsening chest pain or shortness of breath.

## 2024-03-01 NOTE — CONSULTS
Thank you for your consult to [facility]. We have reviewed the patient chart. This patient does meet criteria for [facility] service at this time.  Will assume care on 03/02/24 at 7AM.

## 2024-03-01 NOTE — PT/OT/SLP PROGRESS
Physical Therapy      Patient Name:  Christine Mosqueda   MRN:  [id number]    Patient not seen today secondary to  (Pt politely declined to participate in anticipation of transition to SNF/Rehab today.). Will follow-up .

## 2024-03-01 NOTE — PROGRESS NOTES
[facility] [place]  Nurses Note -- 4 Eyes      3/1/2024       Skin assessed on: Q Shift      [x] No Pressure Injuries Present    []Prevention Measures Documented    [] Yes LDA  for Pressure Injury Previously documented     [] Yes New Pressure Injury Discovered   [] LDA for New Pressure Injury Added      Attending RN:  Ángel Devlin RN     Second RN:  Concepción Martinez RN

## 2024-03-01 NOTE — PLAN OF CARE
Problem: Skin Injury Risk Increased  Goal: Skin Health and Integrity  Outcome: Ongoing, Progressing  Intervention: Optimize Skin Protection  Flowsheets (Taken 2/29/2024 1852)  Pressure Reduction Techniques:   frequent weight shift encouraged   rest period provided between sit times  Pressure Reduction Devices: positioning supports utilized  Skin Protection:   adhesive use limited   incontinence pads utilized  Head of Bed (HOB) Positioning: HOB elevated  Intervention: Promote and Optimize Oral Intake  Flowsheets (Taken 2/29/2024 1852)  Oral Nutrition Promotion: rest periods promoted

## 2024-03-01 NOTE — ASSESSMENT & PLAN NOTE
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   - Surgery consulted: S/p debridement on 02/16, 2/17, 2/19, and 2/21 (wound vac placed.)  - s/p OR 2/23 for first wound vac change. S/p repeat vac change on Mon 02/26 with partial wound closure. Surgery plan for bedside wound vac change on Thursday (2/29)  - blood and wound cultures with lactobacillus.   - ID consulted. Descalate from meropenem to Unasyn for 2 weeks. EOC 03/06/24  - jansen in place due to surgical wound site  -Accepted to [facility], but awaiting insurance auth.

## 2024-03-01 NOTE — NURSING
[facility]  Nurses Note -- 4 Eyes      2/29/2024       Skin assessed on: Q Shift  Q Shift    [x] No Pressure Injuries Present    []Prevention Measures Documented    [] Yes LDA  for Pressure Injury Previously documented     [] Yes New Pressure Injury Discovered   [] LDA for New Pressure Injury Added      Attending RN:  Radha Valenzuela, RN     Second RN:  Lala MUNOZ RN

## 2024-03-01 NOTE — ASSESSMENT & PLAN NOTE
Body mass index is 36.59 kg/m². Morbid obesity complicates all aspects of disease management from diagnostic modalities to treatment. Weight loss encouraged and health benefits explained to patient.

## 2024-03-01 NOTE — SUBJECTIVE & OBJECTIVE
Interval History: Pt feeling fine awaiting placement. No fever or chills.     Review of Systems  Objective:     Vital Signs (Most Recent):  Temp: 98.4 °F (36.9 °C) (03/01/24 1146)  Pulse: 72 (03/01/24 1146)  Resp: 18 (03/01/24 1440)  BP: (!) 186/91 (03/01/24 1146)  SpO2: (!) 94 % (03/01/24 1146) Vital Signs (24h Range):  Temp:  [98 °F (36.7 °C)-99.4 °F (37.4 °C)] 98.4 °F (36.9 °C)  Pulse:  [66-82] 72  Resp:  [18-20] 18  SpO2:  [94 %-99 %] 94 %  BP: (128-186)/(58-91) 186/91     Weight: 96.7 kg (213 lb 3 oz)  Body mass index is 36.59 kg/m².    Intake/Output Summary (Last 24 hours) at 3/1/2024 1517  Last data filed at 3/1/2024 1229  Gross per 24 hour   Intake 850 ml   Output 5800 ml   Net -4950 ml         Physical Exam    Vitals reviewed.   Constitutional:       General: She is not in acute distress.     Appearance: She is not toxic-appearing.   HENT:      Head: Normocephalic and atraumatic.      Mouth/Throat:      Mouth: Mucous membranes are moist.      Pharynx: Oropharynx is clear.   Eyes:      General: No scleral icterus.     Extraocular Movements: Extraocular movements intact.   Cardiovascular:      Rate and Rhythm: Normal rate and regular rhythm.   Pulmonary:      Effort: No respiratory distress.   Abdominal:      General: There is no distension.      Palpations: Abdomen is soft.      Tenderness: There is no abdominal tenderness.   Musculoskeletal:         General: No swelling or tenderness.   Skin:     General: Skin is warm and dry.   Neurological:      General: No focal deficit present.      Mental Status: She is alert and oriented to person, place, and time.   Psychiatric:         Mood and Affect: Mood normal.         Behavior: Behavior normal.     Significant Labs: All pertinent labs within the past 24 hours have been reviewed.    Significant Imaging: I have reviewed all pertinent imaging results/findings within the past 24 hours.

## 2024-03-01 NOTE — PROGRESS NOTES
[facility] Medicine  Progress Note    Patient Name: Christine Mosqueda  MRN: [id number]  Patient Class: IP- Inpatient   Admission Date: 2/15/2024  Length of Stay: 14 days  Attending Physician: Qasim Skinner III, MD  Primary Care Provider: [facility]        Subjective:     Principal Problem:Estephanie's gangrene        HPI:  Ms. Mosqueda is a 53 yoF with a PMHx of diastolic heart failure, HTN, T2DM on insulin. She presented to the hospital with worsening pain and swelling of the right medial thigh. She developed an abscess in the area that underwent I+D at [facility] a few days ago. She was sent home on PO abx. Since that time the pain and swelling have worsened.  In ED, CT scan demonstrated extensive inflammation extending from the right perineum to the right medial thigh with multiple foci of air. She was admitted to general surgery for estephanie's gangrene. She underwent extensive debridement under general surgery today in OR. She is currently doing well with good pain control. HM consulted for medical management.     Overview/Hospital Course:  Ms Christine Mosqueda is a 53 y.o.  woman with poorly controlled type II DM who was admitted for sepsis secondary to bacteremia and Estephanie's gangrene. Pt presented w/ worsening pain and swelling of the Rt medial thigh. CT scan findings were consistent w/ Estephanie's gangrene. General surgery team consulted. S/p surgical debridement 02/16, 2/17, 2/19, and on 02/21 (wound vac placed.) S/p OR 2/23 for first wound vac change. Plan for OR again on 02/26. Had acute on chronic anemia, likely exacerbated with multiple debridements. Required blood transfusions on 02/19 and again on 02/21.  Blood and wound cultures with lactobacillus. ID consulted- on meropenem. Repeat blood cx with no growth thus far. Also found to have CARLINE on admission, nephrology following. CARLINE resolved. PT/OT recommends SNF on discharge.     Interval History: Pt feeling fine awaiting  placement. No fever or chills.     Review of Systems  Objective:     Vital Signs (Most Recent):  Temp: 98.4 °F (36.9 °C) (03/01/24 1146)  Pulse: 72 (03/01/24 1146)  Resp: 18 (03/01/24 1440)  BP: (!) 186/91 (03/01/24 1146)  SpO2: (!) 94 % (03/01/24 1146) Vital Signs (24h Range):  Temp:  [98 °F (36.7 °C)-99.4 °F (37.4 °C)] 98.4 °F (36.9 °C)  Pulse:  [66-82] 72  Resp:  [18-20] 18  SpO2:  [94 %-99 %] 94 %  BP: (128-186)/(58-91) 186/91     Weight: 96.7 kg (213 lb 3 oz)  Body mass index is 36.59 kg/m².    Intake/Output Summary (Last 24 hours) at 3/1/2024 1517  Last data filed at 3/1/2024 1229  Gross per 24 hour   Intake 850 ml   Output 5800 ml   Net -4950 ml         Physical Exam    Vitals reviewed.   Constitutional:       General: She is not in acute distress.     Appearance: She is not toxic-appearing.   HENT:      Head: Normocephalic and atraumatic.      Mouth/Throat:      Mouth: Mucous membranes are moist.      Pharynx: Oropharynx is clear.   Eyes:      General: No scleral icterus.     Extraocular Movements: Extraocular movements intact.   Cardiovascular:      Rate and Rhythm: Normal rate and regular rhythm.   Pulmonary:      Effort: No respiratory distress.   Abdominal:      General: There is no distension.      Palpations: Abdomen is soft.      Tenderness: There is no abdominal tenderness.   Musculoskeletal:         General: No swelling or tenderness.   Skin:     General: Skin is warm and dry.   Neurological:      General: No focal deficit present.      Mental Status: She is alert and oriented to person, place, and time.   Psychiatric:         Mood and Affect: Mood normal.         Behavior: Behavior normal.     Significant Labs: All pertinent labs within the past 24 hours have been reviewed.    Significant Imaging: I have reviewed all pertinent imaging results/findings within the past 24 hours.    Assessment/Plan:      * Librado's gangrene  CT on admit with R perineum to R medial thigh [place].   - Surgery  consulted: S/p debridement on 02/16, 2/17, 2/19, and 2/21 (wound vac placed.)  - s/p OR 2/23 for first wound vac change. S/p repeat vac change on Mon 02/26 with partial wound closure. Surgery plan for bedside wound vac change on Thursday (2/29)  - blood and wound cultures with lactobacillus.   - ID consulted. Descalate from meropenem to Unasyn for 2 weeks. EOC 03/06/24  - jansen in place due to surgical wound site  -Accepted to [facility], but awaiting insurance auth.    Necrotizing fasciitis        Debility  Patient with Acute debility due to other reduced mobility. valuation for etiology is complete. Plan includes progressive mobility protocol initated and PT/OT consulted.    -PTOT recommend SNF  -CM to assist with placement     Advanced care planning/counseling discussion  Advance Care Planning    Date: 02/21/2024    Code Status  I engaged the the patient in a voluntary conversation about the patient's preferences for care  at the very end of life. The patient wishes to have CPR and other invasive treatments performed when her heart and/or breathing stops. I communicated to the patient that her wishes align with full code status and she agrees and verbalized understanding.   I spent a total of 16 minutes engaging the patient in this advance care planning discussion.           Code Status: Full Code      Class 2 severe obesity due to excess calories with serious comorbidity and body mass index (BMI) of 35.0 to 35.9 in adult  Body mass index is 36.59 kg/m². Morbid obesity complicates all aspects of disease management from diagnostic modalities to treatment. Weight loss encouraged and health benefits explained to patient.         ETOH abuse  Patient drinks at least a 6 pack beer daily  - Start scheduled librium- wean ordered  - Thiamine, folate, potassium, magnesium      Cocaine abuse  Patient sprinkles crack crystals in her marijuana  Wants help. Tearful. Emotional support provided.   CM consulted.       Anemia of chronic  disease  Patient's anemia is currently controlled. Has received 1 units of PRBCs on 2/19 and 02/21 . Etiology likely d/t chronic disease and acute blood loss post surgery.  Current CBC reviewed-   Lab Results   Component Value Date    HGB 7.3 (L) 03/01/2024    HCT 23.9 (L) 03/01/2024     - monitor daily   -s/p 1U pRBC on 02/19 and 02/21    Essential hypertension  Chronic, controlled.   -hold home meds at this time:  Amlodipine 10 mg,   Resume Lasix 40 mg p.o.    Latest blood pressure and vitals reviewed-     Temp:  [98 °F (36.7 °C)-99.4 °F (37.4 °C)]   Pulse:  [66-82]   Resp:  [18-20]   BP: (128-186)/(58-91)   SpO2:  [94 %-99 %] .   Home meds for hypertension were reviewed and noted below.   Hypertension Medications               amlodipine (NORVASC) 10 MG tablet Take 10 mg by mouth once daily.    furosemide (LASIX) 40 MG tablet Take 1 tablet (40 mg total) by mouth once daily.            While in the hospital, will manage blood pressure as follows; Adjust home antihypertensive regimen as follows- hold meds for now given infection/ narcotic use. Resume as warranted . May develop rebound HTN from drug/ alcohol withdrawal.    Will utilize p.r.n. blood pressure medication only if patient's blood pressure greater than 180/110 and she develops symptoms such as worsening chest pain or shortness of breath.    Sepsis with acute renal failure and tubular necrosis without septic shock  Defined by leukocytosis, tachycardia and groin abscess. Source is [place].   - surgical management with debridement  - antibiotics= Unasyn per ID  - Leukocytosis improving     Chronic diastolic congestive heart failure  Patient is identified as having Diastolic (HFpEF) heart failure that is Chronic. CHF is currently controlled. Latest ECHO performed and demonstrates- Results for orders placed during the hospital encounter of 03/10/23    Echo    Interpretation Summary  · The left ventricle is normal in size with low normal systolic function.  ·  The estimated ejection fraction is 53%.  · There is abnormal septal wall motion.  · Indeterminate left ventricular diastolic function.  · Mild left atrial enlargement.  · Normal right ventricular size with low normal right ventricular systolic function.  · Mild right atrial enlargement.  · Mild mitral regurgitation.  · Mild tricuspid regurgitation.  · Normal central venous pressure (3 mmHg).  · The estimated PA systolic pressure is 23 mmHg.    No acute issues  Monitor fluid status- does have lower extremity edema   Discussed with nephrology, okay to resume lasix 02/23    ILD (interstitial lung disease)  -Noted on imaging; CT chest 2022  -Stable, no acute issues.   -Not on home O2 but says she should be (but never received because she left AMA at OSH)  -would test prior to discharge .      CARLINE (acute kidney injury)  Patient with acute kidney injury/acute renal failure likely due to pre-renal azotemia due to IVVD and acute tubular necrosis caused by sepsis  CARLINE is currently improving. Baseline creatinine  0.9  - Labs reviewed- Renal function/electrolytes with Estimated Creatinine Clearance: 71.7 mL/min (based on SCr of 1 mg/dL). according to latest data. Monitor urine output and serial BMP and adjust therapy as needed. Avoid nephrotoxins and renally dose meds for GFR listed above.    - Highly suspect ATN as an etiology at this time  - Nephrology consult requested, appreciate recs  - renal function is improving and near baseline.   - Resolved    Tobacco abuse  - Pt was counseled about cessation x4 minutes      Type 2 diabetes mellitus with hyperglycemia, without long-term current use of insulin  Patient's FSGs are uncontrolled due to hyperglycemia on current medication regimen.  Last A1c reviewed-   Lab Results   Component Value Date    HGBA1C 8.2 (H) 04/06/2023     Most recent fingerstick glucose reviewed-   Recent Labs   Lab 02/29/24  1915 02/29/24  2322 03/01/24  0759 03/01/24  1143   POCTGLUCOSE 167* 121* 115* 88        Current correctional scale  High  Maintain anti-hyperglycemic dose as follows-   Antihyperglycemics (From admission, onward)      Start     Stop Route Frequency Ordered    02/27/24 0900  insulin detemir U-100 (Levemir) pen 20 Units         -- SubQ Daily 02/25/24 1359    02/26/24 2100  insulin detemir U-100 (Levemir) pen 15 Units         -- SubQ Nightly 02/25/24 1359    02/22/24 1145  insulin aspart U-100 pen 5 Units         -- SubQ 3 times daily with meals 02/22/24 1031    02/16/24 1514  insulin aspart U-100 pen 0-15 Units         -- SubQ Before meals & nightly PRN 02/16/24 1515          Hold Oral hypoglycemics while patient is in the hospital.  A1c: 8.2  Meds: basal bolus insulin + SSI PRN to maintain goal 140-180  Titrate as needed. Basal BID (20AM, 15U PM), prandia at 5U TIDWM  ADA diet, accuchecks ACHS, hypoglycemic protocol  Hold insulin 2/20 5:00 p.m. and 2/20 6:00 a.m. in setting of NPO status            VTE Risk Mitigation (From admission, onward)           Ordered     enoxaparin injection 40 mg  Every 24 hours         02/24/24 1615     IP VTE HIGH RISK PATIENT  Once         02/16/24 0506     Place sequential compression device  Until discontinued         02/16/24 0506                    Discharge Planning   AVTAR: 3/1/2024     Code Status: Full Code   Is the patient medically ready for discharge?:     Reason for patient still in hospital (select all that apply): Treatment and Pending disposition  Discharge Plan A: Rehab   Discharge Delays: None known at this time              Qasim Skinner III, MD  Department of Hospital Medicine   [facility]

## 2024-03-01 NOTE — ASSESSMENT & PLAN NOTE
Patient's FSGs are uncontrolled due to hyperglycemia on current medication regimen.  Last A1c reviewed-   Lab Results   Component Value Date    HGBA1C 8.2 (H) 04/06/2023     Most recent fingerstick glucose reviewed-   Recent Labs   Lab 02/29/24  1915 02/29/24  2322 03/01/24  0759 03/01/24  1143   POCTGLUCOSE 167* 121* 115* 88       Current correctional scale  High  Maintain anti-hyperglycemic dose as follows-   Antihyperglycemics (From admission, onward)    Start     Stop Route Frequency Ordered    02/27/24 0900  insulin detemir U-100 (Levemir) pen 20 Units         -- SubQ Daily 02/25/24 1359    02/26/24 2100  insulin detemir U-100 (Levemir) pen 15 Units         -- SubQ Nightly 02/25/24 1359    02/22/24 1145  insulin aspart U-100 pen 5 Units         -- SubQ 3 times daily with meals 02/22/24 1031    02/16/24 1514  insulin aspart U-100 pen 0-15 Units         -- SubQ Before meals & nightly PRN 02/16/24 1515        Hold Oral hypoglycemics while patient is in the hospital.  A1c: 8.2  Meds: basal bolus insulin + SSI PRN to maintain goal 140-180  Titrate as needed. Basal BID (20AM, 15U PM), prandia at 5U TIDWM  ADA diet, accuchecks ACHS, hypoglycemic protocol  Hold insulin 2/20 5:00 p.m. and 2/20 6:00 a.m. in setting of NPO status

## 2024-03-01 NOTE — ASSESSMENT & PLAN NOTE
Patient's anemia is currently controlled. Has received 1 units of PRBCs on 2/19 and 02/21 . Etiology likely d/t chronic disease and acute blood loss post surgery.  Current CBC reviewed-   Lab Results   Component Value Date    HGB 7.3 (L) 03/01/2024    HCT 23.9 (L) 03/01/2024     - monitor daily   -s/p 1U pRBC on 02/19 and 02/21

## 2024-03-01 NOTE — PROGRESS NOTES
[facility] [place] - Telemetry  Wound Care  WOC [facility]    Patient Name:  Christine Mosqueda   MRN:  [id number]  Date: 3/1/2024  Diagnosis: Librado's gangrene    History:     Past Medical History:   Diagnosis Date    Anxiety     Arthritis     Bronchitis     CHF (congestive heart failure)     Diabetic ketoacidosis associated with type 2 diabetes mellitus 3/10/2023    DM (diabetes mellitus)     Emphysema lung     Encounter for blood transfusion     HTN (hypertension)     Restrictive lung disease     Sleep apnea        Social History     Socioeconomic History    Marital status:    Tobacco Use    Smoking status: Some Days     Current packs/day: 0.25     Types: Cigarettes    Smokeless tobacco: Never   Substance and Sexual Activity    Alcohol use: Yes    Drug use: Yes     Types: Marijuana    Sexual activity: Yes     Partners: Male     Social Determinants of Health     Financial Resource Strain: Low Risk  (2/16/2024)    Overall Financial Resource Strain (CARDIA)     Difficulty of Paying Living Expenses: Not very hard   Food Insecurity: No Food Insecurity (2/16/2024)    Hunger Vital Sign     Worried About Running Out of Food in the Last Year: Never true     Ran Out of Food in the Last Year: Never true   Transportation Needs: No Transportation Needs (2/16/2024)    PRAPARE - Transportation     Lack of Transportation (Medical): No     Lack of Transportation (Non-Medical): No   Physical Activity: Inactive (2/16/2024)    Exercise Vital Sign     Days of Exercise per Week: 0 days     Minutes of Exercise per Session: 0 min   Stress: No Stress Concern Present (2/16/2024)    [place] [employer] - Occupational Stress Questionnaire     Feeling of Stress : Only a little   Social Connections: Moderately Isolated (2/16/2024)    Social Connection and Isolation Panel [NHANES]     Frequency of Communication with Friends and Family: Three times a week     Frequency of Social Gatherings with Friends and Family: Three times a week      Attends Jehovah's witness Services: Never     Active Member of Clubs or Organizations: No     Attends Club or Organization Meetings: Never     Marital Status:    Housing Stability: Low Risk  (2/16/2024)    Housing Stability Vital Sign     Unable to Pay for Housing in the Last Year: No     Number of Places Lived in the Last Year: 1     Unstable Housing in the Last Year: No       Precautions:     Allergies as of 02/15/2024 - Reviewed 02/15/2024   Allergen Reaction Noted    Hydrochlorothiazide plus  05/02/2013       [facility] Assessment Details/Treatment     Active Problem List with Overview Notes    Diagnosis Date Noted    Necrotizing fasciitis 02/29/2024    Debility 02/22/2024    Class 2 severe obesity due to excess calories with serious comorbidity and body mass index (BMI) of 35.0 to 35.9 in adult 02/21/2024    Advanced care planning/counseling discussion 02/21/2024    Librado's gangrene 02/16/2024    ILD (interstitial lung disease) 02/16/2024    Chronic diastolic congestive heart failure 02/16/2024    Sepsis with acute renal failure and tubular necrosis without septic shock 02/16/2024    Essential hypertension 02/16/2024    Anemia of chronic disease 02/16/2024    Cocaine abuse 02/16/2024    ETOH abuse 02/16/2024    Nonadherence to medical treatment 03/23/2023    Superficial vein thrombosis 03/22/2023    Vasculopathy 03/22/2023    Microcytic anemia 03/13/2023    CARLINE (acute kidney injury) 03/10/2023    Acute on chronic systolic heart failure 02/05/2018    Anxiety 02/05/2018    Type 2 diabetes mellitus with hyperglycemia, without long-term current use of insulin 02/05/2018    Emphysema lung 02/05/2018    Restrictive lung disease 02/05/2018    Tobacco abuse 02/05/2018     KCI/3M VAC dressing remains in place to right medial thigh at 125 mmHg continuous suction. Pink fluid in VAC canister.   Awaiting approval by insurance for transfer to [facility].   Nursing to remove VAC dressing when discharged to [facility]     03/01/2024

## 2024-03-01 NOTE — PLAN OF CARE
Problem: Diabetes Comorbidity  Goal: Blood Glucose Level Within Targeted Range  Outcome: Ongoing, Progressing  Intervention: Monitor and Manage Glycemia  Flowsheets (Taken 3/1/2024 0420)  Glycemic Management: blood glucose monitored     Problem: Adult Inpatient Plan of Care  Goal: Plan of Care Review  Outcome: Ongoing, Progressing  Goal: Absence of Hospital-Acquired Illness or Injury  Outcome: Ongoing, Progressing  Goal: Optimal Comfort and Wellbeing  Outcome: Ongoing, Progressing  Goal: Readiness for Transition of Care  Outcome: Ongoing, Progressing     Problem: Fall Injury Risk  Goal: Absence of Fall and Fall-Related Injury  Outcome: Ongoing, Progressing  Intervention: Identify and Manage Contributors  Flowsheets (Taken 3/1/2024 0420)  Medication Review/Management: medications reviewed  Intervention: Promote Injury-Free Environment  Flowsheets (Taken 3/1/2024 0420)  Safety Promotion/Fall Prevention: assistive device/personal item within reach

## 2024-03-01 NOTE — PLAN OF CARE
[facility]     Department of Hospital Medicine     [address] [place] [address]     [phone] after hours  [phone] fax       Inpatient Rehab ORDERS    03/01/2024    Admit to Nursing Home:      Skilled Bed                                               Diagnoses:  Active Hospital Problems    Diagnosis  POA    *Librado's gangrene [N49.3]  Yes    Necrotizing fasciitis [M72.6]  Yes    Debility [R53.81]  Yes    Class 2 severe obesity due to excess calories with serious comorbidity and body mass index (BMI) of 35.0 to 35.9 in adult [E66.01, Z68.35]  Not Applicable    Advanced care planning/counseling discussion [Z71.89]  Not Applicable    ILD (interstitial lung disease) [J84.9]  Yes    Chronic diastolic congestive heart failure [I50.32]  Yes    Sepsis with acute renal failure and tubular necrosis without septic shock [A41.9, R65.20, N17.0]  Yes    Essential hypertension [I10]  Yes    Anemia of chronic disease [D63.8]  Yes    Cocaine abuse [F14.10]  Yes    ETOH abuse [F10.10]  Yes    CARLINE (acute kidney injury) [N17.9]  Yes    Tobacco abuse [Z72.0]  Yes     Chronic    Type 2 diabetes mellitus with hyperglycemia, without long-term current use of insulin [E11.65]  Yes      Resolved Hospital Problems    Diagnosis Date Resolved POA    Hyponatremia [E87.1] 02/21/2024 Yes       Patient is homebound due to:  Librado's gangrene    Allergies:  Review of patient's allergies indicates:   Allergen Reactions    Hydrochlorothiazide plus        Vitals:       Every shift (Skilled Nursing patients)    Diet:cardiac  Acitivities:    - Up in a chair each morning as tolerated   - Ambulate with assistance to bathroom   - Scheduled walks once each shift (every 8 hours)      LABS:  Per facility protocol   CMP, CBC weekly for 1 month    Nursing Precautions:    - Aspiration precautions:             - Total assistance with meals            -  Upright 90 degrees befor during and after  meals              - Decubitus precautions:        -  for positioning   - Pressure reducing foam mattress   - Turn patient every two hours. Use wedge pillows to anchor patient    CONSULTS:       Physical Therapy to evaluate and treat     Occupational Therapy to evaluate and treat       MISCELLANEOUS CARE:      Routine Skin for Bedridden Patients:  Apply moisture barrier cream to all    skin folds and wet areas in perineal area daily and after baths and                           all bowel movements.    WOUND CARE:  Wound spray or saline for wound cleaning with all dressing changes.    All wounds to be measured with first dressing changes and every week.    Groin: Cleanse wound with Vashe. Placed Andrey ring over sutures and in crease by labia. Applied 3M Cavilon No Sting Film Barrier to periwound skin. Filled wound with Simplace dressing and created a bridge to anterior thigh for SensiTrak. Used 2 pieces of black foam. VAC at 125 mmHg continuous suction.     Wound Vac:     Location:  groin           Dressing changes every Monday, Wednesday and Friday.        ET Consult      Medications: Discontinue all previous medication orders, if any. See new list below.       Medication List        START taking these medications      oxyCODONE 5 MG immediate release tablet  Commonly known as: ROXICODONE  Take 1 tablet (5 mg total) by mouth every 6 (six) hours as needed for Pain.     sodium chloride 0.9 % PgBk 100 mL with ampicillin-sulbactam 3 gram SolR 3 g  Inject 3 g into the vein every 6 (six) hours. for 5 days     thiamine 100 MG tablet  Take 1 tablet (100 mg total) by mouth once daily.            CHANGE how you take these medications      ALPRAZolam 0.25 MG tablet  Commonly known as: XANAX  Take 1 tablet (0.25 mg total) by mouth 2 (two) times daily as needed for Anxiety.  What changed:   medication strength  how much to take  when to take this            CONTINUE taking these medications      albuterol 1.25 mg/3 mL  Nebu  Commonly known as: ACCUNEB  Take 1.25 mg by nebulization every 6 (six) hours as needed.     amLODIPine 10 MG tablet  Commonly known as: NORVASC  Take 10 mg by mouth once daily.     aspirin 81 MG Chew  Take 81 mg by mouth once daily.     atorvastatin 20 MG tablet  Commonly known as: LIPITOR  Take 20 mg by mouth once daily.     fluticasone-salmeterol 500-50 mcg/dose 500-50 mcg/dose Dsdv diskus inhaler  Commonly known as: ADVAIR DISKUS  Inhale 1 puff into the lungs 2 (two) times daily.     furosemide 40 MG tablet  Commonly known as: LASIX  Take 1 tablet (40 mg total) by mouth once daily.     gabapentin 400 MG capsule  Commonly known as: NEURONTIN  Take 400 mg by mouth 2 (two) times daily.     insulin aspart U-100 100 unit/mL injection  Commonly known as: NovoLOG  Inject 10 Units into the skin 3 (three) times daily before meals.     insulin detemir U-100 100 unit/mL injection  Commonly known as: Levemir  Inject 22 Units into the skin once daily.     nicotine 14 mg/24 hr  Commonly known as: NICODERM CQ  Place 1 patch onto the skin once daily.     omeprazole 40 MG capsule  Commonly known as: PRILOSEC  Take 40 mg by mouth once daily.     potassium chloride 8 MEQ Tbsr  Commonly known as: KLOR-CON  Take 1 tablet (8 mEq total) by mouth once daily.     promethazine 12.5 MG Tab  Commonly known as: PHENERGAN  Take 25 mg by mouth every 6 (six) hours as needed.     tiotropium 18 mcg inhalation capsule  Commonly known as: SPIRIVA  Inhale 18 mcg into the lungs once daily.                    _________________________________  Qasim Skinner III, MD  03/01/2024

## 2024-03-02 LAB
ALBUMIN SERPL BCP-MCNC: 1.7 G/DL (ref 3.5–5.2)
ALP SERPL-CCNC: 93 U/L (ref 55–135)
ALT SERPL W/O P-5'-P-CCNC: 14 U/L (ref 10–44)
ANION GAP SERPL CALC-SCNC: 7 MMOL/L (ref 8–16)
AST SERPL-CCNC: 23 U/L (ref 10–40)
BASOPHILS # BLD AUTO: 0.05 K/UL (ref 0–0.2)
BASOPHILS NFR BLD: 0.5 % (ref 0–1.9)
BILIRUB SERPL-MCNC: 0.2 MG/DL (ref 0.1–1)
BUN SERPL-MCNC: 19 MG/DL (ref 6–20)
CALCIUM SERPL-MCNC: 8.5 MG/DL (ref 8.7–10.5)
CHLORIDE SERPL-SCNC: 106 MMOL/L (ref 95–110)
CO2 SERPL-SCNC: 26 MMOL/L (ref 23–29)
CREAT SERPL-MCNC: 0.9 MG/DL (ref 0.5–1.4)
DIFFERENTIAL METHOD BLD: ABNORMAL
EOSINOPHIL # BLD AUTO: 0 K/UL (ref 0–0.5)
EOSINOPHIL NFR BLD: 0.2 % (ref 0–8)
ERYTHROCYTE [DISTWIDTH] IN BLOOD BY AUTOMATED COUNT: 19.2 % (ref 11.5–14.5)
EST. GFR  (NO RACE VARIABLE): >60 ML/MIN/1.73 M^2
GLUCOSE SERPL-MCNC: 119 MG/DL (ref 70–110)
HCT VFR BLD AUTO: 28.4 % (ref 37–48.5)
HGB BLD-MCNC: 8.4 G/DL (ref 12–16)
IMM GRANULOCYTES # BLD AUTO: 0.04 K/UL (ref 0–0.04)
IMM GRANULOCYTES NFR BLD AUTO: 0.4 % (ref 0–0.5)
LYMPHOCYTES # BLD AUTO: 1.5 K/UL (ref 1–4.8)
LYMPHOCYTES NFR BLD: 14.7 % (ref 18–48)
MAGNESIUM SERPL-MCNC: 1.7 MG/DL (ref 1.6–2.6)
MCH RBC QN AUTO: 22.5 PG (ref 27–31)
MCHC RBC AUTO-ENTMCNC: 29.6 G/DL (ref 32–36)
MCV RBC AUTO: 76 FL (ref 82–98)
MONOCYTES # BLD AUTO: 0.7 K/UL (ref 0.3–1)
MONOCYTES NFR BLD: 6.5 % (ref 4–15)
NEUTROPHILS # BLD AUTO: 8 K/UL (ref 1.8–7.7)
NEUTROPHILS NFR BLD: 77.7 % (ref 38–73)
NRBC BLD-RTO: 0 /100 WBC
PHOSPHATE SERPL-MCNC: 5.1 MG/DL (ref 2.7–4.5)
PLATELET # BLD AUTO: 197 K/UL (ref 150–450)
PMV BLD AUTO: 11.1 FL (ref 9.2–12.9)
POCT GLUCOSE: 124 MG/DL (ref 70–110)
POCT GLUCOSE: 153 MG/DL (ref 70–110)
POCT GLUCOSE: 166 MG/DL (ref 70–110)
POCT GLUCOSE: 255 MG/DL (ref 70–110)
POCT GLUCOSE: 39 MG/DL (ref 70–110)
POCT GLUCOSE: 55 MG/DL (ref 70–110)
POCT GLUCOSE: 77 MG/DL (ref 70–110)
POTASSIUM SERPL-SCNC: 5 MMOL/L (ref 3.5–5.1)
PROT SERPL-MCNC: 6.4 G/DL (ref 6–8.4)
RBC # BLD AUTO: 3.73 M/UL (ref 4–5.4)
SODIUM SERPL-SCNC: 139 MMOL/L (ref 136–145)
WBC # BLD AUTO: 10.22 K/UL (ref 3.9–12.7)

## 2024-03-02 PROCEDURE — 94761 N-INVAS EAR/PLS OXIMETRY MLT: CPT

## 2024-03-02 PROCEDURE — A4216 STERILE WATER/SALINE, 10 ML: HCPCS | Performed by: STUDENT IN AN ORGANIZED HEALTH CARE EDUCATION/TRAINING PROGRAM

## 2024-03-02 PROCEDURE — 63600175 PHARM REV CODE 636 W HCPCS: Performed by: STUDENT IN AN ORGANIZED HEALTH CARE EDUCATION/TRAINING PROGRAM

## 2024-03-02 PROCEDURE — 63600175 PHARM REV CODE 636 W HCPCS: Performed by: INTERNAL MEDICINE

## 2024-03-02 PROCEDURE — 36415 COLL VENOUS BLD VENIPUNCTURE: CPT

## 2024-03-02 PROCEDURE — 80053 COMPREHEN METABOLIC PANEL: CPT

## 2024-03-02 PROCEDURE — 83735 ASSAY OF MAGNESIUM: CPT

## 2024-03-02 PROCEDURE — 25000003 PHARM REV CODE 250: Performed by: HOSPITALIST

## 2024-03-02 PROCEDURE — 99900035 HC TECH TIME PER 15 MIN (STAT)

## 2024-03-02 PROCEDURE — 25000003 PHARM REV CODE 250: Performed by: STUDENT IN AN ORGANIZED HEALTH CARE EDUCATION/TRAINING PROGRAM

## 2024-03-02 PROCEDURE — 21400001 HC TELEMETRY ROOM

## 2024-03-02 PROCEDURE — 27000221 HC OXYGEN, UP TO 24 HOURS

## 2024-03-02 PROCEDURE — 84100 ASSAY OF PHOSPHORUS: CPT

## 2024-03-02 PROCEDURE — 25000003 PHARM REV CODE 250: Performed by: INTERNAL MEDICINE

## 2024-03-02 PROCEDURE — 85025 COMPLETE CBC W/AUTO DIFF WBC: CPT

## 2024-03-02 RX ORDER — AMLODIPINE BESYLATE 5 MG/1
10 TABLET ORAL DAILY
Status: DISCONTINUED | OUTPATIENT
Start: 2024-03-02 | End: 2024-03-06 | Stop reason: HOSPADM

## 2024-03-02 RX ADMIN — PANTOPRAZOLE SODIUM 40 MG: 40 TABLET, DELAYED RELEASE ORAL at 09:03

## 2024-03-02 RX ADMIN — ENOXAPARIN SODIUM 40 MG: 40 INJECTION SUBCUTANEOUS at 04:03

## 2024-03-02 RX ADMIN — ALPRAZOLAM 0.25 MG: 0.25 TABLET ORAL at 02:03

## 2024-03-02 RX ADMIN — INSULIN ASPART 3 UNITS: 100 INJECTION, SOLUTION INTRAVENOUS; SUBCUTANEOUS at 11:03

## 2024-03-02 RX ADMIN — AMPICILLIN SODIUM AND SULBACTAM SODIUM 3 G: 2; 1 INJECTION, POWDER, FOR SOLUTION INTRAMUSCULAR; INTRAVENOUS at 11:03

## 2024-03-02 RX ADMIN — Medication 10 ML: at 05:03

## 2024-03-02 RX ADMIN — INSULIN DETEMIR 15 UNITS: 100 INJECTION, SOLUTION SUBCUTANEOUS at 09:03

## 2024-03-02 RX ADMIN — FUROSEMIDE 40 MG: 40 TABLET ORAL at 09:03

## 2024-03-02 RX ADMIN — GABAPENTIN 400 MG: 400 CAPSULE ORAL at 09:03

## 2024-03-02 RX ADMIN — AMLODIPINE BESYLATE 10 MG: 5 TABLET ORAL at 09:03

## 2024-03-02 RX ADMIN — INSULIN ASPART 5 UNITS: 100 INJECTION, SOLUTION INTRAVENOUS; SUBCUTANEOUS at 04:03

## 2024-03-02 RX ADMIN — THIAMINE HCL TAB 100 MG 100 MG: 100 TAB at 09:03

## 2024-03-02 RX ADMIN — OXYCODONE 10 MG: 5 TABLET ORAL at 05:03

## 2024-03-02 RX ADMIN — OXYCODONE 5 MG: 5 TABLET ORAL at 09:03

## 2024-03-02 RX ADMIN — Medication 10 ML: at 11:03

## 2024-03-02 RX ADMIN — OXYCODONE 10 MG: 5 TABLET ORAL at 10:03

## 2024-03-02 RX ADMIN — ACETAMINOPHEN 1000 MG: 500 TABLET ORAL at 02:03

## 2024-03-02 RX ADMIN — AMPICILLIN SODIUM AND SULBACTAM SODIUM 3 G: 2; 1 INJECTION, POWDER, FOR SOLUTION INTRAMUSCULAR; INTRAVENOUS at 04:03

## 2024-03-02 RX ADMIN — FOLIC ACID 1 MG: 1 TABLET ORAL at 09:03

## 2024-03-02 RX ADMIN — Medication 24 G: at 01:03

## 2024-03-02 RX ADMIN — INSULIN ASPART 5 UNITS: 100 INJECTION, SOLUTION INTRAVENOUS; SUBCUTANEOUS at 09:03

## 2024-03-02 RX ADMIN — INSULIN ASPART 5 UNITS: 100 INJECTION, SOLUTION INTRAVENOUS; SUBCUTANEOUS at 11:03

## 2024-03-02 RX ADMIN — AMPICILLIN SODIUM AND SULBACTAM SODIUM 3 G: 2; 1 INJECTION, POWDER, FOR SOLUTION INTRAMUSCULAR; INTRAVENOUS at 05:03

## 2024-03-02 RX ADMIN — ASPIRIN 81 MG CHEWABLE TABLET 81 MG: 81 TABLET CHEWABLE at 09:03

## 2024-03-02 RX ADMIN — INSULIN ASPART 9 UNITS: 100 INJECTION, SOLUTION INTRAVENOUS; SUBCUTANEOUS at 09:03

## 2024-03-02 RX ADMIN — ACETAMINOPHEN 1000 MG: 500 TABLET ORAL at 07:03

## 2024-03-02 RX ADMIN — AMPICILLIN SODIUM AND SULBACTAM SODIUM 3 G: 2; 1 INJECTION, POWDER, FOR SOLUTION INTRAMUSCULAR; INTRAVENOUS at 12:03

## 2024-03-02 RX ADMIN — ACETAMINOPHEN 1000 MG: 500 TABLET ORAL at 09:03

## 2024-03-02 RX ADMIN — Medication 6 MG: at 09:03

## 2024-03-02 RX ADMIN — ATORVASTATIN CALCIUM 20 MG: 10 TABLET, FILM COATED ORAL at 09:03

## 2024-03-02 NOTE — ASSESSMENT & PLAN NOTE
-Noted on imaging; CT chest 2022  -Stable, no acute issues.   -Not on home O2 but says she should be (but never received because she left AMA at OSH)

## 2024-03-02 NOTE — ASSESSMENT & PLAN NOTE
Cont with PT/OT for gait training and strengthening and restoration of ADL's   Encourage mobility, OOB in chair, and early ambulation as appropriate  Fall precautions   Monitor for bowel and bladder dysfunction  Monitor for and prevent skin breakdown and pressure ulcers  Patient has been able to work with OT/PT who recommend placement for further rehabilitation  Patient pending placement, continue in-hospital care as stated above in the meantime  More than 20 minutes of my time has been spent discussing and planning just her safe disposition with patient/family/CM/SW/Nursing staff (not including other time spent in clinical discussion and management)  CM/SW following, appreciate input

## 2024-03-02 NOTE — ASSESSMENT & PLAN NOTE
Lab Results   Component Value Date    HGBA1C 8.2 (H) 04/06/2023     Recent Labs   Lab 03/02/24  0141   POCTGLUCOSE 77      Cont current basal/prandial insulin regimen   Low dose correction scale   Cont blood glucose monitoring   BG goal:  Preprandial blood glucose target <140 mg/dL  Random glucoses <180 mg/dL  Avoid hypoglycemia -  Reduce antihyperglycemic therapy when caloric intake is reduced. Avoid insulin stacking as a result of repeated injection of prandial insulin at close intervals.   Avoid severe hyperglycemia  ADA diet  Antihyperglycemics (From admission, onward)      Start     Stop Route Frequency Ordered    02/27/24 0900  insulin detemir U-100 (Levemir) pen 20 Units         -- SubQ Daily 02/25/24 1359    02/26/24 2100  insulin detemir U-100 (Levemir) pen 15 Units         -- SubQ Nightly 02/25/24 1359    02/22/24 1145  insulin aspart U-100 pen 5 Units         -- SubQ 3 times daily with meals 02/22/24 1031    02/16/24 1514  insulin aspart U-100 pen 0-15 Units         -- SubQ Before meals & nightly PRN 02/16/24 1515

## 2024-03-02 NOTE — ASSESSMENT & PLAN NOTE
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   Surgery consulted: S/p debridement on 02/16, 2/17, 2/19, and 2/21 (wound vac placed.)  s/p OR 2/23 for first wound vac change. S/p repeat vac change on Mon 02/26 with partial wound closure. Surgery plan for bedside wound vac change on Thursday (2/29)  blood and wound cultures with lactobacillus.   ID consulted. Descalate from meropenem to Unasyn for 2 weeks. EOC 03/06/24  jansen in place due to surgical wound site  Antibiotics given-   Antibiotics (From admission, onward)      Start     Stop Route Frequency Ordered    02/25/24 1215  ampicillin-sulbactam (UNASYN) 3 g in sodium chloride 0.9 % 100 mL IVPB (MB+)         03/06/24 1114 IV Every 6 hours (non-standard times) 02/25/24 1104

## 2024-03-02 NOTE — NURSING
[facility]  Nurses Note -- 4 Eyes      3/1/2024       Skin assessed on: Q Shift      [x] No Pressure Injuries Present    []Prevention Measures Documented    [] Yes LDA  for Pressure Injury Previously documented     [] Yes New Pressure Injury Discovered   [] LDA for New Pressure Injury Added      Attending RN:  Stormy Gipson LPN     Second RN:  Ángel Devlin RN

## 2024-03-02 NOTE — ASSESSMENT & PLAN NOTE
BP Readings from Last 3 Encounters:   03/02/24 (!) 159/92   06/20/23 (!) 164/86   05/08/23 (!) 144/91     Continuing home medications as prescribed  Will cont to monitor and adjust as needed  Will utilize p.r.n. blood pressure medication only if patient's blood pressure greater than 180/110 and she develops symptoms such as worsening chest pain or shortness of breath.  Cardiac diet    Cardiac/Autonomic (From admission, onward)      Start     Stop Route Frequency Ordered    02/16/24 0900  atorvastatin tablet 20 mg         -- Oral Daily 02/16/24 0506

## 2024-03-02 NOTE — PLAN OF CARE
Problem: Diabetes Comorbidity  Goal: Blood Glucose Level Within Targeted Range  Outcome: Ongoing, Progressing     Problem: Adult Inpatient Plan of Care  Goal: Plan of Care Review  Outcome: Ongoing, Progressing  Goal: Patient-Specific Goal (Individualized)  Outcome: Ongoing, Progressing  Goal: Absence of Hospital-Acquired Illness or Injury  Outcome: Ongoing, Progressing  Goal: Optimal Comfort and Wellbeing  Outcome: Ongoing, Progressing  Goal: Readiness for Transition of Care  Outcome: Ongoing, Progressing     Problem: Skin Injury Risk Increased  Goal: Skin Health and Integrity  Outcome: Ongoing, Progressing     Problem: Adjustment to Illness (Sepsis/Septic Shock)  Goal: Optimal Coping  Outcome: Ongoing, Progressing     Problem: Bleeding (Sepsis/Septic Shock)  Goal: Absence of Bleeding  Outcome: Ongoing, Progressing     Problem: Glycemic Control Impaired (Sepsis/Septic Shock)  Goal: Blood Glucose Level Within Desired Range  Outcome: Ongoing, Progressing     Problem: Infection Progression (Sepsis/Septic Shock)  Goal: Absence of Infection Signs and Symptoms  Outcome: Ongoing, Progressing     Problem: Nutrition Impaired (Sepsis/Septic Shock)  Goal: Optimal Nutrition Intake  Outcome: Ongoing, Progressing     Problem: Fluid and Electrolyte Imbalance (Acute Kidney Injury/Impairment)  Goal: Fluid and Electrolyte Balance  Outcome: Ongoing, Progressing     Problem: Oral Intake Inadequate (Acute Kidney Injury/Impairment)  Goal: Optimal Nutrition Intake  Outcome: Ongoing, Progressing     Problem: Renal Function Impairment (Acute Kidney Injury/Impairment)  Goal: Effective Renal Function  Outcome: Ongoing, Progressing     Problem: Fall Injury Risk  Goal: Absence of Fall and Fall-Related Injury  Outcome: Ongoing, Progressing     Problem: Infection  Goal: Absence of Infection Signs and Symptoms  Outcome: Ongoing, Progressing     Problem: Impaired Wound Healing  Goal: Optimal Wound Healing  Outcome: Ongoing, Progressing      room air

## 2024-03-02 NOTE — PROGRESS NOTES
[facility] Medicine  Telemedicine Progress Note    Patient Name: Christine Mosqueda  MRN: [id number]  Patient Class: IP- Inpatient   Admission Date: 2/15/2024  Length of Stay: 15 days  Attending Physician: Lynsey Colmenares MD  Primary Care Provider: [facility]          Subjective:     Principal Problem:Estephanie's gangrene        HPI:  Ms. Mosqueda is a 53 yoF with a PMHx of diastolic heart failure, HTN, T2DM on insulin. She presented to the hospital with worsening pain and swelling of the right medial thigh. She developed an abscess in the area that underwent I+D at [facility] a few days ago. She was sent home on PO abx. Since that time the pain and swelling have worsened.  In ED, CT scan demonstrated extensive inflammation extending from the right perineum to the right medial thigh with multiple foci of air. She was admitted to general surgery for estephanie's gangrene. She underwent extensive debridement under general surgery today in OR. She is currently doing well with good pain control. HM consulted for medical management.     Overview/Hospital Course:  Ms Christine Mosqueda is a 53 y.o.  woman with poorly controlled type II DM who was admitted for sepsis secondary to bacteremia and Estephanie's gangrene. Pt presented w/ worsening pain and swelling of the Rt medial thigh. CT scan findings were consistent w/ Estephanie's gangrene. General surgery team consulted. S/p surgical debridement 02/16, 2/17, 2/19, and on 02/21 (wound vac placed.) S/p OR 2/23 for first wound vac change. Plan for OR again on 02/26. Had acute on chronic anemia, likely exacerbated with multiple debridements. Required blood transfusions on 02/19 and again on 02/21.  Blood and wound cultures with lactobacillus. ID consulted- on meropenem. Repeat blood cx with no growth thus far. Also found to have CARLINE on admission, nephrology following. CARLINE resolved. PT/OT recommends SNF on discharge.     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 No acute events overnight. Sitting up in bed with no complaints this morning.  at bedside. Being treated with IV abx and currently pending SNF placement.     Symptoms: Stable. The patient denies shortness of breath, malaise, cough, chest pain, weakness, headache, chest pressure, anorexia, diarrhea and anxiety.     Diet: Regular. Adequate intake. The patient denies nausea and vomiting.    Last Bowel Movement: 03/01/24    Activity Level: Impaired due to weakness      Pain: The patient Complains of pain that is mild. The patient reports pain is requiring pain meds. Pain is well controlled.      Review of Systems   Constitutional:  Negative for chills and fever.   Respiratory:  Negative for cough and shortness of breath.    Cardiovascular:  Negative for chest pain and palpitations.   Gastrointestinal:  Negative for abdominal pain.        Scheduled Meds:   acetaminophen  1,000 mg Oral Q8H    amLODIPine  10 mg Oral Daily    ampicillin-sulbactam  3 g Intravenous Q6H    budesonide  1 mg Nebulization Q12H    And    arformoteroL  15 mcg Nebulization BID    aspirin  81 mg Oral Daily    atorvastatin  20 mg Oral Daily    enoxparin  40 mg Subcutaneous Q24H (prophylaxis, 1700)    folic acid  1 mg Oral Daily    furosemide  40 mg Oral Daily    gabapentin  400 mg Oral BID    insulin aspart U-100  5 Units Subcutaneous TIDWM    insulin detemir U-100  15 Units Subcutaneous QHS    insulin detemir U-100 (Levemir)  20 Units Subcutaneous Daily    pantoprazole  40 mg Oral Daily    sodium chloride 0.9%  10 mL  Intravenous Q6H    thiamine  100 mg Oral Daily     Continuous Infusions:  PRN Meds:.0.9%  NaCl infusion (for blood administration), albuterol sulfate, ALPRAZolam, dextrose 10%, dextrose 10%, glucagon (human recombinant), glucose, glucose, HYDROmorphone, insulin aspart U-100, melatonin, ondansetron, oxyCODONE, oxyCODONE, prochlorperazine, sodium chloride 0.9%, Flushing PICC/Midline Protocol **AND** sodium chloride 0.9% **AND** sodium chloride 0.9%      Objective:     Vitals:    03/01/24 1939 03/01/24 2000 03/01/24 2330 03/02/24 0440   BP: (!) 143/71  137/65 (!) 159/92   BP Location: Left arm  Left arm Left arm   Patient Position: Lying  Lying Lying   Pulse: 84 84 77 (!) 57   Resp: 18 20 18 18   Temp: 99.9 °F (37.7 °C)  98 °F (36.7 °C) 98 °F (36.7 °C)   TempSrc: Oral  Oral Oral   SpO2:  (!) 94% (!) 93% (!) 94%   Weight:       Height:           Patient Vitals for the past 72 hrs (Last 3 readings):   Weight   03/01/24 0439 96.7 kg (213 lb 3 oz)       Intake/Output Summary (Last 24 hours) at 3/2/2024 0851  Last data filed at 3/2/2024 0637  Gross per 24 hour   Intake 480 ml   Output 2651 ml   Net -2171 ml     Net IO Since Admission: -18,852.42 mL [03/02/24 0851]    Physical Exam  Vitals and nursing note reviewed.   Constitutional:       General: She is awake. She is not in acute distress.     Appearance: Normal appearance. She is well-developed and well-groomed. She is not toxic-appearing.   HENT:      Head: Normocephalic and atraumatic.   Cardiovascular:      Rate and Rhythm: Normal rate.   Pulmonary:      Effort: No tachypnea, accessory muscle usage or respiratory distress.   Abdominal:      General: There is no distension.   Neurological:      Mental Status: She is alert and oriented to person, place, and time. Mental status is at baseline.   Psychiatric:         Mood and Affect: Mood normal.         Behavior: Behavior normal. Behavior is cooperative.         Thought Content: Thought content normal.       Significant  Labs: All pertinent labs within the past 24 hours have been reviewed.    BMP (Last 3 Results):   Recent Labs   Lab 02/28/24  0354 02/29/24  0358 03/01/24  0513   GLU 55* 81 101    138 140   K 4.4 5.1 4.3    103 105   CO2 30* 27 28   BUN 17 17 15   CREATININE 1.1 1.1 1.0   CALCIUM 8.1* 8.1* 8.3*   MG 1.6 1.6 1.7     CBC (Last 3 Results):   Recent Labs   Lab 02/29/24  0358 03/01/24  0513 03/02/24  0646   WBC 10.97 9.93 10.22   RBC 3.34* 3.06* 3.73*   HGB 7.6* 7.3* 8.4*   HCT 25.1* 23.9* 28.4*   PLT SEE COMMENT 224 197   MCV 75* 78* 76*   MCH 22.8* 23.9* 22.5*   MCHC 30.3* 30.5* 29.6*       Significant Imaging: I have reviewed all pertinent imaging results/findings within the past 24 hours.  Echo    Left Ventricle: There is hyperdynamic systolic function with a visually   estimated ejection fraction of 70 - 75%.    Right Ventricle: Normal right ventricular cavity size. Systolic   function is normal.    Left Atrium: Left atrium is mildly dilated.    Tricuspid Valve: There is mild regurgitation.    Pulmonary Artery: The estimated pulmonary artery systolic pressure is   55 mmHg.    IVC/SVC: Intermediate venous pressure at 8 mmHg.    Pericardium: There is a trivial posterior effusion.        Assessment/Plan:      * Librado's gangrene  CT on admit with R perineum to R medial thigh Fourniers.   Surgery consulted: S/p debridement on 02/16, 2/17, 2/19, and 2/21 (wound vac placed.)  s/p OR 2/23 for first wound vac change. S/p repeat vac change on Mon 02/26 with partial wound closure. Surgery plan for bedside wound vac change on Thursday (2/29)  blood and wound cultures with lactobacillus.   ID consulted. Descalate from meropenem to Unasyn for 2 weeks. EOC 03/06/24  jansen in place due to surgical wound site  Antibiotics given-   Antibiotics (From admission, onward)      Start     Stop Route Frequency Ordered    02/25/24 1215  ampicillin-sulbactam (UNASYN) 3 g in sodium chloride 0.9 % 100 mL IVPB (MB+)          03/06/24 1114 IV Every 6 hours (non-standard times) 02/25/24 1104                Sepsis with acute renal failure and tubular necrosis without septic shock  Defined by leukocytosis, tachycardia and groin abscess. Source is Fourniers.   - surgical management with debridement  - antibiotics= Unasyn per ID  - Leukocytosis improving     Necrotizing fasciitis  Plan as above.       Debility  Cont with PT/OT for gait training and strengthening and restoration of ADL's   Encourage mobility, OOB in chair, and early ambulation as appropriate  Fall precautions   Monitor for bowel and bladder dysfunction  Monitor for and prevent skin breakdown and pressure ulcers  Patient has been able to work with OT/PT who recommend placement for further rehabilitation  Patient pending placement, continue in-hospital care as stated above in the meantime  More than 20 minutes of my time has been spent discussing and planning just her safe disposition with patient/family/CM/SW/Nursing staff (not including other time spent in clinical discussion and management)  CM/SW following, appreciate input     Advanced care planning/counseling discussion  Advance Care Planning    Date: 02/21/2024    Code Status  I engaged the the patient in a voluntary conversation about the patient's preferences for care  at the very end of life. The patient wishes to have CPR and other invasive treatments performed when her heart and/or breathing stops. I communicated to the patient that her wishes align with full code status and she agrees and verbalized understanding.   I spent a total of 16 minutes engaging the patient in this advance care planning discussion.           Code Status: Full Code      Class 2 severe obesity due to excess calories with serious comorbidity and body mass index (BMI) of 35.0 to 35.9 in adult  Body mass index is 36.59 kg/m². Morbid obesity complicates all aspects of disease management from diagnostic modalities to treatment. Weight loss  encouraged and health benefits explained to patient.         ETOH abuse  Patient drinks at least a 6 pack beer daily  - Start scheduled librium- wean ordered  - Thiamine, folate, potassium, magnesium      Cocaine abuse  Patient sprinkles crack crystals in her marijuana  Wants help. Tearful. Emotional support provided.   CM consulted.       Anemia of chronic disease  Patient's anemia is currently controlled. Has received 1 units of PRBCs on 2/19 and 02/21 . Etiology likely d/t chronic disease and acute blood loss post surgery.  Current CBC reviewed-   Lab Results   Component Value Date    HGB 8.4 (L) 03/02/2024    HCT 28.4 (L) 03/02/2024     - monitor daily   -s/p 1U pRBC on 02/19 and 02/21    Essential hypertension  BP Readings from Last 3 Encounters:   03/02/24 (!) 159/92   06/20/23 (!) 164/86   05/08/23 (!) 144/91     Continuing home medications as prescribed  Will cont to monitor and adjust as needed  Will utilize p.r.n. blood pressure medication only if patient's blood pressure greater than 180/110 and she develops symptoms such as worsening chest pain or shortness of breath.  Cardiac diet    Cardiac/Autonomic (From admission, onward)      Start     Stop Route Frequency Ordered    02/16/24 0900  atorvastatin tablet 20 mg         -- Oral Daily 02/16/24 0506            Chronic diastolic congestive heart failure  Patient is identified as having Diastolic (HFpEF) heart failure that is Chronic. CHF is currently controlled. Latest ECHO performed and demonstrates- Results for orders placed during the hospital encounter of 03/10/23    Echo    Interpretation Summary  · The left ventricle is normal in size with low normal systolic function.  · The estimated ejection fraction is 53%.  · There is abnormal septal wall motion.  · Indeterminate left ventricular diastolic function.  · Mild left atrial enlargement.  · Normal right ventricular size with low normal right ventricular systolic function.  · Mild right atrial  enlargement.  · Mild mitral regurgitation.  · Mild tricuspid regurgitation.  · Normal central venous pressure (3 mmHg).  · The estimated PA systolic pressure is 23 mmHg.    No acute issues  Monitor fluid status- does have lower extremity edema   Discussed with nephrology, okay to resume lasix 02/23    ILD (interstitial lung disease)  -Noted on imaging; CT chest 2022  -Stable, no acute issues.   -Not on home O2 but says she should be (but never received because she left AMA at OSH)    CARLINE (acute kidney injury)  Patient with acute kidney injury/acute renal failure likely due to pre-renal azotemia due to IVVD and acute tubular necrosis caused by sepsis  CARLINE is currently improving. Baseline creatinine  0.9  - Labs reviewed- Renal function/electrolytes with Estimated Creatinine Clearance: 71.7 mL/min (based on SCr of 1 mg/dL). according to latest data. Monitor urine output and serial BMP and adjust therapy as needed. Avoid nephrotoxins and renally dose meds for GFR listed above.    - Highly suspect ATN as an etiology at this time  - Nephrology consult requested, appreciate recs  - renal function is improving and near baseline.   - Resolved    Tobacco abuse  - Pt was counseled about cessation x4 minutes      Type 2 diabetes mellitus with hyperglycemia, without long-term current use of insulin  Lab Results   Component Value Date    HGBA1C 8.2 (H) 04/06/2023     Recent Labs   Lab 03/02/24  0141   POCTGLUCOSE 77      Cont current basal/prandial insulin regimen   Low dose correction scale   Cont blood glucose monitoring   BG goal:  Preprandial blood glucose target <140 mg/dL  Random glucoses <180 mg/dL  Avoid hypoglycemia -  Reduce antihyperglycemic therapy when caloric intake is reduced. Avoid insulin stacking as a result of repeated injection of prandial insulin at close intervals.   Avoid severe hyperglycemia  ADA diet  Antihyperglycemics (From admission, onward)      Start     Stop Route Frequency Ordered    02/27/24 0900   insulin detemir U-100 (Levemir) pen 20 Units         -- SubQ Daily 02/25/24 1359    02/26/24 2100  insulin detemir U-100 (Levemir) pen 15 Units         -- SubQ Nightly 02/25/24 1359    02/22/24 1145  insulin aspart U-100 pen 5 Units         -- SubQ 3 times daily with meals 02/22/24 1031    02/16/24 1514  insulin aspart U-100 pen 0-15 Units         -- SubQ Before meals & nightly PRN 02/16/24 1515               VTE Risk Mitigation (From admission, onward)           Ordered     enoxaparin injection 40 mg  Every 24 hours         02/24/24 1615     IP VTE HIGH RISK PATIENT  Once         02/16/24 0506     Place sequential compression device  Until discontinued         02/16/24 0506                          I have completed this tele-visit without the assistance of a telepresenter.    The attending portion of this evaluation, treatment, and documentation was performed per Lynsey Chatman MD via Telemedicine AudioVisual using the secure [email or web address] software platform with 2 way audio/video. The provider was located off-site and the patient is located in the hospital. The aforementioned video software was utilized to document the relevant history and physical exam    Lynsey Chatman MD  Department of Hospital Medicine   [facility]

## 2024-03-02 NOTE — ASSESSMENT & PLAN NOTE
Patient's anemia is currently controlled. Has received 1 units of PRBCs on 2/19 and 02/21 . Etiology likely d/t chronic disease and acute blood loss post surgery.  Current CBC reviewed-   Lab Results   Component Value Date    HGB 8.4 (L) 03/02/2024    HCT 28.4 (L) 03/02/2024     - monitor daily   -s/p 1U pRBC on 02/19 and 02/21

## 2024-03-02 NOTE — SUBJECTIVE & OBJECTIVE
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 No acute events overnight. Sitting up in bed with no complaints this morning.  at bedside. Being treated with IV abx and currently pending SNF placement.     Symptoms: Stable. The patient denies shortness of breath, malaise, cough, chest pain, weakness, headache, chest pressure, anorexia, diarrhea and anxiety.     Diet: Regular. Adequate intake. The patient denies nausea and vomiting.    Last Bowel Movement: 03/01/24    Activity Level: Impaired due to weakness      Pain: The patient Complains of pain that is mild. The patient reports pain is requiring pain meds. Pain is well controlled.      Review of Systems   Constitutional:  Negative for chills and fever.   Respiratory:  Negative for cough and shortness of breath.    Cardiovascular:  Negative for chest pain and palpitations.   Gastrointestinal:  Negative for abdominal pain.        Scheduled Meds:   acetaminophen  1,000 mg Oral Q8H    amLODIPine  10 mg Oral Daily    ampicillin-sulbactam  3 g Intravenous Q6H    budesonide  1 mg Nebulization Q12H    And    arformoteroL  15 mcg Nebulization BID    aspirin  81 mg Oral Daily    atorvastatin  20 mg Oral Daily    enoxparin  40 mg Subcutaneous Q24H (prophylaxis, 1700)    folic acid  1 mg Oral Daily    furosemide  40 mg Oral Daily    gabapentin  400 mg Oral BID    insulin aspart U-100  5 Units Subcutaneous TIDWM    insulin detemir U-100  15 Units Subcutaneous QHS    insulin detemir U-100 (Levemir)  20 Units Subcutaneous Daily    pantoprazole  40 mg Oral Daily    sodium  chloride 0.9%  10 mL Intravenous Q6H    thiamine  100 mg Oral Daily     Continuous Infusions:  PRN Meds:.0.9%  NaCl infusion (for blood administration), albuterol sulfate, ALPRAZolam, dextrose 10%, dextrose 10%, glucagon (human recombinant), glucose, glucose, HYDROmorphone, insulin aspart U-100, melatonin, ondansetron, oxyCODONE, oxyCODONE, prochlorperazine, sodium chloride 0.9%, Flushing PICC/Midline Protocol **AND** sodium chloride 0.9% **AND** sodium chloride 0.9%      Objective:     Vitals:    03/01/24 1939 03/01/24 2000 03/01/24 2330 03/02/24 0440   BP: (!) 143/71  137/65 (!) 159/92   BP Location: Left arm  Left arm Left arm   Patient Position: Lying  Lying Lying   Pulse: 84 84 77 (!) 57   Resp: 18 20 18 18   Temp: 99.9 °F (37.7 °C)  98 °F (36.7 °C) 98 °F (36.7 °C)   TempSrc: Oral  Oral Oral   SpO2:  (!) 94% (!) 93% (!) 94%   Weight:       Height:           Patient Vitals for the past 72 hrs (Last 3 readings):   Weight   03/01/24 0439 96.7 kg (213 lb 3 oz)       Intake/Output Summary (Last 24 hours) at 3/2/2024 0851  Last data filed at 3/2/2024 0637  Gross per 24 hour   Intake 480 ml   Output 2651 ml   Net -2171 ml     Net IO Since Admission: -18,852.42 mL [03/02/24 0851]    Physical Exam  Vitals and nursing note reviewed.   Constitutional:       General: She is awake. She is not in acute distress.     Appearance: Normal appearance. She is well-developed and well-groomed. She is not toxic-appearing.   HENT:      Head: Normocephalic and atraumatic.   Cardiovascular:      Rate and Rhythm: Normal rate.   Pulmonary:      Effort: No tachypnea, accessory muscle usage or respiratory distress.   Abdominal:      General: There is no distension.   Neurological:      Mental Status: She is alert and oriented to person, place, and time. Mental status is at baseline.   Psychiatric:         Mood and Affect: Mood normal.         Behavior: Behavior normal. Behavior is cooperative.         Thought Content: Thought content  normal.       Significant Labs: All pertinent labs within the past 24 hours have been reviewed.    BMP (Last 3 Results):   Recent Labs   Lab 02/28/24  0354 02/29/24  0358 03/01/24  0513   GLU 55* 81 101    138 140   K 4.4 5.1 4.3    103 105   CO2 30* 27 28   BUN 17 17 15   CREATININE 1.1 1.1 1.0   CALCIUM 8.1* 8.1* 8.3*   MG 1.6 1.6 1.7     CBC (Last 3 Results):   Recent Labs   Lab 02/29/24  0358 03/01/24  0513 03/02/24  0646   WBC 10.97 9.93 10.22   RBC 3.34* 3.06* 3.73*   HGB 7.6* 7.3* 8.4*   HCT 25.1* 23.9* 28.4*   PLT SEE COMMENT 224 197   MCV 75* 78* 76*   MCH 22.8* 23.9* 22.5*   MCHC 30.3* 30.5* 29.6*       Significant Imaging: I have reviewed all pertinent imaging results/findings within the past 24 hours.  Echo    Left Ventricle: There is hyperdynamic systolic function with a visually   estimated ejection fraction of 70 - 75%.    Right Ventricle: Normal right ventricular cavity size. Systolic   function is normal.    Left Atrium: Left atrium is mildly dilated.    Tricuspid Valve: There is mild regurgitation.    Pulmonary Artery: The estimated pulmonary artery systolic pressure is   55 mmHg.    IVC/SVC: Intermediate venous pressure at 8 mmHg.    Pericardium: There is a trivial posterior effusion.

## 2024-03-03 LAB
POCT GLUCOSE: 128 MG/DL (ref 70–110)
POCT GLUCOSE: 144 MG/DL (ref 70–110)
POCT GLUCOSE: 178 MG/DL (ref 70–110)
POCT GLUCOSE: 80 MG/DL (ref 70–110)

## 2024-03-03 PROCEDURE — 25000003 PHARM REV CODE 250: Performed by: HOSPITALIST

## 2024-03-03 PROCEDURE — C1751 CATH, INF, PER/CENT/MIDLINE: HCPCS

## 2024-03-03 PROCEDURE — 63600175 PHARM REV CODE 636 W HCPCS: Performed by: INTERNAL MEDICINE

## 2024-03-03 PROCEDURE — 21400001 HC TELEMETRY ROOM

## 2024-03-03 PROCEDURE — 25000003 PHARM REV CODE 250: Performed by: STUDENT IN AN ORGANIZED HEALTH CARE EDUCATION/TRAINING PROGRAM

## 2024-03-03 PROCEDURE — 99900035 HC TECH TIME PER 15 MIN (STAT)

## 2024-03-03 PROCEDURE — 63600175 PHARM REV CODE 636 W HCPCS: Performed by: STUDENT IN AN ORGANIZED HEALTH CARE EDUCATION/TRAINING PROGRAM

## 2024-03-03 PROCEDURE — 25000003 PHARM REV CODE 250: Performed by: INTERNAL MEDICINE

## 2024-03-03 PROCEDURE — A4216 STERILE WATER/SALINE, 10 ML: HCPCS | Performed by: STUDENT IN AN ORGANIZED HEALTH CARE EDUCATION/TRAINING PROGRAM

## 2024-03-03 PROCEDURE — 27000221 HC OXYGEN, UP TO 24 HOURS

## 2024-03-03 PROCEDURE — 63600175 PHARM REV CODE 636 W HCPCS

## 2024-03-03 PROCEDURE — 36410 VNPNXR 3YR/> PHY/QHP DX/THER: CPT

## 2024-03-03 RX ORDER — KETOROLAC TROMETHAMINE 30 MG/ML
15 INJECTION, SOLUTION INTRAMUSCULAR; INTRAVENOUS ONCE
Status: COMPLETED | OUTPATIENT
Start: 2024-03-03 | End: 2024-03-03

## 2024-03-03 RX ADMIN — AMPICILLIN SODIUM AND SULBACTAM SODIUM 3 G: 2; 1 INJECTION, POWDER, FOR SOLUTION INTRAMUSCULAR; INTRAVENOUS at 05:03

## 2024-03-03 RX ADMIN — AMPICILLIN SODIUM AND SULBACTAM SODIUM 3 G: 2; 1 INJECTION, POWDER, FOR SOLUTION INTRAMUSCULAR; INTRAVENOUS at 11:03

## 2024-03-03 RX ADMIN — ALPRAZOLAM 0.25 MG: 0.25 TABLET ORAL at 08:03

## 2024-03-03 RX ADMIN — OXYCODONE 10 MG: 5 TABLET ORAL at 12:03

## 2024-03-03 RX ADMIN — ACETAMINOPHEN 1000 MG: 500 TABLET ORAL at 10:03

## 2024-03-03 RX ADMIN — Medication 10 ML: at 05:03

## 2024-03-03 RX ADMIN — Medication 10 ML: at 12:03

## 2024-03-03 RX ADMIN — ACETAMINOPHEN 1000 MG: 500 TABLET ORAL at 01:03

## 2024-03-03 RX ADMIN — THIAMINE HCL TAB 100 MG 100 MG: 100 TAB at 08:03

## 2024-03-03 RX ADMIN — ACETAMINOPHEN 1000 MG: 500 TABLET ORAL at 06:03

## 2024-03-03 RX ADMIN — Medication 10 ML: at 11:03

## 2024-03-03 RX ADMIN — ENOXAPARIN SODIUM 40 MG: 40 INJECTION SUBCUTANEOUS at 05:03

## 2024-03-03 RX ADMIN — PANTOPRAZOLE SODIUM 40 MG: 40 TABLET, DELAYED RELEASE ORAL at 08:03

## 2024-03-03 RX ADMIN — AMLODIPINE BESYLATE 10 MG: 5 TABLET ORAL at 08:03

## 2024-03-03 RX ADMIN — KETOROLAC TROMETHAMINE 15 MG: 30 INJECTION, SOLUTION INTRAMUSCULAR; INTRAVENOUS at 11:03

## 2024-03-03 RX ADMIN — ASPIRIN 81 MG CHEWABLE TABLET 81 MG: 81 TABLET CHEWABLE at 08:03

## 2024-03-03 RX ADMIN — INSULIN ASPART 5 UNITS: 100 INJECTION, SOLUTION INTRAVENOUS; SUBCUTANEOUS at 12:03

## 2024-03-03 RX ADMIN — OXYCODONE 10 MG: 5 TABLET ORAL at 08:03

## 2024-03-03 RX ADMIN — GABAPENTIN 400 MG: 400 CAPSULE ORAL at 08:03

## 2024-03-03 RX ADMIN — INSULIN ASPART 5 UNITS: 100 INJECTION, SOLUTION INTRAVENOUS; SUBCUTANEOUS at 05:03

## 2024-03-03 RX ADMIN — FOLIC ACID 1 MG: 1 TABLET ORAL at 08:03

## 2024-03-03 RX ADMIN — ATORVASTATIN CALCIUM 20 MG: 10 TABLET, FILM COATED ORAL at 08:03

## 2024-03-03 RX ADMIN — FUROSEMIDE 40 MG: 40 TABLET ORAL at 08:03

## 2024-03-03 RX ADMIN — INSULIN DETEMIR 15 UNITS: 100 INJECTION, SOLUTION SUBCUTANEOUS at 08:03

## 2024-03-03 RX ADMIN — Medication 6 MG: at 08:03

## 2024-03-03 NOTE — ASSESSMENT & PLAN NOTE
Defined by leukocytosis, tachycardia and groin abscess. Source is [facility].   - surgical management with debridement  - antibiotics= Unasyn per ID  - Leukocytosis resolved.

## 2024-03-03 NOTE — ASSESSMENT & PLAN NOTE
BP Readings from Last 3 Encounters:   03/03/24 (!) 167/78   06/20/23 (!) 164/86   05/08/23 (!) 144/91     Continuing home medications as prescribed  Will cont to monitor and adjust as needed  Will utilize p.r.n. blood pressure medication only if patient's blood pressure greater than 180/110 and she develops symptoms such as worsening chest pain or shortness of breath.  Cardiac diet    Cardiac/Autonomic (From admission, onward)    Start     Stop Route Frequency Ordered    03/02/24 1000  amLODIPine tablet 10 mg         -- Oral Daily 03/02/24 0850    02/16/24 0900  atorvastatin tablet 20 mg         -- Oral Daily 02/16/24 0506

## 2024-03-03 NOTE — PROGRESS NOTES
[facility] Medicine  Telemedicine Progress Note    Patient Name: Christine Mosqueda  MRN: [id number]  Patient Class: IP- Inpatient   Admission Date: 2/15/2024  Length of Stay: 16 days  Attending Physician: Lynsey Colmenares MD  Primary Care Provider: [facility]          Subjective:     Principal Problem:Estephanie's gangrene        HPI:  Ms. Mosqueda is a 53 yoF with a PMHx of diastolic heart failure, HTN, T2DM on insulin. She presented to the hospital with worsening pain and swelling of the right medial thigh. She developed an abscess in the area that underwent I+D at [facility] a few days ago. She was sent home on PO abx. Since that time the pain and swelling have worsened.  In ED, CT scan demonstrated extensive inflammation extending from the right perineum to the right medial thigh with multiple foci of air. She was admitted to general surgery for estephanie's gangrene. She underwent extensive debridement under general surgery today in OR. She is currently doing well with good pain control. HM consulted for medical management.     Overview/Hospital Course:  Ms Christine Mosqueda is a 53 y.o.  woman with poorly controlled type II DM who was admitted for sepsis secondary to bacteremia and Estephanie's gangrene. Pt presented w/ worsening pain and swelling of the Rt medial thigh. CT scan findings were consistent w/ Estephanie's gangrene. General surgery team consulted. S/p surgical debridement 02/16, 2/17, 2/19, and on 02/21 (wound vac placed.) S/p OR 2/23 for first wound vac change. Plan for OR again on 02/26. Had acute on chronic anemia, likely exacerbated with multiple debridements. Required blood transfusions on 02/19 and again on 02/21.  Blood and wound cultures with lactobacillus. ID consulted- on meropenem. Repeat blood cx with no growth thus far. Also found to have CARLINE on admission, nephrology following. CARLINE resolved. PT/OT recommends SNF on discharge.     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 No acute events overnight. Sitting up in bed with no complaints this morning.  at bedside. Being treated with IV abx and currently pending SNF placement.     Symptoms: Stable. The patient denies shortness of breath, malaise, cough, chest pain, weakness, headache, chest pressure, anorexia, diarrhea and anxiety.     Diet: Regular. Adequate intake. The patient denies nausea and vomiting.    Last Bowel Movement: 03/02/24    Activity Level: Impaired due to weakness      Pain: The patient Complains of pain that is mild. The patient reports pain is requiring pain meds. Pain is well controlled.      Review of Systems   Constitutional:  Negative for chills and fever.   Respiratory:  Negative for cough and shortness of breath.    Cardiovascular:  Negative for chest pain and palpitations.   Gastrointestinal:  Negative for abdominal pain.        Scheduled Meds:   acetaminophen  1,000 mg Oral Q8H    amLODIPine  10 mg Oral Daily    ampicillin-sulbactam  3 g Intravenous Q6H    budesonide  1 mg Nebulization Q12H    And    arformoteroL  15 mcg Nebulization BID    aspirin  81 mg Oral Daily    atorvastatin  20 mg Oral Daily    enoxparin  40 mg Subcutaneous Q24H (prophylaxis, 1700)    folic acid  1 mg Oral Daily    furosemide  40 mg Oral Daily    gabapentin  400 mg Oral BID    insulin aspart U-100  5 Units Subcutaneous TIDWM    insulin detemir U-100  15 Units Subcutaneous QHS    insulin detemir U-100 (Levemir)  20 Units Subcutaneous Daily    pantoprazole  40 mg Oral Daily    sodium chloride 0.9%  10 mL  Intravenous Q6H    thiamine  100 mg Oral Daily     Continuous Infusions:  PRN Meds:.0.9%  NaCl infusion (for blood administration), albuterol sulfate, ALPRAZolam, dextrose 10%, dextrose 10%, glucagon (human recombinant), glucose, glucose, HYDROmorphone, insulin aspart U-100, melatonin, ondansetron, oxyCODONE, oxyCODONE, prochlorperazine, sodium chloride 0.9%, Flushing PICC/Midline Protocol **AND** sodium chloride 0.9% **AND** sodium chloride 0.9%      Objective:     Vitals:    03/03/24 0000 03/03/24 0052 03/03/24 0453 03/03/24 0622   BP: 126/60  (!) 167/78    BP Location: Left arm  Right arm    Patient Position: Lying  Lying    Pulse: 76  83    Resp: 18 19 18    Temp: 98.5 °F (36.9 °C)  98.6 °F (37 °C) 98.6 °F (37 °C)   TempSrc: Oral  Oral    SpO2: (!) 92%  (!) 91%    Weight:       Height:           Patient Vitals for the past 72 hrs (Last 3 readings):   Weight   03/01/24 0439 96.7 kg (213 lb 3 oz)         Intake/Output Summary (Last 24 hours) at 3/3/2024 0657  Last data filed at 3/3/2024 0538  Gross per 24 hour   Intake --   Output 2500 ml   Net -2500 ml       Net IO Since Admission: -21,352.42 mL [03/03/24 0657]    Physical Exam  Vitals and nursing note reviewed.   Constitutional:       General: She is awake. She is not in acute distress.     Appearance: Normal appearance. She is well-developed and well-groomed. She is not toxic-appearing.   HENT:      Head: Normocephalic and atraumatic.   Cardiovascular:      Rate and Rhythm: Normal rate.   Pulmonary:      Effort: No tachypnea, accessory muscle usage or respiratory distress.   Abdominal:      General: There is no distension.   Neurological:      Mental Status: She is alert and oriented to person, place, and time. Mental status is at baseline.   Psychiatric:         Mood and Affect: Mood normal.         Behavior: Behavior normal. Behavior is cooperative.         Thought Content: Thought content normal.       Significant Labs: All pertinent labs within the past 24  hours have been reviewed.    BMP (Last 3 Results):   Recent Labs   Lab 02/29/24  0358 03/01/24  0513 03/02/24  0646   GLU 81 101 119*    140 139   K 5.1 4.3 5.0    105 106   CO2 27 28 26   BUN 17 15 19   CREATININE 1.1 1.0 0.9   CALCIUM 8.1* 8.3* 8.5*   MG 1.6 1.7 1.7       CBC (Last 3 Results):   Recent Labs   Lab 02/29/24  0358 03/01/24  0513 03/02/24  0646   WBC 10.97 9.93 10.22   RBC 3.34* 3.06* 3.73*   HGB 7.6* 7.3* 8.4*   HCT 25.1* 23.9* 28.4*   PLT SEE COMMENT 224 197   MCV 75* 78* 76*   MCH 22.8* 23.9* 22.5*   MCHC 30.3* 30.5* 29.6*         Significant Imaging: I have reviewed all pertinent imaging results/findings within the past 24 hours.  Echo    Left Ventricle: There is hyperdynamic systolic function with a visually   estimated ejection fraction of 70 - 75%.    Right Ventricle: Normal right ventricular cavity size. Systolic   function is normal.    Left Atrium: Left atrium is mildly dilated.    Tricuspid Valve: There is mild regurgitation.    Pulmonary Artery: The estimated pulmonary artery systolic pressure is   55 mmHg.    IVC/SVC: Intermediate venous pressure at 8 mmHg.    Pericardium: There is a trivial posterior effusion.      Review of Systems   Constitutional:  Negative for chills and fever.   Respiratory:  Negative for cough and shortness of breath.    Cardiovascular:  Negative for chest pain and palpitations.   Gastrointestinal:  Negative for abdominal pain.     Physical Exam  Vitals and nursing note reviewed.   Constitutional:       General: She is awake. She is not in acute distress.     Appearance: Normal appearance. She is well-developed and well-groomed. She is not toxic-appearing.   HENT:      Head: Normocephalic and atraumatic.   Cardiovascular:      Rate and Rhythm: Normal rate.   Pulmonary:      Effort: No tachypnea, accessory muscle usage or respiratory distress.   Abdominal:      General: There is no distension.   Neurological:      Mental Status: She is alert and oriented  to person, place, and time. Mental status is at baseline.   Psychiatric:         Mood and Affect: Mood normal.         Behavior: Behavior normal. Behavior is cooperative.         Thought Content: Thought content normal.         Assessment/Plan:      * Librado's gangrene  CT on admit with R perineum to R medial thigh Fourniers.   Surgery consulted: S/p debridement on 02/16, 2/17, 2/19, and 2/21 (wound vac placed.)  s/p OR 2/23 for first wound vac change. S/p repeat vac change on Mon 02/26 with partial wound closure. Surgery plan for bedside wound vac change on Thursday (2/29)  blood and wound cultures with lactobacillus.   ID consulted. Descalate from meropenem to Unasyn for 2 weeks. EOC 03/06/24  jansen in place due to surgical wound site  Antibiotics given-   Antibiotics (From admission, onward)      Start     Stop Route Frequency Ordered    02/25/24 1215  ampicillin-sulbactam (UNASYN) 3 g in sodium chloride 0.9 % 100 mL IVPB (MB+)         03/06/24 1114 IV Every 6 hours (non-standard times) 02/25/24 1104            Sepsis with acute renal failure and tubular necrosis without septic shock  Defined by leukocytosis, tachycardia and groin abscess. Source is [place].   - surgical management with debridement  - antibiotics= Unasyn per ID  - Leukocytosis resolved.     Necrotizing fasciitis  Plan as above.       Debility  Cont with PT/OT for gait training and strengthening and restoration of ADL's   Encourage mobility, OOB in chair, and early ambulation as appropriate  Fall precautions   Monitor for bowel and bladder dysfunction  Monitor for and prevent skin breakdown and pressure ulcers  Patient has been able to work with OT/PT who recommend placement for further rehabilitation  Patient pending placement, continue in-hospital care as stated above in the meantime  More than 20 minutes of my time has been spent discussing and planning just her safe disposition with patient/family/CM/SW/Nursing staff (not including other  time spent in clinical discussion and management)  CM/SW following, appreciate input     Advanced care planning/counseling discussion  Advance Care Planning    Date: 02/21/2024    Code Status  I engaged the the patient in a voluntary conversation about the patient's preferences for care  at the very end of life. The patient wishes to have CPR and other invasive treatments performed when her heart and/or breathing stops. I communicated to the patient that her wishes align with full code status and she agrees and verbalized understanding.   I spent a total of 16 minutes engaging the patient in this advance care planning discussion.           Code Status: Full Code      Class 2 severe obesity due to excess calories with serious comorbidity and body mass index (BMI) of 35.0 to 35.9 in adult  Body mass index is 36.59 kg/m². Morbid obesity complicates all aspects of disease management from diagnostic modalities to treatment. Weight loss encouraged and health benefits explained to patient.         ETOH abuse  Patient drinks at least a 6 pack beer daily  - Start scheduled librium- wean ordered  - Thiamine, folate, potassium, magnesium      Cocaine abuse  Patient sprinkles crack crystals in her marijuana  Wants help. Tearful. Emotional support provided.   CM consulted.       Anemia of chronic disease  Patient's anemia is currently controlled. Has received 1 units of PRBCs on 2/19 and 02/21 . Etiology likely d/t chronic disease and acute blood loss post surgery.  Current CBC reviewed-   Lab Results   Component Value Date    HGB 8.4 (L) 03/02/2024    HCT 28.4 (L) 03/02/2024     - monitor daily   -s/p 1U pRBC on 02/19 and 02/21    Essential hypertension  BP Readings from Last 3 Encounters:   03/03/24 (!) 167/78   06/20/23 (!) 164/86   05/08/23 (!) 144/91     Continuing home medications as prescribed  Will cont to monitor and adjust as needed  Will utilize p.r.n. blood pressure medication only if patient's blood pressure greater  than 180/110 and she develops symptoms such as worsening chest pain or shortness of breath.  Cardiac diet    Cardiac/Autonomic (From admission, onward)      Start     Stop Route Frequency Ordered    03/02/24 1000  amLODIPine tablet 10 mg         -- Oral Daily 03/02/24 0850    02/16/24 0900  atorvastatin tablet 20 mg         -- Oral Daily 02/16/24 0506            Chronic diastolic congestive heart failure  Patient is identified as having Diastolic (HFpEF) heart failure that is Chronic. CHF is currently controlled. Latest ECHO performed and demonstrates- Results for orders placed during the hospital encounter of 03/10/23    Echo    Interpretation Summary  · The left ventricle is normal in size with low normal systolic function.  · The estimated ejection fraction is 53%.  · There is abnormal septal wall motion.  · Indeterminate left ventricular diastolic function.  · Mild left atrial enlargement.  · Normal right ventricular size with low normal right ventricular systolic function.  · Mild right atrial enlargement.  · Mild mitral regurgitation.  · Mild tricuspid regurgitation.  · Normal central venous pressure (3 mmHg).  · The estimated PA systolic pressure is 23 mmHg.    No acute issues  Monitor fluid status- does have lower extremity edema   Discussed with nephrology, okay to resume lasix 02/23    ILD (interstitial lung disease)  -Noted on imaging; CT chest 2022  -Stable, no acute issues.   -Not on home O2 but says she should be (but never received because she left AMA at OSH)    CARLINE (acute kidney injury)  Patient with acute kidney injury/acute renal failure likely due to pre-renal azotemia due to IVVD and acute tubular necrosis caused by sepsis  CARLINE is currently improving. Baseline creatinine  0.9  - Labs reviewed- Renal function/electrolytes with Estimated Creatinine Clearance: 79.7 mL/min (based on SCr of 0.9 mg/dL). according to latest data. Monitor urine output and serial BMP and adjust therapy as needed. Avoid  nephrotoxins and renally dose meds for GFR listed above.    - Highly suspect ATN as an etiology at this time  - Nephrology consult requested, appreciate recs  - renal function is improving and near baseline.   - Resolved    Tobacco abuse  - Pt was counseled about cessation x4 minutes      Type 2 diabetes mellitus with hyperglycemia, without long-term current use of insulin  Lab Results   Component Value Date    HGBA1C 8.2 (H) 04/06/2023     Recent Labs   Lab 03/02/24  1948   POCTGLUCOSE 166*        Cont current basal/prandial insulin regimen   Low dose correction scale   Cont blood glucose monitoring   BG goal:  Preprandial blood glucose target <140 mg/dL  Random glucoses <180 mg/dL  Avoid hypoglycemia -  Reduce antihyperglycemic therapy when caloric intake is reduced. Avoid insulin stacking as a result of repeated injection of prandial insulin at close intervals.   Avoid severe hyperglycemia  ADA diet  Antihyperglycemics (From admission, onward)      Start     Stop Route Frequency Ordered    02/27/24 0900  insulin detemir U-100 (Levemir) pen 20 Units         -- SubQ Daily 02/25/24 1359    02/26/24 2100  insulin detemir U-100 (Levemir) pen 15 Units         -- SubQ Nightly 02/25/24 1359    02/22/24 1145  insulin aspart U-100 pen 5 Units         -- SubQ 3 times daily with meals 02/22/24 1031    02/16/24 1514  insulin aspart U-100 pen 0-15 Units         -- SubQ Before meals & nightly PRN 02/16/24 1515               VTE Risk Mitigation (From admission, onward)           Ordered     enoxaparin injection 40 mg  Every 24 hours         02/24/24 1615     IP VTE HIGH RISK PATIENT  Once         02/16/24 0506     Place sequential compression device  Until discontinued         02/16/24 0506                          I have completed this tele-visit without the assistance of a telepresenter.    The attending portion of this evaluation, treatment, and documentation was performed per Lynsey Chatman MD via Telemedicine AudioVisual  using the secure charity: water software platform with 2 way audio/video. The provider was located off-site and the patient is located in the hospital. The aforementioned video software was utilized to document the relevant history and physical exam    Lynsey Chatman MD  Department of [facility]   [facility]

## 2024-03-03 NOTE — NURSING
[facility] [place]  Nurses Note -- 4 Eyes      3/2/2024       Skin assessed on: Q Shift      [x] No Pressure Injuries Present    []Prevention Measures Documented    [] Yes LDA  for Pressure Injury Previously documented     [] Yes New Pressure Injury Discovered   [] LDA for New Pressure Injury Added      Attending RN:  Stormy Gipson LPN     Second RN:  Janae Castellon RN

## 2024-03-03 NOTE — SUBJECTIVE & OBJECTIVE
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 No acute events overnight. Sitting up in bed with no complaints this morning.  at bedside. Being treated with IV abx and currently pending SNF placement.     Symptoms: Stable. The patient denies shortness of breath, malaise, cough, chest pain, weakness, headache, chest pressure, anorexia, diarrhea and anxiety.     Diet: Regular. Adequate intake. The patient denies nausea and vomiting.    Last Bowel Movement: 03/02/24    Activity Level: Impaired due to weakness      Pain: The patient Complains of pain that is mild. The patient reports pain is requiring pain meds. Pain is well controlled.      Review of Systems   Constitutional:  Negative for chills and fever.   Respiratory:  Negative for cough and shortness of breath.    Cardiovascular:  Negative for chest pain and palpitations.   Gastrointestinal:  Negative for abdominal pain.        Scheduled Meds:   acetaminophen  1,000 mg Oral Q8H    amLODIPine  10 mg Oral Daily    ampicillin-sulbactam  3 g Intravenous Q6H    budesonide  1 mg Nebulization Q12H    And    arformoteroL  15 mcg Nebulization BID    aspirin  81 mg Oral Daily    atorvastatin  20 mg Oral Daily    enoxparin  40 mg Subcutaneous Q24H (prophylaxis, 1700)    folic acid  1 mg Oral Daily    furosemide  40 mg Oral Daily    gabapentin  400 mg Oral BID    insulin aspart U-100  5 Units Subcutaneous TIDWM    insulin detemir U-100  15 Units Subcutaneous QHS    insulin detemir U-100 (Levemir)  20 Units Subcutaneous Daily    pantoprazole  40 mg Oral Daily    sodium  chloride 0.9%  10 mL Intravenous Q6H    thiamine  100 mg Oral Daily     Continuous Infusions:  PRN Meds:.0.9%  NaCl infusion (for blood administration), albuterol sulfate, ALPRAZolam, dextrose 10%, dextrose 10%, glucagon (human recombinant), glucose, glucose, HYDROmorphone, insulin aspart U-100, melatonin, ondansetron, oxyCODONE, oxyCODONE, prochlorperazine, sodium chloride 0.9%, Flushing PICC/Midline Protocol **AND** sodium chloride 0.9% **AND** sodium chloride 0.9%      Objective:     Vitals:    03/03/24 0000 03/03/24 0052 03/03/24 0453 03/03/24 0622   BP: 126/60  (!) 167/78    BP Location: Left arm  Right arm    Patient Position: Lying  Lying    Pulse: 76  83    Resp: 18 19 18    Temp: 98.5 °F (36.9 °C)  98.6 °F (37 °C) 98.6 °F (37 °C)   TempSrc: Oral  Oral    SpO2: (!) 92%  (!) 91%    Weight:       Height:           Patient Vitals for the past 72 hrs (Last 3 readings):   Weight   03/01/24 0439 96.7 kg (213 lb 3 oz)         Intake/Output Summary (Last 24 hours) at 3/3/2024 0657  Last data filed at 3/3/2024 0538  Gross per 24 hour   Intake --   Output 2500 ml   Net -2500 ml       Net IO Since Admission: -21,352.42 mL [03/03/24 0657]    Physical Exam  Vitals and nursing note reviewed.   Constitutional:       General: She is awake. She is not in acute distress.     Appearance: Normal appearance. She is well-developed and well-groomed. She is not toxic-appearing.   HENT:      Head: Normocephalic and atraumatic.   Cardiovascular:      Rate and Rhythm: Normal rate.   Pulmonary:      Effort: No tachypnea, accessory muscle usage or respiratory distress.   Abdominal:      General: There is no distension.   Neurological:      Mental Status: She is alert and oriented to person, place, and time. Mental status is at baseline.   Psychiatric:         Mood and Affect: Mood normal.         Behavior: Behavior normal. Behavior is cooperative.         Thought Content: Thought content normal.       Significant Labs: All pertinent  labs within the past 24 hours have been reviewed.    BMP (Last 3 Results):   Recent Labs   Lab 02/29/24  0358 03/01/24  0513 03/02/24  0646   GLU 81 101 119*    140 139   K 5.1 4.3 5.0    105 106   CO2 27 28 26   BUN 17 15 19   CREATININE 1.1 1.0 0.9   CALCIUM 8.1* 8.3* 8.5*   MG 1.6 1.7 1.7       CBC (Last 3 Results):   Recent Labs   Lab 02/29/24  0358 03/01/24  0513 03/02/24  0646   WBC 10.97 9.93 10.22   RBC 3.34* 3.06* 3.73*   HGB 7.6* 7.3* 8.4*   HCT 25.1* 23.9* 28.4*   PLT SEE COMMENT 224 197   MCV 75* 78* 76*   MCH 22.8* 23.9* 22.5*   MCHC 30.3* 30.5* 29.6*         Significant Imaging: I have reviewed all pertinent imaging results/findings within the past 24 hours.  Echo    Left Ventricle: There is hyperdynamic systolic function with a visually   estimated ejection fraction of 70 - 75%.    Right Ventricle: Normal right ventricular cavity size. Systolic   function is normal.    Left Atrium: Left atrium is mildly dilated.    Tricuspid Valve: There is mild regurgitation.    Pulmonary Artery: The estimated pulmonary artery systolic pressure is   55 mmHg.    IVC/SVC: Intermediate venous pressure at 8 mmHg.    Pericardium: There is a trivial posterior effusion.      Review of Systems   Constitutional:  Negative for chills and fever.   Respiratory:  Negative for cough and shortness of breath.    Cardiovascular:  Negative for chest pain and palpitations.   Gastrointestinal:  Negative for abdominal pain.     Physical Exam  Vitals and nursing note reviewed.   Constitutional:       General: She is awake. She is not in acute distress.     Appearance: Normal appearance. She is well-developed and well-groomed. She is not toxic-appearing.   HENT:      Head: Normocephalic and atraumatic.   Cardiovascular:      Rate and Rhythm: Normal rate.   Pulmonary:      Effort: No tachypnea, accessory muscle usage or respiratory distress.   Abdominal:      General: There is no distension.   Neurological:      Mental Status:  She is alert and oriented to person, place, and time. Mental status is at baseline.   Psychiatric:         Mood and Affect: Mood normal.         Behavior: Behavior normal. Behavior is cooperative.         Thought Content: Thought content normal.

## 2024-03-03 NOTE — NURSING
Handoff report given to on coming nurse all questions answered. Nurse was made aware of pt pulling midline. Wound vac output marked at 400 with and out put of 50.

## 2024-03-03 NOTE — ASSESSMENT & PLAN NOTE
Lab Results   Component Value Date    HGBA1C 8.2 (H) 04/06/2023     Recent Labs   Lab 03/02/24  1948   POCTGLUCOSE 166*        Cont current basal/prandial insulin regimen   Low dose correction scale   Cont blood glucose monitoring   BG goal:  Preprandial blood glucose target <140 mg/dL  Random glucoses <180 mg/dL  Avoid hypoglycemia -  Reduce antihyperglycemic therapy when caloric intake is reduced. Avoid insulin stacking as a result of repeated injection of prandial insulin at close intervals.   Avoid severe hyperglycemia  ADA diet  Antihyperglycemics (From admission, onward)    Start     Stop Route Frequency Ordered    02/27/24 0900  insulin detemir U-100 (Levemir) pen 20 Units         -- SubQ Daily 02/25/24 1359    02/26/24 2100  insulin detemir U-100 (Levemir) pen 15 Units         -- SubQ Nightly 02/25/24 1359    02/22/24 1145  insulin aspart U-100 pen 5 Units         -- SubQ 3 times daily with meals 02/22/24 1031    02/16/24 1514  insulin aspart U-100 pen 0-15 Units         -- SubQ Before meals & nightly PRN 02/16/24 1515

## 2024-03-03 NOTE — ASSESSMENT & PLAN NOTE
Patient with acute kidney injury/acute renal failure likely due to pre-renal azotemia due to IVVD and acute tubular necrosis caused by sepsis  CARLINE is currently improving. Baseline creatinine  0.9  - Labs reviewed- Renal function/electrolytes with Estimated Creatinine Clearance: 79.7 mL/min (based on SCr of 0.9 mg/dL). according to latest data. Monitor urine output and serial BMP and adjust therapy as needed. Avoid nephrotoxins and renally dose meds for GFR listed above.    - Highly suspect ATN as an etiology at this time  - Nephrology consult requested, appreciate recs  - renal function is improving and near baseline.   - Resolved

## 2024-03-03 NOTE — PLAN OF CARE
Problem: Adult Inpatient Plan of Care  Goal: Plan of Care Review  Outcome: Ongoing, Progressing     Problem: Diabetes Comorbidity  Goal: Blood Glucose Level Within Targeted Range  Outcome: Ongoing, Progressing     Problem: Infection Progression (Sepsis/Septic Shock)  Goal: Absence of Infection Signs and Symptoms  Outcome: Ongoing, Progressing     Problem: Fall Injury Risk  Goal: Absence of Fall and Fall-Related Injury  Outcome: Ongoing, Progressing     Problem: Impaired Wound Healing  Goal: Optimal Wound Healing  Outcome: Ongoing, Progressing

## 2024-03-03 NOTE — NURSING
Assistant Charge Nurse responded to Ms. Mosqueda's call light. At that time she realized pt had pulled her Midline out. A new IV was started on the wrist.

## 2024-03-04 LAB
POCT GLUCOSE: 125 MG/DL (ref 70–110)
POCT GLUCOSE: 167 MG/DL (ref 70–110)
POCT GLUCOSE: 66 MG/DL (ref 70–110)
POCT GLUCOSE: 78 MG/DL (ref 70–110)

## 2024-03-04 PROCEDURE — A4216 STERILE WATER/SALINE, 10 ML: HCPCS | Performed by: STUDENT IN AN ORGANIZED HEALTH CARE EDUCATION/TRAINING PROGRAM

## 2024-03-04 PROCEDURE — 99900035 HC TECH TIME PER 15 MIN (STAT)

## 2024-03-04 PROCEDURE — 25000003 PHARM REV CODE 250: Performed by: STUDENT IN AN ORGANIZED HEALTH CARE EDUCATION/TRAINING PROGRAM

## 2024-03-04 PROCEDURE — 25000003 PHARM REV CODE 250: Performed by: HOSPITALIST

## 2024-03-04 PROCEDURE — 63600175 PHARM REV CODE 636 W HCPCS: Performed by: HOSPITALIST

## 2024-03-04 PROCEDURE — 97116 GAIT TRAINING THERAPY: CPT

## 2024-03-04 PROCEDURE — 63600175 PHARM REV CODE 636 W HCPCS: Performed by: INTERNAL MEDICINE

## 2024-03-04 PROCEDURE — 27000221 HC OXYGEN, UP TO 24 HOURS

## 2024-03-04 PROCEDURE — 94761 N-INVAS EAR/PLS OXIMETRY MLT: CPT

## 2024-03-04 PROCEDURE — 97110 THERAPEUTIC EXERCISES: CPT

## 2024-03-04 PROCEDURE — 25000003 PHARM REV CODE 250: Performed by: INTERNAL MEDICINE

## 2024-03-04 PROCEDURE — 97606 NEG PRS WND THER DME>50 SQCM: CPT

## 2024-03-04 PROCEDURE — 63600175 PHARM REV CODE 636 W HCPCS: Performed by: STUDENT IN AN ORGANIZED HEALTH CARE EDUCATION/TRAINING PROGRAM

## 2024-03-04 PROCEDURE — 21400001 HC TELEMETRY ROOM

## 2024-03-04 RX ADMIN — INSULIN ASPART 3 UNITS: 100 INJECTION, SOLUTION INTRAVENOUS; SUBCUTANEOUS at 11:03

## 2024-03-04 RX ADMIN — ASPIRIN 81 MG CHEWABLE TABLET 81 MG: 81 TABLET CHEWABLE at 09:03

## 2024-03-04 RX ADMIN — AMPICILLIN SODIUM AND SULBACTAM SODIUM 3 G: 2; 1 INJECTION, POWDER, FOR SOLUTION INTRAMUSCULAR; INTRAVENOUS at 11:03

## 2024-03-04 RX ADMIN — INSULIN DETEMIR 15 UNITS: 100 INJECTION, SOLUTION SUBCUTANEOUS at 08:03

## 2024-03-04 RX ADMIN — ACETAMINOPHEN 1000 MG: 500 TABLET ORAL at 06:03

## 2024-03-04 RX ADMIN — Medication 10 ML: at 06:03

## 2024-03-04 RX ADMIN — FOLIC ACID 1 MG: 1 TABLET ORAL at 09:03

## 2024-03-04 RX ADMIN — ENOXAPARIN SODIUM 40 MG: 40 INJECTION SUBCUTANEOUS at 05:03

## 2024-03-04 RX ADMIN — GABAPENTIN 400 MG: 400 CAPSULE ORAL at 08:03

## 2024-03-04 RX ADMIN — FUROSEMIDE 40 MG: 40 TABLET ORAL at 09:03

## 2024-03-04 RX ADMIN — PANTOPRAZOLE SODIUM 40 MG: 40 TABLET, DELAYED RELEASE ORAL at 09:03

## 2024-03-04 RX ADMIN — OXYCODONE 10 MG: 5 TABLET ORAL at 08:03

## 2024-03-04 RX ADMIN — OXYCODONE 10 MG: 5 TABLET ORAL at 04:03

## 2024-03-04 RX ADMIN — THIAMINE HCL TAB 100 MG 100 MG: 100 TAB at 09:03

## 2024-03-04 RX ADMIN — ALPRAZOLAM 0.25 MG: 0.25 TABLET ORAL at 08:03

## 2024-03-04 RX ADMIN — Medication 10 ML: at 11:03

## 2024-03-04 RX ADMIN — AMLODIPINE BESYLATE 10 MG: 5 TABLET ORAL at 09:03

## 2024-03-04 RX ADMIN — HYDROMORPHONE HYDROCHLORIDE 1 MG: 1 INJECTION, SOLUTION INTRAMUSCULAR; INTRAVENOUS; SUBCUTANEOUS at 03:03

## 2024-03-04 RX ADMIN — INSULIN ASPART 5 UNITS: 100 INJECTION, SOLUTION INTRAVENOUS; SUBCUTANEOUS at 11:03

## 2024-03-04 RX ADMIN — OXYCODONE 10 MG: 5 TABLET ORAL at 10:03

## 2024-03-04 RX ADMIN — Medication 10 ML: at 05:03

## 2024-03-04 RX ADMIN — AMPICILLIN SODIUM AND SULBACTAM SODIUM 3 G: 2; 1 INJECTION, POWDER, FOR SOLUTION INTRAMUSCULAR; INTRAVENOUS at 05:03

## 2024-03-04 RX ADMIN — ATORVASTATIN CALCIUM 20 MG: 10 TABLET, FILM COATED ORAL at 09:03

## 2024-03-04 RX ADMIN — AMPICILLIN SODIUM AND SULBACTAM SODIUM 3 G: 2; 1 INJECTION, POWDER, FOR SOLUTION INTRAMUSCULAR; INTRAVENOUS at 06:03

## 2024-03-04 RX ADMIN — ACETAMINOPHEN 1000 MG: 500 TABLET ORAL at 02:03

## 2024-03-04 RX ADMIN — ACETAMINOPHEN 1000 MG: 500 TABLET ORAL at 10:03

## 2024-03-04 RX ADMIN — GABAPENTIN 400 MG: 400 CAPSULE ORAL at 09:03

## 2024-03-04 NOTE — PLAN OF CARE
Problem: Physical Therapy  Goal: Physical Therapy Goal  Description: Goals to be met by: 3/5/24     Patient will increase functional independence with mobility by performin. Supine to sit with Modified [place]  2. Sit to stand transfer with Modified [place]  3. Bed to chair transfer with Modified [place]   4. Gait  x 10-15 feet with Stand-by Assistance using Rolling Walker.   5. Lower extremity exercise program x10 reps per handout, with supervision  6. W/C propulsion x 50' with Supervision      Outcome: Ongoing, Progressing

## 2024-03-04 NOTE — PT/OT/SLP PROGRESS
Physical Therapy Treatment    Patient Name:  Christine Mosqueda   MRN:  [id number]    Recommendations:     Discharge Recommendations: Moderate Intensity Therapy  Discharge Equipment Recommendations: bedside commode  Barriers to discharge:  wound VAC    Assessment:     Christine Mosqueda is a 53 y.o. 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  She presents with the following impairments/functional limitations: impaired balance, decreased safety awareness, impaired skin, impaired functional mobility, gait instability, impaired coordination .    Rehab Prognosis: Good; patient would benefit from acute skilled PT services to address these deficits and reach maximum level of function.    Recent Surgery: Procedure(s) (LRB):  Wound vac change,  debridement,  partial wound closure (Right) 7 Days Post-Op    Plan:     During this hospitalization, patient to be seen 4 x/week to address the identified rehab impairments via gait training, therapeutic activities, therapeutic exercises, neuromuscular re-education and progress toward the following goals:    Plan of Care Expires:  03/05/24    Subjective     Chief Complaint: No c/o  Patient/Family Comments/goals: Pt agreeable to treatment  Pain/Comfort:  Pain Rating 1: 0/10  Pain Addressed 1: Pre-medicate for activity      Objective:     Communicated with  prior to session.  Patient found HOB elevated with telemetry, wound vac, jansen catheter upon PT entry to room.     General Precautions: Standard, fall, respiratory  Orthopedic Precautions: N/A  Braces: N/A  Respiratory Status: Nasal cannula, flow 2 L/min     Functional Mobility:  Bed Mobility:     Scooting: stand by assistance  Supine to Sit: stand by assistance  Transfers:     Sit to Stand:  stand by assistance with rolling walker  Gait: Gait training with RW and CGA/SBA approx 250' with VC/TC for walker management/safety and increased trunk ext.    Balance: Fair+ sit and FAir+/fair stand      AM-PAC 6 CLICK  MOBILITY  Turning over in bed (including adjusting bedclothes, sheets and blankets)?: 4  Sitting down on and standing up from a chair with arms (e.g., wheelchair, bedside commode, etc.): 3  Moving from lying on back to sitting on the side of the bed?: 3  Moving to and from a bed to a chair (including a wheelchair)?: 3  Need to walk in hospital room?: 3  Climbing 3-5 steps with a railing?: 3  Basic Mobility Total Score: 19       Treatment & Education:  Educated pt to perform B AP's, TKE'w, and GS while in bed throughout the day.  Pt demonstrates compliance.  Educated pt on Pursed lip breathing 2/2 SOB with ambulation.  Pt performed B FAQ's seated at EOB 2 x 10 reps    Patient left sitting edge of bed with all lines intact and call button in reach..    GOALS:   Multidisciplinary Problems       Physical Therapy Goals          Problem: Physical Therapy    Goal Priority Disciplines Outcome Goal Variances Interventions   Physical Therapy Goal     PT, PT/OT Ongoing, Progressing     Description: Goals to be met by: 3/5/24     Patient will increase functional independence with mobility by performin. Supine to sit with Modified [place]  2. Sit to stand transfer with Modified [place]  3. Bed to chair transfer with Modified [place]   4. Gait  x 10-15 feet with Stand-by Assistance using Rolling Walker.   5. Lower extremity exercise program x10 reps per handout, with supervision  6. W/C propulsion x 50' with Supervision                           Time Tracking:     PT Received On: 24  PT Start Time: 1538     PT Stop Time: 1601  PT Total Time (min): 23 min     Billable Minutes: Gait Training 10 and Therapeutic Exercise 13    Treatment Type: Treatment, 6th Visit  PT/PTA: PT     Number of PTA visits since last PT visit: 5     2024

## 2024-03-04 NOTE — PROGRESS NOTES
[facility] Intensive Care  Adult Nutrition  Consult Note    SUMMARY     Recommendations    1. Consider Diabetic Renal Diet, monitoring PO intake of meals and snacks.  2. Icontinue ONS Boost Glucose Control to help meet EEN/EPN.   3. Continue to monitor weights and labs.   4. Collaboration with medical providers    Goals: 1. Pt to meet 50% EEN/EPN by RD follow up  Nutrition Goal Status: continues  Communication of RD Recs:  (POC)    Assessment and Plan    Nutrition Problem  Inacreased nutrient needs; protein     Related to (etiology):   Wound healing    Signs and Symptoms (as evidenced by):   Estephanie's gangrene    Interventions/Recommendations (treatment strategy):  Diabetic Renal Diet  Collaboration with medical providers    Nutrition Diagnosis Status:   continues    Reason for Assessment    Reason For Assessment: identified at risk by screening criteria, consult  Diagnosis:  (estephanie's gangrene)  Relevant Medical History:   Past Medical History:   Diagnosis Date    Anxiety     Arthritis     Bronchitis     CHF (congestive heart failure)     Diabetic ketoacidosis associated with type 2 diabetes mellitus 3/10/2023    DM (diabetes mellitus)     Emphysema lung     Encounter for blood transfusion     HTN (hypertension)     Restrictive lung disease     Sleep apnea      General Information Comments: RD follow up. Pt continues on diabetic 2000 kcal diet with 100% intake of meals. BMI 36.59, obese grade l continues. NFPE not warranted. RD to continue to monitor and follow.      RD follow up. Pt  Pt currently on diabetic diet with 100% intake of meals since admit. LBM 2/25/24. BMI continues at 35.42, obese grade l. PT currently off the floor in pre-op. NFPE not warranted at this time. RD to continue to monitor and follow up.       Pt identified as at risk at admit. Pt is currently NPO, previously diabetic renal diet with 75% intake. BMI 35.34, obese grade ll. She is ill appearing. Pt with weight fluctuations. 60 lb  "weight gain x 8 months. LBM 2/18/24. NFPE to be performed at follow ups as warranted.   Interdisciplinary Rounds: did not attend  Nutrition Discharge Planning: diabetic renal diet    Nutrition Risk Screen    Nutrition Risk Screen: no indicators present    Nutrition/Diet History    Spiritual, Cultural Beliefs, Nondenominational Practices, Values that Affect Care: no  Food Allergies: NKFA    Anthropometrics    Temp: 97.4 °F (36.3 °C)  Height Method: Stated  Height: 5' 4" (162.6 cm)  Height (inches): 64 in  Weight Method: Bed Scale  Weight: 96.7 kg (213 lb 3 oz)  Weight (lb): 213.19 lb  Ideal Body Weight (IBW), Female: 120 lb  % Ideal Body Weight, Female (lb): 170.83 %  BMI (Calculated): 36.6  BMI Grade: 35 - 39.9 - obesity - grade II       Lab/Procedures/Meds    Pertinent Labs Reviewed: reviewed  BMP  Lab Results   Component Value Date     03/02/2024    K 5.0 03/02/2024     03/02/2024    CO2 26 03/02/2024    BUN 19 03/02/2024    CREATININE 0.9 03/02/2024    CALCIUM 8.5 (L) 03/02/2024    ANIONGAP 7 (L) 03/02/2024    EGFRNORACEVR >60 03/02/2024      Pertinent Medications Reviewed: reviewed  Current Outpatient Medications   Medication Instructions    albuterol (ACCUNEB) 1.25 mg, Every 6 hours PRN    ALPRAZolam (XANAX) 0.25 mg, Oral, 2 times daily PRN    amLODIPine (NORVASC) 10 mg, Daily    aspirin 81 mg, Daily    atorvastatin (LIPITOR) 20 mg, Oral, Daily    fluticasone-salmeterol diskus inhaler 500-50 mcg 1 puff, Inhalation, 2 times daily    furosemide (LASIX) 40 mg, Oral, Daily    gabapentin (NEURONTIN) 400 mg, Oral, 2 times daily    insulin aspart U-100 (NOVOLOG) 10 Units, Subcutaneous, 3 times daily before meals    insulin detemir U-100 (LEVEMIR) 22 Units, Subcutaneous, Daily    nicotine (NICODERM CQ) 14 mg/24 hr 1 patch, Transdermal, Daily    omeprazole (PRILOSEC) 40 mg, Daily    oxyCODONE (ROXICODONE) 5 mg, Oral, Every 6 hours PRN    potassium chloride (KLOR-CON) 8 MEQ TbSR 8 mEq, Oral, Daily    promethazine " (PHENERGAN) 25 mg, Oral, Every 6 hours PRN    sodium chloride 0.9 % PgBk 100 mL with ampicillin-sulbactam 3 gram SolR 3 g 3 g, Intravenous, Every 6 hours    thiamine 100 mg, Oral, Daily    tiotropium (SPIRIVA) 18 mcg, Daily        Estimated/Assessed Needs    Weight Used For Calorie Calculations: 54.4 kg (120 lb)  Energy Calorie Requirements (kcal): 1461 kcal  Energy Need Method: Kershaw-St Jeor (x 1.3)  Protein Requirements: 70 g  Weight Used For Protein Calculations: 54.4 kg (120 lb)     Estimated Fluid Requirement Method: RDA Method  RDA Method (mL): 1461         Nutrition Prescription Ordered    Current Diet Order: npo    Evaluation of Received Nutrient/Fluid Intake    I/O: i/o  Energy Calories Required: not meeting needs  Protein Required: not meeting needs  Fluid Required: not meeting needs  Comments: LBM 2/18/24  % Intake of Estimated Energy Needs: 0 - 25 %  % Meal Intake: NPO    Nutrition Risk    Level of Risk/Frequency of Follow-up: low       Monitor and Evaluation    Food and Nutrient Intake: food and beverage intake  Food and Nutrient Adminstration: diet order  Knowledge/Beliefs/Attitudes: food and nutrition knowledge/skill, beliefs and attitudes  Physical Activity and Function: nutrition-related ADLs and IADLs, factors affecting access to physical activity  Anthropometric Measurements: weight, weight change, body mass index  Biochemical Data, Medical Tests and Procedures: inflammatory profile  Nutrition-Focused Physical Findings: overall appearance       Nutrition Follow-Up    RD Follow-up?: Yes    Mikayla Da Silva, Registration Eligible, provisional LDN

## 2024-03-04 NOTE — PLAN OF CARE
Followed up with [employer] in regard to auth.  Patient denied by insurance. Peer to peer offered. TOO met with patient to discuss discharge planning. TOO explained auth declined for rehab. TOO informed patient IV abx will be complete on 3/6/2024 and she can return home with home health SN or home health PT/OT. Patient stated that she would prefer to go home with home health SN to help with wound vac. TOO informed patient that everything will be setup prior to discharge. Notified Dr. Colmenares and inquired about wound vac.     Nurse notified SW that patient and sister wanted to discuss discharge planning. SW utilized camera to discuss discharge planning with patient and sister. SW reviewed the plan. Patient's sister stated that patient does not have the help needed at home. Sister said that she is concerned that wound will get infected with patient trying to clear herself after using the bathroom or that patient won't be able to clean herself well enough. Patient said that she spoke with the insurance company and they told her she was declined because they were going off of documentation sent for only march 4-5. TOO informed patient and sister that a call will be placed to the insurance company.     TOO notified Darrian that family wants patient to go to Rehab and inquired if he were willing to do Peer to Peer. Dr. Colmenares agreed. Peer to Peer setup for tomorrow 9am-11am.     Patient declined by multiple SNF.      03/04/24 1037   Discharge Reassessment   Assessment Type Discharge Planning Reassessment   Did the patient's condition or plan change since previous assessment? No   Discharge Plan discussed with: Patient   Communicated AVTAR with patient/caregiver Date not available/Unable to determine   Discharge Plan A Rehab   Discharge Plan B Home Health   DME Needed Upon Discharge  none   Transition of Care Barriers None   Why the patient remains in the hospital Requires continued medical care   Post-Acute Status   Post-Acute Authorization  Placement  (Rehab)   Post-Acute Placement Status Discharge Plan Changed   Coverage Medicaid   Discharge Delays None known at this time

## 2024-03-04 NOTE — SUBJECTIVE & OBJECTIVE
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 No acute events overnight. Sitting up in bed with no complaints this morning.  at bedside. Being treated with IV abx and currently pending SNF placement.     Symptoms: Stable. The patient denies shortness of breath, malaise, cough, chest pain, weakness, headache, chest pressure, anorexia, diarrhea and anxiety.     Diet: Regular. Adequate intake. The patient denies nausea and vomiting.    Last Bowel Movement: 03/04/24    Activity Level: Impaired due to weakness      Pain: The patient Complains of pain that is mild. The patient reports pain is requiring pain meds. Pain is well controlled.      Review of Systems   Constitutional:  Negative for chills and fever.   Respiratory:  Negative for cough and shortness of breath.    Cardiovascular:  Negative for chest pain and palpitations.   Gastrointestinal:  Negative for abdominal pain.        Scheduled Meds:   acetaminophen  1,000 mg Oral Q8H    amLODIPine  10 mg Oral Daily    ampicillin-sulbactam  3 g Intravenous Q6H    budesonide  1 mg Nebulization Q12H    And    arformoteroL  15 mcg Nebulization BID    aspirin  81 mg Oral Daily    atorvastatin  20 mg Oral Daily    enoxparin  40 mg Subcutaneous Q24H (prophylaxis, 1700)    folic acid  1 mg Oral Daily    furosemide  40 mg Oral Daily    gabapentin  400 mg Oral BID    insulin aspart U-100  5 Units Subcutaneous TIDWM    insulin detemir U-100  15 Units Subcutaneous QHS    insulin detemir U-100 (Levemir)  20 Units Subcutaneous Daily    pantoprazole  40 mg Oral Daily    sodium  chloride 0.9%  10 mL Intravenous Q6H    thiamine  100 mg Oral Daily     Continuous Infusions:  PRN Meds:.0.9%  NaCl infusion (for blood administration), albuterol sulfate, ALPRAZolam, dextrose 10%, dextrose 10%, glucagon (human recombinant), glucose, glucose, HYDROmorphone, insulin aspart U-100, melatonin, ondansetron, oxyCODONE, oxyCODONE, prochlorperazine, sodium chloride 0.9%, Flushing PICC/Midline Protocol **AND** sodium chloride 0.9% **AND** sodium chloride 0.9%      Objective:     Vitals:    03/03/24 2015 03/03/24 2340 03/04/24 0517 03/04/24 0742   BP:  (!) 148/70 139/76 138/81   BP Location:  Left arm Left arm Left arm   Patient Position:  Lying Lying Sitting   Pulse:  80 75 71   Resp: 19 18 18 20   Temp:  99 °F (37.2 °C) 98.5 °F (36.9 °C) 97.4 °F (36.3 °C)   TempSrc:  Oral Oral Oral   SpO2:  (!) 91% 95% 98%   Weight:       Height:           No data found.      Intake/Output Summary (Last 24 hours) at 3/4/2024 0839  Last data filed at 3/4/2024 0324  Gross per 24 hour   Intake 458 ml   Output 3800 ml   Net -3342 ml       Net IO Since Admission: -24,574.42 mL [03/04/24 0839]    Physical Exam  Vitals and nursing note reviewed.   Constitutional:       General: She is awake. She is not in acute distress.     Appearance: Normal appearance. She is well-developed and well-groomed. She is not toxic-appearing.   HENT:      Head: Normocephalic and atraumatic.   Cardiovascular:      Rate and Rhythm: Normal rate.   Pulmonary:      Effort: No tachypnea, accessory muscle usage or respiratory distress.   Abdominal:      General: There is no distension.   Neurological:      Mental Status: She is alert and oriented to person, place, and time. Mental status is at baseline.   Psychiatric:         Mood and Affect: Mood normal.         Behavior: Behavior normal. Behavior is cooperative.         Thought Content: Thought content normal.       Significant Labs: All pertinent labs within the past 24 hours have been reviewed.    BMP  (Last 3 Results):   Recent Labs   Lab 02/29/24  0358 03/01/24  0513 03/02/24  0646   GLU 81 101 119*    140 139   K 5.1 4.3 5.0    105 106   CO2 27 28 26   BUN 17 15 19   CREATININE 1.1 1.0 0.9   CALCIUM 8.1* 8.3* 8.5*   MG 1.6 1.7 1.7       CBC (Last 3 Results):   Recent Labs   Lab 02/29/24  0358 03/01/24  0513 03/02/24  0646   WBC 10.97 9.93 10.22   RBC 3.34* 3.06* 3.73*   HGB 7.6* 7.3* 8.4*   HCT 25.1* 23.9* 28.4*   PLT SEE COMMENT 224 197   MCV 75* 78* 76*   MCH 22.8* 23.9* 22.5*   MCHC 30.3* 30.5* 29.6*         Significant Imaging: I have reviewed all pertinent imaging results/findings within the past 24 hours.  Echo    Left Ventricle: There is hyperdynamic systolic function with a visually   estimated ejection fraction of 70 - 75%.    Right Ventricle: Normal right ventricular cavity size. Systolic   function is normal.    Left Atrium: Left atrium is mildly dilated.    Tricuspid Valve: There is mild regurgitation.    Pulmonary Artery: The estimated pulmonary artery systolic pressure is   55 mmHg.    IVC/SVC: Intermediate venous pressure at 8 mmHg.    Pericardium: There is a trivial posterior effusion.      Review of Systems   Constitutional:  Negative for chills and fever.   Respiratory:  Negative for cough and shortness of breath.    Cardiovascular:  Negative for chest pain and palpitations.   Gastrointestinal:  Negative for abdominal pain.     Physical Exam  Vitals and nursing note reviewed.   Constitutional:       General: She is awake. She is not in acute distress.     Appearance: Normal appearance. She is well-developed and well-groomed. She is not toxic-appearing.   HENT:      Head: Normocephalic and atraumatic.   Cardiovascular:      Rate and Rhythm: Normal rate.   Pulmonary:      Effort: No tachypnea, accessory muscle usage or respiratory distress.   Abdominal:      General: There is no distension.   Neurological:      Mental Status: She is alert and oriented to person, place, and time.  Mental status is at baseline.   Psychiatric:         Mood and Affect: Mood normal.         Behavior: Behavior normal. Behavior is cooperative.         Thought Content: Thought content normal.

## 2024-03-04 NOTE — NURSING
Ms Mosqueda is complaining of severe thigh pain to wound site after pain med oxycodone 10 mg IR given. Scheduled acetaminophen was given as well. She insists I call the MD to let someone know she continues to have pain. Message sent to ROSALINDA MORIN with patient concern via secure chat.

## 2024-03-04 NOTE — PLAN OF CARE
Problem: Diabetes Comorbidity  Goal: Blood Glucose Level Within Targeted Range  Intervention: Monitor and Manage Glycemia  Flowsheets (Taken 3/4/2024 0734)  Glycemic Management: blood glucose monitored     Problem: Adjustment to Illness (Sepsis/Septic Shock)  Goal: Optimal Coping  Intervention: Optimize Psychosocial Adjustment to Illness  Flowsheets (Taken 3/4/2024 0734)  Supportive Measures: active listening utilized     Problem: Bleeding (Sepsis/Septic Shock)  Goal: Absence of Bleeding  Intervention: Monitor and Manage Bleeding  Flowsheets (Taken 3/4/2024 0734)  Bleeding Precautions: blood pressure closely monitored     Problem: Glycemic Control Impaired (Sepsis/Septic Shock)  Goal: Blood Glucose Level Within Desired Range  Intervention: Optimize Glycemic Control  Flowsheets (Taken 3/4/2024 0734)  Glycemic Management: blood glucose monitored     Problem: Infection Progression (Sepsis/Septic Shock)  Goal: Absence of Infection Signs and Symptoms  Intervention: Initiate Sepsis Management  Flowsheets (Taken 3/4/2024 0734)  Infection Management: aseptic technique maintained  Intervention: Promote Recovery  Flowsheets (Taken 3/4/2024 0734)  Sleep/Rest Enhancement: awakenings minimized  Airway/Ventilation Support: comfort measures provided  Activity Management: Rolling - L1     Problem: Renal Function Impairment (Acute Kidney Injury/Impairment)  Goal: Effective Renal Function  Intervention: Monitor and Support Renal Function  Flowsheets (Taken 3/4/2024 0734)  Medication Review/Management: medications reviewed     Problem: Fall Injury Risk  Goal: Absence of Fall and Fall-Related Injury  Intervention: Identify and Manage Contributors  Flowsheets (Taken 3/4/2024 0734)  Self-Care Promotion:   independence encouraged   safe use of adaptive equipment encouraged   BADL personal objects within reach  Medication Review/Management: medications reviewed  Intervention: Promote Injury-Free Environment  Flowsheets (Taken 3/4/2024  0734)  Safety Promotion/Fall Prevention:   assistive device/personal item within reach   side rails raised x 2   bedside commode chair     Problem: Impaired Wound Healing  Goal: Optimal Wound Healing  Intervention: Promote Wound Healing  Flowsheets (Taken 3/4/2024 0734)  Sleep/Rest Enhancement: awakenings minimized  Activity Management: Rolling - L1  Pain Management Interventions: care clustered

## 2024-03-04 NOTE — PROGRESS NOTES
Reviewed Case Management note- patient will not be transferring to another facility today. SW continuing to attempt placement.   Discussed NPWT with Surgery and Nursing. Nursing to change VAC dressing today. Will assist nursing as needed with dressing change. Will provide nursing with Andrey rings to secure seal over sutures and in dip by labia.   1515 KCI/3M VAC dressing change using Simplace dressing. Filled creases along side of labia and covered incision radiating posteriorly on medial thigh with Nadrey ring.  Assessment:  Photodocumentation    Wound developing beefy red granular base. Small amount pink drainage in VAC canister.   Treatment/Plan:  Assist nursing as needed with VAC management.

## 2024-03-04 NOTE — PT/OT/SLP PROGRESS
Occupational Therapy      Patient Name:  Christine Mosqueda   MRN:  [id number]    1029: Pt politely declined, complaining of pain and was just medicated by nurse. Pt inquired if OT can return. OT to follow up as able.     3/4/2024

## 2024-03-04 NOTE — PROGRESS NOTES
[facility] Medicine  Telemedicine Progress Note    Patient Name: Christine Mosqueda  MRN: [id number]  Patient Class: IP- Inpatient   Admission Date: 2/15/2024  Length of Stay: 17 days  Attending Physician: Lynsey Colmenares MD  Primary Care Provider: [facility]          Subjective:     Principal Problem:Estephanie's gangrene        HPI:  Ms. Mosqueda is a 53 yoF with a PMHx of diastolic heart failure, HTN, T2DM on insulin. She presented to the hospital with worsening pain and swelling of the right medial thigh. She developed an abscess in the area that underwent I+D at [facility] a few days ago. She was sent home on PO abx. Since that time the pain and swelling have worsened.  In ED, CT scan demonstrated extensive inflammation extending from the right perineum to the right medial thigh with multiple foci of air. She was admitted to general surgery for estephanie's gangrene. She underwent extensive debridement under general surgery today in OR. She is currently doing well with good pain control. HM consulted for medical management.     Overview/Hospital Course:  Ms Christine Mosqueda is a 53 y.o.  woman with poorly controlled type II DM who was admitted for sepsis secondary to bacteremia and Estephanie's gangrene. Pt presented w/ worsening pain and swelling of the Rt medial thigh. CT scan findings were consistent w/ Estephanie's gangrene. General surgery team consulted. S/p surgical debridement 02/16, 2/17, 2/19, and on 02/21 (wound vac placed.) S/p OR 2/23 for first wound vac change. Plan for OR again on 02/26. Had acute on chronic anemia, likely exacerbated with multiple debridements. Required blood transfusions on 02/19 and again on 02/21.  Blood and wound cultures with lactobacillus. ID consulted- on meropenem. Repeat blood cx with no growth thus far. Also found to have CARLINE on admission, nephrology following. CARLINE resolved. PT/OT recommends SNF on discharge.     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 No acute events overnight. Sitting up in bed with no complaints this morning.  at bedside. Being treated with IV abx and currently pending SNF placement.     Symptoms: Stable. The patient denies shortness of breath, malaise, cough, chest pain, weakness, headache, chest pressure, anorexia, diarrhea and anxiety.     Diet: Regular. Adequate intake. The patient denies nausea and vomiting.    Last Bowel Movement: 03/04/24    Activity Level: Impaired due to weakness      Pain: The patient Complains of pain that is mild. The patient reports pain is requiring pain meds. Pain is well controlled.      Review of Systems   Constitutional:  Negative for chills and fever.   Respiratory:  Negative for cough and shortness of breath.    Cardiovascular:  Negative for chest pain and palpitations.   Gastrointestinal:  Negative for abdominal pain.        Scheduled Meds:   acetaminophen  1,000 mg Oral Q8H    amLODIPine  10 mg Oral Daily    ampicillin-sulbactam  3 g Intravenous Q6H    budesonide  1 mg Nebulization Q12H    And    arformoteroL  15 mcg Nebulization BID    aspirin  81 mg Oral Daily    atorvastatin  20 mg Oral Daily    enoxparin  40 mg Subcutaneous Q24H (prophylaxis, 1700)    folic acid  1 mg Oral Daily    furosemide  40 mg Oral Daily    gabapentin  400 mg Oral BID    insulin aspart U-100  5 Units Subcutaneous TIDWM    insulin detemir U-100  15 Units Subcutaneous QHS    insulin detemir U-100 (Levemir)  20 Units Subcutaneous Daily    pantoprazole  40 mg Oral Daily    sodium chloride 0.9%  10 mL  Intravenous Q6H    thiamine  100 mg Oral Daily     Continuous Infusions:  PRN Meds:.0.9%  NaCl infusion (for blood administration), albuterol sulfate, ALPRAZolam, dextrose 10%, dextrose 10%, glucagon (human recombinant), glucose, glucose, HYDROmorphone, insulin aspart U-100, melatonin, ondansetron, oxyCODONE, oxyCODONE, prochlorperazine, sodium chloride 0.9%, Flushing PICC/Midline Protocol **AND** sodium chloride 0.9% **AND** sodium chloride 0.9%      Objective:     Vitals:    03/03/24 2015 03/03/24 2340 03/04/24 0517 03/04/24 0742   BP:  (!) 148/70 139/76 138/81   BP Location:  Left arm Left arm Left arm   Patient Position:  Lying Lying Sitting   Pulse:  80 75 71   Resp: 19 18 18 20   Temp:  99 °F (37.2 °C) 98.5 °F (36.9 °C) 97.4 °F (36.3 °C)   TempSrc:  Oral Oral Oral   SpO2:  (!) 91% 95% 98%   Weight:       Height:           No data found.      Intake/Output Summary (Last 24 hours) at 3/4/2024 0839  Last data filed at 3/4/2024 0324  Gross per 24 hour   Intake 458 ml   Output 3800 ml   Net -3342 ml       Net IO Since Admission: -24,574.42 mL [03/04/24 0839]    Physical Exam  Vitals and nursing note reviewed.   Constitutional:       General: She is awake. She is not in acute distress.     Appearance: Normal appearance. She is well-developed and well-groomed. She is not toxic-appearing.   HENT:      Head: Normocephalic and atraumatic.   Cardiovascular:      Rate and Rhythm: Normal rate.   Pulmonary:      Effort: No tachypnea, accessory muscle usage or respiratory distress.   Abdominal:      General: There is no distension.   Neurological:      Mental Status: She is alert and oriented to person, place, and time. Mental status is at baseline.   Psychiatric:         Mood and Affect: Mood normal.         Behavior: Behavior normal. Behavior is cooperative.         Thought Content: Thought content normal.       Significant Labs: All pertinent labs within the past 24 hours have been reviewed.    BMP (Last 3 Results):    Recent Labs   Lab 02/29/24  0358 03/01/24  0513 03/02/24  0646   GLU 81 101 119*    140 139   K 5.1 4.3 5.0    105 106   CO2 27 28 26   BUN 17 15 19   CREATININE 1.1 1.0 0.9   CALCIUM 8.1* 8.3* 8.5*   MG 1.6 1.7 1.7       CBC (Last 3 Results):   Recent Labs   Lab 02/29/24  0358 03/01/24  0513 03/02/24  0646   WBC 10.97 9.93 10.22   RBC 3.34* 3.06* 3.73*   HGB 7.6* 7.3* 8.4*   HCT 25.1* 23.9* 28.4*   PLT SEE COMMENT 224 197   MCV 75* 78* 76*   MCH 22.8* 23.9* 22.5*   MCHC 30.3* 30.5* 29.6*         Significant Imaging: I have reviewed all pertinent imaging results/findings within the past 24 hours.  Echo    Left Ventricle: There is hyperdynamic systolic function with a visually   estimated ejection fraction of 70 - 75%.    Right Ventricle: Normal right ventricular cavity size. Systolic   function is normal.    Left Atrium: Left atrium is mildly dilated.    Tricuspid Valve: There is mild regurgitation.    Pulmonary Artery: The estimated pulmonary artery systolic pressure is   55 mmHg.    IVC/SVC: Intermediate venous pressure at 8 mmHg.    Pericardium: There is a trivial posterior effusion.      Review of Systems   Constitutional:  Negative for chills and fever.   Respiratory:  Negative for cough and shortness of breath.    Cardiovascular:  Negative for chest pain and palpitations.   Gastrointestinal:  Negative for abdominal pain.     Physical Exam  Vitals and nursing note reviewed.   Constitutional:       General: She is awake. She is not in acute distress.     Appearance: Normal appearance. She is well-developed and well-groomed. She is not toxic-appearing.   HENT:      Head: Normocephalic and atraumatic.   Cardiovascular:      Rate and Rhythm: Normal rate.   Pulmonary:      Effort: No tachypnea, accessory muscle usage or respiratory distress.   Abdominal:      General: There is no distension.   Neurological:      Mental Status: She is alert and oriented to person, place, and time. Mental status is at  baseline.   Psychiatric:         Mood and Affect: Mood normal.         Behavior: Behavior normal. Behavior is cooperative.         Thought Content: Thought content normal.         Assessment/Plan:      * Librado's gangrene  CT on admit with R perineum to R medial thigh [place]niers.   Surgery consulted: S/p debridement on 02/16, 2/17, 2/19, and 2/21 (wound vac placed.)  s/p OR 2/23 for first wound vac change. S/p repeat vac change on Mon 02/26 with partial wound closure. Surgery plan for bedside wound vac change on Thursday (2/29)  blood and wound cultures with lactobacillus.   ID consulted. Descalate from meropenem to Unasyn for 2 weeks. EOC 03/06/24  jansen in place due to surgical wound site  Antibiotics given-   Antibiotics (From admission, onward)      Start     Stop Route Frequency Ordered    02/25/24 1215  ampicillin-sulbactam (UNASYN) 3 g in sodium chloride 0.9 % 100 mL IVPB (MB+)         03/06/24 1114 IV Every 6 hours (non-standard times) 02/25/24 1104            Sepsis with acute renal failure and tubular necrosis without septic shock  Defined by leukocytosis, tachycardia and groin abscess. Source is Fourniers.   - surgical management with debridement  - antibiotics= Unasyn per ID  - Leukocytosis resolved.     Necrotizing fasciitis  Plan as above.       Debility  Cont with PT/OT for gait training and strengthening and restoration of ADL's   Encourage mobility, OOB in chair, and early ambulation as appropriate  Fall precautions   Monitor for bowel and bladder dysfunction  Monitor for and prevent skin breakdown and pressure ulcers  Patient has been able to work with OT/PT who recommend placement for further rehabilitation  Patient pending placement, continue in-hospital care as stated above in the meantime  More than 20 minutes of my time has been spent discussing and planning just her safe disposition with patient/family/CM/SW/Nursing staff (not including other time spent in clinical discussion and  management)  CM/SW following, appreciate input     Advanced care planning/counseling discussion  Advance Care Planning    Date: 02/21/2024    Code Status  I engaged the the patient in a voluntary conversation about the patient's preferences for care  at the very end of life. The patient wishes to have CPR and other invasive treatments performed when her heart and/or breathing stops. I communicated to the patient that her wishes align with full code status and she agrees and verbalized understanding.   I spent a total of 16 minutes engaging the patient in this advance care planning discussion.           Code Status: Full Code      Class 2 severe obesity due to excess calories with serious comorbidity and body mass index (BMI) of 35.0 to 35.9 in adult  Body mass index is 36.59 kg/m². Morbid obesity complicates all aspects of disease management from diagnostic modalities to treatment. Weight loss encouraged and health benefits explained to patient.         ETOH abuse  Patient drinks at least a 6 pack beer daily  - Start scheduled librium- wean ordered  - Thiamine, folate, potassium, magnesium      Cocaine abuse  Patient sprinkles crack crystals in her marijuana  Wants help. Tearful. Emotional support provided.   CM consulted.       Anemia of chronic disease  Patient's anemia is currently controlled. Has received 1 units of PRBCs on 2/19 and 02/21 . Etiology likely d/t chronic disease and acute blood loss post surgery.  Current CBC reviewed-   Lab Results   Component Value Date    HGB 8.4 (L) 03/02/2024    HCT 28.4 (L) 03/02/2024     - monitor daily   -s/p 1U pRBC on 02/19 and 02/21    Essential hypertension  BP Readings from Last 3 Encounters:   03/03/24 (!) 167/78   06/20/23 (!) 164/86   05/08/23 (!) 144/91     Continuing home medications as prescribed  Will cont to monitor and adjust as needed  Will utilize p.r.n. blood pressure medication only if patient's blood pressure greater than 180/110 and she develops  symptoms such as worsening chest pain or shortness of breath.  Cardiac diet    Cardiac/Autonomic (From admission, onward)      Start     Stop Route Frequency Ordered    03/02/24 1000  amLODIPine tablet 10 mg         -- Oral Daily 03/02/24 0850    02/16/24 0900  atorvastatin tablet 20 mg         -- Oral Daily 02/16/24 0506            Chronic diastolic congestive heart failure  Patient is identified as having Diastolic (HFpEF) heart failure that is Chronic. CHF is currently controlled. Latest ECHO performed and demonstrates- Results for orders placed during the hospital encounter of 03/10/23    Echo    Interpretation Summary  · The left ventricle is normal in size with low normal systolic function.  · The estimated ejection fraction is 53%.  · There is abnormal septal wall motion.  · Indeterminate left ventricular diastolic function.  · Mild left atrial enlargement.  · Normal right ventricular size with low normal right ventricular systolic function.  · Mild right atrial enlargement.  · Mild mitral regurgitation.  · Mild tricuspid regurgitation.  · Normal central venous pressure (3 mmHg).  · The estimated PA systolic pressure is 23 mmHg.    No acute issues  Monitor fluid status- does have lower extremity edema   Discussed with nephrology, okay to resume lasix 02/23    ILD (interstitial lung disease)  -Noted on imaging; CT chest 2022  -Stable, no acute issues.   -Not on home O2 but says she should be (but never received because she left AMA at OSH)    CARLINE (acute kidney injury)  Patient with acute kidney injury/acute renal failure likely due to pre-renal azotemia due to IVVD and acute tubular necrosis caused by sepsis  CARLINE is currently improving. Baseline creatinine  0.9  - Labs reviewed- Renal function/electrolytes with Estimated Creatinine Clearance: 79.7 mL/min (based on SCr of 0.9 mg/dL). according to latest data. Monitor urine output and serial BMP and adjust therapy as needed. Avoid nephrotoxins and renally dose  meds for GFR listed above.    - Highly suspect ATN as an etiology at this time  - Nephrology consult requested, appreciate recs  - renal function is improving and near baseline.   - Resolved    Tobacco abuse  - Pt was counseled about cessation x4 minutes      Type 2 diabetes mellitus with hyperglycemia, without long-term current use of insulin  Lab Results   Component Value Date    HGBA1C 8.2 (H) 04/06/2023     Recent Labs   Lab 03/02/24  1948   POCTGLUCOSE 166*        Cont current basal/prandial insulin regimen   Low dose correction scale   Cont blood glucose monitoring   BG goal:  Preprandial blood glucose target <140 mg/dL  Random glucoses <180 mg/dL  Avoid hypoglycemia -  Reduce antihyperglycemic therapy when caloric intake is reduced. Avoid insulin stacking as a result of repeated injection of prandial insulin at close intervals.   Avoid severe hyperglycemia  ADA diet  Antihyperglycemics (From admission, onward)      Start     Stop Route Frequency Ordered    02/27/24 0900  insulin detemir U-100 (Levemir) pen 20 Units         -- SubQ Daily 02/25/24 1359    02/26/24 2100  insulin detemir U-100 (Levemir) pen 15 Units         -- SubQ Nightly 02/25/24 1359    02/22/24 1145  insulin aspart U-100 pen 5 Units         -- SubQ 3 times daily with meals 02/22/24 1031    02/16/24 1514  insulin aspart U-100 pen 0-15 Units         -- SubQ Before meals & nightly PRN 02/16/24 1515               VTE Risk Mitigation (From admission, onward)           Ordered     enoxaparin injection 40 mg  Every 24 hours         02/24/24 1615     IP VTE HIGH RISK PATIENT  Once         02/16/24 0506     Place sequential compression device  Until discontinued         02/16/24 0506                          I have completed this tele-visit without the assistance of a telepresenter.    The attending portion of this evaluation, treatment, and documentation was performed per Lynsey Chatman MD via Telemedicine AudioVisual using the secure [employer] software  platform with 2 way audio/video. The provider was located off-site and the patient is located in the hospital. The aforementioned video software was utilized to document the relevant history and physical exam    Lynsey Chatman MD  Department of [facility] Medicine   [facility]

## 2024-03-04 NOTE — PLAN OF CARE
Recommendations    1. Consider Diabetic Renal Diet, monitoring PO intake of meals and snacks.  2. Icontinue ONS Boost Glucose Control to help meet EEN/EPN.   3. Continue to monitor weights and labs.   4. Collaboration with medical providers    Goals: 1. Pt to meet 50% EEN/EPN by RD follow up  Nutrition Goal Status: continues  Communication of RD Recs:  (POC)    Assessment and Plan    Nutrition Problem  Inacreased nutrient needs; protein     Related to (etiology):   Wound healing    Signs and Symptoms (as evidenced by):   Librado's gangrene    Interventions/Recommendations (treatment strategy):  Diabetic Renal Diet  Collaboration with medical providers    Nutrition Diagnosis Status:   continues

## 2024-03-05 LAB
POCT GLUCOSE: 107 MG/DL (ref 70–110)
POCT GLUCOSE: 120 MG/DL (ref 70–110)
POCT GLUCOSE: 121 MG/DL (ref 70–110)
POCT GLUCOSE: 158 MG/DL (ref 70–110)
POCT GLUCOSE: 66 MG/DL (ref 70–110)

## 2024-03-05 PROCEDURE — 99900031 HC PATIENT EDUCATION (STAT)

## 2024-03-05 PROCEDURE — 25000003 PHARM REV CODE 250: Performed by: HOSPITALIST

## 2024-03-05 PROCEDURE — 21400001 HC TELEMETRY ROOM

## 2024-03-05 PROCEDURE — 25000003 PHARM REV CODE 250: Performed by: STUDENT IN AN ORGANIZED HEALTH CARE EDUCATION/TRAINING PROGRAM

## 2024-03-05 PROCEDURE — 97535 SELF CARE MNGMENT TRAINING: CPT

## 2024-03-05 PROCEDURE — 63600175 PHARM REV CODE 636 W HCPCS: Performed by: INTERNAL MEDICINE

## 2024-03-05 PROCEDURE — 25000242 PHARM REV CODE 250 ALT 637 W/ HCPCS: Performed by: HOSPITALIST

## 2024-03-05 PROCEDURE — 63600175 PHARM REV CODE 636 W HCPCS: Performed by: STUDENT IN AN ORGANIZED HEALTH CARE EDUCATION/TRAINING PROGRAM

## 2024-03-05 PROCEDURE — 94761 N-INVAS EAR/PLS OXIMETRY MLT: CPT

## 2024-03-05 PROCEDURE — A4216 STERILE WATER/SALINE, 10 ML: HCPCS | Performed by: STUDENT IN AN ORGANIZED HEALTH CARE EDUCATION/TRAINING PROGRAM

## 2024-03-05 PROCEDURE — 25000003 PHARM REV CODE 250: Performed by: INTERNAL MEDICINE

## 2024-03-05 PROCEDURE — 94640 AIRWAY INHALATION TREATMENT: CPT

## 2024-03-05 PROCEDURE — 97530 THERAPEUTIC ACTIVITIES: CPT

## 2024-03-05 RX ADMIN — Medication 10 ML: at 06:03

## 2024-03-05 RX ADMIN — Medication 10 ML: at 05:03

## 2024-03-05 RX ADMIN — ENOXAPARIN SODIUM 40 MG: 40 INJECTION SUBCUTANEOUS at 05:03

## 2024-03-05 RX ADMIN — OXYCODONE 10 MG: 5 TABLET ORAL at 02:03

## 2024-03-05 RX ADMIN — Medication 10 ML: at 11:03

## 2024-03-05 RX ADMIN — BUDESONIDE 1 MG: 0.5 INHALANT RESPIRATORY (INHALATION) at 07:03

## 2024-03-05 RX ADMIN — FUROSEMIDE 40 MG: 40 TABLET ORAL at 09:03

## 2024-03-05 RX ADMIN — GABAPENTIN 400 MG: 400 CAPSULE ORAL at 09:03

## 2024-03-05 RX ADMIN — ACETAMINOPHEN 1000 MG: 500 TABLET ORAL at 09:03

## 2024-03-05 RX ADMIN — AMLODIPINE BESYLATE 10 MG: 5 TABLET ORAL at 09:03

## 2024-03-05 RX ADMIN — OXYCODONE 10 MG: 5 TABLET ORAL at 05:03

## 2024-03-05 RX ADMIN — AMPICILLIN SODIUM AND SULBACTAM SODIUM 3 G: 2; 1 INJECTION, POWDER, FOR SOLUTION INTRAMUSCULAR; INTRAVENOUS at 12:03

## 2024-03-05 RX ADMIN — AMPICILLIN SODIUM AND SULBACTAM SODIUM 3 G: 2; 1 INJECTION, POWDER, FOR SOLUTION INTRAMUSCULAR; INTRAVENOUS at 05:03

## 2024-03-05 RX ADMIN — AMPICILLIN SODIUM AND SULBACTAM SODIUM 3 G: 2; 1 INJECTION, POWDER, FOR SOLUTION INTRAMUSCULAR; INTRAVENOUS at 11:03

## 2024-03-05 RX ADMIN — THIAMINE HCL TAB 100 MG 100 MG: 100 TAB at 09:03

## 2024-03-05 RX ADMIN — ACETAMINOPHEN 1000 MG: 500 TABLET ORAL at 01:03

## 2024-03-05 RX ADMIN — ATORVASTATIN CALCIUM 20 MG: 10 TABLET, FILM COATED ORAL at 09:03

## 2024-03-05 RX ADMIN — PANTOPRAZOLE SODIUM 40 MG: 40 TABLET, DELAYED RELEASE ORAL at 09:03

## 2024-03-05 RX ADMIN — ARFORMOTEROL TARTRATE 15 MCG: 15 SOLUTION RESPIRATORY (INHALATION) at 07:03

## 2024-03-05 RX ADMIN — AMPICILLIN SODIUM AND SULBACTAM SODIUM 3 G: 2; 1 INJECTION, POWDER, FOR SOLUTION INTRAMUSCULAR; INTRAVENOUS at 06:03

## 2024-03-05 RX ADMIN — ASPIRIN 81 MG CHEWABLE TABLET 81 MG: 81 TABLET CHEWABLE at 09:03

## 2024-03-05 RX ADMIN — OXYCODONE 10 MG: 5 TABLET ORAL at 10:03

## 2024-03-05 RX ADMIN — FOLIC ACID 1 MG: 1 TABLET ORAL at 09:03

## 2024-03-05 RX ADMIN — INSULIN ASPART 5 UNITS: 100 INJECTION, SOLUTION INTRAVENOUS; SUBCUTANEOUS at 11:03

## 2024-03-05 RX ADMIN — OXYCODONE 10 MG: 5 TABLET ORAL at 12:03

## 2024-03-05 RX ADMIN — Medication 6 MG: at 09:03

## 2024-03-05 RX ADMIN — OXYCODONE 10 MG: 5 TABLET ORAL at 09:03

## 2024-03-05 RX ADMIN — Medication 10 ML: at 12:03

## 2024-03-05 NOTE — PLAN OF CARE
Problem: Diabetes Comorbidity  Goal: Blood Glucose Level Within Targeted Range  Intervention: Monitor and Manage Glycemia  Flowsheets (Taken 3/5/2024 0457)  Glycemic Management: blood glucose monitored     Problem: Adult Inpatient Plan of Care  Goal: Optimal Comfort and Wellbeing  Intervention: Monitor Pain and Promote Comfort  Flowsheets (Taken 3/5/2024 0457)  Pain Management Interventions:   around-the-clock dosing utilized   care clustered   medication offered     Problem: Infection Progression (Sepsis/Septic Shock)  Goal: Absence of Infection Signs and Symptoms  Intervention: Promote Recovery  Flowsheets (Taken 3/5/2024 0457)  Sleep/Rest Enhancement:   awakenings minimized   regular sleep/rest pattern promoted  Airway/Ventilation Support: comfort measures provided  Activity Management:   Arm raise - L1   Sitting at edge of bed - L2   Rolling - L1     Problem: Impaired Wound Healing  Goal: Optimal Wound Healing  Intervention: Promote Wound Healing  Flowsheets (Taken 3/5/2024 0457)  Sleep/Rest Enhancement:   awakenings minimized   regular sleep/rest pattern promoted  Activity Management:   Arm raise - L1   Sitting at edge of bed - L2   Rolling - L1  Pain Management Interventions:   around-the-clock dosing utilized   care clustered   medication offered

## 2024-03-05 NOTE — PROGRESS NOTES
[facility] [place] 4 [place]      3/5/2024       Skin assessed on: Q Shift      [x] No Pressure Injuries Present    []Prevention Measures Documented    [] Yes LDA  for Pressure Injury Previously documented     [] Yes New Pressure Injury Discovered   [] LDA for New Pressure Injury Added      Attending RN:  Ángel Devlin RN     Second RN:  Dwight Light RN

## 2024-03-05 NOTE — NURSING
Received handoff report from morning shift. Patient lying on bed AAOx4, on 2lpm, no acute distress noted, breathing even and unlabored.Safety precautions maintained. Care assumed at this time.

## 2024-03-05 NOTE — ASSESSMENT & PLAN NOTE
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   Surgery consulted: S/p debridement on 02/16, 2/17, 2/19, and 2/21 (wound vac placed.)  s/p OR 2/23 for first wound vac change. S/p repeat vac change on Mon 02/26 with partial wound closure. Surgery plan for bedside wound vac change on Thursday (2/29)  blood and wound cultures with lactobacillus.   ID consulted. Descalate from meropenem to Unasyn for 2 weeks. EOC 03/06/24  jansen in place due to surgical wound site  Antibiotics given-   Antibiotics (From admission, onward)    Start     Stop Route Frequency Ordered    02/25/24 1215  ampicillin-sulbactam (UNASYN) 3 g in sodium chloride 0.9 % 100 mL IVPB (MB+)         03/06/24 1225 IV Every 6 hours (non-standard times) 02/25/24 1104

## 2024-03-05 NOTE — SUBJECTIVE & OBJECTIVE
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 No acute events overnight. Sitting up in bed with no complaints this morning.  at bedside. Being treated with IV abx and currently pending SNF placement. Insurance denied SNF placement and will do peer to peer today.     Symptoms: Stable. The patient denies shortness of breath, malaise, cough, chest pain, weakness, headache, chest pressure, anorexia, diarrhea and anxiety.     Diet: Regular. Adequate intake. The patient denies nausea and vomiting.    Last Bowel Movement: 03/05/24    Activity Level: Impaired due to weakness      Pain: The patient Complains of pain that is mild. The patient reports pain is requiring pain meds. Pain is well controlled.      Review of Systems   Constitutional:  Negative for chills and fever.   Respiratory:  Negative for cough and shortness of breath.    Cardiovascular:  Negative for chest pain and palpitations.   Gastrointestinal:  Negative for abdominal pain.        Scheduled Meds:   acetaminophen  1,000 mg Oral Q8H    amLODIPine  10 mg Oral Daily    ampicillin-sulbactam  3 g Intravenous Q6H    budesonide  1 mg Nebulization Q12H    And    arformoteroL  15 mcg Nebulization BID    aspirin  81 mg Oral Daily    atorvastatin  20 mg Oral Daily    enoxparin  40 mg Subcutaneous Q24H (prophylaxis, 1700)    folic acid  1 mg Oral Daily    furosemide  40 mg Oral Daily    gabapentin  400 mg Oral BID    insulin aspart U-100  5 Units Subcutaneous TIDWM    insulin detemir U-100  15 Units Subcutaneous QHS    insulin detemir U-100 (Levemir)  20 Units  Subcutaneous Daily    pantoprazole  40 mg Oral Daily    sodium chloride 0.9%  10 mL Intravenous Q6H    thiamine  100 mg Oral Daily     Continuous Infusions:  PRN Meds:.0.9%  NaCl infusion (for blood administration), albuterol sulfate, ALPRAZolam, dextrose 10%, dextrose 10%, glucagon (human recombinant), glucose, glucose, HYDROmorphone, insulin aspart U-100, melatonin, ondansetron, oxyCODONE, oxyCODONE, prochlorperazine, sodium chloride 0.9%, Flushing PICC/Midline Protocol **AND** sodium chloride 0.9% **AND** sodium chloride 0.9%      Objective:     Vitals:    03/05/24 0450 03/05/24 0531 03/05/24 0741 03/05/24 0756   BP: (!) 151/68  (!) 172/84    BP Location: Left arm  Left arm    Patient Position: Lying  Lying    Pulse: 80  82 77   Resp: 18 19 18 18   Temp: 98.4 °F (36.9 °C)  98 °F (36.7 °C)    TempSrc: Oral  Oral    SpO2: (!) 93%  (!) 92% 98%   Weight:       Height:           No data found.      Intake/Output Summary (Last 24 hours) at 3/5/2024 0915  Last data filed at 3/5/2024 0630  Gross per 24 hour   Intake 120 ml   Output 3350 ml   Net -3230 ml       Net IO Since Admission: -28,684.42 mL [03/05/24 0915]    Physical Exam  Vitals and nursing note reviewed.   Constitutional:       General: She is awake. She is not in acute distress.     Appearance: Normal appearance. She is well-developed and well-groomed. She is not toxic-appearing.   HENT:      Head: Normocephalic and atraumatic.   Cardiovascular:      Rate and Rhythm: Normal rate.   Pulmonary:      Effort: No tachypnea, accessory muscle usage or respiratory distress.   Abdominal:      General: There is no distension.   Neurological:      Mental Status: She is alert and oriented to person, place, and time. Mental status is at baseline.   Psychiatric:         Mood and Affect: Mood normal.         Behavior: Behavior normal. Behavior is cooperative.         Thought Content: Thought content normal.       Significant Labs: All pertinent labs within the past 24 hours  have been reviewed.    BMP (Last 3 Results):   Recent Labs   Lab 02/29/24  0358 03/01/24  0513 03/02/24  0646   GLU 81 101 119*    140 139   K 5.1 4.3 5.0    105 106   CO2 27 28 26   BUN 17 15 19   CREATININE 1.1 1.0 0.9   CALCIUM 8.1* 8.3* 8.5*   MG 1.6 1.7 1.7       CBC (Last 3 Results):   Recent Labs   Lab 02/29/24  0358 03/01/24  0513 03/02/24  0646   WBC 10.97 9.93 10.22   RBC 3.34* 3.06* 3.73*   HGB 7.6* 7.3* 8.4*   HCT 25.1* 23.9* 28.4*   PLT SEE COMMENT 224 197   MCV 75* 78* 76*   MCH 22.8* 23.9* 22.5*   MCHC 30.3* 30.5* 29.6*         Significant Imaging: I have reviewed all pertinent imaging results/findings within the past 24 hours.  Echo    Left Ventricle: There is hyperdynamic systolic function with a visually   estimated ejection fraction of 70 - 75%.    Right Ventricle: Normal right ventricular cavity size. Systolic   function is normal.    Left Atrium: Left atrium is mildly dilated.    Tricuspid Valve: There is mild regurgitation.    Pulmonary Artery: The estimated pulmonary artery systolic pressure is   55 mmHg.    IVC/SVC: Intermediate venous pressure at 8 mmHg.    Pericardium: There is a trivial posterior effusion.      Review of Systems   Constitutional:  Negative for chills and fever.   Respiratory:  Negative for cough and shortness of breath.    Cardiovascular:  Negative for chest pain and palpitations.   Gastrointestinal:  Negative for abdominal pain.     Physical Exam  Vitals and nursing note reviewed.   Constitutional:       General: She is awake. She is not in acute distress.     Appearance: Normal appearance. She is well-developed and well-groomed. She is not toxic-appearing.   HENT:      Head: Normocephalic and atraumatic.   Cardiovascular:      Rate and Rhythm: Normal rate.   Pulmonary:      Effort: No tachypnea, accessory muscle usage or respiratory distress.   Abdominal:      General: There is no distension.   Neurological:      Mental Status: She is alert and oriented to  person, place, and time. Mental status is at baseline.   Psychiatric:         Mood and Affect: Mood normal.         Behavior: Behavior normal. Behavior is cooperative.         Thought Content: Thought content normal.

## 2024-03-05 NOTE — PT/OT/SLP PROGRESS
Occupational Therapy   Treatment    Name: Christine Mosqueda  MRN: [id number]  Admitting Diagnosis:  Librado's gangrene  8 Days Post-Op    Recommendations:     Discharge Recommendations: Moderate Intensity Therapy  Discharge Equipment Recommendations:  bedside commode, walker, rolling, bath bench  Barriers to discharge:   (Pt requires increased level of assistance)    Assessment:     Christine Mosqueda is a 53 y.o. female with a medical diagnosis of Librado's gangrene.  She presents with deconditioning, pain after ambulation. Performance deficits affecting function are weakness, impaired endurance, impaired self care skills, impaired functional mobility, gait instability, impaired balance, decreased safety awareness, decreased lower extremity function, decreased upper extremity function, decreased ROM, impaired skin, impaired cardiopulmonary response to activity.     Rehab Prognosis:  Good; patient would benefit from acute skilled OT services to address these deficits and reach maximum level of function.       Plan:     Patient to be seen 5 x/week to address the above listed problems via self-care/home management, therapeutic activities, therapeutic exercises  Plan of Care Expires: 03/07/24  Plan of Care Reviewed with: patient    Subjective     Chief Complaint: Pt reporting that she has no equipment at home  Patient/Family Comments/goals: To go to SNF  Pain/Comfort:  Pain Rating 1: 6/10  Location - Side 1: Right  Location - Orientation 1: medial  Location 1: thigh  Pain Addressed 1: Pre-medicate for activity, Reposition, Distraction, Cessation of Activity, Nurse notified  Pain Rating Post-Intervention 1: 8/10    Objective:     Communicated with: nsharish prior to session.  Patient found sitting edge of bed with telemetry, peripheral IV, jansen catheter, wound vac upon OT entry to room.    General Precautions: Standard, fall, respiratory    Orthopedic Precautions:N/A  Braces: N/A  Respiratory Status: Nasal cannula, flow 1.5  L/min     Occupational Performance:       Functional Mobility/Transfers:  Patient completed Sit <> Stand Transfer with minimum assistance  with  rolling walker   Functional Mobility: Pt with fair dynamic seated and standing balance.     Activities of Daily Living:  Upper Body Dressing: supervision to don gown as robe seated EOB  Lower Body Dressing: stand by assistance to don B flip flops seated EOB      [facility] 6 Click ADL: 16    Treatment & Education:  Pt educated on role of OT and POC.   Pt performing skills as listed above.     Patient left sitting edge of bed with all lines intact, call button in reach, nsg notified, and spouse present    GOALS:   Multidisciplinary Problems       Occupational Therapy Goals          Problem: Occupational Therapy    Goal Priority Disciplines Outcome Interventions   Occupational Therapy Goal     OT, PT/OT Ongoing, Progressing    Description: Goals to be met by: 03/07/2024     Patient will increase functional independence with ADLs by performing:    UE Dressing with [place].  LE Dressing with Minimal Assistance.  Grooming while seated at sink with Set-up Assistance.  Toileting from bedside commode with Minimal Assistance for hygiene and clothing management.   Sitting in chair  x60  minutes with Supervision.  Toilet transfer to bedside commode with Supervision.  Upper extremity exercise program x10  reps per handout, with assistance as needed for right upper extremity.                         Time Tracking:     OT Date of Treatment: 03/05/24  OT Start Time: 1319  OT Stop Time: 1352  OT Total Time (min): 33 min    Billable Minutes:Self Care/Home Management 16  Therapeutic Activity 17    OT/BRIDGET: OT     Number of BRIDGET visits since last OT visit: 0    3/5/2024

## 2024-03-05 NOTE — NURSING
[facility]  Nurses Note -- 4 Eyes      3/5/2024       Skin assessed on: Q Shift      [x] No Pressure Injuries Present    []Prevention Measures Documented    [] Yes LDA  for Pressure Injury Previously documented     [] Yes New Pressure Injury Discovered   [] LDA for New Pressure Injury Added      Attending RN:  Dwight Light RN     Second RN:  CALEB Neal

## 2024-03-05 NOTE — PLAN OF CARE
Notified patient and  that Dr. Colmenares is doing a peer to peer today. TOO informed patient that if peer to peer is lost, patient to discharge home tomorrow with Home Health. Patient and  expressed understanding.     Dr. Colmenares notified SW that [facility] said to send them a list of all the SNF that denied and then they can do a reconsideration.    TOO called Neisha at [facility] to ask if she could review referral again.    TOO called insurance company to get fax number to send list of declined SNF. TOO faxed list and updated clinicals to insurance company. ([phone])

## 2024-03-05 NOTE — PLAN OF CARE
Problem: Diabetes Comorbidity  Goal: Blood Glucose Level Within Targeted Range  Outcome: Ongoing, Progressing     Problem: Adult Inpatient Plan of Care  Goal: Plan of Care Review  Outcome: Ongoing, Progressing     Problem: Skin Injury Risk Increased  Goal: Skin Health and Integrity  Outcome: Ongoing, Progressing     Problem: Adjustment to Illness (Sepsis/Septic Shock)  Goal: Optimal Coping  Outcome: Ongoing, Progressing     Problem: Fluid and Electrolyte Imbalance (Acute Kidney Injury/Impairment)  Goal: Fluid and Electrolyte Balance  Outcome: Ongoing, Progressing     Problem: Fall Injury Risk  Goal: Absence of Fall and Fall-Related Injury  Outcome: Ongoing, Progressing     Problem: Infection  Goal: Absence of Infection Signs and Symptoms  Outcome: Ongoing, Progressing     Problem: Impaired Wound Healing  Goal: Optimal Wound Healing  Outcome: Ongoing, Progressing

## 2024-03-05 NOTE — PLAN OF CARE
Problem: Occupational Therapy  Goal: Occupational Therapy Goal  Description: Goals to be met by: 03/07/2024     Patient will increase functional independence with ADLs by performing:    UE Dressing with [place].  LE Dressing with Minimal Assistance.  Grooming while seated at sink with Set-up Assistance.  Toileting from bedside commode with Minimal Assistance for hygiene and clothing management.   Sitting in chair  x60  minutes with Supervision.  Toilet transfer to bedside commode with Supervision.  Upper extremity exercise program x10  reps per handout, with assistance as needed for right upper extremity.    Outcome: Ongoing, Progressing   Pt progressing towards OT goals. Pt initially agitated and intermittently labile, reporting that she was concerned that she would have to go home and would not be able to go to SNF. Pt able to ambulate in hallway with CGA and min v/c for RW management 2/2 wound vac on RW. Some increased pain after session but nsg had just given medication prior to ambulation. RN notified and aware. Cont OT POC

## 2024-03-05 NOTE — PT/OT/SLP PROGRESS
Physical Therapy      Patient Name:  Christine Mosqueda   MRN:  [id number]    Patient not seen today secondary to Patient unwilling to participate. Attempted at 0926 and 1137. First attempt, pt stated she is ready to leave and she doesn't need therapy. Therapist educated pt on benefits of therapy and OOB activities while being in the hospital, pt verbalized understanding and requested therapy to check back later. At 1137, pt still unwilling to participate.  Will follow-up as able.

## 2024-03-05 NOTE — PROGRESS NOTES
[facility]  Telemedicine Progress Note    Patient Name: Christine Mosqueda  MRN: [id number]  Patient Class: IP- Inpatient   Admission Date: 2/15/2024  Length of Stay: 18 days  Attending Physician: Lynsey Colmenares MD  Primary Care Provider: [facility]          Subjective:     Principal Problem:Estephanie's gangrene        HPI:  Ms. Mosqueda is a 53 yoF with a PMHx of diastolic heart failure, HTN, T2DM on insulin. She presented to the hospital with worsening pain and swelling of the right medial thigh. She developed an abscess in the area that underwent I+D at [facility] a few days ago. She was sent home on PO abx. Since that time the pain and swelling have worsened.  In ED, CT scan demonstrated extensive inflammation extending from the right perineum to the right medial thigh with multiple foci of air. She was admitted to general surgery for estephanie's gangrene. She underwent extensive debridement under general surgery today in OR. She is currently doing well with good pain control. HM consulted for medical management.     Overview/Hospital Course:  Ms Christine Mosqueda is a 53 y.o.  woman with poorly controlled type II DM who was admitted for sepsis secondary to bacteremia and Estephanie's gangrene. Pt presented w/ worsening pain and swelling of the Rt medial thigh. CT scan findings were consistent w/ Estephanie's gangrene. General surgery team consulted. S/p surgical debridement 02/16, 2/17, 2/19, and on 02/21 (wound vac placed.) S/p OR 2/23 for first wound vac change. Plan for OR again on 02/26. Had acute on chronic anemia, likely exacerbated with multiple debridements. Required blood transfusions on 02/19 and again on 02/21.  Blood and wound cultures with lactobacillus. ID consulted- on meropenem. Repeat blood cx with no growth thus far. Also found to have CARLINE on admission, nephrology following. CARLINE resolved. PT/OT recommends SNF on discharge.     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 No acute events overnight. Sitting up in bed with no complaints this morning.  at bedside. Being treated with IV abx and currently pending SNF placement. Insurance denied SNF placement and will do peer to peer today.     Symptoms: Stable. The patient denies shortness of breath, malaise, cough, chest pain, weakness, headache, chest pressure, anorexia, diarrhea and anxiety.     Diet: Regular. Adequate intake. The patient denies nausea and vomiting.    Last Bowel Movement: 03/05/24    Activity Level: Impaired due to weakness      Pain: The patient Complains of pain that is mild. The patient reports pain is requiring pain meds. Pain is well controlled.      Review of Systems   Constitutional:  Negative for chills and fever.   Respiratory:  Negative for cough and shortness of breath.    Cardiovascular:  Negative for chest pain and palpitations.   Gastrointestinal:  Negative for abdominal pain.        Scheduled Meds:   acetaminophen  1,000 mg Oral Q8H    amLODIPine  10 mg Oral Daily    ampicillin-sulbactam  3 g Intravenous Q6H    budesonide  1 mg Nebulization Q12H    And    arformoteroL  15 mcg Nebulization BID    aspirin  81 mg Oral Daily    atorvastatin  20 mg Oral Daily    enoxparin  40 mg Subcutaneous Q24H (prophylaxis, 1700)    folic acid  1 mg Oral Daily    furosemide  40 mg Oral Daily    gabapentin  400 mg Oral BID    insulin aspart U-100  5 Units Subcutaneous TIDWM    insulin detemir U-100  15 Units Subcutaneous QHS    insulin detemir U-100 (Levemir)  20 Units Subcutaneous Daily     pantoprazole  40 mg Oral Daily    sodium chloride 0.9%  10 mL Intravenous Q6H    thiamine  100 mg Oral Daily     Continuous Infusions:  PRN Meds:.0.9%  NaCl infusion (for blood administration), albuterol sulfate, ALPRAZolam, dextrose 10%, dextrose 10%, glucagon (human recombinant), glucose, glucose, HYDROmorphone, insulin aspart U-100, melatonin, ondansetron, oxyCODONE, oxyCODONE, prochlorperazine, sodium chloride 0.9%, Flushing PICC/Midline Protocol **AND** sodium chloride 0.9% **AND** sodium chloride 0.9%      Objective:     Vitals:    03/05/24 0450 03/05/24 0531 03/05/24 0741 03/05/24 0756   BP: (!) 151/68  (!) 172/84    BP Location: Left arm  Left arm    Patient Position: Lying  Lying    Pulse: 80  82 77   Resp: 18 19 18 18   Temp: 98.4 °F (36.9 °C)  98 °F (36.7 °C)    TempSrc: Oral  Oral    SpO2: (!) 93%  (!) 92% 98%   Weight:       Height:           No data found.      Intake/Output Summary (Last 24 hours) at 3/5/2024 0915  Last data filed at 3/5/2024 0630  Gross per 24 hour   Intake 120 ml   Output 3350 ml   Net -3230 ml       Net IO Since Admission: -28,684.42 mL [03/05/24 0915]    Physical Exam  Vitals and nursing note reviewed.   Constitutional:       General: She is awake. She is not in acute distress.     Appearance: Normal appearance. She is well-developed and well-groomed. She is not toxic-appearing.   HENT:      Head: Normocephalic and atraumatic.   Cardiovascular:      Rate and Rhythm: Normal rate.   Pulmonary:      Effort: No tachypnea, accessory muscle usage or respiratory distress.   Abdominal:      General: There is no distension.   Neurological:      Mental Status: She is alert and oriented to person, place, and time. Mental status is at baseline.   Psychiatric:         Mood and Affect: Mood normal.         Behavior: Behavior normal. Behavior is cooperative.         Thought Content: Thought content normal.       Significant Labs: All pertinent labs within the past 24 hours have been  reviewed.    BMP (Last 3 Results):   Recent Labs   Lab 02/29/24  0358 03/01/24  0513 03/02/24  0646   GLU 81 101 119*    140 139   K 5.1 4.3 5.0    105 106   CO2 27 28 26   BUN 17 15 19   CREATININE 1.1 1.0 0.9   CALCIUM 8.1* 8.3* 8.5*   MG 1.6 1.7 1.7       CBC (Last 3 Results):   Recent Labs   Lab 02/29/24  0358 03/01/24  0513 03/02/24  0646   WBC 10.97 9.93 10.22   RBC 3.34* 3.06* 3.73*   HGB 7.6* 7.3* 8.4*   HCT 25.1* 23.9* 28.4*   PLT SEE COMMENT 224 197   MCV 75* 78* 76*   MCH 22.8* 23.9* 22.5*   MCHC 30.3* 30.5* 29.6*         Significant Imaging: I have reviewed all pertinent imaging results/findings within the past 24 hours.  Echo    Left Ventricle: There is hyperdynamic systolic function with a visually   estimated ejection fraction of 70 - 75%.    Right Ventricle: Normal right ventricular cavity size. Systolic   function is normal.    Left Atrium: Left atrium is mildly dilated.    Tricuspid Valve: There is mild regurgitation.    Pulmonary Artery: The estimated pulmonary artery systolic pressure is   55 mmHg.    IVC/SVC: Intermediate venous pressure at 8 mmHg.    Pericardium: There is a trivial posterior effusion.      Review of Systems   Constitutional:  Negative for chills and fever.   Respiratory:  Negative for cough and shortness of breath.    Cardiovascular:  Negative for chest pain and palpitations.   Gastrointestinal:  Negative for abdominal pain.     Physical Exam  Vitals and nursing note reviewed.   Constitutional:       General: She is awake. She is not in acute distress.     Appearance: Normal appearance. She is well-developed and well-groomed. She is not toxic-appearing.   HENT:      Head: Normocephalic and atraumatic.   Cardiovascular:      Rate and Rhythm: Normal rate.   Pulmonary:      Effort: No tachypnea, accessory muscle usage or respiratory distress.   Abdominal:      General: There is no distension.   Neurological:      Mental Status: She is alert and oriented to person,  place, and time. Mental status is at baseline.   Psychiatric:         Mood and Affect: Mood normal.         Behavior: Behavior normal. Behavior is cooperative.         Thought Content: Thought content normal.         Assessment/Plan:      * Librado's gangrene  CT on admit with R perineum to R medial thigh [place].   Surgery consulted: S/p debridement on 02/16, 2/17, 2/19, and 2/21 (wound vac placed.)  s/p OR 2/23 for first wound vac change. S/p repeat vac change on Mon 02/26 with partial wound closure. Surgery plan for bedside wound vac change on Thursday (2/29)  blood and wound cultures with lactobacillus.   ID consulted. Descalate from meropenem to Unasyn for 2 weeks. EOC 03/06/24  jansen in place due to surgical wound site  Antibiotics given-   Antibiotics (From admission, onward)      Start     Stop Route Frequency Ordered    02/25/24 1215  ampicillin-sulbactam (UNASYN) 3 g in sodium chloride 0.9 % 100 mL IVPB (MB+)         03/06/24 1225 IV Every 6 hours (non-standard times) 02/25/24 1104            Sepsis with acute renal failure and tubular necrosis without septic shock  Defined by leukocytosis, tachycardia and groin abscess. Source is [place].   - surgical management with debridement  - antibiotics= Unasyn per ID  - Leukocytosis resolved.     Necrotizing fasciitis  Plan as above.       Debility  Cont with PT/OT for gait training and strengthening and restoration of ADL's   Encourage mobility, OOB in chair, and early ambulation as appropriate  Fall precautions   Monitor for bowel and bladder dysfunction  Monitor for and prevent skin breakdown and pressure ulcers  Patient has been able to work with OT/PT who recommend placement for further rehabilitation  Patient pending placement, continue in-hospital care as stated above in the meantime  More than 20 minutes of my time has been spent discussing and planning just her safe disposition with patient/family/CM/SW/Nursing staff (not including other time spent  in clinical discussion and management)  CM/SW following, appreciate input     Advanced care planning/counseling discussion  Advance Care Planning    Date: 02/21/2024    Code Status  I engaged the the patient in a voluntary conversation about the patient's preferences for care  at the very end of life. The patient wishes to have CPR and other invasive treatments performed when her heart and/or breathing stops. I communicated to the patient that her wishes align with full code status and she agrees and verbalized understanding.   I spent a total of 16 minutes engaging the patient in this advance care planning discussion.           Code Status: Full Code      Class 2 severe obesity due to excess calories with serious comorbidity and body mass index (BMI) of 35.0 to 35.9 in adult  Body mass index is 36.59 kg/m². Morbid obesity complicates all aspects of disease management from diagnostic modalities to treatment. Weight loss encouraged and health benefits explained to patient.         ETOH abuse  Patient drinks at least a 6 pack beer daily  - Start scheduled librium- wean ordered  - Thiamine, folate, potassium, magnesium      Cocaine abuse  Patient sprinkles crack crystals in her marijuana  Wants help. Tearful. Emotional support provided.   CM consulted.       Anemia of chronic disease  Patient's anemia is currently controlled. Has received 1 units of PRBCs on 2/19 and 02/21 . Etiology likely d/t chronic disease and acute blood loss post surgery.  Current CBC reviewed-   Lab Results   Component Value Date    HGB 8.4 (L) 03/02/2024    HCT 28.4 (L) 03/02/2024     - monitor daily   -s/p 1U pRBC on 02/19 and 02/21    Essential hypertension  BP Readings from Last 3 Encounters:   03/03/24 (!) 167/78   06/20/23 (!) 164/86   05/08/23 (!) 144/91     Continuing home medications as prescribed  Will cont to monitor and adjust as needed  Will utilize p.r.n. blood pressure medication only if patient's blood pressure greater than  180/110 and she develops symptoms such as worsening chest pain or shortness of breath.  Cardiac diet    Cardiac/Autonomic (From admission, onward)      Start     Stop Route Frequency Ordered    03/02/24 1000  amLODIPine tablet 10 mg         -- Oral Daily 03/02/24 0850    02/16/24 0900  atorvastatin tablet 20 mg         -- Oral Daily 02/16/24 0506            Chronic diastolic congestive heart failure  Patient is identified as having Diastolic (HFpEF) heart failure that is Chronic. CHF is currently controlled. Latest ECHO performed and demonstrates- Results for orders placed during the hospital encounter of 03/10/23    Echo    Interpretation Summary  · The left ventricle is normal in size with low normal systolic function.  · The estimated ejection fraction is 53%.  · There is abnormal septal wall motion.  · Indeterminate left ventricular diastolic function.  · Mild left atrial enlargement.  · Normal right ventricular size with low normal right ventricular systolic function.  · Mild right atrial enlargement.  · Mild mitral regurgitation.  · Mild tricuspid regurgitation.  · Normal central venous pressure (3 mmHg).  · The estimated PA systolic pressure is 23 mmHg.    No acute issues  Monitor fluid status- does have lower extremity edema   Discussed with nephrology, okay to resume lasix 02/23    ILD (interstitial lung disease)  -Noted on imaging; CT chest 2022  -Stable, no acute issues.   -Not on home O2 but says she should be (but never received because she left AMA at OSH)    CARLINE (acute kidney injury)  Patient with acute kidney injury/acute renal failure likely due to pre-renal azotemia due to IVVD and acute tubular necrosis caused by sepsis  CARLINE is currently improving. Baseline creatinine  0.9  - Labs reviewed- Renal function/electrolytes with Estimated Creatinine Clearance: 79.7 mL/min (based on SCr of 0.9 mg/dL). according to latest data. Monitor urine output and serial BMP and adjust therapy as needed. Avoid  nephrotoxins and renally dose meds for GFR listed above.    - Highly suspect ATN as an etiology at this time  - Nephrology consult requested, appreciate recs  - renal function is improving and near baseline.   - Resolved    Tobacco abuse  - Pt was counseled about cessation x4 minutes      Type 2 diabetes mellitus with hyperglycemia, without long-term current use of insulin  Lab Results   Component Value Date    HGBA1C 8.2 (H) 04/06/2023     Recent Labs   Lab 03/02/24  1948   POCTGLUCOSE 166*        Cont current basal/prandial insulin regimen   Low dose correction scale   Cont blood glucose monitoring   BG goal:  Preprandial blood glucose target <140 mg/dL  Random glucoses <180 mg/dL  Avoid hypoglycemia -  Reduce antihyperglycemic therapy when caloric intake is reduced. Avoid insulin stacking as a result of repeated injection of prandial insulin at close intervals.   Avoid severe hyperglycemia  ADA diet  Antihyperglycemics (From admission, onward)      Start     Stop Route Frequency Ordered    02/27/24 0900  insulin detemir U-100 (Levemir) pen 20 Units         -- SubQ Daily 02/25/24 1359    02/26/24 2100  insulin detemir U-100 (Levemir) pen 15 Units         -- SubQ Nightly 02/25/24 1359    02/22/24 1145  insulin aspart U-100 pen 5 Units         -- SubQ 3 times daily with meals 02/22/24 1031    02/16/24 1514  insulin aspart U-100 pen 0-15 Units         -- SubQ Before meals & nightly PRN 02/16/24 1515               VTE Risk Mitigation (From admission, onward)           Ordered     enoxaparin injection 40 mg  Every 24 hours         02/24/24 1615     IP VTE HIGH RISK PATIENT  Once         02/16/24 0506     Place sequential compression device  Until discontinued         02/16/24 0506                          I have completed this tele-visit without the assistance of a telepresenter.    The attending portion of this evaluation, treatment, and documentation was performed per Lynsey Chatman MD via Telemedicine AudioVisual  using the secure [employer] software platform with 2 way audio/video. The provider was located off-site and the patient is located in the hospital. The aforementioned video software was utilized to document the relevant history and physical exam    Lynsey Chatman MD  Department of [facility] Medicine   [facility]

## 2024-03-06 VITALS
TEMPERATURE: 98 F | WEIGHT: 213.19 LBS | SYSTOLIC BLOOD PRESSURE: 130 MMHG | HEART RATE: 85 BPM | RESPIRATION RATE: 18 BRPM | OXYGEN SATURATION: 92 % | HEIGHT: 64 IN | BODY MASS INDEX: 36.4 KG/M2 | DIASTOLIC BLOOD PRESSURE: 72 MMHG

## 2024-03-06 LAB
POCT GLUCOSE: 108 MG/DL (ref 70–110)
POCT GLUCOSE: 159 MG/DL (ref 70–110)

## 2024-03-06 PROCEDURE — 97535 SELF CARE MNGMENT TRAINING: CPT

## 2024-03-06 PROCEDURE — 27000221 HC OXYGEN, UP TO 24 HOURS

## 2024-03-06 PROCEDURE — 25000003 PHARM REV CODE 250: Performed by: STUDENT IN AN ORGANIZED HEALTH CARE EDUCATION/TRAINING PROGRAM

## 2024-03-06 PROCEDURE — 25000003 PHARM REV CODE 250: Performed by: HOSPITALIST

## 2024-03-06 PROCEDURE — 63600175 PHARM REV CODE 636 W HCPCS: Performed by: INTERNAL MEDICINE

## 2024-03-06 PROCEDURE — 25000003 PHARM REV CODE 250: Performed by: INTERNAL MEDICINE

## 2024-03-06 PROCEDURE — 99232 SBSQ HOSP IP/OBS MODERATE 35: CPT | Mod: 24,,,

## 2024-03-06 PROCEDURE — A4216 STERILE WATER/SALINE, 10 ML: HCPCS | Performed by: STUDENT IN AN ORGANIZED HEALTH CARE EDUCATION/TRAINING PROGRAM

## 2024-03-06 PROCEDURE — 99900035 HC TECH TIME PER 15 MIN (STAT)

## 2024-03-06 PROCEDURE — 94760 N-INVAS EAR/PLS OXIMETRY 1: CPT

## 2024-03-06 PROCEDURE — 97530 THERAPEUTIC ACTIVITIES: CPT | Mod: CQ

## 2024-03-06 RX ORDER — LANOLIN ALCOHOL/MO/W.PET/CERES
100 CREAM (GRAM) TOPICAL DAILY
Qty: 30 TABLET | Refills: 0 | Status: SHIPPED | OUTPATIENT
Start: 2024-03-06

## 2024-03-06 RX ORDER — FUROSEMIDE 40 MG/1
40 TABLET ORAL DAILY
Qty: 30 TABLET | Refills: 0 | Status: SHIPPED | OUTPATIENT
Start: 2024-03-06 | End: 2024-04-05

## 2024-03-06 RX ORDER — OXYCODONE HYDROCHLORIDE 5 MG/1
5 TABLET ORAL EVERY 8 HOURS PRN
Qty: 15 TABLET | Refills: 0 | Status: SHIPPED | OUTPATIENT
Start: 2024-03-06 | End: 2024-03-06

## 2024-03-06 RX ORDER — OXYCODONE HYDROCHLORIDE 5 MG/1
5 TABLET ORAL EVERY 8 HOURS PRN
Qty: 15 TABLET | Refills: 0 | Status: SHIPPED | OUTPATIENT
Start: 2024-03-06

## 2024-03-06 RX ORDER — AMLODIPINE BESYLATE 10 MG/1
10 TABLET ORAL DAILY
Qty: 30 TABLET | Refills: 11 | Status: SHIPPED | OUTPATIENT
Start: 2024-03-07 | End: 2025-03-07

## 2024-03-06 RX ADMIN — THIAMINE HCL TAB 100 MG 100 MG: 100 TAB at 08:03

## 2024-03-06 RX ADMIN — OXYCODONE 10 MG: 5 TABLET ORAL at 06:03

## 2024-03-06 RX ADMIN — AMLODIPINE BESYLATE 10 MG: 5 TABLET ORAL at 08:03

## 2024-03-06 RX ADMIN — FOLIC ACID 1 MG: 1 TABLET ORAL at 08:03

## 2024-03-06 RX ADMIN — GABAPENTIN 400 MG: 400 CAPSULE ORAL at 08:03

## 2024-03-06 RX ADMIN — PANTOPRAZOLE SODIUM 40 MG: 40 TABLET, DELAYED RELEASE ORAL at 08:03

## 2024-03-06 RX ADMIN — Medication 10 ML: at 12:03

## 2024-03-06 RX ADMIN — AMPICILLIN SODIUM AND SULBACTAM SODIUM 3 G: 2; 1 INJECTION, POWDER, FOR SOLUTION INTRAMUSCULAR; INTRAVENOUS at 12:03

## 2024-03-06 RX ADMIN — ACETAMINOPHEN 1000 MG: 500 TABLET ORAL at 06:03

## 2024-03-06 RX ADMIN — ATORVASTATIN CALCIUM 20 MG: 10 TABLET, FILM COATED ORAL at 08:03

## 2024-03-06 RX ADMIN — ASPIRIN 81 MG CHEWABLE TABLET 81 MG: 81 TABLET CHEWABLE at 08:03

## 2024-03-06 RX ADMIN — Medication 10 ML: at 06:03

## 2024-03-06 RX ADMIN — INSULIN ASPART 5 UNITS: 100 INJECTION, SOLUTION INTRAVENOUS; SUBCUTANEOUS at 08:03

## 2024-03-06 RX ADMIN — OXYCODONE 10 MG: 5 TABLET ORAL at 02:03

## 2024-03-06 RX ADMIN — FUROSEMIDE 40 MG: 40 TABLET ORAL at 08:03

## 2024-03-06 RX ADMIN — AMPICILLIN SODIUM AND SULBACTAM SODIUM 3 G: 2; 1 INJECTION, POWDER, FOR SOLUTION INTRAMUSCULAR; INTRAVENOUS at 06:03

## 2024-03-06 RX ADMIN — INSULIN ASPART 3 UNITS: 100 INJECTION, SOLUTION INTRAVENOUS; SUBCUTANEOUS at 08:03

## 2024-03-06 RX ADMIN — INSULIN ASPART 5 UNITS: 100 INJECTION, SOLUTION INTRAVENOUS; SUBCUTANEOUS at 11:03

## 2024-03-06 NOTE — ASSESSMENT & PLAN NOTE
Cont with PT/OT for gait training and strengthening and restoration of ADL's   Encourage mobility, OOB in chair, and early ambulation as appropriate  Fall precautions   Monitor for bowel and bladder dysfunction  Monitor for and prevent skin breakdown and pressure ulcers  Patient has been able to work with OT/PT who recommend placement for further rehabilitation  Patient pending placement, continue in-hospital care as stated above in the meantime  More than 20 minutes of my time has been spent discussing and planning just her safe disposition with patient/family/CM/SW/Nursing staff (not including other time spent in clinical discussion and management)  CM/SW following, appreciate input   Patient has declined to go to SNF. Has elected to go home instead.

## 2024-03-06 NOTE — PLAN OF CARE
SW discussed discharge planning with patient. Informed patient that [facility]  accepted her for SNF. SW discussed the benefits of going to SNF as it relates to wound care. Patient very adamant about going home. SW informed patient that home health will be coordinated and SN will change wound vac three times a week. SW inquired if there was someone that will assist in care when she discharges. Patient stated that her  works but she has someone that can help.     I provided the patient a choice of post acute providers and offered a list of CMS rated home health.    Patient has declined to select a preferred provider and elects placement with the first accepting in network provider that is available to provide services as ordered by the physician.     Patient accepted by [facility].     9:54 am    Notified Ehsan at [facility] about discharge. Ehsan instructed SW to send order to her with clinical documents so it could be expedited. Order completed by Dr. Paula. Emailed to Ehsan.    2:29 pm     Followed up with Oneyda, patient  for [facility]. Oneyda said that rapid response team has order and is still verifying insurance benefits, but will call them to see where they are in the process. Oneyda said that they are aware that patient is discharging today.     3:49 pm     Wound Vac delivered.

## 2024-03-06 NOTE — PLAN OF CARE
Case Management Final Discharge Note      Discharge Disposition: Home with [facility]    New DME ordered / company name: None    Relevant SDOH / Transition of Care Barriers:  none    Primary Caretaker and contact information: none    Scheduled followup appointment: Post Op    Referrals placed: None    Transportation: family        Patient and family educated on discharge services and updated on DC plan. Bedside RN notified, patient clear to discharge from Case Management Perspective.        03/06/24 1551   Final Note   Assessment Type Final Discharge Note   Anticipated Discharge Disposition [facility]   Hospital Resources/Appts/Education Provided Appointments scheduled and added to AVS;Post-Acute resouces added to AVS   Post-Acute Status   Post-Acute Authorization Placement;Home Health  (SNF/Rehab)   Post-Acute Placement Status Discharge Plan Changed   Home Health Status Set-up Complete/Auth obtained   Coverage Medicaid   Discharge Delays None known at this time

## 2024-03-06 NOTE — ASSESSMENT & PLAN NOTE
Lab Results   Component Value Date    HGBA1C 8.2 (H) 04/06/2023     Recent Labs   Lab 03/06/24  0750   POCTGLUCOSE 159*        Cont current basal/prandial insulin regimen   Low dose correction scale   Cont blood glucose monitoring   BG goal:  Preprandial blood glucose target <140 mg/dL  Random glucoses <180 mg/dL  Avoid hypoglycemia -  Reduce antihyperglycemic therapy when caloric intake is reduced. Avoid insulin stacking as a result of repeated injection of prandial insulin at close intervals.   Avoid severe hyperglycemia  ADA diet  Antihyperglycemics (From admission, onward)    Start     Stop Route Frequency Ordered    02/27/24 0900  insulin detemir U-100 (Levemir) pen 20 Units         -- SubQ Daily 02/25/24 1359    02/26/24 2100  insulin detemir U-100 (Levemir) pen 15 Units         -- SubQ Nightly 02/25/24 1359    02/22/24 1145  insulin aspart U-100 pen 5 Units         -- SubQ 3 times daily with meals 02/22/24 1031    02/16/24 1514  insulin aspart U-100 pen 0-15 Units         -- SubQ Before meals & nightly PRN 02/16/24 1515

## 2024-03-06 NOTE — PT/OT/SLP PROGRESS
Occupational Therapy   Treatment    Name: Christine Mosqueda  MRN: [id number]  Admitting Diagnosis:  Librado's gangrene  9 Days Post-Op    Recommendations:     Discharge Recommendations: Moderate Intensity Therapy  Discharge Equipment Recommendations:  bedside commode, bath bench, walker, rolling  Barriers to discharge:   (Pt requires increased level of assistance)    Assessment:     Christine Mosqueda is a 53 y.o. female with a medical diagnosis of Librado's gangrene.  She presents with some slightly frantic behavior requiring mod v/c to reduce speed during ambulation and to take care during self care tasks in stance for line management with jansen catheter and wound vac as pt found in stance at sink for grooming tasks without assistance. Performance deficits affecting function are weakness, impaired endurance, impaired self care skills, impaired functional mobility, gait instability, impaired balance, pain, decreased safety awareness, decreased lower extremity function, impaired skin, edema, impaired cardiopulmonary response to activity.     Rehab Prognosis:  Good; patient would benefit from acute skilled OT services to address these deficits and reach maximum level of function.       Plan:     Patient to be seen 5 x/week to address the above listed problems via self-care/home management, therapeutic activities, therapeutic exercises  Plan of Care Expires: 03/07/24  Plan of Care Reviewed with: patient    Subjective     Chief Complaint: Pt reports she is excited to be going home  Patient/Family Comments/goals: To get home to get her nails done at the salon  Pain/Comfort:  Pain Rating 1: 0/10    Objective:     Communicated with: jenaro prior to session.  Patient found ambulatory in room/hawley with telemetry, jnasen catheter, wound vac, peripheral IV upon OT entry to room.    General Precautions: Standard, fall, respiratory    Orthopedic Precautions:N/A  Braces: N/A  Respiratory Status: Room air     Occupational Performance:        Functional Mobility/Transfers:  Patient completed Sit <> Stand Transfer with stand by assistance and contact guard assistance  with  rolling walker   Functional Mobility: Pt with fair to fair- dynamic seated and standing balance.     Activities of Daily Living:  Grooming: supervision to groom self to glue hair piece into place and comb as preferred.   Upper Body Dressing: stand by assistance to don gown overhead  Lower Body Dressing: stand by assistance to don flip flops in stance. Pt declined to don Depend style undergarment at this time, awaiting wound vac change to smaller home wound vac in order to accommodate threading would vac though leg of undergarment      [facility] 6 Click ADL: 16    Treatment & Education:  Pt educated on role of OT and POC.   Pt performing skills as listed above.     Patient left up in chair with all lines intact, call button in reach, and nsg notified, virtual nsg to attend to pt shortly    GOALS:   Multidisciplinary Problems       Occupational Therapy Goals          Problem: Occupational Therapy    Goal Priority Disciplines Outcome Interventions   Occupational Therapy Goal     OT, PT/OT Ongoing, Progressing    Description: Goals to be met by: 03/07/2024     Patient will increase functional independence with ADLs by performing:    UE Dressing with [place].  LE Dressing with Minimal Assistance.  Grooming while seated at sink with Set-up Assistance.  Toileting from bedside commode with Minimal Assistance for hygiene and clothing management.   Sitting in chair  x60  minutes with Supervision.  Toilet transfer to bedside commode with Supervision.  Upper extremity exercise program x10  reps per handout, with assistance as needed for right upper extremity.                         Time Tracking:     OT Date of Treatment: 03/06/24  OT Start Time: 1059  OT Stop Time: 1115  OT Total Time (min): 16 min    Billable Minutes:Self Care/Home Management 16    OT/BRIDGET: OT     Number of BRIDGET visits  since last OT visit: 0    3/6/2024

## 2024-03-06 NOTE — ANESTHESIA POSTPROCEDURE EVALUATION
Anesthesia Post Evaluation    Patient: Christine Mosqueda    Procedure(s) Performed: Procedure(s) (LRB):  Wound vac change,  debridement,  partial wound closure (Right)    Final Anesthesia Type: MAC      Patient location during evaluation: PACU  Patient participation: Yes- Able to Participate  Level of consciousness: awake and alert, oriented and awake  Post-procedure vital signs: reviewed and stable  Airway patency: patent    PONV status at discharge: No PONV  Anesthetic complications: no      Cardiovascular status: blood pressure returned to baseline  Respiratory status: unassisted, spontaneous ventilation and room air  Hydration status: euvolemic  Follow-up not needed.              Vitals Value Taken Time   /75 03/06/24 0455   Temp 36.7 °C (98 °F) 03/06/24 0455   Pulse 77 03/06/24 0455   Resp 16 03/06/24 0609   SpO2 93 % 03/06/24 0455         Event Time   Out of Recovery 02/26/2024 11:03:00         Pain/Yoel Score: Pain Rating Prior to Med Admin: 10 (3/6/2024  6:09 AM)  Pain Rating Post Med Admin: 0 (3/5/2024 10:02 PM)

## 2024-03-06 NOTE — PLAN OF CARE
Problem: Adult Inpatient Plan of Care  Goal: Plan of Care Review  Outcome: Ongoing, Progressing     Problem: Diabetes Comorbidity  Goal: Blood Glucose Level Within Targeted Range  Outcome: Ongoing, Progressing     Problem: Skin Injury Risk Increased  Goal: Skin Health and Integrity  Outcome: Ongoing, Progressing     Problem: Adjustment to Illness (Sepsis/Septic Shock)  Goal: Optimal Coping  Outcome: Ongoing, Progressing     Problem: Bleeding (Sepsis/Septic Shock)  Goal: Absence of Bleeding  Outcome: Ongoing, Progressing     Problem: Glycemic Control Impaired (Sepsis/Septic Shock)  Goal: Blood Glucose Level Within Desired Range  Outcome: Ongoing, Progressing     Problem: Infection Progression (Sepsis/Septic Shock)  Goal: Absence of Infection Signs and Symptoms  Outcome: Ongoing, Progressing     Problem: Nutrition Impaired (Sepsis/Septic Shock)  Goal: Optimal Nutrition Intake  Outcome: Ongoing, Progressing     Problem: Fluid and Electrolyte Imbalance (Acute Kidney Injury/Impairment)  Goal: Fluid and Electrolyte Balance  Outcome: Ongoing, Progressing     Problem: Oral Intake Inadequate (Acute Kidney Injury/Impairment)  Goal: Optimal Nutrition Intake  Outcome: Ongoing, Progressing     Problem: Fall Injury Risk  Goal: Absence of Fall and Fall-Related Injury  Outcome: Ongoing, Progressing     Problem: Infection  Goal: Absence of Infection Signs and Symptoms  Outcome: Ongoing, Progressing     Problem: Impaired Wound Healing  Goal: Optimal Wound Healing  Outcome: Ongoing, Progressing     Problem: Renal Function Impairment (Acute Kidney Injury/Impairment)  Goal: Effective Renal Function  Outcome: Met

## 2024-03-06 NOTE — PLAN OF CARE
[facility]    [facility] HEALTH ORDERS  FACE TO FACE ENCOUNTER    Patient Name: Chritsine Mosqueda  YOB: 1970    [facility]   PCP Address: [address] [facility] [place] [address]  PCP Phone Number: [phone]  PCP Fax: [phone]       Encounter Date: 03/06/2024    Admit to Home Health    Diagnoses:  Active Hospital Problems    Diagnosis  POA    *Librado's gangrene [N49.3]  Yes     Priority: 1 - High    Sepsis with acute renal failure and tubular necrosis without septic shock [A41.9, R65.20, N17.0]  Yes     Priority: 2     Necrotizing fasciitis [M72.6]  Yes     Priority: 3     Debility [R53.81]  Yes    Class 2 severe obesity due to excess calories with serious comorbidity and body mass index (BMI) of 35.0 to 35.9 in adult [E66.01, Z68.35]  Not Applicable    Advanced care planning/counseling discussion [Z71.89]  Not Applicable    ILD (interstitial lung disease) [J84.9]  Yes    Chronic diastolic congestive heart failure [I50.32]  Yes    Essential hypertension [I10]  Yes    Anemia of chronic disease [D63.8]  Yes    Cocaine abuse [F14.10]  Yes    ETOH abuse [F10.10]  Yes    CARLINE (acute kidney injury) [N17.9]  Yes    Tobacco abuse [Z72.0]  Yes     Chronic    Type 2 diabetes mellitus with hyperglycemia, without long-term current use of insulin [E11.65]  Yes      Resolved Hospital Problems    Diagnosis Date Resolved POA    Hyponatremia [E87.1] 02/21/2024 Yes       No future appointments.   Follow-up Information       [facility] Follow up.    Specialty: Rehabilitation  Why: Rehab  Contact information:  [address]  [place] [address]  [phone]                               I have seen and examined this patient face to face today. My clinical findings that support the need for the home health skilled services and home bound status are the following:  Weakness/numbness causing balance and gait disturbance due to Infection making it taxing to  leave home.    Allergies:  Review of patient's allergies indicates:   Allergen Reactions    Hydrochlorothiazide plus        Diet: cardiac diet and diabetic diet: 2000 calorie    Activities: activity as tolerated    Nursing:   SN to complete comprehensive assessment including routine vital signs. Instruct on disease process and s/s of complications to report to MD. Review/verify medication list sent home with the patient at time of discharge  and instruct patient/caregiver as needed. Frequency may be adjusted depending on start of care date.    Notify MD if SBP > 160 or < 90; DBP > 90 or < 50; HR > 120 or < 50; Temp > 101; Other:        CONSULTS:     to evaluate for community resources/long-range planning.    MISCELLANEOUS CARE:  Routine Skin for Bedridden Patients: Instruct patient/caregiver to apply moisture barrier cream to all skin folds and wet areas in perineal area daily and after baths and all bowel movements.  Diabetic Care:   SN to perform and educate Diabetic management with blood glucose monitoring:, Fingerstick blood sugar AC and HS, and Report CBG < 60 or > 350 to physician.    WOUND CARE ORDERS  Wound Vac continuous cycle negative pressure 125 mmHg change dressing Q3 Days      Medications: Review discharge medications with patient and family and provide education.      Current Discharge Medication List        START taking these medications    Details   oxyCODONE (ROXICODONE) 5 MG immediate release tablet Take 1 tablet (5 mg total) by mouth every 8 (eight) hours as needed for Pain.  Qty: 15 tablet, Refills: 0    Comments: Quantity prescribed more than 7 day supply? No      thiamine 100 MG tablet Take 1 tablet (100 mg total) by mouth once daily.  Qty: 30 tablet, Refills: 0           CONTINUE these medications which have CHANGED    Details   amLODIPine (NORVASC) 10 MG tablet Take 1 tablet (10 mg total) by mouth once daily.  Qty: 30 tablet, Refills: 11    Comments: .      furosemide (LASIX) 40  MG tablet Take 1 tablet (40 mg total) by mouth once daily.  Qty: 30 tablet, Refills: 0           CONTINUE these medications which have NOT CHANGED    Details   albuterol (ACCUNEB) 1.25 mg/3 mL Nebu Take 1.25 mg by nebulization every 6 (six) hours as needed.      aspirin 81 MG Chew Take 81 mg by mouth once daily.      atorvastatin (LIPITOR) 20 MG tablet Take 20 mg by mouth once daily.      fluticasone-salmeterol diskus inhaler 500-50 mcg Inhale 1 puff into the lungs 2 (two) times daily.      gabapentin (NEURONTIN) 400 MG capsule Take 400 mg by mouth 2 (two) times daily.      insulin aspart U-100 (NOVOLOG) 100 unit/mL injection Inject 10 Units into the skin 3 (three) times daily before meals.      insulin detemir U-100 (LEVEMIR) 100 unit/mL injection Inject 22 Units into the skin once daily.  Refills: 12      nicotine (NICODERM CQ) 14 mg/24 hr Place 1 patch onto the skin once daily.  Qty: 28 patch, Refills: 1    Associated Diagnoses: E coli bacteremia      omeprazole (PRILOSEC) 40 MG capsule Take 40 mg by mouth once daily.      potassium chloride (KLOR-CON) 8 MEQ TbSR Take 1 tablet (8 mEq total) by mouth once daily.  Qty: 30 tablet, Refills: 0      promethazine (PHENERGAN) 12.5 MG Tab Take 25 mg by mouth every 6 (six) hours as needed.      tiotropium (SPIRIVA) 18 mcg inhalation capsule Inhale 18 mcg into the lungs once daily.           STOP taking these medications       ALPRAZolam (XANAX) 2 MG Tab Comments:   Reason for Stopping:               I certify that this patient is confined to her home and needs intermittent skilled nursing care.

## 2024-03-06 NOTE — NURSING
No data to display              AVS virtually reviewed with patient in its entirety with emphasis on medications, follow-up appointments and reasons to return to the ED or contact the [facility] On Call Nurse Care Line. Patient also encouraged to utilize their patient portal. Ease and convenience of use reiterated. Education complete and patient voiced understanding. All questions answered. Discharge teaching complete.  .

## 2024-03-06 NOTE — NURSING
[facility] [place] 4 [place]      3/5/2024       Skin assessed on: Q Shift      [x] No Pressure Injuries Present    [x]Prevention Measures Documented    [] Yes LDA  for Pressure Injury Previously documented     [] Yes New Pressure Injury Discovered   [] LDA for New Pressure Injury Added      Attending RN:  Shanel Smith, RN     Second :  JOSE R Green

## 2024-03-06 NOTE — PT/OT/SLP PROGRESS
Physical Therapy Treatment    Patient Name:  Christine Mosqueda   MRN:  [id number]    Recommendations:     Discharge Recommendations: Moderate Intensity Therapy  Discharge Equipment Recommendations: bedside commode  Barriers to discharge: None    Assessment:     Christine Mosqueda is a 53 y.o. 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  She presents with the following impairments/functional limitations: impaired balance, decreased safety awareness, impaired skin, impaired functional mobility, gait instability, impaired coordination.    Pt ambulated ~150ft using RW and CGA.    Rehab Prognosis: Good; patient would benefit from acute skilled PT services to address these deficits and reach maximum level of function.    Recent Surgery: Procedure(s) (LRB):  Wound vac change,  debridement,  partial wound closure (Right) 9 Days Post-Op    Plan:     During this hospitalization, patient to be seen 4 x/week to address the identified rehab impairments via gait training, therapeutic activities, therapeutic exercises, neuromuscular re-education and progress toward the following goals:    Plan of Care Expires:  03/05/24    Subjective     Chief Complaint: Ready to go home  Patient/Family Comments/goals: Pt agreed to ambulate  Pain/Comfort:  Pain Rating 1: 0/10      Objective:     Communicated with Stephani prior to session.  Patient found ambulatory in room/hawley with jansen catheter, peripheral IV, telemetry, wound vac upon PT entry to room.     General Precautions: Standard, fall, respiratory  Orthopedic Precautions: N/A  Braces: N/A  Respiratory Status: Room air     Functional Mobility:  Gait: Pt ambulated ~150ft using RW and CGA. Pt with decreased bre, step length, and weight shifting. Pt with fwd flex trunk at times requiring verbal cueing for upright posture.  Balance: Pt with fair- standing balance. Pt found standing in room with no AD on RA, standing at sink applying her wig ready to be d/c. Pt able to perform  self hygiene tasks with supervision and anali her dress with SBA.      AM-PAC 6 CLICK MOBILITY  Turning over in bed (including adjusting bedclothes, sheets and blankets)?: 4  Sitting down on and standing up from a chair with arms (e.g., wheelchair, bedside commode, etc.): 3  Moving from lying on back to sitting on the side of the bed?: 3  Moving to and from a bed to a chair (including a wheelchair)?: 3  Need to walk in hospital room?: 3  Climbing 3-5 steps with a railing?: 3  Basic Mobility Total Score: 19       Treatment & Education:      Patient left up in chair with all lines intact, call button in reach, and Kionne notified..    GOALS:   Multidisciplinary Problems       Physical Therapy Goals          Problem: Physical Therapy    Goal Priority Disciplines Outcome Goal Variances Interventions   Physical Therapy Goal     PT, PT/OT Ongoing, Progressing     Description: Goals to be met by: 3/5/24     Patient will increase functional independence with mobility by performin. Supine to sit with Modified [place]  2. Sit to stand transfer with Modified [place]  3. Bed to chair transfer with Modified [place]   4. Gait  x 10-15 feet with Stand-by Assistance using Rolling Walker.   5. Lower extremity exercise program x10 reps per handout, with supervision  6. W/C propulsion x 50' with Supervision                           Time Tracking:     PT Received On: 24  PT Start Time: 1059     PT Stop Time: 1115  PT Total Time (min): 16 min     Billable Minutes: Therapeutic Activity 16    Treatment Type: Treatment  PT/PTA: PTA     Number of PTA visits since last PT visit: 2024

## 2024-03-06 NOTE — NURSING
Bedside handoff report received from off going nurse. Pt lying in bed, NAD noted. Fall/safety precautions maintained. Call light and personal belongings within reach. Communication board updated.

## 2024-03-06 NOTE — PLAN OF CARE
Problem: Occupational Therapy  Goal: Occupational Therapy Goal  Description: Goals to be met by: 03/07/2024     Patient will increase functional independence with ADLs by performing:    UE Dressing with [place].  LE Dressing with Minimal Assistance.  Grooming while seated at sink with Set-up Assistance.  Toileting from bedside commode with Minimal Assistance for hygiene and clothing management.   Sitting in chair  x60  minutes with Supervision.  Toilet transfer to bedside commode with Supervision.  Upper extremity exercise program x10  reps per handout, with assistance as needed for right upper extremity.    Outcome: Ongoing, Progressing  Pt progressing towards OT goals. Cont OT POC

## 2024-03-06 NOTE — ASSESSMENT & PLAN NOTE
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   Surgery consulted: S/p debridement on 02/16, 2/17, 2/19, and 2/21 (wound vac placed.)  s/p OR 2/23 for first wound vac change. S/p repeat vac change on Mon 02/26 with partial wound closure. Surgery plan for bedside wound vac change on Thursday (2/29)  blood and wound cultures with lactobacillus.   ID consulted. Descalate from meropenem to Unasyn for 2 weeks. EOC 03/06/24   Has completed IV abx therapy while inpatient.   jansen in place due to surgical wound site  Antibiotics given-   Antibiotics (From admission, onward)      Start     Stop Route Frequency Ordered    02/25/24 1215  ampicillin-sulbactam (UNASYN) 3 g in sodium chloride 0.9 % 100 mL IVPB (MB+)         03/06/24 1225 IV Every 6 hours (non-standard times) 02/25/24 1104

## 2024-03-06 NOTE — NURSING
Patients home wound vac delivered to bedside. Patient connected to home wound vac. Wound vac working, displays 125 continuous suction. Patient educated on keeping wound vac plugged in when not in use and on bringing dressing change kit and cannister to wound care clinic for all follow up appointments. Verbalized understanding.       3M wound vac cleaned and notified 3M of need to discontinue and be picked up in central sterile.

## 2024-03-06 NOTE — ASSESSMENT & PLAN NOTE
BP Readings from Last 3 Encounters:   03/06/24 (!) 146/85   06/20/23 (!) 164/86   05/08/23 (!) 144/91     Continuing home medications as prescribed  Will cont to monitor and adjust as needed  Will utilize p.r.n. blood pressure medication only if patient's blood pressure greater than 180/110 and she develops symptoms such as worsening chest pain or shortness of breath.  Cardiac diet    Cardiac/Autonomic (From admission, onward)    Start     Stop Route Frequency Ordered    03/02/24 1000  amLODIPine tablet 10 mg         -- Oral Daily 03/02/24 0850    02/16/24 0900  atorvastatin tablet 20 mg         -- Oral Daily 02/16/24 0506

## 2024-03-06 NOTE — PROGRESS NOTES
[facility] [place]  W[facility]    Patient Name:  Christine Mosqueda   MRN:  [id number]  Date: 3/6/2024  Diagnosis: Librado's gangrene    History:     Past Medical History:   Diagnosis Date    Anxiety     Arthritis     Bronchitis     CHF (congestive heart failure)     Diabetic ketoacidosis associated with type 2 diabetes mellitus 3/10/2023    DM (diabetes mellitus)     Emphysema lung     Encounter for blood transfusion     HTN (hypertension)     Restrictive lung disease     Sleep apnea        Social History     Socioeconomic History    Marital status:    Tobacco Use    Smoking status: Some Days     Current packs/day: 0.25     Types: Cigarettes    Smokeless tobacco: Never   Substance and Sexual Activity    Alcohol use: Yes    Drug use: Yes     Types: Marijuana    Sexual activity: Yes     Partners: Male     Social Determinants of Health     Financial Resource Strain: Low Risk  (2/16/2024)    Overall Financial Resource Strain (CARDIA)     Difficulty of Paying Living Expenses: Not very hard   Food Insecurity: No Food Insecurity (2/16/2024)    Hunger Vital Sign     Worried About Running Out of Food in the Last Year: Never true     Ran Out of Food in the Last Year: Never true   Transportation Needs: No Transportation Needs (2/16/2024)    PRAPARE - Transportation     Lack of Transportation (Medical): No     Lack of Transportation (Non-Medical): No   Physical Activity: Inactive (2/16/2024)    Exercise Vital Sign     Days of Exercise per Week: 0 days     Minutes of Exercise per Session: 0 min   Stress: No Stress Concern Present (2/16/2024)    [employer] - Occupational Stress Questionnaire     Feeling of Stress : Only a little   Social Connections: Moderately Isolated (2/16/2024)    Social Connection and Isolation Panel [NHANES]     Frequency of Communication with Friends and Family: Three times a week     Frequency of Social Gatherings with Friends and Family: Three times a week      Attends Spiritism Services: Never     Active Member of Clubs or Organizations: No     Attends Club or Organization Meetings: Never     Marital Status:    Housing Stability: Low Risk  (2/16/2024)    Housing Stability Vital Sign     Unable to Pay for Housing in the Last Year: No     Number of Places Lived in the Last Year: 1     Unstable Housing in the Last Year: No       Precautions:     Allergies as of 02/15/2024 - Reviewed 02/15/2024   Allergen Reaction Noted    Hydrochlorothiazide plus  05/02/2013       [facility] Assessment Details/Treatment     Active Problem List with Overview Notes    Diagnosis Date Noted    Necrotizing fasciitis 02/29/2024    Debility 02/22/2024    Class 2 severe obesity due to excess calories with serious comorbidity and body mass index (BMI) of 35.0 to 35.9 in adult 02/21/2024    Advanced care planning/counseling discussion 02/21/2024    Librado's gangrene 02/16/2024    ILD (interstitial lung disease) 02/16/2024    Chronic diastolic congestive heart failure 02/16/2024    Sepsis with acute renal failure and tubular necrosis without septic shock 02/16/2024    Essential hypertension 02/16/2024    Anemia of chronic disease 02/16/2024    Cocaine abuse 02/16/2024    ETOH abuse 02/16/2024    Nonadherence to medical treatment 03/23/2023    Superficial vein thrombosis 03/22/2023    Vasculopathy 03/22/2023    Microcytic anemia 03/13/2023    CARLINE (acute kidney injury) 03/10/2023    Acute on chronic systolic heart failure 02/05/2018    Anxiety 02/05/2018    Type 2 diabetes mellitus with hyperglycemia, without long-term current use of insulin 02/05/2018    Emphysema lung 02/05/2018    Restrictive lung disease 02/05/2018    Tobacco abuse 02/05/2018 03/06/2024  Assessment:  Patient discharged home with KCI/3M VAC.  Moderate amount pink drainage in VAC canister. Hernandez catheter to divert urine from dressing.  Treatment/Plan:  Connected patient to home VAC at 125 mmHg continuous suction. Secured tubing  to right thigh with transparent film. Nursing confirmed [facility] to visit tomorrow and change VAC dressing.    and patient instructed to bring VAC dressing and canister to appointments.

## 2024-03-06 NOTE — DISCHARGE SUMMARY
[facility] Medicine  Discharge Summary      Patient Name: Christine Mosqueda  MRN: [id number]  Patient Class: IP- Inpatient  Admission Date: 2/15/2024  Hospital Length of Stay: 19 days  Discharge Date and Time:  03/06/2024 10:00 AM  Attending Physician: Lynsey Colmenares MD   Discharging Provider: Lynsey Chatman MD  Primary Care Provider: [facility]      HPI:   Ms. Mosqueda is a 53 yoF with a PMHx of diastolic heart failure, HTN, T2DM on insulin. She presented to the hospital with worsening pain and swelling of the right medial thigh. She developed an abscess in the area that underwent I+D at [facility] a few days ago. She was sent home on PO abx. Since that time the pain and swelling have worsened.  In ED, CT scan demonstrated extensive inflammation extending from the right perineum to the right medial thigh with multiple foci of air. She was admitted to general surgery for estephanie's gangrene. She underwent extensive debridement under general surgery today in OR. She is currently doing well with good pain control. HM consulted for medical management.     Procedure(s) (LRB):  Wound vac change,  debridement,  partial wound closure (Right)      Hospital Course:   Ms Christine Mosqueda is a 53 y.o.  woman with poorly controlled type II DM who was admitted for sepsis secondary to bacteremia and Estephanie's gangrene. Pt presented w/ worsening pain and swelling of the Rt medial thigh. CT scan findings were consistent w/ Estephanie's gangrene. General surgery team consulted. S/p surgical debridement 02/16, 2/17, 2/19, and on 02/21 (wound vac placed.) S/p OR 2/23 for first wound vac change. Plan for OR again on 02/26. Had acute on chronic anemia, likely exacerbated with multiple debridements. Required blood transfusions on 02/19 and again on 02/21.  Blood and wound cultures with lactobacillus. ID consulted- on meropenem. Repeat blood cx with no growth thus far. Also found to have CARLINE on admission,  nephrology following. CARLINE resolved. PT/OT recommends SNF on discharge.      Goals of Care Treatment Preferences:  Code Status: Full Code      Consults:   Consults (From admission, onward)          Status Ordering Provider     Inpatient consult to Midline team  Once        Provider:  (Not yet assigned)    Completed POLLO GOLDSMITH     Inpatient virtual consult to Hospital Medicine  Once        Provider:  Pollo Goldsmith MD    Completed LUISITO HOYOS III     Inpatient consult to Midline team  Once        Provider:  (Not yet assigned)    Completed LUISITO HOYOS III     Inpatient consult to Nephrology  Once        Provider:  Brigitte Deleon FNP-C    Completed CALLIER, EMERY     Inpatient consult to Infectious Diseases  Once        Provider:  Germaine Dobbs MD    Completed CALLIER, EMERY     Inpatient consult to Social Work  Once        Provider:  (Not yet assigned)    Completed NITZA DYKES     Inpatient consult to Registered Dietitian/Nutritionist  Once        Provider:  (Not yet assigned)    Completed CALLIER, EMERY     Inpatient consult to Hospital Medicine-General  Once        Provider:  (Not yet assigned)    Completed CALLIER, EMERY     Inpatient consult to General surgery  Once        Provider:  Steven Paula MD    Completed KIM VIRGEN            Psychiatric  ETOH abuse  Patient drinks at least a 6 pack beer daily  - Start scheduled librium- wean ordered  - Thiamine, folate, potassium, magnesium      Cocaine abuse  Patient sprinkles crack crystals in her marijuana  Wants help. Tearful. Emotional support provided.   CM consulted.       Pulmonary  ILD (interstitial lung disease)  -Noted on imaging; CT chest 2022  -Stable, no acute issues.   -Not on home O2 but says she should be (but never received because she left AMA at OSH)    Cardiac/Vascular  Essential hypertension  BP Readings from Last 3 Encounters:   03/06/24 (!) 146/85   06/20/23 (!) 164/86   05/08/23 (!) 144/91     Continuing home  medications as prescribed  Will cont to monitor and adjust as needed  Will utilize p.r.n. blood pressure medication only if patient's blood pressure greater than 180/110 and she develops symptoms such as worsening chest pain or shortness of breath.  Cardiac diet    Cardiac/Autonomic (From admission, onward)      Start     Stop Route Frequency Ordered    03/02/24 1000  amLODIPine tablet 10 mg         -- Oral Daily 03/02/24 0850    02/16/24 0900  atorvastatin tablet 20 mg         -- Oral Daily 02/16/24 0506            Chronic diastolic congestive heart failure  Patient is identified as having Diastolic (HFpEF) heart failure that is Chronic. CHF is currently controlled. Latest ECHO performed and demonstrates- Results for orders placed during the hospital encounter of 03/10/23    Echo    Interpretation Summary  · The left ventricle is normal in size with low normal systolic function.  · The estimated ejection fraction is 53%.  · There is abnormal septal wall motion.  · Indeterminate left ventricular diastolic function.  · Mild left atrial enlargement.  · Normal right ventricular size with low normal right ventricular systolic function.  · Mild right atrial enlargement.  · Mild mitral regurgitation.  · Mild tricuspid regurgitation.  · Normal central venous pressure (3 mmHg).  · The estimated PA systolic pressure is 23 mmHg.    No acute issues  Monitor fluid status- does have lower extremity edema   Discussed with nephrology, glo to resume lasix 02/23    Renal/  * Librado's gangrene  CT on admit with R perineum to R medial thigh [place].   Surgery consulted: S/p debridement on 02/16, 2/17, 2/19, and 2/21 (wound vac placed.)  s/p OR 2/23 for first wound vac change. S/p repeat vac change on Mon 02/26 with partial wound closure. Surgery plan for bedside wound vac change on Thursday (2/29)  blood and wound cultures with lactobacillus.   ID consulted. Descalate from meropenem to Unasyn for 2 weeks. EOC 03/06/24   Has  completed IV abx therapy while inpatient.   jansen in place due to surgical wound site  Antibiotics given-   Antibiotics (From admission, onward)      Start     Stop Route Frequency Ordered    02/25/24 1215  ampicillin-sulbactam (UNASYN) 3 g in sodium chloride 0.9 % 100 mL IVPB (MB+)         03/06/24 1225 IV Every 6 hours (non-standard times) 02/25/24 1104            CARLINE (acute kidney injury)  Patient with acute kidney injury/acute renal failure likely due to pre-renal azotemia due to IVVD and acute tubular necrosis caused by sepsis  CARLINE is currently improving. Baseline creatinine  0.9  - Labs reviewed- Renal function/electrolytes with Estimated Creatinine Clearance: 79.7 mL/min (based on SCr of 0.9 mg/dL). according to latest data. Monitor urine output and serial BMP and adjust therapy as needed. Avoid nephrotoxins and renally dose meds for GFR listed above.    - Highly suspect ATN as an etiology at this time  - Nephrology consult requested, appreciate recs  - renal function is improving and near baseline.   - Resolved    ID  Necrotizing fasciitis  Plan as above.       Sepsis with acute renal failure and tubular necrosis without septic shock  Defined by leukocytosis, tachycardia and groin abscess. Source is Fourniers.   - surgical management with debridement  - antibiotics= Unasyn per ID  - Leukocytosis resolved.     Oncology  Anemia of chronic disease  Patient's anemia is currently controlled. Has received 1 units of PRBCs on 2/19 and 02/21 . Etiology likely d/t chronic disease and acute blood loss post surgery.  Current CBC reviewed-   Lab Results   Component Value Date    HGB 8.4 (L) 03/02/2024    HCT 28.4 (L) 03/02/2024     - monitor daily   -s/p 1U pRBC on 02/19 and 02/21    Endocrine  Class 2 severe obesity due to excess calories with serious comorbidity and body mass index (BMI) of 35.0 to 35.9 in adult  Body mass index is 36.59 kg/m². Morbid obesity complicates all aspects of disease management from  diagnostic modalities to treatment. Weight loss encouraged and health benefits explained to patient.         Type 2 diabetes mellitus with hyperglycemia, without long-term current use of insulin  Lab Results   Component Value Date    HGBA1C 8.2 (H) 04/06/2023     Recent Labs   Lab 03/06/24  0750   POCTGLUCOSE 159*        Cont current basal/prandial insulin regimen   Low dose correction scale   Cont blood glucose monitoring   BG goal:  Preprandial blood glucose target <140 mg/dL  Random glucoses <180 mg/dL  Avoid hypoglycemia -  Reduce antihyperglycemic therapy when caloric intake is reduced. Avoid insulin stacking as a result of repeated injection of prandial insulin at close intervals.   Avoid severe hyperglycemia  ADA diet  Antihyperglycemics (From admission, onward)      Start     Stop Route Frequency Ordered    02/27/24 0900  insulin detemir U-100 (Levemir) pen 20 Units         -- SubQ Daily 02/25/24 1359    02/26/24 2100  insulin detemir U-100 (Levemir) pen 15 Units         -- SubQ Nightly 02/25/24 1359    02/22/24 1145  insulin aspart U-100 pen 5 Units         -- SubQ 3 times daily with meals 02/22/24 1031    02/16/24 1514  insulin aspart U-100 pen 0-15 Units         -- SubQ Before meals & nightly PRN 02/16/24 1515             Palliative Care  Advanced care planning/counseling discussion  Advance Care Planning    Date: 02/21/2024    Code Status  I engaged the the patient in a voluntary conversation about the patient's preferences for care  at the very end of life. The patient wishes to have CPR and other invasive treatments performed when her heart and/or breathing stops. I communicated to the patient that her wishes align with full code status and she agrees and verbalized understanding.   I spent a total of 16 minutes engaging the patient in this advance care planning discussion.           Code Status: Full Code      Other  Debility  Cont with PT/OT for gait training and strengthening and restoration of  ADL's   Encourage mobility, OOB in chair, and early ambulation as appropriate  Fall precautions   Monitor for bowel and bladder dysfunction  Monitor for and prevent skin breakdown and pressure ulcers  Patient has been able to work with OT/PT who recommend placement for further rehabilitation  Patient pending placement, continue in-hospital care as stated above in the meantime  More than 20 minutes of my time has been spent discussing and planning just her safe disposition with patient/family/CM/SW/Nursing staff (not including other time spent in clinical discussion and management)  CM/SW following, appreciate input   Patient has declined to go to SNF. Has elected to go home instead.     Tobacco abuse  - Pt was counseled about cessation x4 minutes        Final Active Diagnoses:    Diagnosis Date Noted POA    PRINCIPAL PROBLEM:  Librado's gangrene [N49.3] 02/16/2024 Yes    Sepsis with acute renal failure and tubular necrosis without septic shock [A41.9, R65.20, N17.0] 02/16/2024 Yes    Necrotizing fasciitis [M72.6] 02/29/2024 Yes    Debility [R53.81] 02/22/2024 Yes    Class 2 severe obesity due to excess calories with serious comorbidity and body mass index (BMI) of 35.0 to 35.9 in adult [E66.01, Z68.35] 02/21/2024 Not Applicable    Advanced care planning/counseling discussion [Z71.89] 02/21/2024 Not Applicable    ILD (interstitial lung disease) [J84.9] 02/16/2024 Yes    Chronic diastolic congestive heart failure [I50.32] 02/16/2024 Yes    Essential hypertension [I10] 02/16/2024 Yes    Anemia of chronic disease [D63.8] 02/16/2024 Yes    Cocaine abuse [F14.10] 02/16/2024 Yes    ETOH abuse [F10.10] 02/16/2024 Yes    CARLINE (acute kidney injury) [N17.9] 03/10/2023 Yes    Tobacco abuse [Z72.0] 02/05/2018 Yes     Chronic    Type 2 diabetes mellitus with hyperglycemia, without long-term current use of insulin [E11.65] 02/05/2018 Yes      Problems Resolved During this Admission:    Diagnosis Date Noted Date Resolved POA     Hyponatremia [E87.1] 02/21/2024 02/21/2024 Yes       Discharged Condition: fair    Disposition: Home or Self Care    Follow Up:   Follow-up Information       [facility] Follow up.    Specialty: Rehabilitation  Why: Rehab  Contact information:  [address] [place] [id number]  [phone]                           Patient Instructions:      Diet diabetic     Notify your health care provider if you experience any of the following:  temperature >100.4     Activity as tolerated       Significant Diagnostic Studies: N/A    Pending Diagnostic Studies:       None           Medications:  Reconciled Home Medications:      Medication List        START taking these medications      oxyCODONE 5 MG immediate release tablet  Commonly known as: ROXICODONE  Take 1 tablet (5 mg total) by mouth every 8 (eight) hours as needed for Pain.     thiamine 100 MG tablet  Take 1 tablet (100 mg total) by mouth once daily.            CONTINUE taking these medications      albuterol 1.25 mg/3 mL Nebu  Commonly known as: ACCUNEB  Take 1.25 mg by nebulization every 6 (six) hours as needed.     amLODIPine 10 MG tablet  Commonly known as: NORVASC  Take 1 tablet (10 mg total) by mouth once daily.  Start taking on: March 7, 2024     aspirin 81 MG Chew  Take 81 mg by mouth once daily.     atorvastatin 20 MG tablet  Commonly known as: LIPITOR  Take 20 mg by mouth once daily.     fluticasone-salmeterol 500-50 mcg/dose 500-50 mcg/dose Dsdv diskus inhaler  Commonly known as: ADVAIR DISKUS  Inhale 1 puff into the lungs 2 (two) times daily.     furosemide 40 MG tablet  Commonly known as: LASIX  Take 1 tablet (40 mg total) by mouth once daily.     gabapentin 400 MG capsule  Commonly known as: NEURONTIN  Take 400 mg by mouth 2 (two) times daily.     insulin aspart U-100 100 unit/mL injection  Commonly known as: NovoLOG  Inject 10 Units into the skin 3 (three) times daily before meals.     insulin detemir U-100 100 unit/mL  injection  Commonly known as: Levemir  Inject 22 Units into the skin once daily.     nicotine 14 mg/24 hr  Commonly known as: NICODERM CQ  Place 1 patch onto the skin once daily.     omeprazole 40 MG capsule  Commonly known as: PRILOSEC  Take 40 mg by mouth once daily.     potassium chloride 8 MEQ Tbsr  Commonly known as: KLOR-CON  Take 1 tablet (8 mEq total) by mouth once daily.     promethazine 12.5 MG Tab  Commonly known as: PHENERGAN  Take 25 mg by mouth every 6 (six) hours as needed.     tiotropium 18 mcg inhalation capsule  Commonly known as: SPIRIVA  Inhale 18 mcg into the lungs once daily.            STOP taking these medications      ALPRAZolam 2 MG Tab  Commonly known as: XANAX              Indwelling Lines/Drains at time of discharge:   Lines/Drains/Airways       Drain  Duration                  Urethral Catheter 02/16/24 0414 Non-latex;Straight-tip;Silicone 16 Fr. 19 days                    Time spent on the discharge of patient: 45 minutes         The attending portion of this evaluation, treatment, and documentation was performed per Lynsey Chatman MD via Telemedicine AudioVisual using the secure [employer] software platform with 2 way audio/video. The provider was located off-site and the patient is located in the hospital. The aforementioned video software was utilized to document the relevant history and physical exam    Lynsey Chatman MD  Department of Hospital Medicine  [facility]

## 2024-03-06 NOTE — NURSING
[facility]  Nurses Note -- 4 Eyes      3/6/2024       Skin assessed on: Q Shift      [x] No Pressure Injuries Present    [x]Prevention Measures Documented    [] Yes LDA  for Pressure Injury Previously documented     [] Yes New Pressure Injury Discovered   [] LDA for New Pressure Injury Added      Attending RN:  Stephani Wong, RN     Second RN:  Shanel IRVING RN

## 2024-03-06 NOTE — PLAN OF CARE
Problem: Physical Therapy  Goal: Physical Therapy Goal  Description: Goals to be met by: 3/5/24     Patient will increase functional independence with mobility by performin. Supine to sit with Modified [place]  2. Sit to stand transfer with Modified [place]  3. Bed to chair transfer with Modified [place]   4. Gait  x 10-15 feet with Stand-by Assistance using Rolling Walker.   5. Lower extremity exercise program x10 reps per handout, with supervision  6. W/C propulsion x 50' with Supervision      Outcome: Ongoing, Progressing       Pt ambulated ~150ft using RW and CGA.

## 2024-03-06 NOTE — PLAN OF CARE
Problem: Diabetes Comorbidity  Goal: Blood Glucose Level Within Targeted Range  Outcome: Adequate for Care Transition     Problem: Adult Inpatient Plan of Care  Goal: Plan of Care Review  Outcome: Adequate for Care Transition  Goal: Patient-Specific Goal (Individualized)  Outcome: Adequate for Care Transition  Goal: Absence of Hospital-Acquired Illness or Injury  Outcome: Adequate for Care Transition  Goal: Optimal Comfort and Wellbeing  Outcome: Adequate for Care Transition  Goal: Readiness for Transition of Care  Outcome: Adequate for Care Transition     Problem: Skin Injury Risk Increased  Goal: Skin Health and Integrity  Outcome: Adequate for Care Transition     Problem: Adjustment to Illness (Sepsis/Septic Shock)  Goal: Optimal Coping  Outcome: Adequate for Care Transition     Problem: Bleeding (Sepsis/Septic Shock)  Goal: Absence of Bleeding  Outcome: Adequate for Care Transition     Problem: Glycemic Control Impaired (Sepsis/Septic Shock)  Goal: Blood Glucose Level Within Desired Range  Outcome: Adequate for Care Transition     Problem: Infection Progression (Sepsis/Septic Shock)  Goal: Absence of Infection Signs and Symptoms  Outcome: Adequate for Care Transition     Problem: Nutrition Impaired (Sepsis/Septic Shock)  Goal: Optimal Nutrition Intake  Outcome: Adequate for Care Transition     Problem: Fluid and Electrolyte Imbalance (Acute Kidney Injury/Impairment)  Goal: Fluid and Electrolyte Balance  Outcome: Adequate for Care Transition     Problem: Oral Intake Inadequate (Acute Kidney Injury/Impairment)  Goal: Optimal Nutrition Intake  Outcome: Adequate for Care Transition     Problem: Fall Injury Risk  Goal: Absence of Fall and Fall-Related Injury  Outcome: Adequate for Care Transition     Problem: Infection  Goal: Absence of Infection Signs and Symptoms  Outcome: Adequate for Care Transition     Problem: Impaired Wound Healing  Goal: Optimal Wound Healing  Outcome: Adequate for Care Transition

## 2024-03-07 ENCOUNTER — PATIENT OUTREACH (OUTPATIENT)
Dept: ADMINISTRATIVE | Facility: CLINIC | Age: 54
End: 2024-03-07
Payer: MEDICAID

## 2024-03-07 NOTE — PROGRESS NOTES
2nd Attempt made to reach patient for TCC call. Left voicemail please call [phone] leave first name, last name, and  for Libia I will return your call.

## 2024-03-07 NOTE — PROGRESS NOTES
C3 nurse attempted to contact Christine Mosqueda  for a TCC post hospital discharge follow up call. The patient is unable to conduct the call @ this time. The patient requested a callback.    The patient does not have a scheduled HOSFU appointment within 5-7 days post hospital discharge date 3/6/24. Non-[facility] PCP.

## 2024-03-08 ENCOUNTER — TELEPHONE (OUTPATIENT)
Dept: SURGERY | Facility: CLINIC | Age: 54
End: 2024-03-08
Payer: MEDICAID

## 2024-03-08 ENCOUNTER — PATIENT MESSAGE (OUTPATIENT)
Dept: ADMINISTRATIVE | Facility: CLINIC | Age: 54
End: 2024-03-08
Payer: MEDICAID

## 2024-03-08 NOTE — PROGRESS NOTES
Pharmacokinetic Assessment Follow Up: IV Vancomycin    Vancomycin serum concentration assessment(s):    The random level was drawn correctly and can be used to guide therapy at this time. The measurement is within the desired definitive target range of 10 to 20 mcg/mL.    Vancomycin Regimen Plan:  No dose on 2/18  Re-dose when the random level is less than 20 mcg/mL, next level to be drawn at 03:00 on 2/19    Drug levels (last 3 results):  Recent Labs   Lab Result Units 02/17/24  2229 02/18/24  1332   Vancomycin, Random ug/mL  --  19.5   Vancomycin-Trough ug/mL 18.3  --        Pharmacy will continue to follow and monitor vancomycin.    Please contact pharmacy at extension [phone] for questions regarding this assessment.    Thank you for the consult,   Laura Huber       Patient brief summary:  Christine Mosqueda is a 53 y.o. female initiated on antimicrobial therapy with IV Vancomycin for treatment of skin & soft tissue infection      Drug Allergies:   Review of patient's allergies indicates:   Allergen Reactions    Hydrochlorothiazide plus        Actual Body Weight:   93.4 kg    Renal Function:   Estimated Creatinine Clearance: 30.6 mL/min (A) (based on SCr of 2.3 mg/dL (H)).,     Dialysis Method (if applicable):  N/A    CBC (last 72 hours):  Recent Labs   Lab Result Units 02/15/24  2320 02/16/24  0703 02/17/24 0407 02/18/24  0323   WBC K/uL 26.34* 27.47* 29.46* 31.73*   Hemoglobin g/dL 9.8* 9.3* 8.0* 7.0*   Hematocrit % 30.6* 30.0* 25.2* 22.2*   Platelets K/uL 400 139* 423 477*   Gran % % 83.1* 84.3* 84.4* 85.8*   Lymph % % 6.7* 7.9* 7.6* 5.9*   Mono % % 7.0 5.9 5.8 4.5   Eosinophil % % 0.1 0.3 0.5 0.3   Basophil % % 0.5 0.6 0.3 0.2   Differential Method  Automated Automated Automated Automated       Metabolic Panel (last 72 hours):  Recent Labs   Lab Result Units 02/15/24  2320 02/16/24  0703 02/16/24  1601 02/17/24  0407 02/17/24  1212 02/17/24  1533 02/17/24  2128 02/18/24  0255 02/18/24  0323   Sodium mmol/L 128*  130* 129* 130* 131*  --  128*  --  129*   Sodium, Urine mmol/L  --   --   --   --   --  <20*  --   --   --    Potassium mmol/L 3.8 3.7 4.1 3.8 4.1  --  4.3  --  4.3   Chloride mmol/L 93* 95 94* 97 96  --  96  --  98   CO2 mmol/L 26 24 24 25 22*  --  24  --  23   Glucose mg/dL 384* 252* 371* 68* 101  --  356*  --  173*   Glucose, UA   --   --   --   --   --   --   --  2+*  --    BUN mg/dL 16 16 21* 26* 28*  --  30*  --  30*   Creatinine mg/dL 1.8* 1.5* 2.1* 2.4* 2.3*  --  2.6*  --  2.3*   Creatinine, Urine mg/dL  --   --   --   --   --  131.7  --   --   --    Albumin g/dL 1.5* 1.2*  --  1.0*  --   --   --   --  0.9*   Total Bilirubin mg/dL 0.4 0.3  --  0.3  --   --   --   --  0.4   Alkaline Phosphatase U/L 153* 117  --  128  --   --   --   --  124   AST U/L 11 6*  --  11  --   --   --   --  11   ALT U/L 7* 5*  --  5*  --   --   --   --  5*   Magnesium mg/dL  --  1.5* 1.6 1.5*  --   --   --   --  1.4*   Phosphorus mg/dL  --  2.6*  --  3.0  --   --   --   --  3.9       Vancomycin Administrations:  vancomycin given in the last 96 hours                     vancomycin 750 mg in dextrose 5 % (D5W) 250 mL IVPB (Vial-Mate) (mg) 750 mg New Bag 02/18/24 0015     750 mg New Bag 02/16/24 2324    vancomycin (VANCOCIN) 1,750 mg in dextrose 5 % (D5W) 500 mL IVPB (mg) 1,750 mg New Bag 02/15/24 2344                    Microbiologic Results:  Microbiology Results (last 7 days)       Procedure Component Value Units Date/Time    AFB Culture & Smear [[id number]] Collected: 02/17/24 0911    Order Status: Sent Specimen: Abscess from Groin Updated: 02/18/24 1405    Rapid Organism ID by PCR (from Blood culture) [[id number]] Collected: 02/15/24 2320    Order Status: No result Updated: 02/18/24 1328    Culture, ID (Consult) [[id number]] Collected: 02/15/24 2321    Order Status: No result Specimen: Blood from Peripheral, Antecubital, Right Updated: 02/18/24 1328    Aerobic culture [[id number]] Collected: 02/17/24 0911    Order Status: Completed  Specimen: Abscess from Groin Updated: 02/18/24 1108     Aerobic Bacterial Culture No significant isolate to date    Narrative:      RIGHT THIGH WOUND    Blood culture [[id number]] Collected: 02/17/24 2006    Order Status: Completed Specimen: Blood from Peripheral, Hand, Right Updated: 02/18/24 0912     Blood Culture, Routine No Growth to date    Blood culture [[id number]] Collected: 02/17/24 2005    Order Status: Completed Specimen: Blood from Peripheral, Hand, Right Updated: 02/18/24 0912     Blood Culture, Routine No Growth to date    Blood culture x two cultures. Draw prior to antibiotics. [[id number]]  (Abnormal) Collected: 02/15/24 2320    Order Status: Completed Specimen: Blood from Peripheral, Antecubital, Left Updated: 02/18/24 0841     Blood Culture, Routine Gram stain portia bottle: Gram positive rods      Results called to and read back by:Sandra HARVEY 02/17/2024  11:01      GRAM POSITIVE RODS  Isolate sent out for reference testing      Narrative:      Aerobic and anaerobic    Blood culture x two cultures. Draw prior to antibiotics. [[id number]]  (Abnormal) Collected: 02/15/24 2321    Order Status: Completed Specimen: Blood from Peripheral, Antecubital, Right Updated: 02/18/24 0841     Blood Culture, Routine Gram stain portia bottle: Gram positive rods      Results called to and read back by:KAMALA STEELE  02/17/2024  18:30      GRAM POSITIVE RODS  Isolate sent out for reference testing      Narrative:      Aerobic and anaerobic    Gram stain [[id number]] Collected: 02/17/24 0911    Order Status: Completed Specimen: Abscess from Groin Updated: 02/17/24 1447     Gram Stain Result Rare WBC's      Many Gram positive cocci in pairs and chains      Rare Gram negative rods    Narrative:      RIGHT THIGH WOUND    Fungus culture [[id number]] Collected: 02/17/24 0911    Order Status: Sent Specimen: Abscess from Groin Updated: 02/17/24 1117    Culture, Anaerobe [[id number]] Collected: 02/17/24 0911    Order Status: Sent Specimen:  Abscess from Groin Updated: 02/17/24 1117           Chief complaint:   69F PMH HLD, osteoporosis presented for elective complex Acomm aneurysm embolization via balloon assisted coiling. Patient started on ASA 81 in preparation for the procedure. Patient underwent aneurysm embolization with coiling complicated by aneurysm rupture, controlled with coils and balloon tamponade. NISHANT CT showed SAH b/l frontal lobes and thick R SAH, subsequent stability CTH with worsening ICH and IVH. Intubated in ICU with EVD placement.    24hr EVENTS:  3/6 - Intubated. EVD placed, set to 20. Dropped to 10 overnight.  3/7 - Exam improved as below, Extubated      -----------------------------------------------------------------------------------------------------------------------------------------------------------------------------------    PHYSICAL EXAM:  General: Calm  HEENT: MMM  Neuro:  -Mental status- No acute distress. Follows commands  -CN- PERRL 3mm, EOMI, tongue midline, face symmetric  - LLE 4-/5 otherwise full strength    CV: RRR  Pulm: clear to auscultation  Abd: Soft, nontender, nondistended  Ext: no noted edema in lower ext  Skin: warm, dry    ROS: [ ]  Unable to assess due to mental status   All other systems negative    -----------------------------------------------------------------------------------------------------------------------------------------------------------------------------------  ICU Vital Signs Last 24 Hrs  T(C): 37 (07 Mar 2024 14:00), Max: 37.9 (07 Mar 2024 08:30)  T(F): 98.6 (07 Mar 2024 14:00), Max: 100.2 (07 Mar 2024 08:30)  HR: 71 (07 Mar 2024 14:00) (63 - 102)  BP: 113/59 (07 Mar 2024 14:00) (83/52 - 146/68)  BP(mean): 76 (07 Mar 2024 14:00) (52 - 92)  ABP: 113/47 (07 Mar 2024 14:00) (82/37 - 151/70)  ABP(mean): 74 (07 Mar 2024 14:00) (56 - 105)  RR: 20 (07 Mar 2024 14:00) (10 - 20)  SpO2: 100% (07 Mar 2024 14:00) (97% - 100%)    O2 Parameters below as of 07 Mar 2024 13:59    O2 Flow (L/min): 2          I&O's Summary    06 Mar 2024 07:01  -  07 Mar 2024 07:00  --------------------------------------------------------  IN: 3306.1 mL / OUT: 1981 mL / NET: 1325.1 mL    07 Mar 2024 07:01  -  07 Mar 2024 14:34  --------------------------------------------------------  IN: 1631.1 mL / OUT: 349 mL / NET: 1282.1 mL        MEDICATIONS  (STANDING):  chlorhexidine 0.12% Liquid 15 milliLiter(s) Oral Mucosa every 12 hours  chlorhexidine 2% Cloths 1 Application(s) Topical daily  dexMEDEtomidine Infusion 0.02 MICROgram(s)/kG/Hr (0.23 mL/Hr) IV Continuous <Continuous>  levETIRAcetam   Injectable 500 milliGRAM(s) IV Push every 12 hours  niCARdipine Infusion 5 mG/Hr (25 mL/Hr) IV Continuous <Continuous>  niMODipine Oral Solution 30 milliGRAM(s) Enteral Tube every 2 hours  norepinephrine Infusion 0.05 MICROgram(s)/kG/Min (4.21 mL/Hr) IV Continuous <Continuous>  pantoprazole   Suspension 40 milliGRAM(s) Oral daily  polyethylene glycol 3350 17 Gram(s) Oral daily  senna 2 Tablet(s) Oral at bedtime  simvastatin 10 milliGRAM(s) Oral at bedtime  sodium chloride 0.9% Bolus 500 milliLiter(s) IV Bolus once  sodium chloride 0.9%. 1000 milliLiter(s) (60 mL/Hr) IV Continuous <Continuous>      RESPIRATORY:  Mode: CPAP with PS  FiO2: 30  PEEP: 6  PS: 8  MAP: 9  PIP: 17        IMAGING:   Recent imaging studies were reviewed.    LAB RESULTS:                          8.5    12.61 )-----------( 300      ( 07 Mar 2024 12:30 )             25.4       PT/INR - ( 06 Mar 2024 08:10 )   PT: 11.4 sec;   INR: 1.03 ratio         PTT - ( 06 Mar 2024 08:10 )  PTT:34.6 sec    03-07    142  |  108  |  11.9  ----------------------------<  141<H>  3.4<L>   |  22.0  |  0.53    Ca    7.8<L>      07 Mar 2024 03:00  Phos  2.4     03-07  Mg     1.8     03-07            ABG - ( 06 Mar 2024 17:56 )  pH, Arterial: 7.370 pH, Blood: x     /  pCO2: 48    /  pO2: 399   / HCO3: 28    / Base Excess: 2.4   /  SaO2: 100.0

## 2024-03-08 NOTE — PROGRESS NOTES
3rd Attempt made to reach patient for TCC call. No answer.  Message sent via [facility] portal for Post Discharge Attempt.

## 2024-03-11 NOTE — PHYSICIAN QUERY
"PT Name: Christine Mosqueda  MR #: [id number]    DOCUMENTATION CLARIFICATION     CDS/: Opal De León               Contact information:[email or web address]  This form is a permanent document in the medical record.    Query Date: March 11, 2024  By submitting this query, we are merely seeking further clarification of documentation. Please utilize your independent clinical judgment when addressing the question(s) below.    The Medical Record contains the following:   Indicator Supporting Clinical Findings Location in Medical Record     x Documentation of "Debridement"  A small area of suspicious appearing skin and tissue at one of the inferior edges of the wound was sharply debrided.    There was a small 2 cm area of fibrinous exudate of the medial thigh that was debrided with electrocautery. Attestation signed by Scott Koroma MD at 2/23/2024 11:16 PM      Attestation signed by Scott Koroma MD at 2/23/2024 11:17 PM    Documentation of "I&D"      Other       Excisional debridement is the surgical removal or cutting away of such tissue, necrosis, or slough and is classified to the root operation "Excision." Use of a sharp instrument does not always indicate that an excisional debridement was performed. Minor removal of loose fragments with scissors or using a sharp instrument to scrape away tissue is not an excisional debridement. Excisional debridement involves the use of a scalpel to remove devitalized tissue.  Nonexcisional debridement is the nonoperative brushing, irrigating, scrubbing, or washing of devitalized tissue, necrosis, slough, or foreign material. Most nonexcisional debridement procedures are classified to the root operation "Extraction" (pulling or stripping out or off all or a portion of a body part by the use of force).     Provider, please provide further clarification on the procedure performed on _______(specify site)             :    [  x ] Excisional Debridement of skin   [   ] Excisional " Debridement of subcutaneous tissue and/or fascia   [   ] Excisional Debridement of muscle   [   ] Excisional Debridement of tendon   [   ] Excisional Debridement of bursa and/or ligaments   [   ] Excisional Debridement of bone   [   ] Excisional Debridement of joint        [   ] Non-excisional Debridement of skin   [  x ] Non-excisional Debridement of subcutaneous tissue and/or fascia   [   ] Non-excisional Debridement of muscle   [   ] Non-excisional Debridement of tendon   [   ] Non-excisional Debridement of bursa and/or ligaments   [   ] Non-excisional Debridement of bone   [   ] Non-excisional Debridement of joint         Reference:    ICD-10-CM/PCS Coding Clinic Third Quarter ICD-10, Effective with discharges: October 7, 2015 [facility] [employer] § Excisional and nonexcisional debridement (2015).    Form No. 11416

## 2024-03-12 ENCOUNTER — TELEPHONE (OUTPATIENT)
Dept: SURGERY | Facility: CLINIC | Age: 54
End: 2024-03-12
Payer: MEDICAID

## 2024-03-18 LAB — FUNGUS SPEC CULT: NORMAL

## 2024-04-07 LAB
ACID FAST MOD KINY STN SPEC: NORMAL
MYCOBACTERIUM SPEC QL CULT: NORMAL

## 2024-05-20 PROBLEM — N17.9 AKI (ACUTE KIDNEY INJURY): Status: RESOLVED | Noted: 2023-03-10 | Resolved: 2024-05-20

## 2024-05-27 PROBLEM — R65.20 SEPSIS WITH ACUTE RENAL FAILURE AND TUBULAR NECROSIS WITHOUT SEPTIC SHOCK: Status: RESOLVED | Noted: 2024-02-16 | Resolved: 2024-05-27

## 2024-05-27 PROBLEM — N17.0 SEPSIS WITH ACUTE RENAL FAILURE AND TUBULAR NECROSIS WITHOUT SEPTIC SHOCK: Status: RESOLVED | Noted: 2024-02-16 | Resolved: 2024-05-27

## 2024-05-27 PROBLEM — A41.9 SEPSIS WITH ACUTE RENAL FAILURE AND TUBULAR NECROSIS WITHOUT SEPTIC SHOCK: Status: RESOLVED | Noted: 2024-02-16 | Resolved: 2024-05-27

## 2024-07-26 NOTE — ASSESSMENT & PLAN NOTE
Hx of tobacco abuse, pulmonary emphysema, pulmonary hypertension, restrictive lung disease listed per chart review of outside medical record    CXR 03/14/2023 non-acute    PLAN  Duo nebs PRN  Supplemental oxygen to target SaO2 >90%   Pt will benefit with PT services with progression of strength/ROM, manual and modalities to return to PLOF. Pt prior to onset of current condition had min/no pain with able to complete full ADLs and work activities. Patient received education on their current pathology and how their condition effects them with their functional activities. Patient understood discussion and questions were answered. Patient understands their activity limitations and understands rational for treatment progression.         Subjective:  Pt reports she has been feeling tired and her phone and watch has been giving her alerts on low oxygen ~84% reading.        Any changes in Ambulatory Summary Sheet?  None        Objective:       Vitals HR 55 O2sats 97% pre and during Nustep/ standing exercises    At arrival ambulation with SPC very slow freddie without any LOB.  Frequent seated break with standing exercises.   LLE fatigued faster.  Cued for WS with STS    Exercises: (No more than 4 columns)   Exercise/Equipment 7/12/24 #1 7/24/24 #2 7/26/24 #3        pulseOx monitoring   stable pulseOx monitoring   stable    WARM UP        Nustep  5' lv 4    5' lv 4           TABLE       *Hip adduction iso 10x1 with 5\" hold      Seated ADD   20* 3\"                            STANDING       *Marches with UE support 10x1 each  2*10  2*10    *Hip abduction 10x1 each 2*10 2*10    STS   19\" 8*           Amb no AD H/T more intentional step   No AD   100 ft                       PROPRIOCEPTION       *Romberg stance EO/EC 20\"x2 20\" 2*  30s ea. Airex next   Tandem EO/EC  20\" ea. R/L 30s ea. R/L Airex next                        MODALITIES                         Other Therapeutic Activities/Education: Patient received education on their current pathology and how their condition effects them with their functional activities. Patient understood discussion and questions were answered. Patient understands their activity limitations and understands rational for

## 2025-03-17 ENCOUNTER — HOSPITAL ENCOUNTER (EMERGENCY)
Facility: HOSPITAL | Age: 55
Discharge: HOME | End: 2025-03-18
Attending: EMERGENCY MEDICINE
Payer: MEDICAID

## 2025-03-17 DIAGNOSIS — R51.9 FACIAL PAIN: Primary | ICD-10-CM

## 2025-03-17 PROCEDURE — 99284 EMERGENCY DEPT VISIT MOD MDM: CPT | Mod: 25

## 2025-03-18 VITALS
OXYGEN SATURATION: 99 % | TEMPERATURE: 98 F | DIASTOLIC BLOOD PRESSURE: 77 MMHG | RESPIRATION RATE: 16 BRPM | BODY MASS INDEX: 27.31 KG/M2 | HEART RATE: 69 BPM | WEIGHT: 160 LBS | SYSTOLIC BLOOD PRESSURE: 152 MMHG | HEIGHT: 64 IN

## 2025-03-18 LAB
ALBUMIN SERPL BCP-MCNC: 2.3 G/DL (ref 3.5–5.2)
ALP SERPL-CCNC: 135 U/L (ref 40–150)
ALT SERPL W/O P-5'-P-CCNC: 22 U/L (ref 10–44)
ANION GAP SERPL CALC-SCNC: 10 MMOL/L (ref 8–16)
AST SERPL-CCNC: 21 U/L (ref 10–40)
BASOPHILS # BLD AUTO: 0.04 K/UL (ref 0–0.2)
BASOPHILS NFR BLD: 0.6 % (ref 0–1.9)
BILIRUB SERPL-MCNC: 0.1 MG/DL (ref 0.1–1)
BUN SERPL-MCNC: 26 MG/DL (ref 6–20)
CALCIUM SERPL-MCNC: 8.5 MG/DL (ref 8.7–10.5)
CHLORIDE SERPL-SCNC: 109 MMOL/L (ref 95–110)
CO2 SERPL-SCNC: 21 MMOL/L (ref 23–29)
CREAT SERPL-MCNC: 1.2 MG/DL (ref 0.5–1.4)
DIFFERENTIAL METHOD BLD: ABNORMAL
EOSINOPHIL # BLD AUTO: 0.2 K/UL (ref 0–0.5)
EOSINOPHIL NFR BLD: 3 % (ref 0–8)
ERYTHROCYTE [DISTWIDTH] IN BLOOD BY AUTOMATED COUNT: 15.3 % (ref 11.5–14.5)
EST. GFR  (NO RACE VARIABLE): 53.1 ML/MIN/1.73 M^2
GLUCOSE SERPL-MCNC: 230 MG/DL (ref 70–110)
HCT VFR BLD AUTO: 40.3 % (ref 37–48.5)
HCV AB SERPL QL IA: NORMAL
HGB BLD-MCNC: 12.5 G/DL (ref 12–16)
HIV 1+2 AB+HIV1 P24 AG SERPL QL IA: NORMAL
IMM GRANULOCYTES # BLD AUTO: 0.02 K/UL (ref 0–0.04)
IMM GRANULOCYTES NFR BLD AUTO: 0.3 % (ref 0–0.5)
LYMPHOCYTES # BLD AUTO: 2.4 K/UL (ref 1–4.8)
LYMPHOCYTES NFR BLD: 33.8 % (ref 18–48)
MCH RBC QN AUTO: 22.8 PG (ref 27–31)
MCHC RBC AUTO-ENTMCNC: 31 G/DL (ref 32–36)
MCV RBC AUTO: 74 FL (ref 82–98)
MONOCYTES # BLD AUTO: 0.5 K/UL (ref 0.3–1)
MONOCYTES NFR BLD: 7.2 % (ref 4–15)
NEUTROPHILS # BLD AUTO: 3.8 K/UL (ref 1.8–7.7)
NEUTROPHILS NFR BLD: 55.1 % (ref 38–73)
NRBC BLD-RTO: 0 /100 WBC
PLATELET # BLD AUTO: 282 K/UL (ref 150–450)
PMV BLD AUTO: 11.5 FL (ref 9.2–12.9)
POTASSIUM SERPL-SCNC: 4 MMOL/L (ref 3.5–5.1)
PROT SERPL-MCNC: 6.8 G/DL (ref 6–8.4)
RBC # BLD AUTO: 5.48 M/UL (ref 4–5.4)
SODIUM SERPL-SCNC: 140 MMOL/L (ref 136–145)
WBC # BLD AUTO: 6.95 K/UL (ref 3.9–12.7)

## 2025-03-18 PROCEDURE — 80053 COMPREHEN METABOLIC PANEL: CPT | Performed by: EMERGENCY MEDICINE

## 2025-03-18 PROCEDURE — 85025 COMPLETE CBC W/AUTO DIFF WBC: CPT | Performed by: EMERGENCY MEDICINE

## 2025-03-18 PROCEDURE — 63600175 PHARM REV CODE 636 W HCPCS: Mod: JZ,TB | Performed by: EMERGENCY MEDICINE

## 2025-03-18 PROCEDURE — 87389 HIV-1 AG W/HIV-1&-2 AB AG IA: CPT | Performed by: PHYSICIAN ASSISTANT

## 2025-03-18 PROCEDURE — 96374 THER/PROPH/DIAG INJ IV PUSH: CPT

## 2025-03-18 PROCEDURE — 86803 HEPATITIS C AB TEST: CPT | Performed by: PHYSICIAN ASSISTANT

## 2025-03-18 RX ORDER — KETOROLAC TROMETHAMINE 30 MG/ML
10 INJECTION, SOLUTION INTRAMUSCULAR; INTRAVENOUS
Status: COMPLETED | OUTPATIENT
Start: 2025-03-18 | End: 2025-03-18

## 2025-03-18 RX ORDER — NAPROXEN 375 MG/1
375 TABLET ORAL 2 TIMES DAILY PRN
Qty: 10 TABLET | Refills: 0 | Status: CANCELLED | OUTPATIENT
Start: 2025-03-18 | End: 2025-03-23

## 2025-03-18 RX ADMIN — KETOROLAC TROMETHAMINE 10 MG: 30 INJECTION, SOLUTION INTRAMUSCULAR; INTRAVENOUS at 12:03

## 2025-03-18 NOTE — ED TRIAGE NOTES
"Christine Mosqueda, a 54 y.o. female presents to the ED w/ complaint of right sided facial pain for the last few months. +Pruritus.     Triage note:  Chief Complaint   Patient presents with    Facial Pain     Whole face pain for "awhile"     Review of patient's allergies indicates:   Allergen Reactions    Hydrochlorothiazide plus      Past Medical History:   Diagnosis Date    Anxiety     Arthritis     Bronchitis     CHF (congestive heart failure)     Diabetic ketoacidosis associated with type 2 diabetes mellitus 3/10/2023    DM (diabetes mellitus)     Emphysema lung     Encounter for blood transfusion     HTN (hypertension)     Restrictive lung disease     Sleep apnea      "

## 2025-03-18 NOTE — ED PROVIDER NOTES
"Encounter Date: 3/17/2025       History     Chief Complaint   Patient presents with    Facial Pain     Whole face pain for "awhile"     This patient is a 54-year-old female Past Medical History:  No date: Anxiety  No date: Arthritis  No date: Bronchitis  No date: CHF (congestive heart failure)  3/10/2023: Diabetic ketoacidosis associated with type 2 diabetes   mellitus  No date: DM (diabetes mellitus)  No date: Emphysema lung  No date: Encounter for blood transfusion  No date: HTN (hypertension)  No date: Restrictive lung disease  No date: Sleep apnea  Presenting to the emergency department with complaints of acute on chronic right-sided face pain.  She reports this has been going on for years, most recently she is using Norco to treat the pain. She denies it is associated with face swelling, redness, fever.  Denies intraoral complaints.      Review of patient's allergies indicates:   Allergen Reactions    Hydrochlorothiazide plus      Past Medical History:   Diagnosis Date    Anxiety     Arthritis     Bronchitis     CHF (congestive heart failure)     Diabetic ketoacidosis associated with type 2 diabetes mellitus 3/10/2023    DM (diabetes mellitus)     Emphysema lung     Encounter for blood transfusion     HTN (hypertension)     Restrictive lung disease     Sleep apnea      Past Surgical History:   Procedure Laterality Date     SECTION      COLONOSCOPY N/A 2025    Procedure: COLONOSCOPY;  Surgeon: Qasim Riojas MD;  Location: [facility];  Service: Endoscopy;  Laterality: N/A;    COLONOSCOPY W/ BIOPSIES AND POLYPECTOMY  2025    DEBRIDEMENT Right 2024    Procedure: DEBRIDEMENT; Librado's gangrene;  Surgeon: Manish Nazario MD;  Location: [facility] OR;  Service: General;  Laterality: Right;  Lithotomy    DEBRIDEMENT Right 2024    Procedure: Wound vac change,  debridement,  partial wound closure;  Surgeon: Scott Koroma MD;  Location: [facility] OR;  Service: General;  Laterality: Right;    " PALATAL EXPANSION      REPLACEMENT OF WOUND VACUUM-ASSISTED CLOSURE DEVICE Right 02/23/2024    Procedure: REPLACEMENT, WOUND VAC;  Surgeon: Scott Koroma MD;  Location: [facility] OR;  Service: General;  Laterality: Right;  Lithotomy position  all sizes of wound vac foam (small, med, and large)    WOUND EXPLORATION N/A 02/17/2024    Procedure: INCISION AND DRAINAGE; WOUND DEBRIDEMENT,;  Surgeon: Scott Koroma MD;  Location: [facility] OR;  Service: General;  Laterality: N/A;    WOUND EXPLORATION Right 02/19/2024    Procedure: EXPLORATION, WOUND;  Surgeon: Scott Koroma MD;  Location: [facility] OR;  Service: General;  Laterality: Right;  Lithotomy Position  wound vac (2 black sponges and white sponges)    WOUND EXPLORATION Right 02/21/2024    Procedure: EXPLORATION, WOUND;  Surgeon: Scott Koroma MD;  Location: [facility] OR;  Service: General;  Laterality: Right;  9:30am start    WRIST SURGERY Right      Family History   Problem Relation Name Age of Onset    Diabetes Mother      Hypertension Mother      Hypertension Father      Diabetes Father      Diabetes Sister       Social History[1]  Review of Systems   HENT:  Negative for facial swelling.        Physical Exam     Initial Vitals [03/17/25 1948]   BP Pulse Resp Temp SpO2   (!) 145/73 81 20 98.1 °F (36.7 °C) 96 %      MAP       --         Physical Exam    Nursing note and vitals reviewed.  Constitutional: She appears well-developed and well-nourished.   HENT:   Head: Normocephalic and atraumatic. Mouth/Throat: Oropharynx is clear and moist.   Tenderness to palpation over the right face, no overlying cutaneous abnormality   Eyes: Conjunctivae and EOM are normal.   Neck:   Normal range of motion.  Pulmonary/Chest: No respiratory distress.   Abdominal: She exhibits no distension.   Musculoskeletal:         General: Normal range of motion.      Cervical back: Normal range of motion.     Neurological: She is alert and oriented to person, place, and time. She has normal strength.  No cranial nerve deficit. GCS score is 15. GCS eye subscore is 4. GCS verbal subscore is 5. GCS motor subscore is 6.   Skin: Skin is warm and dry. Capillary refill takes less than 2 seconds.   Psychiatric: She has a normal mood and affect. Thought content normal.         ED Course   Procedures  Labs Reviewed   CBC W/ AUTO DIFFERENTIAL - Abnormal       Result Value    WBC 6.95      RBC 5.48 (*)     Hemoglobin 12.5      Hematocrit 40.3      MCV 74 (*)     MCH 22.8 (*)     MCHC 31.0 (*)     RDW 15.3 (*)     Platelets 282      MPV 11.5      Immature Granulocytes 0.3      Gran # (ANC) 3.8      Immature Grans (Abs) 0.02      Lymph # 2.4      Mono # 0.5      Eos # 0.2      Baso # 0.04      nRBC 0      Gran % 55.1      Lymph % 33.8      Mono % 7.2      Eosinophil % 3.0      Basophil % 0.6      Differential Method Automated     COMPREHENSIVE METABOLIC PANEL - Abnormal    Sodium 140      Potassium 4.0      Chloride 109      CO2 21 (*)     Glucose 230 (*)     BUN 26 (*)     Creatinine 1.2      Calcium 8.5 (*)     Total Protein 6.8      Albumin 2.3 (*)     Total Bilirubin 0.1      Alkaline Phosphatase 135      AST 21      ALT 22      eGFR 53.1 (*)     Anion Gap 10     HEPATITIS C ANTIBODY    Hepatitis C Ab Non-reactive      Narrative:     Release to patient->Immediate   HIV 1 / 2 ANTIBODY    HIV 1/2 Ag/Ab Non-reactive      Narrative:     Release to patient->Immediate          Imaging Results    None          Medications   ketorolac injection 9.999 mg (9.999 mg Intravenous Given 3/18/25 0055)     Medical Decision Making  Patient assessed in the presence of family member.  Initial vital signs are stable.  Patient reporting familiar exacerbation of right face pain that has been present intermittently for years.  No evidence of overlying skin changes that may represent cellulitis, sinusitis, abscess, neoplasm, other CNS infection.  No new trauma.  Screening labs appear stable for the patient.  She reports improvement after IV  anti-inflammatory and further educated about supportive care for possible tic [place].  She will be discharged with a short course of anti-inflammatory and asked to follow up with the primary care doctor as soon as possible to request whether specialist referral to assess for this underlying cause is warranted.  Asked to adhere to her normal diabetic hyperglycemic regimen.  Asked to return to the emergency department immediately for any new, concerning, or worsening symptoms.  Patient discharged in stable condition.    Amount and/or Complexity of Data Reviewed  Labs: ordered.    Risk  Prescription drug management.                                      Clinical Impression:  Final diagnoses:  [R51.9] Facial pain (Primary)          ED Disposition Condition    Discharge Stable          ED Prescriptions    None       Follow-up Information       Follow up With Specialties Details Why Contact [facility], [facility] Behavioral Health, Psychiatry, Family Medicine Schedule an appointment as soon as possible for a visit   [address] [facility] [address]  [phone]      [facility] - Emergency Dept Emergency Medicine  As needed, If symptoms worsen [address] [facility] [address] [phone]                 [1]   Social History  Tobacco Use    Smoking status: Some Days     Current packs/day: 0.25     Types: Cigarettes    Smokeless tobacco: Never   Substance Use Topics    Alcohol use: Yes     Comment: occ    Drug use: Not Currently     Types: Marijuana        Tres Novak MD  03/18/25 2037

## 2025-03-18 NOTE — PROVIDER PROGRESS NOTES - EMERGENCY DEPT.
Encounter Date: 3/17/2025    ED Physician Progress Notes          ED staff SIGN OUT NOTE    Patient s/o to me by Dr. Novak pending CMP    CMP with mild hyperglycemia without AGMA    Patient was discharged home as planned

## 2025-03-18 NOTE — FIRST PROVIDER EVALUATION
Medical screening examination initiated.  I have conducted a focused provider triage encounter, findings are as follows:    Brief history of present illness:  facial pain for months    There were no vitals filed for this visit.    Pertinent physical exam:  well appearing, no distress, no obvious facial swelling, no respiratory distress, appropriately conversational     Brief workup plan:  evaluate facial pain     Preliminary workup initiated; this workup will be continued and followed by the physician or advanced practice provider that is assigned to the patient when roomed.

## 2025-04-01 NOTE — ED NOTES
Family, Kevin (sister) @ [phone] notified of patient's departure & name of the accepting facility.   Alert and oriented to person, place, time/situation. normal mood and affect. no apparent risk to self or others.

## (undated) DEVICE — NDL HYPO REG 25G X 1 1/2

## (undated) DEVICE — ADHESIVE MASTISOL VIAL 48/BX

## (undated) DEVICE — SEE MEDLINE ITEM 154981

## (undated) DEVICE — SEE MEDLINE ITEM 107746

## (undated) DEVICE — COVER OVERHEAD SURG LT BLUE

## (undated) DEVICE — SOL NACL IRR 1000ML BTL

## (undated) DEVICE — SUPPORT ULNA NERVE PROTECTOR

## (undated) DEVICE — PAD ABD 8X10 STERILE

## (undated) DEVICE — PAD ABDOMINAL STERILE 8X10IN

## (undated) DEVICE — TIP YANKAUERS BULB NO VENT

## (undated) DEVICE — GLOVE SURGICAL LATEX SZ 7

## (undated) DEVICE — TRAY FOLEY 16FR INFECTION CONT

## (undated) DEVICE — DRAPE UNDERBUTTOCKS PCH STRL

## (undated) DEVICE — SOL NACL IRR 3000ML

## (undated) DEVICE — CANISTER SUCTION 2 LTR

## (undated) DEVICE — STAPLER SKIN ROTATING HEAD

## (undated) DEVICE — PAD ABDOMINAL 5X9 STERILE

## (undated) DEVICE — KIT DRSNG GRNUFM MED 18X12X5CM

## (undated) DEVICE — POSITIONER HEAD DONUT 9IN FOAM

## (undated) DEVICE — ELECTRODE REM PLYHSV RETURN 9

## (undated) DEVICE — SEE MEDLINE ITEM 146292

## (undated) DEVICE — SOL IRR SOD CHL .9% POUR

## (undated) DEVICE — PULSAVAC ZIMMER

## (undated) DEVICE — SYR IRRIGATION BULB STER 60ML

## (undated) DEVICE — SEE MEDLINE ITEM 146298

## (undated) DEVICE — TOWEL OR DISP STRL BLUE 4/PK

## (undated) DEVICE — SPONGE LAP 18X18 PREWASHED

## (undated) DEVICE — CANISTER INFOV.A.C WOUND 500ML

## (undated) DEVICE — BANDAGE DERMACEA STRETCH 4X1IN

## (undated) DEVICE — Device

## (undated) DEVICE — GOWN NONREINF SET-IN SLV XL

## (undated) DEVICE — BLANKET UPPER BODY 78.7X29.9IN

## (undated) DEVICE — GAUZE SPONGE 4X4 12PLY

## (undated) DEVICE — SUT VICRYL 3-0 27 SH

## (undated) DEVICE — SYR 10CC LUER LOCK

## (undated) DEVICE — PACK ENDOSCOPY GENERAL

## (undated) DEVICE — TRAY SKIN SCRUB WET PREMIUM

## (undated) DEVICE — GLOVE BIOGEL 7.0

## (undated) DEVICE — BANDAGE ROLL COTTN 4.5INX4.1YD

## (undated) DEVICE — GLOVE SURG BIOGEL LATEX SZ 7.5

## (undated) DEVICE — APPLICATOR CHLORAPREP ORN 26ML

## (undated) DEVICE — TRAY VAGINAL WET PREP